# Patient Record
Sex: MALE | Race: WHITE | Employment: OTHER | URBAN - METROPOLITAN AREA
[De-identification: names, ages, dates, MRNs, and addresses within clinical notes are randomized per-mention and may not be internally consistent; named-entity substitution may affect disease eponyms.]

---

## 2017-01-16 ENCOUNTER — ANTICOAGULATION THERAPY VISIT (OUTPATIENT)
Dept: ANTICOAGULATION | Facility: CLINIC | Age: 82
End: 2017-01-16
Payer: COMMERCIAL

## 2017-01-16 ENCOUNTER — OFFICE VISIT (OUTPATIENT)
Dept: INTERNAL MEDICINE | Facility: CLINIC | Age: 82
End: 2017-01-16
Payer: COMMERCIAL

## 2017-01-16 VITALS
HEART RATE: 94 BPM | TEMPERATURE: 97.4 F | OXYGEN SATURATION: 97 % | DIASTOLIC BLOOD PRESSURE: 78 MMHG | WEIGHT: 187 LBS | BODY MASS INDEX: 26.18 KG/M2 | HEIGHT: 71 IN | SYSTOLIC BLOOD PRESSURE: 124 MMHG

## 2017-01-16 DIAGNOSIS — E11.9 TYPE 2 DIABETES MELLITUS WITHOUT COMPLICATION, WITHOUT LONG-TERM CURRENT USE OF INSULIN (H): ICD-10-CM

## 2017-01-16 DIAGNOSIS — E11.22 TYPE 2 DIABETES MELLITUS WITH STAGE 3 CHRONIC KIDNEY DISEASE, WITHOUT LONG-TERM CURRENT USE OF INSULIN (H): ICD-10-CM

## 2017-01-16 DIAGNOSIS — Z01.818 PREOP GENERAL PHYSICAL EXAM: Primary | ICD-10-CM

## 2017-01-16 DIAGNOSIS — N18.30 TYPE 2 DIABETES MELLITUS WITH STAGE 3 CHRONIC KIDNEY DISEASE, WITHOUT LONG-TERM CURRENT USE OF INSULIN (H): ICD-10-CM

## 2017-01-16 DIAGNOSIS — Z79.01 LONG-TERM (CURRENT) USE OF ANTICOAGULANTS: Primary | ICD-10-CM

## 2017-01-16 DIAGNOSIS — I48.20 CHRONIC ATRIAL FIBRILLATION (H): ICD-10-CM

## 2017-01-16 DIAGNOSIS — H26.9 CATARACT: ICD-10-CM

## 2017-01-16 LAB — INR POINT OF CARE: 4.1 (ref 0.86–1.14)

## 2017-01-16 PROCEDURE — 85610 PROTHROMBIN TIME: CPT | Mod: QW

## 2017-01-16 PROCEDURE — 36416 COLLJ CAPILLARY BLOOD SPEC: CPT

## 2017-01-16 PROCEDURE — 99207 ZZC NO CHARGE NURSE ONLY: CPT

## 2017-01-16 PROCEDURE — 99214 OFFICE O/P EST MOD 30 MIN: CPT | Performed by: INTERNAL MEDICINE

## 2017-01-16 RX ORDER — PIOGLITAZONEHYDROCHLORIDE 45 MG/1
45 TABLET ORAL DAILY
Qty: 90 TABLET | Refills: 1 | Status: SHIPPED | OUTPATIENT
Start: 2017-01-16 | End: 2017-06-14

## 2017-01-16 NOTE — PROGRESS NOTES
ANTICOAGULATION FOLLOW-UP CLINIC VISIT    Patient Name:  Louis Keller Jr.  Date:  1/16/2017  Contact Type:  Face to Face    SUBJECTIVE:     Patient Findings     Positives Activity level change (Pt reports decreased activity.), Unexplained INR or factor level change    Comments Pt has cataract surgery 1/17/17           OBJECTIVE    INR PROTIME   Date Value Ref Range Status   01/16/2017 4.1* 0.86 - 1.14 Final       ASSESSMENT / PLAN  INR assessment SUPRA    Recheck INR In: 10 DAYS    INR Location Clinic      Anticoagulation Summary as of 1/16/2017     INR goal 2.0-3.0   Selected INR 4.1! (1/16/2017)   Maintenance plan 5 mg (5 mg x 1) on Sun, Tue, Thu; 2.5 mg (5 mg x 0.5) all other days   Full instructions 1/16: Hold; Otherwise 5 mg on Sun, Tue, Thu; 2.5 mg all other days   Weekly total 25 mg   Plan last modified Monica Souza RN (5/12/2016)   Next INR check 1/26/2017   Priority INR   Target end date     Indications   Long-term (current) use of anticoagulants [Z79.01] [Z79.01]  Atrial fibrillation (H) [I48.91]         Anticoagulation Episode Summary     INR check location     Preferred lab     Send INR reminders to [facility]    Comments       Anticoagulation Care Providers     Provider Role Specialty Phone number    Joselito Armas MD [facility] Internal Medicine [phone]            See the Encounter Report to view Anticoagulation Flowsheet and Dosing Calendar (Go to Encounters tab in chart review, and find the Anticoagulation Therapy Visit)        Monica Souza, RN

## 2017-01-16 NOTE — MR AVS SNAPSHOT
After Visit Summary   1/16/2017    Louis Keller Jr.    MRN: [id number]           Patient Information     Date Of Birth          6/26/1932        Visit Information        Provider Department      1/16/2017 1:00 PM Joselito Armas MD [facility]        Today's Diagnoses     Preop general physical exam    -  1     Type 2 diabetes mellitus without complication, without long-term current use of insulin (H)           Care Instructions      Before Your Surgery      Call your surgeon if there is any change in your health. This includes signs of a cold or flu (such as a sore throat, runny nose, cough, rash or fever).    Do not smoke, drink alcohol or take over the counter medicine (unless your surgeon or primary care doctor tells you to) for the 24 hours before and after surgery.    If you take prescribed drugs: Follow your doctor s orders about which medicines to take and which to stop until after surgery.    Eating and drinking prior to surgery: follow the instructions from your surgeon    Take a shower or bath the night before surgery. Use the soap your surgeon gave you to gently clean your skin. If you do not have soap from your surgeon, use your regular soap. Do not shave or scrub the surgery site.  Wear clean pajamas and have clean sheets on your bed.         Follow-ups after your visit        Your next 10 appointments already scheduled     Jan 16, 2017  1:45 PM   Anticoagulation Visit with [facility]   [facility] ([facility])    [address] [facility] [address]  [facility] [id number] [phone]            Mar 07, 2017  2:15 PM   Return Visit with Herbert Gleason MD   [facility] [place] ([facility])    [address]  [facility] [id number] [phone]              Who to contact     If you have questions or need follow up information about today's clinic visit or your  "schedule please contact [facility] directly at [phone].  Normal or non-critical lab and imaging results will be communicated to you by MyChart, letter or phone within 4 business days after the clinic has received the results. If you do not hear from us within 7 days, please contact the clinic through MyChart or phone. If you have a critical or abnormal lab result, we will notify you by phone as soon as possible.  Submit refill requests through [employer] or call your pharmacy and they will forward the refill request to us. Please allow 3 business days for your refill to be completed.          Additional Information About Your Visit        Care EveryWhere ID     This is your Care EveryWhere ID. This could be used by other organizations to access your [facility] medical records  [id number]        Your Vitals Were     Pulse Temperature Height BMI (Body Mass Index) Pulse Oximetry       94 97.4  F (36.3  C) (Oral) 5' 11\" (1.803 m) 26.09 kg/m2 97%        Blood Pressure from Last 3 Encounters:   01/16/17 124/78   12/14/16 112/70   09/14/16 108/58    Weight from Last 3 Encounters:   01/16/17 187 lb (84.823 kg)   12/14/16 185 lb (83.915 kg)   09/14/16 188 lb (85.276 kg)              Today, you had the following     No orders found for display         Today's Medication Changes          These changes are accurate as of: 1/16/17  1:31 PM.  If you have any questions, ask your nurse or doctor.               These medicines have changed or have updated prescriptions.        Dose/Directions    pioglitazone 45 MG tablet   Commonly known as:  ACTOS   This may have changed:  when to take this   Used for:  Type 2 diabetes mellitus without complication, without long-term current use of insulin (H)   Changed by:  Joselito Armas MD        Dose:  45 mg   Take 1 tablet (45 mg) by mouth daily   Quantity:  90 tablet   Refills:  1         Stop taking these medicines if you haven't already. Please contact your care team " if you have questions.     metFORMIN 1000 MG tablet   Commonly known as:  GLUCOPHAGE   Stopped by:  Joselito Armas MD                Where to get your medicines      These medications were sent to [facility] [address] [facility] - [place], [place] - [address]  [address], [facility] [address]     Phone:  [phone]    - pioglitazone 45 MG tablet             Primary Care Provider Office Phone # Fax #    Joselito Armas [phone]       [facility] [address] [facility] [address]        Thank you!     Thank you for choosing [facility]  for your care. Our goal is always to provide you with excellent care. Hearing back from our patients is one way we can continue to improve our services. Please take a few minutes to complete the written survey that you may receive in the mail after your visit with us. Thank you!             Your Updated Medication List - Protect others around you: Learn how to safely use, store and throw away your medicines at www.disposemymeds.org.          This list is accurate as of: 1/16/17  1:31 PM.  Always use your most recent med list.                   Brand Name Dispense Instructions for use    ACE NOT PRESCRIBED (INTENTIONAL)     0 each    1 each daily ACE Inhibitor not prescribed due to Risk for drug interaction       acetaminophen 500 MG tablet    TYLENOL     Take 500 mg by mouth every 6 hours as needed       ASPIRIN NOT PRESCRIBED    INTENTIONAL     by Other route continuous prn.       blood glucose monitoring lancets     1 Box    Use to test blood sugar 1 times daily or as directed.       blood glucose monitoring test strip    ONE TOUCH ULTRA    100 each    100 strips by In Vitro route daily check daily       cholestyramine 4 G Packet    QUESTRAN    180 each    Take 1 packet (4 g) by mouth 2 times daily (with meals)       Chromium 200 MCG Tabs      Take 500 tablets by mouth daily       diltiazem 120 MG 24 hr capsule      90 capsule    Take 1 capsule (120 mg) by mouth every evening       ferrous gluconate 324 (38 FE) MG tablet    FERGON    100 tablet    Take 1 tablet (324 mg) by mouth daily (with breakfast)       fish oil-omega-3 fatty acids 1000 MG capsule      Take 1 g by mouth 2 times daily       glipiZIDE 10 MG 24 hr tablet    glipiZIDE XL    180 tablet    Take 1 tablet (10 mg) by mouth 2 times daily       loperamide 2 MG tablet    IMODIUM A-D    30 tablet    1 tablet BID       Multi-vitamin Tabs tablet   Generic drug:  multivitamin, therapeutic with minerals     100    one tab po qd       order for DME     1 Device    Equipment being ordered: Onetouch Ultrasoft Lancing Device       pioglitazone 45 MG tablet    ACTOS    90 tablet    Take 1 tablet (45 mg) by mouth daily       STATIN NOT PRESCRIBED (INTENTIONAL)     0 each    1 each At Bedtime Statin not prescribed intentionally due to Risk for drug interaction       warfarin 5 MG tablet    COUMADIN    72 tablet    Take 1 tablet (5 mg) Tuesday, Thursday, and Sunday. Take one-half tablet (2.5 mg) on Monday, Wednesday, Friday, and Saturday or as directed by INR clinic

## 2017-01-16 NOTE — NURSING NOTE
"Chief Complaint   Patient presents with     Pre-Op Exam     01/17/17       Initial /78 mmHg  Pulse 94  Temp(Src) 97.4  F (36.3  C) (Oral)  Ht 5' 11\" (1.803 m)  Wt 187 lb (84.823 kg)  BMI 26.09 kg/m2  SpO2 97% Estimated body mass index is 26.09 kg/(m^2) as calculated from the following:    Height as of this encounter: 5' 11\" (1.803 m).    Weight as of this encounter: 187 lb (84.823 kg).  BP completed using cuff size: margarita Grimm MA      "

## 2017-01-16 NOTE — MR AVS SNAPSHOT
Louis Keller Jr.   1/16/2017 1:45 PM   Anticoagulation Therapy Visit    Description:  84 year old male   Provider:  [facility]   Department:  [facility]           INR as of 1/16/2017     Selected INR 4.1! (1/16/2017)      Anticoagulation Summary as of 1/16/2017     INR goal 2.0-3.0   Selected INR 4.1! (1/16/2017)   Full instructions 1/16: Hold; Otherwise 5 mg on Sun, Tue, Thu; 2.5 mg all other days   Next INR check 1/26/2017    Indications   Long-term (current) use of anticoagulants [Z79.01] [Z79.01]  Atrial fibrillation (H) [I48.91]         Your next Anticoagulation Clinic appointment(s)     Jan 26, 2017  2:45 PM   Anticoagulation Visit with [facility]   [facility] ([facility])    [address] [facility] [address]  [facility] [address] [phone]              Contact Numbers     [facility] Phone Numbers:  Anticoagulation Clinic Appointments : [phone]  Anticoagulation Nurse: [phone]         January 2017 Details    Sun Mon Tue Wed Thu Fri Sat     1               2               3               4               5               6               7                 8               9               10               11               12               13               14                 15               16      Hold   See details      17      5 mg         18      2.5 mg         19      5 mg         20      2.5 mg         21      2.5 mg           22      5 mg         23      2.5 mg         24      5 mg         25      2.5 mg         26            27               28                 29               30               31                    Date Details   01/16 This INR check       Date of next INR:  1/26/2017         How to take your warfarin dose     To take:  2.5 mg Take 0.5 of a 5 mg tablet.    To take:  5 mg Take 1 of the 5 mg tablets.    Hold Do not take your warfarin dose. See the Details table to the right for additional  instructions.

## 2017-01-16 NOTE — PROGRESS NOTES
[facility]  [address]  [facility] [address]  [phone]  Dept: [phone]    PRE-OP EVALUATION:  Today's date: 2017    Louis PACHECO Arianna Willis (: 1932) presents for pre-operative evaluation assessment as requested by Dr. Sagastume.  He requires evaluation and anesthesia risk assessment prior to undergoing surgery/procedure for treatment of eye cataract .  Proposed procedure: Rt Cataract Removal    Date of Surgery/ Procedure: 17  Time of Surgery/ Procedure: 10:30AM  Hospital/Surgical Facility: [facility]  Fax number for surgical facility: [phone]  Primary Physician: Joselito Armas  Type of Anesthesia Anticipated: to be determined    Patient has a Health Care Directive or Living Will:  YES     1. NO - Do you have a history of heart attack, stroke, stent, bypass or surgery on an artery in the head, neck, heart or legs?  2. NO - Do you ever have any pain or discomfort in your chest?  3. NO - Do you have a history of  Heart Failure?  4. YES - ARE YOUR TROUBLED BY SHORTNESS OF BREATH WHEN WALKING ON THE LEVEL, UP A SLIGHT HILL OR AT NIGHT?   5. NO - Do you currently have a cold, bronchitis or other respiratory infection?  6. NO - Do you have a cough, shortness of breath or wheezing?  7. YES - DO YOU SOMETIMES GET PAINS IN THE CALVES OF YOUR LEGS WHEN YOU WALK?   8. NO - Do you or anyone in your family have previous history of blood clots?  9. NO - Do you or does anyone in your family have a serious bleeding problem such as prolonged bleeding following surgeries or cuts?  10. YES - HAVE YOU EVER HAD PROBLEMS WITH ANEMIA OR BEEN TOLD TO TAKE IRON PILLS?   11. YES - HAVE YOU HAD ANY ABNORMAL BLOOD LOSS SUCH AS BLACK, TARRY OR BLOODY STOOLS, OR ABNORMAL VAGINAL BLEEDING?   12. YES - HAVE YOU EVER HAD A BLOOD TRANSFUSION?   13. NO - Have you or any of your relatives ever had problems with anesthesia?  14. NO - Do you have sleep apnea, excessive  snoring or daytime drowsiness?  15. NO - Do you have any prosthetic heart valves?  16. YES - DO YOU HAVE PROSTHETIC JOINTS?   17. NO - Is there any chance that you may be pregnant?      HPI:                                                      Brief HPI related to upcoming procedure: scheduled for eye cataract surgery.   No acute complaints, no medication change or new medical conditions.  Has history of atrial fibrillation. On anticoagulation with Coumadin and rate control medications. Asymptonatic - no chest pains , palpitations,  no side effects from medications.  Has H/O DM. On diet , exercise and PO medications. Blood sugars are controlled. No parestesias. No hypoglycemias. Has had diarrhea related to Metformin  Patient has anemia . Has been treated with iron PO . Currently not symptomatic with dizziness, weakness, no melena or blood in the stools.         See problem list for active medical problems.  Problems all longstanding and stable, except as noted/documented.  See ROS for pertinent symptoms related to these conditions.                                                                                                  .    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  FPA / [facility]             Advanced directives, counseling/discussion 05/07/2012     Priority: Low     Patient states has Advance Directive and will bring in a copy to clinic.       HL (hearing loss) 05/07/2012     Priority: Low      Past Medical History   Diagnosis Date     Type II or unspecified type diabetes mellitus without mention of complication, not stated as uncontrolled      Scarlet fever      Hemorrhage of gastrointestinal tract, unspecified 63,65,and 1971     hospitalized     Unspecified disease of pancreas 1990     pancreatitis     Arrhythmia      atrial fibrillation     Antiplatelet or antithrombotic long-term use      History of blood transfusion      Diarrhea      Atrial fibrillation (H)      QUESADA (dyspnea on exertion)      Diastolic murmur      Past Surgical History   Procedure Laterality Date     C nonspecific procedure  Child     T&A     C nonspecific procedure  ; also 11/03     Colonoscopy     C nonspecific procedure       Basal cell cancer of the nose     C nonspecific procedure  1990     Cholecystectomy     Arthroplasty knee  8/5/2013     Procedure: ARTHROPLASTY KNEE;  Left Total Knee Arthroplasty       Cardioversion  9/6/11     failed     Current Outpatient Prescriptions   Medication Sig Dispense Refill     ferrous gluconate (FERGON) 324 (38 FE) MG tablet Take 1  tablet (324 mg) by mouth daily (with breakfast) 100 tablet 3     warfarin (COUMADIN) 5 MG tablet Take 1 tablet (5 mg) Tuesday, Thursday, and Sunday. Take one-half tablet (2.5 mg) on Monday, Wednesday, Friday, and Saturday or as directed by INR clinic 72 tablet 3     pioglitazone (ACTOS) 45 MG tablet Take 1 tablet (45 mg) by mouth every other day 90 tablet 1     diltiazem 120 MG 24 hr CD capsule Take 1 capsule (120 mg) by mouth every evening 90 capsule 1     cholestyramine (QUESTRAN) 4 G packet Take 1 packet (4 g) by mouth 2 times daily (with meals) 180 each 0     glipiZIDE (GLIPIZIDE XL) 10 MG 24 hr tablet Take 1 tablet (10 mg) by mouth 2 times daily 180 tablet 1     loperamide (IMODIUM A-D) 2 MG tablet Start with 2 tabs (4 mg), then take one tab (2 mg) after each diarrheal stool.  Do not use more than  8 tabs (16 mg) per day. 30 tablet 0     blood glucose monitoring (ONE TOUCH ULTRA) test strip 100 strips by In Vitro route daily check daily 100 each 3     metFORMIN (GLUCOPHAGE) 1000 MG tablet Take 1 tablet (1,000 mg) by mouth 2 times daily (with meals) 180 tablet 3     acetaminophen (TYLENOL) 500 MG tablet Take 500 mg by mouth every 6 hours as needed        STATIN NOT PRESCRIBED, INTENTIONAL, 1 each At Bedtime Statin not prescribed intentionally due to Risk for drug interaction 0 each 0     ACE NOT PRESCRIBED, INTENTIONAL, 1 each daily ACE Inhibitor not prescribed due to Risk for drug interaction 0 each 0     ORDER FOR DME Equipment being ordered: Onetouch Ultrasoft Lancing Device 1 Device 0     blood glucose monitoring (ONE TOUCH ULTRASOFT) lancets Use to test blood sugar 1 times daily or as directed. 1 Box 2     Chromium 200 MCG TABS Take 1 tablet by mouth 2 times daily.       ASPIRIN NOT PRESCRIBED, INTENTIONAL, by Other route continuous prn.       fish oil-omega-3 fatty acids (FISH OIL) 1000 MG capsule Take 400 g by mouth 2 times daily        MULTI-VITAMIN OR TABS one tab po qd 100 0     OTC products: None,  except as noted above    Allergies   Allergen Reactions     No Known Drug Allergies       Latex Allergy: NO    Social History   Substance Use Topics     Smoking status: Never Smoker      Smokeless tobacco: Never Used     Alcohol Use: 0.0 oz/week     0 Standard drinks or equivalent per week      Comment: 1 glass of wine every day     History   Drug Use No       REVIEW OF SYSTEMS:                                                    C: NEGATIVE for fever, chills, change in weight  I: NEGATIVE for worrisome rashes, moles or lesions  E: NEGATIVE for vision changes or irritation  E/M: NEGATIVE for ear, mouth and throat problems  R: NEGATIVE for significant cough or SOB  CV: NEGATIVE for chest pain, palpitations or peripheral edema  GI: NEGATIVE for nausea, abdominal pain, heartburn, or change in bowel habits  : NEGATIVE for frequency, dysuria, or hematuria  M: NEGATIVE for significant arthralgias or myalgia  N: NEGATIVE for weakness, dizziness or paresthesias  E: NEGATIVE for temperature intolerance, skin/hair changes  H: NEGATIVE for bleeding problems  P: NEGATIVE for changes in mood or affect    EXAM:                                                    There were no vitals taken for this visit.    GENERAL APPEARANCE: healthy, alert and no distress     EYES: EOMI, - PERRL     HENT: ear canals and TM's normal and nose and mouth without ulcers or lesions     NECK: no adenopathy, no asymmetry, masses, or scars and thyroid normal to palpation     RESP: lungs clear to auscultation - no rales, rhonchi or wheezes     CV: irregular rates and rhythm, normal S1 S2, no S3 or S4 and no murmur, click or rub -     ABDOMEN:  soft, nontender, no HSM or masses and bowel sounds normal     MS: extremities normal- no gross deformities noted, no evidence of inflammation in joints, FROM in all extremities.     SKIN: no suspicious lesions or rashes     NEURO: Normal strength and tone, sensory exam grossly normal, mentation intact and speech  normal     PSYCH: mentation appears normal. and affect normal/bright     LYMPHATICS: No axillary, cervical, inguinal, or supraclavicular nodes    DIAGNOSTICS:                                                    No labs or EKG required for low risk surgery (cataract, skin procedure, breast biopsy, etc)    Recent Labs   Lab Test 12/29/16 12/14/16   0822 12/01/16 09/14/16   0858   HGB   --   12.5*   --    --   12.5*   PLT   --   189   --    --   176   INR  3.4*   --   3.0*   < >   --    NA   --   140   --    --   139   POTASSIUM   --   4.4   --    --   4.3   CR   --   0.96   --    --   0.89   A1C   --   6.8*   --    --   6.6*    < > = values in this interval not displayed.        IMPRESSION:                                                    Reason for surgery/procedure: eye cataract   Diagnosis/reason for consult: preoperative evaluation/ clearance      The proposed surgical procedure is considered LOW risk.    REVISED CARDIAC RISK INDEX  The patient has the following serious cardiovascular risks for perioperative complications such as (MI, PE, VFib and 3  AV Block):  No serious cardiac risks  INTERPRETATION: 0 risks: Class I (very low risk - 0.4% complication rate)    The patient has the following additional risks for perioperative complications:  No identified additional risks    No diagnosis found.    RECOMMENDATIONS:                                                          --Patient is to take all scheduled medications on the day of surgery EXCEPT for modifications listed below.  Hold morning medications on day of procedure    APPROVAL GIVEN to proceed with proposed procedure, without further diagnostic evaluation       Signed Electronically by: Joselito Armas MD    Copy of this evaluation report is provided to requesting physician.    [facility] Preop Guidelines

## 2017-01-17 DIAGNOSIS — E78.5 HYPERLIPIDEMIA LDL GOAL <100: ICD-10-CM

## 2017-01-17 DIAGNOSIS — K91.89 POSTCHOLECYSTECTOMY DIARRHEA: Primary | ICD-10-CM

## 2017-01-17 DIAGNOSIS — R19.7 POSTCHOLECYSTECTOMY DIARRHEA: Primary | ICD-10-CM

## 2017-01-17 NOTE — TELEPHONE ENCOUNTER
Cholestyramine      Last Written Prescription Date: 10/12/16  Last Fill Quantity: 180, # refills: 0  [place] with [facility] or [facility] prescribing provider: 01/16/17   Next 5 appointments (look out 90 days)     Mar 07, 2017  2:15 PM   Return Visit with Herbert Gleason MD   [facility] ([facility])    [address] [address] [phone]                   CHOL      144   3/14/2016  HDL       51   3/14/2016  LDL       76   3/14/2016  TRIG       87   3/14/2016  CHOLHDLRATIO      2.0   3/20/2015    Labs showing if normal/abnormal  Lab Results   Component Value Date    CHOL 144 03/14/2016    TRIG 87 03/14/2016    HDL 51 03/14/2016    LDL 76 03/14/2016    VLDL 19 03/20/2015    CHOLHDLRATIO 2.0 03/20/2015

## 2017-01-21 RX ORDER — CHOLESTYRAMINE 4 G/9G
1 POWDER, FOR SUSPENSION ORAL 2 TIMES DAILY WITH MEALS
Qty: 180 EACH | Refills: 0 | Status: SHIPPED | OUTPATIENT
Start: 2017-01-21 | End: 2017-03-15

## 2017-01-23 ENCOUNTER — TELEPHONE (OUTPATIENT)
Dept: INTERNAL MEDICINE | Facility: CLINIC | Age: 82
End: 2017-01-23

## 2017-01-23 NOTE — TELEPHONE ENCOUNTER
Reason for Call:  Other     Detailed comments: pt has some ?'s about his medications & which ones he should be taking & for how long.      Phone Number Patient can be reached at: Home number on file [phone] (home)    Best Time: anytime    Can we leave a detailed message on this number? YES    Call taken on 1/23/2017 at 3:52 PM by Monica Kelly

## 2017-01-24 NOTE — TELEPHONE ENCOUNTER
Question:  1.) stopped metformin and started Actos every day, diarrhea has stopped-should he stop Questran ?  BS have been a little elevated up to 150's verses 120's.

## 2017-01-26 ENCOUNTER — ANTICOAGULATION THERAPY VISIT (OUTPATIENT)
Dept: ANTICOAGULATION | Facility: CLINIC | Age: 82
End: 2017-01-26
Payer: COMMERCIAL

## 2017-01-26 DIAGNOSIS — I48.91 ATRIAL FIBRILLATION (H): ICD-10-CM

## 2017-01-26 DIAGNOSIS — Z79.01 LONG-TERM (CURRENT) USE OF ANTICOAGULANTS: Primary | ICD-10-CM

## 2017-01-26 LAB — INR POINT OF CARE: 2.1 (ref 0.86–1.14)

## 2017-01-26 PROCEDURE — 36416 COLLJ CAPILLARY BLOOD SPEC: CPT

## 2017-01-26 PROCEDURE — 85610 PROTHROMBIN TIME: CPT | Mod: QW

## 2017-01-26 PROCEDURE — 99207 ZZC NO CHARGE NURSE ONLY: CPT

## 2017-01-26 NOTE — MR AVS SNAPSHOT
Louis Keller Jr.   1/26/2017 2:45 PM   Anticoagulation Therapy Visit    Description:  84 year old male   Provider:  [facility]   Department:  [facility]           INR as of 1/26/2017     Selected INR 2.1 (1/26/2017)      Anticoagulation Summary as of 1/26/2017     INR goal 2.0-3.0   Selected INR 2.1 (1/26/2017)   Full instructions 5 mg on Sun, Tue, Thu; 2.5 mg all other days   Next INR check 2/16/2017    Indications   Long-term (current) use of anticoagulants [Z79.01] [Z79.01]  Atrial fibrillation (H) [I48.91]         Your next Anticoagulation Clinic appointment(s)     Feb 16, 2017  1:45 PM   Anticoagulation Visit with [facility]   [facility]    [address] [facility] [address]  [facility] [id number] [phone]              Contact Numbers     [facility] Phone Numbers:  Anticoagulation Clinic Appointments : [phone]  Anticoagulation Nurse: [phone]         January 2017 Details    Sun Mon Tue Wed Thu Fri Sat     1               2               3               4               5               6               7                 8               9               10               11               12               13               14                 15               16               17               18               19               20               21                 22               23               24               25               26      5 mg   See details      27      2.5 mg         28      2.5 mg           29      5 mg         30      2.5 mg         31      5 mg              Date Details   01/26 This INR check               How to take your warfarin dose     To take:  2.5 mg Take 0.5 of a 5 mg tablet.    To take:  5 mg Take 1 of the 5 mg tablets.           February 2017 Details    Sun Mon Tue Wed Thu Fri Sat        1      2.5 mg         2      5 mg         3      2.5 mg         4      2.5 mg           5      5 mg          6      2.5 mg         7      5 mg         8      2.5 mg         9      5 mg         10      2.5 mg         11      2.5 mg           12      5 mg         13      2.5 mg         14      5 mg         15      2.5 mg         16            17               18                 19               20               21               22               23               24               25                 26               27               28                    Date Details   No additional details    Date of next INR:  2/16/2017         How to take your warfarin dose     To take:  2.5 mg Take 0.5 of a 5 mg tablet.    To take:  5 mg Take 1 of the 5 mg tablets.

## 2017-01-26 NOTE — PROGRESS NOTES
ANTICOAGULATION FOLLOW-UP CLINIC VISIT    Patient Name:  Louis Keller Jr.  Date:  1/26/2017  Contact Type:  Face to Face    SUBJECTIVE:     Patient Findings     Positives Medication Changes (Metformin stopped and Actos dose increased since pt was having diarrhea.  Diarrhea has improved since this change.)           OBJECTIVE    INR PROTIME   Date Value Ref Range Status   01/26/2017 2.1* 0.86 - 1.14 Final       ASSESSMENT / PLAN  INR assessment THER    Recheck INR In: 3 WEEKS    INR Location Clinic      Anticoagulation Summary as of 1/26/2017     INR goal 2.0-3.0   Selected INR 2.1 (1/26/2017)   Maintenance plan 5 mg (5 mg x 1) on Sun, Tue, Thu; 2.5 mg (5 mg x 0.5) all other days   Full instructions 5 mg on Sun, Tue, Thu; 2.5 mg all other days   Weekly total 25 mg   No change documented Tata Alexis RN   Plan last modified Monica Souza RN (5/12/2016)   Next INR check 2/16/2017   Priority INR   Target end date     Indications   Long-term (current) use of anticoagulants [Z79.01] [Z79.01]  Atrial fibrillation (H) [I48.91]         Anticoagulation Episode Summary     INR check location     Preferred lab     Send INR reminders to [facility]    Comments likes calendar printout.        Anticoagulation Care Providers     Provider Role Specialty Phone number    Joselito Armas MD [facility] Internal Medicine [phone]            See the Encounter Report to view Anticoagulation Flowsheet and Dosing Calendar (Go to Encounters tab in chart review, and find the Anticoagulation Therapy Visit)    Dosage adjustment made based on physician directed care plan.    Tata Alexis RN

## 2017-02-16 ENCOUNTER — ANTICOAGULATION THERAPY VISIT (OUTPATIENT)
Dept: ANTICOAGULATION | Facility: CLINIC | Age: 82
End: 2017-02-16
Payer: COMMERCIAL

## 2017-02-16 DIAGNOSIS — Z79.01 LONG-TERM (CURRENT) USE OF ANTICOAGULANTS: ICD-10-CM

## 2017-02-16 LAB — INR POINT OF CARE: 2 (ref 0.86–1.14)

## 2017-02-16 PROCEDURE — 36416 COLLJ CAPILLARY BLOOD SPEC: CPT

## 2017-02-16 PROCEDURE — 85610 PROTHROMBIN TIME: CPT | Mod: QW

## 2017-02-16 PROCEDURE — 99207 ZZC NO CHARGE NURSE ONLY: CPT

## 2017-02-16 NOTE — PROGRESS NOTES
ANTICOAGULATION FOLLOW-UP CLINIC VISIT    Patient Name:  Louis Keller Jr.  Date:  2/16/2017  Contact Type:  Face to Face    SUBJECTIVE:     Patient Findings     Positives No Problem Findings           OBJECTIVE    INR Protime   Date Value Ref Range Status   02/16/2017 2.0 (A) 0.86 - 1.14 Final       ASSESSMENT / PLAN  INR assessment THER    Recheck INR In: 4 WEEKS    INR Location Clinic      Anticoagulation Summary as of 2/16/2017     INR goal 2.0-3.0   Today's INR 2.0   Maintenance plan 5 mg (5 mg x 1) on Sun, Tue, Thu; 2.5 mg (5 mg x 0.5) all other days   Full instructions 5 mg on Sun, Tue, Thu; 2.5 mg all other days   Weekly total 25 mg   Plan last modified Monica Souza RN (5/12/2016)   Next INR check 3/16/2017   Priority INR   Target end date     Indications   Long-term (current) use of anticoagulants [Z79.01] [Z79.01]  Atrial fibrillation (H) [I48.91]         Anticoagulation Episode Summary     INR check location     Preferred lab     Send INR reminders to [facility]    Comments likes calendar printout.        Anticoagulation Care Providers     Provider Role Specialty Phone number    Joselito Armas MD Responsible Internal Medicine [phone]            See the Encounter Report to view Anticoagulation Flowsheet and Dosing Calendar (Go to Encounters tab in chart review, and find the Anticoagulation Therapy Visit)        Monica Souza, RN

## 2017-03-07 ENCOUNTER — OFFICE VISIT (OUTPATIENT)
Dept: CARDIOLOGY | Facility: CLINIC | Age: 82
End: 2017-03-07
Attending: INTERNAL MEDICINE
Payer: COMMERCIAL

## 2017-03-07 VITALS
HEART RATE: 78 BPM | SYSTOLIC BLOOD PRESSURE: 124 MMHG | DIASTOLIC BLOOD PRESSURE: 68 MMHG | HEIGHT: 71 IN | BODY MASS INDEX: 26.32 KG/M2 | WEIGHT: 188 LBS

## 2017-03-07 DIAGNOSIS — I48.20 CHRONIC ATRIAL FIBRILLATION (H): ICD-10-CM

## 2017-03-07 DIAGNOSIS — R06.09 DOE (DYSPNEA ON EXERTION): ICD-10-CM

## 2017-03-07 PROCEDURE — 99213 OFFICE O/P EST LOW 20 MIN: CPT | Performed by: INTERNAL MEDICINE

## 2017-03-07 NOTE — LETTER
3/7/2017    Joselito Armas MD  [facility]   [address] [facility] [address]    RE: Louis Keller Jr.       Dear Colleague,    I had the pleasure of seeing Louis Keller Jr. in the [facility].    Cardiology Progress Note          Assessment and Plan:     1. Stable, chronic atrial fibrillation on rate control strategy.    Repeat stress testing if worsening functional status in the future.    Continue anticoagulation    Routine follow-up one year      This note was transcribed using electronic voice recognition software and there may be typographical errors present.                Interval History:   The patient is a very pleasant 84 year old whom I have been following for chronic atrial fibrillation on rate control strategy and anticoagulation.  He has had negative stress testing in the past in 2013 and 2014.  He has stable dyspnea on exertion.      I see his wife, Laura in cardiology as well.  She is currently in the hospital for influenza and dehydration while on chronic diuretics that she did not hold despite reduced oral intake during her illness and has had resultant hyponatremia and weakness.                     Review of Systems:   Review of Systems:  Skin:  Negative     Eyes:  Positive for glasses  ENT:  Positive for hearing loss;postnasal drainage;nasal congestion  Respiratory:  Positive for dyspnea on exertion;cough  Cardiovascular:    Positive for;fatigue  Gastroenterology: Positive for diarrhea  Genitourinary:  Positive for urinary frequency;nocturia;prostate problem  Musculoskeletal:  Positive for joint pain;foot pain  Neurologic:  Positive for numbness or tingling of feet;headaches  Psychiatric:  Positive for sleep disturbances  Heme/Lymph/Imm:  Positive for    Endocrine:  Positive for diabetes              Physical Exam:     Vitals: /68 (BP Location: Right arm, Patient Position: Chair, Cuff Size: Adult Regular)  Pulse 78  Ht 1.803 m  "(5' 11\")  Wt 85.3 kg (188 lb)  BMI 26.22 kg/m2  Constitutional:  cooperative, alert and oriented, well developed, well nourished, in no acute distress        Skin:  warm and dry to the touch, no apparent skin lesions or masses noted        Head:  normocephalic, no masses or lesions        Eyes:  pupils equal and round, conjunctivae and lids unremarkable, sclera white, no xanthalasma, EOMS intact, no nystagmus        ENT:  no pallor or cyanosis, dentition good        Neck:  JVP normal        Chest:  normal breath sounds, clear to auscultation, normal A-P diameter, normal symmetry, normal respiratory excursion, no use of accessory muscles        Cardiac:   irregularly irregular rhythm         grade 1;early diastolic murmur good rate control    Abdomen:      benign    Extremities and Back:  no deformities, clubbing, cyanosis, erythema observed        Neurological:  affect appropriate, oriented to time, person and place;no gross motor deficits                 Medications:     Current Outpatient Prescriptions   Medication Sig Dispense Refill     Chromium 1000 MCG TABS Take 500 mcg by mouth daily       cholestyramine (QUESTRAN) 4 G Packet Take 1 packet (4 g) by mouth 2 times daily (with meals) (Patient taking differently: Take 1 packet by mouth daily ) 180 each 0     pioglitazone (ACTOS) 45 MG tablet Take 1 tablet (45 mg) by mouth daily 90 tablet 1     ferrous gluconate (FERGON) 324 (38 FE) MG tablet Take 1 tablet (324 mg) by mouth daily (with breakfast) 100 tablet 3     warfarin (COUMADIN) 5 MG tablet Take 1 tablet (5 mg) Tuesday, Thursday, and Sunday. Take one-half tablet (2.5 mg) on Monday, Wednesday, Friday, and Saturday or as directed by INR clinic 72 tablet 3     diltiazem 120 MG 24 hr CD capsule Take 1 capsule (120 mg) by mouth every evening 90 capsule 1     glipiZIDE (GLIPIZIDE XL) 10 MG 24 hr tablet Take 1 tablet (10 mg) by mouth 2 times daily 180 tablet 1     loperamide (IMODIUM A-D) 2 MG tablet 1 tablet BID 30 " tablet 0     blood glucose monitoring (ONE TOUCH ULTRA) test strip 100 strips by In Vitro route daily check daily 100 each 3     acetaminophen (TYLENOL) 500 MG tablet Take 500 mg by mouth every 6 hours as needed        ORDER FOR DME Equipment being ordered: Onetouch Ultrasoft Lancing Device 1 Device 0     blood glucose monitoring (ONE TOUCH ULTRASOFT) lancets Use to test blood sugar 1 times daily or as directed. 1 Box 2     fish oil-omega-3 fatty acids (FISH OIL) 1000 MG capsule Take 1 g by mouth 2 times daily        MULTI-VITAMIN OR TABS one tab po qd 100 0     STATIN NOT PRESCRIBED, INTENTIONAL, 1 each At Bedtime Statin not prescribed intentionally due to Risk for drug interaction (Patient not taking: Reported on 3/7/2017) 0 each 0     ACE NOT PRESCRIBED, INTENTIONAL, 1 each daily ACE Inhibitor not prescribed due to Risk for drug interaction (Patient not taking: Reported on 3/7/2017) 0 each 0     ASPIRIN NOT PRESCRIBED, INTENTIONAL, by Other route continuous prn Reported on 3/7/2017                  Data:   All laboratory data reviewed  No results found for this or any previous visit (from the past 24 hour(s)).    All laboratory data reviewed  Lab Results   Component Value Date    CHOL 144 03/14/2016     Lab Results   Component Value Date    HDL 51 03/14/2016     Lab Results   Component Value Date    LDL 76 03/14/2016     Lab Results   Component Value Date    TRIG 87 03/14/2016     Lab Results   Component Value Date    CHOLHDLRATIO 2.0 03/20/2015     TSH   Date Value Ref Range Status   06/08/2016 3.94 0.40 - 4.00 mU/L Final     Last Basic Metabolic Panel:  Lab Results   Component Value Date     12/14/2016      Lab Results   Component Value Date    POTASSIUM 4.4 12/14/2016     Lab Results   Component Value Date    CHLORIDE 105 12/14/2016     Lab Results   Component Value Date    MICHAEL 9.2 12/14/2016     Lab Results   Component Value Date    CO2 28 12/14/2016     Lab Results   Component Value Date    BUN 13  12/14/2016     Lab Results   Component Value Date    CR 0.96 12/14/2016     Lab Results   Component Value Date     12/14/2016     Lab Results   Component Value Date    WBC 6.5 12/14/2016     Lab Results   Component Value Date    RBC 4.07 12/14/2016     Lab Results   Component Value Date    HGB 12.5 12/14/2016     Lab Results   Component Value Date    HCT 38.8 12/14/2016     Lab Results   Component Value Date    MCV 95 12/14/2016     Lab Results   Component Value Date    MCH 30.7 12/14/2016     Lab Results   Component Value Date    MCHC 32.2 12/14/2016     Lab Results   Component Value Date    RDW 14.2 12/14/2016     Lab Results   Component Value Date     12/14/2016     Thank you for allowing me to participate in the care of your patient.    Sincerely,     Herbert Gleason MD     [facility]

## 2017-03-07 NOTE — MR AVS SNAPSHOT
After Visit Summary   3/7/2017    Louis Keller Jr.    MRN: [id number]           Patient Information     Date Of Birth          6/26/1932        Visit Information        Provider Department      3/7/2017 2:15 PM Herbert Gleason MD [facility]        Today's Diagnoses     Chronic atrial fibrillation (H)        QUESADA (dyspnea on exertion)           Follow-ups after your visit        Additional Services     Follow-Up with Cardiologist                 Your next 10 appointments already scheduled     Mar 15, 2017  1:20 PM CDT   Office Visit with Joselito Armas MD   [facility]    [address] [place]  [facility] [id number] [phone]           Bring a current list of meds and any records pertaining to this visit.  For Physicals, please bring immunization records and any forms needing to be filled out.  Please arrive 10 minutes early to complete paperwork.            Mar 16, 2017 11:00 AM CDT   [facility]tico[facility] Visit with [facility]   [facility]    [address] [place] [address]  [facility] [id number] [phone]              Future tests that were ordered for you today     Open Future Orders        Priority Expected Expires Ordered    Follow-Up with Cardiologist Routine 3/7/2018 7/20/2018 3/7/2017            Who to contact     If you have questions or need follow up information about today's clinic visit or your schedule please contact [facility] [place] directly at [phone].  Normal or non-critical lab and imaging results will be communicated to you by MyChart, letter or phone within 4 business days after the clinic has received the results. If you do not hear from us within 7 days, please contact the clinic through MyChart or phone. If you have a critical or abnormal lab result, we will notify you by  "phone as soon as possible.  Submit refill requests through [employer] or call your pharmacy and they will forward the refill request to us. Please allow 3 business days for your refill to be completed.          Additional Information About Your Visit        Care EveryWhere ID     This is your Care EveryWhere ID. This could be used by other organizations to access your [place] medical records  [id number]        Your Vitals Were     Pulse Height BMI (Body Mass Index)             78 1.803 m (5' 11\") 26.22 kg/m2          Blood Pressure from Last 3 Encounters:   03/07/17 124/68   01/16/17 124/78   12/14/16 112/70    Weight from Last 3 Encounters:   03/07/17 85.3 kg (188 lb)   01/16/17 84.8 kg (187 lb)   12/14/16 83.9 kg (185 lb)              We Performed the Following     Follow-Up with Cardiologist          Today's Medication Changes          These changes are accurate as of: 3/7/17  2:41 PM.  If you have any questions, ask your nurse or doctor.               These medicines have changed or have updated prescriptions.        Dose/Directions    cholestyramine 4 G Packet   Commonly known as:  QUESTRAN   This may have changed:  when to take this   Used for:  Postcholecystectomy diarrhea, Hyperlipidemia LDL goal <100        Dose:  1 packet   Take 1 packet (4 g) by mouth 2 times daily (with meals)   Quantity:  180 each   Refills:  0       Chromium 1000 MCG Tabs   This may have changed:  Another medication with the same name was removed. Continue taking this medication, and follow the directions you see here.   Changed by:  Herbert Gleason MD        Dose:  500 mcg   Take 500 mcg by mouth daily   Refills:  0                Primary Care Provider Office Phone # Fax #    Joselito Armas [phone]       [facility] [address] [place] [address]        Thank you!     Thank you for choosing [facility] [place]  for your care. Our goal is always to " provide you with excellent care. Hearing back from our patients is one way we can continue to improve our services. Please take a few minutes to complete the written survey that you may receive in the mail after your visit with us. Thank you!             Your Updated Medication List - Protect others around you: Learn how to safely use, store and throw away your medicines at www.disposemymeds.org.          This list is accurate as of: 3/7/17  2:41 PM.  Always use your most recent med list.                   Brand Name Dispense Instructions for use    ACE NOT PRESCRIBED (INTENTIONAL)     0 each    1 each daily ACE Inhibitor not prescribed due to Risk for drug interaction       acetaminophen 500 MG tablet    TYLENOL     Take 500 mg by mouth every 6 hours as needed       ASPIRIN NOT PRESCRIBED    INTENTIONAL     by Other route continuous prn Reported on 3/7/2017       blood glucose monitoring lancets     1 Box    Use to test blood sugar 1 times daily or as directed.       blood glucose monitoring test strip    ONE TOUCH ULTRA    100 each    100 strips by In Vitro route daily check daily       cholestyramine 4 G Packet    QUESTRAN    180 each    Take 1 packet (4 g) by mouth 2 times daily (with meals)       Chromium 1000 MCG Tabs      Take 500 mcg by mouth daily       diltiazem 120 MG 24 hr capsule     90 capsule    Take 1 capsule (120 mg) by mouth every evening       ferrous gluconate 324 (38 FE) MG tablet    FERGON    100 tablet    Take 1 tablet (324 mg) by mouth daily (with breakfast)       fish oil-omega-3 fatty acids 1000 MG capsule      Take 1 g by mouth 2 times daily       glipiZIDE 10 MG 24 hr tablet    glipiZIDE XL    180 tablet    Take 1 tablet (10 mg) by mouth 2 times daily       loperamide 2 MG tablet    IMODIUM A-D    30 tablet    1 tablet BID       Multi-vitamin Tabs tablet   Generic drug:  multivitamin, therapeutic with minerals     100    one tab po qd       order for DME     1 Device    Equipment being  ordered: Onetouch Ultrasoft Lancing Device       pioglitazone 45 MG tablet    ACTOS    90 tablet    Take 1 tablet (45 mg) by mouth daily       STATIN NOT PRESCRIBED (INTENTIONAL)     0 each    1 each At Bedtime Statin not prescribed intentionally due to Risk for drug interaction       warfarin 5 MG tablet    COUMADIN    72 tablet    Take 1 tablet (5 mg) Tuesday, Thursday, and Sunday. Take one-half tablet (2.5 mg) on Monday, Wednesday, Friday, and Saturday or as directed by INR clinic

## 2017-03-07 NOTE — PROGRESS NOTES
"Cardiology Progress Note          Assessment and Plan:     1. Stable, chronic atrial fibrillation on rate control strategy.    Repeat stress testing if worsening functional status in the future.    Continue anticoagulation    Routine follow-up one year      This note was transcribed using electronic voice recognition software and there may be typographical errors present.                Interval History:   The patient is a very pleasant 84 year old whom I have been following for chronic atrial fibrillation on rate control strategy and anticoagulation.  He has had negative stress testing in the past in 2013 and 2014.  He has stable dyspnea on exertion.      I see his wife, Laura in cardiology as well.  She is currently in the hospital for influenza and dehydration while on chronic diuretics that she did not hold despite reduced oral intake during her illness and has had resultant hyponatremia and weakness.                     Review of Systems:   Review of Systems:  Skin:  Negative     Eyes:  Positive for glasses  ENT:  Positive for hearing loss;postnasal drainage;nasal congestion  Respiratory:  Positive for dyspnea on exertion;cough  Cardiovascular:    Positive for;fatigue  Gastroenterology: Positive for diarrhea  Genitourinary:  Positive for urinary frequency;nocturia;prostate problem  Musculoskeletal:  Positive for joint pain;foot pain  Neurologic:  Positive for numbness or tingling of feet;headaches  Psychiatric:  Positive for sleep disturbances  Heme/Lymph/Imm:  Positive for    Endocrine:  Positive for diabetes              Physical Exam:     Vitals: /68 (BP Location: Right arm, Patient Position: Chair, Cuff Size: Adult Regular)  Pulse 78  Ht 1.803 m (5' 11\")  Wt 85.3 kg (188 lb)  BMI 26.22 kg/m2  Constitutional:  cooperative, alert and oriented, well developed, well nourished, in no acute distress        Skin:  warm and dry to the touch, no apparent skin lesions or masses noted        Head:  " normocephalic, no masses or lesions        Eyes:  pupils equal and round, conjunctivae and lids unremarkable, sclera white, no xanthalasma, EOMS intact, no nystagmus        ENT:  no pallor or cyanosis, dentition good        Neck:  JVP normal        Chest:  normal breath sounds, clear to auscultation, normal A-P diameter, normal symmetry, normal respiratory excursion, no use of accessory muscles        Cardiac:   irregularly irregular rhythm         grade 1;early diastolic murmur good rate control    Abdomen:      benign    Extremities and Back:  no deformities, clubbing, cyanosis, erythema observed        Neurological:  affect appropriate, oriented to time, person and place;no gross motor deficits                 Medications:     Current Outpatient Prescriptions   Medication Sig Dispense Refill     Chromium 1000 MCG TABS Take 500 mcg by mouth daily       cholestyramine (QUESTRAN) 4 G Packet Take 1 packet (4 g) by mouth 2 times daily (with meals) (Patient taking differently: Take 1 packet by mouth daily ) 180 each 0     pioglitazone (ACTOS) 45 MG tablet Take 1 tablet (45 mg) by mouth daily 90 tablet 1     ferrous gluconate (FERGON) 324 (38 FE) MG tablet Take 1 tablet (324 mg) by mouth daily (with breakfast) 100 tablet 3     warfarin (COUMADIN) 5 MG tablet Take 1 tablet (5 mg) Tuesday, Thursday, and Sunday. Take one-half tablet (2.5 mg) on Monday, Wednesday, Friday, and Saturday or as directed by INR clinic 72 tablet 3     diltiazem 120 MG 24 hr CD capsule Take 1 capsule (120 mg) by mouth every evening 90 capsule 1     glipiZIDE (GLIPIZIDE XL) 10 MG 24 hr tablet Take 1 tablet (10 mg) by mouth 2 times daily 180 tablet 1     loperamide (IMODIUM A-D) 2 MG tablet 1 tablet BID 30 tablet 0     blood glucose monitoring (ONE TOUCH ULTRA) test strip 100 strips by In Vitro route daily check daily 100 each 3     acetaminophen (TYLENOL) 500 MG tablet Take 500 mg by mouth every 6 hours as needed        ORDER FOR DME Equipment  being ordered: Onetouch Ultrasoft Lancing Device 1 Device 0     blood glucose monitoring (ONE TOUCH ULTRASOFT) lancets Use to test blood sugar 1 times daily or as directed. 1 Box 2     fish oil-omega-3 fatty acids (FISH OIL) 1000 MG capsule Take 1 g by mouth 2 times daily        MULTI-VITAMIN OR TABS one tab po qd 100 0     STATIN NOT PRESCRIBED, INTENTIONAL, 1 each At Bedtime Statin not prescribed intentionally due to Risk for drug interaction (Patient not taking: Reported on 3/7/2017) 0 each 0     ACE NOT PRESCRIBED, INTENTIONAL, 1 each daily ACE Inhibitor not prescribed due to Risk for drug interaction (Patient not taking: Reported on 3/7/2017) 0 each 0     ASPIRIN NOT PRESCRIBED, INTENTIONAL, by Other route continuous prn Reported on 3/7/2017                  Data:   All laboratory data reviewed  No results found for this or any previous visit (from the past 24 hour(s)).    All laboratory data reviewed  Lab Results   Component Value Date    CHOL 144 03/14/2016     Lab Results   Component Value Date    HDL 51 03/14/2016     Lab Results   Component Value Date    LDL 76 03/14/2016     Lab Results   Component Value Date    TRIG 87 03/14/2016     Lab Results   Component Value Date    CHOLHDLRATIO 2.0 03/20/2015     TSH   Date Value Ref Range Status   06/08/2016 3.94 0.40 - 4.00 mU/L Final     Last Basic Metabolic Panel:  Lab Results   Component Value Date     12/14/2016      Lab Results   Component Value Date    POTASSIUM 4.4 12/14/2016     Lab Results   Component Value Date    CHLORIDE 105 12/14/2016     Lab Results   Component Value Date    MICHAEL 9.2 12/14/2016     Lab Results   Component Value Date    CO2 28 12/14/2016     Lab Results   Component Value Date    BUN 13 12/14/2016     Lab Results   Component Value Date    CR 0.96 12/14/2016     Lab Results   Component Value Date     12/14/2016     Lab Results   Component Value Date    WBC 6.5 12/14/2016     Lab Results   Component Value Date    RBC 4.07  12/14/2016     Lab Results   Component Value Date    HGB 12.5 12/14/2016     Lab Results   Component Value Date    HCT 38.8 12/14/2016     Lab Results   Component Value Date    MCV 95 12/14/2016     Lab Results   Component Value Date    MCH 30.7 12/14/2016     Lab Results   Component Value Date    MCHC 32.2 12/14/2016     Lab Results   Component Value Date    RDW 14.2 12/14/2016     Lab Results   Component Value Date     12/14/2016

## 2017-03-15 ENCOUNTER — OFFICE VISIT (OUTPATIENT)
Dept: INTERNAL MEDICINE | Facility: CLINIC | Age: 82
End: 2017-03-15
Payer: COMMERCIAL

## 2017-03-15 VITALS
BODY MASS INDEX: 25.9 KG/M2 | OXYGEN SATURATION: 98 % | DIASTOLIC BLOOD PRESSURE: 60 MMHG | SYSTOLIC BLOOD PRESSURE: 124 MMHG | WEIGHT: 185 LBS | HEART RATE: 86 BPM | HEIGHT: 71 IN | TEMPERATURE: 97.7 F

## 2017-03-15 DIAGNOSIS — K91.89 POSTCHOLECYSTECTOMY DIARRHEA: ICD-10-CM

## 2017-03-15 DIAGNOSIS — D64.9 ANEMIA, UNSPECIFIED TYPE: ICD-10-CM

## 2017-03-15 DIAGNOSIS — E78.5 HYPERLIPIDEMIA LDL GOAL <100: ICD-10-CM

## 2017-03-15 DIAGNOSIS — I48.20 CHRONIC ATRIAL FIBRILLATION (H): ICD-10-CM

## 2017-03-15 DIAGNOSIS — E11.22 TYPE 2 DIABETES MELLITUS WITH STAGE 3 CHRONIC KIDNEY DISEASE, WITHOUT LONG-TERM CURRENT USE OF INSULIN (H): Primary | ICD-10-CM

## 2017-03-15 DIAGNOSIS — R19.7 POSTCHOLECYSTECTOMY DIARRHEA: ICD-10-CM

## 2017-03-15 DIAGNOSIS — N18.30 TYPE 2 DIABETES MELLITUS WITH STAGE 3 CHRONIC KIDNEY DISEASE, WITHOUT LONG-TERM CURRENT USE OF INSULIN (H): Primary | ICD-10-CM

## 2017-03-15 LAB
ERYTHROCYTE [DISTWIDTH] IN BLOOD BY AUTOMATED COUNT: 14.9 % (ref 10–15)
HBA1C MFR BLD: 7.6 % (ref 4.3–6)
HCT VFR BLD AUTO: 41.5 % (ref 40–53)
HGB BLD-MCNC: 13.6 G/DL (ref 13.3–17.7)
MCH RBC QN AUTO: 30.8 PG (ref 26.5–33)
MCHC RBC AUTO-ENTMCNC: 32.8 G/DL (ref 31.5–36.5)
MCV RBC AUTO: 94 FL (ref 78–100)
PLATELET # BLD AUTO: 156 10E9/L (ref 150–450)
RBC # BLD AUTO: 4.41 10E12/L (ref 4.4–5.9)
VIT B12 SERPL-MCNC: 294 PG/ML (ref 193–986)
WBC # BLD AUTO: 5.1 10E9/L (ref 4–11)

## 2017-03-15 PROCEDURE — 83540 ASSAY OF IRON: CPT | Performed by: INTERNAL MEDICINE

## 2017-03-15 PROCEDURE — 83036 HEMOGLOBIN GLYCOSYLATED A1C: CPT | Performed by: INTERNAL MEDICINE

## 2017-03-15 PROCEDURE — 36415 COLL VENOUS BLD VENIPUNCTURE: CPT | Performed by: INTERNAL MEDICINE

## 2017-03-15 PROCEDURE — 83550 IRON BINDING TEST: CPT | Performed by: INTERNAL MEDICINE

## 2017-03-15 PROCEDURE — 99214 OFFICE O/P EST MOD 30 MIN: CPT | Performed by: INTERNAL MEDICINE

## 2017-03-15 PROCEDURE — 82607 VITAMIN B-12: CPT | Performed by: INTERNAL MEDICINE

## 2017-03-15 PROCEDURE — 85027 COMPLETE CBC AUTOMATED: CPT | Performed by: INTERNAL MEDICINE

## 2017-03-15 RX ORDER — CHOLESTYRAMINE 4 G/9G
1 POWDER, FOR SUSPENSION ORAL 2 TIMES DAILY WITH MEALS
Qty: 180 EACH | Refills: 3 | Status: SHIPPED | OUTPATIENT
Start: 2017-03-15 | End: 2018-03-03

## 2017-03-15 NOTE — PROGRESS NOTES
SUBJECTIVE:                                                    Louis Keller Jr. is a 84 year old male who presents to clinic today for the following health issues:    Patient is seen for a follow up visit.  No acute complaints, no medication change or new medical conditions.  Seen by cardiology recently , no changes. Has history of atrial fibrillation. On anticoagulation with Coumadin and rate control medications. Asymptonatic - no chest pains , palpitations,  no side effects from medications.    Diabetes Follow-up    Patient is checking blood sugars: once daily.  Results are as follows:         Before lunchtime - varies from 100-170    Diabetic concerns: no     Symptoms of hypoglycemia (low blood sugar): none     Paresthesias (numbness or burning in feet) or sores: Yes , bilateral foot numbness     Date of last diabetic eye exam: 01/2016   Has H/O DM. On diet , exercise and Actos. Blood sugars are controlled. No parestesias. No hypoglycemias.    Hyperlipidemia Follow-Up      Rate your low fat/cholesterol diet?: good    Taking statin?  No    Other lipid medications/supplements?:  Fish oil/Omega 3     Patient has anemia of chronic disease, normal B12, folate, iron, FIT. Has been treated with MVI. Not significant  symptoms reported.      Amount of exercise or physical activity: daily walk    Problems taking medications regularly: No    Medication side effects: No  Diet: eating regular diet    PROBLEMS TO ADD ON...    Problem list and histories reviewed & adjusted, as indicated.  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     Anemia due to blood loss, acute     Physical deconditioning     Diabetes mellitus, type 2 (H)     Atrial fibrillation (H)     QUESADA (dyspnea on exertion)     Diastolic murmur     Pain of right thigh     Long-term (current) use of anticoagulants [Z79.01]     Past Surgical History   Procedure Laterality Date     C nonspecific procedure  Child     T&A     C nonspecific procedure  ; also      Colonoscopy     C nonspecific procedure       Basal cell cancer of the nose     C nonspecific procedure       Cholecystectomy     Arthroplasty knee  2013     Procedure: ARTHROPLASTY KNEE;  Left Total Knee Arthroplasty       Cardioversion  11     failed       Social History   Substance Use Topics     Smoking status: Never Smoker     Smokeless tobacco: Never Used     Alcohol use 0.0 oz/week     0 Standard drinks or equivalent per week      Comment: 1 glass of wine every day     Family History   Problem Relation Age of Onset     Alzheimer Disease Maternal Grandmother      Arthritis Maternal Grandmother      CANCER Mother      breast/ /colon     Eye Disorder Mother      glaucoma and cataracts     HEART DISEASE Mother      angina/CABG     Hypertension Mother      CANCER Father      colon     DIABETES Maternal Aunt      AoDM         Current Outpatient Prescriptions   Medication Sig Dispense Refill     cholestyramine (QUESTRAN) 4 G Packet Take 1 packet (4 g) by mouth 2 times daily (with meals) 180 each 3     Chromium 1000 MCG TABS Take 500 mcg by mouth daily       pioglitazone (ACTOS) 45 MG tablet Take 1 tablet (45 mg) by mouth daily 90 tablet 1     ferrous gluconate (FERGON) 324 (38 FE) MG tablet Take 1 tablet (324 mg) by mouth daily (with breakfast) 100 tablet 3     warfarin (COUMADIN) 5 MG tablet Take 1 tablet (5 mg) Tuesday, Thursday, and . Take one-half tablet (2.5 mg) on Monday, Wednesday, Friday, and Saturday or as directed by INR  "clinic 72 tablet 3     diltiazem 120 MG 24 hr CD capsule Take 1 capsule (120 mg) by mouth every evening 90 capsule 1     glipiZIDE (GLIPIZIDE XL) 10 MG 24 hr tablet Take 1 tablet (10 mg) by mouth 2 times daily 180 tablet 1     loperamide (IMODIUM A-D) 2 MG tablet 1 tablet BID 30 tablet 0     acetaminophen (TYLENOL) 500 MG tablet Take 500 mg by mouth every 6 hours as needed        fish oil-omega-3 fatty acids (FISH OIL) 1000 MG capsule Take 1 g by mouth 2 times daily        MULTI-VITAMIN OR TABS one tab po qd 100 0     [DISCONTINUED] cholestyramine (QUESTRAN) 4 G Packet Take 1 packet (4 g) by mouth 2 times daily (with meals) (Patient taking differently: Take 1 packet by mouth daily ) 180 each 0     blood glucose monitoring (ONE TOUCH ULTRA) test strip 100 strips by In Vitro route daily check daily 100 each 3     STATIN NOT PRESCRIBED, INTENTIONAL, 1 each At Bedtime Statin not prescribed intentionally due to Risk for drug interaction (Patient not taking: Reported on 3/7/2017) 0 each 0     ACE NOT PRESCRIBED, INTENTIONAL, 1 each daily ACE Inhibitor not prescribed due to Risk for drug interaction (Patient not taking: Reported on 3/7/2017) 0 each 0     ORDER FOR DME Equipment being ordered: Onetouch Ultrasoft Lancing Device 1 Device 0     blood glucose monitoring (ONE TOUCH ULTRASOFT) lancets Use to test blood sugar 1 times daily or as directed. 1 Box 2     ASPIRIN NOT PRESCRIBED, INTENTIONAL, by Other route continuous prn Reported on 3/7/2017         Reviewed and updated as needed this visit by clinical staff       Reviewed and updated as needed this visit by Provider         ROS:  Constitutional, HEENT, cardiovascular, pulmonary, GI, , musculoskeletal, neuro, skin, endocrine and psych systems are negative, except as otherwise noted.    OBJECTIVE:                                                    /60  Pulse 86  Temp 97.7  F (36.5  C) (Oral)  Ht 5' 11\" (1.803 m)  Wt 185 lb (83.9 kg)  SpO2 98%  BMI 25.8 " kg/m2  Body mass index is 25.8 kg/(m^2).  GENERAL: healthy, alert and no distress  NECK: no adenopathy, no asymmetry, masses, or scars and thyroid normal to palpation  RESP: lungs clear to auscultation - no rales, rhonchi or wheezes  CV: irregular rate and rhythm, normal S1 S2, no S3 or S4, no murmur, click or rub, no peripheral edema and peripheral pulses strong  ABDOMEN: soft, nontender, no hepatosplenomegaly, no masses and bowel sounds normal  MS: no gross musculoskeletal defects noted, no edema    Diagnostic Test Results:  none      ASSESSMENT/PLAN:                                                      Problem List Items Addressed This Visit     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

## 2017-03-15 NOTE — MR AVS SNAPSHOT
"              After Visit Summary   3/15/2017    Louis Keller Jr.    MRN: [id number]           Patient Information     Date Of Birth          6/26/1932        Visit Information        Provider Department      3/15/2017 1:20 PM Joselito Armas MD [facility]        Today's Diagnoses     Type 2 diabetes mellitus with stage 3 chronic kidney disease, without long-term current use of insulin (H)    -  1    Anemia, unspecified type        Postcholecystectomy diarrhea        Hyperlipidemia LDL goal <100        Chronic atrial fibrillation (H)           Follow-ups after your visit        Your next 10 appointments already scheduled     Mar 16, 2017 11:00 AM CDT   Anticoagulation Visit with [facility]   [facility]    [address] [facility] [address]  [facility] [address] [phone]              Who to contact     If you have questions or need follow up information about today's clinic visit or your schedule please contact [facility] directly at [phone].  Normal or non-critical lab and imaging results will be communicated to you by [employer], letter or phone within 4 business days after the clinic has received the results. If you do not hear from us within 7 days, please contact the clinic through [employer] or phone. If you have a critical or abnormal lab result, we will notify you by phone as soon as possible.  Submit refill requests through [employer] or call your pharmacy and they will forward the refill request to us. Please allow 3 business days for your refill to be completed.          Additional Information About Your Visit        [employer] Information     [employer] lets you send messages to your doctor, view your test results, renew your prescriptions, schedule appointments and more. To sign up, go to [email or web address] . Click on \"Log in\" on the left side of the screen, which will take you to the Welcome page. Then " "click on \"Sign up Now\" on the right side of the page.     You will be asked to enter the access code listed below, as well as some personal information. Please follow the directions to create your username and password.     Your access code is: [email or web address]  Expires: 2017  1:59 PM     Your access code will  in 90 days. If you need help or a new code, please call your [facility] clinic or [phone].        Care EveryWhere ID     This is your Care EveryWhere ID. This could be used by other organizations to access your [facility] medical records  [id number]        Your Vitals Were     Pulse Temperature Height Pulse Oximetry BMI (Body Mass Index)       86 97.7  F (36.5  C) (Oral) 5' 11\" (1.803 m) 98% 25.8 kg/m2        Blood Pressure from Last 3 Encounters:   03/15/17 124/60   17 124/68   17 124/78    Weight from Last 3 Encounters:   03/15/17 185 lb (83.9 kg)   17 188 lb (85.3 kg)   17 187 lb (84.8 kg)              We Performed the Following     CBC with platelets     Hemoglobin A1c     Iron and iron binding capacity     Vitamin B12          Today's Medication Changes          These changes are accurate as of: 3/15/17  1:59 PM.  If you have any questions, ask your nurse or doctor.               These medicines have changed or have updated prescriptions.        Dose/Directions    cholestyramine 4 G Packet   Commonly known as:  QUESTRAN   This may have changed:  when to take this   Used for:  Postcholecystectomy diarrhea, Hyperlipidemia LDL goal <100        Dose:  1 packet   Take 1 packet (4 g) by mouth 2 times daily (with meals)   Quantity:  180 each   Refills:  3            Where to get your medicines      These medications were sent to [facility] [address] [place] [facility] [place], [place] - [address]  [address] [facility] [address]     Phone:  [phone]     cholestyramine 4 G Packet                Primary Care Provider Office Phone # Fax #    Joselito Armas [phone] " [phone]       [facility] [address]  [facility] [address]        Thank you!     Thank you for choosing [facility]  for your care. Our goal is always to provide you with excellent care. Hearing back from our patients is one way we can continue to improve our services. Please take a few minutes to complete the written survey that you may receive in the mail after your visit with us. Thank you!             Your Updated Medication List - Protect others around you: Learn how to safely use, store and throw away your medicines at www.disposemymeds.org.          This list is accurate as of: 3/15/17  1:59 PM.  Always use your most recent med list.                   Brand Name Dispense Instructions for use    ACE NOT PRESCRIBED (INTENTIONAL)     0 each    1 each daily ACE Inhibitor not prescribed due to Risk for drug interaction       acetaminophen 500 MG tablet    TYLENOL     Take 500 mg by mouth every 6 hours as needed       ASPIRIN NOT PRESCRIBED    INTENTIONAL     by Other route continuous prn Reported on 3/7/2017       blood glucose monitoring lancets     1 Box    Use to test blood sugar 1 times daily or as directed.       blood glucose monitoring test strip    ONE TOUCH ULTRA    100 each    100 strips by In Vitro route daily check daily       cholestyramine 4 G Packet    QUESTRAN    180 each    Take 1 packet (4 g) by mouth 2 times daily (with meals)       Chromium 1000 MCG Tabs      Take 500 mcg by mouth daily       diltiazem 120 MG 24 hr capsule     90 capsule    Take 1 capsule (120 mg) by mouth every evening       ferrous gluconate 324 (38 FE) MG tablet    FERGON    100 tablet    Take 1 tablet (324 mg) by mouth daily (with breakfast)       fish oil-omega-3 fatty acids 1000 MG capsule      Take 1 g by mouth 2 times daily       glipiZIDE 10 MG 24 hr tablet    glipiZIDE XL    180 tablet    Take 1 tablet (10 mg) by mouth 2 times daily       loperamide 2 MG tablet    IMODIUM A-D     30 tablet    1 tablet BID       Multi-vitamin Tabs tablet   Generic drug:  multivitamin, therapeutic with minerals     100    one tab po qd       order for DME     1 Device    Equipment being ordered: Onetouch Ultrasoft Lancing Device       pioglitazone 45 MG tablet    ACTOS    90 tablet    Take 1 tablet (45 mg) by mouth daily       STATIN NOT PRESCRIBED (INTENTIONAL)     0 each    1 each At Bedtime Statin not prescribed intentionally due to Risk for drug interaction       warfarin 5 MG tablet    COUMADIN    72 tablet    Take 1 tablet (5 mg) Tuesday, Thursday, and Sunday. Take one-half tablet (2.5 mg) on Monday, Wednesday, Friday, and Saturday or as directed by INR clinic

## 2017-03-15 NOTE — NURSING NOTE
"Chief Complaint   Patient presents with     Diabetes     F/U       Initial /60  Pulse 86  Temp 97.7  F (36.5  C) (Oral)  Ht 5' 11\" (1.803 m)  Wt 185 lb (83.9 kg)  SpO2 98%  BMI 25.8 kg/m2 Estimated body mass index is 25.8 kg/(m^2) as calculated from the following:    Height as of this encounter: 5' 11\" (1.803 m).    Weight as of this encounter: 185 lb (83.9 kg).  Medication Reconciliation: complete   Elen Grimm MA      "

## 2017-03-16 ENCOUNTER — ANTICOAGULATION THERAPY VISIT (OUTPATIENT)
Dept: ANTICOAGULATION | Facility: CLINIC | Age: 82
End: 2017-03-16
Payer: COMMERCIAL

## 2017-03-16 DIAGNOSIS — Z79.01 LONG-TERM (CURRENT) USE OF ANTICOAGULANTS: ICD-10-CM

## 2017-03-16 LAB — INR POINT OF CARE: 2.6 (ref 0.86–1.14)

## 2017-03-16 PROCEDURE — 36416 COLLJ CAPILLARY BLOOD SPEC: CPT

## 2017-03-16 PROCEDURE — 99207 ZZC NO CHARGE NURSE ONLY: CPT

## 2017-03-16 PROCEDURE — 85610 PROTHROMBIN TIME: CPT | Mod: QW

## 2017-03-16 NOTE — PROGRESS NOTES
ANTICOAGULATION FOLLOW-UP CLINIC VISIT    Patient Name:  Louis Keller Jr.  Date:  3/16/2017  Contact Type:  Face to Face    SUBJECTIVE:     Patient Findings     Positives No Problem Findings           OBJECTIVE    INR Protime   Date Value Ref Range Status   03/16/2017 2.6 (A) 0.86 - 1.14 Final       ASSESSMENT / PLAN  INR assessment THER    Recheck INR In: 4 WEEKS    INR Location Clinic      Anticoagulation Summary as of 3/16/2017     INR goal 2.0-3.0   Today's INR 2.6   Maintenance plan 5 mg (5 mg x 1) on Sun, Tue, Thu; 2.5 mg (5 mg x 0.5) all other days   Full instructions 5 mg on Sun, Tue, Thu; 2.5 mg all other days   Weekly total 25 mg   No change documented Monica Souza RN   Plan last modified Monica Souza RN (5/12/2016)   Next INR check 4/13/2017   Priority INR   Target end date     Indications   Long-term (current) use of anticoagulants [Z79.01] [Z79.01]  Atrial fibrillation (H) [I48.91]         Anticoagulation Episode Summary     INR check location     Preferred lab     Send INR reminders to [facility]    Comments likes calendar printout.        Anticoagulation Care Providers     Provider Role Specialty Phone number    Joselito Armas MD Responsible Internal Medicine [phone]            See the Encounter Report to view Anticoagulation Flowsheet and Dosing Calendar (Go to Encounters tab in chart review, and find the Anticoagulation Therapy Visit)    Dosage adjustment made based on physician directed care plan.    Monica Souza RN

## 2017-03-17 LAB
IRON SATN MFR SERPL: 24 % (ref 15–46)
IRON SERPL-MCNC: 110 UG/DL (ref 35–180)
TIBC SERPL-MCNC: 465 UG/DL (ref 240–430)

## 2017-03-21 DIAGNOSIS — E11.9 TYPE 2 DIABETES, HBA1C GOAL < 8% (H): ICD-10-CM

## 2017-03-21 DIAGNOSIS — I48.20 CHRONIC ATRIAL FIBRILLATION (H): ICD-10-CM

## 2017-03-21 DIAGNOSIS — L57.0 ACTINIC KERATOSIS: ICD-10-CM

## 2017-03-21 RX ORDER — GLIPIZIDE 10 MG/1
10 TABLET, FILM COATED, EXTENDED RELEASE ORAL 2 TIMES DAILY
Qty: 180 TABLET | Refills: 0 | Status: SHIPPED | OUTPATIENT
Start: 2017-03-21 | End: 2017-06-14

## 2017-03-21 NOTE — TELEPHONE ENCOUNTER
Per note attached to 3/15/17 lab results-Notes Recorded by Joselito Armas MD on 3/17/2017 at 3:12 PM  Slightly worsened diabetic control. Suggest to improve diet, exercise and recheck in 3 months

## 2017-03-21 NOTE — TELEPHONE ENCOUNTER
Glipizide         Last Written Prescription Date: 9/26/16  Last Fill Quantity: 180, # refills: 1  Last Office Visit with [facility], [facility] or [facility] prescribing provider:  3/15/17        BP Readings from Last 3 Encounters:   03/15/17 124/60   03/07/17 124/68   01/16/17 124/78     Lab Results   Component Value Date    MICROL 16 09/14/2016     No results found for: MICROALBUMIN  Creatinine   Date Value Ref Range Status   12/14/2016 0.96 0.66 - 1.25 mg/dL Final   ]  GFR Estimate   Date Value Ref Range Status   12/14/2016 75 >60 mL/min/1.7m2 Final     Comment:     Non  GFR Calc   09/14/2016 81 >60 mL/min/1.7m2 Final     Comment:     Non  GFR Calc   06/08/2016 76 >60 mL/min/1.7m2 Final     Comment:     Non  GFR Calc     GFR Estimate If Black   Date Value Ref Range Status   12/14/2016 >90   GFR Calc   >60 mL/min/1.7m2 Final   09/14/2016 >90   GFR Calc   >60 mL/min/1.7m2 Final   06/08/2016 >90   GFR Calc   >60 mL/min/1.7m2 Final     Lab Results   Component Value Date    CHOL 144 03/14/2016     Lab Results   Component Value Date    HDL 51 03/14/2016     Lab Results   Component Value Date    LDL 76 03/14/2016     Lab Results   Component Value Date    TRIG 87 03/14/2016     Lab Results   Component Value Date    CHOLHDLRATIO 2.0 03/20/2015     Lab Results   Component Value Date    AST 29 12/14/2016     Lab Results   Component Value Date    ALT 28 12/14/2016     Lab Results   Component Value Date    A1C 7.6 03/15/2017    A1C 6.8 12/14/2016    A1C 6.6 09/14/2016    A1C 7.0 06/08/2016    A1C 7.7 03/14/2016     Potassium   Date Value Ref Range Status   12/14/2016 4.4 3.4 - 5.3 mmol/L Final       Labs showing if normal/abnormal  Lab Results   Component Value Date    UCRR 83 09/14/2016    MICROL 16 09/14/2016     Lab Results   Component Value Date    CHOL 144 03/14/2016    TRIG 87 03/14/2016    HDL 51 03/14/2016    LDL 76 03/14/2016    VLDL 19  03/20/2015    CHOLHDLRATIO 2.0 03/20/2015     Lab Results   Component Value Date    A1C 7.6 (H) 03/15/2017    A1C 6.8 (H) 12/14/2016    A1C 6.6 (H) 09/14/2016

## 2017-04-03 DIAGNOSIS — E11.22 TYPE 2 DIABETES MELLITUS WITH STAGE 3 CHRONIC KIDNEY DISEASE, WITHOUT LONG-TERM CURRENT USE OF INSULIN (H): Primary | ICD-10-CM

## 2017-04-03 DIAGNOSIS — N18.30 TYPE 2 DIABETES MELLITUS WITH STAGE 3 CHRONIC KIDNEY DISEASE, WITHOUT LONG-TERM CURRENT USE OF INSULIN (H): Primary | ICD-10-CM

## 2017-04-03 NOTE — TELEPHONE ENCOUNTER
Pt calling.  Was taken off Metformin 1000mg BID d/t diarrhea March 2017 and advised to take Actos daily.  Also taking Glipizide 10mg BID.    His sugars were 160-170's.    Started taking Metformin 1000mg 1/2 tab daily x 3 wks ago (had some med left).    Sugars were 135-150 and not having diarrhea from 1/2 tab.    Asking if he can get refill on Metformin 1000mg 1/2 tab daily (rx pended for Metformin 500mg tabs)--asking for a 90 day supply.    Has a future appt 6-14-17.    Please advise, thanks.

## 2017-04-13 ENCOUNTER — ANTICOAGULATION THERAPY VISIT (OUTPATIENT)
Dept: ANTICOAGULATION | Facility: CLINIC | Age: 82
End: 2017-04-13
Payer: COMMERCIAL

## 2017-04-13 DIAGNOSIS — Z79.01 LONG-TERM (CURRENT) USE OF ANTICOAGULANTS: ICD-10-CM

## 2017-04-13 LAB — INR POINT OF CARE: 2.8 (ref 0.86–1.14)

## 2017-04-13 PROCEDURE — 36416 COLLJ CAPILLARY BLOOD SPEC: CPT

## 2017-04-13 PROCEDURE — 85610 PROTHROMBIN TIME: CPT | Mod: QW

## 2017-04-13 PROCEDURE — 99207 ZZC NO CHARGE NURSE ONLY: CPT

## 2017-04-13 NOTE — MR AVS SNAPSHOT
Louis Keller Jr.   4/13/2017 1:45 PM   Anticoagulation Therapy Visit    Description:  84 year old male   Provider[facility]  [facility]   Department:  [facility]           INR as of 4/13/2017     Today's INR 2.8      Anticoagulation Summary as of 4/13/2017     INR goal 2.0-3.0   Today's INR 2.8   Full instructions 5 mg on Sun, Tue, Thu; 2.5 mg all other days   Next INR check 5/11/2017    Indications   Long-term (current) use of anticoagulants [Z79.01] [Z79.01]  Atrial fibrillation (H) [I48.91]         Your next Anticoagulation Clinic appointment(s)     May 11, 2017  1:45 PM CDT   [facility] with [facility]   [facility]    [address] [facility] [address]  [facility] [address] [phone]              Contact Numbers     [facility] Phone Numbers:  Anticoagulation Clinic Appointments : [phone]  Anticoagulation Nurse: [phone]         April 2017 Details    Sun Mon Tue Wed Thu Fri Sat           1                 2               3               4               5               6               7               8                 9               10               11               12               13      5 mg   See details      14      2.5 mg         15      2.5 mg           16      5 mg         17      2.5 mg         18      5 mg         19      2.5 mg         20      5 mg         21      2.5 mg         22      2.5 mg           23      5 mg         24      2.5 mg         25      5 mg         26      2.5 mg         27      5 mg         28      2.5 mg         29      2.5 mg           30      5 mg                Date Details   04/13 This INR check               How to take your warfarin dose     To take:  2.5 mg Take 0.5 of a 5 mg tablet.    To take:  5 mg Take 1 of the 5 mg tablets.           May 2017 Details    Sun Mon Tue Wed Thu Fri Sat      1      2.5 mg         2      5 mg         3      2.5 mg         4      5 mg          5      2.5 mg         6      2.5 mg           7      5 mg         8      2.5 mg         9      5 mg         10      2.5 mg         11            12               13                 14               15               16               17               18               19               20                 21               22               23               24               25               26               27                 28               29               30               31                   Date Details   No additional details    Date of next INR:  5/11/2017         How to take your warfarin dose     To take:  2.5 mg Take 0.5 of a 5 mg tablet.    To take:  5 mg Take 1 of the 5 mg tablets.

## 2017-04-13 NOTE — PROGRESS NOTES
ANTICOAGULATION FOLLOW-UP CLINIC VISIT    Patient Name:  Louis Keller Jr.  Date:  4/13/2017  Contact Type:  Face to Face    SUBJECTIVE:     Patient Findings     Positives No Problem Findings           OBJECTIVE    INR Protime   Date Value Ref Range Status   04/13/2017 2.8 (A) 0.86 - 1.14 Final       ASSESSMENT / PLAN  INR assessment THER    Recheck INR In: 4 WEEKS    INR Location Clinic      Anticoagulation Summary as of 4/13/2017     INR goal 2.0-3.0   Today's INR 2.8   Maintenance plan 5 mg (5 mg x 1) on Sun, Tue, Thu; 2.5 mg (5 mg x 0.5) all other days   Full instructions 5 mg on Sun, Tue, Thu; 2.5 mg all other days   Weekly total 25 mg   No change documented Monica Souza RN   Plan last modified Monica Souza RN (5/12/2016)   Next INR check 5/11/2017   Priority INR   Target end date     Indications   Long-term (current) use of anticoagulants [Z79.01] [Z79.01]  Atrial fibrillation (H) [I48.91]         Anticoagulation Episode Summary     INR check location     Preferred lab     Send INR reminders to [facility]    Comments likes calendar printout.        Anticoagulation Care Providers     Provider Role Specialty Phone number    Joselito Armas MD Responsible Internal Medicine [phone]            See the Encounter Report to view Anticoagulation Flowsheet and Dosing Calendar (Go to Encounters tab in chart review, and find the Anticoagulation Therapy Visit)        Monica Souza, CAMILLE

## 2017-05-10 DIAGNOSIS — I48.20 CHRONIC ATRIAL FIBRILLATION (H): ICD-10-CM

## 2017-05-10 RX ORDER — DILTIAZEM HYDROCHLORIDE 120 MG/1
120 CAPSULE, COATED, EXTENDED RELEASE ORAL EVERY EVENING
Qty: 90 CAPSULE | Refills: 3 | Status: SHIPPED | OUTPATIENT
Start: 2017-05-10 | End: 2018-05-15

## 2017-05-12 ENCOUNTER — ANTICOAGULATION THERAPY VISIT (OUTPATIENT)
Dept: ANTICOAGULATION | Facility: CLINIC | Age: 82
End: 2017-05-12
Payer: COMMERCIAL

## 2017-05-12 DIAGNOSIS — Z79.01 LONG-TERM (CURRENT) USE OF ANTICOAGULANTS: ICD-10-CM

## 2017-05-12 DIAGNOSIS — I48.91 ATRIAL FIBRILLATION (H): ICD-10-CM

## 2017-05-12 LAB — INR POINT OF CARE: 3.2 (ref 0.86–1.14)

## 2017-05-12 PROCEDURE — 36416 COLLJ CAPILLARY BLOOD SPEC: CPT

## 2017-05-12 PROCEDURE — 99207 ZZC NO CHARGE NURSE ONLY: CPT

## 2017-05-12 PROCEDURE — 85610 PROTHROMBIN TIME: CPT | Mod: QW

## 2017-05-12 NOTE — PROGRESS NOTES
ANTICOAGULATION FOLLOW-UP CLINIC VISIT    Patient Name:  Louis Keller Jr.  Date:  5/12/2017  Contact Type:  Face to Face    SUBJECTIVE:     Patient Findings     Positives Antibiotic use or infection (took antibiotic prior to dental visit yesterday.)           OBJECTIVE    INR Protime   Date Value Ref Range Status   05/12/2017 3.2 (A) 0.86 - 1.14 Final       ASSESSMENT / PLAN  INR assessment SUPRA    Recheck INR In: 4 WEEKS    INR Location Clinic      Anticoagulation Summary as of 5/12/2017     INR goal 2.0-3.0   Today's INR 3.2!   Maintenance plan 5 mg (5 mg x 1) on Sun, Tue, Thu; 2.5 mg (5 mg x 0.5) all other days   Full instructions 5 mg on Sun, Tue, Thu; 2.5 mg all other days   Weekly total 25 mg   No change documented Tata Alexis RN   Plan last modified Monica Souza RN (5/12/2016)   Next INR check 6/14/2017   Priority INR   Target end date     Indications   Long-term (current) use of anticoagulants [Z79.01] [Z79.01]  Atrial fibrillation (H) [I48.91]         Anticoagulation Episode Summary     INR check location     Preferred lab     Send INR reminders to [facility]    Comments likes calendar printout.        Anticoagulation Care Providers     Provider Role Specialty Phone number    Joselito Armas MD [facility] Internal Medicine [phone]            See the Encounter Report to view Anticoagulation Flowsheet and Dosing Calendar (Go to Encounters tab in chart review, and find the Anticoagulation Therapy Visit)    Dosage adjustment made based on physician directed care plan.    Tata Alexis RN

## 2017-05-12 NOTE — MR AVS SNAPSHOT
Louis Keller Jr.   5/12/2017 1:30 PM   Anticoagulation Therapy Visit    Description:  84 year old male   Provider[facility]  [facility]   Department:  [facility]           INR as of 5/12/2017     Today's INR 3.2!      Anticoagulation Summary as of 5/12/2017     INR goal 2.0-3.0   Today's INR 3.2!   Full instructions 5 mg on Sun, Tue, Thu; 2.5 mg all other days   Next INR check 6/14/2017    Indications   Long-term (current) use of anticoagulants [Z79.01] [Z79.01]  Atrial fibrillation (H) [I48.91]         Your next Anticoagulation Clinic appointment(s)     Jun 14, 2017  8:00 AM CDT   Anticoagulation Visit with [facility]   [facility]    [address] [facility] [address]  [facility] [address] [phone]              Contact Numbers     [facility] Phone Numbers:  Anticoagulation Clinic Appointments : [phone]  Anticoagulation Nurse: [phone]         May 2017 Details    Sun Mon Tue Wed Thu Fri Sat      1               2               3               4               5               6                 7               8               9               10               11               12      2.5 mg   See details      13      2.5 mg           14      5 mg         15      2.5 mg         16      5 mg         17      2.5 mg         18      5 mg         19      2.5 mg         20      2.5 mg           21      5 mg         22      2.5 mg         23      5 mg         24      2.5 mg         25      5 mg         26      2.5 mg         27      2.5 mg           28      5 mg         29      2.5 mg         30      5 mg         31      2.5 mg             Date Details   05/12 This INR check               How to take your warfarin dose     To take:  2.5 mg Take 0.5 of a 5 mg tablet.    To take:  5 mg Take 1 of the 5 mg tablets.           June 2017 Details    Sun Mon Tue Wed Thu Fri Sat         1      5 mg         2      2.5 mg         3      2.5  mg           4      5 mg         5      2.5 mg         6      5 mg         7      2.5 mg         8      5 mg         9      2.5 mg         10      2.5 mg           11      5 mg         12      2.5 mg         13      5 mg         14            15               16               17                 18               19               20               21               22               23               24                 25               26               27               28               29               30                 Date Details   No additional details    Date of next INR:  6/14/2017         How to take your warfarin dose     To take:  2.5 mg Take 0.5 of a 5 mg tablet.    To take:  5 mg Take 1 of the 5 mg tablets.

## 2017-05-25 DIAGNOSIS — Z79.01 CHRONIC ANTICOAGULATION: ICD-10-CM

## 2017-05-25 DIAGNOSIS — R00.2 HEART PALPITATIONS: ICD-10-CM

## 2017-05-25 DIAGNOSIS — I48.91 NEW ONSET ATRIAL FIBRILLATION (H): ICD-10-CM

## 2017-05-25 RX ORDER — WARFARIN SODIUM 5 MG/1
TABLET ORAL
Qty: 72 TABLET | Refills: 3 | Status: CANCELLED | OUTPATIENT
Start: 2017-05-25

## 2017-05-25 NOTE — TELEPHONE ENCOUNTER
Warfarin    Last Written Prescription Date: 12/14/16  Last Fill Qty: 72, # refills: 3  Last Office Visit with [facility], [facility] or [facility] prescribing provider: INR 05/12/17 and IM 03/15/17  Next 5 appointments (look out 90 days)     Jun 14, 2017  7:40 AM CDT   SHORT with Joselito Armas MD   [facility] ([facility]    [address]  [facility] [id number] [phone]                   Date and Result of Last PT/INR:   Lab Results   Component Value Date    INR 3.2 05/12/2017    INR 2.8 04/13/2017    INR 5.03 01/09/2014    INR 1.48 08/08/2013              Lab Results   Component Value Date    INR 3.2 (A) 05/12/2017    INR 2.8 (A) 04/13/2017    INR 2.6 (A) 03/16/2017    INR 2.0 (A) 02/16/2017    INR 2.1 (A) 01/26/2017

## 2017-06-14 ENCOUNTER — ANTICOAGULATION THERAPY VISIT (OUTPATIENT)
Dept: ANTICOAGULATION | Facility: CLINIC | Age: 82
End: 2017-06-14
Payer: COMMERCIAL

## 2017-06-14 ENCOUNTER — OFFICE VISIT (OUTPATIENT)
Dept: INTERNAL MEDICINE | Facility: CLINIC | Age: 82
End: 2017-06-14
Payer: COMMERCIAL

## 2017-06-14 VITALS
RESPIRATION RATE: 16 BRPM | TEMPERATURE: 98.2 F | HEIGHT: 71 IN | DIASTOLIC BLOOD PRESSURE: 62 MMHG | BODY MASS INDEX: 25.62 KG/M2 | WEIGHT: 183 LBS | OXYGEN SATURATION: 97 % | SYSTOLIC BLOOD PRESSURE: 118 MMHG | HEART RATE: 90 BPM

## 2017-06-14 DIAGNOSIS — L57.0 ACTINIC KERATOSIS: ICD-10-CM

## 2017-06-14 DIAGNOSIS — E11.22 TYPE 2 DIABETES MELLITUS WITH STAGE 3 CHRONIC KIDNEY DISEASE, WITHOUT LONG-TERM CURRENT USE OF INSULIN (H): Primary | ICD-10-CM

## 2017-06-14 DIAGNOSIS — I48.20 CHRONIC ATRIAL FIBRILLATION (H): ICD-10-CM

## 2017-06-14 DIAGNOSIS — Z79.01 LONG-TERM (CURRENT) USE OF ANTICOAGULANTS: ICD-10-CM

## 2017-06-14 DIAGNOSIS — N18.30 TYPE 2 DIABETES MELLITUS WITH STAGE 3 CHRONIC KIDNEY DISEASE, WITHOUT LONG-TERM CURRENT USE OF INSULIN (H): Primary | ICD-10-CM

## 2017-06-14 DIAGNOSIS — E78.5 HYPERLIPIDEMIA LDL GOAL <100: ICD-10-CM

## 2017-06-14 DIAGNOSIS — I48.91 ATRIAL FIBRILLATION (H): ICD-10-CM

## 2017-06-14 LAB
ALBUMIN UR-MCNC: NEGATIVE MG/DL
APPEARANCE UR: CLEAR
BILIRUB UR QL STRIP: NEGATIVE
COLOR UR AUTO: YELLOW
GLUCOSE UR STRIP-MCNC: NEGATIVE MG/DL
HBA1C MFR BLD: 7.2 % (ref 4.3–6)
HGB UR QL STRIP: ABNORMAL
INR POINT OF CARE: 2.3 (ref 0.86–1.14)
KETONES UR STRIP-MCNC: NEGATIVE MG/DL
LEUKOCYTE ESTERASE UR QL STRIP: NEGATIVE
NITRATE UR QL: NEGATIVE
PH UR STRIP: 5.5 PH (ref 5–7)
RBC #/AREA URNS AUTO: ABNORMAL /HPF (ref 0–2)
SP GR UR STRIP: 1.01 (ref 1–1.03)
URN SPEC COLLECT METH UR: ABNORMAL
UROBILINOGEN UR STRIP-ACNC: 0.2 EU/DL (ref 0.2–1)
WBC #/AREA URNS AUTO: ABNORMAL /HPF (ref 0–2)

## 2017-06-14 PROCEDURE — 99207 ZZC NO CHARGE NURSE ONLY: CPT

## 2017-06-14 PROCEDURE — 83036 HEMOGLOBIN GLYCOSYLATED A1C: CPT | Performed by: INTERNAL MEDICINE

## 2017-06-14 PROCEDURE — 99214 OFFICE O/P EST MOD 30 MIN: CPT | Performed by: INTERNAL MEDICINE

## 2017-06-14 PROCEDURE — 85610 PROTHROMBIN TIME: CPT | Mod: QW

## 2017-06-14 PROCEDURE — 81001 URINALYSIS AUTO W/SCOPE: CPT | Performed by: INTERNAL MEDICINE

## 2017-06-14 PROCEDURE — 80053 COMPREHEN METABOLIC PANEL: CPT | Performed by: INTERNAL MEDICINE

## 2017-06-14 PROCEDURE — 36416 COLLJ CAPILLARY BLOOD SPEC: CPT

## 2017-06-14 RX ORDER — GLIPIZIDE 10 MG/1
10 TABLET, FILM COATED, EXTENDED RELEASE ORAL 2 TIMES DAILY
Qty: 180 TABLET | Refills: 0 | Status: SHIPPED | OUTPATIENT
Start: 2017-06-14 | End: 2017-09-18

## 2017-06-14 RX ORDER — PIOGLITAZONEHYDROCHLORIDE 45 MG/1
45 TABLET ORAL DAILY
Qty: 90 TABLET | Refills: 1 | Status: SHIPPED | OUTPATIENT
Start: 2017-06-14 | End: 2018-01-27

## 2017-06-14 NOTE — PROGRESS NOTES
ANTICOAGULATION FOLLOW-UP CLINIC VISIT    Patient Name:  Louis Keller Jr.  Date:  6/14/2017  Contact Type:  Face to Face    SUBJECTIVE:     Patient Findings     Positives No Problem Findings           OBJECTIVE    INR Protime   Date Value Ref Range Status   06/14/2017 2.3 (A) 0.86 - 1.14 Final       ASSESSMENT / PLAN  INR assessment THER    Recheck INR In: 4 WEEKS    INR Location Clinic      Anticoagulation Summary as of 6/14/2017     INR goal 2.0-3.0   Today's INR 2.3   Maintenance plan 5 mg (5 mg x 1) on Sun, Tue, Thu; 2.5 mg (5 mg x 0.5) all other days   Full instructions 5 mg on Sun, Tue, Thu; 2.5 mg all other days   Weekly total 25 mg   No change documented Tata Alexis RN   Plan last modified Monica Souza RN (5/12/2016)   Next INR check 7/13/2017   Priority INR   Target end date     Indications   Long-term (current) use of anticoagulants [Z79.01] [Z79.01]  Atrial fibrillation (H) [I48.91]         Anticoagulation Episode Summary     INR check location     Preferred lab     Send INR reminders to [facility]    Comments likes calendar printout.        Anticoagulation Care Providers     Provider Role Specialty Phone number    Joselito Armas MD Responsible Internal Medicine [phone]            See the Encounter Report to view Anticoagulation Flowsheet and Dosing Calendar (Go to Encounters tab in chart review, and find the Anticoagulation Therapy Visit)    Dosage adjustment made based on physician directed care plan.    Tata Alexis RN

## 2017-06-14 NOTE — NURSING NOTE
"Chief Complaint   Patient presents with     Recheck Medication     Amoxicillin usage questions.     Blood Draw     Per requested by PCP to recheck labs in 3 months        Initial /62  Pulse 90  Temp 98.2  F (36.8  C) (Oral)  Resp 16  Ht 5' 11\" (1.803 m)  Wt 183 lb (83 kg)  SpO2 97%  BMI 25.52 kg/m2 Estimated body mass index is 25.52 kg/(m^2) as calculated from the following:    Height as of this encounter: 5' 11\" (1.803 m).    Weight as of this encounter: 183 lb (83 kg).  Medication Reconciliation: complete      Marina Vargas CMA      "

## 2017-06-14 NOTE — MR AVS SNAPSHOT
"              After Visit Summary   6/14/2017    Louis Keller Jr.    MRN: [id number]           Patient Information     Date Of Birth          6/26/1932        Visit Information        Provider Department      6/14/2017 7:40 AM Joselito Armas MD [facility]        Today's Diagnoses     Type 2 diabetes mellitus with stage 3 chronic kidney disease, without long-term current use of insulin (H)    -  1    Chronic atrial fibrillation (H)        Actinic keratosis        Hyperlipidemia LDL goal <100           Follow-ups after your visit        Who to contact     If you have questions or need follow up information about today's clinic visit or your schedule please contact [facility] directly at [phone].  Normal or non-critical lab and imaging results will be communicated to you by MyChart, letter or phone within 4 business days after the clinic has received the results. If you do not hear from us within 7 days, please contact the clinic through MyChart or phone. If you have a critical or abnormal lab result, we will notify you by phone as soon as possible.  Submit refill requests through [employer] or call your pharmacy and they will forward the refill request to us. Please allow 3 business days for your refill to be completed.          Additional Information About Your Visit        MyChart Information     [employer] lets you send messages to your doctor, view your test results, renew your prescriptions, schedule appointments and more. To sign up, go to [email or web address] . Click on \"Log in\" on the left side of the screen, which will take you to the Welcome page. Then click on \"Sign up Now\" on the right side of the page.     You will be asked to enter the access code listed below, as well as some personal information. Please follow the directions to create your username and password.     Your access code is: [email or web address]  Expires: 9/12/2017  8:13 AM     Your access code will " " in 90 days. If you need help or a new code, please call your [facility] clinic or [phone].        Care EveryWhere ID     This is your Care EveryWhere ID. This could be used by other organizations to access your [facility] medical records  [id number]        Your Vitals Were     Pulse Temperature Respirations Height Pulse Oximetry BMI (Body Mass Index)    90 98.2  F (36.8  C) (Oral) 16 5' 11\" (1.803 m) 97% 25.52 kg/m2       Blood Pressure from Last 3 Encounters:   17 118/62   03/15/17 124/60   17 124/68    Weight from Last 3 Encounters:   17 183 lb (83 kg)   03/15/17 185 lb (83.9 kg)   17 188 lb (85.3 kg)              We Performed the Following     *UA reflex to Microscopic and Culture ([place] and [facility] (except [place] and [place])     Comprehensive metabolic panel     Hemoglobin A1c          Where to get your medicines      These medications were sent to [place] [address] [place] [facility] [address]  [address] [facility] [address]     Phone:  [phone]     glipiZIDE 10 MG 24 hr tablet    pioglitazone 45 MG tablet          Primary Care Provider Office Phone # Fax #    Joselito Armas [phone]       [facility] [address] [facility] [address]        Thank you!     Thank you for choosing [facility]  for your care. Our goal is always to provide you with excellent care. Hearing back from our patients is one way we can continue to improve our services. Please take a few minutes to complete the written survey that you may receive in the mail after your visit with us. Thank you!             Your Updated Medication List - Protect others around you: Learn how to safely use, store and throw away your medicines at www.disposemymeds.org.          This list is accurate as of: 17  8:13 AM.  Always use your most recent med list.                   Brand Name Dispense Instructions for use    ACE " NOT PRESCRIBED (INTENTIONAL)     0 each    1 each daily ACE Inhibitor not prescribed due to Risk for drug interaction       acetaminophen 500 MG tablet    TYLENOL     Take 500 mg by mouth every 6 hours as needed       ASPIRIN NOT PRESCRIBED    INTENTIONAL     by Other route continuous prn Reported on 3/7/2017       blood glucose monitoring lancets     1 Box    Use to test blood sugar 1 times daily or as directed.       blood glucose monitoring test strip    ONE TOUCH ULTRA    100 each    100 strips by In Vitro route daily check daily       cholestyramine 4 G Packet    QUESTRAN    180 each    Take 1 packet (4 g) by mouth 2 times daily (with meals)       Chromium 1000 MCG Tabs      Take 500 mcg by mouth daily       diltiazem 120 MG 24 hr capsule     90 capsule    Take 1 capsule (120 mg) by mouth every evening       ferrous gluconate 324 (38 FE) MG tablet    FERGON    100 tablet    Take 1 tablet (324 mg) by mouth daily (with breakfast)       fish oil-omega-3 fatty acids 1000 MG capsule      Take 1 g by mouth 2 times daily       glipiZIDE 10 MG 24 hr tablet    glipiZIDE XL    180 tablet    Take 1 tablet (10 mg) by mouth 2 times daily       loperamide 2 MG tablet    IMODIUM A-D    30 tablet    1 tablet BID       metFORMIN 500 MG tablet    GLUCOPHAGE    90 tablet    Take 1 tablet (500 mg) by mouth daily (with dinner)       Multi-vitamin Tabs tablet   Generic drug:  multivitamin, therapeutic with minerals     100    one tab po qd       order for DME     1 Device    Equipment being ordered: Onetouch Ultrasoft Lancing Device       pioglitazone 45 MG tablet    ACTOS    90 tablet    Take 1 tablet (45 mg) by mouth daily       STATIN NOT PRESCRIBED (INTENTIONAL)     0 each    1 each At Bedtime Statin not prescribed intentionally due to Risk for drug interaction       warfarin 5 MG tablet    COUMADIN    72 tablet    Take 1 tablet (5 mg) Tuesday, Thursday, and Sunday. Take one-half tablet (2.5 mg) on Monday, Wednesday, Friday, and  Saturday or as directed by INR clinic

## 2017-06-14 NOTE — MR AVS SNAPSHOT
Louis Keller Jr.   6/14/2017 8:00 AM   Anticoagulation Therapy Visit    Description:  84 year old male   Provider[facility]   Department:  [facility]           INR as of 6/14/2017     Today's INR 2.3      Anticoagulation Summary as of 6/14/2017     INR goal 2.0-3.0   Today's INR 2.3   Full instructions 5 mg on Sun, Tue, Thu; 2.5 mg all other days   Next INR check 7/13/2017    Indications   Long-term (current) use of anticoagulants [Z79.01] [Z79.01]  Atrial fibrillation (H) [I48.91]         Your next Anticoagulation Clinic appointment(s)     Jul 13, 2017 11:00 AM CDT   [facility] with [facility]   [facility]    [address] [facility] [address]  [facility] [address] [phone]              Contact Numbers     [facility] Phone Numbers:  Anticoagulation Clinic Appointments : [phone]  Anticoagulation Nurse: [phone]         June 2017 Details    Sun Mon Tue Wed Thu Fri Sat         1               2               3                 4               5               6               7               8               9               10                 11               12               13               14      2.5 mg   See details      15      5 mg         16      2.5 mg         17      2.5 mg           18      5 mg         19      2.5 mg         20      5 mg         21      2.5 mg         22      5 mg         23      2.5 mg         24      2.5 mg           25      5 mg         26      2.5 mg         27      5 mg         28      2.5 mg         29      5 mg         30      2.5 mg           Date Details   06/14 This INR check               How to take your warfarin dose     To take:  2.5 mg Take 0.5 of a 5 mg tablet.    To take:  5 mg Take 1 of the 5 mg tablets.           July 2017 Details    Sun Mon Tue Wed Thu Fri Sat           1      2.5 mg           2      5 mg         3      2.5 mg         4      5 mg          5      2.5 mg         6      5 mg         7      2.5 mg         8      2.5 mg           9      5 mg         10      2.5 mg         11      5 mg         12      2.5 mg         13            14               15                 16               17               18               19               20               21               22                 23               24               25               26               27               28               29                 30               31                     Date Details   No additional details    Date of next INR:  7/13/2017         How to take your warfarin dose     To take:  2.5 mg Take 0.5 of a 5 mg tablet.    To take:  5 mg Take 1 of the 5 mg tablets.

## 2017-06-14 NOTE — PROGRESS NOTES
SUBJECTIVE:                                                    Louis Keller Jr. is a 84 year old male who presents to clinic today for the following health issues:      Patient is seen for a follow up visit.  No acute complaints, no medication change or new medical conditions.  Has H/O DM. On diet , exercise and PO medications. Blood sugars are controlled. Has mild feet parestesias. No hypoglycemias.  Has added Metformin for the past 3 months. Last HgbA1c was slightly worse - 7.4 . Now has average glucose of 127 for the past month.     Has history of atrial fibrillation. On anticoagulation with Coumadin and rate control medications. Asymptonatic - no chest pains , palpitations,  no side effects from medications.  Has H/O hyperlipidemia. On medical treatment and diet. No side effects. No muscle weakness, myalgias or upset stomach.           Problem list and histories reviewed & adjusted, as indicated.  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     Anemia due to blood loss, acute     Physical deconditioning     Diabetes mellitus, type 2 (H)     Atrial fibrillation (H)     QUESADA (dyspnea on exertion)     Diastolic murmur     Pain of right thigh     Long-term (current) use of anticoagulants [Z79.01]     Past Surgical History:   Procedure Laterality Date     ARTHROPLASTY KNEE  8/5/2013    Procedure: ARTHROPLASTY KNEE;  Left Total Knee Arthroplasty       C NONSPECIFIC PROCEDURE  Child    T&A     C NONSPECIFIC PROCEDURE  ; also 11/03    Colonoscopy     C NONSPECIFIC  PROCEDURE      Basal cell cancer of the nose     C NONSPECIFIC PROCEDURE      Cholecystectomy     CARDIOVERSION  11    failed       Social History   Substance Use Topics     Smoking status: Never Smoker     Smokeless tobacco: Never Used     Alcohol use 0.0 oz/week     0 Standard drinks or equivalent per week      Comment: 1 glass of wine every day     Family History   Problem Relation Age of Onset     Alzheimer Disease Maternal Grandmother      Arthritis Maternal Grandmother      CANCER Mother      breast/ 1983/colon     Eye Disorder Mother      glaucoma and cataracts     HEART DISEASE Mother      angina/CABG     Hypertension Mother      CANCER Father      colon     DIABETES Maternal Aunt      AoDM         Current Outpatient Prescriptions   Medication Sig Dispense Refill     glipiZIDE (GLIPIZIDE XL) 10 MG 24 hr tablet Take 1 tablet (10 mg) by mouth 2 times daily 180 tablet 0     pioglitazone (ACTOS) 45 MG tablet Take 1 tablet (45 mg) by mouth daily 90 tablet 1     diltiazem 120 MG 24 hr capsule Take 1 capsule (120 mg) by mouth every evening 90 capsule 3     metFORMIN (GLUCOPHAGE) 500 MG tablet Take 1 tablet (500 mg) by mouth daily (with dinner) 90 tablet 1     cholestyramine (QUESTRAN) 4 G Packet Take 1 packet (4 g) by mouth 2 times daily (with meals) 180 each 3     Chromium 1000 MCG TABS Take 500 mcg by mouth daily       ferrous gluconate (FERGON) 324 (38 FE) MG tablet Take 1 tablet (324 mg) by mouth daily (with breakfast) 100 tablet 3     warfarin (COUMADIN) 5 MG tablet Take 1 tablet (5 mg) Tuesday, Thursday, and . Take one-half tablet (2.5 mg) on Monday, Wednesday, Friday, and Saturday or as directed by INR clinic 72 tablet 3     loperamide (IMODIUM A-D) 2 MG tablet 1 tablet BID 30 tablet 0     blood glucose monitoring (ONE TOUCH ULTRA) test strip 100 strips by In Vitro route daily check daily 100 each 3     acetaminophen (TYLENOL) 500 MG tablet Take 500 mg by mouth every 6 hours as needed    "     STATIN NOT PRESCRIBED, INTENTIONAL, 1 each At Bedtime Statin not prescribed intentionally due to Risk for drug interaction 0 each 0     ACE NOT PRESCRIBED, INTENTIONAL, 1 each daily ACE Inhibitor not prescribed due to Risk for drug interaction 0 each 0     ORDER FOR DME Equipment being ordered: Onetouch Ultrasoft Lancing Device 1 Device 0     blood glucose monitoring (ONE TOUCH ULTRASOFT) lancets Use to test blood sugar 1 times daily or as directed. 1 Box 2     ASPIRIN NOT PRESCRIBED, INTENTIONAL, by Other route continuous prn Reported on 3/7/2017       fish oil-omega-3 fatty acids (FISH OIL) 1000 MG capsule Take 1 g by mouth 2 times daily        MULTI-VITAMIN OR TABS one tab po qd 100 0     [DISCONTINUED] glipiZIDE (GLIPIZIDE XL) 10 MG 24 hr tablet Take 1 tablet (10 mg) by mouth 2 times daily 180 tablet 0     [DISCONTINUED] pioglitazone (ACTOS) 45 MG tablet Take 1 tablet (45 mg) by mouth daily 90 tablet 1       Reviewed and updated as needed this visit by clinical staff  Tobacco  Allergies  Meds       Reviewed and updated as needed this visit by Provider         ROS:  Constitutional, HEENT, cardiovascular, pulmonary, gi and gu systems are negative, except as otherwise noted.    OBJECTIVE:                                                    /62  Pulse 90  Temp 98.2  F (36.8  C) (Oral)  Resp 16  Ht 5' 11\" (1.803 m)  Wt 183 lb (83 kg)  SpO2 97%  BMI 25.52 kg/m2  Body mass index is 25.52 kg/(m^2).  GENERAL: healthy, alert and no distress  NECK: no adenopathy, no asymmetry, masses, or scars and thyroid normal to palpation  RESP: lungs clear to auscultation - no rales, rhonchi or wheezes  CV: regular rate and rhythm, normal S1 S2, no S3 or S4, no murmur, click or rub, no peripheral edema and peripheral pulses strong  ABDOMEN: soft, nontender, no hepatosplenomegaly, no masses and bowel sounds normal  MS: no gross musculoskeletal defects noted, no edema  Diabetic foot exam: normal DP and PT pulses, no " trophic changes or ulcerative lesions and slightly abnormal sensory exam    Diagnostic Test Results:  Results for orders placed or performed in visit on 05/12/17   INR point of care   Result Value Ref Range    INR Protime 3.2 (A) 0.86 - 1.14        ASSESSMENT/PLAN:                                                      Problem List Items Addressed This Visit     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    Relevant Medications    glipiZIDE (GLIPIZIDE XL) 10 MG 24 hr tablet    pioglitazone (ACTOS) 45 MG tablet    Other Relevant Orders    Hemoglobin A1c    Comprehensive metabolic panel    *UA reflex to Microscopic and Culture ([place] [facility] (except [place] and [place])    Hyperlipidemia LDL goal <100    Atrial fibrillation (H)    Relevant Medications    glipiZIDE (GLIPIZIDE XL) 10 MG 24 hr tablet      Other Visit Diagnoses     Actinic keratosis        Relevant Medications    glipiZIDE (GLIPIZIDE XL) 10 MG 24 hr tablet           Assess lab work  Cont treatment   Monitor glucose , keep diet, exercise     Follow-Up:in 3 months     Joselito Armas MD  [facility]

## 2017-06-15 LAB
ALBUMIN SERPL-MCNC: 4 G/DL (ref 3.4–5)
ALP SERPL-CCNC: 64 U/L (ref 40–150)
ALT SERPL W P-5'-P-CCNC: 26 U/L (ref 0–70)
ANION GAP SERPL CALCULATED.3IONS-SCNC: 8 MMOL/L (ref 3–14)
AST SERPL W P-5'-P-CCNC: 31 U/L (ref 0–45)
BILIRUB SERPL-MCNC: 1.8 MG/DL (ref 0.2–1.3)
BUN SERPL-MCNC: 13 MG/DL (ref 7–30)
CALCIUM SERPL-MCNC: 8.9 MG/DL (ref 8.5–10.1)
CHLORIDE SERPL-SCNC: 108 MMOL/L (ref 94–109)
CO2 SERPL-SCNC: 25 MMOL/L (ref 20–32)
CREAT SERPL-MCNC: 0.89 MG/DL (ref 0.66–1.25)
GFR SERPL CREATININE-BSD FRML MDRD: 82 ML/MIN/1.7M2
GLUCOSE SERPL-MCNC: 157 MG/DL (ref 70–99)
POTASSIUM SERPL-SCNC: 4.1 MMOL/L (ref 3.4–5.3)
PROT SERPL-MCNC: 7.2 G/DL (ref 6.8–8.8)
SODIUM SERPL-SCNC: 141 MMOL/L (ref 133–144)

## 2017-07-13 ENCOUNTER — ANTICOAGULATION THERAPY VISIT (OUTPATIENT)
Dept: ANTICOAGULATION | Facility: CLINIC | Age: 82
End: 2017-07-13
Payer: COMMERCIAL

## 2017-07-13 DIAGNOSIS — Z79.01 LONG-TERM (CURRENT) USE OF ANTICOAGULANTS: ICD-10-CM

## 2017-07-13 LAB — INR POINT OF CARE: 2.7 (ref 0.86–1.14)

## 2017-07-13 PROCEDURE — 99207 ZZC NO CHARGE NURSE ONLY: CPT

## 2017-07-13 PROCEDURE — 36416 COLLJ CAPILLARY BLOOD SPEC: CPT

## 2017-07-13 PROCEDURE — 85610 PROTHROMBIN TIME: CPT | Mod: QW

## 2017-07-13 NOTE — MR AVS SNAPSHOT
Louis Keller Jr.   7/13/2017 11:00 AM   Anticoagulation Therapy Visit    Description:  85 year old male   Provider[facility]  [facility]   Department:  [facility]           INR as of 7/13/2017     Today's INR 2.7      Anticoagulation Summary as of 7/13/2017     INR goal 2.0-3.0   Today's INR 2.7   Full instructions 5 mg on Sun, Tue, Thu; 2.5 mg all other days   Next INR check 8/10/2017    Indications   Long-term (current) use of anticoagulants [Z79.01] [Z79.01]  Atrial fibrillation (H) [I48.91]         Your next Anticoagulation Clinic appointment(s)     Aug 10, 2017 11:45 AM CDT   [facility]agulation Visit with [facility]   [facility]    [address] [facility] [address]  [facility] [address] [phone]              Contact Numbers     [facility] Phone Numbers:  Anticoagulation Clinic Appointments : [phone]  Anticoagulation Nurse: [phone]         July 2017 Details    Sun Mon Tue Wed Thu Fri Sat           1                 2               3               4               5               6               7               8                 9               10               11               12               13      5 mg   See details      14      2.5 mg         15      2.5 mg           16      5 mg         17      2.5 mg         18      5 mg         19      2.5 mg         20      5 mg         21      2.5 mg         22      2.5 mg           23      5 mg         24      2.5 mg         25      5 mg         26      2.5 mg         27      5 mg         28      2.5 mg         29      2.5 mg           30      5 mg         31      2.5 mg               Date Details   07/13 This INR check               How to take your warfarin dose     To take:  2.5 mg Take 0.5 of a 5 mg tablet.    To take:  5 mg Take 1 of the 5 mg tablets.           August 2017 Details    Sun Mon Tue Wed Thu Fri Sat       1      5 mg         2      2.5 mg         3       5 mg         4      2.5 mg         5      2.5 mg           6      5 mg         7      2.5 mg         8      5 mg         9      2.5 mg         10            11               12                 13               14               15               16               17               18               19                 20               21               22               23               24               25               26                 27               28               29               30               31                  Date Details   No additional details    Date of next INR:  8/10/2017         How to take your warfarin dose     To take:  2.5 mg Take 0.5 of a 5 mg tablet.    To take:  5 mg Take 1 of the 5 mg tablets.

## 2017-07-13 NOTE — PROGRESS NOTES
ANTICOAGULATION FOLLOW-UP CLINIC VISIT    Patient Name:  Louis Keller Jr.  Date:  7/13/2017  Contact Type:  Face to Face    SUBJECTIVE:     Patient Findings     Positives No Problem Findings           OBJECTIVE    INR Protime   Date Value Ref Range Status   07/13/2017 2.7 (A) 0.86 - 1.14 Final       ASSESSMENT / PLAN  INR assessment THER    Recheck INR In: 4 WEEKS    INR Location Clinic      Anticoagulation Summary as of 7/13/2017     INR goal 2.0-3.0   Today's INR 2.7   Maintenance plan 5 mg (5 mg x 1) on Sun, Tue, Thu; 2.5 mg (5 mg x 0.5) all other days   Full instructions 5 mg on Sun, Tue, Thu; 2.5 mg all other days   Weekly total 25 mg   No change documented Monica Souza RN   Plan last modified Monica Souza RN (5/12/2016)   Next INR check 8/10/2017   Priority INR   Target end date     Indications   Long-term (current) use of anticoagulants [Z79.01] [Z79.01]  Atrial fibrillation (H) [I48.91]         Anticoagulation Episode Summary     INR check location     Preferred lab     Send INR reminders to [facility]    Comments likes calendar printout.        Anticoagulation Care Providers     Provider Role Specialty Phone number    Joselito Armas MD Responsible Internal Medicine [phone]            See the Encounter Report to view Anticoagulation Flowsheet and Dosing Calendar (Go to Encounters tab in chart review, and find the Anticoagulation Therapy Visit)    Dosage adjustment made based on physician directed care plan.    Monica Souza RN

## 2017-08-05 DIAGNOSIS — E11.9 TYPE 2 DIABETES MELLITUS WITHOUT COMPLICATION, WITHOUT LONG-TERM CURRENT USE OF INSULIN (H): ICD-10-CM

## 2017-08-07 RX ORDER — PIOGLITAZONEHYDROCHLORIDE 45 MG/1
TABLET ORAL
Qty: 90 TABLET | Refills: 1 | OUTPATIENT
Start: 2017-08-07

## 2017-08-10 ENCOUNTER — TELEPHONE (OUTPATIENT)
Dept: ANTICOAGULATION | Facility: CLINIC | Age: 82
End: 2017-08-10

## 2017-08-10 ENCOUNTER — ANTICOAGULATION THERAPY VISIT (OUTPATIENT)
Dept: ANTICOAGULATION | Facility: CLINIC | Age: 82
End: 2017-08-10
Payer: COMMERCIAL

## 2017-08-10 DIAGNOSIS — Z79.01 LONG-TERM (CURRENT) USE OF ANTICOAGULANTS: ICD-10-CM

## 2017-08-10 DIAGNOSIS — I48.20 CHRONIC ATRIAL FIBRILLATION (H): Primary | ICD-10-CM

## 2017-08-10 LAB — INR POINT OF CARE: 2.5 (ref 0.86–1.14)

## 2017-08-10 PROCEDURE — 99207 ZZC NO CHARGE NURSE ONLY: CPT

## 2017-08-10 PROCEDURE — 36416 COLLJ CAPILLARY BLOOD SPEC: CPT

## 2017-08-10 PROCEDURE — 85610 PROTHROMBIN TIME: CPT | Mod: QW

## 2017-08-10 NOTE — PROGRESS NOTES
ANTICOAGULATION FOLLOW-UP CLINIC VISIT    Patient Name:  Louis Keller Jr.  Date:  8/10/2017  Contact Type:  Face to Face    SUBJECTIVE:     Patient Findings     Positives No Problem Findings           OBJECTIVE    INR Protime   Date Value Ref Range Status   08/10/2017 2.5 (A) 0.86 - 1.14 Final       ASSESSMENT / PLAN  INR assessment THER    Recheck INR In: 4 WEEKS    INR Location Clinic      Anticoagulation Summary as of 8/10/2017     INR goal 2.0-3.0   Today's INR 2.5   Maintenance plan 5 mg (5 mg x 1) on Sun, Tue, Thu; 2.5 mg (5 mg x 0.5) all other days   Full instructions 5 mg on Sun, Tue, Thu; 2.5 mg all other days   Weekly total 25 mg   No change documented Monica Souza RN   Plan last modified Monica Souza RN (5/12/2016)   Next INR check 9/7/2017   Priority INR   Target end date     Indications   Long-term (current) use of anticoagulants [Z79.01] [Z79.01]  Atrial fibrillation (H) [I48.91]         Anticoagulation Episode Summary     INR check location     Preferred lab     Send INR reminders to [facility]    Comments likes calendar printout.        Anticoagulation Care Providers     Provider Role Specialty Phone number    Joselito Armas MD Responsible Internal Medicine [phone]            See the Encounter Report to view Anticoagulation Flowsheet and Dosing Calendar (Go to Encounters tab in chart review, and find the Anticoagulation Therapy Visit)    Dosage adjustment made based on physician directed care plan.    Monica Souza RN

## 2017-08-10 NOTE — TELEPHONE ENCOUNTER
For insurance purposes, an annual INR referral is required. Please sign the order and route back to the INR Clinic. Monica Souza RN

## 2017-08-10 NOTE — MR AVS SNAPSHOT
Louis Keller Jr.   8/10/2017 11:45 AM   Anticoagulation Therapy Visit    Description:  85 year old male   Provider[facility]  [facility]   Department:  [facility]           INR as of 8/10/2017     Today's INR 2.5      Anticoagulation Summary as of 8/10/2017     INR goal 2.0-3.0   Today's INR 2.5   Full instructions 5 mg on Sun, Tue, Thu; 2.5 mg all other days   Next INR check 9/7/2017    Indications   Long-term (current) use of anticoagulants [Z79.01] [Z79.01]  Atrial fibrillation (H) [I48.91]         Your next Anticoagulation Clinic appointment(s)     Sep 07, 2017 11:15 AM CDT   [facility] Visit with [facility]   [facility]    [address] [facility] [address]  [facility] [address] [phone]              Contact Numbers     [facility] Phone Numbers:  Anticoagulation Clinic Appointments : [phone]  Anticoagulation Nurse: [phone]         August 2017 Details    Sun Mon Tue Wed Thu Fri Sat       1               2               3               4               5                 6               7               8               9               10      5 mg   See details      11      2.5 mg         12      2.5 mg           13      5 mg         14      2.5 mg         15      5 mg         16      2.5 mg         17      5 mg         18      2.5 mg         19      2.5 mg           20      5 mg         21      2.5 mg         22      5 mg         23      2.5 mg         24      5 mg         25      2.5 mg         26      2.5 mg           27      5 mg         28      2.5 mg         29      5 mg         30      2.5 mg         31      5 mg            Date Details   08/10 This INR check               How to take your warfarin dose     To take:  2.5 mg Take 0.5 of a 5 mg tablet.    To take:  5 mg Take 1 of the 5 mg tablets.           September 2017 Details    Sun Mon Tue Wed Thu Fri Sat          1      2.5 mg         2      2.5 mg            3      5 mg         4      2.5 mg         5      5 mg         6      2.5 mg         7            8               9                 10               11               12               13               14               15               16                 17               18               19               20               21               22               23                 24               25               26               27               28               29               30                Date Details   No additional details    Date of next INR:  9/7/2017         How to take your warfarin dose     To take:  2.5 mg Take 0.5 of a 5 mg tablet.    To take:  5 mg Take 1 of the 5 mg tablets.

## 2017-09-07 ENCOUNTER — ANTICOAGULATION THERAPY VISIT (OUTPATIENT)
Dept: ANTICOAGULATION | Facility: CLINIC | Age: 82
End: 2017-09-07
Payer: COMMERCIAL

## 2017-09-07 DIAGNOSIS — Z79.01 LONG-TERM (CURRENT) USE OF ANTICOAGULANTS: ICD-10-CM

## 2017-09-07 LAB — INR POINT OF CARE: 2.6 (ref 0.86–1.14)

## 2017-09-07 PROCEDURE — 85610 PROTHROMBIN TIME: CPT | Mod: QW

## 2017-09-07 PROCEDURE — 36416 COLLJ CAPILLARY BLOOD SPEC: CPT

## 2017-09-07 PROCEDURE — 99207 ZZC NO CHARGE NURSE ONLY: CPT

## 2017-09-07 NOTE — MR AVS SNAPSHOT
Louis Keller Jr.   9/7/2017 11:15 AM   Anticoagulation Therapy Visit    Description:  85 year old male   Provider[facility]   Department:  [facility]           INR as of 9/7/2017     Today's INR 2.6      Anticoagulation Summary as of 9/7/2017     INR goal 2.0-3.0   Today's INR 2.6   Full instructions 5 mg on Sun, Tue, Thu; 2.5 mg all other days   Next INR check 10/5/2017    Indications   Long-term (current) use of anticoagulants [Z79.01] [Z79.01]  Atrial fibrillation (H) [I48.91]         Your next Anticoagulation Clinic appointment(s)     Oct 05, 2017 11:15 AM CDT   [facility] with [facility]   [facility]    [address] [facility] [address]  [facility] [address] [phone]              Contact Numbers     [facility] Phone Numbers:  Anticoagulation Clinic Appointments : [phone]  Anticoagulation Nurse: [phone]         September 2017 Details    Sun Mon Tue Wed Thu Fri Sat          1               2                 3               4               5               6               7      5 mg   See details      8      2.5 mg         9      2.5 mg           10      5 mg         11      2.5 mg         12      5 mg         13      2.5 mg         14      5 mg         15      2.5 mg         16      2.5 mg           17      5 mg         18      2.5 mg         19      5 mg         20      2.5 mg         21      5 mg         22      2.5 mg         23      2.5 mg           24      5 mg         25      2.5 mg         26      5 mg         27      2.5 mg         28      5 mg         29      2.5 mg         30      2.5 mg          Date Details   09/07 This INR check               How to take your warfarin dose     To take:  2.5 mg Take 0.5 of a 5 mg tablet.    To take:  5 mg Take 1 of the 5 mg tablets.           October 2017 Details    Sun Mon Tue Wed Thu Fri Sat     1      5 mg         2      2.5 mg         3       5 mg         4      2.5 mg         5            6               7                 8               9               10               11               12               13               14                 15               16               17               18               19               20               21                 22               23               24               25               26               27               28                 29               30               31                    Date Details   No additional details    Date of next INR:  10/5/2017         How to take your warfarin dose     To take:  2.5 mg Take 0.5 of a 5 mg tablet.    To take:  5 mg Take 1 of the 5 mg tablets.

## 2017-09-07 NOTE — PROGRESS NOTES
ANTICOAGULATION FOLLOW-UP CLINIC VISIT    Patient Name:  Louis Keller Jr.  Date:  9/7/2017  Contact Type:  Face to Face    SUBJECTIVE:     Patient Findings     Positives No Problem Findings           OBJECTIVE    INR Protime   Date Value Ref Range Status   09/07/2017 2.6 (A) 0.86 - 1.14 Final       ASSESSMENT / PLAN  INR assessment THER    Recheck INR In: 4 WEEKS    INR Location Clinic      Anticoagulation Summary as of 9/7/2017     INR goal 2.0-3.0   Today's INR 2.6   Maintenance plan 5 mg (5 mg x 1) on Sun, Tue, Thu; 2.5 mg (5 mg x 0.5) all other days   Full instructions 5 mg on Sun, Tue, Thu; 2.5 mg all other days   Weekly total 25 mg   No change documented Monica Souza RN   Plan last modified Monica Souza RN (5/12/2016)   Next INR check 10/5/2017   Priority INR   Target end date     Indications   Long-term (current) use of anticoagulants [Z79.01] [Z79.01]  Atrial fibrillation (H) [I48.91]         Anticoagulation Episode Summary     INR check location     Preferred lab     Send INR reminders to [facility]    Comments likes calendar printout.        Anticoagulation Care Providers     Provider Role Specialty Phone number    Joselito Armas MD Responsible Internal Medicine [phone]            See the Encounter Report to view Anticoagulation Flowsheet and Dosing Calendar (Go to Encounters tab in chart review, and find the Anticoagulation Therapy Visit)    Dosage adjustment made based on physician directed care plan.    Monica Souza RN

## 2017-09-18 ENCOUNTER — OFFICE VISIT (OUTPATIENT)
Dept: INTERNAL MEDICINE | Facility: CLINIC | Age: 82
End: 2017-09-18
Payer: COMMERCIAL

## 2017-09-18 VITALS
OXYGEN SATURATION: 97 % | WEIGHT: 188 LBS | HEIGHT: 71 IN | HEART RATE: 86 BPM | TEMPERATURE: 98.4 F | SYSTOLIC BLOOD PRESSURE: 118 MMHG | DIASTOLIC BLOOD PRESSURE: 64 MMHG | BODY MASS INDEX: 26.32 KG/M2

## 2017-09-18 DIAGNOSIS — L57.0 ACTINIC KERATOSIS: ICD-10-CM

## 2017-09-18 DIAGNOSIS — I48.20 CHRONIC ATRIAL FIBRILLATION (H): ICD-10-CM

## 2017-09-18 DIAGNOSIS — Z23 NEED FOR PROPHYLACTIC VACCINATION AND INOCULATION AGAINST INFLUENZA: ICD-10-CM

## 2017-09-18 DIAGNOSIS — B35.1 ONYCHOMYCOSIS: ICD-10-CM

## 2017-09-18 DIAGNOSIS — E11.22 TYPE 2 DIABETES MELLITUS WITH STAGE 3 CHRONIC KIDNEY DISEASE, WITHOUT LONG-TERM CURRENT USE OF INSULIN (H): Primary | ICD-10-CM

## 2017-09-18 DIAGNOSIS — N18.30 TYPE 2 DIABETES MELLITUS WITH STAGE 3 CHRONIC KIDNEY DISEASE, WITHOUT LONG-TERM CURRENT USE OF INSULIN (H): Primary | ICD-10-CM

## 2017-09-18 LAB
ALBUMIN SERPL-MCNC: 3.9 G/DL (ref 3.4–5)
ALP SERPL-CCNC: 68 U/L (ref 40–150)
ALT SERPL W P-5'-P-CCNC: 31 U/L (ref 0–70)
ANION GAP SERPL CALCULATED.3IONS-SCNC: 7 MMOL/L (ref 3–14)
AST SERPL W P-5'-P-CCNC: 32 U/L (ref 0–45)
BILIRUB SERPL-MCNC: 1.5 MG/DL (ref 0.2–1.3)
BUN SERPL-MCNC: 16 MG/DL (ref 7–30)
CALCIUM SERPL-MCNC: 9.4 MG/DL (ref 8.5–10.1)
CHLORIDE SERPL-SCNC: 102 MMOL/L (ref 94–109)
CO2 SERPL-SCNC: 27 MMOL/L (ref 20–32)
CREAT SERPL-MCNC: 0.93 MG/DL (ref 0.66–1.25)
ERYTHROCYTE [DISTWIDTH] IN BLOOD BY AUTOMATED COUNT: 14.7 % (ref 10–15)
GFR SERPL CREATININE-BSD FRML MDRD: 77 ML/MIN/1.7M2
GLUCOSE SERPL-MCNC: 201 MG/DL (ref 70–99)
HBA1C MFR BLD: 7.5 % (ref 4.3–6)
HCT VFR BLD AUTO: 40.1 % (ref 40–53)
HGB BLD-MCNC: 13 G/DL (ref 13.3–17.7)
MCH RBC QN AUTO: 31.5 PG (ref 26.5–33)
MCHC RBC AUTO-ENTMCNC: 32.4 G/DL (ref 31.5–36.5)
MCV RBC AUTO: 97 FL (ref 78–100)
PLATELET # BLD AUTO: 164 10E9/L (ref 150–450)
POTASSIUM SERPL-SCNC: 4.2 MMOL/L (ref 3.4–5.3)
PROT SERPL-MCNC: 7.7 G/DL (ref 6.8–8.8)
RBC # BLD AUTO: 4.13 10E12/L (ref 4.4–5.9)
SODIUM SERPL-SCNC: 136 MMOL/L (ref 133–144)
TSH SERPL DL<=0.005 MIU/L-ACNC: 3.07 MU/L (ref 0.4–4)
WBC # BLD AUTO: 6.6 10E9/L (ref 4–11)

## 2017-09-18 PROCEDURE — 85027 COMPLETE CBC AUTOMATED: CPT | Performed by: INTERNAL MEDICINE

## 2017-09-18 PROCEDURE — 99214 OFFICE O/P EST MOD 30 MIN: CPT | Mod: 25 | Performed by: INTERNAL MEDICINE

## 2017-09-18 PROCEDURE — 84443 ASSAY THYROID STIM HORMONE: CPT | Performed by: INTERNAL MEDICINE

## 2017-09-18 PROCEDURE — 99207 C PAF COMPLETED  NO CHARGE: CPT | Performed by: INTERNAL MEDICINE

## 2017-09-18 PROCEDURE — 80053 COMPREHEN METABOLIC PANEL: CPT | Performed by: INTERNAL MEDICINE

## 2017-09-18 PROCEDURE — 90662 IIV NO PRSV INCREASED AG IM: CPT | Performed by: INTERNAL MEDICINE

## 2017-09-18 PROCEDURE — 83036 HEMOGLOBIN GLYCOSYLATED A1C: CPT | Performed by: INTERNAL MEDICINE

## 2017-09-18 PROCEDURE — 36415 COLL VENOUS BLD VENIPUNCTURE: CPT | Performed by: INTERNAL MEDICINE

## 2017-09-18 PROCEDURE — G0008 ADMIN INFLUENZA VIRUS VAC: HCPCS | Performed by: INTERNAL MEDICINE

## 2017-09-18 PROCEDURE — 82043 UR ALBUMIN QUANTITATIVE: CPT | Performed by: INTERNAL MEDICINE

## 2017-09-18 RX ORDER — GLIPIZIDE 10 MG/1
10 TABLET, FILM COATED, EXTENDED RELEASE ORAL 2 TIMES DAILY
Qty: 180 TABLET | Refills: 3 | Status: SHIPPED | OUTPATIENT
Start: 2017-09-18 | End: 2018-09-29

## 2017-09-18 NOTE — MR AVS SNAPSHOT
After Visit Summary   9/18/2017    Louis Keller Jr.    MRN: [id number]           Patient Information     Date Of Birth          6/26/1932        Visit Information        Provider Department      9/18/2017 2:00 PM Joselito Armas MD [facility]        Today's Diagnoses     Onychomycosis    -  1    Chronic atrial fibrillation (H)        Actinic keratosis        Type 2 diabetes mellitus with stage 3 chronic kidney disease, without long-term current use of insulin (H)           Follow-ups after your visit        Additional Services     PODIATRY/FOOT & ANKLE SURGERY REFERRAL       Your provider has referred you to: [facility]: [facility] [phone]   [email or web address]    Please be aware that coverage of these services is subject to the terms and limitations of your health insurance plan.  Call member services at your health plan with any benefit or coverage questions.      Please bring the following to your appointment:  >>   Any x-rays, CTs or MRIs which have been performed.  Contact the facility where they were done to arrange for  prior to your scheduled appointment.    >>   List of current medications   >>   This referral request   >>   Any documents/labs given to you for this referral                  Your next 10 appointments already scheduled     Oct 05, 2017 11:15 AM CDT   Anticoagulation Visit with [facility]   [facility]    [address] [facility] [address]  [facility] [address] [phone]              Who to contact     If you have questions or need follow up information about today's clinic visit or your schedule please contact [facility] directly at [phone].  Normal or non-critical lab and imaging results will be communicated to you by MyChart, letter or phone within 4 business days after the clinic has received the  "results. If you do not hear from us within 7 days, please contact the clinic through [employer] or phone. If you have a critical or abnormal lab result, we will notify you by phone as soon as possible.  Submit refill requests through [employer] or call your pharmacy and they will forward the refill request to us. Please allow 3 business days for your refill to be completed.          Additional Information About Your Visit        [employer] Information     [employer] lets you send messages to your doctor, view your test results, renew your prescriptions, schedule appointments and more. To sign up, go to [email or web address] . Click on \"Log in\" on the left side of the screen, which will take you to the Welcome page. Then click on \"Sign up Now\" on the right side of the page.     You will be asked to enter the access code listed below, as well as some personal information. Please follow the directions to create your username and password.     Your access code is: [email or web address]  Expires: 2017  2:31 PM     Your access code will  in 90 days. If you need help or a new code, please call your [place] clinic or [phone].        Care EveryWhere ID     This is your Care EveryWhere ID. This could be used by other organizations to access your [place] medical records  [id number]        Your Vitals Were     Pulse Temperature Height Pulse Oximetry BMI (Body Mass Index)       86 98.4  F (36.9  C) (Oral) 5' 11\" (1.803 m) 97% 26.22 kg/m2        Blood Pressure from Last 3 Encounters:   17 118/64   17 118/62   03/15/17 124/60    Weight from Last 3 Encounters:   17 188 lb (85.3 kg)   17 183 lb (83 kg)   03/15/17 185 lb (83.9 kg)              We Performed the Following     PAF COMPLETED     PODIATRY/FOOT & ANKLE SURGERY REFERRAL          Where to get your medicines      These medications were sent to [facility] [address] [place] [facility] - [place], [place] - [address]  [address] [facility] [address]     " Phone:  [phone]     glipiZIDE 10 MG 24 hr tablet    metFORMIN 500 MG tablet          Primary Care Provider Office Phone # Fax #    Joselito Armas [phone]       [address] [facility] [address]        Equal Access to Services     [facility] ELVIS : Hadii aad ku hadasho Soomaali, waaxda luqadaha, qaybta kaalmada adeegyada, waxay idiin hayaan adeeg khcarmelinash lajacieln ah. So [facility] [phone].    ATENCIÓN: Si habla español, tiene a cao disposición servicios gratuitos de asistencia lingüística. Llame al [phone].    We comply with applicable federal civil rights laws and [place] laws. We do not discriminate on the basis of race, color, national origin, age, disability sex, sexual orientation or gender identity.            Thank you!     Thank you for choosing [facility]  for your care. Our goal is always to provide you with excellent care. Hearing back from our patients is one way we can continue to improve our services. Please take a few minutes to complete the written survey that you may receive in the mail after your visit with us. Thank you!             Your Updated Medication List - Protect others around you: Learn how to safely use, store and throw away your medicines at www.disposemymeds.org.          This list is accurate as of: 9/18/17  2:31 PM.  Always use your most recent med list.                   Brand Name Dispense Instructions for use Diagnosis    ACE NOT PRESCRIBED (INTENTIONAL)     0 each    1 each daily ACE Inhibitor not prescribed due to Risk for drug interaction    Type 2 diabetes, HbA1C goal < 8% (H)       acetaminophen 500 MG tablet    TYLENOL     Take 500 mg by mouth every 6 hours as needed        ASPIRIN NOT PRESCRIBED    INTENTIONAL     by Other route continuous prn Reported on 3/7/2017        blood glucose monitoring lancets     1 Box    Use to test blood sugar 1 times daily or as directed.    Type 2 diabetes, HbA1C goal < 8% (H)       blood  glucose monitoring test strip    ONE TOUCH ULTRA    100 each    100 strips by In Vitro route daily check daily    Type 2 diabetes mellitus with stage 3 chronic kidney disease (H)       cholestyramine 4 G Packet    QUESTRAN    180 each    Take 1 packet (4 g) by mouth 2 times daily (with meals)    Postcholecystectomy diarrhea, Hyperlipidemia LDL goal <100       Chromium 1000 MCG Tabs      Take 500 mcg by mouth daily        diltiazem 120 MG 24 hr capsule    CARDIZEM CD/CARTIA XT    90 capsule    Take 1 capsule (120 mg) by mouth every evening    Chronic atrial fibrillation (H)       ferrous gluconate 324 (38 FE) MG tablet    FERGON    100 tablet    Take 1 tablet (324 mg) by mouth daily (with breakfast)    Anemia due to blood loss, acute       fish oil-omega-3 fatty acids 1000 MG capsule      Take 1 g by mouth 2 times daily        glipiZIDE 10 MG 24 hr tablet    glipiZIDE XL    180 tablet    Take 1 tablet (10 mg) by mouth 2 times daily    Chronic atrial fibrillation (H), Actinic keratosis       loperamide 2 MG tablet    IMODIUM A-D    30 tablet    1 tablet BID        metFORMIN 500 MG tablet    GLUCOPHAGE    90 tablet    Take 1 tablet (500 mg) by mouth daily (with dinner)    Type 2 diabetes mellitus with stage 3 chronic kidney disease, without long-term current use of insulin (H)       Multi-vitamin Tabs tablet   Generic drug:  multivitamin, therapeutic with minerals     100    one tab po qd        order for DME     1 Device    Equipment being ordered: Onetouch Ultrasoft Lancing Device    Type 2 diabetes, HbA1C goal < 8% (H)       pioglitazone 45 MG tablet    ACTOS    90 tablet    Take 1 tablet (45 mg) by mouth daily    Type 2 diabetes mellitus with stage 3 chronic kidney disease, without long-term current use of insulin (H)       STATIN NOT PRESCRIBED (INTENTIONAL)     0 each    1 each At Bedtime Statin not prescribed intentionally due to Risk for drug interaction    Type 2 diabetes, HbA1C goal < 8% (H)       warfarin 5  MG tablet    COUMADIN    72 tablet    Take 1 tablet (5 mg) Tuesday, Thursday, and Sunday. Take one-half tablet (2.5 mg) on Monday, Wednesday, Friday, and Saturday or as directed by INR clinic    Heart palpitations, New onset atrial fibrillation (H), Chronic anticoagulation

## 2017-09-18 NOTE — PROGRESS NOTES
Injectable Influenza Immunization Documentation    1.  Is the person to be vaccinated sick today? No    2. Does the person to be vaccinated have an allergy to a component   of the vaccine? No    3. Has the person to be vaccinated ever had a serious reaction   to influenza vaccine in the past? No    4. Has the person to be vaccinated ever had Guillain-Barré syndrome? No    Form completed by Vaishali Looney MA

## 2017-09-18 NOTE — PROGRESS NOTES
SUBJECTIVE:   Louis Keller Jr. is a 85 year old male who presents to clinic today for the following health issues:    Patient is seen for a follow up visit.    Concern for right great toe pain, swelling around the toe nail and drainage.   Has h/o onychomycosis.       Diabetes Follow-up    Patient is checking blood sugars: once daily.  Results are as follows:       lunchtime - 120-125        Diabetic concerns: infection of toenail ( Rt big toe/toenail)     Symptoms of hypoglycemia (low blood sugar): none     Paresthesias (numbness or burning in feet) or sores: Yes, Rt toe sore        Date of last diabetic eye exam: due  Has H/O DM. On diet , exercise and PO med. Blood sugars are controlled. No parestesias. No hypoglycemias.    Hyperlipidemia Follow-Up      Rate your low fat/cholesterol diet?: good    Taking statin?  No    Other lipid medications/supplements?: daily fish oil    Has history of atrial fibrillation. On anticoagulation with Coumadin and rate control medications. Asymptonatic - no chest pains , palpitations,  no side effects from medications.      PROBLEMS TO ADD ON...    Discussed preventive measures.     Problem list and histories reviewed & adjusted, as indicated.  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     Anemia due to blood loss, acute     Physical deconditioning     Diabetes mellitus, type 2 (H)     Atrial fibrillation (H)     QUESADA (dyspnea on exertion)     Diastolic murmur     Pain of right thigh     Long-term (current) use of  anticoagulants [Z79.01]     Past Surgical History:   Procedure Laterality Date     ARTHROPLASTY KNEE  2013    Procedure: ARTHROPLASTY KNEE;  Left Total Knee Arthroplasty       C NONSPECIFIC PROCEDURE  Child    T&A     C NONSPECIFIC PROCEDURE  ; also     Colonoscopy     C NONSPECIFIC PROCEDURE      Basal cell cancer of the nose     C NONSPECIFIC PROCEDURE      Cholecystectomy     CARDIOVERSION  11    failed       Social History   Substance Use Topics     Smoking status: Never Smoker     Smokeless tobacco: Never Used     Alcohol use 0.0 oz/week     0 Standard drinks or equivalent per week      Comment: 1 glass of wine every day     Family History   Problem Relation Age of Onset     Alzheimer Disease Maternal Grandmother      Arthritis Maternal Grandmother      CANCER Mother      breast/ /colon     Eye Disorder Mother      glaucoma and cataracts     HEART DISEASE Mother      angina/CABG     Hypertension Mother      CANCER Father      colon     DIABETES Maternal Aunt      AoDM         Current Outpatient Prescriptions   Medication Sig Dispense Refill     glipiZIDE (GLIPIZIDE XL) 10 MG 24 hr tablet Take 1 tablet (10 mg) by mouth 2 times daily 180 tablet 3     metFORMIN (GLUCOPHAGE) 500 MG tablet Take 1 tablet (500 mg) by mouth daily (with dinner) 90 tablet 1     pioglitazone (ACTOS) 45 MG tablet Take 1 tablet (45 mg) by mouth daily 90 tablet 1     diltiazem 120 MG 24 hr capsule Take 1 capsule (120 mg) by mouth every evening 90 capsule 3     cholestyramine (QUESTRAN) 4 G Packet Take 1 packet (4 g) by mouth 2 times daily (with meals) 180 each 3     Chromium 1000 MCG TABS Take 500 mcg by mouth daily       ferrous gluconate (FERGON) 324 (38 FE) MG tablet Take 1 tablet (324 mg) by mouth daily (with breakfast) 100 tablet 3     warfarin (COUMADIN) 5 MG tablet Take 1 tablet (5 mg) Tuesday, Thursday, and . Take one-half tablet (2.5 mg) on Monday, Wednesday, Friday, and Saturday or as directed by  "INR clinic 72 tablet 3     loperamide (IMODIUM A-D) 2 MG tablet 1 tablet BID 30 tablet 0     acetaminophen (TYLENOL) 500 MG tablet Take 500 mg by mouth every 6 hours as needed        fish oil-omega-3 fatty acids (FISH OIL) 1000 MG capsule Take 1 g by mouth 2 times daily        MULTI-VITAMIN OR TABS one tab po qd 100 0     [DISCONTINUED] glipiZIDE (GLIPIZIDE XL) 10 MG 24 hr tablet Take 1 tablet (10 mg) by mouth 2 times daily 180 tablet 0     [DISCONTINUED] metFORMIN (GLUCOPHAGE) 500 MG tablet Take 1 tablet (500 mg) by mouth daily (with dinner) 90 tablet 1     blood glucose monitoring (ONE TOUCH ULTRA) test strip 100 strips by In Vitro route daily check daily 100 each 3     STATIN NOT PRESCRIBED, INTENTIONAL, 1 each At Bedtime Statin not prescribed intentionally due to Risk for drug interaction 0 each 0     ACE NOT PRESCRIBED, INTENTIONAL, 1 each daily ACE Inhibitor not prescribed due to Risk for drug interaction 0 each 0     ORDER FOR DME Equipment being ordered: Onetouch Ultrasoft Lancing Device 1 Device 0     blood glucose monitoring (ONE TOUCH ULTRASOFT) lancets Use to test blood sugar 1 times daily or as directed. 1 Box 2     ASPIRIN NOT PRESCRIBED, INTENTIONAL, by Other route continuous prn Reported on 3/7/2017           Reviewed and updated as needed this visit by clinical staffTobacco       Reviewed and updated as needed this visit by Provider         ROS:  Constitutional, HEENT, cardiovascular, pulmonary, gi and gu systems are negative, except as otherwise noted.      OBJECTIVE:   /64  Pulse 86  Temp 98.4  F (36.9  C) (Oral)  Ht 5' 11\" (1.803 m)  Wt 188 lb (85.3 kg)  SpO2 97%  BMI 26.22 kg/m2  Body mass index is 26.22 kg/(m^2).   GENERAL: healthy, alert and no distress  NECK: no adenopathy, no asymmetry, masses, or scars and thyroid normal to palpation  RESP: lungs clear to auscultation - no rales, rhonchi or wheezes  CV: regular rate and rhythm, normal S1 S2, no S3 or S4, no murmur, click or " rub, no peripheral edema and peripheral pulses strong  ABDOMEN: soft, nontender, no hepatosplenomegaly, no masses and bowel sounds normal  MS: no gross musculoskeletal defects noted, no edema  Diabetic foot exam: normal DP and PT pulses, no trophic changes or ulcerative lesions and normal sensory exam, mild decreased in the left great toe   Right great toe with onychomycosis and mild paronychial irritation     Diagnostic Test Results:  none     ASSESSMENT/PLAN:     Problem List Items Addressed This Visit     Diabetes mellitus, type 2 (H) - Primary    Relevant Medications    glipiZIDE (GLIPIZIDE XL) 10 MG 24 hr tablet    metFORMIN (GLUCOPHAGE) 500 MG tablet    Other Relevant Orders    Hemoglobin A1c    Comprehensive metabolic panel    CBC with platelets    TSH with free T4 reflex    Albumin Random Urine Quantitative with Creat Ratio    Atrial fibrillation (H)    Relevant Medications    glipiZIDE (GLIPIZIDE XL) 10 MG 24 hr tablet    Other Relevant Orders    TSH with free T4 reflex      Other Visit Diagnoses     Onychomycosis        Relevant Orders    PODIATRY/FOOT & ANKLE SURGERY REFERRAL    Actinic keratosis        Relevant Medications    glipiZIDE (GLIPIZIDE XL) 10 MG 24 hr tablet    Other Relevant Orders    Comprehensive metabolic panel    CBC with platelets         Assess lab  Cont treatment   Refer to podiatry   Immunized for influenza       Follow-Up:in 6 months     Joselito Armas MD  [facility]

## 2017-09-18 NOTE — NURSING NOTE
"Chief Complaint   Patient presents with     Diabetes     Toenail     Possible infection of Rt big toe/toenail       Initial /64  Pulse 86  Temp 98.4  F (36.9  C) (Oral)  Ht 5' 11\" (1.803 m)  Wt 188 lb (85.3 kg)  SpO2 97%  BMI 26.22 kg/m2 Estimated body mass index is 26.22 kg/(m^2) as calculated from the following:    Height as of this encounter: 5' 11\" (1.803 m).    Weight as of this encounter: 188 lb (85.3 kg).  Medication Reconciliation: complete   Elen Grimm MA      "

## 2017-09-19 LAB
CREAT UR-MCNC: 74 MG/DL
MICROALBUMIN UR-MCNC: 27 MG/L
MICROALBUMIN/CREAT UR: 35.7 MG/G CR (ref 0–17)

## 2017-09-26 ENCOUNTER — OFFICE VISIT (OUTPATIENT)
Dept: PODIATRY | Facility: CLINIC | Age: 82
End: 2017-09-26
Payer: COMMERCIAL

## 2017-09-26 VITALS
WEIGHT: 188 LBS | BODY MASS INDEX: 26.32 KG/M2 | SYSTOLIC BLOOD PRESSURE: 128 MMHG | DIASTOLIC BLOOD PRESSURE: 74 MMHG | HEIGHT: 71 IN

## 2017-09-26 DIAGNOSIS — M20.42 HAMMER TOE OF LEFT FOOT: ICD-10-CM

## 2017-09-26 DIAGNOSIS — B35.1 ONYCHOMYCOSIS OF TOENAIL: ICD-10-CM

## 2017-09-26 DIAGNOSIS — L60.3 DYSTROPHIC NAIL: ICD-10-CM

## 2017-09-26 DIAGNOSIS — M79.672 LEFT FOOT PAIN: ICD-10-CM

## 2017-09-26 DIAGNOSIS — E11.42 DIABETIC POLYNEUROPATHY ASSOCIATED WITH TYPE 2 DIABETES MELLITUS (H): Primary | ICD-10-CM

## 2017-09-26 PROCEDURE — 99204 OFFICE O/P NEW MOD 45 MIN: CPT | Performed by: PODIATRIST

## 2017-09-26 RX ORDER — KETOCONAZOLE 20 MG/G
CREAM TOPICAL DAILY
Qty: 30 G | Refills: 1 | Status: SHIPPED | OUTPATIENT
Start: 2017-09-26 | End: 2018-03-03

## 2017-09-26 NOTE — LETTER
"    9/26/2017         RE: Louis Keller Jr.  [address]  [facility] [address]        Dear Colleague,    Thank you for referring your patient, Louis Keller Jr., to the [facility]. Please see a copy of my visit note below.    PATIENT HISTORY:  Louis Keller Jr. is a 85 year old male who presents to clinic for thickened, brittle right great toenail. He thinks it is fungal. Denies injury. Is diabetic. No pain to right foot. Does have pain to the left toes. Is wondering if it is because they are \"curled over\" and what can be done for them. Pain to left foot can be 4/10 when walking.     Review of Systems:  Patient denies fever, chills, rash, wound, stiffness, limping, numbness, weakness, heart burn, blood in stool, chest pain with activity, calf pain when walking, shortness of breath with activity, chronic cough, easy bleeding/bruising, swelling of ankles, excessive thirst, fatigue, depression, anxiety.       PAST MEDICAL HISTORY:   Past Medical History:   Diagnosis Date     Antiplatelet or antithrombotic long-term use      Atrial fibrillation (H)      Diarrhea      Diastolic murmur      QUESADA (dyspnea on exertion)      Hemorrhage of gastrointestinal tract, unspecified 63,65,and 1971    hospitalized     History of blood transfusion      Scarlet fever      Type II or unspecified type diabetes mellitus without mention of complication, not stated as uncontrolled      Unspecified disease of pancreas 1990    pancreatitis        PAST SURGICAL HISTORY:   Past Surgical History:   Procedure Laterality Date     ARTHROPLASTY KNEE  8/5/2013    Procedure: ARTHROPLASTY KNEE;  Left Total Knee Arthroplasty       C NONSPECIFIC PROCEDURE  Child    T&A     C NONSPECIFIC PROCEDURE  ; also 11/03    Colonoscopy     C NONSPECIFIC PROCEDURE      Basal cell cancer of the nose     C NONSPECIFIC PROCEDURE  1990    Cholecystectomy     CARDIOVERSION  9/6/11    failed        MEDICATIONS:   Current Outpatient " Prescriptions:      ketoconazole (NIZORAL) 2 % cream, Apply topically daily Apply to affected toenail daily, Disp: 30 g, Rfl: 1     glipiZIDE (GLIPIZIDE XL) 10 MG 24 hr tablet, Take 1 tablet (10 mg) by mouth 2 times daily, Disp: 180 tablet, Rfl: 3     metFORMIN (GLUCOPHAGE) 500 MG tablet, Take 1 tablet (500 mg) by mouth daily (with dinner), Disp: 90 tablet, Rfl: 1     pioglitazone (ACTOS) 45 MG tablet, Take 1 tablet (45 mg) by mouth daily, Disp: 90 tablet, Rfl: 1     diltiazem 120 MG 24 hr capsule, Take 1 capsule (120 mg) by mouth every evening, Disp: 90 capsule, Rfl: 3     cholestyramine (QUESTRAN) 4 G Packet, Take 1 packet (4 g) by mouth 2 times daily (with meals), Disp: 180 each, Rfl: 3     Chromium 1000 MCG TABS, Take 500 mcg by mouth daily, Disp: , Rfl:      ferrous gluconate (FERGON) 324 (38 FE) MG tablet, Take 1 tablet (324 mg) by mouth daily (with breakfast), Disp: 100 tablet, Rfl: 3     warfarin (COUMADIN) 5 MG tablet, Take 1 tablet (5 mg) Tuesday, Thursday, and Sunday. Take one-half tablet (2.5 mg) on Monday, Wednesday, Friday, and Saturday or as directed by INR clinic, Disp: 72 tablet, Rfl: 3     loperamide (IMODIUM A-D) 2 MG tablet, 1 tablet BID, Disp: 30 tablet, Rfl: 0     blood glucose monitoring (ONE TOUCH ULTRA) test strip, 100 strips by In Vitro route daily check daily, Disp: 100 each, Rfl: 3     acetaminophen (TYLENOL) 500 MG tablet, Take 500 mg by mouth every 6 hours as needed , Disp: , Rfl:      STATIN NOT PRESCRIBED, INTENTIONAL,, 1 each At Bedtime Statin not prescribed intentionally due to Risk for drug interaction, Disp: 0 each, Rfl: 0     ACE NOT PRESCRIBED, INTENTIONAL,, 1 each daily ACE Inhibitor not prescribed due to Risk for drug interaction, Disp: 0 each, Rfl: 0     ORDER FOR DME, Equipment being ordered: Onetouch Ultrasoft Lancing Device, Disp: 1 Device, Rfl: 0     blood glucose monitoring (ONE TOUCH ULTRASOFT) lancets, Use to test blood sugar 1 times daily or as directed., Disp: 1 Box,  "Rfl: 2     ASPIRIN NOT PRESCRIBED, INTENTIONAL,, by Other route continuous prn Reported on 3/7/2017, Disp: , Rfl:      fish oil-omega-3 fatty acids (FISH OIL) 1000 MG capsule, Take 1 g by mouth 2 times daily , Disp: , Rfl:      MULTI-VITAMIN OR TABS, one tab po qd, Disp: 100, Rfl: 0  No current facility-administered medications for this visit.     Facility-Administered Medications Ordered in Other Visits:      insulin aspart (NovoLOG) injection, 1-7 Units, Subcutaneous, TID AC, Brenda Peterson MD     insulin aspart (NovoLOG) injection, 1-5 Units, Subcutaneous, At Bedtime, Brenda Peterson MD     ALLERGIES:    Allergies   Allergen Reactions     No Known Drug Allergies         SOCIAL HISTORY:   Social History     Social History     Marital status:      Spouse name: N/A     Number of children: N/A     Years of education: N/A     Occupational History           Semi-retired     Social History Main Topics     Smoking status: Never Smoker     Smokeless tobacco: Never Used     Alcohol use 0.0 oz/week     0 Standard drinks or equivalent per week      Comment: 1 glass of wine every day     Drug use: No     Sexual activity: No     Other Topics Concern     Caffeine Concern No     14 oz per day     Sleep Concern No     sometimes - nocturia 4 times per night     Special Diet No     Exercise Yes     treadmill/walking 15-20 minutes 6 days per week     Social History Narrative        FAMILY HISTORY:   Family History   Problem Relation Age of Onset     Alzheimer Disease Maternal Grandmother      Arthritis Maternal Grandmother      CANCER Mother      breast/ 1983/colon     Eye Disorder Mother      glaucoma and cataracts     HEART DISEASE Mother      angina/CABG     Hypertension Mother      CANCER Father      colon     DIABETES Maternal Aunt      AoDM        EXAM:Vitals: /74  Ht 5' 11\" (1.803 m)  Wt 188 lb (85.3 kg)  BMI 26.22 kg/m2  BMI= Body mass index is 26.22 kg/(m^2).    General appearance: " Patient is alert and fully cooperative with history & exam.  No sign of distress is noted during the visit.     Psychiatric: Affect is pleasant & appropriate.  Patient appears motivated to improve health.     Respiratory: Breathing is regular & unlabored while sitting.     HEENT: Hearing is intact to spoken word.  Speech is clear.  No gross evidence of visual impairment that would impact ambulation.     Dermatologic: right great toenail is thickened, dystrophic and 80% onycholysed. No redness or signs of infection noted.      Vascular: DP & PT pulses are intact & regular bilaterally.  No significant edema or varicosities noted.  CFT and skin temperature is normal to both lower extremities.     Neurologic: Lower extremity sensation is diminished.      Musculoskeletal: Patient is ambulatory without assistive device or brace.  Semi flexible contracture of the left toes 2-4.      ASSESSMENT:    Diabetic polyneuropathy associated with type 2 diabetes mellitus (H)  Dystrophic nail  Onychomycosis of toenail  Hammer toe of left foot  Left foot pain       PLAN:  Reviewed patient's chart in epic. Talked about proper diabetic foot care. Discussed causes and treatments of nail fungus.  Explained that even if a culture comes back negative, a patient could still have nail fungus.  Discussed treatment options with patient and explained that there isn't one treatment that is 100% effective.  Discussed oral lamisil which is the most effective at about 70% but which can have liver effects.  Explained that if she wanted to try this that she would need serial blood draws to test her liver function.  Discussed over the counter antifungal creams.  Explained that these are about 50% effective and need to be applied twice a day for about 3-4 months.  Also talked about prescription penlac which is a nail laquer.  Again this is also only 50% effective.  Also discussed that if there was damage to the nail and the nail is now dystrophic that  non of the above is going to change the nail.  If there was damage, there is note anything that can be done for the nail to correct it.  Discussed that if it becomes painful, we can remove the nail in clinic.        Was given order for anti fungal cream.    Reviewed and discussed causes of hammertoes with patient.  Explained that this can be caused by an overpowering of muscles or by the way we walk.  Discussed conservative treatments such as orthotics, pads, shoe gear.  Explained that sometimes the flexor tendons can be cut to try and straighten the toe and reduce rubbing. This is normally done in office and patient is weight bearing in postop she for 1-2 weeks.  We also discussed surgical intervention to remove the joint and possibly fuse the toe.  Normally patient has a pin sticking out of the toe for about 6 weeks and can not get the foot wet. Patient would have to be minimal weight bearing in cam boot.      He is going to think about tenotomies. Was given toe crest pad.          Winter Masterson DPM, Podiatry/Foot and Ankle Surgery    Weight management plan: Patient was referred to their PCP to discuss a diet and exercise plan.      Again, thank you for allowing me to participate in the care of your patient.        Sincerely,        Winter Masterson DPM, Podiatry/Foot and Ankle Surgery

## 2017-09-26 NOTE — PATIENT INSTRUCTIONS
DR. MEJIAS'S CLINIC LOCATIONS:   MONDAY AM - [place] TUESDAY - [place]   [address] [address] [place] [address]   [phone] / [phone] / [phone]       WEDNESDAY - [place]    [address] [address]    [phone] / [phone]        FRIDAY PM - [place] SCHEDULE SURGERY: [phone]   [address] APPOINTMENTS: [phone]   [place] [address] BILLING QUESTIONS: [phone] / [phone]          Body Mass Index (BMI)  Many things can cause foot and ankle problems. Foot structure, activity level, foot mechanics and injuries are common causes of pain.  One very important issue that often goes unmentioned, is body weight. Extra weight can cause increased stress on muscles, ligaments, bones and tendons.  Sometimes just a few extra pounds is all it takes to put one over her/his threshold. Without reducing that stress, it can be difficult to alleviate pain. Some people are uncomfortable addressing this issue, but we feel it is important for you to think about it. As Foot &  Ankle specialists, our job is addressing the lower extremity problem and possible causes. Regarding extra body weight, we encourage patients to discuss diet and weight management plans with their primary care doctors. It is this team approach that gives you the best opportunity for pain relief and getting you back on your feet.        HAMMERTOES     Hammertoe is a contracture (bending) of one or both joints of the second, third, fourth, or fifth (little) toes. This abnormal bending can put pressure on the toe when wearing shoes, causing problems to develop.  Hammertoes usually start out as mild deformities and get progressively worse over time. In the earlier stages, hammertoes are flexible and the symptoms can often be managed with noninvasive measures. But if left untreated, hammertoes can become more rigid and  will not respond to non-surgical treatment.  Because of the progressive nature of hammertoes, they should receive early attention. Hammertoes never get better without some kind of intervention.  Causes  The most common cause of hammertoe is a muscle/tendon imbalance. This imbalance, which leads to a bending of the toe, results from mechanical (structural) changes in the foot that occur over time in some people.  Hammertoes may be aggravated by shoes that don t fit properly. A hammertoe may result if a toe is too long and is forced into a cramped position when a tight shoe is worn.  Occasionally, hammertoe is the result of an earlier trauma to the toe. In some people, hammertoes are inherited.  Symptoms  Pain or irritation of the affected toe when wearing shoes.   Corns and calluses (a buildup of skin) on the toe, between two toes, or on the ball of the foot. Corns are caused by constant friction against the shoe. They may be soft or hard, depending upon their location.   Inflammation, redness, or a burning sensation   Contracture of the toe   In more severe cases of hammertoe, open sores may form.   Diagnosis  Although hammertoes are readily apparent, to arrive at a diagnosis the foot and ankle surgeon will obtain a thorough history of your symptoms and examine your foot. During the physical examination, the doctor may attempt to reproduce your symptoms by manipulating your foot and will study the contractures of the toes. In addition, the foot and ankle surgeon may take x-rays to determine the degree of the deformities and assess any changes that may have occurred.   Hammertoes are progressive   they don t go away by themselves and usually they will get worse over time. However, not all cases are alike   some hammertoes progress more rapidly than others. Once your foot and ankle surgeon has evaluated your hammertoes, a treatment plan can be developed that is suited to your needs.  Non-surgical Treatment  There is a  variety of treatment options for hammertoe. The treatment your foot and ankle surgeon selects will depend upon the severity of your hammertoe and other factors.  A number of non-surgical measures can be undertaken:  Padding corns and calluses. Your foot and ankle surgeon can provide or prescribe pads designed to shield corns from irritation. If you want to try over-the-counter pads, avoid the medicated types. Medicated pads are generally not recommended because they may contain a small amount of acid that can be harmful. Consult your surgeon about this option.   Changes in shoewear. Avoid shoes with pointed toes, shoes that are too short, or shoes with high heels   conditions that can force your toe against the front of the shoe. Instead, choose comfortable shoes with a deep, roomy toe box and heels no higher than two inches.   Orthotic devices. A custom orthotic device placed in your shoe may help control the muscle/tendon imbalance.   Injection therapy. Corticosteroid injections are sometimes used to ease pain and inflammation caused by hammertoe.   Medications. Oral nonsteroidal anti-inflammatory drugs (NSAIDs), such as ibuprofen, may be recommended to reduce pain and inflammation.   Splinting/strapping. Splints or small straps may be applied by the surgeon to realign the bent toe.   Exercises:   1. The Toe Tap  Stand flat on the ground in your bare feet. Raise all of your toes up off the ground as high as you can. Then starting with the little toes, slowly press them down to the ground. After the big toes are on the ground, start over by raising all of them up off the ground again. This motion is similar to tapping your fingers on a desk. Repeat this ten times.     2. Interlocking your Fingers and Toes  Cross your right foot over your knee and place the fingers of your left hand between your toes. Squeeze your toes together, pinching your fingers between them. Spread the toes apart and squeeze them together  again. Repeat this ten times then do the other foot. Like most exercises, this will get easier the more you do it. If you are having a lot of difficulty with this exercise, start with just your index finger between your first and second toe, then later add your middle finger between your second and third toes, and so on until you can fit all your fingers between your toes. Do this ten times on each foot. Eventually you will be able to spread your toes apart without using your fingers.    3. Gripping the Floor   the floor by pressing the pads of your toes (not the tips) into the floor without curling your toes. Relax and repeat this ten times. If your shoes have the proper amount of depth, you should be able to do this with shoes on.      HAMMERTOE TOE SURGERY   Hammertoe surgery is complex. The surgical procedure is an attempt to help the toe lay in a better position. Nearly every structure in the toe will be cut including the tendons, ligaments, skin and bone. Hammertoes are a complex deformity and final toe position is difficult to predict. The only sure way to position a toe is to fuse all three digital joints. That will not happen as some degree of toe motion is needed for walking. The toe may not be completely reduced as the surrounding skin and other structures may not allow the toe to return to a normal position. The tendons on adjacent toes may need to be cut at the time of the original or subsequent surgeries, as interconnections exist between the toes. The toe may drift after surgery. Stiffness may develop leading to new areas of pressure.   Future shoe choices will be critical in allowing the surgery to provide comfort. The toes will still hurt if shoes rub. The original pain may also persist as other foot problems may be contributing to the current pain. The toe may or may not be pinned in place. External pins would require complete avoidance of water on the foot for six weeks. The pin would be  removed about six weeks after the surgery. Strict attention to protection is critical. The pin could get bumped or loosen resulting in early removal. Removal might be necessary before the bone heals which would negatively affect the final surgical outcome and toe allignment.     NAIL FUNGUS / ONYCHOMYCOSIS   Nail fungus is not a hygiene problem and will not likely lead to significant medical   problems. The nails may get thick causing pain and possibly local skin infection.   Treatments include debridement (trimming), oral antifungals, topical antifungals and complete removal of the nail. Most fungal nails are not treated.   Topicals such as tea tree oil can be helpful for surface fungus and may, at best, limit   progression. Over the counter creams (such as Lamisil) can also be used however, their effectiveness is also quite low.  Topical treatment with Pen lac is expensive and often not covered by insurance. Pen lac has an approximate 8% success rate. Topical therapy recommendations is to apply twice a day for at least 3-4 months as it takes 9 months for new nail to grow out.    Experts suggest soaking your feet for 15 to 20 minutes in a mixture of 1 cup vinegar to 4 cups warm water. Be sure to rinse well and pat your feet dry when you're done. You can soak your feet like this daily. But if your skin becomes irritated, try soaking only two to three times a week. Vicks VapoRub, as with vinegar, there have been no controlled clinical trials to assess the effectiveness of Vicks VapoRub on nail fungus, but there have been numerous anecdotal reports that it works. There's no consensus on how often to apply this product, so check with your doctor before using it on your nails.     Oral therapies include Sporanox and Lamisil. Oral therapies are also expensive and not very effective. Side effects such as liver disease are the main concern. Return of fungus is common even if the treatment worked.     Other Tips:  - Penlac  nail medication apply daily x 4 months; remove old polish first day of each week  - Antifungal cream/powder (Zeasorb)   apply daily to feet and shoes x 2 months  - Clean shoes with Lysol or in washing machine every few weeks  - Rotate shoe gear; give them 24 hours to dry out between days wearing them  - Clean pair of socks in morning, clean pair in afternoon if your feet sweat  - Shower shoes used in public showers/pools  FOOT CARE NURSES  If you are interested in having a foot care nurse come out to your   home, please call one of these contacts for more information:  [facility]  [phone] [facility]  [phone]   [facility]  [phone]  [place] [phone]  [place]   [facility]  [phone]  At [facility] [phone]

## 2017-09-26 NOTE — PROGRESS NOTES
"PATIENT HISTORY:  Louis Keller Jr. is a 85 year old male who presents to clinic for thickened, brittle right great toenail. He thinks it is fungal. Denies injury. Is diabetic. No pain to right foot. Does have pain to the left toes. Is wondering if it is because they are \"curled over\" and what can be done for them. Pain to left foot can be 4/10 when walking.     Review of Systems:  Patient denies fever, chills, rash, wound, stiffness, limping, numbness, weakness, heart burn, blood in stool, chest pain with activity, calf pain when walking, shortness of breath with activity, chronic cough, easy bleeding/bruising, swelling of ankles, excessive thirst, fatigue, depression, anxiety.       PAST MEDICAL HISTORY:   Past Medical History:   Diagnosis Date     Antiplatelet or antithrombotic long-term use      Atrial fibrillation (H)      Diarrhea      Diastolic murmur      QUESADA (dyspnea on exertion)      Hemorrhage of gastrointestinal tract, unspecified 63,65,and 1971    hospitalized     History of blood transfusion      Scarlet fever      Type II or unspecified type diabetes mellitus without mention of complication, not stated as uncontrolled      Unspecified disease of pancreas 1990    pancreatitis        PAST SURGICAL HISTORY:   Past Surgical History:   Procedure Laterality Date     ARTHROPLASTY KNEE  8/5/2013    Procedure: ARTHROPLASTY KNEE;  Left Total Knee Arthroplasty       C NONSPECIFIC PROCEDURE  Child    T&A     C NONSPECIFIC PROCEDURE  ; also 11/03    Colonoscopy     C NONSPECIFIC PROCEDURE      Basal cell cancer of the nose     C NONSPECIFIC PROCEDURE  1990    Cholecystectomy     CARDIOVERSION  9/6/11    failed        MEDICATIONS:   Current Outpatient Prescriptions:      ketoconazole (NIZORAL) 2 % cream, Apply topically daily Apply to affected toenail daily, Disp: 30 g, Rfl: 1     glipiZIDE (GLIPIZIDE XL) 10 MG 24 hr tablet, Take 1 tablet (10 mg) by mouth 2 times daily, Disp: 180 tablet, Rfl: 3     metFORMIN " (GLUCOPHAGE) 500 MG tablet, Take 1 tablet (500 mg) by mouth daily (with dinner), Disp: 90 tablet, Rfl: 1     pioglitazone (ACTOS) 45 MG tablet, Take 1 tablet (45 mg) by mouth daily, Disp: 90 tablet, Rfl: 1     diltiazem 120 MG 24 hr capsule, Take 1 capsule (120 mg) by mouth every evening, Disp: 90 capsule, Rfl: 3     cholestyramine (QUESTRAN) 4 G Packet, Take 1 packet (4 g) by mouth 2 times daily (with meals), Disp: 180 each, Rfl: 3     Chromium 1000 MCG TABS, Take 500 mcg by mouth daily, Disp: , Rfl:      ferrous gluconate (FERGON) 324 (38 FE) MG tablet, Take 1 tablet (324 mg) by mouth daily (with breakfast), Disp: 100 tablet, Rfl: 3     warfarin (COUMADIN) 5 MG tablet, Take 1 tablet (5 mg) Tuesday, Thursday, and Sunday. Take one-half tablet (2.5 mg) on Monday, Wednesday, Friday, and Saturday or as directed by INR clinic, Disp: 72 tablet, Rfl: 3     loperamide (IMODIUM A-D) 2 MG tablet, 1 tablet BID, Disp: 30 tablet, Rfl: 0     blood glucose monitoring (ONE TOUCH ULTRA) test strip, 100 strips by In Vitro route daily check daily, Disp: 100 each, Rfl: 3     acetaminophen (TYLENOL) 500 MG tablet, Take 500 mg by mouth every 6 hours as needed , Disp: , Rfl:      STATIN NOT PRESCRIBED, INTENTIONAL,, 1 each At Bedtime Statin not prescribed intentionally due to Risk for drug interaction, Disp: 0 each, Rfl: 0     ACE NOT PRESCRIBED, INTENTIONAL,, 1 each daily ACE Inhibitor not prescribed due to Risk for drug interaction, Disp: 0 each, Rfl: 0     ORDER FOR DME, Equipment being ordered: Onetouch Ultrasoft Lancing Device, Disp: 1 Device, Rfl: 0     blood glucose monitoring (ONE TOUCH ULTRASOFT) lancets, Use to test blood sugar 1 times daily or as directed., Disp: 1 Box, Rfl: 2     ASPIRIN NOT PRESCRIBED, INTENTIONAL,, by Other route continuous prn Reported on 3/7/2017, Disp: , Rfl:      fish oil-omega-3 fatty acids (FISH OIL) 1000 MG capsule, Take 1 g by mouth 2 times daily , Disp: , Rfl:      MULTI-VITAMIN OR TABS, one tab po  "qd, Disp: 100, Rfl: 0  No current facility-administered medications for this visit.     Facility-Administered Medications Ordered in Other Visits:      insulin aspart (NovoLOG) injection, 1-7 Units, Subcutaneous, TID AC, Brenda Peterson MD     insulin aspart (NovoLOG) injection, 1-5 Units, Subcutaneous, At Bedtime, Brenda Peterson MD     ALLERGIES:    Allergies   Allergen Reactions     No Known Drug Allergies         SOCIAL HISTORY:   Social History     Social History     Marital status:      Spouse name: N/A     Number of children: N/A     Years of education: N/A     Occupational History           Semi-retired     Social History Main Topics     Smoking status: Never Smoker     Smokeless tobacco: Never Used     Alcohol use 0.0 oz/week     0 Standard drinks or equivalent per week      Comment: 1 glass of wine every day     Drug use: No     Sexual activity: No     Other Topics Concern     Caffeine Concern No     14 oz per day     Sleep Concern No     sometimes - nocturia 4 times per night     Special Diet No     Exercise Yes     treadmill/walking 15-20 minutes 6 days per week     Social History Narrative        FAMILY HISTORY:   Family History   Problem Relation Age of Onset     Alzheimer Disease Maternal Grandmother      Arthritis Maternal Grandmother      CANCER Mother      breast/ 1983/colon     Eye Disorder Mother      glaucoma and cataracts     HEART DISEASE Mother      angina/CABG     Hypertension Mother      CANCER Father      colon     DIABETES Maternal Aunt      AoDM        EXAM:Vitals: /74  Ht 5' 11\" (1.803 m)  Wt 188 lb (85.3 kg)  BMI 26.22 kg/m2  BMI= Body mass index is 26.22 kg/(m^2).    General appearance: Patient is alert and fully cooperative with history & exam.  No sign of distress is noted during the visit.     Psychiatric: Affect is pleasant & appropriate.  Patient appears motivated to improve health.     Respiratory: Breathing is regular & unlabored while " sitting.     HEENT: Hearing is intact to spoken word.  Speech is clear.  No gross evidence of visual impairment that would impact ambulation.     Dermatologic: right great toenail is thickened, dystrophic and 80% onycholysed. No redness or signs of infection noted.      Vascular: DP & PT pulses are intact & regular bilaterally.  No significant edema or varicosities noted.  CFT and skin temperature is normal to both lower extremities.     Neurologic: Lower extremity sensation is diminished.      Musculoskeletal: Patient is ambulatory without assistive device or brace.  Semi flexible contracture of the left toes 2-4.      ASSESSMENT:    Diabetic polyneuropathy associated with type 2 diabetes mellitus (H)  Dystrophic nail  Onychomycosis of toenail  Hammer toe of left foot  Left foot pain       PLAN:  Reviewed patient's chart in epic. Talked about proper diabetic foot care. Discussed causes and treatments of nail fungus.  Explained that even if a culture comes back negative, a patient could still have nail fungus.  Discussed treatment options with patient and explained that there isn't one treatment that is 100% effective.  Discussed oral lamisil which is the most effective at about 70% but which can have liver effects.  Explained that if she wanted to try this that she would need serial blood draws to test her liver function.  Discussed over the counter antifungal creams.  Explained that these are about 50% effective and need to be applied twice a day for about 3-4 months.  Also talked about prescription penlac which is a nail laquer.  Again this is also only 50% effective.  Also discussed that if there was damage to the nail and the nail is now dystrophic that non of the above is going to change the nail.  If there was damage, there is note anything that can be done for the nail to correct it.  Discussed that if it becomes painful, we can remove the nail in clinic.        Was given order for anti fungal  cream.    Reviewed and discussed causes of hammertoes with patient.  Explained that this can be caused by an overpowering of muscles or by the way we walk.  Discussed conservative treatments such as orthotics, pads, shoe gear.  Explained that sometimes the flexor tendons can be cut to try and straighten the toe and reduce rubbing. This is normally done in office and patient is weight bearing in postop she for 1-2 weeks.  We also discussed surgical intervention to remove the joint and possibly fuse the toe.  Normally patient has a pin sticking out of the toe for about 6 weeks and can not get the foot wet. Patient would have to be minimal weight bearing in cam boot.      He is going to think about tenotomies. Was given toe crest pad.          Winter Masterson DPM, Podiatry/Foot and Ankle Surgery    Weight management plan: Patient was referred to their PCP to discuss a diet and exercise plan.

## 2017-09-26 NOTE — NURSING NOTE
"Chief Complaint   Patient presents with     Toenail     right great toe looks like fungus        Initial /74  Ht 5' 11\" (1.803 m)  Wt 188 lb (85.3 kg)  BMI 26.22 kg/m2 Estimated body mass index is 26.22 kg/(m^2) as calculated from the following:    Height as of this encounter: 5' 11\" (1.803 m).    Weight as of this encounter: 188 lb (85.3 kg).  Medication Reconciliation: complete   Willian Mijares MA      "

## 2017-09-26 NOTE — MR AVS SNAPSHOT
After Visit Summary   9/26/2017    Louis Keller Jr.    MRN: [id number]           Patient Information     Date Of Birth          6/26/1932        Visit Information        Provider Department      9/26/2017 11:15 AM Winter Masterson DPM, Podiatry/Foot and Ankle Surgery [facility]        Today's Diagnoses     Diabetic polyneuropathy associated with type 2 diabetes mellitus (H)    -  1    Dystrophic nail          Care Instructions      DR. MASTERSON'S CLINIC LOCATIONS:   MONDAY AM - [place] TUESDAY - [place]   [address] [place] [address] [place] [address]   [phone] / [phone] / [phone]       WEDNESDAY [facility]    [address] [place] [address]    [phone] / [phone]        FRIDAY PM - [place] SCHEDULE SURGERY: [phone] [address] APPOINTMENTS: [phone]   [place] [address] BILLING QUESTIONS: [phone] / [phone]          Body Mass Index (BMI)  Many things can cause foot and ankle problems. Foot structure, activity level, foot mechanics and injuries are common causes of pain.  One very important issue that often goes unmentioned, is body weight. Extra weight can cause increased stress on muscles, ligaments, bones and tendons.  Sometimes just a few extra pounds is all it takes to put one over her/his threshold. Without reducing that stress, it can be difficult to alleviate pain. Some people are uncomfortable addressing this issue, but we feel it is important for you to think about it. As Foot &  Ankle specialists, our job is addressing the lower extremity problem and possible causes. Regarding extra body weight, we encourage patients to discuss diet and weight management plans with their primary care doctors. It is this team approach that gives you the best opportunity for pain relief and getting you back on your feet.        JOSE      Hammertoe is a contracture (bending) of one or both joints of the second, third, fourth, or fifth (little) toes. This abnormal bending can put pressure on the toe when wearing shoes, causing problems to develop.  Hammertoes usually start out as mild deformities and get progressively worse over time. In the earlier stages, hammertoes are flexible and the symptoms can often be managed with noninvasive measures. But if left untreated, hammertoes can become more rigid and will not respond to non-surgical treatment.  Because of the progressive nature of hammertoes, they should receive early attention. Hammertoes never get better without some kind of intervention.  Causes  The most common cause of hammertoe is a muscle/tendon imbalance. This imbalance, which leads to a bending of the toe, results from mechanical (structural) changes in the foot that occur over time in some people.  Hammertoes may be aggravated by shoes that don t fit properly. A hammertoe may result if a toe is too long and is forced into a cramped position when a tight shoe is worn.  Occasionally, hammertoe is the result of an earlier trauma to the toe. In some people, hammertoes are inherited.  Symptoms  Pain or irritation of the affected toe when wearing shoes.   Corns and calluses (a buildup of skin) on the toe, between two toes, or on the ball of the foot. Corns are caused by constant friction against the shoe. They may be soft or hard, depending upon their location.   Inflammation, redness, or a burning sensation   Contracture of the toe   In more severe cases of hammertoe, open sores may form.   Diagnosis  Although hammertoes are readily apparent, to arrive at a diagnosis the foot and ankle surgeon will obtain a thorough history of your symptoms and examine your foot. During the physical examination, the doctor may attempt to reproduce your symptoms by manipulating your foot and will study the contractures of the toes. In addition, the foot and  ankle surgeon may take x-rays to determine the degree of the deformities and assess any changes that may have occurred.   Hammertoes are progressive - they don t go away by themselves and usually they will get worse over time. However, not all cases are alike - some hammertoes progress more rapidly than others. Once your foot and ankle surgeon has evaluated your hammertoes, a treatment plan can be developed that is suited to your needs.  Non-surgical Treatment  There is a variety of treatment options for hammertoe. The treatment your foot and ankle surgeon selects will depend upon the severity of your hammertoe and other factors.  A number of non-surgical measures can be undertaken:  Padding corns and calluses. Your foot and ankle surgeon can provide or prescribe pads designed to shield corns from irritation. If you want to try over-the-counter pads, avoid the medicated types. Medicated pads are generally not recommended because they may contain a small amount of acid that can be harmful. Consult your surgeon about this option.   Changes in shoewear. Avoid shoes with pointed toes, shoes that are too short, or shoes with high heels - conditions that can force your toe against the front of the shoe. Instead, choose comfortable shoes with a deep, roomy toe box and heels no higher than two inches.   Orthotic devices. A custom orthotic device placed in your shoe may help control the muscle/tendon imbalance.   Injection therapy. Corticosteroid injections are sometimes used to ease pain and inflammation caused by hammertoe.   Medications. Oral nonsteroidal anti-inflammatory drugs (NSAIDs), such as ibuprofen, may be recommended to reduce pain and inflammation.   Splinting/strapping. Splints or small straps may be applied by the surgeon to realign the bent toe.   Exercises:   1. The Toe Tap  Stand flat on the ground in your bare feet. Raise all of your toes up off the ground as high as you can. Then starting with the little  toes, slowly press them down to the ground. After the big toes are on the ground, start over by raising all of them up off the ground again. This motion is similar to tapping your fingers on a desk. Repeat this ten times.     2. Interlocking your Fingers and Toes  Cross your right foot over your knee and place the fingers of your left hand between your toes. Squeeze your toes together, pinching your fingers between them. Spread the toes apart and squeeze them together again. Repeat this ten times then do the other foot. Like most exercises, this will get easier the more you do it. If you are having a lot of difficulty with this exercise, start with just your index finger between your first and second toe, then later add your middle finger between your second and third toes, and so on until you can fit all your fingers between your toes. Do this ten times on each foot. Eventually you will be able to spread your toes apart without using your fingers.    3. Gripping the Floor   the floor by pressing the pads of your toes (not the tips) into the floor without curling your toes. Relax and repeat this ten times. If your shoes have the proper amount of depth, you should be able to do this with shoes on.      HAMMERTOE TOE SURGERY   Hammertoe surgery is complex. The surgical procedure is an attempt to help the toe lay in a better position. Nearly every structure in the toe will be cut including the tendons, ligaments, skin and bone. Hammertoes are a complex deformity and final toe position is difficult to predict. The only sure way to position a toe is to fuse all three digital joints. That will not happen as some degree of toe motion is needed for walking. The toe may not be completely reduced as the surrounding skin and other structures may not allow the toe to return to a normal position. The tendons on adjacent toes may need to be cut at the time of the original or subsequent surgeries, as interconnections exist  between the toes. The toe may drift after surgery. Stiffness may develop leading to new areas of pressure.   Future shoe choices will be critical in allowing the surgery to provide comfort. The toes will still hurt if shoes rub. The original pain may also persist as other foot problems may be contributing to the current pain. The toe may or may not be pinned in place. External pins would require complete avoidance of water on the foot for six weeks. The pin would be removed about six weeks after the surgery. Strict attention to protection is critical. The pin could get bumped or loosen resulting in early removal. Removal might be necessary before the bone heals which would negatively affect the final surgical outcome and toe allignment.     NAIL FUNGUS / ONYCHOMYCOSIS   Nail fungus is not a hygiene problem and will not likely lead to significant medical   problems. The nails may get thick causing pain and possibly local skin infection.   Treatments include debridement (trimming), oral antifungals, topical antifungals and complete removal of the nail. Most fungal nails are not treated.   Topicals such as tea tree oil can be helpful for surface fungus and may, at best, limit   progression. Over the counter creams (such as Lamisil) can also be used however, their effectiveness is also quite low.  Topical treatment with Pen lac is expensive and often not covered by insurance. Pen lac has an approximate 8% success rate. Topical therapy recommendations is to apply twice a day for at least 3-4 months as it takes 9 months for new nail to grow out.    Experts suggest soaking your feet for 15 to 20 minutes in a mixture of 1 cup vinegar to 4 cups warm water. Be sure to rinse well and pat your feet dry when you're done. You can soak your feet like this daily. But if your skin becomes irritated, try soaking only two to three times a week. Vicks VapoRub, as with vinegar, there have been no controlled clinical trials to assess the  effectiveness of Vicks VapoRub on nail fungus, but there have been numerous anecdotal reports that it works. There's no consensus on how often to apply this product, so check with your doctor before using it on your nails.     Oral therapies include Sporanox and Lamisil. Oral therapies are also expensive and not very effective. Side effects such as liver disease are the main concern. Return of fungus is common even if the treatment worked.     Other Tips:  - Penlac nail medication apply daily x 4 months; remove old polish first day of each week  - Antifungal cream/powder (Zeasorb) - apply daily to feet and shoes x 2 months  - Clean shoes with Lysol or in washing machine every few weeks  - Rotate shoe gear; give them 24 hours to dry out between days wearing them  - Clean pair of socks in morning, clean pair in afternoon if your feet sweat  - Shower shoes used in public showers/pools  FOOT CARE NURSES  If you are interested in having a foot care nurse come out to your   home, please call one of these contacts for more information:  [facility]  [phone] [facility]  [phone]   [facility]  [phone]  [facility] [phone]  [place]   [place] [facility]  [phone]  At [facility] [phone]                 Follow-ups after your visit        Your next 10 appointments already scheduled     Oct 05, 2017 11:15 AM CDT   [facility]    [address] [facility] [address]  [facility] [address] [phone]              Who to contact     If you have questions or need follow up information about today's clinic visit or your schedule please contact [facility] directly at [phone].  Normal or non-critical lab and imaging results will be communicated to you by MyChart, letter or phone within 4 business days after  "the clinic has received the results. If you do not hear from us within 7 days, please contact the clinic through [facility] or phone. If you have a critical or abnormal lab result, we will notify you by phone as soon as possible.  Submit refill requests through [employer] or call your pharmacy and they will forward the refill request to us. Please allow 3 business days for your refill to be completed.          Additional Information About Your Visit        [employer] Information     [facility] lets you send messages to your doctor, view your test results, renew your prescriptions, schedule appointments and more. To sign up, go to [email or web address] . Click on \"Log in\" on the left side of the screen, which will take you to the Welcome page. Then click on \"Sign up Now\" on the right side of the page.     You will be asked to enter the access code listed below, as well as some personal information. Please follow the directions to create your username and password.     Your access code is: [email or web address]  Expires: 2017  2:31 PM     Your access code will  in 90 days. If you need help or a new code, please call your [place] clinic or [phone].        Care EveryWhere ID     This is your Care EveryWhere ID. This could be used by other organizations to access your [place] medical records  [id number]        Your Vitals Were     Height BMI (Body Mass Index)                5' 11\" (1.803 m) 26.22 kg/m2           Blood Pressure from Last 3 Encounters:   17 128/74   17 118/64   17 118/62    Weight from Last 3 Encounters:   17 188 lb (85.3 kg)   17 188 lb (85.3 kg)   17 183 lb (83 kg)              Today, you had the following     No orders found for display       Primary Care Provider Office Phone # Fax #    Joselito Armas [phone]       [address]  [facility] [address]        Equal Access to Services     SABRA LEAL AH: Danae Foster, " warodrigoda ceebernardoha, qaybta kaalmichael medrano, emanuel aburtodennise ah. So [facility] [phone].    ATENCIÓN: Si brittney farley, tiene a cao disposición servicios gratuitos de asistencia lingüística. Jose L al [phone].    We comply with applicable federal civil rights laws and [place] laws. We do not discriminate on the basis of race, color, national origin, age, disability sex, sexual orientation or gender identity.            Thank you!     Thank you for choosing [facility]  for your care. Our goal is always to provide you with excellent care. Hearing back from our patients is one way we can continue to improve our services. Please take a few minutes to complete the written survey that you may receive in the mail after your visit with us. Thank you!             Your Updated Medication List - Protect others around you: Learn how to safely use, store and throw away your medicines at www.disposemymeds.org.          This list is accurate as of: 9/26/17 11:40 AM.  Always use your most recent med list.                   Brand Name Dispense Instructions for use Diagnosis    ACE NOT PRESCRIBED (INTENTIONAL)     0 each    1 each daily ACE Inhibitor not prescribed due to Risk for drug interaction    Type 2 diabetes, HbA1C goal < 8% (H)       acetaminophen 500 MG tablet    TYLENOL     Take 500 mg by mouth every 6 hours as needed        ASPIRIN NOT PRESCRIBED    INTENTIONAL     by Other route continuous prn Reported on 3/7/2017        blood glucose monitoring lancets     1 Box    Use to test blood sugar 1 times daily or as directed.    Type 2 diabetes, HbA1C goal < 8% (H)       blood glucose monitoring test strip    ONE TOUCH ULTRA    100 each    100 strips by In Vitro route daily check daily    Type 2 diabetes mellitus with stage 3 chronic kidney disease (H)       cholestyramine 4 G Packet    QUESTRAN    180 each    Take 1 packet (4 g) by mouth 2 times daily (with meals)     Postcholecystectomy diarrhea, Hyperlipidemia LDL goal <100       Chromium 1000 MCG Tabs      Take 500 mcg by mouth daily        diltiazem 120 MG 24 hr capsule    CARDIZEM CD/CARTIA XT    90 capsule    Take 1 capsule (120 mg) by mouth every evening    Chronic atrial fibrillation (H)       ferrous gluconate 324 (38 FE) MG tablet    FERGON    100 tablet    Take 1 tablet (324 mg) by mouth daily (with breakfast)    Anemia due to blood loss, acute       fish oil-omega-3 fatty acids 1000 MG capsule      Take 1 g by mouth 2 times daily        glipiZIDE 10 MG 24 hr tablet    glipiZIDE XL    180 tablet    Take 1 tablet (10 mg) by mouth 2 times daily    Chronic atrial fibrillation (H), Actinic keratosis       loperamide 2 MG tablet    IMODIUM A-D    30 tablet    1 tablet BID        metFORMIN 500 MG tablet    GLUCOPHAGE    90 tablet    Take 1 tablet (500 mg) by mouth daily (with dinner)    Type 2 diabetes mellitus with stage 3 chronic kidney disease, without long-term current use of insulin (H)       Multi-vitamin Tabs tablet   Generic drug:  multivitamin, therapeutic with minerals     100    one tab po qd        order for DME     1 Device    Equipment being ordered: Onetouch Ultrasoft Lancing Device    Type 2 diabetes, HbA1C goal < 8% (H)       pioglitazone 45 MG tablet    ACTOS    90 tablet    Take 1 tablet (45 mg) by mouth daily    Type 2 diabetes mellitus with stage 3 chronic kidney disease, without long-term current use of insulin (H)       STATIN NOT PRESCRIBED (INTENTIONAL)     0 each    1 each At Bedtime Statin not prescribed intentionally due to Risk for drug interaction    Type 2 diabetes, HbA1C goal < 8% (H)       warfarin 5 MG tablet    COUMADIN    72 tablet    Take 1 tablet (5 mg) Tuesday, Thursday, and Sunday. Take one-half tablet (2.5 mg) on Monday, Wednesday, Friday, and Saturday or as directed by INR clinic    Heart palpitations, New onset atrial fibrillation (H), Chronic anticoagulation

## 2017-10-05 ENCOUNTER — ANTICOAGULATION THERAPY VISIT (OUTPATIENT)
Dept: ANTICOAGULATION | Facility: CLINIC | Age: 82
End: 2017-10-05
Payer: COMMERCIAL

## 2017-10-05 DIAGNOSIS — Z79.01 LONG-TERM (CURRENT) USE OF ANTICOAGULANTS: ICD-10-CM

## 2017-10-05 LAB — INR POINT OF CARE: 2.4 (ref 0.86–1.14)

## 2017-10-05 PROCEDURE — 36416 COLLJ CAPILLARY BLOOD SPEC: CPT

## 2017-10-05 PROCEDURE — 85610 PROTHROMBIN TIME: CPT | Mod: QW

## 2017-10-05 PROCEDURE — 99207 ZZC NO CHARGE NURSE ONLY: CPT

## 2017-10-05 NOTE — PROGRESS NOTES
ANTICOAGULATION FOLLOW-UP CLINIC VISIT    Patient Name:  Louis Keller Jr.  Date:  10/5/2017  Contact Type:  Face to Face    SUBJECTIVE:     Patient Findings     Positives No Problem Findings           OBJECTIVE    INR Protime   Date Value Ref Range Status   10/05/2017 2.4 (A) 0.86 - 1.14 Final       ASSESSMENT / PLAN  INR assessment THER    Recheck INR In: 4 WEEKS    INR Location Clinic      Anticoagulation Summary as of 10/5/2017     INR goal 2.0-3.0   Today's INR 2.4   Maintenance plan 5 mg (5 mg x 1) on Sun, Tue, Thu; 2.5 mg (5 mg x 0.5) all other days   Full instructions 5 mg on Sun, Tue, Thu; 2.5 mg all other days   Weekly total 25 mg   No change documented Monica Souza RN   Plan last modified Monica Souza RN (5/12/2016)   Next INR check 11/2/2017   Priority INR   Target end date     Indications   Long-term (current) use of anticoagulants [Z79.01] [Z79.01]  Atrial fibrillation (H) [I48.91]         Anticoagulation Episode Summary     INR check location     Preferred lab     Send INR reminders to [facility]    Comments likes calendar printout.        Anticoagulation Care Providers     Provider Role Specialty Phone number    Joselito Armas MD Responsible Internal Medicine [phone]            See the Encounter Report to view Anticoagulation Flowsheet and Dosing Calendar (Go to Encounters tab in chart review, and find the Anticoagulation Therapy Visit)    Dosage adjustment made based on physician directed care plan.    Monica Souza RN

## 2017-10-05 NOTE — MR AVS SNAPSHOT
Louis Keller Jr.   10/5/2017 11:15 AM   Anticoagulation Therapy Visit    Description:  85 year old male   Provider:  [facility]   Department:  [facility]           INR as of 10/5/2017     Today's INR 2.4      Anticoagulation Summary as of 10/5/2017     INR goal 2.0-3.0   Today's INR 2.4   Full instructions 5 mg on Sun, Tue, Thu; 2.5 mg all other days   Next INR check 11/2/2017    Indications   Long-term (current) use of anticoagulants [Z79.01] [Z79.01]  Atrial fibrillation (H) [I48.91]         Your next Anticoagulation Clinic appointment(s)     Nov 02, 2017 11:00 AM CDT   [facility]ticoagulation Visit with [facility]   [facility]    [address] [facility] [address]  [facility] [address] [phone]              Contact Numbers     [facility] Phone Numbers:  Anticoagulation Clinic Appointments : [phone]  Anticoagulation Nurse: [phone]         October 2017 Details    Sun Mon Tue Wed Thu Fri Sat     1               2               3               4               5      5 mg   See details      6      2.5 mg         7      2.5 mg           8      5 mg         9      2.5 mg         10      5 mg         11      2.5 mg         12      5 mg         13      2.5 mg         14      2.5 mg           15      5 mg         16      2.5 mg         17      5 mg         18      2.5 mg         19      5 mg         20      2.5 mg         21      2.5 mg           22      5 mg         23      2.5 mg         24      5 mg         25      2.5 mg         26      5 mg         27      2.5 mg         28      2.5 mg           29      5 mg         30      2.5 mg         31      5 mg              Date Details   10/05 This INR check               How to take your warfarin dose     To take:  2.5 mg Take 0.5 of a 5 mg tablet.    To take:  5 mg Take 1 of the 5 mg tablets.           November 2017 Details    Sun Mon Tue Wed Thu Fri Sat        1      2.5  mg         2            3               4                 5               6               7               8               9               10               11                 12               13               14               15               16               17               18                 19               20               21               22               23               24               25                 26               27               28               29               30                  Date Details   No additional details    Date of next INR:  11/2/2017         How to take your warfarin dose     To take:  2.5 mg Take 0.5 of a 5 mg tablet.    To take:  5 mg Take 1 of the 5 mg tablets.

## 2017-10-21 DIAGNOSIS — N18.30 TYPE 2 DIABETES MELLITUS WITH STAGE 3 CHRONIC KIDNEY DISEASE (H): ICD-10-CM

## 2017-10-21 DIAGNOSIS — E11.22 TYPE 2 DIABETES MELLITUS WITH STAGE 3 CHRONIC KIDNEY DISEASE (H): ICD-10-CM

## 2017-10-24 NOTE — TELEPHONE ENCOUNTER
Tests strips       Last Written Prescription Date: 9/14/16  Last Fill Quantity: 100,  # refills: 3   Last Office Visit with G, [facility] or [facility] prescribing provider: 9/18/17

## 2017-11-02 ENCOUNTER — ANTICOAGULATION THERAPY VISIT (OUTPATIENT)
Dept: ANTICOAGULATION | Facility: CLINIC | Age: 82
End: 2017-11-02
Payer: COMMERCIAL

## 2017-11-02 DIAGNOSIS — Z79.01 LONG-TERM (CURRENT) USE OF ANTICOAGULANTS: ICD-10-CM

## 2017-11-02 LAB — INR POINT OF CARE: 2.4 (ref 0.86–1.14)

## 2017-11-02 PROCEDURE — 36416 COLLJ CAPILLARY BLOOD SPEC: CPT

## 2017-11-02 PROCEDURE — 99207 ZZC NO CHARGE NURSE ONLY: CPT

## 2017-11-02 PROCEDURE — 85610 PROTHROMBIN TIME: CPT | Mod: QW

## 2017-11-02 NOTE — MR AVS SNAPSHOT
Louis Keller Jr.   11/2/2017 11:00 AM   Anticoagulation Therapy Visit    Description:  85 year old male   Provider[facility]  [facility]   Department:  [facility]           INR as of 11/2/2017     Today's INR 2.4      Anticoagulation Summary as of 11/2/2017     INR goal 2.0-3.0   Today's INR 2.4   Full instructions 5 mg on Sun, Tue, Thu; 2.5 mg all other days   Next INR check 11/29/2017    Indications   Long-term (current) use of anticoagulants [Z79.01] [Z79.01]  Atrial fibrillation (H) [I48.91]         Your next Anticoagulation Clinic appointment(s)     Nov 29, 2017 11:00 AM CST   [facility] with [facility]   [facility]    [address] [facility] [address]  [facility] [id number] [phone]            Dec 27, 2017 11:00 AM CST   [facility] Visit with [facility]   [facility]    [address] [facility] [address]  [facility] [id number] [phone]              Contact Numbers     [facility] Phone Numbers:  Anticoagulation Clinic Appointments : [phone]  Anticoagulation Nurse: [phone]         November 2017 Details    Sun Mon Tue Wed Thu Fri Sat        1               2      5 mg   See details      3      2.5 mg         4      2.5 mg           5      5 mg         6      2.5 mg         7      5 mg         8      2.5 mg         9      5 mg         10      2.5 mg         11      2.5 mg           12      5 mg         13      2.5 mg         14      5 mg         15      2.5 mg         16      5 mg         17      2.5 mg         18      2.5 mg           19      5 mg         20      2.5 mg         21      5 mg         22      2.5 mg         23      5 mg         24      2.5 mg         25      2.5 mg           26      5 mg         27      2.5 mg         28      5 mg         29            30                  Date Details   11/02 This INR check       Date of  next INR:  11/29/2017         How to take your warfarin dose     To take:  2.5 mg Take 0.5 of a 5 mg tablet.    To take:  5 mg Take 1 of the 5 mg tablets.

## 2017-11-02 NOTE — PROGRESS NOTES
ANTICOAGULATION FOLLOW-UP CLINIC VISIT    Patient Name:  Louis Keller Jr.  Date:  11/2/2017  Contact Type:  Face to Face    SUBJECTIVE:     Patient Findings     Positives No Problem Findings           OBJECTIVE    INR Protime   Date Value Ref Range Status   11/02/2017 2.4 (A) 0.86 - 1.14 Final       ASSESSMENT / PLAN  INR assessment THER    Recheck INR In: 4 WEEKS    INR Location Clinic      Anticoagulation Summary as of 11/2/2017     INR goal 2.0-3.0   Today's INR 2.4   Maintenance plan 5 mg (5 mg x 1) on Sun, Tue, Thu; 2.5 mg (5 mg x 0.5) all other days   Full instructions 5 mg on Sun, Tue, Thu; 2.5 mg all other days   Weekly total 25 mg   No change documented Monica Souza RN   Plan last modified Monica Souza RN (5/12/2016)   Next INR check 11/29/2017   Priority INR   Target end date     Indications   Long-term (current) use of anticoagulants [Z79.01] [Z79.01]  Atrial fibrillation (H) [I48.91]         Anticoagulation Episode Summary     INR check location     Preferred lab     Send INR reminders to [facility]    Comments likes calendar printout.        Anticoagulation Care Providers     Provider Role Specialty Phone number    Joselito Armas MD Responsible Internal Medicine [phone]            See the Encounter Report to view Anticoagulation Flowsheet and Dosing Calendar (Go to Encounters tab in chart review, and find the Anticoagulation Therapy Visit)    Dosage adjustment made based on physician directed care plan.    Monica Souza RN

## 2017-11-29 ENCOUNTER — ANTICOAGULATION THERAPY VISIT (OUTPATIENT)
Dept: ANTICOAGULATION | Facility: CLINIC | Age: 82
End: 2017-11-29
Payer: COMMERCIAL

## 2017-11-29 DIAGNOSIS — Z79.01 LONG-TERM (CURRENT) USE OF ANTICOAGULANTS: ICD-10-CM

## 2017-11-29 LAB — INR POINT OF CARE: 2.5 (ref 0.86–1.14)

## 2017-11-29 PROCEDURE — 99207 ZZC NO CHARGE NURSE ONLY: CPT

## 2017-11-29 PROCEDURE — 36416 COLLJ CAPILLARY BLOOD SPEC: CPT

## 2017-11-29 PROCEDURE — 85610 PROTHROMBIN TIME: CPT | Mod: QW

## 2017-11-29 NOTE — PROGRESS NOTES
ANTICOAGULATION FOLLOW-UP CLINIC VISIT    Patient Name:  Louis Keller Jr.  Date:  11/29/2017  Contact Type:  Face to Face    SUBJECTIVE:     Patient Findings     Positives No Problem Findings           OBJECTIVE    INR Protime   Date Value Ref Range Status   11/29/2017 2.5 (A) 0.86 - 1.14 Final       ASSESSMENT / PLAN  INR assessment THER    Recheck INR In: 4 WEEKS    INR Location Clinic      Anticoagulation Summary as of 11/29/2017     INR goal 2.0-3.0   Today's INR 2.5   Maintenance plan 5 mg (5 mg x 1) on Sun, Tue, Thu; 2.5 mg (5 mg x 0.5) all other days   Full instructions 5 mg on Sun, Tue, Thu; 2.5 mg all other days   Weekly total 25 mg   No change documented Monica Souza RN   Plan last modified Monica Souza RN (5/12/2016)   Next INR check 12/27/2017   Priority INR   Target end date     Indications   Long-term (current) use of anticoagulants [Z79.01] [Z79.01]  Atrial fibrillation (H) [I48.91]         Anticoagulation Episode Summary     INR check location     Preferred lab     Send INR reminders to [facility]    Comments likes calendar printout.        Anticoagulation Care Providers     Provider Role Specialty Phone number    Joselito Armas MD Responsible Internal Medicine [phone]            See the Encounter Report to view Anticoagulation Flowsheet and Dosing Calendar (Go to Encounters tab in chart review, and find the Anticoagulation Therapy Visit)    Dosage adjustment made based on physician directed care plan.    Monica Souza RN

## 2017-11-29 NOTE — MR AVS SNAPSHOT
Louis Keller Jr.   11/29/2017 11:00 AM   Anticoagulation Therapy Visit    Description:  85 year old male   Provider[facility]  [facility]   Department:  [facility]           INR as of 11/29/2017     Today's INR 2.5      Anticoagulation Summary as of 11/29/2017     INR goal 2.0-3.0   Today's INR 2.5   Full instructions 5 mg on Sun, Tue, Thu; 2.5 mg all other days   Next INR check 12/27/2017    Indications   Long-term (current) use of anticoagulants [Z79.01] [Z79.01]  Atrial fibrillation (H) [I48.91]         Your next Anticoagulation Clinic appointment(s)     Dec 27, 2017 11:00 AM CST   [facility] Visit with [facility]   [facility]    [address] [facility] [address]  [facility] [address] [phone]              Contact Numbers     [facility] Phone Numbers:  Anticoagulation Clinic Appointments : [phone]  Anticoagulation Nurse: [phone]         November 2017 Details    Sun Mon Tue Wed Thu Fri Sat        1               2               3               4                 5               6               7               8               9               10               11                 12               13               14               15               16               17               18                 19               20               21               22               23               24               25                 26               27               28               29      2.5 mg   See details      30      5 mg            Date Details   11/29 This INR check               How to take your warfarin dose     To take:  2.5 mg Take 0.5 of a 5 mg tablet.    To take:  5 mg Take 1 of the 5 mg tablets.           December 2017 Details    Sun Mon Tue Wed Thu Fri Sat          1      2.5 mg         2      2.5 mg           3      5 mg         4      2.5 mg         5      5 mg         6      2.5 mg         7      5 mg          8      2.5 mg         9      2.5 mg           10      5 mg         11      2.5 mg         12      5 mg         13      2.5 mg         14      5 mg         15      2.5 mg         16      2.5 mg           17      5 mg         18      2.5 mg         19      5 mg         20      2.5 mg         21      5 mg         22      2.5 mg         23      2.5 mg           24      5 mg         25      2.5 mg         26      5 mg         27            28               29               30                 31                      Date Details   No additional details    Date of next INR:  12/27/2017         How to take your warfarin dose     To take:  2.5 mg Take 0.5 of a 5 mg tablet.    To take:  5 mg Take 1 of the 5 mg tablets.

## 2017-12-05 ENCOUNTER — TRANSFERRED RECORDS (OUTPATIENT)
Dept: HEALTH INFORMATION MANAGEMENT | Facility: CLINIC | Age: 82
End: 2017-12-05

## 2017-12-12 ENCOUNTER — TRANSFERRED RECORDS (OUTPATIENT)
Dept: HEALTH INFORMATION MANAGEMENT | Facility: CLINIC | Age: 82
End: 2017-12-12

## 2017-12-16 DIAGNOSIS — I48.91 NEW ONSET ATRIAL FIBRILLATION (H): ICD-10-CM

## 2017-12-16 DIAGNOSIS — R00.2 HEART PALPITATIONS: ICD-10-CM

## 2017-12-16 DIAGNOSIS — Z79.01 CHRONIC ANTICOAGULATION: ICD-10-CM

## 2017-12-19 RX ORDER — WARFARIN SODIUM 5 MG/1
TABLET ORAL
Qty: 65 TABLET | Refills: 1 | Status: SHIPPED | OUTPATIENT
Start: 2017-12-19 | End: 2018-06-10

## 2017-12-19 NOTE — TELEPHONE ENCOUNTER
Warfarin 5 mg    Last Written Prescription Date: 12/14/16  Last Fill Qty: 72, # refills: 3  Last Office Visit with [facility] or [facility] prescribing provider: 9/18/17  Next 5 appointments (look out 90 days)     Dec 27, 2017 11:20 AM CST   SHORT with Joselito Armas MD   [facility] ([facility])    [address]  [facility] [id number] [phone]                   Date and Result of Last PT/INR:   Lab Results   Component Value Date    INR 2.5 11/29/2017    INR 2.4 11/02/2017    INR 5.03 01/09/2014    INR 1.48 08/08/2013      Requested Prescriptions   Pending Prescriptions Disp Refills     warfarin (COUMADIN) 5 MG tablet [Pharmacy Med Name: WARFARIN SODIUM 5 MG TABLET] 72 tablet 1     Sig: TAKE 1 TABLET BY MOUTH TUESDAY,THURS AND SUN. TAKE 1/2 TABLET ON MON,WED,FRI AND SAT. OR AS DIRECTED    Vitamin K Antagonists Failed    12/16/2017  9:00 PM       Failed - INR is within goal in the past 6 weeks    Confirm INR is within goal in the past 6 weeks.     Recent Labs   Lab Test 11/29/17   INR  2.5*                      Passed - Recent or future visit with authorizing provider's specialty    Patient had office visit in the last year or has a visit in the next 30 days with authorizing provider.  See chart review.              Passed - Patient is 18 years of age or older      Prescription approved per [facility] Refill Protocol.  Monica Souza RN

## 2017-12-27 ENCOUNTER — OFFICE VISIT (OUTPATIENT)
Dept: INTERNAL MEDICINE | Facility: CLINIC | Age: 82
End: 2017-12-27
Payer: COMMERCIAL

## 2017-12-27 ENCOUNTER — ANTICOAGULATION THERAPY VISIT (OUTPATIENT)
Dept: ANTICOAGULATION | Facility: CLINIC | Age: 82
End: 2017-12-27
Payer: COMMERCIAL

## 2017-12-27 VITALS
TEMPERATURE: 98.2 F | BODY MASS INDEX: 26.18 KG/M2 | HEART RATE: 77 BPM | DIASTOLIC BLOOD PRESSURE: 80 MMHG | WEIGHT: 187 LBS | OXYGEN SATURATION: 98 % | HEIGHT: 71 IN | SYSTOLIC BLOOD PRESSURE: 130 MMHG

## 2017-12-27 DIAGNOSIS — R97.20 ELEVATED PROSTATE SPECIFIC ANTIGEN (PSA): ICD-10-CM

## 2017-12-27 DIAGNOSIS — Z12.5 SCREENING FOR PROSTATE CANCER: ICD-10-CM

## 2017-12-27 DIAGNOSIS — N18.30 TYPE 2 DIABETES MELLITUS WITH STAGE 3 CHRONIC KIDNEY DISEASE, WITHOUT LONG-TERM CURRENT USE OF INSULIN (H): Primary | ICD-10-CM

## 2017-12-27 DIAGNOSIS — I48.20 CHRONIC ATRIAL FIBRILLATION (H): ICD-10-CM

## 2017-12-27 DIAGNOSIS — Z79.01 LONG-TERM (CURRENT) USE OF ANTICOAGULANTS: ICD-10-CM

## 2017-12-27 DIAGNOSIS — E11.22 TYPE 2 DIABETES MELLITUS WITH STAGE 3 CHRONIC KIDNEY DISEASE, WITHOUT LONG-TERM CURRENT USE OF INSULIN (H): Primary | ICD-10-CM

## 2017-12-27 DIAGNOSIS — I48.91 ATRIAL FIBRILLATION (H): ICD-10-CM

## 2017-12-27 DIAGNOSIS — M17.0 PRIMARY OSTEOARTHRITIS OF BOTH KNEES: ICD-10-CM

## 2017-12-27 DIAGNOSIS — E78.5 HYPERLIPIDEMIA LDL GOAL <100: ICD-10-CM

## 2017-12-27 LAB
HBA1C MFR BLD: 7.5 % (ref 4.3–6)
INR POINT OF CARE: 2.1 (ref 0.86–1.14)

## 2017-12-27 PROCEDURE — 36416 COLLJ CAPILLARY BLOOD SPEC: CPT

## 2017-12-27 PROCEDURE — 80061 LIPID PANEL: CPT | Performed by: INTERNAL MEDICINE

## 2017-12-27 PROCEDURE — 82043 UR ALBUMIN QUANTITATIVE: CPT | Performed by: INTERNAL MEDICINE

## 2017-12-27 PROCEDURE — 99214 OFFICE O/P EST MOD 30 MIN: CPT | Performed by: INTERNAL MEDICINE

## 2017-12-27 PROCEDURE — 83036 HEMOGLOBIN GLYCOSYLATED A1C: CPT | Performed by: INTERNAL MEDICINE

## 2017-12-27 PROCEDURE — 80053 COMPREHEN METABOLIC PANEL: CPT | Performed by: INTERNAL MEDICINE

## 2017-12-27 PROCEDURE — 85610 PROTHROMBIN TIME: CPT | Mod: QW

## 2017-12-27 PROCEDURE — G0103 PSA SCREENING: HCPCS | Performed by: INTERNAL MEDICINE

## 2017-12-27 PROCEDURE — 99207 ZZC NO CHARGE NURSE ONLY: CPT

## 2017-12-27 NOTE — NURSING NOTE
"Chief Complaint   Patient presents with     Recheck Medication     3 month F/U on DM       Initial /80  Pulse 77  Temp 98.2  F (36.8  C) (Oral)  Ht 5' 11\" (1.803 m)  Wt 187 lb (84.8 kg)  SpO2 98%  BMI 26.08 kg/m2 Estimated body mass index is 26.08 kg/(m^2) as calculated from the following:    Height as of this encounter: 5' 11\" (1.803 m).    Weight as of this encounter: 187 lb (84.8 kg).  Medication Reconciliation: complete   Elen Grimm MA      "

## 2017-12-27 NOTE — PROGRESS NOTES
ANTICOAGULATION FOLLOW-UP CLINIC VISIT    Patient Name:  Louis Keller Jr.  Date:  12/27/2017  Contact Type:  Face to Face    SUBJECTIVE:     Patient Findings     Positives No Problem Findings           OBJECTIVE    INR Protime   Date Value Ref Range Status   12/27/2017 2.1 (A) 0.86 - 1.14 Final       ASSESSMENT / PLAN  INR assessment THER    Recheck INR In: 4 WEEKS    INR Location Clinic      Anticoagulation Summary as of 12/27/2017     INR goal 2.0-3.0   Today's INR 2.1   Maintenance plan 5 mg (5 mg x 1) on Sun, Tue, Thu; 2.5 mg (5 mg x 0.5) all other days   Full instructions 5 mg on Sun, Tue, Thu; 2.5 mg all other days   Weekly total 25 mg   No change documented Tata Alexis RN   Plan last modified Monica Souza RN (5/12/2016)   Next INR check 1/25/2018   Priority INR   Target end date     Indications   Long-term (current) use of anticoagulants [Z79.01] [Z79.01]  Atrial fibrillation (H) [I48.91]         Anticoagulation Episode Summary     INR check location     Preferred lab     Send INR reminders to [facility]    Comments likes calendar printout.        Anticoagulation Care Providers     Provider Role Specialty Phone number    Joselito Armas MD Responsible Internal Medicine [phone]            See the Encounter Report to view Anticoagulation Flowsheet and Dosing Calendar (Go to Encounters tab in chart review, and find the Anticoagulation Therapy Visit)    Dosage adjustment made based on physician directed care plan.    Tata Alexis RN

## 2017-12-27 NOTE — MR AVS SNAPSHOT
"              After Visit Summary   12/27/2017    Louis Keller Jr.    MRN: [id number]           Patient Information     Date Of Birth          6/26/1932        Visit Information        Provider Department      12/27/2017 11:20 AM Joselito Armas MD [facility]        Today's Diagnoses     Type 2 diabetes mellitus with stage 3 chronic kidney disease, without long-term current use of insulin (H)    -  1    Hyperlipidemia LDL goal <100        Chronic atrial fibrillation (H)        Screening for prostate cancer        Primary osteoarthritis of both knees        Elevated prostate specific antigen (PSA)           Follow-ups after your visit        Your next 10 appointments already scheduled     Jan 25, 2018 11:00 AM CST   Anticoagulation Visit with [facility]   [facility] ([facility])    [address] [facility] [address]  [facility] [address] [phone]              Who to contact     If you have questions or need follow up information about today's clinic visit or your schedule please contact [facility] directly at [phone].  Normal or non-critical lab and imaging results will be communicated to you by PrestoBoxhart, letter or phone within 4 business days after the clinic has received the results. If you do not hear from us within 7 days, please contact the clinic through [employer] or phone. If you have a critical or abnormal lab result, we will notify you by phone as soon as possible.  Submit refill requests through Indelsul or call your pharmacy and they will forward the refill request to us. Please allow 3 business days for your refill to be completed.          Additional Information About Your Visit        PrestoBoxharANTERIOS Information     Indelsul lets you send messages to your doctor, view your test results, renew your prescriptions, schedule appointments and more. To sign up, go to [email or web address] . Click on \"Log in\" on the left " "side of the screen, which will take you to the Welcome page. Then click on \"Sign up Now\" on the right side of the page.     You will be asked to enter the access code listed below, as well as some personal information. Please follow the directions to create your username and password.     Your access code is: [email or web address]  Expires: 3/29/2018  5:39 PM     Your access code will  in 90 days. If you need help or a new code, please call your [place] clinic or [phone].        Care EveryWhere ID     This is your Care EveryWhere ID. This could be used by other organizations to access your [place] medical records  [id number]        Your Vitals Were     Pulse Temperature Height Pulse Oximetry BMI (Body Mass Index)       77 98.2  F (36.8  C) (Oral) 5' 11\" (1.803 m) 98% 26.08 kg/m2        Blood Pressure from Last 3 Encounters:   17 130/80   17 128/74   17 118/64    Weight from Last 3 Encounters:   17 187 lb (84.8 kg)   17 188 lb (85.3 kg)   17 188 lb (85.3 kg)              We Performed the Following     Albumin Random Urine Quantitative with Creat Ratio     Comprehensive metabolic panel     Hemoglobin A1c     Lipid panel reflex to direct LDL Non-fasting     Prostate spec antigen screen          Today's Medication Changes          These changes are accurate as of: 17 11:59 PM.  If you have any questions, ask your nurse or doctor.               These medicines have changed or have updated prescriptions.        Dose/Directions    metFORMIN 500 MG tablet   Commonly known as:  GLUCOPHAGE   This may have changed:  when to take this   Used for:  Type 2 diabetes mellitus with stage 3 chronic kidney disease, without long-term current use of insulin (H)   Changed by:  Joselito Armas MD        Dose:  500 mg   Take 1 tablet (500 mg) by mouth 2 times daily (with meals)   Quantity:  180 tablet   Refills:  1            Where to get your medicines      These medications were sent to " [id number] - [place], [place] - [address]  [address], [facility] [address]     Phone:  [phone]     metFORMIN 500 MG tablet                Primary Care Provider Office Phone # Fax #    Joselito Armas [phone]       [address]  [facility] [address]        Equal Access to Services     [facility] Center[facility] : Hadii aad ku hadasho Soomaali, waaxda luqadaha, qaybta kaalmada adeegyada, waxay idiin hayaan adeeg kharash la'aan ah. So [facility] [phone].    ATENCIÓN: Si habla español, tiene a cao disposición servicios gratuitos de asistencia lingüística. Llame al [phone].    We comply with applicable federal civil rights laws and [place] laws. We do not discriminate on the basis of race, color, national origin, age, disability, sex, sexual orientation, or gender identity.            Thank you!     Thank you for choosing [facility]  for your care. Our goal is always to provide you with excellent care. Hearing back from our patients is one way we can continue to improve our services. Please take a few minutes to complete the written survey that you may receive in the mail after your visit with us. Thank you!             Your Updated Medication List - Protect others around you: Learn how to safely use, store and throw away your medicines at www.disposemymeds.org.          This list is accurate as of: 12/27/17 11:59 PM.  Always use your most recent med list.                   Brand Name Dispense Instructions for use Diagnosis    ACE NOT PRESCRIBED (INTENTIONAL)     0 each    1 each daily ACE Inhibitor not prescribed due to Risk for drug interaction    Type 2 diabetes, HbA1C goal < 8% (H)       acetaminophen 500 MG tablet    TYLENOL     Take 500 mg by mouth every 6 hours as needed        ASPIRIN NOT PRESCRIBED    INTENTIONAL     by Other route continuous prn Reported on 3/7/2017        blood glucose monitoring lancets     1 Box    Use to test blood sugar 1  times daily or as directed.    Type 2 diabetes, HbA1C goal < 8% (H)       cholestyramine 4 G Packet    QUESTRAN    180 each    Take 1 packet (4 g) by mouth 2 times daily (with meals)    Postcholecystectomy diarrhea, Hyperlipidemia LDL goal <100       Chromium 1000 MCG Tabs      Take 500 mcg by mouth daily        diltiazem 120 MG 24 hr capsule    CARDIZEM CD/CARTIA XT    90 capsule    Take 1 capsule (120 mg) by mouth every evening    Chronic atrial fibrillation (H)       ferrous gluconate 324 (38 FE) MG tablet    FERGON    100 tablet    Take 1 tablet (324 mg) by mouth daily (with breakfast)    Anemia due to blood loss, acute       fish oil-omega-3 fatty acids 1000 MG capsule      Take 1 g by mouth 2 times daily        glipiZIDE 10 MG 24 hr tablet    glipiZIDE XL    180 tablet    Take 1 tablet (10 mg) by mouth 2 times daily    Chronic atrial fibrillation (H), Actinic keratosis       ketoconazole 2 % cream    NIZORAL    30 g    Apply topically daily Apply to affected toenail daily    Diabetic polyneuropathy associated with type 2 diabetes mellitus (H), Dystrophic nail, Onychomycosis of toenail       loperamide 2 MG tablet    IMODIUM A-D    30 tablet    1 tablet BID        metFORMIN 500 MG tablet    GLUCOPHAGE    180 tablet    Take 1 tablet (500 mg) by mouth 2 times daily (with meals)    Type 2 diabetes mellitus with stage 3 chronic kidney disease, without long-term current use of insulin (H)       Multi-vitamin Tabs tablet   Generic drug:  multivitamin, therapeutic with minerals     100    one tab po qd        ONETOUCH ULTRA test strip   Generic drug:  blood glucose monitoring     100 strip    USE TO TEST DAILY    Type 2 diabetes mellitus with stage 3 chronic kidney disease (H)       pioglitazone 45 MG tablet    ACTOS    90 tablet    Take 1 tablet (45 mg) by mouth daily    Type 2 diabetes mellitus with stage 3 chronic kidney disease, without long-term current use of insulin (H)       STATIN NOT PRESCRIBED (INTENTIONAL)      0 each    1 each At Bedtime Statin not prescribed intentionally due to Risk for drug interaction    Type 2 diabetes, HbA1C goal < 8% (H)       warfarin 5 MG tablet    COUMADIN    65 tablet    TAKE 1 TABLET BY MOUTH TUESDAY,THURS AND SUN. TAKE 1/2 TABLET ON MON,WED,FRI AND SAT. OR AS DIRECTED    Heart palpitations, New onset atrial fibrillation (H), Chronic anticoagulation

## 2017-12-27 NOTE — MR AVS SNAPSHOT
Louis Keller Jr.   12/27/2017 11:00 AM   Anticoagulation Therapy Visit    Description:  85 year old male   Provider[facility]   Department:  [facility]           INR as of 12/27/2017     Today's INR 2.1      Anticoagulation Summary as of 12/27/2017     INR goal 2.0-3.0   Today's INR 2.1   Full instructions 5 mg on Sun, Tue, Thu; 2.5 mg all other days   Next INR check 1/25/2018    Indications   Long-term (current) use of anticoagulants [Z79.01] [Z79.01]  Atrial fibrillation (H) [I48.91]         Your next Anticoagulation Clinic appointment(s)     Jan 25, 2018 11:00 AM CST   [facility] with [facility]   [facility]    [address] [facility] [address]  [facility] [address] [phone]              Contact Numbers     [facility] Phone Numbers:  Anticoagulation Clinic Appointments : [phone]  Anticoagulation Nurse: [phone]         December 2017 Details    Sun Mon Tue Wed Thu Fri Sat          1               2                 3               4               5               6               7               8               9                 10               11               12               13               14               15               16                 17               18               19               20               21               22               23                 24               25               26               27      2.5 mg   See details      28      5 mg         29      2.5 mg         30      2.5 mg           31      5 mg                Date Details   12/27 This INR check               How to take your warfarin dose     To take:  2.5 mg Take 0.5 of a 5 mg tablet.    To take:  5 mg Take 1 of the 5 mg tablets.           January 2018 Details    Sun Mon Tue Wed Thu Fri Sat      1      2.5 mg         2      5 mg         3      2.5 mg         4      5 mg         5      2.5 mg         6       2.5 mg           7      5 mg         8      2.5 mg         9      5 mg         10      2.5 mg         11      5 mg         12      2.5 mg         13      2.5 mg           14      5 mg         15      2.5 mg         16      5 mg         17      2.5 mg         18      5 mg         19      2.5 mg         20      2.5 mg           21      5 mg         22      2.5 mg         23      5 mg         24      2.5 mg         25            26               27                 28               29               30               31                   Date Details   No additional details    Date of next INR:  1/25/2018         How to take your warfarin dose     To take:  2.5 mg Take 0.5 of a 5 mg tablet.    To take:  5 mg Take 1 of the 5 mg tablets.

## 2017-12-27 NOTE — PROGRESS NOTES
SUBJECTIVE:   Louis Keller Jr. is a 85 year old male who presents to clinic today for the following health issues:      Follow Up:    No acute complaints, no medication change or new medical conditions.  Has H/O DM. On diet , exercise and PO medications. Blood sugars are controlled. No parestesias. No hypoglycemias.  Has H/O hyperlipidemia. On medical treatment and diet. No side effects. No muscle weakness, myalgias or upset stomach.   Has history of atrial fibrillation. On anticoagulation with Coumadin and rate control medications. Asymptonatic - no chest pains , palpitations,  no side effects from medications.  Has concern for pain with ambulation - knees and ankles. Uses a cane. Has OA. Chronic, recurrent.     PROBLEMS TO ADD ON...    Problem list and histories reviewed & adjusted, as indicated.  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     Anemia due to blood loss, acute     Physical deconditioning     Diabetes mellitus, type 2 (H)     Atrial fibrillation (H)     QUESADA (dyspnea on exertion)     Diastolic murmur     Pain of right thigh     Long-term (current) use of anticoagulants [Z79.01]     Past Surgical History:   Procedure Laterality Date     ARTHROPLASTY KNEE  8/5/2013    Procedure: ARTHROPLASTY KNEE;  Left Total Knee Arthroplasty       C NONSPECIFIC PROCEDURE  Child    T&A     C NONSPECIFIC PROCEDURE  ; also 11/03    Colonoscopy     C NONSPECIFIC PROCEDURE      Basal cell cancer of the nose     C NONSPECIFIC PROCEDURE  1990    Cholecystectomy      CARDIOVERSION  11    failed       Social History   Substance Use Topics     Smoking status: Never Smoker     Smokeless tobacco: Never Used     Alcohol use 0.0 oz/week     0 Standard drinks or equivalent per week      Comment: 1 glass of wine every day     Family History   Problem Relation Age of Onset     Alzheimer Disease Maternal Grandmother      Arthritis Maternal Grandmother      CANCER Mother      breast/ 1983/colon     Eye Disorder Mother      glaucoma and cataracts     HEART DISEASE Mother      angina/CABG     Hypertension Mother      CANCER Father      colon     DIABETES Maternal Aunt      AoDM         Current Outpatient Prescriptions   Medication Sig Dispense Refill     metFORMIN (GLUCOPHAGE) 500 MG tablet Take 1 tablet (500 mg) by mouth 2 times daily (with meals) 180 tablet 1     warfarin (COUMADIN) 5 MG tablet TAKE 1 TABLET BY MOUTH TUESDAY,THURS AND SUN. TAKE 1/2 TABLET ON MON,WED,FRI AND SAT. OR AS DIRECTED 65 tablet 1     ketoconazole (NIZORAL) 2 % cream Apply topically daily Apply to affected toenail daily 30 g 1     glipiZIDE (GLIPIZIDE XL) 10 MG 24 hr tablet Take 1 tablet (10 mg) by mouth 2 times daily 180 tablet 3     pioglitazone (ACTOS) 45 MG tablet Take 1 tablet (45 mg) by mouth daily 90 tablet 1     diltiazem 120 MG 24 hr capsule Take 1 capsule (120 mg) by mouth every evening 90 capsule 3     cholestyramine (QUESTRAN) 4 G Packet Take 1 packet (4 g) by mouth 2 times daily (with meals) 180 each 3     Chromium 1000 MCG TABS Take 500 mcg by mouth daily       ferrous gluconate (FERGON) 324 (38 FE) MG tablet Take 1 tablet (324 mg) by mouth daily (with breakfast) 100 tablet 3     loperamide (IMODIUM A-D) 2 MG tablet 1 tablet BID 30 tablet 0     acetaminophen (TYLENOL) 500 MG tablet Take 500 mg by mouth every 6 hours as needed        fish oil-omega-3 fatty acids (FISH OIL) 1000 MG capsule Take 1 g by mouth 2 times daily        MULTI-VITAMIN OR TABS one tab po qd 100 0     metFORMIN  "(GLUCOPHAGE) 500 MG tablet Take 1 tablet (500 mg) by mouth 2 times daily (with meals) 180 tablet 1     ONE TOUCH ULTRA test strip USE TO TEST DAILY 100 strip 1     [DISCONTINUED] metFORMIN (GLUCOPHAGE) 500 MG tablet Take 1 tablet (500 mg) by mouth daily (with dinner) 90 tablet 1     STATIN NOT PRESCRIBED, INTENTIONAL, 1 each At Bedtime Statin not prescribed intentionally due to Risk for drug interaction 0 each 0     ACE NOT PRESCRIBED, INTENTIONAL, 1 each daily ACE Inhibitor not prescribed due to Risk for drug interaction 0 each 0     blood glucose monitoring (ONE TOUCH ULTRASOFT) lancets Use to test blood sugar 1 times daily or as directed. 1 Box 2     ASPIRIN NOT PRESCRIBED, INTENTIONAL, by Other route continuous prn Reported on 3/7/2017           Reviewed and updated as needed this visit by clinical staff       Reviewed and updated as needed this visit by Provider         ROS:  Constitutional, HEENT, cardiovascular, pulmonary, gi and gu systems are negative, except as otherwise noted.      OBJECTIVE:   /80  Pulse 77  Temp 98.2  F (36.8  C) (Oral)  Ht 5' 11\" (1.803 m)  Wt 187 lb (84.8 kg)  SpO2 98%  BMI 26.08 kg/m2  Body mass index is 26.08 kg/(m^2).   GENERAL: healthy, alert and no distress  EYES: Eyes grossly normal to inspection, PERRL and conjunctivae and sclerae normal  HENT: ear canals and TM's normal, nose and mouth without ulcers or lesions  NECK: no adenopathy, no asymmetry, masses, or scars and thyroid normal to palpation  RESP: lungs clear to auscultation - no rales, rhonchi or wheezes  CV: regular rate and rhythm, normal S1 S2, no S3 or S4, no murmur, click or rub, no peripheral edema and peripheral pulses strong  ABDOMEN: soft, nontender, no hepatosplenomegaly, no masses and bowel sounds normal  MS: no gross musculoskeletal defects noted, no edema,OA of the knees, palpatory tender   SKIN: no suspicious lesions or rashes  NEURO: Normal strength and tone, mentation intact and speech " normal    Diagnostic Test Results:  Results for orders placed or performed in visit on 12/27/17   Hemoglobin A1c   Result Value Ref Range    Hemoglobin A1C 7.5 (H) 4.3 - 6.0 %   Prostate spec antigen screen   Result Value Ref Range    PSA 4.35 (H) 0 - 4 ug/L   Lipid panel reflex to direct LDL Non-fasting   Result Value Ref Range    Cholesterol 140 <200 mg/dL    Triglycerides 100 <150 mg/dL    HDL Cholesterol 62 >39 mg/dL    LDL Cholesterol Calculated 58 <100 mg/dL    Non HDL Cholesterol 78 <130 mg/dL   Albumin Random Urine Quantitative with Creat Ratio   Result Value Ref Range    Creatinine Urine 57 mg/dL    Albumin Urine mg/L 27 mg/L    Albumin Urine mg/g Cr 47.56 (H) 0 - 17 mg/g Cr   Comprehensive metabolic panel   Result Value Ref Range    Sodium 137 133 - 144 mmol/L    Potassium 4.2 3.4 - 5.3 mmol/L    Chloride 104 94 - 109 mmol/L    Carbon Dioxide 24 20 - 32 mmol/L    Anion Gap 9 3 - 14 mmol/L    Glucose 173 (H) 70 - 99 mg/dL    Urea Nitrogen 14 7 - 30 mg/dL    Creatinine 0.82 0.66 - 1.25 mg/dL    GFR Estimate 89 >60 mL/min/1.7m2    GFR Estimate If Black >90 >60 mL/min/1.7m2    Calcium 9.3 8.5 - 10.1 mg/dL    Bilirubin Total 1.3 0.2 - 1.3 mg/dL    Albumin 3.9 3.4 - 5.0 g/dL    Protein Total 7.3 6.8 - 8.8 g/dL    Alkaline Phosphatase 79 40 - 150 U/L    ALT 26 0 - 70 U/L    AST 24 0 - 45 U/L       ASSESSMENT/PLAN:     Problem List Items Addressed This Visit     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    Relevant Medications    metFORMIN (GLUCOPHAGE) 500 MG tablet    Other Relevant Orders    Hemoglobin A1c (Completed)    Albumin Random Urine Quantitative with Creat Ratio (Completed)    Comprehensive metabolic panel (Completed)    Hemoglobin A1c    Hyperlipidemia LDL goal <100    Relevant Orders    Lipid panel reflex to direct LDL Non-fasting (Completed)    Atrial fibrillation (H)      Other Visit Diagnoses     Screening for prostate cancer        Relevant Orders    Prostate spec antigen  screen (Completed)    Primary osteoarthritis of both knees        Elevated prostate specific antigen (PSA)        Relevant Orders    Prostate spec antigen screen           Assess DM control, consider increased Metformin dose   Recheck PSA- history of elevated   Controlled AF, cont treatment   Assess lipids   PRN Tylenol for OA of the knees     Follow-Up:in 6 months or as needed     Joselito Armas MD  [facility]

## 2017-12-28 LAB
ALBUMIN SERPL-MCNC: 3.9 G/DL (ref 3.4–5)
ALP SERPL-CCNC: 79 U/L (ref 40–150)
ALT SERPL W P-5'-P-CCNC: 26 U/L (ref 0–70)
ANION GAP SERPL CALCULATED.3IONS-SCNC: 9 MMOL/L (ref 3–14)
AST SERPL W P-5'-P-CCNC: 24 U/L (ref 0–45)
BILIRUB SERPL-MCNC: 1.3 MG/DL (ref 0.2–1.3)
BUN SERPL-MCNC: 14 MG/DL (ref 7–30)
CALCIUM SERPL-MCNC: 9.3 MG/DL (ref 8.5–10.1)
CHLORIDE SERPL-SCNC: 104 MMOL/L (ref 94–109)
CHOLEST SERPL-MCNC: 140 MG/DL
CO2 SERPL-SCNC: 24 MMOL/L (ref 20–32)
CREAT SERPL-MCNC: 0.82 MG/DL (ref 0.66–1.25)
CREAT UR-MCNC: 57 MG/DL
GFR SERPL CREATININE-BSD FRML MDRD: 89 ML/MIN/1.7M2
GLUCOSE SERPL-MCNC: 173 MG/DL (ref 70–99)
HDLC SERPL-MCNC: 62 MG/DL
LDLC SERPL CALC-MCNC: 58 MG/DL
MICROALBUMIN UR-MCNC: 27 MG/L
MICROALBUMIN/CREAT UR: 47.56 MG/G CR (ref 0–17)
NONHDLC SERPL-MCNC: 78 MG/DL
POTASSIUM SERPL-SCNC: 4.2 MMOL/L (ref 3.4–5.3)
PROT SERPL-MCNC: 7.3 G/DL (ref 6.8–8.8)
PSA SERPL-ACNC: 4.35 UG/L (ref 0–4)
SODIUM SERPL-SCNC: 137 MMOL/L (ref 133–144)
TRIGL SERPL-MCNC: 100 MG/DL

## 2017-12-29 DIAGNOSIS — E11.9 DM TYPE 2 (DIABETES MELLITUS, TYPE 2) (H): Primary | ICD-10-CM

## 2018-01-07 DIAGNOSIS — D62 ANEMIA DUE TO BLOOD LOSS, ACUTE: ICD-10-CM

## 2018-01-10 RX ORDER — FERROUS GLUCONATE 324(38)MG
TABLET ORAL
Qty: 100 TABLET | Refills: 3 | Status: SHIPPED | OUTPATIENT
Start: 2018-01-10 | End: 2019-03-09

## 2018-01-10 NOTE — TELEPHONE ENCOUNTER
Requested Prescriptions   Pending Prescriptions Disp Refills     ferrous gluconate (FERGON) 324 (38 FE) MG tablet [Pharmacy Med Name: FERROUS GLUCONATE 324 MG TAB] 100 tablet 3     Sig: TAKE 1 TABLET (324 MG) BY MOUTH DAILY (WITH BREAKFAST)    There is no refill protocol information for this order        Routing refill request to provider for review/approval because:  Drug not on the G refill protocol

## 2018-01-25 ENCOUNTER — ANTICOAGULATION THERAPY VISIT (OUTPATIENT)
Dept: ANTICOAGULATION | Facility: CLINIC | Age: 83
End: 2018-01-25
Payer: COMMERCIAL

## 2018-01-25 DIAGNOSIS — Z79.01 LONG-TERM (CURRENT) USE OF ANTICOAGULANTS: ICD-10-CM

## 2018-01-25 LAB — INR POINT OF CARE: 2.2 (ref 0.86–1.14)

## 2018-01-25 PROCEDURE — 99207 ZZC NO CHARGE NURSE ONLY: CPT

## 2018-01-25 PROCEDURE — 85610 PROTHROMBIN TIME: CPT | Mod: QW

## 2018-01-25 PROCEDURE — 36416 COLLJ CAPILLARY BLOOD SPEC: CPT

## 2018-01-25 NOTE — MR AVS SNAPSHOT
Louis Keller Jr.   1/25/2018 11:00 AM   Anticoagulation Therapy Visit    Description:  85 year old male   Provider:  [facility]   Department:  [facility]           INR as of 1/25/2018     Today's INR 2.2      Anticoagulation Summary as of 1/25/2018     INR goal 2.0-3.0   Today's INR 2.2   Full instructions 5 mg on Sun, Tue, Thu; 2.5 mg all other days   Next INR check 2/22/2018    Indications   Long-term (current) use of anticoagulants [Z79.01] [Z79.01]  Atrial fibrillation (H) [I48.91]         Your next Anticoagulation Clinic appointment(s)     Feb 22, 2018 11:15 AM CST   [facility]    [address] [facility] [address]  [facility] [address] [phone]              Contact Numbers     [facility] Phone Numbers:  Anticoagulation Clinic Appointments : [phone]  Anticoagulation Nurse: [phone]         January 2018 Details    Sun Mon Tue Wed Thu Fri Sat      1               2               3               4               5               6                 7               8               9               10               11               12               13                 14               15               16               17               18               19               20                 21               22               23               24               25      5 mg   See details      26      2.5 mg         27      2.5 mg           28      5 mg         29      2.5 mg         30      5 mg         31      2.5 mg             Date Details   01/25 This INR check               How to take your warfarin dose     To take:  2.5 mg Take 0.5 of a 5 mg tablet.    To take:  5 mg Take 1 of the 5 mg tablets.           February 2018 Details    Sun Mon Tue Wed Thu Fri Sat         1      5 mg         2      2.5 mg         3      2.5 mg           4      5 mg         5      2.5 mg         6       5 mg         7      2.5 mg         8      5 mg         9      2.5 mg         10      2.5 mg           11      5 mg         12      2.5 mg         13      5 mg         14      2.5 mg         15      5 mg         16      2.5 mg         17      2.5 mg           18      5 mg         19      2.5 mg         20      5 mg         21      2.5 mg         22            23               24                 25               26               27               28                   Date Details   No additional details    Date of next INR:  2/22/2018         How to take your warfarin dose     To take:  2.5 mg Take 0.5 of a 5 mg tablet.    To take:  5 mg Take 1 of the 5 mg tablets.

## 2018-01-25 NOTE — PROGRESS NOTES
ANTICOAGULATION FOLLOW-UP CLINIC VISIT    Patient Name:  Louis Keller Jr.  Date:  1/25/2018  Contact Type:  Face to Face    SUBJECTIVE:     Patient Findings     Positives No Problem Findings           OBJECTIVE    INR Protime   Date Value Ref Range Status   01/25/2018 2.2 (A) 0.86 - 1.14 Final       ASSESSMENT / PLAN  INR assessment THER    Recheck INR In: 4 WEEKS    INR Location Clinic      Anticoagulation Summary as of 1/25/2018     INR goal 2.0-3.0   Today's INR 2.2   Maintenance plan 5 mg (5 mg x 1) on Sun, Tue, Thu; 2.5 mg (5 mg x 0.5) all other days   Full instructions 5 mg on Sun, Tue, Thu; 2.5 mg all other days   Weekly total 25 mg   No change documented Monica Souza RN   Plan last modified Monica Souza RN (5/12/2016)   Next INR check 2/22/2018   Priority INR   Target end date     Indications   Long-term (current) use of anticoagulants [Z79.01] [Z79.01]  Atrial fibrillation (H) [I48.91]         Anticoagulation Episode Summary     INR check location     Preferred lab     Send INR reminders to [facility]    Comments likes calendar printout.        Anticoagulation Care Providers     Provider Role Specialty Phone number    Joselito Armas MD Responsible Internal Medicine [phone]            See the Encounter Report to view Anticoagulation Flowsheet and Dosing Calendar (Go to Encounters tab in chart review, and find the Anticoagulation Therapy Visit)    Dosage adjustment made based on physician directed care plan.    Monica Souza RN

## 2018-01-27 DIAGNOSIS — E11.22 TYPE 2 DIABETES MELLITUS WITH STAGE 3 CHRONIC KIDNEY DISEASE, WITHOUT LONG-TERM CURRENT USE OF INSULIN (H): ICD-10-CM

## 2018-01-27 DIAGNOSIS — N18.30 TYPE 2 DIABETES MELLITUS WITH STAGE 3 CHRONIC KIDNEY DISEASE, WITHOUT LONG-TERM CURRENT USE OF INSULIN (H): ICD-10-CM

## 2018-02-01 RX ORDER — PIOGLITAZONEHYDROCHLORIDE 45 MG/1
TABLET ORAL
Qty: 90 TABLET | Refills: 1 | Status: SHIPPED | OUTPATIENT
Start: 2018-02-01 | End: 2018-08-11

## 2018-02-01 NOTE — TELEPHONE ENCOUNTER
"Requested Prescriptions   Pending Prescriptions Disp Refills     pioglitazone (ACTOS) 45 MG tablet [Pharmacy Med Name: PIOGLITAZONE HCL 45 MG TABLET] 90 tablet 1     Sig: TAKE 1 TABLET (45 MG) BY MOUTH DAILY    Thiazolidinedione Agents (TZD's)  Passed    1/27/2018  9:51 PM       Passed - Patient's BP is less than 140/90    BP Readings from Last 3 Encounters:   12/27/17 130/80   09/26/17 128/74   09/18/17 118/64                Passed - Patient has documented LDL within the past 12 mos.    Recent Labs   Lab Test  12/27/17   1156   LDL  58            Passed - Patient has a normal ALT within the past 12 mos.    Recent Labs   Lab Test  12/27/17   1156   ALT  26            Passed - Patient has a normal AST within the past 12 mos.     Recent Labs   Lab Test  12/27/17   1156   AST  24            Passed - Patient has had a Microalbumin in the past 12 mos.    Recent Labs   Lab Test  12/27/17   1156   MICROL  27   UMALCR  47.56*            Passed - Patient has documented A1c within the specified period of time.    Recent Labs   Lab Test  12/27/17   1156   A1C  7.5*            Passed - Diagnosis not CHF       Passed - Patient is age 18 or older       Passed - Patient has a normal serum Creatinine in the past 12 months    Recent Labs   Lab Test  12/27/17   1156   CR  0.82            Passed - Patient has had an appointment with authorizing provider within the past 6 mos.or within the next 30 days.    Patient had office visit in the last 6 months or has a visit in the next 30 days with authorizing provider.  See \"Patient Info\" tab in inbasket, or \"Choose Columns\" in Meds & Orders section of the refill encounter.            Prescription approved per [facility] Refill Protocol.    "

## 2018-02-22 ENCOUNTER — ANTICOAGULATION THERAPY VISIT (OUTPATIENT)
Dept: ANTICOAGULATION | Facility: CLINIC | Age: 83
End: 2018-02-22
Payer: COMMERCIAL

## 2018-02-22 DIAGNOSIS — Z79.01 LONG-TERM (CURRENT) USE OF ANTICOAGULANTS: ICD-10-CM

## 2018-02-22 LAB — INR POINT OF CARE: 2 (ref 0.86–1.14)

## 2018-02-22 PROCEDURE — 99207 ZZC NO CHARGE NURSE ONLY: CPT

## 2018-02-22 PROCEDURE — 36416 COLLJ CAPILLARY BLOOD SPEC: CPT

## 2018-02-22 PROCEDURE — 85610 PROTHROMBIN TIME: CPT | Mod: QW

## 2018-02-22 NOTE — MR AVS SNAPSHOT
Louis Keller Jr.   2/22/2018 11:15 AM   Anticoagulation Therapy Visit    Description:  85 year old male   Provider[facility]  [facility]   Department:  [facility]           INR as of 2/22/2018     Today's INR 2.0      Anticoagulation Summary as of 2/22/2018     INR goal 2.0-3.0   Today's INR 2.0   Full instructions 5 mg on Sun, Tue, Thu; 2.5 mg all other days   Next INR check 3/22/2018    Indications   Long-term (current) use of anticoagulants [Z79.01] [Z79.01]  Atrial fibrillation (H) [I48.91]         Your next Anticoagulation Clinic appointment(s)     Mar 22, 2018 11:00 AM CDT   [facility] Visit with [facility]   [facility]    [address] [facility] [address]  [facility] [address] [phone]              Contact Numbers     [facility] Phone Numbers:  Anticoagulation Clinic Appointments : [phone]  Anticoagulation Nurse: [phone]         February 2018 Details    Sun Mon Tue Wed Thu Fri Sat         1               2               3                 4               5               6               7               8               9               10                 11               12               13               14               15               16               17                 18               19               20               21               22      5 mg   See details      23      2.5 mg         24      2.5 mg           25      5 mg         26      2.5 mg         27      5 mg         28      2.5 mg             Date Details   02/22 This INR check               How to take your warfarin dose     To take:  2.5 mg Take 0.5 of a 5 mg tablet.    To take:  5 mg Take 1 of the 5 mg tablets.           March 2018 Details    Sun Mon Tue Wed Thu Fri Sat         1      5 mg         2      2.5 mg         3      2.5 mg           4      5 mg         5      2.5 mg         6      5 mg         7      2.5 mg         8      5 mg          9      2.5 mg         10      2.5 mg           11      5 mg         12      2.5 mg         13      5 mg         14      2.5 mg         15      5 mg         16      2.5 mg         17      2.5 mg           18      5 mg         19      2.5 mg         20      5 mg         21      2.5 mg         22            23               24                 25               26               27               28               29               30               31                Date Details   No additional details    Date of next INR:  3/22/2018         How to take your warfarin dose     To take:  2.5 mg Take 0.5 of a 5 mg tablet.    To take:  5 mg Take 1 of the 5 mg tablets.

## 2018-02-22 NOTE — PROGRESS NOTES
ANTICOAGULATION FOLLOW-UP CLINIC VISIT    Patient Name:  Louis Keller Jr.  Date:  2/22/2018  Contact Type:  Face to Face    SUBJECTIVE:     Patient Findings     Positives No Problem Findings           OBJECTIVE    INR Protime   Date Value Ref Range Status   02/22/2018 2.0 (A) 0.86 - 1.14 Final       ASSESSMENT / PLAN  INR assessment THER    Recheck INR In: 4 WEEKS    INR Location Clinic      Anticoagulation Summary as of 2/22/2018     INR goal 2.0-3.0   Today's INR 2.0   Maintenance plan 5 mg (5 mg x 1) on Sun, Tue, Thu; 2.5 mg (5 mg x 0.5) all other days   Full instructions 5 mg on Sun, Tue, Thu; 2.5 mg all other days   Weekly total 25 mg   No change documented Monica Souza RN   Plan last modified Monica Souza RN (5/12/2016)   Next INR check 3/22/2018   Priority INR   Target end date     Indications   Long-term (current) use of anticoagulants [Z79.01] [Z79.01]  Atrial fibrillation (H) [I48.91]         Anticoagulation Episode Summary     INR check location     Preferred lab     Send INR reminders to [facility]    Comments likes calendar printout.        Anticoagulation Care Providers     Provider Role Specialty Phone number    Joselito Armas MD Responsible Internal Medicine [phone]            See the Encounter Report to view Anticoagulation Flowsheet and Dosing Calendar (Go to Encounters tab in chart review, and find the Anticoagulation Therapy Visit)    Dosage adjustment made based on physician directed care plan.    Monica Souza RN

## 2018-03-03 ENCOUNTER — APPOINTMENT (OUTPATIENT)
Dept: CT IMAGING | Facility: CLINIC | Age: 83
DRG: 554 | End: 2018-03-03
Attending: EMERGENCY MEDICINE
Payer: COMMERCIAL

## 2018-03-03 ENCOUNTER — APPOINTMENT (OUTPATIENT)
Dept: GENERAL RADIOLOGY | Facility: CLINIC | Age: 83
DRG: 554 | End: 2018-03-03
Attending: EMERGENCY MEDICINE
Payer: COMMERCIAL

## 2018-03-03 ENCOUNTER — HOSPITAL ENCOUNTER (INPATIENT)
Facility: CLINIC | Age: 83
LOS: 1 days | Discharge: NURSING, REHAB OR LONG-TERM CARE | DRG: 554 | End: 2018-03-06
Attending: EMERGENCY MEDICINE | Admitting: HOSPITALIST
Payer: COMMERCIAL

## 2018-03-03 DIAGNOSIS — M11.20 PSEUDOGOUT: Primary | ICD-10-CM

## 2018-03-03 DIAGNOSIS — K59.00 CONSTIPATION, UNSPECIFIED CONSTIPATION TYPE: ICD-10-CM

## 2018-03-03 DIAGNOSIS — M25.461 KNEE EFFUSION, RIGHT: ICD-10-CM

## 2018-03-03 LAB
ANION GAP SERPL CALCULATED.3IONS-SCNC: 8 MMOL/L (ref 3–14)
BASOPHILS # BLD AUTO: 0 10E9/L (ref 0–0.2)
BASOPHILS NFR BLD AUTO: 0.4 %
BUN SERPL-MCNC: 14 MG/DL (ref 7–30)
CALCIUM SERPL-MCNC: 9.2 MG/DL (ref 8.5–10.1)
CHLORIDE SERPL-SCNC: 101 MMOL/L (ref 94–109)
CO2 SERPL-SCNC: 24 MMOL/L (ref 20–32)
CREAT SERPL-MCNC: 0.81 MG/DL (ref 0.66–1.25)
DIFFERENTIAL METHOD BLD: ABNORMAL
EOSINOPHIL # BLD AUTO: 0 10E9/L (ref 0–0.7)
EOSINOPHIL NFR BLD AUTO: 0.2 %
ERYTHROCYTE [DISTWIDTH] IN BLOOD BY AUTOMATED COUNT: 14.2 % (ref 10–15)
GFR SERPL CREATININE-BSD FRML MDRD: >90 ML/MIN/1.7M2
GLUCOSE SERPL-MCNC: 183 MG/DL (ref 70–99)
HCT VFR BLD AUTO: 38.9 % (ref 40–53)
HGB BLD-MCNC: 12.8 G/DL (ref 13.3–17.7)
IMM GRANULOCYTES # BLD: 0 10E9/L (ref 0–0.4)
IMM GRANULOCYTES NFR BLD: 0.4 %
INR PPP: 2.12 (ref 0.86–1.14)
LACTATE BLD-SCNC: 1.3 MMOL/L (ref 0.7–2)
LYMPHOCYTES # BLD AUTO: 1 10E9/L (ref 0.8–5.3)
LYMPHOCYTES NFR BLD AUTO: 9.4 %
MCH RBC QN AUTO: 30.4 PG (ref 26.5–33)
MCHC RBC AUTO-ENTMCNC: 32.9 G/DL (ref 31.5–36.5)
MCV RBC AUTO: 92 FL (ref 78–100)
MONOCYTES # BLD AUTO: 1.6 10E9/L (ref 0–1.3)
MONOCYTES NFR BLD AUTO: 14.7 %
NEUTROPHILS # BLD AUTO: 8.3 10E9/L (ref 1.6–8.3)
NEUTROPHILS NFR BLD AUTO: 74.9 %
NRBC # BLD AUTO: 0 10*3/UL
NRBC BLD AUTO-RTO: 0 /100
PLATELET # BLD AUTO: 165 10E9/L (ref 150–450)
POTASSIUM SERPL-SCNC: 4.1 MMOL/L (ref 3.4–5.3)
RBC # BLD AUTO: 4.21 10E12/L (ref 4.4–5.9)
SODIUM SERPL-SCNC: 133 MMOL/L (ref 133–144)
WBC # BLD AUTO: 11 10E9/L (ref 4–11)

## 2018-03-03 PROCEDURE — 73562 X-RAY EXAM OF KNEE 3: CPT | Mod: RT

## 2018-03-03 PROCEDURE — G0378 HOSPITAL OBSERVATION PER HR: HCPCS

## 2018-03-03 PROCEDURE — 76882 US LMTD JT/FCL EVL NVASC XTR: CPT

## 2018-03-03 PROCEDURE — 87040 BLOOD CULTURE FOR BACTERIA: CPT | Performed by: PHYSICIAN ASSISTANT

## 2018-03-03 PROCEDURE — 25000132 ZZH RX MED GY IP 250 OP 250 PS 637: Performed by: HOSPITALIST

## 2018-03-03 PROCEDURE — 36415 COLL VENOUS BLD VENIPUNCTURE: CPT | Performed by: PHYSICIAN ASSISTANT

## 2018-03-03 PROCEDURE — 85610 PROTHROMBIN TIME: CPT | Performed by: EMERGENCY MEDICINE

## 2018-03-03 PROCEDURE — 20611 DRAIN/INJ JOINT/BURSA W/US: CPT | Mod: RT

## 2018-03-03 PROCEDURE — 73030 X-RAY EXAM OF SHOULDER: CPT | Mod: LT

## 2018-03-03 PROCEDURE — 73700 CT LOWER EXTREMITY W/O DYE: CPT | Mod: RT

## 2018-03-03 PROCEDURE — 99220 ZZC INITIAL OBSERVATION CARE,LEVL III: CPT | Performed by: PHYSICIAN ASSISTANT

## 2018-03-03 PROCEDURE — 80048 BASIC METABOLIC PNL TOTAL CA: CPT | Performed by: EMERGENCY MEDICINE

## 2018-03-03 PROCEDURE — 83605 ASSAY OF LACTIC ACID: CPT | Performed by: PHYSICIAN ASSISTANT

## 2018-03-03 PROCEDURE — 25000132 ZZH RX MED GY IP 250 OP 250 PS 637: Performed by: EMERGENCY MEDICINE

## 2018-03-03 PROCEDURE — 73610 X-RAY EXAM OF ANKLE: CPT | Mod: RT

## 2018-03-03 PROCEDURE — 25000132 ZZH RX MED GY IP 250 OP 250 PS 637: Performed by: PHYSICIAN ASSISTANT

## 2018-03-03 PROCEDURE — 99285 EMERGENCY DEPT VISIT HI MDM: CPT | Mod: 25

## 2018-03-03 PROCEDURE — 85025 COMPLETE CBC W/AUTO DIFF WBC: CPT | Performed by: EMERGENCY MEDICINE

## 2018-03-03 PROCEDURE — 36415 COLL VENOUS BLD VENIPUNCTURE: CPT | Performed by: EMERGENCY MEDICINE

## 2018-03-03 RX ORDER — NICOTINE POLACRILEX 4 MG
15-30 LOZENGE BUCCAL
Status: DISCONTINUED | OUTPATIENT
Start: 2018-03-03 | End: 2018-03-05

## 2018-03-03 RX ORDER — HYDROXYZINE HYDROCHLORIDE 25 MG/1
25 TABLET, FILM COATED ORAL EVERY 6 HOURS PRN
Status: DISCONTINUED | OUTPATIENT
Start: 2018-03-03 | End: 2018-03-06 | Stop reason: HOSPADM

## 2018-03-03 RX ORDER — ONDANSETRON 4 MG/1
4 TABLET, ORALLY DISINTEGRATING ORAL EVERY 6 HOURS PRN
Status: DISCONTINUED | OUTPATIENT
Start: 2018-03-03 | End: 2018-03-06 | Stop reason: HOSPADM

## 2018-03-03 RX ORDER — WARFARIN SODIUM 5 MG/1
5 TABLET ORAL ONCE
Status: COMPLETED | OUTPATIENT
Start: 2018-03-03 | End: 2018-03-03

## 2018-03-03 RX ORDER — ACETAMINOPHEN 325 MG/1
650 TABLET ORAL EVERY 6 HOURS
Status: DISCONTINUED | OUTPATIENT
Start: 2018-03-03 | End: 2018-03-06 | Stop reason: HOSPADM

## 2018-03-03 RX ORDER — PIOGLITAZONEHYDROCHLORIDE 15 MG/1
45 TABLET ORAL DAILY
Status: DISCONTINUED | OUTPATIENT
Start: 2018-03-04 | End: 2018-03-06 | Stop reason: HOSPADM

## 2018-03-03 RX ORDER — HYDROCODONE BITARTRATE AND ACETAMINOPHEN 5; 325 MG/1; MG/1
1 TABLET ORAL ONCE
Status: COMPLETED | OUTPATIENT
Start: 2018-03-03 | End: 2018-03-03

## 2018-03-03 RX ORDER — DILTIAZEM HYDROCHLORIDE 120 MG/1
120 CAPSULE, COATED, EXTENDED RELEASE ORAL EVERY EVENING
Status: DISCONTINUED | OUTPATIENT
Start: 2018-03-03 | End: 2018-03-06 | Stop reason: HOSPADM

## 2018-03-03 RX ORDER — AMOXICILLIN 250 MG
1 CAPSULE ORAL 2 TIMES DAILY PRN
Status: DISCONTINUED | OUTPATIENT
Start: 2018-03-03 | End: 2018-03-06 | Stop reason: HOSPADM

## 2018-03-03 RX ORDER — MULTIVITAMIN,THERAPEUTIC
1 TABLET ORAL DAILY
COMMUNITY

## 2018-03-03 RX ORDER — CHOLESTYRAMINE 4 G/9G
1 POWDER, FOR SUSPENSION ORAL EVERY EVENING
Status: DISCONTINUED | OUTPATIENT
Start: 2018-03-03 | End: 2018-03-06 | Stop reason: HOSPADM

## 2018-03-03 RX ORDER — IBUPROFEN 200 MG
200 TABLET ORAL EVERY 6 HOURS PRN
Status: DISCONTINUED | OUTPATIENT
Start: 2018-03-03 | End: 2018-03-05

## 2018-03-03 RX ORDER — GLIPIZIDE 10 MG/1
10 TABLET, FILM COATED, EXTENDED RELEASE ORAL 2 TIMES DAILY
Status: DISCONTINUED | OUTPATIENT
Start: 2018-03-03 | End: 2018-03-06 | Stop reason: HOSPADM

## 2018-03-03 RX ORDER — LIDOCAINE HYDROCHLORIDE AND EPINEPHRINE 10; 10 MG/ML; UG/ML
INJECTION, SOLUTION INFILTRATION; PERINEURAL
Status: DISCONTINUED
Start: 2018-03-03 | End: 2018-03-03 | Stop reason: HOSPADM

## 2018-03-03 RX ORDER — DEXTROSE MONOHYDRATE 25 G/50ML
25-50 INJECTION, SOLUTION INTRAVENOUS
Status: DISCONTINUED | OUTPATIENT
Start: 2018-03-03 | End: 2018-03-05

## 2018-03-03 RX ORDER — LIDOCAINE 40 MG/G
CREAM TOPICAL
Status: DISCONTINUED | OUTPATIENT
Start: 2018-03-03 | End: 2018-03-06 | Stop reason: HOSPADM

## 2018-03-03 RX ORDER — CHOLESTYRAMINE 4 G/9G
1 POWDER, FOR SUSPENSION ORAL EVERY EVENING
COMMUNITY
End: 2018-12-19

## 2018-03-03 RX ORDER — POLYETHYLENE GLYCOL 3350 17 G/17G
17 POWDER, FOR SOLUTION ORAL DAILY PRN
Status: DISCONTINUED | OUTPATIENT
Start: 2018-03-03 | End: 2018-03-06 | Stop reason: HOSPADM

## 2018-03-03 RX ORDER — AMOXICILLIN 250 MG
2 CAPSULE ORAL 2 TIMES DAILY PRN
Status: DISCONTINUED | OUTPATIENT
Start: 2018-03-03 | End: 2018-03-06 | Stop reason: HOSPADM

## 2018-03-03 RX ORDER — ONDANSETRON 2 MG/ML
4 INJECTION INTRAMUSCULAR; INTRAVENOUS EVERY 6 HOURS PRN
Status: DISCONTINUED | OUTPATIENT
Start: 2018-03-03 | End: 2018-03-06 | Stop reason: HOSPADM

## 2018-03-03 RX ORDER — HYDROCODONE BITARTRATE AND ACETAMINOPHEN 5; 325 MG/1; MG/1
1-2 TABLET ORAL EVERY 4 HOURS PRN
Qty: 15 TABLET | Refills: 0 | Status: SHIPPED | OUTPATIENT
Start: 2018-03-03 | End: 2018-03-06

## 2018-03-03 RX ORDER — NALOXONE HYDROCHLORIDE 0.4 MG/ML
.1-.4 INJECTION, SOLUTION INTRAMUSCULAR; INTRAVENOUS; SUBCUTANEOUS
Status: DISCONTINUED | OUTPATIENT
Start: 2018-03-03 | End: 2018-03-06 | Stop reason: HOSPADM

## 2018-03-03 RX ADMIN — METFORMIN HYDROCHLORIDE 500 MG: 500 TABLET ORAL at 22:53

## 2018-03-03 RX ADMIN — WARFARIN SODIUM 5 MG: 5 TABLET ORAL at 22:53

## 2018-03-03 RX ADMIN — HYDROCODONE BITARTRATE AND ACETAMINOPHEN 1 TABLET: 5; 325 TABLET ORAL at 14:24

## 2018-03-03 RX ADMIN — ACETAMINOPHEN 650 MG: 325 TABLET, FILM COATED ORAL at 22:53

## 2018-03-03 RX ADMIN — DILTIAZEM HYDROCHLORIDE 120 MG: 120 CAPSULE, COATED, EXTENDED RELEASE ORAL at 22:53

## 2018-03-03 RX ADMIN — GLIPIZIDE 10 MG: 10 TABLET, FILM COATED, EXTENDED RELEASE ORAL at 22:53

## 2018-03-03 RX ADMIN — HYDROCODONE BITARTRATE AND ACETAMINOPHEN 1 TABLET: 5; 325 TABLET ORAL at 19:44

## 2018-03-03 ASSESSMENT — PAIN DESCRIPTION - DESCRIPTORS: DESCRIPTORS: ACHING

## 2018-03-03 ASSESSMENT — ENCOUNTER SYMPTOMS: JOINT SWELLING: 1

## 2018-03-03 NOTE — IP AVS SNAPSHOT
Louis Keller Jr. #[id number] (CSN: [id number])  (85 year old M)  (Adm: 18)     [id number]               [facility] OBSERVATION DEPARTMENT: [phone]            Patient Demographics     Patient Name Sex          Age SSN Address Phone    Louis Keller Jr. Male 1932 (85 year old) [id number] [facility] [address] [phone] (Home) *Preferred*  none (Work)  [phone] (Mobile)      Emergency Contact(s)     Name Relation Home Work Mobile    Kathy Hoyos Daughter [phone] none [phone]    Maame Stout Daughter [phone] none [phone]      Admission Information     Attending Provider Admitting Provider Admission Type Admission Date/Time    Candie Vivas, Candie Odonnell, DO Emergency 18  1327    Discharge Date Hospital Service Auth/Cert Status Service Area     Observation Incomplete [facility]    Unit Room/Bed Admission Status        OBSERVATION DEPT  Admission (Confirmed)       Admission     Complaint    Knee effusion, right, Joint effusion of knee      Hospital Account     Name Acct ID Class Status Primary Coverage    Louis Keller Jr. [id number] Inpatient Open UCARE - [employer]            Guarantor Account (for Hospital Account #[id number])     Name Relation to Pt Service Area Active? Acct Type    Louis Keller Jr.  FCS Yes Personal/Family    Address Phone          [address] [place] [address] [phone](H)  none(O)              Coverage Information (for Hospital Account #[id number])     F/O Payor/Plan Precert #    UCARE/UCARE FOR SENIORS     Subscriber Subscriber #    Louis Keller Jr. [id number]    Address Phone    [address]  [place] [address] [phone]                                                INTERAGENCY TRANSFER FORM - PHYSICIAN ORDERS   3/3/2018                       [facility] OBSERVATION  "DEPARTMENT: [phone]            Attending Provider: Candie Vivas DO     Allergies:  No Known Drug Allergies    Infection:  None   Service:  Observation    Ht:  1.803 m (5' 11\")   Wt:  86.2 kg (190 lb)   Admission Wt:  86.2 kg (190 lb)    BMI:  26.5 kg/m 2   BSA:  2.08 m 2            ED Clinical Impression     Diagnosis Description Comment Added By Time Added    Knee effusion, right [M25.461] Knee effusion, right [M25.461]  Jaime Decker DO 3/3/2018  6:17 PM      Hospital Problems as of 3/6/2018              Priority Class Noted POA    Knee effusion, right Medium  3/3/2018 Yes    Joint effusion of knee Medium  3/5/2018 Yes      Non-Hospital Problems as of 3/6/2018              Priority Class Noted    Family history of malignant neoplasm of gastrointestinal tract Medium  5/18/2004    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) High  4/26/2011    Chronic anticoagulation High  7/25/2011    Hyperlipidemia LDL goal <100 Medium  4/18/2012    Fatigue Medium  5/7/2012    Seasonal allergic rhinitis Medium  5/7/2012    Knee pain Medium  5/7/2012    Irritable bowel syndrome with diarrhea Medium  5/7/2012    Advanced directives, counseling/discussion Low  5/7/2012    HL (hearing loss) Low  5/7/2012    Health Care Home Low  6/20/2012    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 Medium  8/5/2013    Anemia due to blood loss, acute Medium  8/9/2013    Physical deconditioning Medium  8/9/2013    Diabetes mellitus, type 2 (H) Medium  Unknown    Atrial fibrillation (H) Medium  Unknown    QUESADA (dyspnea on exertion) Medium  Unknown    Diastolic murmur Medium  Unknown    Pain of right thigh Medium  9/28/2015    Long-term (current) use of anticoagulants [Z79.01] Medium  3/31/2016      Code Status History     Date Active Date Inactive Code Status Order ID Comments User Context    3/6/2018 10:26 AM  Full Code [id number]  Mike Boykin MD Outpatient    3/4/2018  1:05 PM 3/6/2018 10:26 AM Full Code " [id number]  Maame Pack PA-C Inpatient    8/5/2013  2:02 PM 8/8/2013  3:03 PM Full Code [id number]  Sherlyn Souza RN Inpatient      Current Code Status     Date Active Code Status Order ID Comments User Context       Prior      Summary of Visit     Reason for your hospital stay       Knee effusion, pseudogout                Medication Review      START taking        Dose / Directions Comments    colchicine 0.6 MG tablet   Used for:  Pseudogout        Dose:  0.6 mg   Take 1 tablet (0.6 mg) by mouth 2 times daily for 7 days Then, re-assess with physician   Quantity:  3 tablet   Refills:  0        naproxen 250 MG tablet   Commonly known as:  NAPROSYN        Dose:  250 mg   Take 1 tablet (250 mg) by mouth 2 times daily (with meals) for 7 days After 7 days, re-assess with a physician before continuing   Refills:  0        oxyCODONE IR 5 MG tablet   Commonly known as:  ROXICODONE        Dose:  2.5-5 mg   Take 0.5-1 tablets (2.5-5 mg) by mouth every 6 hours as needed for moderate to severe pain   Quantity:  18 tablet   Refills:  0        polyethylene glycol Packet   Commonly known as:  MIRALAX/GLYCOLAX   Used for:  Constipation, unspecified constipation type        Dose:  17 g   Take 17 g by mouth daily as needed for constipation   Quantity:  7 packet   Refills:  0        senna-docusate 8.6-50 MG per tablet   Commonly known as:  SENOKOT-S;PERICOLACE   Used for:  Constipation, unspecified constipation type        Dose:  1 tablet   Take 1 tablet by mouth 2 times daily as needed for constipation   Quantity:  100 tablet   Refills:  0          CONTINUE these medications which have NOT CHANGED        Dose / Directions Comments    ACE NOT PRESCRIBED (INTENTIONAL)   Used for:  Type 2 diabetes, HbA1C goal < 8% (H)        Dose:  1 each   1 each daily ACE Inhibitor not prescribed due to Risk for drug interaction   Quantity:  0 each   Refills:  0        acetaminophen 500 MG tablet   Commonly known as:  TYLENOL         Dose:  500 mg   Take 500 mg by mouth every 6 hours as needed   Refills:  0        ASPIRIN NOT PRESCRIBED   Commonly known as:  INTENTIONAL        by Other route continuous prn Reported on 3/7/2017   Refills:  0        cholestyramine 4 G Packet   Commonly known as:  QUESTRAN        Dose:  1 packet   Take 1 packet by mouth every evening   Refills:  0        Chromium 1000 MCG Tabs        Dose:  500 mcg   Take 500 mcg by mouth daily   Refills:  0        diltiazem 120 MG 24 hr capsule   Commonly known as:  CARDIZEM CD/CARTIA XT   Used for:  Chronic atrial fibrillation (H)        Dose:  120 mg   Take 1 capsule (120 mg) by mouth every evening   Quantity:  90 capsule   Refills:  3        ferrous gluconate 324 (38 FE) MG tablet   Commonly known as:  FERGON   Used for:  Anemia due to blood loss, acute        TAKE 1 TABLET (324 MG) BY MOUTH DAILY (WITH BREAKFAST)   Quantity:  100 tablet   Refills:  3        fish oil-omega-3 fatty acids 1000 MG capsule        Dose:  1 g   Take 1 g by mouth 2 times daily   Refills:  0        glipiZIDE 10 MG 24 hr tablet   Commonly known as:  glipiZIDE XL   Used for:  Chronic atrial fibrillation (H), Actinic keratosis        Dose:  10 mg   Take 1 tablet (10 mg) by mouth 2 times daily   Quantity:  180 tablet   Refills:  3        loperamide 2 MG tablet   Commonly known as:  IMODIUM A-D        1 tablet BID   Quantity:  30 tablet   Refills:  0    bid       metFORMIN 500 MG tablet   Commonly known as:  GLUCOPHAGE   Used for:  Type 2 diabetes mellitus with stage 3 chronic kidney disease, without long-term current use of insulin (H)        Dose:  500 mg   Take 1 tablet (500 mg) by mouth 2 times daily (with meals)   Quantity:  180 tablet   Refills:  1        multivitamin, therapeutic Tabs tablet        Dose:  0.5 tablet   Take 0.5 tablets by mouth 2 times daily   Refills:  0        pioglitazone 45 MG tablet   Commonly known as:  ACTOS   Used for:  Type 2 diabetes mellitus with stage 3 chronic kidney  disease, without long-term current use of insulin (H)        TAKE 1 TABLET (45 MG) BY MOUTH DAILY   Quantity:  90 tablet   Refills:  1        STATIN NOT PRESCRIBED (INTENTIONAL)   Used for:  Type 2 diabetes, HbA1C goal < 8% (H)        Dose:  1 each   1 each At Bedtime Statin not prescribed intentionally due to Risk for drug interaction   Quantity:  0 each   Refills:  0        warfarin 5 MG tablet   Commonly known as:  COUMADIN   Used for:  Heart palpitations, New onset atrial fibrillation (H), Chronic anticoagulation        TAKE 1 TABLET BY MOUTH TUESDAY,THURS AND SUN. TAKE 1/2 TABLET ON MON,WED,FRI AND SAT. OR AS DIRECTED   Quantity:  65 tablet   Refills:  1          STOP taking     blood glucose monitoring lancets           ONETOUCH ULTRA test strip   Generic drug:  blood glucose monitoring                   After Care     Activity - Up with nursing assistance           Advance Diet as Tolerated       Follow this diet upon discharge: Moderate consistent carbohydrate (4039-1583 shanta / 4-6 CHO units per meal)       Fall precautions           General info for SNF       Length of Stay Estimate: Short Term Care: Estimated # of Days <30  Condition at Discharge: Stable  Level of care:skilled   Rehabilitation Potential: Good  Admission H&P remains valid and up-to-date: Yes  Recent Chemotherapy: N/A  Use Nursing Home Standing Orders: Yes       Glucose monitor nursing POCT       Before meals and at bedtime       Mantoux instructions       Give two-step Mantoux (PPD) Per Facility Policy Yes               Further instructions from your care team         Water on the Knee    Water on the knee is also known as knee effusion. The knee joint normally has less than 1 ounce of fluid. Injury or inflammation of the knee joint causes extra fluid to collect there. When this happens, the knee joint looks swollen and is usually painful. It may be hard to fully bend the knee.  The most common cause of water on the knee is osteoarthritis  due to wear and tear on the joint cartilage. Other causes include injury to the cartilage, inflammatory arthritis such as gout or rheumatoid arthritis, and infection of the joint.  You may need a needle aspiration, if the cause of your water on the knee is not certain. This procedure removes a sample of joint fluid from the knee for testing. This is done with a local anesthetic. Removing excess fluid may also relieve swelling and pain.  Home care    Limit your activities. Stay off the injured leg as much as possible until pain improves.    Keep your leg elevated to reduce pain and swelling. When sleeping, place a pillow under the injured leg. When sitting, support the injured leg so it is level with your waist. This is very important during the first 48 hours.    Apply an ice pack over the injured area for 15 to 20 minutes every 3 to 6 hours. You should do this for the first 24 to 48 hours. You can make an ice pack by filling a plastic bag that seals at the top with ice cubes and then wrapping it with a thin towel. Continue to use ice packs for relief of pain and swelling as needed. As the ice melts, be careful to avoid getting your wrap, splint, or cast wet. After 48 hours, apply heat(warm shower or warm bath) for 15 to 20 minutes several times a day, or alternate ice and heat. If you have to wear a hook-and-loop knee brace, you can open it to apply the ice pack, or heat, directly to the knee. Never put ice directly on the skin. Always wrap the ice in a towel or other type of cloth.    You may use over-the-counter pain medicine to control pain, unless another pain medicine was prescribed. If you have chronic liver or kidney disease or have ever had a stomach ulcer or GI bleeding, talk with your healthcare provider before using these medicines.    If crutches or a walker have been recommended, do not put weight on the injured leg until you can do so without pain. Check with your healthcare provider before returning  to sports or full work duties.    If you have a hook-and-loop knee brace, you can remove it to bathe and sleep, unless told otherwise.  Follow-up care  Follow up with your healthcare provider as advised.  If you are overweight, talk to your healthcare provider about a weight loss program. The excess weight puts extra strain on your knees.  When to seek medical advice  Call your healthcare provider right away if any of these occur:    Increasing pain, redness, or swelling of the knee    Fever of 100.4 F (38 C) or above lasting for 24 to 48 hours  Date Last Reviewed: 11/23/2015 2000-2017 [employer]. [address] [place] [address]. All rights reserved. This information is not intended as a substitute for professional medical care. Always follow your healthcare professional's instructions.          Referrals     Occupational Therapy Adult Consult       Evaluate and treat as clinically indicated.    Reason:  R knee psseudogout       Physical Therapy Adult Consult       Evaluate and treat as clinically indicated.    Reason:  Right knee pseudogout             Follow-Up Appointment Instructions     Follow-up and recommended labs and tests        Follow up with primary care provider, Joselito Armas, within 7 days for hospital follow- up.  The following labs/tests are recommended: INR in 1 week.             Your next 10 appointments already scheduled     Mar 29, 2018 11:15 AM CDT   Anticoagulation Visit with [facility]   [facility]    [address]  [facility] [address] [phone]            Mar 29, 2018 11:45 AM CDT   LAB with [facility]   [facility])    [address]  [facility] [id number] [phone]           Please do not eat 10-12 hours before your appointment if you are coming in fasting for labs on lipids, cholesterol, or glucose (sugar). This  "does not apply to pregnant women. Water, hot tea and black coffee (with nothing added) are okay. Do not drink other fluids, diet soda or chew gum.              Statement of Approval     Ordered          03/06/18 1027  I have reviewed and agree with all the recommendations and orders detailed in this document.  EFFECTIVE NOW     Approved and electronically signed by:  Mike Boykin MD                                                 INTERAGENCY TRANSFER FORM - NURSING   3/3/2018                       [facility] OBSERVATION DEPARTMENT: [phone]            Attending Provider: Candie Vivas DO     Allergies:  No Known Drug Allergies    Infection:  None   Service:  Observation    Ht:  1.803 m (5' 11\")   Wt:  86.2 kg (190 lb)   Admission Wt:  86.2 kg (190 lb)    BMI:  26.5 kg/m 2   BSA:  2.08 m 2            Advance Directives        Scanned docmt in ACP Activity?           Yes, scanned ACP docmt        Immunizations     Name Date      Influenza (H1N1) 01/21/10     Influenza (High Dose) 3 valent vaccine 09/18/17     Influenza (High Dose) 3 valent vaccine 09/14/16     Influenza (High Dose) 3 valent vaccine 09/21/15     Influenza (High Dose) 3 valent vaccine 09/22/14     Influenza (High Dose) 3 valent vaccine 09/22/14     Influenza (IIV3) PF 09/22/14     Influenza (IIV3) PF 09/06/13     Influenza (IIV3) PF 09/04/12     Influenza (IIV3) PF 09/14/11     Influenza (IIV3) PF 10/04/10     Influenza (IIV3) PF 09/21/09     Influenza (IIV3) PF 11/16/08     Influenza (IIV3) PF 11/04/05     Influenza (IIV3) PF 10/27/04     Influenza (IIV3) PF 11/21/02     Influenza (IIV3) PF 10/20/99     Pneumo Conj 13-V (2010&after) 03/20/15     Pneumococcal 23 valent 11/21/08     Pneumococcal 23 valent 10/29/97     TD (ADULT, 7+) 07/29/10     TD (ADULT, 7+) 06/07/00     TDAP Vaccine (Adacel) 07/11/13       ASSESSMENT     Discharge Profile Flowsheet     EXPECTED DISCHARGE     FINAL RESOURCES      Expected Discharge " Date  03/06/18 (home w/sp) 03/06/18 0751   Resources List  Skilled Nursing Facility 03/06/18 1019    DISCHARGE NEEDS ASSESSMENT     Skilled Nursing Facility  [facility] [phone], Fax: [phone] 03/06/18 1019    Patient/family verbalizes understanding of discharge plan recommendations?  Yes 03/05/18 1427   PAS Number  [id number] 03/06/18 1044    Medical Team notified of plan?  yes 03/05/18 1427   SKIN      Readmission Within The Last 30 Days  no previous admission in last 30 days 03/05/18 1427   Inspection of bony prominences  Full 03/06/18 1000    Transportation Available  van, wheelchair accessible ([facility], 3/6 @ 1300) 03/06/18 1019   Full except areas not inspected   Knee, right 03/06/18 1000    # of Referrals Placed by CTS  Transportation 03/06/18 1019   Inspection under devices  Full 03/04/18 0326    Equipment Used at Home  cane, quad;cane, straight;raised toilet;walker, rolling;grab bar;dressing device;other (see comments) (shower bench) 08/06/13 1523   Skin WDL  ex;color;characteristics 03/06/18 1000    ASSESSMENT OF FUNCTIONAL STATUS     Skin Color/Characteristics  pale 03/06/18 1000    Prior to admission patient needed assistance with:  -- (none identified by pt) 03/05/18 1427   Skin Temperature  warm 03/06/18 1000    Assesssment of Functional Status  Not at baseline with ADL Functioning;Not at baseline with mobility;Needs placement in a SNF/TCF for rehabilitation 03/05/18 1427   Skin Moisture  dry 03/06/18 1000    GASTROINTESTINAL (ADULT,PEDIATRIC,OB)     Skin Elasticity  quick return to original state 03/06/18 1000    GI WDL  WDL 03/06/18 1000   Skin Integrity  intact 03/06/18 1000    Last Bowel Movement  03/05/18 03/05/18 2020   SAFETY      Passing flatus  yes 03/06/18 1000   Safety WDL  WDL 03/06/18 1033    COMMUNICATION ASSESSMENT     All Alarms  alarm(s) activated and audible 03/06/18 1033    Patient's communication style  spoken language (English or Bilingual) 03/03/18 1725       "                Assessment WDL (Within Defined Limits) Definitions           Safety WDL     Effective: 09/28/15    Row Information: <b>WDL Definition:</b> Bed in low position, wheels locked; call light in reach; upper side rails up x 2; ID band on<br> <font color=\"gray\"><i>Item=AS safety wdl>>List=AS safety wdl>>Version=F14</i></font>      Skin WDL     Effective: 09/28/15    Row Information: <b>WDL Definition:</b> Warm; dry; intact; elastic; without discoloration; pressure points without redness<br> <font color=\"gray\"><i>Item=AS skin wdl>>List=AS skin wdl>>Version=F14</i></font>      Vitals     Vital Signs Flowsheet     VITAL SIGNS     Pain Orientation  Left 03/06/18 1008    Temp  98.3  F (36.8  C) 03/06/18 1239   Pain Descriptors  Aching 03/06/18 1008    Temp src  Oral 03/06/18 1239   Pain Intervention(s)  Medication (See eMAR) 03/06/18 1008    Resp  20 03/06/18 1239   Response to Interventions  Relief 03/06/18 1008    Pulse  105 03/04/18 1308   ANALGESIA SIDE EFFECTS MONITORING      Heart Rate  89 03/06/18 1239   Side Effects Monitoring: Respiratory Quality  R 03/06/18 1008    Pulse/Heart Rate Source  Monitor 03/06/18 1239   Side Effects Monitoring: Respiratory Depth  N 03/06/18 1008    BP  124/73 03/06/18 1239   Side Effects Monitoring: Sedation Level  1 03/06/18 1008    BP Location  Right arm 03/06/18 1239   HEIGHT AND WEIGHT      OXYGEN THERAPY     Weight  86.2 kg (190 lb) 03/05/18 0844    SpO2  94 % 03/06/18 1239   POSITIONING      O2 Device  None (Room air) 03/06/18 1239   Body Position  independently positioning 03/06/18 0138    PAIN/COMFORT     Head of Bed (HOB)  HOB at 30-45 degrees 03/06/18 0138    Patient Currently in Pain  yes 03/06/18 1008   Positioning/Transfer Devices  pillows 03/04/18 1403    Preferred Pain Scale  number (Numeric Rating Pain Scale) 03/06/18 1008   DAILY CARE      Patient's Stated Pain Goal  1 03/06/18 1008   Activity Management  activity adjusted per tolerance;ambulated in " nielsen;ambulated to bathroom 03/06/18 1033    0-10 Pain Scale  2 03/06/18 1008   Activity Assistance Provided  assistance, 1 person 03/06/18 1033    Pain Location  Back 03/06/18 1008   Assistive Device Utilized  walker;gait belt 03/06/18 1033            Patient Lines/Drains/Airways Status    Active LINES/DRAINS/AIRWAYS     Name: Placement date: Placement time: Site: Days: Last dressing change:    Incision/Surgical Site 08/05/13 Left Knee 08/05/13   0941    1674             Patient Lines/Drains/Airways Status    Active PICC/CVC     None            Intake/Output Detail Report     Date Intake     Output Net    Shift P.O. I.V. IV Piggyback Total Urine Total       Noc 03/04/18 2300 - 03/05/18 0659 100 -- -- 100 700 700 -600    Day 03/05/18 0700 - 03/05/18 1459 -- -- -- -- 500 500 -500    Lori 03/05/18 1500 - 03/05/18 2259 -- -- -- -- 400 400 -400    Noc 03/05/18 2300 - 03/06/18 0659 240 -- -- 240 500 500 -260    Day 03/06/18 0700 - 03/06/18 1459 -- -- -- -- 200 200 -200      Last Void/BM       Most Recent Value    Urine Occurrence 1 at 03/06/2018 1033    Stool Occurrence 1 at 03/06/2018 1033      Case Management/Discharge Planning     Case Management/Discharge Planning Flowsheet     REFERRAL INFORMATION     Patient Personal Strengths  expressive of needs;strong support system;self-reliant 03/05/18 1427    Did the Initial Social Work Assessment result in a Social Work Case?  Yes 03/05/18 1427   Sources Of Support  adult child(randall);spouse 03/05/18 1427    Admission Type  inpatient 03/05/18 1427   Reaction To Health Status  adjusting;accepting 03/05/18 1427    Arrived From  home or self-care 03/05/18 1427   EXPECTED DISCHARGE      Referral Source  physician 03/05/18 1427   Expected Discharge Date  03/06/18 (home w/sp) 03/06/18 0751    New Steerage to  Clinics?  No 03/05/18 1427   DISCHARGE PLANNING      # of Referrals Placed by CTS  Transportation 03/06/18 1019   Patient/family verbalizes understanding of discharge plan  recommendations?  Yes 03/05/18 1427    Reason For Consult  discharge planning 03/05/18 1427   Medical Team notified of plan?  yes 03/05/18 1427    Record Reviewed  clinical discipline documentation;plan of care;patient profile 03/05/18 1427   Readmission Within The Last 30 Days  no previous admission in last 30 days 03/05/18 1427    CTS Assigned to Case  Nessa Souza 03/05/18 1427   [facility] 03/06/18 1019    LIVING ENVIRONMENT     Equipment Used at Home  cane, quad;cane, straight;raised toilet;walker, rolling;grab bar;dressing device;other (see comments) (shower bench) 08/06/13 1523    Lives With  spouse 03/05/18 1427   PATIENT PLACEMENT INFORMATION      Living Arrangements  house 03/05/18 1427   Did the patient choose [facility]?  Yes 03/06/18 1019    Provides Primary Care For  no one 03/05/18 1427   Placement Choice Reason  location 03/06/18 1019    Quality Of Family Relationships  supportive;involved 03/05/18 1427   Did [facility] accept the patient?  Yes ([facility]) 03/06/18 1019    Able to Return to Prior Living Arrangements  other (see comments) ([facility] rehab stay before return home. ) 03/05/18 1427   [facility] 1 [facility] 03/06/18 1019    ASSESSMENT OF FAMILY/SOCIAL SUPPORT     Facility 1 Reason for Decline  Bed not available 03/06/18 1019    Marital Status   03/05/18 1427   FINAL RESOURCES      Who is your support system?  Wife;Children 03/05/18 1427   Resources List  Skilled Nursing Facility 03/06/18 1019    Spouse's Name  Laura 03/05/18 1427   Skilled Nursing Facility  [facility] [phone], Fax: [phone] 03/06/18 1019    Description of Support System  Supportive;Involved 03/05/18 1427   PAS Number  [id number] 03/06/18 1044    Support Assessment  Adequate family and caregiver support 03/05/18 1427   ABUSE RISK SCREEN      Quality of Family Relationships  supportive;involved 03/05/18 1427   QUESTION TO  PATIENT:  Has a member of your family or a partner(now or in the past) intimidated, hurt, manipulated, or controlled you in any way?  no 03/03/18 2103    ASSESSMENT OF FUNCTIONAL STATUS     QUESTION TO PATIENT: Do you feel safe going back to the place where you are living?  yes 03/03/18 2103    Prior to admission patient needed assistance with:  -- (none identified by pt) 03/05/18 1427   OBSERVATION: Is there reason to believe there has been maltreatment of a vulnerable adult (ie. Physical/Sexual/Emotional abuse, self neglect, lack of adequate food, shelter, medical care, or financial exploitation)?  no 03/03/18 2103    Assesssment of Functional Status  Not at baseline with ADL Functioning;Not at baseline with mobility;Needs placement in a SNF/TCF for rehabilitation 03/05/18 1427   OTHER      EMPLOYMENT     Are you depressed or being treated for depression?  No 03/03/18 2103    Do you work full or part-time?  no 03/05/18 1427   HOMICIDE RISK      COPING/STRESS     Feels Like Hurting Others  no 03/03/18 2103    Major Change/Loss/Stressor  none 08/05/13 1825                       [facility] OBSERVATION DEPARTMENT: [phone]            Medication Administration Report for Louis Keller Jr. as of 03/06/18 1311   Legend:    Given Hold Not Given Due Canceled Entry Other Actions    Time Time (Time) Time  Time-Action       Inactive    Active    Linked        Medications 02/28/18 03/01/18 03/02/18 03/03/18 03/04/18 03/05/18 03/06/18    acetaminophen (TYLENOL) tablet 650 mg  Dose: 650 mg  Freq: EVERY 6 HOURS Route: PO  Start: 03/03/18 2152   Admin Instructions: Alternate ibuprofen (if ordered) with acetaminophen.  Maximum acetaminophen dose from all sources = 75 mg/kg/day not to exceed 4 grams/day.        2253 (650 mg)-Given        0521 (650 mg)-Given       1226 (650 mg)-Given       1749 (650 mg)-Given        0013 (650 mg)-Given [C]       0939 (650 mg)-Given              1619 (650 mg)-Given       2233  (650 mg)-Given        0437 (650 mg)-Given       1008 (650 mg)-Given       [ ] 1600       [ ] 2200           cholestyramine (QUESTRAN) Packet 4 g  Dose: 1 packet  Freq: EVERY EVENING Route: PO  Start: 03/03/18 2206   Admin Instructions: Powder must be mixed with fluid before administration.  Schedule cholestyramine to be given one hour after other oral meds or four hours before (or as close to this as possible).          (2254)-Not Given        2018 (4 g)-Given        2006 (4 g)-Given        [ ] 2000           diltiazem (CARDIZEM CD/CARTIA XT) 24 hr capsule 120 mg  Dose: 120 mg  Freq: EVERY EVENING Route: PO  Start: 03/03/18 2206   Admin Instructions: DO NOT CRUSH.        2253 (120 mg)-Given        2019 (120 mg)-Given        2006 (120 mg)-Given        [ ] 2000           ferrous gluconate (FERGON) tablet 324 mg  Dose: 324 mg  Freq: DAILY WITH BREAKFAST Route: PO  Start: 03/04/18 0936   Admin Instructions: DO NOT CRUSH. Absorbed best on an empty stomach. If stomach upset occurs, can take with meals.         1226 (324 mg)-Given        0940 (324 mg)-Given        0843 (324 mg)-Given           glipiZIDE (GLUCOTROL XL) 24 hr tablet 10 mg  Dose: 10 mg  Freq: 2 TIMES DAILY Route: PO  Start: 03/03/18 2207   Admin Instructions: DO NOT CRUSH. Take before or with meals.        2253 (10 mg)-Given        0851 (10 mg)-Given       2019 (10 mg)-Given        0940 (10 mg)-Given       2006 (10 mg)-Given        0843 (10 mg)-Given       [ ] 2000           glucose 40 % gel 15-30 g  Dose: 15-30 g  Freq: EVERY 15 MIN PRN Route: PO  PRN Reason: low blood sugar  Start: 03/04/18 1302   Admin Instructions: Give 15 g for BG 51 to 69 mg/dL IF patient is conscious and able to swallow. Give 30 g for BG less than or equal to 50 mg/dL IF patient is conscious and able to swallow. Do NOT give glucose gel via enteral tube.  IF patient has enteral tube: give apple juice 120 mL (4 oz or 15 g of CHO) via enteral tube for BG 51 to 69 mg/dL.  Give apple juice  240 mL (8 oz or 30 g of CHO) via enteral tube for BG less than or equal to 50 mg/dL.    ~Oral gel is preferable for conscious and able to swallow patient.   ~IF gel unavailable or patient refuses may provide apple juice 120 mL (4 oz or 15 g of CHO). Document juice on I and O flowsheet.              Or  dextrose 50 % injection 25-50 mL  Dose: 25-50 mL  Freq: EVERY 15 MIN PRN Route: IV  PRN Reason: low blood sugar  Start: 03/04/18 1302   Admin Instructions: Use if have IV access, BG less than 70 mg/dL and meet dose criteria below:  Dose if conscious and alert (or disorientated) and NPO = 25 mL  Dose if unconscious / not alert = 50 mL  Vesicant. For ordered doses up to 25 mg, give IV Push undiluted. Give each 5g over 1 minute.              Or  glucagon injection 1 mg  Dose: 1 mg  Freq: EVERY 15 MIN PRN Route: SC  PRN Reason: low blood sugar  PRN Comment: May repeat x 1 only  Start: 03/04/18 1302   Admin Instructions: May give SQ or IM. ONLY use glucagon IF patient has NO IV access AND is UNABLE to swallow AND blood glucose is LESS than or EQUAL to 50 mg/dL.  Give IV Push over 1 minute. Reconstitute with 1mL sterile water.               HYDROmorphone (PF) (DILAUDID) injection 0.2-0.4 mg  Dose: 0.2-0.4 mg  Freq: EVERY 3 HOURS PRN Route: IV  PRN Reason: severe pain  Start: 03/04/18 1249   Admin Instructions: For ordered doses up to 4 mg give IV Push undiluted. Administer each 2mg over 2-5 minutes.               hydrOXYzine (ATARAX) tablet 25 mg  Dose: 25 mg  Freq: EVERY 6 HOURS PRN Route: PO  PRN Reason: other  PRN Comment: adjuvant pain  Start: 03/03/18 2150              insulin aspart (NovoLOG) inj (RAPID ACTING)  Dose: 1-5 Units  Freq: AT BEDTIME Route: SC  Start: 03/04/18 2100   Admin Instructions: MEDIUM INSULIN RESISTANCE DOSING    Do Not give Bedtime Correction Insulin if BG less than  200.   For  - 249 give 1 units.   For  - 299 give 2 units.   For  - 349 give 3 units.   For  -399 give 4  "units.   For BG greater than or equal to 400 give 5 units.  Notify provider if glucose greater than or equal to 350 mg/dL after administration of correction dose.  If given at mealtime, must be administered 5 min before meal or immediately after.         (2215)-Not Given        2233 (1 Units)-Given        [ ] 2100           insulin aspart (NovoLOG) inj (RAPID ACTING)  Dose: 1-7 Units  Freq: 3 TIMES DAILY BEFORE MEALS Route: SC  Start: 03/04/18 1303   Admin Instructions: Correction Scale - MEDIUM INSULIN RESISTANCE DOSING     Do Not give Correction Insulin if Pre-Meal BG less than 140.   For Pre-Meal  - 189 give 1 unit.   For Pre-Meal  - 239 give 2 units.   For Pre-Meal  - 289 give 3 units.   For Pre-Meal  - 339 give 4 units.   For Pre-Meal - 399 give 5 units.   For Pre-Meal -449 give 6 units  For Pre-Meal BG greater than or equal to 450 give 7 units.   To be given with prandial insulin, and based on pre-meal blood glucose.    Notify provider if glucose greater than or equal to 350 mg/dL after administration of correction dose.  If given at mealtime, must be administered 5 min before meal or immediately after.                (1751)-Not Given        (0931)-Not Given [C]       (1322)-Not Given       1809 (1 Units)-Given        0857 (1 Units)-Given       (1149)-Not Given [C]       [ ] 1700           lidocaine (LMX4) kit  Freq: EVERY 1 HOUR PRN Route: Top  PRN Reason: pain  PRN Comment: with VAD insertion or accessing implanted port.  Start: 03/03/18 2146   Admin Instructions: Do NOT give if patient has a history of allergy to any local anesthetic or any \"camille\" product.  Apply 30 minutes prior to VAD insertion or port access. MAX Dose: 2.5 g (  of 5 g tube).               lidocaine 1 % 1 mL  Dose: 1 mL  Freq: EVERY 1 HOUR PRN Route: OTHER  PRN Comment: mild pain with VAD insertion or accessing implanted port  Start: 03/03/18 2146   Admin Instructions: Do NOT give if patient has a " "history of allergy to any local anesthetic or any \"camille\" product. MAX dose 1 mL subcutaneous OR intradermal in divided doses.               melatonin tablet 1 mg  Dose: 1 mg  Freq: AT BEDTIME PRN Route: PO  PRN Reason: sleep  Start: 03/03/18 2145   Admin Instructions: Do not give unless at least 6 hours of uninterrupted sleep is expected.               metFORMIN (GLUCOPHAGE) tablet 500 mg  Dose: 500 mg  Freq: 2 TIMES DAILY WITH MEALS Route: PO  Start: 03/03/18 2207   Admin Instructions: If the patient receives intravenous, iodinated contrast and patient GFR is greater than 60 mL/min/1.7m2, continue metformin.  Contact provider for 'hold' or 'no hold' instructions if no GFR or if GFR is less than 60 mL/min/1.7m2.        2253 (500 mg)-Given        0851 (500 mg)-Given       1749 (500 mg)-Given        0939 (500 mg)-Given       1811 (500 mg)-Given        0843 (500 mg)-Given       [ ] 1800           naloxone (NARCAN) injection 0.1-0.4 mg  Dose: 0.1-0.4 mg  Freq: EVERY 2 MIN PRN Route: IV  PRN Reason: opioid reversal  Start: 03/03/18 2145   Admin Instructions: For respiratory rate LESS than or EQUAL to 8.  Partial reversal dose:  0.1 mg titrated q 2 minutes for Analgesia Side Effects Monitoring Sedation Level of 3 (frequently drowsy, arousable, drifts to sleep during conversation).Full reversal dose:  0.4 mg bolus for Analgesia Side Effects Monitoring Sedation Level of 4 (somnolent, minimal or no response to stimulation).  For ordered doses up to 2mg give IVP. Give each 0.4mg over 15 seconds in emergency situations. For non-emergent situations further dilute in 9mL of NS to facilitate titration of response.               naproxen (NAPROSYN) tablet 250 mg  Dose: 250 mg  Freq: 2 TIMES DAILY WITH MEALS Route: PO  Start: 03/06/18 1010          1113 (250 mg)-Given       [ ] 1800           ondansetron (ZOFRAN-ODT) ODT tab 4 mg  Dose: 4 mg  Freq: EVERY 6 HOURS PRN Route: PO  PRN Reasons: nausea,vomiting  Start: 03/03/18 2145 "   Admin Instructions: This is Step 1 of nausea and vomiting management.  If nausea not resolved in 15 minutes, go to Step 2 prochlorperazine (COMPAZINE). Do not push through foil backing. Peel back foil and gently remove. Place on tongue immediately. Administration with liquid unnecessary  With dry hands, peel back foil backing and gently remove tablet; do not push oral disintegrating tablet through foil backing; administer immediately on tongue and oral disintegrating tablet dissolves in seconds; then swallow with saliva; liquid not required.              Or  ondansetron (ZOFRAN) injection 4 mg  Dose: 4 mg  Freq: EVERY 6 HOURS PRN Route: IV  PRN Reasons: nausea,vomiting  Start: 03/03/18 2145   Admin Instructions: This is Step 1 of nausea and vomiting management.  If nausea not resolved in 15 minutes, go to Step 2 prochlorperazine (COMPAZINE).  Irritant. For ordered doses up to 4 mg, give IV Push undiluted over 2-5 minutes.               oxyCODONE IR (ROXICODONE) half-tab 2.5-5 mg  Dose: 2.5-5 mg  Freq: EVERY 3 HOURS PRN Route: PO  PRN Reason: moderate to severe pain  Start: 03/03/18 2150        0521 (5 mg)-Given       1424 (5 mg)-Given        0939 (5 mg)-Given        0843 (5 mg)-Given           pioglitazone (ACTOS) tablet 45 mg  Dose: 45 mg  Freq: DAILY Route: PO  Start: 03/04/18 0800        0851 (45 mg)-Given        0939 (45 mg)-Given        0843 (45 mg)-Given           polyethylene glycol (MIRALAX/GLYCOLAX) Packet 17 g  Dose: 17 g  Freq: DAILY PRN Route: PO  PRN Reason: constipation  Start: 03/03/18 2148   Admin Instructions: Give in 8oz of  water, juice, or soda. Hold for loose stools.  This is the second step of a three step constipation treatment.  1 Packet = 17 grams. Mixed prescribed dose in 8 ounces of water. Follow with 8 oz. of water.               senna-docusate (SENOKOT-S;PERICOLACE) 8.6-50 MG per tablet 1 tablet  Dose: 1 tablet  Freq: 2 TIMES DAILY PRN Route: PO  PRN Reason: constipation  Start:  03/03/18 2148   Admin Instructions: If no bowel movement in 24 hours, increase to 2 tablets PO.  Hold for loose stools.              Or  senna-docusate (SENOKOT-S;PERICOLACE) 8.6-50 MG per tablet 2 tablet  Dose: 2 tablet  Freq: 2 TIMES DAILY PRN Route: PO  PRN Reason: constipation  Start: 03/03/18 2148   Admin Instructions: Hold for loose stools.               sodium chloride (PF) 0.9% PF flush 3 mL  Dose: 3 mL  Freq: EVERY 8 HOURS Route: IK  Start: 03/03/18 2152   Admin Instructions: And Q1H PRN, to lock peripheral IV dormant line.        (2301)-Not Given        (0524)-Not Given              2216 (3 mL)-Given        (0530)-Not Given       (1617)-Not Given       2237 (3 mL)-Given        0437 (3 mL)-Given       [ ] 1352       [ ] 2152           sodium chloride (PF) 0.9% PF flush 3 mL  Dose: 3 mL  Freq: EVERY 1 HOUR PRN Route: IK  PRN Reason: line flush  Start: 03/03/18 2146   Admin Instructions: for peripheral IV flush post IV meds               Warfarin Therapy Reminder (Check START DATE - warfarin may be starting in the FUTURE)  Dose: 1 each  Freq: CONTINUOUS PRN Route: XX  Start: 03/03/18 2148   Admin Instructions: *Note to reorder warfarin daily*  Pharmacy Warfarin Dosing Service  Patient is on Warfarin Therapy - check for daily order              Future Medications  Medications 02/28/18 03/01/18 03/02/18 03/03/18 03/04/18 03/05/18 03/06/18       colchicine tablet 0.6 mg  Dose: 0.6 mg  Freq: 2 TIMES DAILY Route: PO  Start: 03/06/18 2000          [ ] 2000           warfarin (COUMADIN) tablet 2.5 mg  Dose: 2.5 mg  Freq: ONCE AT 6PM Route: PO  Start: 03/06/18 1800          [ ] 1800          Completed Medications  Medications 02/28/18 03/01/18 03/02/18 03/03/18 03/04/18 03/05/18 03/06/18         Dose: 600 mg  Freq: ONCE Route: IV  Indications of Use: BONE AND/OR JOINT INFECTION  Last Dose: Stopped (03/04/18 1341)  Start: 03/04/18 0830   End: 03/04/18 1341        1226 (600 mg)-New Bag [C]       1341-Stopped                Dose: 1.2 mg  Freq: ONCE Route: PO  Start: 03/06/18 1007   End: 03/06/18 1113          1113 (1.2 mg)-Given             Dose: 1 tablet  Freq: ONCE Route: PO  Start: 03/03/18 1844   End: 03/03/18 1944   Admin Instructions: Maximum acetaminophen dose from all sources= 75 mg/kg/day not to exceed 4 grams        1944 (1 tablet)-Given                Dose: 1 tablet  Freq: ONCE Route: PO  Start: 03/03/18 1420   End: 03/03/18 1424   Admin Instructions: Maximum acetaminophen dose from all sources= 75 mg/kg/day not to exceed 4 grams        1424 (1 tablet)-Given                Dose: 10 mL  Freq: ONCE Route: ID  Start: 03/05/18 1220   End: 03/05/18 1336   Admin Instructions: For knee          1336 (50 mg)-Given [C]              Dose: 1 mg  Freq: ONCE AT 6PM Route: PO  Start: 03/05/18 1800   End: 03/05/18 1812         1812 (1 mg)-Given              Dose: 2.5 mg  Freq: ONCE AT 6PM Route: PO  Start: 03/04/18 1800   End: 03/04/18 1749        1749 (2.5 mg)-Given               Dose: 5 mg  Freq: ONCE Route: PO  Start: 03/03/18 2218   End: 03/03/18 2253       2253 (5 mg)-Given             Discontinued Medications  Medications 02/28/18 03/01/18 03/02/18 03/03/18 03/04/18 03/05/18 03/06/18         Dose: 1 g  Freq: EVERY 8 HOURS Route: IV  Indications of Use: SKIN AND SOFT TISSUE INFECTION  Start: 03/05/18 1203   End: 03/05/18 1334         1334-Med Discontinued          Dose: 2 g  Freq: EVERY 8 HOURS Route: IV  Indications of Use: SKIN AND SOFT TISSUE INFECTION  Indications Comment: septic joint  Start: 03/05/18 1335   End: 03/06/18 1005         1523 (2 g)-Given        0055 (2 g)-Given       0851 (2 g)-Given       1005-Med Discontinued         Dose: 500 mg  Freq: EVERY 6 HOURS SCHEDULED Route: PO  Indications of Use: SKIN AND SOFT TISSUE INFECTION  Start: 03/05/18 1200   End: 03/05/18 1202                1202-Med Discontinued          Dose: 500 mcg  Freq: DAILY Route: PO  Start: 03/04/18 0800   End: 03/03/18 2208 2208-Med  Discontinued            Dose: 15-30 g  Freq: EVERY 15 MIN PRN Route: PO  PRN Reason: low blood sugar  Start: 03/03/18 2206   End: 03/05/18 1322   Admin Instructions: Give 15 g for BG 51 to 69 mg/dL IF patient is conscious and able to swallow. Give 30 g for BG less than or equal to 50 mg/dL IF patient is conscious and able to swallow. Do NOT give glucose gel via enteral tube.  IF patient has enteral tube: give apple juice 120 mL (4 oz or 15 g of CHO) via enteral tube for BG 51 to 69 mg/dL.  Give apple juice 240 mL (8 oz or 30 g of CHO) via enteral tube for BG less than or equal to 50 mg/dL.    ~Oral gel is preferable for conscious and able to swallow patient.   ~IF gel unavailable or patient refuses may provide apple juice 120 mL (4 oz or 15 g of CHO). Document juice on I and O flowsheet.          1322-Med Discontinued       Or    Dose: 25-50 mL  Freq: EVERY 15 MIN PRN Route: IV  PRN Reason: low blood sugar  Start: 03/03/18 2206   End: 03/05/18 1322   Admin Instructions: Use if have IV access, BG less than 70 mg/dL and meet dose criteria below:  Dose if conscious and alert (or disorientated) and NPO = 25 mL  Dose if unconscious / not alert = 50 mL  Vesicant. For ordered doses up to 25 mg, give IV Push undiluted. Give each 5g over 1 minute.          1322-Med Discontinued       Or    Dose: 1 mg  Freq: EVERY 15 MIN PRN Route: SC  PRN Reason: low blood sugar  PRN Comment: May repeat x 1 only  Start: 03/03/18 2206   End: 03/05/18 1322   Admin Instructions: May give SQ or IM. ONLY use glucagon IF patient has NO IV access AND is UNABLE to swallow AND blood glucose is LESS than or EQUAL to 50 mg/dL.  Give IV Push over 1 minute. Reconstitute with 1mL sterile water.          1322-Med Discontinued          Dose: 200 mg  Freq: EVERY 6 HOURS PRN Route: PO  PRN Reason: moderate pain  Start: 03/03/18 2253   End: 03/05/18 1328         1328-Med Discontinued          Start: 03/03/18 1738   End: 03/03/18 2025   Admin Instructions:  Jaime Decker : cabinet override               2025-Med Discontinued       Medications 02/28/18 03/01/18 03/02/18 03/03/18 03/04/18 03/05/18 03/06/18               INTERAGENCY TRANSFER FORM - NOTES (H&P, Discharge Summary, Consults, Procedures, Therapies)   3/3/2018                       [facility] OBSERVATION DEPARTMENT: [phone]               History & Physicals      H&P by Tata Souza PA-C at 3/3/2018  9:36 PM     Author:  Tata Souza PA-C Service:  Hospitalist Author Type:  Physician Assistant - CARLI    Filed:  3/3/2018 11:06 PM Date of Service:  3/3/2018  9:36 PM Creation Time:  3/3/2018  9:36 PM    Status:  Cosign Needed :  Tata Souza PA-C (Physician Assistant - C)    Cosign Required:  Yes             History and Physical     Louis Keller Jr. MRN# [id number]   YOB: 1932 Age: 85 year old      Date of Admission:  3/3/2018    Primary care provider: Joselito Armas          Assessment and Plan:   Louis Keller Jr. is a 85 year old male with a PMH significant for[NJ1.1] atrial fibrillation on warfarin[NJ1.2],[NJ1.1] DM II, and HLP[NJ1.2] who presents with[NJ1.1] right knee effusion after a mechanical fall.[NJ1.2]    Patient was discussed with[NJ1.1] Dr. Decker[NJ1.2], who was provider in ED. Chart review of ED work up was performed and is as follows: Vitals show temp of[NJ1.1] 99[NJ1.2], pulse[NJ1.1] 105[NJ1.2], and pressure[NJ1.1] 148/90[NJ1.2] with spontaneous respirations and[NJ1.1] no[NJ1.2] hypoxia. Labs remarkable for Creatinine[NJ1.1] 0.81[NJ1.2],[NJ1.1] normal[NJ1.2] electrolytes,[NJ1.1] glucose 183,[NJ1.2]  WBC[NJ1.1] 11[NJ1.2], Hgb[NJ1.1] 12.8[NJ1.2].[NJ1.1] INR 2.12. X ray knee shows no fracture, x ray ankle shows no fracture, x ray shoulder shows no fracture. CT of knee showed no evidence of fracture but unusual calcifications in the suprapatellar region that could represent synovial calcification.[NJ1.2] Chart review  of Care Everywhere, previous visits and admissions were also reviewed.[NJ1.1]    Alerted by nursing that he triggered sepsis protocol. On review of vital signs after he arrived here he had a fever to 101.4 and tachycardia 112. Lactic acid and blood cultures were ordered. Unfortunately, knee fluid was not sent for analysis from ED and was thrown away since there was no concern for infection. I went to reevaluate patient and he has no cough, shortness of breath, urinary symptoms, diarrhea or vomiting. His only complaint is his knee pain and it is not cellulitic appearing. I have low suspicion for septic joint. Fever resolved on its own. Will continue to monitor.        1. Right knee pain/effusion after mechanical fall  Patient had mechanical fall on 3/2 that resulted in effusion and pain making it difficult to walk. ED spoke with orthopedics who recommended therapeutic drainage. Per ED fluid was a bloody mixture (not flank blood or infected appearing). Fluid was not sent for analysis as there was low suspicion for infection. He was still in a fair amount of pain after procedure so we were asked to admit for pain control and safe disposition planning.  -Schedule tylenol  -Low dose Ibuprofen PRN (he reports no hx of bleeding ulcer)  -Ice, elevate and compress leg with ace bandage  -Atarax PRN  -Low dose oxycodone PRN  -No hx of ulcer so low dose ibuprofen ordered  -Repeat CBC in AM    2. Hx of Atrial fibrillation  Rate controlled on Diltiazem.   -Continue Warfarin    3. DM II  Continue Metformin, Glipizide and Actos[NJ1.2]        Social:[NJ1.1] Lives with wife and normally ambulates independently[NJ1.2]  Code: Discussed with patient nd they have chosen[NJ1.1] Full code[NJ1.2]  VTE prophylaxis:[NJ1.1] On warfarin[NJ1.2]  Disposition:[NJ1.1] Observation[NJ1.2]                    Chief Complaint:[NJ1.1]   Right knee pain and effusion[NJ1.2]         History of Present Illness:   Louis Keller Jr. is a 85 year old male  who presents with[NJ1.1] right knee pain and swelling. He states he was walking in the driveway on 3/2 and slipped falling onto his right knee. He was able to get up and complete his tasks but over the course of the day the knee became more swollen and painful. Today he was unable to ambulate without significant pain. He noticed significant swelling but no redness.  He has not had any fever, nausea, vomiting, diarrhea, stomach pairn, or urinary symptoms.[NJ1.2]              Past Medical History:     Past Medical History:   Diagnosis Date     Antiplatelet or antithrombotic long-term use      Atrial fibrillation (H)      Diarrhea      Diastolic murmur      QUESADA (dyspnea on exertion)      Hemorrhage of gastrointestinal tract, unspecified 63,65,and 1971    hospitalized     History of blood transfusion      Scarlet fever      Type II or unspecified type diabetes mellitus without mention of complication, not stated as uncontrolled      Unspecified disease of pancreas 1990    pancreatitis               Past Surgical History:     Past Surgical History:   Procedure Laterality Date     ARTHROPLASTY KNEE  8/5/2013    Procedure: ARTHROPLASTY KNEE;  Left Total Knee Arthroplasty       C NONSPECIFIC PROCEDURE  Child    T&A     C NONSPECIFIC PROCEDURE  ; also 11/03    Colonoscopy     C NONSPECIFIC PROCEDURE      Basal cell cancer of the nose     C NONSPECIFIC PROCEDURE  1990    Cholecystectomy     CARDIOVERSION  9/6/11    failed               Social History:     Social History     Social History     Marital status:      Spouse name: N/A     Number of children: N/A     Years of education: N/A     Occupational History           Semi-retired     Social History Main Topics     Smoking status: Never Smoker     Smokeless tobacco: Never Used     Alcohol use 0.0 oz/week     0 Standard drinks or equivalent per week      Comment: 1 glass of wine every day     Drug use: No     Sexual activity: No     Other Topics Concern      Caffeine Concern No     14 oz per day     Sleep Concern No     sometimes - nocturia 4 times per night     Special Diet No     Exercise Yes     treadmill/walking 15-20 minutes 6 days per week     Social History Narrative               Family History:     Family History   Problem Relation Age of Onset     Alzheimer Disease Maternal Grandmother      Arthritis Maternal Grandmother      CANCER Mother      breast/ 1983/colon     Eye Disorder Mother      glaucoma and cataracts     HEART DISEASE Mother      angina/CABG     Hypertension Mother      CANCER Father      colon     DIABETES Maternal Aunt      AoDM              Allergies:      Allergies   Allergen Reactions     No Known Drug Allergies                Medications:     Prior to Admission medications    Medication Sig Last Dose Taking? Auth Provider   HYDROcodone-acetaminophen (NORCO) 5-325 MG per tablet Take 1-2 tablets by mouth every 4 hours as needed for moderate to severe pain  Yes Jaime Decker DO   cholestyramine (QUESTRAN) 4 G Packet Take 1 packet by mouth every evening 3/2/2018 at PM Yes Unknown, Entered By History   multivitamin, therapeutic (THERA-VIT) TABS tablet Take 0.5 tablets by mouth 2 times daily 3/3/2018 at AM Yes Unknown, Entered By History   pioglitazone (ACTOS) 45 MG tablet TAKE 1 TABLET (45 MG) BY MOUTH DAILY 3/3/2018 at AM Yes Joselito Armas MD   ferrous gluconate (FERGON) 324 (38 FE) MG tablet TAKE 1 TABLET (324 MG) BY MOUTH DAILY (WITH BREAKFAST) 3/3/2018 at AM Yes Joselito Armas MD   metFORMIN (GLUCOPHAGE) 500 MG tablet Take 1 tablet (500 mg) by mouth 2 times daily (with meals) 3/3/2018 at AM Yes Joselito Armas MD   warfarin (COUMADIN) 5 MG tablet TAKE 1 TABLET BY MOUTH TUESDAY,THURS AND SUN. TAKE 1/2 TABLET ON MON,WED,FRI AND SAT. OR AS DIRECTED 3/2/2018 at PM Yes Joselito Armas MD   glipiZIDE (GLIPIZIDE XL) 10 MG 24 hr tablet Take 1 tablet (10 mg) by mouth 2 times daily 3/3/2018 at AM Yes Chanda  Joselito PATRICIA MD   diltiazem 120 MG 24 hr capsule Take 1 capsule (120 mg) by mouth every evening 3/2/2018 at PM Yes Herbert Gleason MD   Chromium 1000 MCG TABS Take 500 mcg by mouth daily 3/3/2018 at AM Yes Reported, Patient   loperamide (IMODIUM A-D) 2 MG tablet 1 tablet BID 3/3/2018 at AM Yes Joselito Armas MD   acetaminophen (TYLENOL) 500 MG tablet Take 500 mg by mouth every 6 hours as needed  3/3/2018 at Unknown time Yes Reported, Patient   fish oil-omega-3 fatty acids (FISH OIL) 1000 MG capsule Take 1 g by mouth 2 times daily  3/3/2018 at AM Yes Reported, Patient   STATIN NOT PRESCRIBED, INTENTIONAL, 1 each At Bedtime Statin not prescribed intentionally due to Risk for drug interaction   Joselito Armas MD   ACE NOT PRESCRIBED, INTENTIONAL, 1 each daily ACE Inhibitor not prescribed due to Risk for drug interaction   Joselito Armas MD   ASPIRIN NOT PRESCRIBED, INTENTIONAL, by Other route continuous prn Reported on 3/7/2017   Reported, Patient              Review of Systems:   A Comprehensive greater than 10 system review of systems was carried out.  Pertinent positives and negatives are noted above.  Otherwise negative for contributory information.            Physical Exam:   Blood pressure 140/65, pulse 105, temperature 101.4  F (38.6  C), temperature source Oral, resp. rate 15, SpO2 94 %.  Exam:  GENERAL:  Comfortable.  PSYCH: pleasant, oriented, No acute distress.  HEENT:  PERRLA. Normal conjunctiva, normal hearing, nasal mucosa and Oropharynx are normal.  NECK:  Supple, no neck vein distention, adenopathy or bruits, normal thyroid.  HEART:[NJ1.1]  Irregularly irregular[NJ1.2]  LUNGS:  Clear to auscultation, normal Respiratory effort. No wheezing, rales or ronchi.  ABDOMEN:  Soft, no hepatosplenomegaly, normal bowel sounds. Non-tender, non distended.   EXTREMITIES:[NJ1.1]  Knee was visualized and it is swollen but no redness is noted and I was able to move knee without significant  pain.[NJ1.2]  SKIN:  Dry to touch, No rash, wound or ulcerations.  NEUROLOGIC:  CN 2-12[NJ1.1] grossly[NJ1.2] intact, sensation is intact with no focal deficits.               Data:       Recent Labs  Lab 03/03/18 1925   WBC 11.0   HGB 12.8*   HCT 38.9*   MCV 92          Recent Labs  Lab 03/03/18 1925      POTASSIUM 4.1   CHLORIDE 101   CO2 24   ANIONGAP 8   *   BUN 14   CR 0.81   GFRESTIMATED >90   GFRESTBLACK >90   MICHAEL 9.2         Recent Results (from the past 24 hour(s))   Knee XR, 3 views, right    Narrative    KNEE THREE VIEWS RIGHT  3/3/2018 2:52 PM     HISTORY: fall, right knee pain/swelling;     COMPARISON: None.    FINDINGS: Degenerative changes seen in the patellofemoral compartment.  There is a degenerative spur off the lateral joint compartment. A knee  joint effusion appears to be present. There are calcific densities in  the suprapatellar region which may represent joint loose bodies..      Impression    IMPRESSION:   1. No fractures are identified.  2. Degenerative change with Chondrocalcinosis.  3. Knee joint effusion. Question joint loose bodies.    SHANELL SOLOMON MD   Ankle XR, G/E 3 views, right    Narrative    ANKLE THREE VIEWS RIGHT  3/3/2018 2:52 PM     HISTORY: fall, ankle pain;     COMPARISON: None.    FINDINGS: There is normal osseous alignment.  No acute fractures are  identified.  There is deformity of the distal fibula. This appears to  be due to a healed fracture.      Impression    IMPRESSION: No definite acute fractures are identified. The deformity  of the distal fibula appears to be due to a healed fracture.    SHANELL SOLOMON MD   XR Shoulder Left G/E 3 Views    Narrative    LEFT SHOULDER THREE OR MORE VIEWS  3/3/2018 2:53 PM     HISTORY: Fall, left shoulder pain.      Impression    IMPRESSION: Three views of the left shoulder are performed. No  evidence of fracture or dislocation. Acromioclavicular joint appears  intact radiographically.         ALIZA TRAN MD   CT  Knee Right w/o Contrast    Narrative    CT KNEE RIGHT WITHOUT CONTRAST 3/3/2018 4:52 PM     HISTORY: Right knee pain, possible lateral femoral condyle fracture.    TECHNIQUE: Axial scans were performed through the right knee with  sagittal and coronal reconstruction. Radiation dose for this scan was  reduced using automated exposure control, adjustment of the mA and/or  kV according to patient size, or iterative reconstruction technique.    COMPARISON: X-ray from 3/3/2018.    FINDINGS: No evidence of acute fracture or subluxation. Moderate  marginal osteophyte formation lateral femoral condyle. Mild marginal  osteophyte formation medial femoral condyle some of which project into  the femoral notch. Moderate patellofemoral degenerative changes.    There is a moderate to large joint effusion with some patchy somewhat  linear calcification within the joint effusion that may be synovial  calcification. It does not have the typical appearance of loose bodies  but  difficult to exclude some of this appearance being related to  loose bodies.      Impression    IMPRESSION:  1. No evidence of acute fracture.  2. Unusual calcifications in the suprapatellar region within a joint  effusion that could represent synovial calcification. Loose bodies  unlikely but could potentially contribute to this appearance. Some  unusual type of synovial chondromatosis or chondrocalcinosis could  result in this appearance as well.  3. Tricompartmental degenerative changes.     POC US SOFT TISSUE    Impression    [facility] Procedure Note     Limited Bedside ED Ultrasound of Soft Tissue:    PROCEDURE: PERFORMED BY: Dr. Jaime Decker  INDICATIONS/SYMPTOM: Right knee swelling  PROBE: High frequency linear probe  BODY LOCATION: Soft tissue located on right knee     FINDINGS: Cobblestoning of soft tissue: absent   Hypoechoic fluid (ie abscess) identified: present    US utilized to access fluid pocket with needle  INTERPRETATION:  The soft  tissue and muscle layers were evaluated.      Findings indicate hemarthrosis    IMAGE DOCUMENTATION: Images were archived to PACs system.             Tata Souza PA-C[NJ1.1]           Revision History        User Key Date/Time User Provider Type Action    > NJ1.2 3/3/2018 11:06 PM Tata Souza PA-C Physician Assistant Almaz BOYCE Sign     NJ1.1 3/3/2018  9:36 PM Tata Souza PA-C Physician Assistant - CARLI                      Discharge Summaries      Discharge Summaries by Mike Boykin MD at 3/6/2018 10:30 AM     Author:  Mike Boykin MD Service:  Hospitalist Author Type:  Physician    Filed:  3/6/2018 10:39 AM Date of Service:  3/6/2018 10:30 AM Creation Time:  3/6/2018 10:29 AM    Status:  Signed :  Mike Boykin MD (Physician)         [facility]  Discharge Summary  Name: Louis Keller Jr.    MRN: [id number]  YOB: 1932    Age: 85 year old  Date of Discharge:[JY1.1]  3/6/2018[JY1.2]  Date of Admission: 3/3/2018  Primary Care Provider: Joselito Armas  Discharge Physician:  Mike Boykin MD  Discharging Service:  Hospitalist  Date of Service (when I saw the patient):[JY1.1] 03/06/18[JY1.2]    Discharge Diagnosis:  Knee effusion, pseudogout     Other Diagnosis:  atrial fibrillation on warfarin, DM II, and HLP     Discharge Disposition:  Discharged to TCU     Allergies:[JY1.1]  Allergies   Allergen Reactions     No Known Drug Allergies[JY1.2]         Discharge Medications:   Current Discharge Medication List      START taking these medications    Details   oxyCODONE IR (ROXICODONE) 5 MG tablet Take 0.5-1 tablets (2.5-5 mg) by mouth every 6 hours as needed for moderate to severe pain  Qty: 18 tablet, Refills: 0    Associated Diagnoses: Knee effusion, right      naproxen (NAPROSYN) 250 MG tablet Take 1 tablet (250 mg) by mouth 2 times daily (with meals) for 7 days After 7 days, re-assess with a physician before continuing    Associated Diagnoses: Knee  effusion, right      colchicine 0.6 MG tablet Take 1 tablet (0.6 mg) by mouth 2 times daily for 7 days Then, re-assess with physician  Qty: 3 tablet    Associated Diagnoses: Pseudogout         CONTINUE these medications which have NOT CHANGED    Details   cholestyramine (QUESTRAN) 4 G Packet Take 1 packet by mouth every evening      multivitamin, therapeutic (THERA-VIT) TABS tablet Take 0.5 tablets by mouth 2 times daily      pioglitazone (ACTOS) 45 MG tablet TAKE 1 TABLET (45 MG) BY MOUTH DAILY  Qty: 90 tablet, Refills: 1    Associated Diagnoses: Type 2 diabetes mellitus with stage 3 chronic kidney disease, without long-term current use of insulin (H)      ferrous gluconate (FERGON) 324 (38 FE) MG tablet TAKE 1 TABLET (324 MG) BY MOUTH DAILY (WITH BREAKFAST)  Qty: 100 tablet, Refills: 3    Associated Diagnoses: Anemia due to blood loss, acute      metFORMIN (GLUCOPHAGE) 500 MG tablet Take 1 tablet (500 mg) by mouth 2 times daily (with meals)  Qty: 180 tablet, Refills: 1    Associated Diagnoses: Type 2 diabetes mellitus with stage 3 chronic kidney disease, without long-term current use of insulin (H)      warfarin (COUMADIN) 5 MG tablet TAKE 1 TABLET BY MOUTH TUESDAY,THURS AND SUN. TAKE 1/2 TABLET ON MON,WED,FRI AND SAT. OR AS DIRECTED  Qty: 65 tablet, Refills: 1    Associated Diagnoses: Heart palpitations; New onset atrial fibrillation (H); Chronic anticoagulation      glipiZIDE (GLIPIZIDE XL) 10 MG 24 hr tablet Take 1 tablet (10 mg) by mouth 2 times daily  Qty: 180 tablet, Refills: 3    Associated Diagnoses: Chronic atrial fibrillation (H); Actinic keratosis      diltiazem 120 MG 24 hr capsule Take 1 capsule (120 mg) by mouth every evening  Qty: 90 capsule, Refills: 3    Associated Diagnoses: Chronic atrial fibrillation (H)      Chromium 1000 MCG TABS Take 500 mcg by mouth daily      loperamide (IMODIUM A-D) 2 MG tablet 1 tablet BID  Qty: 30 tablet, Refills: 0    Comments: bid      acetaminophen (TYLENOL) 500 MG  tablet Take 500 mg by mouth every 6 hours as needed       fish oil-omega-3 fatty acids (FISH OIL) 1000 MG capsule Take 1 g by mouth 2 times daily       STATIN NOT PRESCRIBED, INTENTIONAL, 1 each At Bedtime Statin not prescribed intentionally due to Risk for drug interaction  Qty: 0 each, Refills: 0    Associated Diagnoses: Type 2 diabetes, HbA1C goal < 8% (H)      ACE NOT PRESCRIBED, INTENTIONAL, 1 each daily ACE Inhibitor not prescribed due to Risk for drug interaction  Qty: 0 each, Refills: 0    Associated Diagnoses: Type 2 diabetes, HbA1C goal < 8% (H)      ASPIRIN NOT PRESCRIBED, INTENTIONAL, by Other route continuous prn Reported on 3/7/2017         STOP taking these medications       ONE TOUCH ULTRA test strip Comments:   Reason for Stopping:         blood glucose monitoring (ONE TOUCH ULTRASOFT) lancets Comments:   Reason for Stopping:                Condition on Discharge:  Discharge condition: Stable   Discharge vitals:[JY1.1] Blood pressure 128/74, pulse 105, temperature 98.2  F (36.8  C), temperature source Oral, resp. rate 20, weight 86.2 kg (190 lb), SpO2 95 %.[JY1.2]   Code status on discharge: Full Code     History of Illness:  See detailed admission note for full details.     85 year old male who presents with right knee pain and swelling. He states he was walking in the driveway on 3/2 and slipped falling onto his right knee. He was able to get up and complete his tasks but over the course of the day the knee became more swollen and painful. Today he was unable to ambulate without significant pain. He noticed significant swelling but no redness.  He has not had any fever, nausea, vomiting, diarrhea, stomach pairn, or urinary symptoms.     Significant Physical Exam Findings:  Patient doing better. Still has knee pain. No chest pain, sob, abdo pain, n/v/d    Procedures:  Knee aspiration     Imaging:[JY1.1]  Results for orders placed or performed during the hospital encounter of 03/03/18   Knee XR, 3  views, right    Narrative    KNEE THREE VIEWS RIGHT  3/3/2018 2:52 PM     HISTORY: fall, right knee pain/swelling;     COMPARISON: None.    FINDINGS: Degenerative changes seen in the patellofemoral compartment.  There is a degenerative spur off the lateral joint compartment. A knee  joint effusion appears to be present. There are calcific densities in  the suprapatellar region which may represent joint loose bodies..      Impression    IMPRESSION:   1. No fractures are identified.  2. Degenerative change with Chondrocalcinosis.  3. Knee joint effusion. Question joint loose bodies.    SHANELL SOLOMON MD   Ankle XR, G/E 3 views, right    Narrative    ANKLE THREE VIEWS RIGHT  3/3/2018 2:52 PM     HISTORY: fall, ankle pain;     COMPARISON: None.    FINDINGS: There is normal osseous alignment.  No acute fractures are  identified.  There is deformity of the distal fibula. This appears to  be due to a healed fracture.      Impression    IMPRESSION: No definite acute fractures are identified. The deformity  of the distal fibula appears to be due to a healed fracture.    SHANELL SOLOMON MD   XR Shoulder Left G/E 3 Views    Narrative    LEFT SHOULDER THREE OR MORE VIEWS  3/3/2018 2:53 PM     HISTORY: Fall, left shoulder pain.      Impression    IMPRESSION: Three views of the left shoulder are performed. No  evidence of fracture or dislocation. Acromioclavicular joint appears  intact radiographically.         ALIZA TRAN MD   CT Knee Right w/o Contrast    Narrative    CT KNEE RIGHT WITHOUT CONTRAST 3/3/2018 4:52 PM     HISTORY: Right knee pain, possible lateral femoral condyle fracture.    TECHNIQUE: Axial scans were performed through the right knee with  sagittal and coronal reconstruction. Radiation dose for this scan was  reduced using automated exposure control, adjustment of the mA and/or  kV according to patient size, or iterative reconstruction technique.    COMPARISON: X-ray from 3/3/2018.    FINDINGS: No evidence of acute  fracture or subluxation. Moderate  marginal osteophyte formation lateral femoral condyle. Mild marginal  osteophyte formation medial femoral condyle some of which project into  the femoral notch. Moderate patellofemoral degenerative changes.    There is a moderate to large joint effusion with some patchy somewhat  linear calcification within the joint effusion that may be synovial  calcification. It does not have the typical appearance of loose bodies  but  difficult to exclude some of this appearance being related to  loose bodies.      Impression    IMPRESSION:  1. No evidence of acute fracture.  2. Unusual calcifications in the suprapatellar region within a joint  effusion that could represent synovial calcification. Loose bodies  unlikely but could potentially contribute to this appearance. Some  unusual type of synovial chondromatosis or chondrocalcinosis could  result in this appearance as well.  3. Tricompartmental degenerative changes.      KAYLA VALLEJO MD   POC US SOFT TISSUE    Impression    [facility] Procedure Note     Limited Bedside ED Ultrasound of Soft Tissue:    PROCEDURE: PERFORMED BY: Dr. Jaime Decker  INDICATIONS/SYMPTOM: Right knee swelling  PROBE: High frequency linear probe  BODY LOCATION: Soft tissue located on right knee     FINDINGS: Cobblestoning of soft tissue: absent   Hypoechoic fluid (ie abscess) identified: present    US utilized to access fluid pocket with needle  INTERPRETATION:  The soft tissue and muscle layers were evaluated.      Findings indicate hemarthrosis    IMAGE DOCUMENTATION: Images were archived to PACs system.     MR Knee Right w/o Contrast    Narrative    MR KNEE RIGHT WITHOUT CONTRAST March 5, 2018 9:26 AM    HISTORY: Left knee pain since 3/2/2018 following an injury. The  concern is for a quadriceps tendon rupture or other ligamentous  injury.    COMPARISON: Radiographs and a CT on 3/3/2018.    TECHNIQUE: Multiplanar MR imaging was performed without  contrast.    FINDINGS:     Medial Meniscus: No tear, displaced fragment, or extrusion.      Lateral Meniscus: There is mild diffuse increased signal throughout  the periphery of the meniscus consistent with myxoid degeneration. No  signal contact the articular surface with no evidence of a tear.     Anterior Cruciate Ligament: Unremarkable.     Posterior Cruciate Ligament: Unremarkable.     Medial Collateral Ligament: Unremarkable.    Lateral Collateral Ligament Complex, Popliteus Tendon: The iliotibial  band, fibular collateral ligament, biceps femoris tendon, and  popliteus tendon are unremarkable.    Osseous and Cartilaginous Structures: No fracture or osseous lesion is  demonstrated. No abnormal marrow signal intensity is identified. There  appears to be only mild grade 2 chondromalacia throughout both the  medial and lateral compartments with no focal chondral defects seen in  these areas.     Extensor Mechanism: The quadriceps and patellar tendons are  unremarkable. The medial and lateral patellar retinacula appear  unremarkable.    Joint Space: Large joint effusion. No definite loose bodies  appreciated. There appears to be a suprapatellar plica.    Additional Findings: There is a few seem diameter Baker's cyst. No  semimembranosus-tibial collateral ligament or pes anserine bursitis.  No adjacent soft tissue pathology is seen.      Impression    IMPRESSION:   1. No quadriceps tendon or ligamentous injury is seen.  2. Advanced degenerative changes of the patellofemoral joint with mild  chondromalacia elsewhere in the knee.  3. Suprapatellar plica. Calcifications seen in this area on the CT are  not definitely identified on the MRI. Those calcifications could be  along the plica or synovial lining.  4. Large joint effusion and a several cm diameter Lovelace's cyst.    KALPESH CLARK MD[JY1.3]        Consultations:  Consultation during this admission received from orthopedics.     Significant Lab  Results:[JY1.1]    Recent Labs  Lab 03/06/18  0624 03/05/18  1006 03/04/18  0607   WBC 8.1 10.8 10.2   HGB 11.1* 12.5* 11.4*   HCT 33.7* 38.1* 34.3*   MCV 92 93 93    171 157[JY1.3]          Lab Results   Component Value Date     03/06/2018     03/05/2018     03/03/2018    Lab Results   Component Value Date    CHLORIDE 101 03/06/2018    CHLORIDE 100 03/05/2018    CHLORIDE 101 03/03/2018    Lab Results   Component Value Date    BUN 16 03/06/2018    BUN 15 03/05/2018    BUN 14 03/03/2018      Lab Results   Component Value Date    POTASSIUM 4.0 03/06/2018    POTASSIUM 4.1 03/05/2018    POTASSIUM 4.1 03/03/2018    Lab Results   Component Value Date    CO2 25 03/06/2018    CO2 27 03/05/2018    CO2 24 03/03/2018    Lab Results   Component Value Date    CR 0.77 03/06/2018    CR 0.91 03/05/2018    CR 0.81 03/03/2018[JY1.1]          Recent Labs  Lab 03/06/18  0624 03/05/18  0630 03/04/18  0607   INR 2.54* 3.06* 2.34*[JY1.3]        Pending Results:[JY1.1]    Unresulted Labs Ordered in the Past 30 Days of this Admission     Date and Time Order Name Status Description    3/5/2018 1349 Anaerobic bacterial culture Preliminary     3/5/2018 1345 Fluid Culture Aerobic Bacterial Preliminary     3/3/2018 2239 Blood culture Preliminary     3/3/2018 2239 Blood culture Preliminary[JY1.2]            Discharge Instructions and Follow-Up:   Discharge diet:[JY1.1]   Active Diet Order      Combination Diet 2787-8095 Calories: Moderate Consistent CHO (4-6 CHO units/meal)      Advance Diet as Tolerated[JY1.2]   Discharge activity: Activity as tolerated   Discharge follow-up: Follow up with primary care provider in 7 days   Outpatient therapy: None    Home Care agency: None    Other instructions: Re-check chem 7 with PCP      Hospital Course:  Patient was initially admitted under Obs status. He came with knee pain/effusion. He actually had his knee aspirated in the ED, but unfortunately they discarded the fluid without  sending it for testing. Therefore, it was unclear what his effusion was caused by initially. He was started on antibiotics. He was made inpatient. He continued having pain and inability to ambulate. A decision was made to have ortho re-aspirate his knee. This revealed pseudogout. He has been started on naprosyn and colchicine. Hopefully these can be stopped in a week once his pain improves, as he is on coumadin for a fib. He will DC to TCU.     Total time spent in face to face contact with the patient and coordinating discharge was:  40 Minutes.    Mike Boykin MD  Pager: [phone][JY1.1]     Revision History        User Key Date/Time User Provider Type Action    > JY1.3 3/6/2018 10:39 AM Mike Boykin MD Physician Sign     JY1.2 3/6/2018 10:30 AM Mike Boykin MD Physician      JY1.1 3/6/2018 10:29 AM Mike Boykin MD Physician                      Consult Notes      Consults by Nacho Souza APRN CNP at 3/5/2018  8:02 AM     Author:  Nacho Souza APRN CNP Service:  Orthopedics Author Type:  Nurse Practitioner    Filed:  3/5/2018  3:18 PM Date of Service:  3/5/2018  8:02 AM Creation Time:  3/5/2018  8:01 AM    Status:  Attested :  Nacho Souza APRN CNP (Nurse Practitioner)    Cosigner:  Tristen Zhao MD at 3/5/2018  3:31 PM         Consult Orders:    1. Orthopedic Surgery IP Consult: Patient to be seen: Routine - within 24 hours; knee pain; Consultant may enter orders: Yes [[id number]] ordered by Maame Pack PA-C at 03/04/18 1248           Attestation signed by Tristen Zhao MD at 3/5/2018  3:31 PM        Physician Attestation   I, Tristen Zhao, have reviewed and discussed with the advanced practice provider their history, physical and plan for Louis Keller Jr.. I did not participate in a shared visit by interviewing or examining the patient and this should be billed as an advanced practice provider only visit.    Tristen Zhao  Date of  "Service (when I saw the patient): I did not personally see this patient today.                               [facility] Orthopedic Consultation    Louis Keller Jr. MRN# [id number]   Age: 85 year old YOB: 1932     Date of Admission:  3/3/2018    Reason for consult: Right knee effusion        Requesting physician: Maame Pack PA-C        Level of consult: Consult, follow and place orders           Assessment and Plan:   Assessment:   1)[EJ1.1] Traumatic hemarthrosis right knee-   ? Possible quadriceps tendon rupture given unable to perform straight leg raise[EJ1.2] and[EJ1.3] patient describes that his right knee \"gave out\" as he stepped down.[EJ1.2] S[EJ1.3]eptic knee joint less likely.[EJ1.2]            Plan:[EJ1.1]   1) I discussed with Ortho Trauma Surgeon, Dr. Zhao- will order STAT MRI of right knee this morning to evaluat[EJ1.2]e[EJ1.3] for possible quadriceps tendon injury.  2) PT/OT  3) WBAT RLE with[EJ1.2] right[EJ1.4] knee in immobilizer- ok to remove knee immobilizer when in bed.  4) Continue ACE wrap and ice to decrease swelling.[EJ1.2]   5) Further plan to follow based on MRI results[EJ1.4]: MRI results back: negative for quadriceps tendon or ligamentous injury, does show advanced patellofemoral DJD and large joint effusion.   6) After talking with Hospitalist and Dr. Zhao- we aspirated fluid from right knee effusion this afternoon and sent to lab for cell count, gram stain and culture. Further Ortho plan to follow based on results of joint aspiration.[EJ1.5]              Chief Complaint:[EJ1.1]   Right knee pain[EJ1.4]          History of Present Illness:   This patient is a 85 year old male who presents with the following condition requiring a hospital admission:[EJ1.1]    Three days ago patient reports he stepped off the street over a gutter and felt his right knee give way. He fell to the ground landing on his left shoulder but denies actually striking his right " "knee. He denies hitting his head or injuring any other area of his body. After the fall he started having right knee pain and increased swelling, prompting him to come into the ED. X-rays and CT scan of his right knee did not show any acute fracture. X-rays of his right ankle and left shoulder were also negative. US of his right knee showed a fluid pocket indicating possible hemarthrosis and the knee was drained of approx 35cc of bloody colored fluid. Unfortunately, this fluid was not sent for further analysis as suspicion for septic arthritis was low. Patient was admitted to hospital and continues to have significant right knee effusion and pain. He did have temp up to 101.4[EJ1.4] with tachycardia on evening of 3/3/18 so there was concern about possible septic joint. Antibiotics were started by Hospitalist. Since then, temps have remained in 98-99 range but patient continues to report right knee pain with bending and weight bearing. He is seen this morning in his room. He reports no pain in right knee when lying in bed. The pain increases to 7/10 when he bends the right knee to about 50 degrees. The right knee has a moderate effusion but no erythema or cellulitis. There is some warmth about the knee but it is not hot to touch. He denies numbness or tingling in RLE. He is unable to perform a straight leg raise with his RLE. He is able to plantar and dorsiflex the right foot and wiggle toes. He denies any recent illness or feeling \"sick\" today. Denies current fever, chills, nausea/vomiting or diarrhea. He had a left total knee arthroplasty performed by Dr. Perea in 2013 and a past fracture of his right ankle that did not require surgery. He denies any past problems with his left knee replacement.[EJ1.3]           Past Medical History:[EJ1.1]     Past Medical History:   Diagnosis Date     Antiplatelet or antithrombotic long-term use      Atrial fibrillation (H)      Diarrhea      Diastolic murmur      QUESADA " (dyspnea on exertion)      Hemorrhage of gastrointestinal tract, unspecified 63,65,and 1971    hospitalized     History of blood transfusion      Scarlet fever      Type II or unspecified type diabetes mellitus without mention of complication, not stated as uncontrolled      Unspecified disease of pancreas     pancreatitis[EJ1.6]             Past Surgical History:[EJ1.1]     Past Surgical History:   Procedure Laterality Date     ARTHROPLASTY KNEE  2013    Procedure: ARTHROPLASTY KNEE;  Left Total Knee Arthroplasty       C NONSPECIFIC PROCEDURE  Child    T&A     C NONSPECIFIC PROCEDURE  ; also     Colonoscopy     C NONSPECIFIC PROCEDURE      Basal cell cancer of the nose     C NONSPECIFIC PROCEDURE      Cholecystectomy     CARDIOVERSION  11    failed[EJ1.6]             Social History:[EJ1.1]     Social History   Substance Use Topics     Smoking status: Never Smoker     Smokeless tobacco: Never Used     Alcohol use 0.0 oz/week     0 Standard drinks or equivalent per week      Comment: 1 glass of wine every day[EJ1.6]             Family History:[EJ1.1]     Family History   Problem Relation Age of Onset     Alzheimer Disease Maternal Grandmother      Arthritis Maternal Grandmother      CANCER Mother      breast/ /colon     Eye Disorder Mother      glaucoma and cataracts     HEART DISEASE Mother      angina/CABG     Hypertension Mother      CANCER Father      colon     DIABETES Maternal Aunt      AoDM[EJ1.6]             Immunizations:     VACCINE/DOSE   Diptheria   DPT   DTAP   HBIG   Hepatitis A   Hepatitis B   HIB   Influenza   Measles   Meningococcal   MMR   Mumps   Pneumococcal   Polio   Rubella   Small Pox   TDAP   Varicella   Zoster             Allergies:[EJ1.1]     Allergies   Allergen Reactions     No Known Drug Allergies[EJ1.6]              Medications:[EJ1.1]     Current Facility-Administered Medications   Medication     ferrous gluconate (FERGON) tablet 324 mg     HYDROmorphone  (PF) (DILAUDID) injection 0.2-0.4 mg     glucose 40 % gel 15-30 g    Or     dextrose 50 % injection 25-50 mL    Or     glucagon injection 1 mg     insulin aspart (NovoLOG) inj (RAPID ACTING)     insulin aspart (NovoLOG) inj (RAPID ACTING)     cholestyramine (QUESTRAN) Packet 4 g     diltiazem (CARDIZEM CD/CARTIA XT) 24 hr capsule 120 mg     glipiZIDE (GLUCOTROL XL) 24 hr tablet 10 mg     metFORMIN (GLUCOPHAGE) tablet 500 mg     pioglitazone (ACTOS) tablet 45 mg     acetaminophen (TYLENOL) tablet 650 mg     naloxone (NARCAN) injection 0.1-0.4 mg     melatonin tablet 1 mg     ondansetron (ZOFRAN-ODT) ODT tab 4 mg    Or     ondansetron (ZOFRAN) injection 4 mg     lidocaine 1 % 1 mL     lidocaine (LMX4) kit     sodium chloride (PF) 0.9% PF flush 3 mL     sodium chloride (PF) 0.9% PF flush 3 mL     senna-docusate (SENOKOT-S;PERICOLACE) 8.6-50 MG per tablet 1 tablet    Or     senna-docusate (SENOKOT-S;PERICOLACE) 8.6-50 MG per tablet 2 tablet     polyethylene glycol (MIRALAX/GLYCOLAX) Packet 17 g     Warfarin Therapy Reminder (Check START DATE - warfarin may be starting in the FUTURE)     hydrOXYzine (ATARAX) tablet 25 mg     oxyCODONE IR (ROXICODONE) half-tab 2.5-5 mg     glucose 40 % gel 15-30 g    Or     dextrose 50 % injection 25-50 mL    Or     glucagon injection 1 mg     ibuprofen (ADVIL/MOTRIN) tablet 200 mg     Facility-Administered Medications Ordered in Other Encounters   Medication     insulin aspart (NovoLOG) injection     insulin aspart (NovoLOG) injection[EJ1.6]             Review of Systems:[EJ1.1]   CV: NEGATIVE for chest pain, palpitations or peripheral edema  C: NEGATIVE for fever, chills, change in weight  E/M: NEGATIVE for ear, mouth and throat problems  R: NEGATIVE for significant cough or SOB[EJ1.3]          Physical Exam:   All vitals have been reviewed[EJ1.1]  Patient Vitals for the past 24 hrs:   BP Temp Temp src Pulse Heart Rate Resp SpO2   03/05/18 0754 155/82 98.5  F (36.9  C) Oral - 111 20 85  %   03/05/18 0420 102/57 98.5  F (36.9  C) Oral - 86 16 95 %   03/05/18 0001 114/63 99.7  F (37.6  C) Oral - 106 18 93 %   03/04/18 2035 124/68 99.4  F (37.4  C) Oral - 96 24 96 %   03/04/18 1605 124/68 98.8  F (37.1  C) Oral - 92 16 95 %   03/04/18 1303 123/78 97.9  F (36.6  C) Oral 105 - 16 95 %   03/04/18 0816 113/70 98.3  F (36.8  C) Oral - 88 16 95 %[EJ1.6]       Intake/Output Summary (Last 24 hours) at 03/05/18 0802  Last data filed at 03/05/18 0522   Gross per 24 hour   Intake              100 ml   Output             1350 ml   Net            -1250 ml[EJ1.1]     Musculoskeletal:   RLE: Moderate effusion mainly suprapatellar of right knee. Skin intact. No erythema, streaking or ecchymosis.   -Warm to touch but not hot, slight tenderness to palpation of suprapatellar area, minimal tenderness around joint line.  -Unable to perform straight leg raise or actively extend RLE   -Able to flex right knee to about 45 degrees actively, passive flexion to about 95 degrees  - +pain when joint flexed >50 degrees   - Distal CMS intact  - +DF/PF/EHL  - 2+ DP pulses  - Wiggles toes, cap refill < 3 sec[EJ1.3]              Data:   All laboratory data reviewed[EJ1.1]  Results for orders placed or performed during the hospital encounter of 03/03/18   Knee XR, 3 views, right    Narrative    KNEE THREE VIEWS RIGHT  3/3/2018 2:52 PM     HISTORY: fall, right knee pain/swelling;     COMPARISON: None.    FINDINGS: Degenerative changes seen in the patellofemoral compartment.  There is a degenerative spur off the lateral joint compartment. A knee  joint effusion appears to be present. There are calcific densities in  the suprapatellar region which may represent joint loose bodies..      Impression    IMPRESSION:   1. No fractures are identified.  2. Degenerative change with Chondrocalcinosis.  3. Knee joint effusion. Question joint loose bodies.    SHANELL SOLOMON MD   Ankle XR, G/E 3 views, right    Narrative    ANKLE THREE VIEWS RIGHT   3/3/2018 2:52 PM     HISTORY: fall, ankle pain;     COMPARISON: None.    FINDINGS: There is normal osseous alignment.  No acute fractures are  identified.  There is deformity of the distal fibula. This appears to  be due to a healed fracture.      Impression    IMPRESSION: No definite acute fractures are identified. The deformity  of the distal fibula appears to be due to a healed fracture.    SHANELL SOLOMON MD   XR Shoulder Left G/E 3 Views    Narrative    LEFT SHOULDER THREE OR MORE VIEWS  3/3/2018 2:53 PM     HISTORY: Fall, left shoulder pain.      Impression    IMPRESSION: Three views of the left shoulder are performed. No  evidence of fracture or dislocation. Acromioclavicular joint appears  intact radiographically.         ALIZA TRAN MD   CT Knee Right w/o Contrast    Narrative    CT KNEE RIGHT WITHOUT CONTRAST 3/3/2018 4:52 PM     HISTORY: Right knee pain, possible lateral femoral condyle fracture.    TECHNIQUE: Axial scans were performed through the right knee with  sagittal and coronal reconstruction. Radiation dose for this scan was  reduced using automated exposure control, adjustment of the mA and/or  kV according to patient size, or iterative reconstruction technique.    COMPARISON: X-ray from 3/3/2018.    FINDINGS: No evidence of acute fracture or subluxation. Moderate  marginal osteophyte formation lateral femoral condyle. Mild marginal  osteophyte formation medial femoral condyle some of which project into  the femoral notch. Moderate patellofemoral degenerative changes.    There is a moderate to large joint effusion with some patchy somewhat  linear calcification within the joint effusion that may be synovial  calcification. It does not have the typical appearance of loose bodies  but  difficult to exclude some of this appearance being related to  loose bodies.      Impression    IMPRESSION:  1. No evidence of acute fracture.  2. Unusual calcifications in the suprapatellar region within a  joint  effusion that could represent synovial calcification. Loose bodies  unlikely but could potentially contribute to this appearance. Some  unusual type of synovial chondromatosis or chondrocalcinosis could  result in this appearance as well.  3. Tricompartmental degenerative changes.      KAYLA VALLEJO MD   POC US SOFT TISSUE    Impression    [facility] Procedure Note     Limited Bedside ED Ultrasound of Soft Tissue:    PROCEDURE: PERFORMED BY: Dr. Jaime Decker  INDICATIONS/SYMPTOM: Right knee swelling  PROBE: High frequency linear probe  BODY LOCATION: Soft tissue located on right knee     FINDINGS: Cobblestoning of soft tissue: absent   Hypoechoic fluid (ie abscess) identified: present    US utilized to access fluid pocket with needle  INTERPRETATION:  The soft tissue and muscle layers were evaluated.      Findings indicate hemarthrosis    IMAGE DOCUMENTATION: Images were archived to PACs system.     INR   Result Value Ref Range    INR 2.12 (H) 0.86 - 1.14   CBC with platelets differential   Result Value Ref Range    WBC 11.0 4.0 - 11.0 10e9/L    RBC Count 4.21 (L) 4.4 - 5.9 10e12/L    Hemoglobin 12.8 (L) 13.3 - 17.7 g/dL    Hematocrit 38.9 (L) 40.0 - 53.0 %    MCV 92 78 - 100 fl    MCH 30.4 26.5 - 33.0 pg    MCHC 32.9 31.5 - 36.5 g/dL    RDW 14.2 10.0 - 15.0 %    Platelet Count 165 150 - 450 10e9/L    Diff Method Automated Method     % Neutrophils 74.9 %    % Lymphocytes 9.4 %    % Monocytes 14.7 %    % Eosinophils 0.2 %    % Basophils 0.4 %    % Immature Granulocytes 0.4 %    Nucleated RBCs 0 0 /100    Absolute Neutrophil 8.3 1.6 - 8.3 10e9/L    Absolute Lymphocytes 1.0 0.8 - 5.3 10e9/L    Absolute Monocytes 1.6 (H) 0.0 - 1.3 10e9/L    Absolute Eosinophils 0.0 0.0 - 0.7 10e9/L    Absolute Basophils 0.0 0.0 - 0.2 10e9/L    Abs Immature Granulocytes 0.0 0 - 0.4 10e9/L    Absolute Nucleated RBC 0.0    Basic metabolic panel   Result Value Ref Range    Sodium 133 133 - 144 mmol/L    Potassium 4.1 3.4 -  5.3 mmol/L    Chloride 101 94 - 109 mmol/L    Carbon Dioxide 24 20 - 32 mmol/L    Anion Gap 8 3 - 14 mmol/L    Glucose 183 (H) 70 - 99 mg/dL    Urea Nitrogen 14 7 - 30 mg/dL    Creatinine 0.81 0.66 - 1.25 mg/dL    GFR Estimate >90 >60 mL/min/1.7m2    GFR Estimate If Black >90 >60 mL/min/1.7m2    Calcium 9.2 8.5 - 10.1 mg/dL   Lactic acid whole blood   Result Value Ref Range    Lactic Acid 1.3 0.7 - 2.0 mmol/L   INR   Result Value Ref Range    INR 2.34 (H) 0.86 - 1.14   CBC with platelets differential   Result Value Ref Range    WBC 10.2 4.0 - 11.0 10e9/L    RBC Count 3.71 (L) 4.4 - 5.9 10e12/L    Hemoglobin 11.4 (L) 13.3 - 17.7 g/dL    Hematocrit 34.3 (L) 40.0 - 53.0 %    MCV 93 78 - 100 fl    MCH 30.7 26.5 - 33.0 pg    MCHC 33.2 31.5 - 36.5 g/dL    RDW 14.3 10.0 - 15.0 %    Platelet Count 157 150 - 450 10e9/L    Diff Method Automated Method     % Neutrophils 72.5 %    % Lymphocytes 10.8 %    % Monocytes 15.4 %    % Eosinophils 0.3 %    % Basophils 0.5 %    % Immature Granulocytes 0.5 %    Nucleated RBCs 0 0 /100    Absolute Neutrophil 7.4 1.6 - 8.3 10e9/L    Absolute Lymphocytes 1.1 0.8 - 5.3 10e9/L    Absolute Monocytes 1.6 (H) 0.0 - 1.3 10e9/L    Absolute Eosinophils 0.0 0.0 - 0.7 10e9/L    Absolute Basophils 0.1 0.0 - 0.2 10e9/L    Abs Immature Granulocytes 0.1 0 - 0.4 10e9/L    Absolute Nucleated RBC 0.0    UA with Microscopic reflex to Culture   Result Value Ref Range    Color Urine Yellow     Appearance Urine Clear     Glucose Urine 50 (A) NEG^Negative mg/dL    Bilirubin Urine Negative NEG^Negative    Ketones Urine Negative NEG^Negative mg/dL    Specific Gravity Urine 1.012 1.003 - 1.035    Blood Urine Small (A) NEG^Negative    pH Urine 6.0 5.0 - 7.0 pH    Protein Albumin Urine Negative NEG^Negative mg/dL    Urobilinogen mg/dL 0.0 0.0 - 2.0 mg/dL    Nitrite Urine Negative NEG^Negative    Leukocyte Esterase Urine Negative NEG^Negative    Source Midstream Urine     WBC Urine <1 0 - 5 /HPF    RBC Urine 4 (H) 0 -  2 /HPF    Mucous Urine Present (A) NEG^Negative /LPF   Glucose by meter   Result Value Ref Range    Glucose 221 (H) 70 - 99 mg/dL   Glucose by meter   Result Value Ref Range    Glucose 179 (H) 70 - 99 mg/dL   INR   Result Value Ref Range    INR 3.06 (H) 0.86 - 1.14   Glucose by meter   Result Value Ref Range    Glucose 141 (H) 70 - 99 mg/dL   Blood culture   Result Value Ref Range    Specimen Description Blood Left Hand     Special Requests Aerobic and anaerobic bottles received     Culture Micro No growth after 1 day    Blood culture   Result Value Ref Range    Specimen Description Blood Left Arm     Special Requests Aerobic and anaerobic bottles received     Culture Micro No growth after 1 day[EJ1.6]           Attestation:  I have reviewed today's vital signs, notes, medications, labs and imaging with [EJ1.1] Pavel[EJ1.3].  Amount of time performed on this consult:[EJ1.1] 45[EJ1.3] minutes.[EJ1.1]    MARTHA Rutherford CNP[EJ1.6]        Revision History        User Key Date/Time User Provider Type Action    > EJ1.5 3/5/2018  3:18 PM Nacho Souza APRN CNP Nurse Practitioner Sign     EJ1.3 3/5/2018  9:23 AM Nacho Souza APRN CNP Nurse Practitioner Sign     EJ1.4 3/5/2018  8:43 AM Nacho Souza APRN CNP Nurse Practitioner      EJ1.2 3/5/2018  8:35 AM Nacho Souza APRN CNP Nurse Practitioner      EJ1.6 3/5/2018  8:02 AM Nacho Souza APRN CNP Nurse Practitioner      EJ1.1 3/5/2018  8:01 AM Nacho Souza APRN CNP Nurse Practitioner             Consults by Nessa oSuza LSW at 3/5/2018  2:48 PM     Author:  Nessa Souza LSW Service:  (none) Author Type:      Filed:  3/5/2018  2:48 PM Date of Service:  3/5/2018  2:48 PM Creation Time:  3/5/2018  2:35 PM    Status:  Signed :  Nessa Souza LSW ()         .Care Transition Initial Assessment -   Reason For Consult: discharge planning. Noted PT assessment with  recommendation for pt's transfer to rehab facility on discharge.  Met with: Patient    Active Problems:    Knee effusion, right    Joint effusion of knee       DATA  Lives With: spouse  Living Arrangements: house  Description of Support System: Supportive, Involved  Who is your support system?: Wife, Children  Support Assessment: Adequate family and caregiver support.        Resources List: Skilled Nursing Facility     Quality Of Family Relationships: supportive, involved  Transportation Available:  (to be determined)    ASSESSMENT  Cognitive Status:  alert and oriented    INTERVENTION   Met with pt and discussed therapy recommendation for his transfer to rehab facility on discharge. While hopeful of returning home on discharge, pt noted that he would be in agreement with going to rehab facility if that was determined in his best interest. Pt lives with his wife, reports that his wife has had cardia issues in the past so there would be limitations that she would have caring for him at home. He further noted that he and his wife have both been to [facility] in the past so he would like this facility considered, he also has awareness of [facility]. His daughters live in the [facility] area so he would also consider [facility]. Based on discussion, referrals have been made to these three facilities, pt has requested private room. Discussed with him the private room costs at the facilities ($38.40, $45, $40) which he acknowedges and accepts. Sw has noted in clinical documentation of planning for pt's discharge tomorrow.    PLAN  Financial costs for the patient includes: private room fees at rehab fcaility  Patient given options and choices for discharge: Yes   Patient/family is agreeable to the plan?  Yes:   Patient Goals and Preferences: independence with ambulation and ADLs  Patient anticipates discharging to: rehab facility[GJ1.1]           Revision History        User Key Date/Time User Provider Type Action     > GJ1.1 3/5/2018  2:48 PM Nessa Souza LSW  Sign                     Progress Notes - Physician (Notes for yesterday and today)      Progress Notes by Pramod Marin at 3/6/2018 10:20 AM     Author:  Pramod Marin Service:  (none) Author Type:  (none)    Filed:  3/6/2018 10:40 AM Date of Service:  3/6/2018 10:20 AM Creation Time:  3/6/2018 10:33 AM    Status:  Signed :  Pramod Marin         Pt ambulated in the hallway from his room to nursing station with walker gait belt and assistance of 1 person. Pt complained of right knee pain 4/10. RN notified[EK1.1]     Revision History        User Key Date/Time User Provider Type Action    > EK1.1 3/6/2018 10:40 AM Pramod Marin (none) Sign            Progress Notes by Tristen Zhao MD at 3/5/2018 10:49 PM     Author:  Tristen Zhao MD Service:  Orthopedics Author Type:  Physician    Filed:  3/5/2018 10:53 PM Date of Service:  3/5/2018 10:49 PM Creation Time:  3/5/2018 10:49 PM    Status:  Signed :  Tristen Zhao MD (Physician)         Ortho    Labs reviewed.  Aspiration of patient's right knee shows WBC count of 21,000 and crystals consistent with pseudogout.  No infection.  Would treat patient for pseudogout and discharge home vs rehab when appropriate.  No further workup needed.  No abx needed from ortho standpoint.  Would treat with anti-inflammatory such as indomethacin or prednisone - will leave up to primary team to decide.  Ok for full ROM and WBAT.  Knee immobilizer may help in short term with ambulation.      Please call with any questions.    Tristen Zhao MD[BB1.1]     Revision History        User Key Date/Time User Provider Type Action    > BB1.1 3/5/2018 10:53 PM Tristen Zhao MD Physician Sign            Progress Notes by Tracy Cardoso PA-C at 3/5/2018  4:05 PM     Author:  Tracy Cardoso PA-C Service:  Orthopedics Author Type:  Physician Assistant - C    Filed:  3/5/2018  4:13 PM Date of Service:   3/5/2018  4:05 PM Creation Time:  3/5/2018  4:05 PM    Status:  Signed :  Tracy Cardoso PA-C (Physician Assistant - C)         Louis Keller Jr.  Right knee effusion    S: Patient presents with a right knee effusion. MRI shows no tendon or ligament ruptures, no mensicus tearing. Minimal concern for infection, therefore orthopedics will aspirate the knee at this time to send fluid for culture. Patient resting comfortably in bed without complaints.     O: @[KN1.1]Temp: 98.1  F (36.7  C) Temp src: Oral BP: 128/75   Heart Rate: 93 Resp: 16 SpO2: 97 % O2 Device: None (Room air)[KN1.2]      No significant erythema about the right knee  Moderate knee effusion  Able to perform SLR  Passive knee ROM 0-45 with minimal discomfort  Active knee ROM 0-30  NVID    Labs:[KN1.1]   Hemoglobin   Date Value Ref Range Status   03/05/2018 12.5 (L) 13.3 - 17.7 g/dL Final[KN1.2]   ]    A:  Right knee effusion    P:  - aspiration of right knee fluid   -consent obtained from patient for right knee aspiration   -under sterile condition 45mL of bloody fluid was drained from the patient's right knee   -fluid mostly bloody with tinge of cloudiness   - will send synovial fluid for cell count, crystal, gram stain and culture   - if cell count and gram stain come back without concern for infection, likely okay for patient to discharge home and follow up as an outpatient to discuss arthritis treatment  - Dr. Zhao will follow results this evening, and will call to discuss treatment plan pending cell count and gram stain  - leave ace bandage at patient's knee for compression, okay to remove for showering  - ROM as tolerated by patient, knee immobilizer on while ambulating       Vaishali Cardoso PA-C  3/5/2018[KN1.1]       Revision History        User Key Date/Time User Provider Type Action    > KN1.2 3/5/2018  4:13 PM Tracy Cardoso PA-C Physician Assistant - C Sign     KN1.1 3/5/2018  4:05 PM Tracy Cardoso  JILLIAN De La Fuente Physician Assistant - CARLI                      Procedure Notes      Procedures by Tracy Cardoso PA-C at 3/5/2018  1:00 PM     Author:  Tracy Carodso PA-C Service:  Orthopedics Author Type:  Physician Assistant - C    Filed:  3/5/2018  4:26 PM Date of Service:  3/5/2018  1:00 PM Creation Time:  3/5/2018  4:13 PM    Status:  Signed :  Tracy Cardoso PA-C (Physician Assistant Almaz BOYCE)     Pre-procedure Diagnoses:    1. Knee effusion, right [M25.461]           Post-procedure Diagnoses:    1. Knee effusion, right [M25.461]           Procedures:    1. ASPIRATION NEEDLE KNEE [IPK3838 (Custom)]               Right knee aspiration.  This procedure has been fully reviewed with the patient and written informed consent has been obtained. We discussed the risks of infection, stiffness, bleeding and nerve injury. We discussed we believe the benefits of the procedure outweigh the risks. He expressed understanding and agreed.    Under sterile conditions the right knee was positioned and cleaned with betadine swabs. The right lateral knee was marked for the aspiration. 2cc of 1% lidocaine was injected into the right knee joint using the lateral parapatellar approach. We allowed the anesthetic a few minutes to set in, and then cleaned the knee again with betadine swabs. The same approach was then used and using an 18 gauge needle 45mL of bloody fluid was aspirated from the knee. The synovial fluid did have a slight cloudy appearance. Gauze was used to place pressure at the aspiration site. Minimal bleeding occurred. A bandaid was placed over the site, and a 6 inch ACE bandage was placed around the knee for compression.    The fluid was placed into a sterile container and appropriately labeled. It was sent as a STAT order to the lab for cell count, gram stain, crystal analysis and culture.     Vaishali Cardoso PA-C  March 5, 2018[KN1.1]         Revision History        User Key  Date/Time User Provider Type Action    > KN1.1 3/5/2018  4:26 PM Tracy Cardoso PA-C Physician Assistant - CARLI Sign                     Progress Notes - Therapies (Notes from 03/03/18 through 03/06/18)      Progress Notes by Pramod Marin at 3/6/2018 10:20 AM     Author:  Pramod Marin Service:  (none) Author Type:  (none)    Filed:  3/6/2018 10:40 AM Date of Service:  3/6/2018 10:20 AM Creation Time:  3/6/2018 10:33 AM    Status:  Signed :  Pramod Marin         Pt ambulated in the hallway from his room to nursing station with walker gait belt and assistance of 1 person. Pt complained of right knee pain 4/10. RN notified[EK1.1]     Revision History        User Key Date/Time User Provider Type Action    > EK1.1 3/6/2018 10:40 AM rPamod Marin (none) Sign            Progress Notes by Ryan Barfield PT at 3/4/2018  1:07 PM     Author:  Ryan Barfield PT Service:  (none) Author Type:  Physical Therapist    Filed:  3/4/2018  1:07 PM Date of Service:  3/4/2018  1:07 PM Creation Time:  3/4/2018  1:07 PM    Status:  Signed :  Ryan Barfield PT (Physical Therapist)          03/04/18 1030   Quick Adds   Type of Visit Initial PT Evaluation   Living Environment   Lives With spouse   Home Accessibility stairs to enter home;grab bars present (bathtub);grab bars present (toilet)   Number of Stairs to Enter Home 3  (garage, 2 steps to enter front door)   Number of Stairs Within Home 0   Stair Railings at Home outside, present at both sides  (railings present in garage, no railings present at front doo)   Living Environment Comment Pt's wife able to complete cooking/cleaning/etc as pt healing.   Self-Care   Usual Activity Tolerance moderate   Current Activity Tolerance fair   Regular Exercise yes   Activity/Exercise Type walking   Exercise Amount/Frequency 5-7 times/wk   Functional Level Prior   Ambulation 0-->independent   Transferring 0-->independent   Toileting 1-->assistive equipment   Bathing 1-->assistive  equipment   Dressing 0-->independent   Eating 0-->independent   Communication 0-->understands/communicates without difficulty   Swallowing 0-->swallows foods/liquids without difficulty   Cognition 0 - no cognition issues reported   Fall history within last six months yes   Number of times patient has fallen within last six months 1   Which of the above functional risks had a recent onset or change? ambulation   Prior Functional Level Comment Pt reports being modified independent with ADL/IADLs as baseline.   General Information   Onset of Illness/Injury or Date of Surgery - Date 03/03/18   Referring Physician Mamae Pack PA-C   Patient/Family Goals Statement Return home   Pertinent History of Current Problem (include personal factors and/or comorbidities that impact the POC) Pt presented to ED with Rt knee pain after attempting to step over a gap, stepped down on RLE and experienced onset of Rt knee pain and buckling leading to fall.  Bloody fluid was drained from knee; he continues to experience increased pain and difficulty with mobility.   Cognitive Status Examination   Orientation orientation to person, place and time   Level of Consciousness alert   Follows Commands and Answers Questions 100% of the time   Personal Safety and Judgment intact   Memory intact   Pain Assessment   Patient Currently in Pain Yes, see Vital Sign flowsheet   Integumentary/Edema   Integumentary/Edema Comments Rt knee wrap applied   Posture    Posture Forward head position;Protracted shoulders   Range of Motion (ROM)   ROM Comment LLE ROM is WNL, Rt knee flexion limited d/t pain   Strength   Strength Comments LLE strength is WNL, RLE unable to access d/t pain   Bed Mobility   Bed Mobility Comments Supine to sit with HOB elevated with Raquel for moving RLE to EOB   Transfer Skills   Transfer Comments Sit to stand to WW with close CGA , heavy UE use   Gait   Gait Comments Ambulation with WW x 5ft/2 reps, only placing minimal weight  "through RLE - vocalizing pain with attempts at placing partial weight through RLE.   Balance   Balance no deficits were identified   Clinical Impression   Criteria for Skilled Therapeutic Intervention yes, treatment indicated   PT Diagnosis Difficulty Walking   Influenced by the following impairments pain   Functional limitations due to impairments Decreased independence with functional mobility   Clinical Presentation Evolving/Changing   Clinical Presentation Rationale Patient's Rt knee pain requires ongoing care planning/decision making related to unclear discharge recommendations   Clinical Decision Making (Complexity) Low complexity   Therapy Frequency` daily   Predicted Duration of Therapy Intervention (days/wks) 2 days   Anticipated Discharge Disposition Home with Assist;Transitional Care Facility   Risk & Benefits of therapy have been explained Yes   Patient, Family & other staff in agreement with plan of care Yes   [facility] [employer]-PAC TM \"6 Clicks\"   2016, Trustees of [facility], under license to [employer].  All rights reserved.   6 Clicks Short Forms Basic Mobility Inpatient Short Form   [facility] AM-PAC  \"6 Clicks\" V.2 Basic Mobility Inpatient Short Form   1. Turning from your back to your side while in a flat bed without using bedrails? 3 - A Little   2. Moving from lying on your back to sitting on the side of a flat bed without using bedrails? 3 - A Little   3. Moving to and from a bed to a chair (including a wheelchair)? 3 - A Little   4. Standing up from a chair using your arms (e.g., wheelchair, or bedside chair)? 3 - A Little   5. To walk in hospital room? 2 - A Lot   6. Climbing 3-5 steps with a railing? 1 - Total   Basic Mobility Raw Score (Score out of 24.Lower scores equate to lower levels of function) 15   Total Evaluation Time   Total Evaluation Time (Minutes) 18[KN1.1]        Revision History        User Key Date/Time User Provider Type Action    > KN1.1 3/4/2018  1:07 " PM Ryan Barfield, PT Physical Therapist Sign                                                      INTERAGENCY TRANSFER FORM - LAB / IMAGING / EKG / EMG RESULTS   3/3/2018                       [facility] OBSERVATION DEPARTMENT: [phone]            Unresulted Labs (24h ago through future)    Start       Ordered    03/04/18 0600  INR  (warfarin (COUMADIN) Pharmacy Consult-INITIAL ORDER)  DAILY,   Routine      03/03/18 2151         Lab Results - 3 Days      Glucose by meter [[id number]] (Abnormal)  Resulted: 03/06/18 1152, Result status: Final result    Ordering provider: Candie Vivas, DO  03/06/18 1140 Resulting lab: POINT OF CARE TEST, GLUCOSE    Specimen Information    Type Source Collected On     03/06/18 1140          Components       Value Reference Range Flag Lab   Glucose 202 70 - 99 mg/dL H 170            Glucose by meter [[id number]] (Abnormal)  Resulted: 03/06/18 0825, Result status: Final result    Ordering provider: Candie Vivas,   03/06/18 0813 Resulting lab: POINT OF CARE TEST, GLUCOSE    Specimen Information    Type Source Collected On     03/06/18 0813          Components       Value Reference Range Flag Lab   Glucose 150 70 - 99 mg/dL H 170            Anaerobic bacterial culture [[id number]]  Resulted: 03/06/18 0823, Result status: Preliminary result    Ordering provider: Nacho Souza APRN CNP  03/05/18 1349 Resulting lab: [facility]    Specimen Information    Type Source Collected On   Right Knee Knee, Right 03/05/18 1325   Comment:  Joint fluid          Components       Value Reference Range Flag Lab   Specimen Description Right Knee Joint fluid      Special Requests Received in anaerobic tubes.   75   Culture Micro Culture negative monitoring continues   225            Basic metabolic panel [[id number]] (Abnormal)  Resulted: 03/06/18 0648, Result status: Final result    Ordering provider: Sunitha Hoffman PA-C   03/06/18 0000 Resulting lab: [facility]    Specimen Information    Type Source Collected On   Blood  03/06/18 0624          Components       Value Reference Range Flag Lab   Sodium 132 133 - 144 mmol/L L FrRdHs   Potassium 4.0 3.4 - 5.3 mmol/L  FrRdHs   Chloride 101 94 - 109 mmol/L  FrRdHs   Carbon Dioxide 25 20 - 32 mmol/L  FrRdHs   Anion Gap 6 3 - 14 mmol/L  FrRdHs   Glucose 156 70 - 99 mg/dL H FrRdHs   Urea Nitrogen 16 7 - 30 mg/dL  FrRdHs   Creatinine 0.77 0.66 - 1.25 mg/dL  FrRdHs   GFR Estimate >90 >60 mL/min/1.7m2  FrRdHs   Comment:  Non  GFR Calc   GFR Estimate If Black >90 >60 mL/min/1.7m2  FrRdHs   Comment:  African American GFR Calc   Calcium 8.5 8.5 - 10.1 mg/dL  FrRdHs            INR [[id number]] (Abnormal)  Resulted: 03/06/18 0640, Result status: Final result    Ordering provider: Tata Souza PA-C  03/06/18 0000 Resulting lab: [facility]    Specimen Information    Type Source Collected On   Blood  03/06/18 0624          Components       Value Reference Range Flag Lab   INR 2.54 0.86 - 1.14 H FrRdHs            CBC with platelets [[id number]] (Abnormal)  Resulted: 03/06/18 0633, Result status: Final result    Ordering provider: Sunitha Hoffman PA-C  03/06/18 0000 Resulting lab: [facility]    Specimen Information    Type Source Collected On   Blood  03/06/18 0624          Components       Value Reference Range Flag Lab   WBC 8.1 4.0 - 11.0 10e9/L  FrRdHs   RBC Count 3.68 4.4 - 5.9 10e12/L L FrRdHs   Hemoglobin 11.1 13.3 - 17.7 g/dL L FrRdHs   Hematocrit 33.7 40.0 - 53.0 % L FrRdHs   MCV 92 78 - 100 fl  FrRdHs   MCH 30.2 26.5 - 33.0 pg  FrRdHs   MCHC 32.9 31.5 - 36.5 g/dL  FrRdHs   RDW 14.1 10.0 - 15.0 %  FrRdHs   Platelet Count 170 150 - 450 10e9/L  [facility]            Glucose by meter [[id number]] (Abnormal)  Resulted: 03/06/18 0627, Result status: Final result    Ordering provider: Candie Vivas DO  03/06/18 0615 Resulting  lab: POINT OF CARE TEST, GLUCOSE    Specimen Information    Type Source Collected On     03/06/18 0615          Components       Value Reference Range Flag Lab   Glucose 157 70 - 99 mg/dL H 170            Blood culture [[id number]]  Resulted: 03/06/18 0122, Result status: Preliminary result    Ordering provider: Tata Souza PA-C  03/03/18 2239 Resulting lab: [facility]    Specimen Information    Type Source Collected On   Blood  03/03/18 2246   Comment:  Left Hand          Components       Value Reference Range Flag Lab   Specimen Description Blood Left Hand      Special Requests Aerobic and anaerobic bottles received   75   Culture Micro No growth after 2 days   75            Blood culture [[id number]]  Resulted: 03/06/18 0122, Result status: Preliminary result    Ordering provider: Tata Souza PA-C  03/03/18 2239 Resulting lab: [facility]    Specimen Information    Type Source Collected On   Blood  03/03/18 2243   Comment:  Left Arm          Components       Value Reference Range Flag Lab   Specimen Description Blood Left Arm      Special Requests Aerobic and anaerobic bottles received   [facility]   Culture Micro No growth after 2 days   75            Glucose by meter [[id number]] (Abnormal)  Resulted: 03/05/18 2107, Result status: Final result    Ordering provider: Candie Vivas DO  03/05/18 2053 Resulting lab: POINT OF CARE TEST, GLUCOSE    Specimen Information    Type Source Collected On     03/05/18 2053          Components       Value Reference Range Flag Lab   Glucose 208 70 - 99 mg/dL H 170            Crystal ID synovial fluid [[id number]] (Abnormal)  Resulted: 03/05/18 2031, Result status: Final result    Ordering provider: Nacho Souza APRN CNP  03/05/18 1345 Resulting lab: [facility]    Specimen Information    Type Source Collected On   Synovial fluid Knee, Right 03/05/18 1325           Components       Value Reference Range Flag Lab   Crystal Analysis -- NOCRYS^No clincally significant crystals seen. A FrStHsLb   Result:         Positive for intracellular crystals, consistent with calcium pyrophosphate dihydrate   crystals.              Gram stain [[id number]]  Resulted: 03/05/18 1854, Result status: Final result    Ordering provider: Nacho Souza APRN CNP  03/05/18 1345 Resulting lab: [facility]    Specimen Information    Type Source Collected On   Right Knee  03/05/18 1325   Comment:  Joint fluid          Components       Value Reference Range Flag Lab   Specimen Description Right Knee Joint fluid      Gram Stain No organisms seen   226   Gram Stain --   226   Result:         Moderate  WBC'S seen  PMNs seen     Gram Stain too bloody for a cytospin   226   Result:     Gram Stain --   226   Result:         Gram stain review consistent with reported results.  Gram stain slide reviewed at the [facility]              Cell count with differential fluid [[id number]]  Resulted: 03/05/18 1813, Result status: Final result    Ordering provider: Nacho Souza APRN CNP  03/05/18 1345 Resulting lab: [facility]    Specimen Information    Type Source Collected On   Synovial fluid Knee, Right 03/05/18 1325          Components       Value Reference Range Flag Lab   Body Fluid Analysis Source Synovial fluid   FrRdHs   % Neutrophils Fluid 91 %  FrRdHs   % Lymphocytes Fluid 1 %  FrRdHs   % Mono/Macro Fluid 8 %  FrRdHs   Color Fluid Red   FrRdHs   Appearance Fluid Cloudy   FrRdHs   WBC Fluid 40442 /uL  FrRdHs   Comment:         Specimen not received in EDTA. Results may be affected.  No reference ranges have been established.  This result should be interpreted   in the context of the patient's clinical condition and compared to   simultaneous measurement in the patient's blood.  Refer to Lab Guide for   specific interpretive  guidelines.              Glucose by meter [[id number]] (Abnormal)  Resulted: 03/05/18 1743, Result status: Final result    Ordering provider: Candie Vivas DO  03/05/18 1729 Resulting lab: POINT OF CARE TEST, GLUCOSE    Specimen Information    Type Source Collected On     03/05/18 1729          Components       Value Reference Range Flag Lab   Glucose 184 70 - 99 mg/dL H 170            Fluid Culture Aerobic Bacterial [[id number]]  Resulted: 03/05/18 1719, Result status: Preliminary result    Ordering provider: Nacho Souza APRN CNP  03/05/18 1345 Resulting lab: [facility]    Specimen Information    Type Source Collected On   Right Knee Knee, Right 03/05/18 1325   Comment:  Joint fluid          Components       Value Reference Range Flag Lab   Specimen Description Right Knee Joint fluid      Culture Micro PENDING               WBC Differential [[id number]] (Abnormal)  Resulted: 03/05/18 1406, Result status: Final result    Ordering provider: Sunitha Hoffman PA-C  03/05/18 1006 Resulting lab: [facility]    Specimen Information    Type Source Collected On     03/05/18 1006          Components       Value Reference Range Flag Lab   Diff Method Automated Method   FrRdHs   % Neutrophils 82.0 %  FrRdHs   % Lymphocytes 7.3 %  FrRdHs   % Monocytes 9.4 %  FrRdHs   % Eosinophils 0.4 %  FrRdHs   % Basophils 0.4 %  FrRdHs   % Immature Granulocytes 0.5 %  FrRdHs   Nucleated RBCs 0 0 /100  FrRdHs   Absolute Neutrophil 8.6 1.6 - 8.3 10e9/L H FrRdHs   Absolute Lymphocytes 0.8 0.8 - 5.3 10e9/L  FrRdHs   Absolute Monocytes 1.0 0.0 - 1.3 10e9/L  FrRdHs   Absolute Eosinophils 0.0 0.0 - 0.7 10e9/L  FrRdHs   Absolute Basophils 0.0 0.0 - 0.2 10e9/L  FrRdHs   Abs Immature Granulocytes 0.1 0 - 0.4 10e9/L  FrRdHs   Absolute Nucleated RBC 0.0   FrRdHs            Glucose by meter [[id number]] (Abnormal)  Resulted: 03/05/18 1228, Result status: Final result    Ordering provider: Mike Boykin MD  03/05/18 1213  Resulting lab: POINT OF CARE TEST, GLUCOSE    Specimen Information    Type Source Collected On     03/05/18 1213          Components       Value Reference Range Flag Lab   Glucose 183 70 - 99 mg/dL H 170            Basic metabolic panel [[id number]] (Abnormal)  Resulted: 03/05/18 1032, Result status: Final result    Ordering provider: Sunitha Hoffman PA-C  03/05/18 1006 Resulting lab: [facility]    Specimen Information    Type Source Collected On     03/05/18 1006          Components       Value Reference Range Flag Lab   Sodium 133 133 - 144 mmol/L  FrRdHs   Potassium 4.1 3.4 - 5.3 mmol/L  FrRdHs   Chloride 100 94 - 109 mmol/L  FrRdHs   Carbon Dioxide 27 20 - 32 mmol/L  FrRdHs   Anion Gap 6 3 - 14 mmol/L  FrRdHs   Glucose 190 70 - 99 mg/dL H FrRdHs   Urea Nitrogen 15 7 - 30 mg/dL  FrRdHs   Creatinine 0.91 0.66 - 1.25 mg/dL  FrRdHs   GFR Estimate 79 >60 mL/min/1.7m2  FrRdHs   Comment:  Non  GFR Calc   GFR Estimate If Black >90 >60 mL/min/1.7m2  FrRdHs   Comment:  African American GFR Calc   Calcium 9.0 8.5 - 10.1 mg/dL  FrRdHs            CBC with platelets [[id number]] (Abnormal)  Resulted: 03/05/18 1015, Result status: Final result    Ordering provider: Sunitha Hoffman PA-C  03/05/18 0721 Resulting lab: [facility]    Specimen Information    Type Source Collected On   Blood  03/05/18 1006          Components       Value Reference Range Flag Lab   WBC 10.8 4.0 - 11.0 10e9/L  FrRdHs   RBC Count 4.09 4.4 - 5.9 10e12/L L FrRdHs   Hemoglobin 12.5 13.3 - 17.7 g/dL L FrRdHs   Hematocrit 38.1 40.0 - 53.0 % L FrRdHs   MCV 93 78 - 100 fl  FrRdHs   MCH 30.6 26.5 - 33.0 pg  FrRdHs   MCHC 32.8 31.5 - 36.5 g/dL  FrRdHs   RDW 14.3 10.0 - 15.0 %  FrRdHs   Platelet Count 171 150 - 450 10e9/L  FrRdHs            Glucose by meter [[id number]] (Abnormal)  Resulted: 03/05/18 0835, Result status: Final result    Ordering provider: Mike Boykin MD  03/05/18 0756 Resulting lab: POINT OF  CARE TEST, GLUCOSE    Specimen Information    Type Source Collected On     03/05/18 0756          Components       Value Reference Range Flag Lab   Glucose 112 70 - 99 mg/dL H 170            INR [[id number]] (Abnormal)  Resulted: 03/05/18 0700, Result status: Final result    Ordering provider: Tata Souza PA-C  03/05/18 0000 Resulting lab: [facility]    Specimen Information    Type Source Collected On   Blood  03/05/18 0630          Components       Value Reference Range Flag Lab   INR 3.06 0.86 - 1.14 H FrRdHs            Glucose by meter [[id number]] (Abnormal)  Resulted: 03/05/18 0235, Result status: Final result    Ordering provider: Mike Boykin MD  03/05/18 0221 Resulting lab: POINT OF CARE TEST, GLUCOSE    Specimen Information    Type Source Collected On     03/05/18 0221          Components       Value Reference Range Flag Lab   Glucose 141 70 - 99 mg/dL H 170            Glucose by meter [[id number]] (Abnormal)  Resulted: 03/04/18 2212, Result status: Final result    Ordering provider: Mike Boykin MD  03/04/18 2158 Resulting lab: POINT OF CARE TEST, GLUCOSE    Specimen Information    Type Source Collected On     03/04/18 2158          Components       Value Reference Range Flag Lab   Glucose 179 70 - 99 mg/dL H 170            Glucose by meter [[id number]] (Abnormal)  Resulted: 03/04/18 1635, Result status: Final result    Ordering provider: Mike Boykin MD  03/04/18 1609 Resulting lab: POINT OF CARE TEST, GLUCOSE    Specimen Information    Type Source Collected On     03/04/18 1609          Components       Value Reference Range Flag Lab   Glucose 221 70 - 99 mg/dL H 170            UA with Microscopic reflex to Culture [[id number]] (Abnormal)  Resulted: 03/04/18 1008, Result status: Final result    Ordering provider: Maame Pack PA-C  03/04/18 0734 Resulting lab: [facility]    Specimen Information    Type Source Collected On   Midstream Urine  Urine 03/04/18 0945          Components       Value Reference Range Flag Lab   Color Urine Yellow   FrRdHs   Appearance Urine Clear   FrRdHs   Glucose Urine 50 NEG^Negative mg/dL A FrRdHs   Bilirubin Urine Negative NEG^Negative  FrRdHs   Ketones Urine Negative NEG^Negative mg/dL  FrRdHs   Specific Gravity Urine 1.012 1.003 - 1.035  FrRdHs   Blood Urine Small NEG^Negative A FrRdHs   pH Urine 6.0 5.0 - 7.0 pH  FrRdHs   Protein Albumin Urine Negative NEG^Negative mg/dL  FrRdHs   Urobilinogen mg/dL 0.0 0.0 - 2.0 mg/dL  FrRdHs   Nitrite Urine Negative NEG^Negative  FrRdHs   Leukocyte Esterase Urine Negative NEG^Negative  FrRdHs   Source Midstream Urine   FrRdHs   WBC Urine <1 0 - 5 /HPF  FrRdHs   RBC Urine 4 0 - 2 /HPF H FrRdHs   Mucous Urine Present NEG^Negative /LPF A FrRdHs            INR [[id number]] (Abnormal)  Resulted: 03/04/18 0631, Result status: Final result    Ordering provider: Tata Souza PA-C  03/04/18 0000 Resulting lab: [facility]    Specimen Information    Type Source Collected On   Blood  03/04/18 0607          Components       Value Reference Range Flag Lab   INR 2.34 0.86 - 1.14 H FrRdHs            CBC with platelets differential [[id number]] (Abnormal)  Resulted: 03/04/18 0622, Result status: Final result    Ordering provider: Tata Souza PA-C  03/04/18 0000 Resulting lab: [facility]    Specimen Information    Type Source Collected On   Blood  03/04/18 0607          Components       Value Reference Range Flag Lab   WBC 10.2 4.0 - 11.0 10e9/L  FrRdHs   RBC Count 3.71 4.4 - 5.9 10e12/L L FrRdHs   Hemoglobin 11.4 13.3 - 17.7 g/dL L FrRdHs   Hematocrit 34.3 40.0 - 53.0 % L FrRdHs   MCV 93 78 - 100 fl  FrRdHs   MCH 30.7 26.5 - 33.0 pg  FrRdHs   MCHC 33.2 31.5 - 36.5 g/dL  FrRdHs   RDW 14.3 10.0 - 15.0 %  FrRdHs   Platelet Count 157 150 - 450 10e9/L  FrRdHs   Diff Method Automated Method   FrRdHs   % Neutrophils 72.5 %  FrRdHs   % Lymphocytes 10.8 %   FrRdHs   % Monocytes 15.4 %  FrRdHs   % Eosinophils 0.3 %  FrRdHs   % Basophils 0.5 %  FrRdHs   % Immature Granulocytes 0.5 %  FrRdHs   Nucleated RBCs 0 0 /100  FrRdHs   Absolute Neutrophil 7.4 1.6 - 8.3 10e9/L  FrRdHs   Absolute Lymphocytes 1.1 0.8 - 5.3 10e9/L  FrRdHs   Absolute Monocytes 1.6 0.0 - 1.3 10e9/L H FrRdHs   Absolute Eosinophils 0.0 0.0 - 0.7 10e9/L  FrRdHs   Absolute Basophils 0.1 0.0 - 0.2 10e9/L  FrRdHs   Abs Immature Granulocytes 0.1 0 - 0.4 10e9/L  FrRdHs   Absolute Nucleated RBC 0.0   FrRdHs            Lactic acid whole blood [[id number]]  Resulted: 03/03/18 2259, Result status: Final result    Ordering provider: Tata Souza PA-C  03/03/18 2226 Resulting lab: [facility]    Specimen Information    Type Source Collected On   Blood  03/03/18 2243          Components       Value Reference Range Flag Lab   Lactic Acid 1.3 0.7 - 2.0 mmol/L  FrRdHs            Basic metabolic panel [[id number]] (Abnormal)  Resulted: 03/03/18 2006, Result status: Final result    Ordering provider: Jaime Decker DO  03/03/18 1908 Resulting lab: [facility]    Specimen Information    Type Source Collected On   Blood  03/03/18 1925          Components       Value Reference Range Flag Lab   Sodium 133 133 - 144 mmol/L  FrRdHs   Potassium 4.1 3.4 - 5.3 mmol/L  FrRdHs   Chloride 101 94 - 109 mmol/L  FrRdHs   Carbon Dioxide 24 20 - 32 mmol/L  FrRdHs   Anion Gap 8 3 - 14 mmol/L  FrRdHs   Glucose 183 70 - 99 mg/dL H FrRdHs   Urea Nitrogen 14 7 - 30 mg/dL  FrRdHs   Creatinine 0.81 0.66 - 1.25 mg/dL  FrRdHs   GFR Estimate >90 >60 mL/min/1.7m2  FrRdHs   Comment:  Non  GFR Calc   GFR Estimate If Black >90 >60 mL/min/1.7m2  FrRdHs   Comment:  African American GFR Calc   Calcium 9.2 8.5 - 10.1 mg/dL  FrRdHs            CBC with platelets differential [[id number]] (Abnormal)  Resulted: 03/03/18 1948, Result status: Final result    Ordering provider: Jaime Decker DO   03/03/18 1908 Resulting lab: [facility]    Specimen Information    Type Source Collected On   Blood  03/03/18 1925          Components       Value Reference Range Flag Lab   WBC 11.0 4.0 - 11.0 10e9/L  FrRdHs   RBC Count 4.21 4.4 - 5.9 10e12/L L FrRdHs   Hemoglobin 12.8 13.3 - 17.7 g/dL L FrRdHs   Hematocrit 38.9 40.0 - 53.0 % L FrRdHs   MCV 92 78 - 100 fl  FrRdHs   MCH 30.4 26.5 - 33.0 pg  FrRdHs   MCHC 32.9 31.5 - 36.5 g/dL  FrRdHs   RDW 14.2 10.0 - 15.0 %  FrRdHs   Platelet Count 165 150 - 450 10e9/L  FrRdHs   Diff Method Automated Method   FrRdHs   % Neutrophils 74.9 %  FrRdHs   % Lymphocytes 9.4 %  FrRdHs   % Monocytes 14.7 %  FrRdHs   % Eosinophils 0.2 %  FrRdHs   % Basophils 0.4 %  FrRdHs   % Immature Granulocytes 0.4 %  FrRdHs   Nucleated RBCs 0 0 /100  FrRdHs   Absolute Neutrophil 8.3 1.6 - 8.3 10e9/L  FrRdHs   Absolute Lymphocytes 1.0 0.8 - 5.3 10e9/L  FrRdHs   Absolute Monocytes 1.6 0.0 - 1.3 10e9/L H FrRdHs   Absolute Eosinophils 0.0 0.0 - 0.7 10e9/L  FrRdHs   Absolute Basophils 0.0 0.0 - 0.2 10e9/L  FrRdHs   Abs Immature Granulocytes 0.0 0 - 0.4 10e9/L  FrRdHs   Absolute Nucleated RBC 0.0   FrRdHs            INR [[id number]] (Abnormal)  Resulted: 03/03/18 1550, Result status: Final result    Ordering provider: Jaime Decker DO  03/03/18 1419 Resulting lab: [facility]    Specimen Information    Type Source Collected On   Blood  03/03/18 1510          Components       Value Reference Range Flag Lab   INR 2.12 0.86 - 1.14 H FrRdHs   Comment:  Reviewed, acceptable            Testing Performed By     Lab - Abbreviation Name Director Address Valid Date Range    12 - FrRdHs [facility] Unknown [address] [facility] [id number] 05/08/15 1057 - Present    14 - FrStHsLb [facility] Unknown [address] [place] [address] 05/08/15 1057 - Present    45 - [address] Unknown Unknown 01/28/02 0000 - Present    75 - Unknown [facility] Unknown [address] [facility] [address] 01/15/15 1019 - Present    170 - Unknown POINT OF CARE TEST, GLUCOSE Unknown Unknown 10/31/11 1114 - Present    225 - Unknown INFECTIOUS DISEASE DIAGNOSTIC LABORATORY Unknown [address] [facility] [address] 12/19/14 0954 - Present    226 - Unknown MICRO RAPID TESTING LAB Unknown [address] [facility] [address] 12/19/14 0955 - Present               Imaging Results - 3 Days      MR Knee Right w/o Contrast [[id number]]  Resulted: 03/05/18 0937, Result status: Final result    Ordering provider: Nacho Souza APRN CNP  03/05/18 0824 Resulted by: Jaime Driscoll MD    Performed: 03/05/18 0859 - 03/05/18 0926 Resulting lab: RADIOLOGY RESULTS    Narrative:       MR KNEE RIGHT WITHOUT CONTRAST March 5, 2018 9:26 AM    HISTORY: Left knee pain since 3/2/2018 following an injury. The  concern is for a quadriceps tendon rupture or other ligamentous  injury.    COMPARISON: Radiographs and a CT on 3/3/2018.    TECHNIQUE: Multiplanar MR imaging was performed without contrast.    FINDINGS:     Medial Meniscus: No tear, displaced fragment, or extrusion.      Lateral Meniscus: There is mild diffuse increased signal throughout  the periphery of the meniscus consistent with myxoid degeneration. No  signal contact the articular surface with no evidence of a tear.     Anterior Cruciate Ligament: Unremarkable.     Posterior Cruciate Ligament: Unremarkable.     Medial Collateral Ligament: Unremarkable.    Lateral Collateral Ligament Complex, Popliteus Tendon: The iliotibial  band, fibular collateral ligament, biceps femoris tendon, and  popliteus tendon are unremarkable.    Osseous and Cartilaginous Structures: No fracture or osseous lesion is  demonstrated. No abnormal marrow signal intensity is identified. There  appears to be only mild grade 2 chondromalacia throughout both the  medial and lateral compartments with no focal chondral  defects seen in  these areas.     Extensor Mechanism: The quadriceps and patellar tendons are  unremarkable. The medial and lateral patellar retinacula appear  unremarkable.    Joint Space: Large joint effusion. No definite loose bodies  appreciated. There appears to be a suprapatellar plica.    Additional Findings: There is a few seem diameter Baker's cyst. No  semimembranosus-tibial collateral ligament or pes anserine bursitis.  No adjacent soft tissue pathology is seen.      Impression:       IMPRESSION:   1. No quadriceps tendon or ligamentous injury is seen.  2. Advanced degenerative changes of the patellofemoral joint with mild  chondromalacia elsewhere in the knee.  3. Suprapatellar plica. Calcifications seen in this area on the CT are  not definitely identified on the MRI. Those calcifications could be  along the plica or synovial lining.  4. Large joint effusion and a several cm diameter Lovelace's cyst.    KALPESH CLARK MD      CT Knee Right w/o Contrast [[id number]]  Resulted: 03/03/18 2203, Result status: Final result    Ordering provider: Kalpesh Decker DO  03/03/18 1614 Resulted by: Marco Jaime MD    Performed: 03/03/18 1646 - 03/03/18 1652 Resulting lab: RADIOLOGY RESULTS    Narrative:       CT KNEE RIGHT WITHOUT CONTRAST 3/3/2018 4:52 PM     HISTORY: Right knee pain, possible lateral femoral condyle fracture.    TECHNIQUE: Axial scans were performed through the right knee with  sagittal and coronal reconstruction. Radiation dose for this scan was  reduced using automated exposure control, adjustment of the mA and/or  kV according to patient size, or iterative reconstruction technique.    COMPARISON: X-ray from 3/3/2018.    FINDINGS: No evidence of acute fracture or subluxation. Moderate  marginal osteophyte formation lateral femoral condyle. Mild marginal  osteophyte formation medial femoral condyle some of which project into  the femoral notch. Moderate patellofemoral degenerative  changes.    There is a moderate to large joint effusion with some patchy somewhat  linear calcification within the joint effusion that may be synovial  calcification. It does not have the typical appearance of loose bodies  but  difficult to exclude some of this appearance being related to  loose bodies.      Impression:       IMPRESSION:  1. No evidence of acute fracture.  2. Unusual calcifications in the suprapatellar region within a joint  effusion that could represent synovial calcification. Loose bodies  unlikely but could potentially contribute to this appearance. Some  unusual type of synovial chondromatosis or chondrocalcinosis could  result in this appearance as well.  3. Tricompartmental degenerative changes.      KAYLA VALLEJO MD      POC US SOFT TISSUE [[id number]]  Resulted: 03/03/18 1749, Result status: Final result    Ordering provider: Jaime Decker DO  03/03/18 1749 Performed: 03/03/18 1749 - 03/03/18 1749    Resulting lab: RADIANT POCUS      Impression:       [facility] Procedure Note     Limited Bedside ED Ultrasound of Soft Tissue:    PROCEDURE: PERFORMED BY: Dr. Jaime Decker  INDICATIONS/SYMPTOM: Right knee swelling  PROBE: High frequency linear probe  BODY LOCATION: Soft tissue located on right knee     FINDINGS: Cobblestoning of soft tissue: absent   Hypoechoic fluid (ie abscess) identified: present    US utilized to access fluid pocket with needle  INTERPRETATION:  The soft tissue and muscle layers were evaluated.      Findings indicate hemarthrosis    IMAGE DOCUMENTATION: Images were archived to PACs system.        XR Shoulder Left G/E 3 Views [[id number]]  Resulted: 03/03/18 1523, Result status: Final result    Ordering provider: Jaime Decker DO  03/03/18 1419 Resulted by: Alexander Quijano MD    Performed: 03/03/18 1452 - 03/03/18 1453 Resulting lab: RADIOLOGY RESULTS    Narrative:       LEFT SHOULDER THREE OR MORE VIEWS  3/3/2018 2:53 PM     HISTORY: Fall,  left shoulder pain.      Impression:       IMPRESSION: Three views of the left shoulder are performed. No  evidence of fracture or dislocation. Acromioclavicular joint appears  intact radiographically.         ALIZA TRAN MD      Ankle XR, G/E 3 views, right [[id number]]  Resulted: 03/03/18 1504, Result status: Final result    Ordering provider: Jaime Decker,   03/03/18 1419 Resulted by: Jovon Solomon MD    Performed: 03/03/18 1452 - 03/03/18 1452 Resulting lab: RADIOLOGY RESULTS    Narrative:       ANKLE THREE VIEWS RIGHT  3/3/2018 2:52 PM     HISTORY: fall, ankle pain;     COMPARISON: None.    FINDINGS: There is normal osseous alignment.  No acute fractures are  identified.  There is deformity of the distal fibula. This appears to  be due to a healed fracture.      Impression:       IMPRESSION: No definite acute fractures are identified. The deformity  of the distal fibula appears to be due to a healed fracture.    SHANELL SOLOMON MD      Knee XR, 3 views, right [[id number]]  Resulted: 03/03/18 1504, Result status: Final result    Ordering provider: Jaime Decker,   03/03/18 1419 Resulted by: Jovon Solomon MD    Performed: 03/03/18 1451 - 03/03/18 1452 Resulting lab: RADIOLOGY RESULTS    Narrative:       KNEE THREE VIEWS RIGHT  3/3/2018 2:52 PM     HISTORY: fall, right knee pain/swelling;     COMPARISON: None.    FINDINGS: Degenerative changes seen in the patellofemoral compartment.  There is a degenerative spur off the lateral joint compartment. A knee  joint effusion appears to be present. There are calcific densities in  the suprapatellar region which may represent joint loose bodies..      Impression:       IMPRESSION:   1. No fractures are identified.  2. Degenerative change with Chondrocalcinosis.  3. Knee joint effusion. Question joint loose bodies.    SHANELL SOLOMON MD      Testing Performed By     Lab - Abbreviation Name Director Address Valid Date Range    104 - Rad Rslts  RADIOLOGY RESULTS Unknown Unknown 02/16/05 1553 - Present    1735 - RADIANT POCUS RADIANT POCUS Unknown Unknown 05/12/15 0832 - Present            Encounter-Level Documents:     There are no encounter-level documents.      Order-Level Documents:     There are no order-level documents.

## 2018-03-03 NOTE — ED NOTES
Patient arrived at 13:30 complaining of right knee pain after a mechanical fall yesterday. Patient denies hitting head or loss of consciousness after fall. Unable to bear weight on right knee. Has been using a walker to get around but pain has been too intense so he wishes to be seen. Localized swelling of knee noted. CMS intact. ABCs intact. Alert and oriented X4. Oriented to room and call light.

## 2018-03-03 NOTE — IP AVS SNAPSHOT
"    [facility] OBSERVATION DEPARTMENT: [phone]                                              INTERAGENCY TRANSFER FORM - PHYSICIAN ORDERS   3/3/2018                    Hospital Admission Date: 3/3/2018  KATHY LOPEZ JR.   : 1932  Sex: Male        Attending Provider: Candie Vivas DO     Allergies:  No Known Drug Allergies    Infection:  None   Service:  Observation    Ht:  1.803 m (5' 11\")   Wt:  86.2 kg (190 lb)   Admission Wt:  86.2 kg (190 lb)    BMI:  26.5 kg/m 2   BSA:  2.08 m 2            Patient PCP Information     Provider PCP Type    Joselito Armas MD General      ED Clinical Impression     Diagnosis Description Comment Added By Time Added    Knee effusion, right [M25.461] Knee effusion, right [M25.461]  Jaime Decker DO 3/3/2018  6:17 PM      Hospital Problems as of 3/6/2018              Priority Class Noted POA    Knee effusion, right Medium  3/3/2018 Yes    Joint effusion of knee Medium  3/5/2018 Yes      Non-Hospital Problems as of 3/6/2018              Priority Class Noted    Family history of malignant neoplasm of gastrointestinal tract Medium  2004    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) High  2011    Chronic anticoagulation High  2011    Hyperlipidemia LDL goal <100 Medium  2012    Fatigue Medium  2012    Seasonal allergic rhinitis Medium  2012    Knee pain Medium  2012    Irritable bowel syndrome with diarrhea Medium  2012    Advanced directives, counseling/discussion Low  2012    HL (hearing loss) Low  2012    Health Care Home Low  2012    Total knee replacement status: Left 13 Medium  2013    Anemia due to blood loss, acute Medium  2013    Physical deconditioning Medium  2013    Diabetes mellitus, type 2 (H) Medium  Unknown    Atrial fibrillation (H) Medium  Unknown    QUESADA (dyspnea on exertion) Medium  Unknown    Diastolic murmur Medium  Unknown    Pain of " right thigh Medium  9/28/2015    Long-term (current) use of anticoagulants [Z79.01] Medium  3/31/2016      Code Status History     Date Active Date Inactive Code Status Order ID Comments User Context    3/6/2018 10:26 AM  Full Code [id number]  Mike Boykin MD Outpatient    3/4/2018  1:05 PM 3/6/2018 10:26 AM Full Code [id number]  Maame Pack PA-C Inpatient    8/5/2013  2:02 PM 8/8/2013  3:03 PM Full Code [id number]  Sherlyn Souza RN Inpatient         Medication Review      START taking        Dose / Directions Comments    colchicine 0.6 MG tablet   Used for:  Pseudogout        Dose:  0.6 mg   Take 1 tablet (0.6 mg) by mouth 2 times daily for 7 days Then, re-assess with physician   Quantity:  3 tablet   Refills:  0        naproxen 250 MG tablet   Commonly known as:  NAPROSYN        Dose:  250 mg   Take 1 tablet (250 mg) by mouth 2 times daily (with meals) for 7 days After 7 days, re-assess with a physician before continuing   Refills:  0        oxyCODONE IR 5 MG tablet   Commonly known as:  ROXICODONE        Dose:  2.5-5 mg   Take 0.5-1 tablets (2.5-5 mg) by mouth every 6 hours as needed for moderate to severe pain   Quantity:  18 tablet   Refills:  0        polyethylene glycol Packet   Commonly known as:  MIRALAX/GLYCOLAX   Used for:  Constipation, unspecified constipation type        Dose:  17 g   Take 17 g by mouth daily as needed for constipation   Quantity:  7 packet   Refills:  0        senna-docusate 8.6-50 MG per tablet   Commonly known as:  SENOKOT-S;PERICOLACE   Used for:  Constipation, unspecified constipation type        Dose:  1 tablet   Take 1 tablet by mouth 2 times daily as needed for constipation   Quantity:  100 tablet   Refills:  0          CONTINUE these medications which have NOT CHANGED        Dose / Directions Comments    ACE NOT PRESCRIBED (INTENTIONAL)   Used for:  Type 2 diabetes, HbA1C goal < 8% (H)        Dose:  1 each   1 each daily ACE Inhibitor not prescribed due  to Risk for drug interaction   Quantity:  0 each   Refills:  0        acetaminophen 500 MG tablet   Commonly known as:  TYLENOL        Dose:  500 mg   Take 500 mg by mouth every 6 hours as needed   Refills:  0        ASPIRIN NOT PRESCRIBED   Commonly known as:  INTENTIONAL        by Other route continuous prn Reported on 3/7/2017   Refills:  0        cholestyramine 4 G Packet   Commonly known as:  QUESTRAN        Dose:  1 packet   Take 1 packet by mouth every evening   Refills:  0        Chromium 1000 MCG Tabs        Dose:  500 mcg   Take 500 mcg by mouth daily   Refills:  0        diltiazem 120 MG 24 hr capsule   Commonly known as:  CARDIZEM CD/CARTIA XT   Used for:  Chronic atrial fibrillation (H)        Dose:  120 mg   Take 1 capsule (120 mg) by mouth every evening   Quantity:  90 capsule   Refills:  3        ferrous gluconate 324 (38 FE) MG tablet   Commonly known as:  FERGON   Used for:  Anemia due to blood loss, acute        TAKE 1 TABLET (324 MG) BY MOUTH DAILY (WITH BREAKFAST)   Quantity:  100 tablet   Refills:  3        fish oil-omega-3 fatty acids 1000 MG capsule        Dose:  1 g   Take 1 g by mouth 2 times daily   Refills:  0        glipiZIDE 10 MG 24 hr tablet   Commonly known as:  glipiZIDE XL   Used for:  Chronic atrial fibrillation (H), Actinic keratosis        Dose:  10 mg   Take 1 tablet (10 mg) by mouth 2 times daily   Quantity:  180 tablet   Refills:  3        loperamide 2 MG tablet   Commonly known as:  IMODIUM A-D        1 tablet BID   Quantity:  30 tablet   Refills:  0    bid       metFORMIN 500 MG tablet   Commonly known as:  GLUCOPHAGE   Used for:  Type 2 diabetes mellitus with stage 3 chronic kidney disease, without long-term current use of insulin (H)        Dose:  500 mg   Take 1 tablet (500 mg) by mouth 2 times daily (with meals)   Quantity:  180 tablet   Refills:  1        multivitamin, therapeutic Tabs tablet        Dose:  0.5 tablet   Take 0.5 tablets by mouth 2 times daily   Refills:   0        pioglitazone 45 MG tablet   Commonly known as:  ACTOS   Used for:  Type 2 diabetes mellitus with stage 3 chronic kidney disease, without long-term current use of insulin (H)        TAKE 1 TABLET (45 MG) BY MOUTH DAILY   Quantity:  90 tablet   Refills:  1        STATIN NOT PRESCRIBED (INTENTIONAL)   Used for:  Type 2 diabetes, HbA1C goal < 8% (H)        Dose:  1 each   1 each At Bedtime Statin not prescribed intentionally due to Risk for drug interaction   Quantity:  0 each   Refills:  0        warfarin 5 MG tablet   Commonly known as:  COUMADIN   Used for:  Heart palpitations, New onset atrial fibrillation (H), Chronic anticoagulation        TAKE 1 TABLET BY MOUTH TUESDAY,THURS AND SUN. TAKE 1/2 TABLET ON MON,WED,FRI AND SAT. OR AS DIRECTED   Quantity:  65 tablet   Refills:  1          STOP taking     blood glucose monitoring lancets           ONETOUCH ULTRA test strip   Generic drug:  blood glucose monitoring                     Further instructions from your care team         Water on the Knee    Water on the knee is also known as knee effusion. The knee joint normally has less than 1 ounce of fluid. Injury or inflammation of the knee joint causes extra fluid to collect there. When this happens, the knee joint looks swollen and is usually painful. It may be hard to fully bend the knee.  The most common cause of water on the knee is osteoarthritis due to wear and tear on the joint cartilage. Other causes include injury to the cartilage, inflammatory arthritis such as gout or rheumatoid arthritis, and infection of the joint.  You may need a needle aspiration, if the cause of your water on the knee is not certain. This procedure removes a sample of joint fluid from the knee for testing. This is done with a local anesthetic. Removing excess fluid may also relieve swelling and pain.  Home care    Limit your activities. Stay off the injured leg as much as possible until pain improves.    Keep your leg elevated  to reduce pain and swelling. When sleeping, place a pillow under the injured leg. When sitting, support the injured leg so it is level with your waist. This is very important during the first 48 hours.    Apply an ice pack over the injured area for 15 to 20 minutes every 3 to 6 hours. You should do this for the first 24 to 48 hours. You can make an ice pack by filling a plastic bag that seals at the top with ice cubes and then wrapping it with a thin towel. Continue to use ice packs for relief of pain and swelling as needed. As the ice melts, be careful to avoid getting your wrap, splint, or cast wet. After 48 hours, apply heat(warm shower or warm bath) for 15 to 20 minutes several times a day, or alternate ice and heat. If you have to wear a hook-and-loop knee brace, you can open it to apply the ice pack, or heat, directly to the knee. Never put ice directly on the skin. Always wrap the ice in a towel or other type of cloth.    You may use over-the-counter pain medicine to control pain, unless another pain medicine was prescribed. If you have chronic liver or kidney disease or have ever had a stomach ulcer or GI bleeding, talk with your healthcare provider before using these medicines.    If crutches or a walker have been recommended, do not put weight on the injured leg until you can do so without pain. Check with your healthcare provider before returning to sports or full work duties.    If you have a hook-and-loop knee brace, you can remove it to bathe and sleep, unless told otherwise.  Follow-up care  Follow up with your healthcare provider as advised.  If you are overweight, talk to your healthcare provider about a weight loss program. The excess weight puts extra strain on your knees.  When to seek medical advice  Call your healthcare provider right away if any of these occur:    Increasing pain, redness, or swelling of the knee    Fever of 100.4 F (38 C) or above lasting for 24 to 48 hours  Date Last  Reviewed: 11/23/2015 2000-2017 [employer]. [address] [place] [address], [place] [address]. All rights reserved. This information is not intended as a substitute for professional medical care. Always follow your healthcare professional's instructions.          Summary of Visit     Reason for your hospital stay       Knee effusion, pseudogout             After Care     Activity - Up with nursing assistance           Advance Diet as Tolerated       Follow this diet upon discharge: Moderate consistent carbohydrate (5784-8307 shanta / 4-6 CHO units per meal)       Fall precautions           General info for SNF       Length of Stay Estimate: Short Term Care: Estimated # of Days <30  Condition at Discharge: Stable  Level of care:skilled   Rehabilitation Potential: Good  Admission H&P remains valid and up-to-date: Yes  Recent Chemotherapy: N/A  Use Nursing Home Standing Orders: Yes       Glucose monitor nursing POCT       Before meals and at bedtime       Mantoux instructions       Give two-step Mantoux (PPD) Per Facility Policy Yes             Referrals     Occupational Therapy Adult Consult       Evaluate and treat as clinically indicated.    Reason:  R knee psseudogout       Physical Therapy Adult Consult       Evaluate and treat as clinically indicated.    Reason:  Right knee pseudogout             Your next 10 appointments already scheduled     Mar 29, 2018 11:15 AM CDT   Anticoagulation Visit with [facility]   [facility]    [address]  [facility] [address] [phone]            Mar 29, 2018 11:45 AM CDT   LAB with [facility]   [facility] ([facility])    [address]  [facility] [id number] [phone]           Please do not eat 10-12 hours before your appointment if you are coming in fasting for labs on lipids, cholesterol, or glucose (sugar). This does not apply to pregnant  women. Water, hot tea and black coffee (with nothing added) are okay. Do not drink other fluids, diet soda or chew gum.              Follow-Up Appointment Instructions     Future Labs/Procedures    Follow-up and recommended labs and tests      Comments:    Follow up with primary care provider, Joselito Armas, within 7 days for hospital follow- up.  The following labs/tests are recommended: INR in 1 week.      Follow-Up Appointment Instructions     Follow-up and recommended labs and tests        Follow up with primary care provider, Joselito Armas, within 7 days for hospital follow- up.  The following labs/tests are recommended: INR in 1 week.             Statement of Approval     Ordered          03/06/18 1027  I have reviewed and agree with all the recommendations and orders detailed in this document.  EFFECTIVE NOW     Approved and electronically signed by:  Mike Boykin MD

## 2018-03-03 NOTE — IP AVS SNAPSHOT
[facility] Observation Department    [address]    [facility] [address]    Phone:  [phone]                                       After Visit Summary   3/3/2018    Louis Keller Jr.    MRN: [id number]           After Visit Summary Signature Page     I have received my discharge instructions, and my questions have been answered. I have discussed any challenges I see with this plan with the nurse or doctor.    ..........................................................................................................................................  Patient/Patient Representative Signature      ..........................................................................................................................................  Patient Representative Print Name and Relationship to Patient    ..................................................               ................................................  Date                                            Time    ..........................................................................................................................................  Reviewed by Signature/Title    ...................................................              ..............................................  Date                                                            Time

## 2018-03-03 NOTE — ED PROVIDER NOTES
History     Chief Complaint:  Knee Pain    The history is provided by the patient.      Louis Keller Jr. is a 85 year old male on Warfarin for atrial fibrillation who presents to the emergency department for evaluation of knee pain. The patient reports that he stepped over a gap in the street yesterday at about 1500 and when he stepped down onto his right leg, he experienced pain to his right knee and his right leg gave out. The patient fell and landed on his left shoulder, but denies any impact to his head or elsewhere on his body. He sustained an abrasion to his left arm. Today the patient presents with increased pain and swelling to his right knee. The patient has no history of previous injury to this knee, but he has broken his right ankle in 2010 and had a left knee replacement in 2013.     Allergies:  Allergies reviewed. No pertinent allergies.     Medications:    Actos  Fergon  Glucophage  Coumadin  Nizoral  Glipizide XL  Diltiazem  Questran  Imodium  Blood glucose monitoring kit    Past Medical History:    Antiplatelet or antithrombotic long-term use   Atrial fibrillation    Diarrhea   Diastolic murmur   QUESADA (dyspnea on exertion)   Hemorrhage of gastrointestinal tract, unspecified   History of blood transfusion   Scarlet fever   Type II or unspecified type diabetes mellitus without mention of complication, not stated as uncontrolled  Unspecified disease of pancreas    Past Surgical History:    Left knee arthroplasty  Tonsillectomy and adenoidectomy  Colonoscopy  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  Smoking Status: Never Smoker  Smokeless Tobacco: Never  Used  Alcohol Use: Yes (1 glass of wine/day)  Marital Status:      Review of Systems   Musculoskeletal: Positive for joint swelling.        Right knee pain   All other systems reviewed and are negative.    Physical Exam     Patient Vitals for the past 24 hrs:   BP Temp Temp src Pulse Heart Rate SpO2   03/03/18 1331 148/90 99  F (37.2  C) Oral 105 105 97 %       Physical Exam  Constitutional: Patient appears well-developed and well-nourished. There is mild distress.   Head: No external signs of trauma noted.  Neck: No JVD noted  Eyes: Pupils are equal, round, and reactive to light.   Cardiovascular: Normal rate (80's on monitor in room), irregular rhythm and normal heart sounds.  Exam reveals no gallop and no friction rub.  No murmur heard. Equal B/L peripheral pulses.  Pulmonary/Chest: Effort normal and breath sounds normal. No respiratory distress. Patient has no wheezes. Patient has no rales.   Abdominal: Soft. There is no tenderness.   Ext:     Mild TTP over right medial malleolus  Mild TTP over right lateral malleolus  There is TTP over the right fibular head  There is soft tissue swelling present over the right knee  This is a closed injury  He is able to fire foot/toe flexors and extensors  Sensation and perfusion intact throughout foot  There is mild discomfort on the left shoulder     Remainder of the skeletal survey is unremarkable  Neurological: Patient is alert and oriented to person, place, and time.   Skin: Skin is warm and dry. There is no diaphoresis noted. There is an abrasion on the right knee as well as a skin tear on the left arm.    Emergency Department Course     Imaging:  Radiology findings were communicated with the patient and family who voiced understanding of the findings.    XR Shoulder Left G/E 3 Views  Three views of the left shoulder are performed. No  evidence of fracture or dislocation. Acromioclavicular joint appears  intact radiographically.  Reading per radiology.     Ankle  XR, G/E 3 views, right  No definite acute fractures are identified. The deformity  of the distal fibula appears to be due to a healed fracture.  Reading per radiology.     Knee XR, 3 views, right  1. No fractures are identified.  2. Degenerative change with Chondrocalcinosis.  3. Knee joint effusion. Question joint loose bodies.  Reading per radiology.     CT Knee right w/o Contrast  1. No evidence of acute fracture.  2. Unusual calcifications in the suprapatellar region within a joint  effusion that could represent synovial calcification. Loose bodies  unlikely but could potentially contribute to this appearance. Some  unusual type of synovial chondromatosis or chondrocalcinosis could  result in this appearance as well.  3. Tricompartmental degenerative changes.    Reading per radiology.     POC US Soft Tissue (right knee)  PROCEDURE: PERFORMED BY: Dr. Jaime Decker  INDICATIONS/SYMPTOM: Right knee swelling  PROBE: High frequency linear probe  BODY LOCATION: Soft tissue located on right knee     FINDINGS: Cobblestoning of soft tissue: absent  Hypoechoic fluid (ie abscess) identified: present   US utilized to access fluid pocket with needle  INTERPRETATION:  The soft tissue and muscle layers were evaluated.     Findings indicate hemarthrosis    Laboratory:  Laboratory findings were communicated with the patient and family who voiced understanding of the findings.    CBC: pending  BMP: pending    INR: 2.12 (H)    Procedures:    After consent was obtained, using sterile technique the right knee was prepped and Lidocaine 1% with epinephrine was used as local anesthetic. The joint was entered and 35 cc's of bloody colored fluid was withdrawn. The procedure was well tolerated.  The needle insertion sites were dressed and and ACE wrap was applied. The patient was admitted for pain control.    Interventions:  1424 Norco 1 tablet PO     Emergency Department Course:     Nursing notes and vitals reviewed.     I performed  an exam of the patient as documented above.     IV was inserted and blood was drawn for laboratory testing, results above.     The patient was sent for multiple x-rays while in the emergency department, results above.    1558 I updated the patient.    1612 I spoke with orthopedics regarding the patient's presentation, findings, and plan of care.     1745 I drained the patient's knee of fluid.    I discussed the treatment plan with the patient. They expressed understanding of this plan and consented to admission. I discussed the patient with Supriya Souza PA-C, working with Dr. Boykin, who will place the patient in an observation bed for further evaluation and treatment.    I personally reviewed the laboratory and imaging results with the patient and his family and answered all related questions prior to discharge.    Impression & Plan      Medical Decision Making:  Louis Keller Jr. is a 85 year old male who presents to the emergency department today after a fall. Please see the HPI and exam for specifics. The patient's x-rays did raise the question of a fracture. I discussed the case with orthopedics and a CT was ordered. The CT does not show a fracture. There is a notable effusion. We drained the effusion and the patient had some symptomatic relief. He patient was able to stand, but not able to walk. We will place him in observation for pain control and further consultation by orthopedics. The hospitalist requested a CBC and BMP. Those results are pending.    Impressions:    ICD-10-CM    1. Knee effusion, right M25.461      Disposition:   The patient was placed in observation under the hospitalist service.    Scribe Disclosure:  I, Jing Jasmine, am serving as a scribe at 2:26 PM on 3/3/2018 to document services personally performed by Jaime Decker DO based on my observations and the provider's statements to me.    [facility] EMERGENCY DEPARTMENT       Jaime Decker DO  03/03/18  1912

## 2018-03-04 ENCOUNTER — APPOINTMENT (OUTPATIENT)
Dept: PHYSICAL THERAPY | Facility: CLINIC | Age: 83
DRG: 554 | End: 2018-03-04
Attending: PHYSICIAN ASSISTANT
Payer: COMMERCIAL

## 2018-03-04 LAB
ALBUMIN UR-MCNC: NEGATIVE MG/DL
APPEARANCE UR: CLEAR
BASOPHILS # BLD AUTO: 0.1 10E9/L (ref 0–0.2)
BASOPHILS NFR BLD AUTO: 0.5 %
BILIRUB UR QL STRIP: NEGATIVE
COLOR UR AUTO: YELLOW
DIFFERENTIAL METHOD BLD: ABNORMAL
EOSINOPHIL # BLD AUTO: 0 10E9/L (ref 0–0.7)
EOSINOPHIL NFR BLD AUTO: 0.3 %
ERYTHROCYTE [DISTWIDTH] IN BLOOD BY AUTOMATED COUNT: 14.3 % (ref 10–15)
GLUCOSE BLDC GLUCOMTR-MCNC: 179 MG/DL (ref 70–99)
GLUCOSE BLDC GLUCOMTR-MCNC: 221 MG/DL (ref 70–99)
GLUCOSE UR STRIP-MCNC: 50 MG/DL
HCT VFR BLD AUTO: 34.3 % (ref 40–53)
HGB BLD-MCNC: 11.4 G/DL (ref 13.3–17.7)
HGB UR QL STRIP: ABNORMAL
IMM GRANULOCYTES # BLD: 0.1 10E9/L (ref 0–0.4)
IMM GRANULOCYTES NFR BLD: 0.5 %
INR PPP: 2.34 (ref 0.86–1.14)
KETONES UR STRIP-MCNC: NEGATIVE MG/DL
LEUKOCYTE ESTERASE UR QL STRIP: NEGATIVE
LYMPHOCYTES # BLD AUTO: 1.1 10E9/L (ref 0.8–5.3)
LYMPHOCYTES NFR BLD AUTO: 10.8 %
MCH RBC QN AUTO: 30.7 PG (ref 26.5–33)
MCHC RBC AUTO-ENTMCNC: 33.2 G/DL (ref 31.5–36.5)
MCV RBC AUTO: 93 FL (ref 78–100)
MONOCYTES # BLD AUTO: 1.6 10E9/L (ref 0–1.3)
MONOCYTES NFR BLD AUTO: 15.4 %
MUCOUS THREADS #/AREA URNS LPF: PRESENT /LPF
NEUTROPHILS # BLD AUTO: 7.4 10E9/L (ref 1.6–8.3)
NEUTROPHILS NFR BLD AUTO: 72.5 %
NITRATE UR QL: NEGATIVE
NRBC # BLD AUTO: 0 10*3/UL
NRBC BLD AUTO-RTO: 0 /100
PH UR STRIP: 6 PH (ref 5–7)
PLATELET # BLD AUTO: 157 10E9/L (ref 150–450)
RBC # BLD AUTO: 3.71 10E12/L (ref 4.4–5.9)
RBC #/AREA URNS AUTO: 4 /HPF (ref 0–2)
SOURCE: ABNORMAL
SP GR UR STRIP: 1.01 (ref 1–1.03)
UROBILINOGEN UR STRIP-MCNC: 0 MG/DL (ref 0–2)
WBC # BLD AUTO: 10.2 10E9/L (ref 4–11)
WBC #/AREA URNS AUTO: <1 /HPF (ref 0–5)

## 2018-03-04 PROCEDURE — 81001 URINALYSIS AUTO W/SCOPE: CPT | Performed by: PHYSICIAN ASSISTANT

## 2018-03-04 PROCEDURE — 85025 COMPLETE CBC W/AUTO DIFF WBC: CPT | Performed by: PHYSICIAN ASSISTANT

## 2018-03-04 PROCEDURE — 25000132 ZZH RX MED GY IP 250 OP 250 PS 637: Performed by: PHYSICIAN ASSISTANT

## 2018-03-04 PROCEDURE — 97161 PT EVAL LOW COMPLEX 20 MIN: CPT | Mod: GP

## 2018-03-04 PROCEDURE — 96365 THER/PROPH/DIAG IV INF INIT: CPT

## 2018-03-04 PROCEDURE — 25000125 ZZHC RX 250: Performed by: PHYSICIAN ASSISTANT

## 2018-03-04 PROCEDURE — 85610 PROTHROMBIN TIME: CPT | Performed by: PHYSICIAN ASSISTANT

## 2018-03-04 PROCEDURE — 40000193 ZZH STATISTIC PT WARD VISIT

## 2018-03-04 PROCEDURE — 97116 GAIT TRAINING THERAPY: CPT | Mod: GP

## 2018-03-04 PROCEDURE — G0378 HOSPITAL OBSERVATION PER HR: HCPCS

## 2018-03-04 PROCEDURE — 36415 COLL VENOUS BLD VENIPUNCTURE: CPT | Performed by: PHYSICIAN ASSISTANT

## 2018-03-04 PROCEDURE — 25000132 ZZH RX MED GY IP 250 OP 250 PS 637: Performed by: HOSPITALIST

## 2018-03-04 PROCEDURE — 00000146 ZZHCL STATISTIC GLUCOSE BY METER IP

## 2018-03-04 RX ORDER — HYDROMORPHONE HYDROCHLORIDE 1 MG/ML
.2-.4 INJECTION, SOLUTION INTRAMUSCULAR; INTRAVENOUS; SUBCUTANEOUS
Status: DISCONTINUED | OUTPATIENT
Start: 2018-03-04 | End: 2018-03-06 | Stop reason: HOSPADM

## 2018-03-04 RX ORDER — FERROUS GLUCONATE 324(38)MG
324 TABLET ORAL
Status: DISCONTINUED | OUTPATIENT
Start: 2018-03-04 | End: 2018-03-06 | Stop reason: HOSPADM

## 2018-03-04 RX ORDER — WARFARIN SODIUM 2.5 MG/1
2.5 TABLET ORAL
Status: COMPLETED | OUTPATIENT
Start: 2018-03-04 | End: 2018-03-04

## 2018-03-04 RX ORDER — NICOTINE POLACRILEX 4 MG
15-30 LOZENGE BUCCAL
Status: DISCONTINUED | OUTPATIENT
Start: 2018-03-04 | End: 2018-03-06 | Stop reason: HOSPADM

## 2018-03-04 RX ORDER — DEXTROSE MONOHYDRATE 25 G/50ML
25-50 INJECTION, SOLUTION INTRAVENOUS
Status: DISCONTINUED | OUTPATIENT
Start: 2018-03-04 | End: 2018-03-06 | Stop reason: HOSPADM

## 2018-03-04 RX ORDER — CLINDAMYCIN PHOSPHATE 600 MG/50ML
600 INJECTION, SOLUTION INTRAVENOUS ONCE
Status: COMPLETED | OUTPATIENT
Start: 2018-03-04 | End: 2018-03-04

## 2018-03-04 RX ADMIN — DILTIAZEM HYDROCHLORIDE 120 MG: 120 CAPSULE, COATED, EXTENDED RELEASE ORAL at 20:19

## 2018-03-04 RX ADMIN — OXYCODONE HYDROCHLORIDE 5 MG: 5 TABLET ORAL at 05:21

## 2018-03-04 RX ADMIN — CHOLESTYRAMINE 4 G: 4 POWDER, FOR SUSPENSION ORAL at 20:18

## 2018-03-04 RX ADMIN — OXYCODONE HYDROCHLORIDE 5 MG: 5 TABLET ORAL at 14:24

## 2018-03-04 RX ADMIN — CLINDAMYCIN PHOSPHATE 600 MG: 12 INJECTION, SOLUTION INTRAVENOUS at 12:26

## 2018-03-04 RX ADMIN — METFORMIN HYDROCHLORIDE 500 MG: 500 TABLET ORAL at 08:51

## 2018-03-04 RX ADMIN — ACETAMINOPHEN 650 MG: 325 TABLET, FILM COATED ORAL at 05:21

## 2018-03-04 RX ADMIN — METFORMIN HYDROCHLORIDE 500 MG: 500 TABLET ORAL at 17:49

## 2018-03-04 RX ADMIN — FERROUS GLUCONATE 324 MG: 324 TABLET ORAL at 12:26

## 2018-03-04 RX ADMIN — PIOGLITAZONE 45 MG: 15 TABLET ORAL at 08:51

## 2018-03-04 RX ADMIN — WARFARIN SODIUM 2.5 MG: 2.5 TABLET ORAL at 17:49

## 2018-03-04 RX ADMIN — GLIPIZIDE 10 MG: 10 TABLET, FILM COATED, EXTENDED RELEASE ORAL at 08:51

## 2018-03-04 RX ADMIN — ACETAMINOPHEN 650 MG: 325 TABLET, FILM COATED ORAL at 17:49

## 2018-03-04 RX ADMIN — ACETAMINOPHEN 650 MG: 325 TABLET, FILM COATED ORAL at 12:26

## 2018-03-04 RX ADMIN — GLIPIZIDE 10 MG: 10 TABLET, FILM COATED, EXTENDED RELEASE ORAL at 20:19

## 2018-03-04 NOTE — PHARMACY-ADMISSION MEDICATION HISTORY
Admission medication history interview status for this patient is complete. See [facility] admission navigator for allergy information, prior to admission medications and immunization status.     Medication history interview source(s):Patient  Medication history resources (including written lists, pill bottles, clinic record):None  Primary pharmacy: Not ID'd    Changes made to PTA medication list:  Added: None  Deleted: Ketoconazole, Duplicate Metformin  Changed: Cholestyramine (BID to QPM), Multivitamin (1 tab qday changed to 0.5 tab BID)    Actions taken by pharmacist (provider contacted, etc):None     Additional medication history information:None    Medication reconciliation/reorder completed by provider prior to medication history? No    Prior to Admission medications    Medication Sig Last Dose Taking? Auth Provider   HYDROcodone-acetaminophen (NORCO) 5-325 MG per tablet Take 1-2 tablets by mouth every 4 hours as needed for moderate to severe pain  Yes Jaime Decker,    cholestyramine (QUESTRAN) 4 G Packet Take 1 packet by mouth every evening 3/2/2018 at PM Yes Unknown, Entered By History   multivitamin, therapeutic (THERA-VIT) TABS tablet Take 0.5 tablets by mouth 2 times daily 3/3/2018 at AM Yes Unknown, Entered By History   pioglitazone (ACTOS) 45 MG tablet TAKE 1 TABLET (45 MG) BY MOUTH DAILY 3/3/2018 at AM Yes Joselito Armas MD   ferrous gluconate (FERGON) 324 (38 FE) MG tablet TAKE 1 TABLET (324 MG) BY MOUTH DAILY (WITH BREAKFAST) 3/3/2018 at AM Yes Joselito Armas MD   metFORMIN (GLUCOPHAGE) 500 MG tablet Take 1 tablet (500 mg) by mouth 2 times daily (with meals) 3/3/2018 at AM Yes Joselito Armas MD   warfarin (COUMADIN) 5 MG tablet TAKE 1 TABLET BY MOUTH TUESDAY,THURS AND SUN. TAKE 1/2 TABLET ON MON,WED,FRI AND SAT. OR AS DIRECTED 3/2/2018 at PM Yes Joselito Armas MD   glipiZIDE (GLIPIZIDE XL) 10 MG 24 hr tablet Take 1 tablet (10 mg) by mouth 2 times daily 3/3/2018 at AM Yes  Joselito Armas MD   diltiazem 120 MG 24 hr capsule Take 1 capsule (120 mg) by mouth every evening 3/2/2018 at PM Yes Herbert Gleason MD   Chromium 1000 MCG TABS Take 500 mcg by mouth daily 3/3/2018 at AM Yes Reported, Patient   loperamide (IMODIUM A-D) 2 MG tablet 1 tablet BID 3/3/2018 at AM Yes Joselito Armas MD   acetaminophen (TYLENOL) 500 MG tablet Take 500 mg by mouth every 6 hours as needed  3/3/2018 at Unknown time Yes Reported, Patient   fish oil-omega-3 fatty acids (FISH OIL) 1000 MG capsule Take 1 g by mouth 2 times daily  3/3/2018 at AM Yes Reported, Patient   STATIN NOT PRESCRIBED, INTENTIONAL, 1 each At Bedtime Statin not prescribed intentionally due to Risk for drug interaction   Joselito Armas MD   ACE NOT PRESCRIBED, INTENTIONAL, 1 each daily ACE Inhibitor not prescribed due to Risk for drug interaction   Joselito Armas MD   ASPIRIN NOT PRESCRIBED, INTENTIONAL, by Other route continuous prn Reported on 3/7/2017   Reported, Patient

## 2018-03-04 NOTE — PLAN OF CARE
Problem: Patient Care Overview  Goal: Plan of Care/Patient Progress Review  Discharge Planner PT   Patient plan for discharge: Home with assist  Current status: Pt demonstrates difficulty with ambulation attempts with WW d/t limited tolerance for putting weight through RLE, pt attempted stairs however was unable to manage d/t pain  Barriers to return to prior living situation: Stairs to enter home  Recommendations for discharge: Home vs TCU pending pain control and improving mobility  Rationale for recommendations: Pt currently is unable to complete stairs to enter home, could consider rental w/c for in home mobility if pt/family able to get pt up the 2 stairs into home, if pt/family unable to manage stairs may have to consider short TCU stay.       Entered by: Ryan Barfield 03/04/2018 1:08 PM

## 2018-03-04 NOTE — PROGRESS NOTES
[facility]  Hospitalist Progress Note  Maame Pack PA-C, JILLIAN 03/04/2018    Reason for Stay (Diagnosis): knee pain         Assessment and Plan:      Summary of Stay: Louis Keller Jr. is a 85 year old male with a past medical history significant for a fib - on coumadin, DM2, and HLD admitted on 3/3/2018 with right knee pain.  The patient had a mechanical fall on 3/2, striking his right knee on the pavement. The right knee became progressively more swollen and painful to the point that he could not ambulate or touch the knee without severe pain. He presented to the ER on 3/3 for evaluation. He had an XRay and CT of the right knee that did not show any acute fracture. Ortho was consulted via phone by the ER who recommended drainage. About 35cc of mixed bloody fluid was removed, this was not sent for analysis. The patient continued to have significant pain, however, so was admitted for further evaluation. The patient today has severe pain in the knee, particularly with palpation and weight bearing. He has decreased ROM and the knee is warm to the touch. His temp has been fluctuating with a Tmax of 101.4, but he has been taking Tylenol now for pain. He is occasionally tachy with a pulse up to 112. Ua negative.   Problem List:   1. Knee pain - with effusion. I am concerned that there may be some component for septic arthritis here based on his continued pain, decreased ROM and warmth, fever and lack of clinical improvement. Will give a dose of IV Clindamycin and then switch to Oral. Physical Therapy was consulted as well. [place] They are recommending either a wheelchair with family assist to get him into the house, vs TCU. For now we will work on pain control and start the ABX. He has significant pain with any movement and even light palpation of the knee. Imaging was unremarkable. Will have Ortho see him and see as well for any other suggestions on  treatment.   2. DM2 - resume PTA actos, metformin and glipizide. SSI started,  3. A Fib - pharmacy to continue dosing coumadin. Continue PTA diltiazem.   DVT Prophylaxis: Low Risk/Ambulatory with no VTE prophylaxis indicated  Code Status: Full Code  Discharge Dispo: home vs TCU  Estimated Disch Date / # of Days until Disch: 1-2 days        Interval History (Subjective):      Still having a lot of pain in the right knee. Hurts significantly more if he bears any weight on it. Overall not feeling a lot better from yesterday.                   Physical Exam:      Last Vital Signs:  /70 (BP Location: Right arm)  Pulse 105  Temp 98.3  F (36.8  C) (Oral)  Resp 16  SpO2 95%    Wt Readings from Last 1 Encounters:   12/27/17 84.8 kg (187 lb)       Constitutional: Awake, alert, cooperative, no apparent distress   Respiratory: Clear to auscultation bilaterally, no crackles or wheezing   Cardiovascular: Regular rate and rhythm, normal S1 and S2, and no murmur noted   Abdomen: Normal bowel sounds, soft, non-distended, non-tender   Skin: No rashes, no cyanosis, dry to touch   Neuro: Alert and oriented x3, no weakness, numbness, memory loss   Extremities: Right knee is swollen, very tender to the touch (even light touching), limited active and passive ROM due to pain. Warm to the touch, no obvious erythema noted.    Other(s):        All other systems: Negative          Medications:      All current medications were reviewed with changes reflected in problem list.         Data:      All new lab and imaging data was reviewed.   Labs:    Recent Labs  Lab 03/03/18  2246 03/03/18  2243   CULT No growth after 3 hours No growth after 3 hours          Lab Results   Component Value Date     03/03/2018     12/27/2017     09/18/2017    Lab Results   Component Value Date    CHLORIDE 101 03/03/2018    CHLORIDE 104 12/27/2017    CHLORIDE 102 09/18/2017    Lab Results   Component Value Date    BUN 14 03/03/2018    BUN  14 12/27/2017    BUN 16 09/18/2017      Lab Results   Component Value Date    POTASSIUM 4.1 03/03/2018    POTASSIUM 4.2 12/27/2017    POTASSIUM 4.2 09/18/2017    Lab Results   Component Value Date    CO2 24 03/03/2018    CO2 24 12/27/2017    CO2 27 09/18/2017    Lab Results   Component Value Date    CR 0.81 03/03/2018    CR 0.82 12/27/2017    CR 0.93 09/18/2017          Recent Labs  Lab 03/04/18  0607 03/03/18  1925   WBC 10.2 11.0   HGB 11.4* 12.8*   HCT 34.3* 38.9*   MCV 93 92    165       Recent Labs  Lab 03/03/18  1925      POTASSIUM 4.1   CHLORIDE 101   CO2 24   ANIONGAP 8   *   BUN 14   CR 0.81   GFRESTIMATED >90   GFRESTBLACK >90   MICHAEL 9.2     No results for input(s): DD in the last 168 hours.    Recent Labs  Lab 03/04/18  0607 03/03/18  1510   INR 2.34* 2.12*     No results for input(s): LIPASE in the last 168 hours.  No results for input(s): TROPONIN, TROPI, TROPR in the last 168 hours.    Invalid input(s): TROP, TROPONINIES  No results for input(s): TSH in the last 168 hours.    Recent Labs  Lab 03/04/18  0945   COLOR Yellow   APPEARANCE Clear   URINEGLC 50*   URINEBILI Negative   URINEKETONE Negative   SG 1.012   UBLD Small*   URINEPH 6.0   PROTEIN Negative   NITRITE Negative   LEUKEST Negative   RBCU 4*   WBCU <1      Imaging:   Recent Results (from the past 48 hour(s))   Knee XR, 3 views, right    Narrative    KNEE THREE VIEWS RIGHT  3/3/2018 2:52 PM     HISTORY: fall, right knee pain/swelling;     COMPARISON: None.    FINDINGS: Degenerative changes seen in the patellofemoral compartment.  There is a degenerative spur off the lateral joint compartment. A knee  joint effusion appears to be present. There are calcific densities in  the suprapatellar region which may represent joint loose bodies..      Impression    IMPRESSION:   1. No fractures are identified.  2. Degenerative change with Chondrocalcinosis.  3. Knee joint effusion. Question joint loose bodies.    SHANELL SOLOMON MD   Ankle  XR, G/E 3 views, right    Narrative    ANKLE THREE VIEWS RIGHT  3/3/2018 2:52 PM     HISTORY: fall, ankle pain;     COMPARISON: None.    FINDINGS: There is normal osseous alignment.  No acute fractures are  identified.  There is deformity of the distal fibula. This appears to  be due to a healed fracture.      Impression    IMPRESSION: No definite acute fractures are identified. The deformity  of the distal fibula appears to be due to a healed fracture.    SHANELL SOLOMON MD   XR Shoulder Left G/E 3 Views    Narrative    LEFT SHOULDER THREE OR MORE VIEWS  3/3/2018 2:53 PM     HISTORY: Fall, left shoulder pain.      Impression    IMPRESSION: Three views of the left shoulder are performed. No  evidence of fracture or dislocation. Acromioclavicular joint appears  intact radiographically.         ALIZA TRAN MD   CT Knee Right w/o Contrast    Narrative    CT KNEE RIGHT WITHOUT CONTRAST 3/3/2018 4:52 PM     HISTORY: Right knee pain, possible lateral femoral condyle fracture.    TECHNIQUE: Axial scans were performed through the right knee with  sagittal and coronal reconstruction. Radiation dose for this scan was  reduced using automated exposure control, adjustment of the mA and/or  kV according to patient size, or iterative reconstruction technique.    COMPARISON: X-ray from 3/3/2018.    FINDINGS: No evidence of acute fracture or subluxation. Moderate  marginal osteophyte formation lateral femoral condyle. Mild marginal  osteophyte formation medial femoral condyle some of which project into  the femoral notch. Moderate patellofemoral degenerative changes.    There is a moderate to large joint effusion with some patchy somewhat  linear calcification within the joint effusion that may be synovial  calcification. It does not have the typical appearance of loose bodies  but  difficult to exclude some of this appearance being related to  loose bodies.      Impression    IMPRESSION:  1. No evidence of acute fracture.  2. Unusual  calcifications in the suprapatellar region within a joint  effusion that could represent synovial calcification. Loose bodies  unlikely but could potentially contribute to this appearance. Some  unusual type of synovial chondromatosis or chondrocalcinosis could  result in this appearance as well.  3. Tricompartmental degenerative changes.      KAYLA VALLEJO MD   POC US SOFT TISSUE    Impression    [facility] Procedure Note     Limited Bedside ED Ultrasound of Soft Tissue:    PROCEDURE: PERFORMED BY: Dr. Jaime Decker  INDICATIONS/SYMPTOM: Right knee swelling  PROBE: High frequency linear probe  BODY LOCATION: Soft tissue located on right knee     FINDINGS: Cobblestoning of soft tissue: absent   Hypoechoic fluid (ie abscess) identified: present    US utilized to access fluid pocket with needle  INTERPRETATION:  The soft tissue and muscle layers were evaluated.      Findings indicate hemarthrosis    IMAGE DOCUMENTATION: Images were archived to PACs system.

## 2018-03-04 NOTE — PROGRESS NOTES
Ortho    Consult received.  Chart and x-rays reviewed.  Discussed patient with Maame Pack PA-C.  Patient presenting what appears to be a traumatic bloody effusion after a fall.  Pain from effusion and underlying OA.  These will commonly be walm to touch and sometimes have low grade fevers.  Would recommend having him use a knee immobilizer to help with ambulation.  OK to WBAT.  Ice and ace wrap to knee.  Ortho to see patient tomorrow to assess.  Please call with questions.    Tristen Zhao MD

## 2018-03-04 NOTE — DISCHARGE INSTRUCTIONS
Water on the Knee    Water on the knee is also known as knee effusion. The knee joint normally has less than 1 ounce of fluid. Injury or inflammation of the knee joint causes extra fluid to collect there. When this happens, the knee joint looks swollen and is usually painful. It may be hard to fully bend the knee.  The most common cause of water on the knee is osteoarthritis due to wear and tear on the joint cartilage. Other causes include injury to the cartilage, inflammatory arthritis such as gout or rheumatoid arthritis, and infection of the joint.  You may need a needle aspiration, if the cause of your water on the knee is not certain. This procedure removes a sample of joint fluid from the knee for testing. This is done with a local anesthetic. Removing excess fluid may also relieve swelling and pain.  Home care    Limit your activities. Stay off the injured leg as much as possible until pain improves.    Keep your leg elevated to reduce pain and swelling. When sleeping, place a pillow under the injured leg. When sitting, support the injured leg so it is level with your waist. This is very important during the first 48 hours.    Apply an ice pack over the injured area for 15 to 20 minutes every 3 to 6 hours. You should do this for the first 24 to 48 hours. You can make an ice pack by filling a plastic bag that seals at the top with ice cubes and then wrapping it with a thin towel. Continue to use ice packs for relief of pain and swelling as needed. As the ice melts, be careful to avoid getting your wrap, splint, or cast wet. After 48 hours, apply heat(warm shower or warm bath) for 15 to 20 minutes several times a day, or alternate ice and heat. If you have to wear a hook-and-loop knee brace, you can open it to apply the ice pack, or heat, directly to the knee. Never put ice directly on the skin. Always wrap the ice in a towel or other type of cloth.    You may use over-the-counter pain medicine to control  pain, unless another pain medicine was prescribed. If you have chronic liver or kidney disease or have ever had a stomach ulcer or GI bleeding, talk with your healthcare provider before using these medicines.    If crutches or a walker have been recommended, do not put weight on the injured leg until you can do so without pain. Check with your healthcare provider before returning to sports or full work duties.    If you have a hook-and-loop knee brace, you can remove it to bathe and sleep, unless told otherwise.  Follow-up care  Follow up with your healthcare provider as advised.  If you are overweight, talk to your healthcare provider about a weight loss program. The excess weight puts extra strain on your knees.  When to seek medical advice  Call your healthcare provider right away if any of these occur:    Increasing pain, redness, or swelling of the knee    Fever of 100.4 F (38 C) or above lasting for 24 to 48 hours  Date Last Reviewed: 11/23/2015 2000-2017 [employer]. [address] [place] [address], [place] [address]. All rights reserved. This information is not intended as a substitute for professional medical care. Always follow your healthcare professional's instructions.

## 2018-03-04 NOTE — PROGRESS NOTES
03/04/18 1030   Quick Adds   Type of Visit Initial PT Evaluation   Living Environment   Lives With spouse   Home Accessibility stairs to enter home;grab bars present (bathtub);grab bars present (toilet)   Number of Stairs to Enter Home 3  (garage, 2 steps to enter front door)   Number of Stairs Within Home 0   Stair Railings at Home outside, present at both sides  (railings present in garage, no railings present at front doo)   Living Environment Comment Pt's wife able to complete cooking/cleaning/etc as pt healing.   Self-Care   Usual Activity Tolerance moderate   Current Activity Tolerance fair   Regular Exercise yes   Activity/Exercise Type walking   Exercise Amount/Frequency 5-7 times/wk   Functional Level Prior   Ambulation 0-->independent   Transferring 0-->independent   Toileting 1-->assistive equipment   Bathing 1-->assistive equipment   Dressing 0-->independent   Eating 0-->independent   Communication 0-->understands/communicates without difficulty   Swallowing 0-->swallows foods/liquids without difficulty   Cognition 0 - no cognition issues reported   Fall history within last six months yes   Number of times patient has fallen within last six months 1   Which of the above functional risks had a recent onset or change? ambulation   Prior Functional Level Comment Pt reports being modified independent with ADL/IADLs as baseline.   General Information   Onset of Illness/Injury or Date of Surgery - Date 03/03/18   Referring Physician Maame Pack PA-C   Patient/Family Goals Statement Return home   Pertinent History of Current Problem (include personal factors and/or comorbidities that impact the POC) Pt presented to ED with Rt knee pain after attempting to step over a gap, stepped down on RLE and experienced onset of Rt knee pain and buckling leading to fall.  Bloody fluid was drained from knee; he continues to experience increased pain and difficulty with mobility.   Cognitive Status Examination  "  Orientation orientation to person, place and time   Level of Consciousness alert   Follows Commands and Answers Questions 100% of the time   Personal Safety and Judgment intact   Memory intact   Pain Assessment   Patient Currently in Pain Yes, see Vital Sign flowsheet   Integumentary/Edema   Integumentary/Edema Comments Rt knee wrap applied   Posture    Posture Forward head position;Protracted shoulders   Range of Motion (ROM)   ROM Comment LLE ROM is WNL, Rt knee flexion limited d/t pain   Strength   Strength Comments LLE strength is WNL, RLE unable to access d/t pain   Bed Mobility   Bed Mobility Comments Supine to sit with HOB elevated with Raquel for moving RLE to EOB   Transfer Skills   Transfer Comments Sit to stand to WW with close CGA , heavy UE use   Gait   Gait Comments Ambulation with WW x 5ft/2 reps, only placing minimal weight through RLE - vocalizing pain with attempts at placing partial weight through RLE.   Balance   Balance no deficits were identified   Clinical Impression   Criteria for Skilled Therapeutic Intervention yes, treatment indicated   PT Diagnosis Difficulty Walking   Influenced by the following impairments pain   Functional limitations due to impairments Decreased independence with functional mobility   Clinical Presentation Evolving/Changing   Clinical Presentation Rationale Patient's Rt knee pain requires ongoing care planning/decision making related to unclear discharge recommendations   Clinical Decision Making (Complexity) Low complexity   Therapy Frequency` daily   Predicted Duration of Therapy Intervention (days/wks) 2 days   Anticipated Discharge Disposition Home with Assist;Transitional Care Facility   Risk & Benefits of therapy have been explained Yes   Patient, Family & other staff in agreement with plan of care Yes   [facility] AM-PAC TM \"6 Clicks\"   2016, Trustees of [facility], under license to [employer].  All rights reserved.   6 Clicks Short Forms Basic " "Mobility Inpatient Short Form   [facility] AM-PAC  \"6 Clicks\" V.2 Basic Mobility Inpatient Short Form   1. Turning from your back to your side while in a flat bed without using bedrails? 3 - A Little   2. Moving from lying on your back to sitting on the side of a flat bed without using bedrails? 3 - A Little   3. Moving to and from a bed to a chair (including a wheelchair)? 3 - A Little   4. Standing up from a chair using your arms (e.g., wheelchair, or bedside chair)? 3 - A Little   5. To walk in hospital room? 2 - A Lot   6. Climbing 3-5 steps with a railing? 1 - Total   Basic Mobility Raw Score (Score out of 24.Lower scores equate to lower levels of function) 15   Total Evaluation Time   Total Evaluation Time (Minutes) 18     "

## 2018-03-04 NOTE — PHARMACY-ANTICOAGULATION SERVICE
Clinical Pharmacy - Warfarin Dosing Consult     Pharmacy has been consulted to manage this patient s warfarin therapy.  Indication: Atrial Fibrillation  Therapy Goal: INR 2-3  Warfarin Prior to Admission: Yes  Warfarin PTA Regimen: 2.5mg=Fvv-Pcy-Eie-Sat, 5mg=ROW  Significant drug interactions: Questran    INR   Date Value Ref Range Status   03/03/2018 2.12 (H) 0.86 - 1.14 Final     Comment:     Reviewed, acceptable     INR Protime   Date Value Ref Range Status   02/22/2018 2.0 (A) 0.86 - 1.14 Final       Recommend warfarin 5 mg today.  Pharmacy will monitor Louis Keller Jr. daily and order warfarin doses to achieve specified goal.      Please contact pharmacy as soon as possible if the warfarin needs to be held for a procedure or if the warfarin goals change.

## 2018-03-04 NOTE — PLAN OF CARE
Problem: Patient Care Overview  Goal: Plan of Care/Patient Progress Review  Outcome: No Change  PRIMARY DIAGNOSIS: Mechanical fall/ Right knee pain     OUTPATIENT/OBSERVATION GOALS TO BE MET BEFORE DISCHARGE  1. Orthostatic performed: No    2. Tolerating PO medications: Yes    3. Return to near baseline physical activity: Patient using 1 assist with walker, can't do stairs yet due to pain. Patient baseline is IND    4. Cleared for discharge by consultants (if involved): N/A    Discharge Planner Nurse   Safe discharge environment identified: Yes  Barriers to discharge: possibly, Patient can't do stairs yet due to pain. May have difficulty at home if stairs.        Entered by: Leila Arora 03/04/2018 4:44 PM     Please review provider order for any additional goals.   Nurse to notify provider when observation goals have been met and patient is ready for discharge.

## 2018-03-04 NOTE — PLAN OF CARE
Problem: Patient Care Overview  Goal: Plan of Care/Patient Progress Review  Outcome: No Change  PRIMARY DIAGNOSIS: ACUTE PAIN  OUTPATIENT/OBSERVATION GOALS TO BE MET BEFORE DISCHARGE:  1. Pain Status: Improved-controlled with oral pain medications.    2. Return to near baseline physical activity: No, did not get overnight      3. Cleared for discharge by consultants (if involved): IZZY     Discharge Planner Nurse   Safe discharge environment identified: Yes  Barriers to discharge: Yes, patient needs to show successful ambulation        Entered by: Tata Crawley 03/04/2018 6:18 AM     Patient is rating pain 3/10 at rest, patient given tylenol and PRN Oxy 5 mg. Patient has not been up but is premedicated for morning ambulation. Patient did find relief in pain with oral meds. Applied ice to knee as well and patient stated it helped. No consults, plan for pain control. Pt was afebrile overnight. No IV access.      Please review provider order for any additional goals.   Nurse to notify provider when observation goals have been met and patient is ready for discharge.

## 2018-03-04 NOTE — PLAN OF CARE
Problem: Patient Care Overview  Goal: Plan of Care/Patient Progress Review  Problem: Patient Care Overview  Goal: Plan of Care/Patient Progress Review  PRIMARY DIAGNOSIS: ACUTE PAIN - R KNEE  OUTPATIENT/OBSERVATION GOALS TO BE MET BEFORE DISCHARGE:  1. Pain Status: Pain free at rest.     2. Return to near baseline physical activity: No     3. Cleared for discharge by consultants (if involved): No     Pt A&Ox4, VSS. Pt denies pain in R knee at rest. Ace wrap in place. Ice pack used prn. Scheduled tylenol and prn oxycodone for pain. R knee warm to touch, w/ erythema. Pt up with Ax1 with walker. PT recommends home vs TCU pending pain control and mobility improvements. Ortho recommends immobilizer to R knee when oob, WBAT, will see pt tomorrow for official consult. Will continue to monitor.     Discharge Planner Nurse   Safe discharge environment identified: TBD  Barriers to discharge: Yes       Entered by: Sandra Aquino 03/04/2018 8:45 AM    Please review provider order for any additional goals.   Nurse to notify provider when observation goals have been met and patient is ready for discharge.

## 2018-03-04 NOTE — PLAN OF CARE
Problem: Patient Care Overview  Goal: Plan of Care/Patient Progress Review  PRIMARY DIAGNOSIS: ACUTE PAIN - R KNEE  OUTPATIENT/OBSERVATION GOALS TO BE MET BEFORE DISCHARGE:  1. Pain Status: Pain free at rest.    2. Return to near baseline physical activity: No    3. Cleared for discharge by consultants (if involved): No    Pt A&Ox4, VSS. Pt denies pain in R knee at rest. Pt reportedly up with Ax2, though will attempt to ambulate after pt finished with breakfast. Will continue to monitor.    Discharge Planner Nurse   Safe discharge environment identified: TBD  Barriers to discharge: Yes       Entered by: Sandra Aquino 03/04/2018 8:45 AM     Please review provider order for any additional goals.   Nurse to notify provider when observation goals have been met and patient is ready for discharge.

## 2018-03-04 NOTE — H&P
History and Physical     Louis Keller Jr. MRN# [id number]   YOB: 1932 Age: 85 year old      Date of Admission:  3/3/2018    Primary care provider: Joselito Armas          Assessment and Plan:   Louis Keller Jr. is a 85 year old male with a PMH significant for atrial fibrillation on warfarin, DM II, and HLP who presents with right knee effusion after a mechanical fall.    Patient was discussed with Dr. Decker, who was provider in ED. Chart review of ED work up was performed and is as follows: Vitals show temp of 99, pulse 105, and pressure 148/90 with spontaneous respirations and no hypoxia. Labs remarkable for Creatinine 0.81, normal electrolytes, glucose 183,  WBC 11, Hgb 12.8. INR 2.12. X ray knee shows no fracture, x ray ankle shows no fracture, x ray shoulder shows no fracture. CT of knee showed no evidence of fracture but unusual calcifications in the suprapatellar region that could represent synovial calcification. Chart review of Care Everywhere, previous visits and admissions were also reviewed.    Alerted by nursing that he triggered sepsis protocol. On review of vital signs after he arrived here he had a fever to 101.4 and tachycardia 112. Lactic acid and blood cultures were ordered. Unfortunately, knee fluid was not sent for analysis from ED and was thrown away since there was no concern for infection. I went to reevaluate patient and he has no cough, shortness of breath, urinary symptoms, diarrhea or vomiting. His only complaint is his knee pain and it is not cellulitic appearing. I have low suspicion for septic joint. Fever resolved on its own. Will continue to monitor.        1. Right knee pain/effusion after mechanical fall  Patient had mechanical fall on 3/2 that resulted in effusion and pain making it difficult to walk. ED spoke with orthopedics who recommended therapeutic drainage. Per ED fluid was a bloody mixture (not flank blood or infected appearing). Fluid was not  sent for analysis as there was low suspicion for infection. He was still in a fair amount of pain after procedure so we were asked to admit for pain control and safe disposition planning.  -Schedule tylenol  -Low dose Ibuprofen PRN (he reports no hx of bleeding ulcer)  -Ice, elevate and compress leg with ace bandage  -Atarax PRN  -Low dose oxycodone PRN  -No hx of ulcer so low dose ibuprofen ordered  -Repeat CBC in AM    2. Hx of Atrial fibrillation  Rate controlled on Diltiazem.   -Continue Warfarin    3. DM II  Continue Metformin, Glipizide and Actos        Social: Lives with wife and normally ambulates independently  Code: Discussed with patient nd they have chosen Full code  VTE prophylaxis: On warfarin  Disposition: Observation                    Chief Complaint:   Right knee pain and effusion         History of Present Illness:   Louis PACHECO Arianna Willis is a 85 year old male who presents with right knee pain and swelling. He states he was walking in the driveway on 3/2 and slipped falling onto his right knee. He was able to get up and complete his tasks but over the course of the day the knee became more swollen and painful. Today he was unable to ambulate without significant pain. He noticed significant swelling but no redness.  He has not had any fever, nausea, vomiting, diarrhea, stomach pairn, or urinary symptoms.              Past Medical History:     Past Medical History:   Diagnosis Date     Antiplatelet or antithrombotic long-term use      Atrial fibrillation (H)      Diarrhea      Diastolic murmur      QUESADA (dyspnea on exertion)      Hemorrhage of gastrointestinal tract, unspecified 63,65,and 1971    hospitalized     History of blood transfusion      Scarlet fever      Type II or unspecified type diabetes mellitus without mention of complication, not stated as uncontrolled      Unspecified disease of pancreas 1990    pancreatitis               Past Surgical History:     Past Surgical History:   Procedure  Laterality Date     ARTHROPLASTY KNEE  2013    Procedure: ARTHROPLASTY KNEE;  Left Total Knee Arthroplasty       C NONSPECIFIC PROCEDURE  Child    T&A     C NONSPECIFIC PROCEDURE  ; also     Colonoscopy     C NONSPECIFIC PROCEDURE      Basal cell cancer of the nose     C NONSPECIFIC PROCEDURE      Cholecystectomy     CARDIOVERSION  11    failed               Social History:     Social History     Social History     Marital status:      Spouse name: N/A     Number of children: N/A     Years of education: N/A     Occupational History           Semi-retired     Social History Main Topics     Smoking status: Never Smoker     Smokeless tobacco: Never Used     Alcohol use 0.0 oz/week     0 Standard drinks or equivalent per week      Comment: 1 glass of wine every day     Drug use: No     Sexual activity: No     Other Topics Concern     Caffeine Concern No     14 oz per day     Sleep Concern No     sometimes - nocturia 4 times per night     Special Diet No     Exercise Yes     treadmill/walking 15-20 minutes 6 days per week     Social History Narrative               Family History:     Family History   Problem Relation Age of Onset     Alzheimer Disease Maternal Grandmother      Arthritis Maternal Grandmother      CANCER Mother      breast/ 1983/colon     Eye Disorder Mother      glaucoma and cataracts     HEART DISEASE Mother      angina/CABG     Hypertension Mother      CANCER Father      colon     DIABETES Maternal Aunt      AoDM              Allergies:      Allergies   Allergen Reactions     No Known Drug Allergies                Medications:     Prior to Admission medications    Medication Sig Last Dose Taking? Auth Provider   HYDROcodone-acetaminophen (NORCO) 5-325 MG per tablet Take 1-2 tablets by mouth every 4 hours as needed for moderate to severe pain  Yes Jaime Decker DO   cholestyramine (QUESTRAN) 4 G Packet Take 1 packet by mouth every evening 3/2/2018 at PM  Yes Unknown, Entered By History   multivitamin, therapeutic (THERA-VIT) TABS tablet Take 0.5 tablets by mouth 2 times daily 3/3/2018 at AM Yes Unknown, Entered By History   pioglitazone (ACTOS) 45 MG tablet TAKE 1 TABLET (45 MG) BY MOUTH DAILY 3/3/2018 at AM Yes Joselito Armas MD   ferrous gluconate (FERGON) 324 (38 FE) MG tablet TAKE 1 TABLET (324 MG) BY MOUTH DAILY (WITH BREAKFAST) 3/3/2018 at AM Yes Joselito Armas MD   metFORMIN (GLUCOPHAGE) 500 MG tablet Take 1 tablet (500 mg) by mouth 2 times daily (with meals) 3/3/2018 at AM Yes Joselito Armas MD   warfarin (COUMADIN) 5 MG tablet TAKE 1 TABLET BY MOUTH TUESDAY,THURS AND SUN. TAKE 1/2 TABLET ON MON,WED,FRI AND SAT. OR AS DIRECTED 3/2/2018 at PM Yes Joselito Armas MD   glipiZIDE (GLIPIZIDE XL) 10 MG 24 hr tablet Take 1 tablet (10 mg) by mouth 2 times daily 3/3/2018 at AM Yes Joselito Armas MD   diltiazem 120 MG 24 hr capsule Take 1 capsule (120 mg) by mouth every evening 3/2/2018 at PM Yes Herbert Gleason MD   Chromium 1000 MCG TABS Take 500 mcg by mouth daily 3/3/2018 at AM Yes Reported, Patient   loperamide (IMODIUM A-D) 2 MG tablet 1 tablet BID 3/3/2018 at AM Yes Joselito Armas MD   acetaminophen (TYLENOL) 500 MG tablet Take 500 mg by mouth every 6 hours as needed  3/3/2018 at Unknown time Yes Reported, Patient   fish oil-omega-3 fatty acids (FISH OIL) 1000 MG capsule Take 1 g by mouth 2 times daily  3/3/2018 at AM Yes Reported, Patient   STATIN NOT PRESCRIBED, INTENTIONAL, 1 each At Bedtime Statin not prescribed intentionally due to Risk for drug interaction   Joselito Armas MD   ACE NOT PRESCRIBED, INTENTIONAL, 1 each daily ACE Inhibitor not prescribed due to Risk for drug interaction   Joselito Armas MD   ASPIRIN NOT PRESCRIBED, INTENTIONAL, by Other route continuous prn Reported on 3/7/2017   Reported, Patient              Review of Systems:   A Comprehensive greater than 10 system review of systems was carried  out.  Pertinent positives and negatives are noted above.  Otherwise negative for contributory information.            Physical Exam:   Blood pressure 140/65, pulse 105, temperature 101.4  F (38.6  C), temperature source Oral, resp. rate 15, SpO2 94 %.  Exam:  GENERAL:  Comfortable.  PSYCH: pleasant, oriented, No acute distress.  HEENT:  PERRLA. Normal conjunctiva, normal hearing, nasal mucosa and Oropharynx are normal.  NECK:  Supple, no neck vein distention, adenopathy or bruits, normal thyroid.  HEART:  Irregularly irregular  LUNGS:  Clear to auscultation, normal Respiratory effort. No wheezing, rales or ronchi.  ABDOMEN:  Soft, no hepatosplenomegaly, normal bowel sounds. Non-tender, non distended.   EXTREMITIES:  Knee was visualized and it is swollen but no redness is noted and I was able to move knee without significant pain.  SKIN:  Dry to touch, No rash, wound or ulcerations.  NEUROLOGIC:  CN 2-12 grossly intact, sensation is intact with no focal deficits.               Data:       Recent Labs  Lab 03/03/18 1925   WBC 11.0   HGB 12.8*   HCT 38.9*   MCV 92          Recent Labs  Lab 03/03/18 1925      POTASSIUM 4.1   CHLORIDE 101   CO2 24   ANIONGAP 8   *   BUN 14   CR 0.81   GFRESTIMATED >90   GFRESTBLACK >90   MICHAEL 9.2         Recent Results (from the past 24 hour(s))   Knee XR, 3 views, right    Narrative    KNEE THREE VIEWS RIGHT  3/3/2018 2:52 PM     HISTORY: fall, right knee pain/swelling;     COMPARISON: None.    FINDINGS: Degenerative changes seen in the patellofemoral compartment.  There is a degenerative spur off the lateral joint compartment. A knee  joint effusion appears to be present. There are calcific densities in  the suprapatellar region which may represent joint loose bodies..      Impression    IMPRESSION:   1. No fractures are identified.  2. Degenerative change with Chondrocalcinosis.  3. Knee joint effusion. Question joint loose bodies.    SHANELL SOLOMON MD   Ankle XR, G/E  3 views, right    Narrative    ANKLE THREE VIEWS RIGHT  3/3/2018 2:52 PM     HISTORY: fall, ankle pain;     COMPARISON: None.    FINDINGS: There is normal osseous alignment.  No acute fractures are  identified.  There is deformity of the distal fibula. This appears to  be due to a healed fracture.      Impression    IMPRESSION: No definite acute fractures are identified. The deformity  of the distal fibula appears to be due to a healed fracture.    SHANELL SOLOMON MD   XR Shoulder Left G/E 3 Views    Narrative    LEFT SHOULDER THREE OR MORE VIEWS  3/3/2018 2:53 PM     HISTORY: Fall, left shoulder pain.      Impression    IMPRESSION: Three views of the left shoulder are performed. No  evidence of fracture or dislocation. Acromioclavicular joint appears  intact radiographically.         ALIZA TRAN MD   CT Knee Right w/o Contrast    Narrative    CT KNEE RIGHT WITHOUT CONTRAST 3/3/2018 4:52 PM     HISTORY: Right knee pain, possible lateral femoral condyle fracture.    TECHNIQUE: Axial scans were performed through the right knee with  sagittal and coronal reconstruction. Radiation dose for this scan was  reduced using automated exposure control, adjustment of the mA and/or  kV according to patient size, or iterative reconstruction technique.    COMPARISON: X-ray from 3/3/2018.    FINDINGS: No evidence of acute fracture or subluxation. Moderate  marginal osteophyte formation lateral femoral condyle. Mild marginal  osteophyte formation medial femoral condyle some of which project into  the femoral notch. Moderate patellofemoral degenerative changes.    There is a moderate to large joint effusion with some patchy somewhat  linear calcification within the joint effusion that may be synovial  calcification. It does not have the typical appearance of loose bodies  but  difficult to exclude some of this appearance being related to  loose bodies.      Impression    IMPRESSION:  1. No evidence of acute fracture.  2. Unusual  calcifications in the suprapatellar region within a joint  effusion that could represent synovial calcification. Loose bodies  unlikely but could potentially contribute to this appearance. Some  unusual type of synovial chondromatosis or chondrocalcinosis could  result in this appearance as well.  3. Tricompartmental degenerative changes.     POC US SOFT TISSUE    Impression    [facility] Procedure Note     Limited Bedside ED Ultrasound of Soft Tissue:    PROCEDURE: PERFORMED BY: Dr. Jaime Decker  INDICATIONS/SYMPTOM: Right knee swelling  PROBE: High frequency linear probe  BODY LOCATION: Soft tissue located on right knee     FINDINGS: Cobblestoning of soft tissue: absent   Hypoechoic fluid (ie abscess) identified: present    US utilized to access fluid pocket with needle  INTERPRETATION:  The soft tissue and muscle layers were evaluated.      Findings indicate hemarthrosis    IMAGE DOCUMENTATION: Images were archived to PACs system.             Tata Souza PA-C

## 2018-03-04 NOTE — PLAN OF CARE
Problem: Patient Care Overview  Goal: Plan of Care/Patient Progress Review  Outcome: No Change  PRIMARY DIAGNOSIS: ACUTE PAIN  OUTPATIENT/OBSERVATION GOALS TO BE MET BEFORE DISCHARGE:  1. Pain Status: Improved-controlled with oral pain medications.    2. Return to near baseline physical activity: No    3. Cleared for discharge by consultants (if involved): N/A, no consults ordered    Discharge Planner Nurse   Safe discharge environment identified: Yes  Barriers to discharge: No, needs to have controlled pain and successful ambulation       Entered by: Tata Crawley 03/04/2018 2:59 AM      Patient is reporting pain in right knee at 2/10. Ice applied to right knee. Vitals stable on room air. Patient is resting comfortably will continue to monitor.     Please review provider order for any additional goals.   Nurse to notify provider when observation goals have been met and patient is ready for discharge.

## 2018-03-04 NOTE — PLAN OF CARE
Problem: Patient Care Overview  Goal: Plan of Care/Patient Progress Review  ROOM # 223    Living Situation (if not independent, order SW consult): Ind with wife   Facility name:  : [phone]    Activity level at baseline: Ind with walker  Activity level on admit: x2      Patient registered to observation; given Patient Bill of Rights; given the opportunity to ask questions about observation status and their plan of care.  Patient has been oriented to the observation room, bathroom and call light is in place.    Discussed discharge goals and expectations with patient/family.

## 2018-03-05 ENCOUNTER — APPOINTMENT (OUTPATIENT)
Dept: MRI IMAGING | Facility: CLINIC | Age: 83
DRG: 554 | End: 2018-03-05
Attending: NURSE PRACTITIONER
Payer: COMMERCIAL

## 2018-03-05 ENCOUNTER — APPOINTMENT (OUTPATIENT)
Dept: PHYSICAL THERAPY | Facility: CLINIC | Age: 83
DRG: 554 | End: 2018-03-05
Payer: COMMERCIAL

## 2018-03-05 ENCOUNTER — TELEPHONE (OUTPATIENT)
Dept: CARDIOLOGY | Facility: CLINIC | Age: 83
End: 2018-03-05

## 2018-03-05 PROBLEM — M25.469 JOINT EFFUSION OF KNEE: Status: ACTIVE | Noted: 2018-03-05

## 2018-03-05 LAB
ANION GAP SERPL CALCULATED.3IONS-SCNC: 6 MMOL/L (ref 3–14)
APPEARANCE FLD: NORMAL
BASOPHILS # BLD AUTO: 0 10E9/L (ref 0–0.2)
BASOPHILS NFR BLD AUTO: 0.4 %
BUN SERPL-MCNC: 15 MG/DL (ref 7–30)
CALCIUM SERPL-MCNC: 9 MG/DL (ref 8.5–10.1)
CHLORIDE SERPL-SCNC: 100 MMOL/L (ref 94–109)
CO2 SERPL-SCNC: 27 MMOL/L (ref 20–32)
COLOR FLD: NORMAL
CREAT SERPL-MCNC: 0.91 MG/DL (ref 0.66–1.25)
CRYSTALS SNV MICRO: ABNORMAL
DIFFERENTIAL METHOD BLD: ABNORMAL
EOSINOPHIL # BLD AUTO: 0 10E9/L (ref 0–0.7)
EOSINOPHIL NFR BLD AUTO: 0.4 %
ERYTHROCYTE [DISTWIDTH] IN BLOOD BY AUTOMATED COUNT: 14.3 % (ref 10–15)
GFR SERPL CREATININE-BSD FRML MDRD: 79 ML/MIN/1.7M2
GLUCOSE BLDC GLUCOMTR-MCNC: 112 MG/DL (ref 70–99)
GLUCOSE BLDC GLUCOMTR-MCNC: 141 MG/DL (ref 70–99)
GLUCOSE BLDC GLUCOMTR-MCNC: 183 MG/DL (ref 70–99)
GLUCOSE BLDC GLUCOMTR-MCNC: 184 MG/DL (ref 70–99)
GLUCOSE BLDC GLUCOMTR-MCNC: 208 MG/DL (ref 70–99)
GLUCOSE SERPL-MCNC: 190 MG/DL (ref 70–99)
GRAM STN SPEC: NORMAL
HCT VFR BLD AUTO: 38.1 % (ref 40–53)
HGB BLD-MCNC: 12.5 G/DL (ref 13.3–17.7)
IMM GRANULOCYTES # BLD: 0.1 10E9/L (ref 0–0.4)
IMM GRANULOCYTES NFR BLD: 0.5 %
INR PPP: 3.06 (ref 0.86–1.14)
LYMPHOCYTES # BLD AUTO: 0.8 10E9/L (ref 0.8–5.3)
LYMPHOCYTES NFR BLD AUTO: 7.3 %
LYMPHOCYTES NFR FLD MANUAL: 1 %
MCH RBC QN AUTO: 30.6 PG (ref 26.5–33)
MCHC RBC AUTO-ENTMCNC: 32.8 G/DL (ref 31.5–36.5)
MCV RBC AUTO: 93 FL (ref 78–100)
MONOCYTES # BLD AUTO: 1 10E9/L (ref 0–1.3)
MONOCYTES NFR BLD AUTO: 9.4 %
MONOS+MACROS NFR FLD MANUAL: 8 %
NEUTROPHILS # BLD AUTO: 8.6 10E9/L (ref 1.6–8.3)
NEUTROPHILS NFR BLD AUTO: 82 %
NEUTS BAND NFR FLD MANUAL: 91 %
NRBC # BLD AUTO: 0 10*3/UL
NRBC BLD AUTO-RTO: 0 /100
PLATELET # BLD AUTO: 171 10E9/L (ref 150–450)
POTASSIUM SERPL-SCNC: 4.1 MMOL/L (ref 3.4–5.3)
RBC # BLD AUTO: 4.09 10E12/L (ref 4.4–5.9)
SODIUM SERPL-SCNC: 133 MMOL/L (ref 133–144)
SPECIMEN SOURCE FLD: NORMAL
SPECIMEN SOURCE: NORMAL
WBC # BLD AUTO: 10.8 10E9/L (ref 4–11)
WBC # FLD AUTO: NORMAL /UL

## 2018-03-05 PROCEDURE — 87075 CULTR BACTERIA EXCEPT BLOOD: CPT | Performed by: NURSE PRACTITIONER

## 2018-03-05 PROCEDURE — 12000000 ZZH R&B MED SURG/OB

## 2018-03-05 PROCEDURE — 25000132 ZZH RX MED GY IP 250 OP 250 PS 637: Performed by: PHYSICIAN ASSISTANT

## 2018-03-05 PROCEDURE — G0378 HOSPITAL OBSERVATION PER HR: HCPCS

## 2018-03-05 PROCEDURE — 99232 SBSQ HOSP IP/OBS MODERATE 35: CPT | Performed by: PHYSICIAN ASSISTANT

## 2018-03-05 PROCEDURE — 85004 AUTOMATED DIFF WBC COUNT: CPT | Performed by: PHYSICIAN ASSISTANT

## 2018-03-05 PROCEDURE — 36415 COLL VENOUS BLD VENIPUNCTURE: CPT | Performed by: PHYSICIAN ASSISTANT

## 2018-03-05 PROCEDURE — 89051 BODY FLUID CELL COUNT: CPT | Performed by: NURSE PRACTITIONER

## 2018-03-05 PROCEDURE — 87070 CULTURE OTHR SPECIMN AEROBIC: CPT | Performed by: NURSE PRACTITIONER

## 2018-03-05 PROCEDURE — 85027 COMPLETE CBC AUTOMATED: CPT | Performed by: PHYSICIAN ASSISTANT

## 2018-03-05 PROCEDURE — 00000146 ZZHCL STATISTIC GLUCOSE BY METER IP

## 2018-03-05 PROCEDURE — 85610 PROTHROMBIN TIME: CPT | Performed by: PHYSICIAN ASSISTANT

## 2018-03-05 PROCEDURE — 0S9C3ZZ DRAINAGE OF RIGHT KNEE JOINT, PERCUTANEOUS APPROACH: ICD-10-PCS | Performed by: PHYSICIAN ASSISTANT

## 2018-03-05 PROCEDURE — 87205 SMEAR GRAM STAIN: CPT | Performed by: NURSE PRACTITIONER

## 2018-03-05 PROCEDURE — 80048 BASIC METABOLIC PNL TOTAL CA: CPT | Performed by: PHYSICIAN ASSISTANT

## 2018-03-05 PROCEDURE — 97530 THERAPEUTIC ACTIVITIES: CPT | Mod: GP | Performed by: PHYSICAL THERAPIST

## 2018-03-05 PROCEDURE — 73721 MRI JNT OF LWR EXTRE W/O DYE: CPT | Mod: RT

## 2018-03-05 PROCEDURE — 25000132 ZZH RX MED GY IP 250 OP 250 PS 637

## 2018-03-05 PROCEDURE — 97116 GAIT TRAINING THERAPY: CPT | Mod: GP | Performed by: PHYSICAL THERAPIST

## 2018-03-05 PROCEDURE — 25000128 H RX IP 250 OP 636: Performed by: PHYSICIAN ASSISTANT

## 2018-03-05 PROCEDURE — 40000193 ZZH STATISTIC PT WARD VISIT: Performed by: PHYSICAL THERAPIST

## 2018-03-05 PROCEDURE — 25000125 ZZHC RX 250: Performed by: NURSE PRACTITIONER

## 2018-03-05 PROCEDURE — 89060 EXAM SYNOVIAL FLUID CRYSTALS: CPT | Performed by: NURSE PRACTITIONER

## 2018-03-05 PROCEDURE — 25000131 ZZH RX MED GY IP 250 OP 636 PS 637: Performed by: PHYSICIAN ASSISTANT

## 2018-03-05 RX ORDER — CEFAZOLIN SODIUM 2 G/100ML
2 INJECTION, SOLUTION INTRAVENOUS EVERY 8 HOURS
Status: DISCONTINUED | OUTPATIENT
Start: 2018-03-05 | End: 2018-03-06

## 2018-03-05 RX ORDER — CEPHALEXIN 500 MG/1
500 CAPSULE ORAL EVERY 6 HOURS SCHEDULED
Status: DISCONTINUED | OUTPATIENT
Start: 2018-03-05 | End: 2018-03-05

## 2018-03-05 RX ORDER — LIDOCAINE HYDROCHLORIDE 10 MG/ML
10 INJECTION, SOLUTION EPIDURAL; INFILTRATION; INTRACAUDAL; PERINEURAL ONCE
Status: COMPLETED | OUTPATIENT
Start: 2018-03-05 | End: 2018-03-05

## 2018-03-05 RX ORDER — WARFARIN SODIUM 1 MG/1
1 TABLET ORAL
Status: COMPLETED | OUTPATIENT
Start: 2018-03-05 | End: 2018-03-05

## 2018-03-05 RX ADMIN — DILTIAZEM HYDROCHLORIDE 120 MG: 120 CAPSULE, COATED, EXTENDED RELEASE ORAL at 20:06

## 2018-03-05 RX ADMIN — GLIPIZIDE 10 MG: 10 TABLET, FILM COATED, EXTENDED RELEASE ORAL at 09:40

## 2018-03-05 RX ADMIN — FERROUS GLUCONATE 324 MG: 324 TABLET ORAL at 09:40

## 2018-03-05 RX ADMIN — PIOGLITAZONE 45 MG: 15 TABLET ORAL at 09:39

## 2018-03-05 RX ADMIN — ACETAMINOPHEN 650 MG: 325 TABLET, FILM COATED ORAL at 16:19

## 2018-03-05 RX ADMIN — CHOLESTYRAMINE 4 G: 4 POWDER, FOR SUSPENSION ORAL at 20:06

## 2018-03-05 RX ADMIN — METFORMIN HYDROCHLORIDE 500 MG: 500 TABLET ORAL at 18:11

## 2018-03-05 RX ADMIN — ACETAMINOPHEN 650 MG: 325 TABLET, FILM COATED ORAL at 00:13

## 2018-03-05 RX ADMIN — METFORMIN HYDROCHLORIDE 500 MG: 500 TABLET ORAL at 09:39

## 2018-03-05 RX ADMIN — LIDOCAINE HYDROCHLORIDE 50 MG: 10 INJECTION, SOLUTION EPIDURAL; INFILTRATION; INTRACAUDAL at 13:36

## 2018-03-05 RX ADMIN — ACETAMINOPHEN 650 MG: 325 TABLET, FILM COATED ORAL at 22:33

## 2018-03-05 RX ADMIN — CEFAZOLIN SODIUM 2 G: 2 INJECTION, SOLUTION INTRAVENOUS at 15:23

## 2018-03-05 RX ADMIN — INSULIN ASPART 1 UNITS: 100 INJECTION, SOLUTION INTRAVENOUS; SUBCUTANEOUS at 22:33

## 2018-03-05 RX ADMIN — GLIPIZIDE 10 MG: 10 TABLET, FILM COATED, EXTENDED RELEASE ORAL at 20:06

## 2018-03-05 RX ADMIN — ACETAMINOPHEN 650 MG: 325 TABLET, FILM COATED ORAL at 09:39

## 2018-03-05 RX ADMIN — WARFARIN SODIUM 1 MG: 1 TABLET ORAL at 18:12

## 2018-03-05 RX ADMIN — OXYCODONE HYDROCHLORIDE 5 MG: 5 TABLET ORAL at 09:39

## 2018-03-05 NOTE — PLAN OF CARE
Problem: Patient Care Overview  Goal: Plan of Care/Patient Progress Review  Problem: Patient Care Overview  Goal: Plan of Care/Patient Progress Review  PRIMARY DIAGNOSIS: ACUTE PAIN - R KNEE  OUTPATIENT/OBSERVATION GOALS TO BE MET BEFORE DISCHARGE:  1. Pain Status: Pain free at rest.     2. Return to near baseline physical activity: No     3. Cleared for discharge by consultants (if involved): No    Pt A&Ox4, VSS, HR tachy at times. Pt denies R knee pain at rest, reports pain is about 7/10 when bending knee or bearing weight. Ortho preformed R knee joint aspiration, fluid sent to lab. PT recommended TCU. Pt up with Ax1 with walker, and immobilizer on R knee. Ace wrap in place on R knee. Ice pack PRN. Tolerating diabetic diet, appetite good. BM today. SW following for placement. Will continue to monitor.    Discharge Planner Nurse   Safe discharge environment identified: No  Barriers to discharge: Yes       Entered by: Sandra Aquino 03/05/2018 8:00 AM  Please review provider order for any additional goals.   Nurse to notify provider when observation goals have been met and patient is ready for discharge.

## 2018-03-05 NOTE — CONSULTS
"[facility] Orthopedic Consultation    Louis Keller Jr. MRN# [id number]   Age: 85 year old YOB: 1932     Date of Admission:  3/3/2018    Reason for consult: Right knee effusion        Requesting physician: Maame Pack PA-C        Level of consult: Consult, follow and place orders           Assessment and Plan:   Assessment:   1) Traumatic hemarthrosis right knee-   ? Possible quadriceps tendon rupture given unable to perform straight leg raise and patient describes that his right knee \"gave out\" as he stepped down. Septic knee joint less likely.            Plan:   1) I discussed with Ortho Trauma Surgeon, Dr. Zhao- will order STAT MRI of right knee this morning to evaluate for possible quadriceps tendon injury.  2) PT/OT  3) WBAT RLE with right knee in immobilizer- ok to remove knee immobilizer when in bed.  4) Continue ACE wrap and ice to decrease swelling.   5) Further plan to follow based on MRI results: MRI results back: negative for quadriceps tendon or ligamentous injury, does show advanced patellofemoral DJD and large joint effusion.   6) After talking with Hospitalist and Dr. Zhao- we aspirated fluid from right knee effusion this afternoon and sent to lab for cell count, gram stain and culture. Further Ortho plan to follow based on results of joint aspiration.              Chief Complaint:   Right knee pain          History of Present Illness:   This patient is a 85 year old male who presents with the following condition requiring a hospital admission:    Three days ago patient reports he stepped off the street over a gutter and felt his right knee give way. He fell to the ground landing on his left shoulder but denies actually striking his right knee. He denies hitting his head or injuring any other area of his body. After the fall he started having right knee pain and increased swelling, prompting him to come into the ED. X-rays and CT scan of his right knee did not show " "any acute fracture. X-rays of his right ankle and left shoulder were also negative. US of his right knee showed a fluid pocket indicating possible hemarthrosis and the knee was drained of approx 35cc of bloody colored fluid. Unfortunately, this fluid was not sent for further analysis as suspicion for septic arthritis was low. Patient was admitted to hospital and continues to have significant right knee effusion and pain. He did have temp up to 101.4 with tachycardia on evening of 3/3/18 so there was concern about possible septic joint. Antibiotics were started by Hospitalist. Since then, temps have remained in 98-99 range but patient continues to report right knee pain with bending and weight bearing. He is seen this morning in his room. He reports no pain in right knee when lying in bed. The pain increases to 7/10 when he bends the right knee to about 50 degrees. The right knee has a moderate effusion but no erythema or cellulitis. There is some warmth about the knee but it is not hot to touch. He denies numbness or tingling in RLE. He is unable to perform a straight leg raise with his RLE. He is able to plantar and dorsiflex the right foot and wiggle toes. He denies any recent illness or feeling \"sick\" today. Denies current fever, chills, nausea/vomiting or diarrhea. He had a left total knee arthroplasty performed by Dr. Perea in 2013 and a past fracture of his right ankle that did not require surgery. He denies any past problems with his left knee replacement.           Past Medical History:     Past Medical History:   Diagnosis Date     Antiplatelet or antithrombotic long-term use      Atrial fibrillation (H)      Diarrhea      Diastolic murmur      QUESADA (dyspnea on exertion)      Hemorrhage of gastrointestinal tract, unspecified 63,65,and 1971    hospitalized     History of blood transfusion      Scarlet fever      Type II or unspecified type diabetes mellitus without mention of complication, not stated " as uncontrolled      Unspecified disease of pancreas     pancreatitis             Past Surgical History:     Past Surgical History:   Procedure Laterality Date     ARTHROPLASTY KNEE  2013    Procedure: ARTHROPLASTY KNEE;  Left Total Knee Arthroplasty       C NONSPECIFIC PROCEDURE  Child    T&A     C NONSPECIFIC PROCEDURE  ; also     Colonoscopy     C NONSPECIFIC PROCEDURE      Basal cell cancer of the nose     C NONSPECIFIC PROCEDURE      Cholecystectomy     CARDIOVERSION  11    failed             Social History:     Social History   Substance Use Topics     Smoking status: Never Smoker     Smokeless tobacco: Never Used     Alcohol use 0.0 oz/week     0 Standard drinks or equivalent per week      Comment: 1 glass of wine every day             Family History:     Family History   Problem Relation Age of Onset     Alzheimer Disease Maternal Grandmother      Arthritis Maternal Grandmother      CANCER Mother      breast/ /colon     Eye Disorder Mother      glaucoma and cataracts     HEART DISEASE Mother      angina/CABG     Hypertension Mother      CANCER Father      colon     DIABETES Maternal Aunt      AoDM             Immunizations:     VACCINE/DOSE   Diptheria   DPT   DTAP   HBIG   Hepatitis A   Hepatitis B   HIB   Influenza   Measles   Meningococcal   MMR   Mumps   Pneumococcal   Polio   Rubella   Small Pox   TDAP   Varicella   Zoster             Allergies:     Allergies   Allergen Reactions     No Known Drug Allergies              Medications:     Current Facility-Administered Medications   Medication     ferrous gluconate (FERGON) tablet 324 mg     HYDROmorphone (PF) (DILAUDID) injection 0.2-0.4 mg     glucose 40 % gel 15-30 g    Or     dextrose 50 % injection 25-50 mL    Or     glucagon injection 1 mg     insulin aspart (NovoLOG) inj (RAPID ACTING)     insulin aspart (NovoLOG) inj (RAPID ACTING)     cholestyramine (QUESTRAN) Packet 4 g     diltiazem (CARDIZEM CD/CARTIA XT) 24 hr  capsule 120 mg     glipiZIDE (GLUCOTROL XL) 24 hr tablet 10 mg     metFORMIN (GLUCOPHAGE) tablet 500 mg     pioglitazone (ACTOS) tablet 45 mg     acetaminophen (TYLENOL) tablet 650 mg     naloxone (NARCAN) injection 0.1-0.4 mg     melatonin tablet 1 mg     ondansetron (ZOFRAN-ODT) ODT tab 4 mg    Or     ondansetron (ZOFRAN) injection 4 mg     lidocaine 1 % 1 mL     lidocaine (LMX4) kit     sodium chloride (PF) 0.9% PF flush 3 mL     sodium chloride (PF) 0.9% PF flush 3 mL     senna-docusate (SENOKOT-S;PERICOLACE) 8.6-50 MG per tablet 1 tablet    Or     senna-docusate (SENOKOT-S;PERICOLACE) 8.6-50 MG per tablet 2 tablet     polyethylene glycol (MIRALAX/GLYCOLAX) Packet 17 g     Warfarin Therapy Reminder (Check START DATE - warfarin may be starting in the FUTURE)     hydrOXYzine (ATARAX) tablet 25 mg     oxyCODONE IR (ROXICODONE) half-tab 2.5-5 mg     glucose 40 % gel 15-30 g    Or     dextrose 50 % injection 25-50 mL    Or     glucagon injection 1 mg     ibuprofen (ADVIL/MOTRIN) tablet 200 mg     Facility-Administered Medications Ordered in Other Encounters   Medication     insulin aspart (NovoLOG) injection     insulin aspart (NovoLOG) injection             Review of Systems:   CV: NEGATIVE for chest pain, palpitations or peripheral edema  C: NEGATIVE for fever, chills, change in weight  E/M: NEGATIVE for ear, mouth and throat problems  R: NEGATIVE for significant cough or SOB          Physical Exam:   All vitals have been reviewed  Patient Vitals for the past 24 hrs:   BP Temp Temp src Pulse Heart Rate Resp SpO2   03/05/18 0754 155/82 98.5  F (36.9  C) Oral - 111 20 95 %   03/05/18 0420 102/57 98.5  F (36.9  C) Oral - 86 16 95 %   03/05/18 0001 114/63 99.7  F (37.6  C) Oral - 106 18 93 %   03/04/18 2035 124/68 99.4  F (37.4  C) Oral - 96 24 96 %   03/04/18 1605 124/68 98.8  F (37.1  C) Oral - 92 16 95 %   03/04/18 1303 123/78 97.9  F (36.6  C) Oral 105 - 16 95 %   03/04/18 0816 113/70 98.3  F (36.8  C) Oral - 88 16  95 %       Intake/Output Summary (Last 24 hours) at 03/05/18 0802  Last data filed at 03/05/18 0522   Gross per 24 hour   Intake              100 ml   Output             1350 ml   Net            -1250 ml     Musculoskeletal:   RLE: Moderate effusion mainly suprapatellar of right knee. Skin intact. No erythema, streaking or ecchymosis.   -Warm to touch but not hot, slight tenderness to palpation of suprapatellar area, minimal tenderness around joint line.  -Unable to perform straight leg raise or actively extend RLE   -Able to flex right knee to about 45 degrees actively, passive flexion to about 95 degrees  - +pain when joint flexed >50 degrees   - Distal CMS intact  - +DF/PF/EHL  - 2+ DP pulses  - Wiggles toes, cap refill < 3 sec              Data:   All laboratory data reviewed  Results for orders placed or performed during the hospital encounter of 03/03/18   Knee XR, 3 views, right    Narrative    KNEE THREE VIEWS RIGHT  3/3/2018 2:52 PM     HISTORY: fall, right knee pain/swelling;     COMPARISON: None.    FINDINGS: Degenerative changes seen in the patellofemoral compartment.  There is a degenerative spur off the lateral joint compartment. A knee  joint effusion appears to be present. There are calcific densities in  the suprapatellar region which may represent joint loose bodies..      Impression    IMPRESSION:   1. No fractures are identified.  2. Degenerative change with Chondrocalcinosis.  3. Knee joint effusion. Question joint loose bodies.    SHANELL SOLOMON MD   Ankle XR, G/E 3 views, right    Narrative    ANKLE THREE VIEWS RIGHT  3/3/2018 2:52 PM     HISTORY: fall, ankle pain;     COMPARISON: None.    FINDINGS: There is normal osseous alignment.  No acute fractures are  identified.  There is deformity of the distal fibula. This appears to  be due to a healed fracture.      Impression    IMPRESSION: No definite acute fractures are identified. The deformity  of the distal fibula appears to be due to a healed  fracture.    SHANELL SOLOMON MD   XR Shoulder Left G/E 3 Views    Narrative    LEFT SHOULDER THREE OR MORE VIEWS  3/3/2018 2:53 PM     HISTORY: Fall, left shoulder pain.      Impression    IMPRESSION: Three views of the left shoulder are performed. No  evidence of fracture or dislocation. Acromioclavicular joint appears  intact radiographically.         ALIZA TRAN MD   CT Knee Right w/o Contrast    Narrative    CT KNEE RIGHT WITHOUT CONTRAST 3/3/2018 4:52 PM     HISTORY: Right knee pain, possible lateral femoral condyle fracture.    TECHNIQUE: Axial scans were performed through the right knee with  sagittal and coronal reconstruction. Radiation dose for this scan was  reduced using automated exposure control, adjustment of the mA and/or  kV according to patient size, or iterative reconstruction technique.    COMPARISON: X-ray from 3/3/2018.    FINDINGS: No evidence of acute fracture or subluxation. Moderate  marginal osteophyte formation lateral femoral condyle. Mild marginal  osteophyte formation medial femoral condyle some of which project into  the femoral notch. Moderate patellofemoral degenerative changes.    There is a moderate to large joint effusion with some patchy somewhat  linear calcification within the joint effusion that may be synovial  calcification. It does not have the typical appearance of loose bodies  but  difficult to exclude some of this appearance being related to  loose bodies.      Impression    IMPRESSION:  1. No evidence of acute fracture.  2. Unusual calcifications in the suprapatellar region within a joint  effusion that could represent synovial calcification. Loose bodies  unlikely but could potentially contribute to this appearance. Some  unusual type of synovial chondromatosis or chondrocalcinosis could  result in this appearance as well.  3. Tricompartmental degenerative changes.      KAYLA VALLEJO MD   POC US SOFT TISSUE    Impression    [facility] Procedure Note     Limited  Bedside ED Ultrasound of Soft Tissue:    PROCEDURE: PERFORMED BY: Dr. Jaime Decker  INDICATIONS/SYMPTOM: Right knee swelling  PROBE: High frequency linear probe  BODY LOCATION: Soft tissue located on right knee     FINDINGS: Cobblestoning of soft tissue: absent   Hypoechoic fluid (ie abscess) identified: present    US utilized to access fluid pocket with needle  INTERPRETATION:  The soft tissue and muscle layers were evaluated.      Findings indicate hemarthrosis    IMAGE DOCUMENTATION: Images were archived to PACs system.     INR   Result Value Ref Range    INR 2.12 (H) 0.86 - 1.14   CBC with platelets differential   Result Value Ref Range    WBC 11.0 4.0 - 11.0 10e9/L    RBC Count 4.21 (L) 4.4 - 5.9 10e12/L    Hemoglobin 12.8 (L) 13.3 - 17.7 g/dL    Hematocrit 38.9 (L) 40.0 - 53.0 %    MCV 92 78 - 100 fl    MCH 30.4 26.5 - 33.0 pg    MCHC 32.9 31.5 - 36.5 g/dL    RDW 14.2 10.0 - 15.0 %    Platelet Count 165 150 - 450 10e9/L    Diff Method Automated Method     % Neutrophils 74.9 %    % Lymphocytes 9.4 %    % Monocytes 14.7 %    % Eosinophils 0.2 %    % Basophils 0.4 %    % Immature Granulocytes 0.4 %    Nucleated RBCs 0 0 /100    Absolute Neutrophil 8.3 1.6 - 8.3 10e9/L    Absolute Lymphocytes 1.0 0.8 - 5.3 10e9/L    Absolute Monocytes 1.6 (H) 0.0 - 1.3 10e9/L    Absolute Eosinophils 0.0 0.0 - 0.7 10e9/L    Absolute Basophils 0.0 0.0 - 0.2 10e9/L    Abs Immature Granulocytes 0.0 0 - 0.4 10e9/L    Absolute Nucleated RBC 0.0    Basic metabolic panel   Result Value Ref Range    Sodium 133 133 - 144 mmol/L    Potassium 4.1 3.4 - 5.3 mmol/L    Chloride 101 94 - 109 mmol/L    Carbon Dioxide 24 20 - 32 mmol/L    Anion Gap 8 3 - 14 mmol/L    Glucose 183 (H) 70 - 99 mg/dL    Urea Nitrogen 14 7 - 30 mg/dL    Creatinine 0.81 0.66 - 1.25 mg/dL    GFR Estimate >90 >60 mL/min/1.7m2    GFR Estimate If Black >90 >60 mL/min/1.7m2    Calcium 9.2 8.5 - 10.1 mg/dL   Lactic acid whole blood   Result Value Ref Range    Lactic Acid  1.3 0.7 - 2.0 mmol/L   INR   Result Value Ref Range    INR 2.34 (H) 0.86 - 1.14   CBC with platelets differential   Result Value Ref Range    WBC 10.2 4.0 - 11.0 10e9/L    RBC Count 3.71 (L) 4.4 - 5.9 10e12/L    Hemoglobin 11.4 (L) 13.3 - 17.7 g/dL    Hematocrit 34.3 (L) 40.0 - 53.0 %    MCV 93 78 - 100 fl    MCH 30.7 26.5 - 33.0 pg    MCHC 33.2 31.5 - 36.5 g/dL    RDW 14.3 10.0 - 15.0 %    Platelet Count 157 150 - 450 10e9/L    Diff Method Automated Method     % Neutrophils 72.5 %    % Lymphocytes 10.8 %    % Monocytes 15.4 %    % Eosinophils 0.3 %    % Basophils 0.5 %    % Immature Granulocytes 0.5 %    Nucleated RBCs 0 0 /100    Absolute Neutrophil 7.4 1.6 - 8.3 10e9/L    Absolute Lymphocytes 1.1 0.8 - 5.3 10e9/L    Absolute Monocytes 1.6 (H) 0.0 - 1.3 10e9/L    Absolute Eosinophils 0.0 0.0 - 0.7 10e9/L    Absolute Basophils 0.1 0.0 - 0.2 10e9/L    Abs Immature Granulocytes 0.1 0 - 0.4 10e9/L    Absolute Nucleated RBC 0.0    UA with Microscopic reflex to Culture   Result Value Ref Range    Color Urine Yellow     Appearance Urine Clear     Glucose Urine 50 (A) NEG^Negative mg/dL    Bilirubin Urine Negative NEG^Negative    Ketones Urine Negative NEG^Negative mg/dL    Specific Gravity Urine 1.012 1.003 - 1.035    Blood Urine Small (A) NEG^Negative    pH Urine 6.0 5.0 - 7.0 pH    Protein Albumin Urine Negative NEG^Negative mg/dL    Urobilinogen mg/dL 0.0 0.0 - 2.0 mg/dL    Nitrite Urine Negative NEG^Negative    Leukocyte Esterase Urine Negative NEG^Negative    Source Midstream Urine     WBC Urine <1 0 - 5 /HPF    RBC Urine 4 (H) 0 - 2 /HPF    Mucous Urine Present (A) NEG^Negative /LPF   Glucose by meter   Result Value Ref Range    Glucose 221 (H) 70 - 99 mg/dL   Glucose by meter   Result Value Ref Range    Glucose 179 (H) 70 - 99 mg/dL   INR   Result Value Ref Range    INR 3.06 (H) 0.86 - 1.14   Glucose by meter   Result Value Ref Range    Glucose 141 (H) 70 - 99 mg/dL   Blood culture   Result Value Ref Range     Specimen Description Blood Left Hand     Special Requests Aerobic and anaerobic bottles received     Culture Micro No growth after 1 day    Blood culture   Result Value Ref Range    Specimen Description Blood Left Arm     Special Requests Aerobic and anaerobic bottles received     Culture Micro No growth after 1 day           Attestation:  I have reviewed today's vital signs, notes, medications, labs and imaging with Dr. Zhao.  Amount of time performed on this consult: 45 minutes.    MARTHA Rutherford CNP

## 2018-03-05 NOTE — CONSULTS
.Care Transition Initial Assessment - ALISIA  Reason For Consult: discharge planning. Noted PT assessment with recommendation for pt's transfer to rehab facility on discharge.  Met with: Patient    Active Problems:    Knee effusion, right    Joint effusion of knee       DATA  Lives With: spouse  Living Arrangements: house  Description of Support System: Supportive, Involved  Who is your support system?: Wife, Children  Support Assessment: Adequate family and caregiver support.        Resources List: Skilled Nursing Facility     Quality Of Family Relationships: supportive, involved  Transportation Available:  (to be determined)    ASSESSMENT  Cognitive Status:  alert and oriented    INTERVENTION   Met with pt and discussed therapy recommendation for his transfer to rehab facility on discharge. While hopeful of returning home on discharge, pt noted that he would be in agreement with going to rehab facility if that was determined in his best interest. Pt lives with his wife, reports that his wife has had cardia issues in the past so there would be limitations that she would have caring for him at home. He further noted that he and his wife have both been to [facility] in the past so he would like this facility considered, he also has awareness of [facility]. His daughters live in the [facility] area so he would also consider [facility]. Based on discussion, referrals have been made to these three facilities, pt has requested private room. Discussed with him the private room costs at the facilities ($38.40, $45, $40) which he acknowedges and accepts. Sw has noted in clinical documentation of planning for pt's discharge tomorrow.    PLAN  Financial costs for the patient includes: private room fees at rehab fcaility  Patient given options and choices for discharge: Yes   Patient/family is agreeable to the plan?  Yes:   Patient Goals and Preferences: independence with ambulation and ADLs  Patient anticipates discharging  to: rehab facility

## 2018-03-05 NOTE — PROGRESS NOTES
CM notified that PT has recommended TCU at discharge. Plan for discharge tomorrow, Tuesday, 3/6/18. CM attempted to visit with pt for TCU choice. Ortho MD in room preparing to start a procedure. Requested CM come back later. CM asked bedside RN to notify writer when procedure done. Will return later.     CM will continue to follow patient until discharge for any additional needs.     Sarah Walker RN, BSN, CTS  [facility]  [phone]

## 2018-03-05 NOTE — TELEPHONE ENCOUNTER
Pts wife called stating patient is currently admitted with right knee pain. They are talking about moving patient a nursing home tomorrow to do rehab. Pt was scheduled to have OV with Dr. Gleason for 3/9/18. Pts wife requested that we cancel OV and she will call to reschedule when patient gets better. Writer cancelled OV.

## 2018-03-05 NOTE — PLAN OF CARE
Problem: Patient Care Overview  Goal: Plan of Care/Patient Progress Review  Discharge Planner PT   Patient plan for discharge: Home with assist  Current status: Pt tolerates short distance ambulation with use of FWW, CGA, and use of KI. Pt noted to minimally weightbear with ambulation, and when cued to put more weight through R LE pt reports increased pain and is noted to have some buckling through brace. Pt with poor awareness of buckling. Unsafe to trial stairs at this time. Pt also noted to have some slight redness to R shin and the R leg is warm to touch even after having ice pack applied. Nursing and PA updated.   Barriers to return to prior living situation: Stairs to enter home  Recommendations for discharge: TCU  Rationale for recommendations: Pt is not currently at baseline for mobility, and is unsafe to discharge home. With continued PT, both IP and after discharge, pt is likely to obtain mobility goals.        Entered by: Bruna Novoa 03/05/2018 12:10 PM

## 2018-03-05 NOTE — PLAN OF CARE
Problem: Patient Care Overview  Goal: Plan of Care/Patient Progress Review  Outcome: No Change  PRIMARY DIAGNOSIS: Mechanical fall/ Right knee pain       OUTPATIENT/OBSERVATION GOALS TO BE MET BEFORE DISCHARGE  1. Orthostatic performed: No      2. Tolerating PO medications: Yes      3. Return to near baseline physical activity: Patient using 1 assist with walker, reported that he can't do stairs yet due to pain. Patient baseline is IND      4. Cleared for discharge by consultants (if involved): N/A     Pt denies any pain at rest- states pain to right knee w/ movement. SW following for possible TCU vs Home w/ therapies. Temp 99.7 at midnight, Scheduled Tylenol given, Temp now 98.5.  PT saw yesterday - if pt unable to manage stairs - possible TCU stay. Will continue to monitor and provide supportive cares.

## 2018-03-05 NOTE — PLAN OF CARE
Problem: Patient Care Overview  Goal: Plan of Care/Patient Progress Review  Outcome: No Change  PRIMARY DIAGNOSIS: Mechanical fall/ Right knee pain      OUTPATIENT/OBSERVATION GOALS TO BE MET BEFORE DISCHARGE  1. Orthostatic performed: No     2. Tolerating PO medications: Yes     3. Return to near baseline physical activity: Patient using 1 assist with walker, reported to writer that he can't do stairs yet due to pain. Patient baseline is IND     4. Cleared for discharge by consultants (if involved): N/A    Pt denies any pain at rest- states pain to right knee w/ movement. SW following for possible TCU vs Home w/ therapies. Temp 99.7, o/w vitals stable. Scheduled Tylenol given. Will continue to monitor and provide supportive cares.     Discharge Planner Nurse   Safe discharge environment identified: Yes  Barriers to discharge: possibly, Patient can't do stairs yet due to pain. May have difficulty at home if stairs.          Please review provider order for any additional goals.   Nurse to notify provider when observation goals have been met and patient is ready for discharge.

## 2018-03-05 NOTE — PROGRESS NOTES
Louis Keller Jr.  Right knee effusion    S: Patient presents with a right knee effusion. MRI shows no tendon or ligament ruptures, no mensicus tearing. Minimal concern for infection, therefore orthopedics will aspirate the knee at this time to send fluid for culture. Patient resting comfortably in bed without complaints.     O: @Temp: 98.1  F (36.7  C) Temp src: Oral BP: 128/75   Heart Rate: 93 Resp: 16 SpO2: 97 % O2 Device: None (Room air)      No significant erythema about the right knee  Moderate knee effusion  Able to perform SLR  Passive knee ROM 0-45 with minimal discomfort  Active knee ROM 0-30  NVID    Labs:   Hemoglobin   Date Value Ref Range Status   03/05/2018 12.5 (L) 13.3 - 17.7 g/dL Final   ]    A:  Right knee effusion    P:  - aspiration of right knee fluid   -consent obtained from patient for right knee aspiration   -under sterile condition 45mL of bloody fluid was drained from the patient's right knee   -fluid mostly bloody with tinge of cloudiness   - will send synovial fluid for cell count, crystal, gram stain and culture   - if cell count and gram stain come back without concern for infection, likely okay for patient to discharge home and follow up as an outpatient to discuss arthritis treatment  - Dr. Zhao will follow results this evening, and will call to discuss treatment plan pending cell count and gram stain  - leave ace bandage at patient's knee for compression, okay to remove for showering  - ROM as tolerated by patient, knee immobilizer on while ambulating       Vaishali Cardoso PA-C  3/5/2018

## 2018-03-05 NOTE — PROCEDURES
Right knee aspiration.  This procedure has been fully reviewed with the patient and written informed consent has been obtained. We discussed the risks of infection, stiffness, bleeding and nerve injury. We discussed we believe the benefits of the procedure outweigh the risks. He expressed understanding and agreed.    Under sterile conditions the right knee was positioned and cleaned with betadine swabs. The right lateral knee was marked for the aspiration. 2cc of 1% lidocaine was injected into the right knee joint using the lateral parapatellar approach. We allowed the anesthetic a few minutes to set in, and then cleaned the knee again with betadine swabs. The same approach was then used and using an 18 gauge needle 45mL of bloody fluid was aspirated from the knee. The synovial fluid did have a slight cloudy appearance. Gauze was used to place pressure at the aspiration site. Minimal bleeding occurred. A bandaid was placed over the site, and a 6 inch ACE bandage was placed around the knee for compression.    The fluid was placed into a sterile container and appropriately labeled. It was sent as a STAT order to the lab for cell count, gram stain, crystal analysis and culture.     Vaishali Cardoso PA-C  March 5, 2018

## 2018-03-05 NOTE — PROGRESS NOTES
[facility]  Internal Medicine  Progress Note    Date of Service: 3/5/2018    Patient: Louis Keller Jr.  MRN: [id number]  Admission Date: 3/3/2018  Hospital Day # 3    Assessment & Plan: Louis Keller Jr. is a 85 year old male with a past medical history significant for a fib - on coumadin, DM2, and HLD admitted on 3/3/2018 with right knee pain.  The patient had a mechanical fall on 3/2, striking his right knee on the pavement. The right knee became progressively more swollen and painful to the point that he could not ambulate or touch the knee without severe pain. He presented to the ER on 3/3 for evaluation. He had an XRay and CT of the right knee that did not show any acute fracture. Ortho was consulted via phone by the ER who recommended drainage. About 35cc of mixed bloody fluid was removed, this was not sent for analysis. The patient continued to have significant pain, however, so was admitted for further evaluation.     Traumatic Right Knee Effusion - significant swelling, warmth with mild erythema of the right knee joint concerning for infection.  Tmax 101.4 on 3/3 with temp down to 99.7 overnight.  WBC stable 10.8 and Hgb stable 12.5.  Blood cultures NGTD.  Received IV Clindamycin x1 on 3/4.  Orthopedics consulted today.  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  - joint aspiration today and follow up fluid analysis  - IV Cefazolin   - patient today has severe pain in the knee, particularly with palpation and weight bearing. He has decreased ROM and the knee is warm to the touch. - PT recommended TCU, social work consulted.  - scheduled Tylenol with prn Oxycodone and prn Dilaudid.    DM2 - resume PTA actos, metformin and glipizide. ISS protocol.    A Fib - pharmacy to continue dosing coumadin. Continue PTA diltiazem.     CODE: Full  DVT: on warfarin  Diet/fluids: diabetic diet    Sunitha Hoffman MS  JILLIAN  Hospitalist Physician Assistant  [facility]  Pager: [phone]      Subjective & Interval Hx:    Patient still with 7/10 right knee pain.  Has increased pain with any movement or weight bearing.  No chest pain or shortness of breath.  Lives in a home with his wife.     Last 24 hr care team notes reviewed.   ROS:  4 point ROS including Respiratory, CV, GI and , other than that noted in the HPI, is negative.    Physical Exam:    Blood pressure 123/69, pulse 105, temperature 98.2  F (36.8  C), temperature source Oral, resp. rate 16, weight 86.2 kg (190 lb), SpO2 95 %.  General: Alert, interactive, NAD, sitting up in bed  HEENT: AT/NC, sclera anicteric, PERRL, EOMI, OP clear with MMM  Neck: Supple, no JVD or cervical LAD  Resp: clear to auscultation bilaterally, no crackles or wheezes  Cardiac: regular rate and rhythm, no murmur  Abdomen: Soft, nontender, nondistended. +BS.  No HSM or masses, no rebound or guarding.  Extremities: large right knee effusion, tender to palpation, warm to the touch with mild erythema.  Pain with minimal movement.  Neuro: Alert & oriented x 3, FROM testing not performed.     Labs & Images:  Reviewed in Epic   Medications:    Current Facility-Administered Medications   Medication     ceFAZolin (ANCEF) intermittent infusion 1 g     lidocaine (PF) (XYLOCAINE) 1 % injection 100 mg     ferrous gluconate (FERGON) tablet 324 mg     HYDROmorphone (PF) (DILAUDID) injection 0.2-0.4 mg     glucose 40 % gel 15-30 g    Or     dextrose 50 % injection 25-50 mL    Or     glucagon injection 1 mg     insulin aspart (NovoLOG) inj (RAPID ACTING)     insulin aspart (NovoLOG) inj (RAPID ACTING)     cholestyramine (QUESTRAN) Packet 4 g     diltiazem (CARDIZEM CD/CARTIA XT) 24 hr capsule 120 mg     glipiZIDE (GLUCOTROL XL) 24 hr tablet 10 mg     metFORMIN (GLUCOPHAGE) tablet 500 mg     pioglitazone (ACTOS) tablet 45 mg     acetaminophen (TYLENOL) tablet 650 mg     naloxone (NARCAN) injection  0.1-0.4 mg     melatonin tablet 1 mg     ondansetron (ZOFRAN-ODT) ODT tab 4 mg    Or     ondansetron (ZOFRAN) injection 4 mg     lidocaine 1 % 1 mL     lidocaine (LMX4) kit     sodium chloride (PF) 0.9% PF flush 3 mL     sodium chloride (PF) 0.9% PF flush 3 mL     senna-docusate (SENOKOT-S;PERICOLACE) 8.6-50 MG per tablet 1 tablet    Or     senna-docusate (SENOKOT-S;PERICOLACE) 8.6-50 MG per tablet 2 tablet     polyethylene glycol (MIRALAX/GLYCOLAX) Packet 17 g     Warfarin Therapy Reminder (Check START DATE - warfarin may be starting in the FUTURE)     hydrOXYzine (ATARAX) tablet 25 mg     oxyCODONE IR (ROXICODONE) half-tab 2.5-5 mg     glucose 40 % gel 15-30 g    Or     dextrose 50 % injection 25-50 mL    Or     glucagon injection 1 mg     ibuprofen (ADVIL/MOTRIN) tablet 200 mg     Facility-Administered Medications Ordered in Other Encounters   Medication     insulin aspart (NovoLOG) injection     insulin aspart (NovoLOG) injection

## 2018-03-05 NOTE — PLAN OF CARE
Problem: Patient Care Overview  Goal: Plan of Care/Patient Progress Review  PRIMARY DIAGNOSIS: ACUTE PAIN  OUTPATIENT/OBSERVATION GOALS TO BE MET BEFORE DISCHARGE:  1. Pain Status: Pain free at rest.    2. Return to near baseline physical activity: No    3. Cleared for discharge by consultants (if involved): No    Pt A&Ox4, VSS, HR tachy at times. Pt denies R knee pain at rest, reports pain is about 7/10 when bending knee or bearing weight. Ortho consulted this AM, MRI ordered to r/o tendon or ligament injuries. PT recommended TCU vs home pending improved pain control and mobility. Pt reports his R knee feels a little bit better than yesterday, though does report mild numbness in R foot, though he cannot recall if this is new or not. Pt up with Ax1 with walker, and immobilizer on R knee. Ace wrap in place on R knee. Ice pack PRN. Scheduled tylenol and prn oxycodone given. Tolerating diabetic diet, appetite good. BM today. Will continue to monitor.    Discharge Planner Nurse   Safe discharge environment identified: TBD  Barriers to discharge: Yes       Entered by: Sandra Aquino 03/05/2018 8:00 AM     Please review provider order for any additional goals.   Nurse to notify provider when observation goals have been met and patient is ready for discharge.

## 2018-03-06 VITALS
HEART RATE: 83 BPM | RESPIRATION RATE: 18 BRPM | WEIGHT: 191.7 LBS | TEMPERATURE: 97.6 F | DIASTOLIC BLOOD PRESSURE: 80 MMHG | OXYGEN SATURATION: 96 % | SYSTOLIC BLOOD PRESSURE: 124 MMHG | BODY MASS INDEX: 26.74 KG/M2

## 2018-03-06 VITALS
HEART RATE: 105 BPM | RESPIRATION RATE: 20 BRPM | DIASTOLIC BLOOD PRESSURE: 73 MMHG | WEIGHT: 190 LBS | OXYGEN SATURATION: 94 % | BODY MASS INDEX: 26.5 KG/M2 | TEMPERATURE: 98.3 F | SYSTOLIC BLOOD PRESSURE: 124 MMHG

## 2018-03-06 LAB
ANION GAP SERPL CALCULATED.3IONS-SCNC: 6 MMOL/L (ref 3–14)
BUN SERPL-MCNC: 16 MG/DL (ref 7–30)
CALCIUM SERPL-MCNC: 8.5 MG/DL (ref 8.5–10.1)
CHLORIDE SERPL-SCNC: 101 MMOL/L (ref 94–109)
CO2 SERPL-SCNC: 25 MMOL/L (ref 20–32)
CREAT SERPL-MCNC: 0.77 MG/DL (ref 0.66–1.25)
ERYTHROCYTE [DISTWIDTH] IN BLOOD BY AUTOMATED COUNT: 14.1 % (ref 10–15)
GFR SERPL CREATININE-BSD FRML MDRD: >90 ML/MIN/1.7M2
GLUCOSE BLDC GLUCOMTR-MCNC: 150 MG/DL (ref 70–99)
GLUCOSE BLDC GLUCOMTR-MCNC: 157 MG/DL (ref 70–99)
GLUCOSE BLDC GLUCOMTR-MCNC: 202 MG/DL (ref 70–99)
GLUCOSE SERPL-MCNC: 156 MG/DL (ref 70–99)
HCT VFR BLD AUTO: 33.7 % (ref 40–53)
HGB BLD-MCNC: 11.1 G/DL (ref 13.3–17.7)
INR PPP: 2.54 (ref 0.86–1.14)
MCH RBC QN AUTO: 30.2 PG (ref 26.5–33)
MCHC RBC AUTO-ENTMCNC: 32.9 G/DL (ref 31.5–36.5)
MCV RBC AUTO: 92 FL (ref 78–100)
PLATELET # BLD AUTO: 170 10E9/L (ref 150–450)
POTASSIUM SERPL-SCNC: 4 MMOL/L (ref 3.4–5.3)
RBC # BLD AUTO: 3.68 10E12/L (ref 4.4–5.9)
SODIUM SERPL-SCNC: 132 MMOL/L (ref 133–144)
WBC # BLD AUTO: 8.1 10E9/L (ref 4–11)

## 2018-03-06 PROCEDURE — 85027 COMPLETE CBC AUTOMATED: CPT | Performed by: PHYSICIAN ASSISTANT

## 2018-03-06 PROCEDURE — 25000132 ZZH RX MED GY IP 250 OP 250 PS 637: Performed by: PHYSICIAN ASSISTANT

## 2018-03-06 PROCEDURE — 25000132 ZZH RX MED GY IP 250 OP 250 PS 637: Performed by: HOSPITALIST

## 2018-03-06 PROCEDURE — 00000146 ZZHCL STATISTIC GLUCOSE BY METER IP

## 2018-03-06 PROCEDURE — 99239 HOSP IP/OBS DSCHRG MGMT >30: CPT | Performed by: HOSPITALIST

## 2018-03-06 PROCEDURE — 85610 PROTHROMBIN TIME: CPT | Performed by: PHYSICIAN ASSISTANT

## 2018-03-06 PROCEDURE — 36415 COLL VENOUS BLD VENIPUNCTURE: CPT | Performed by: PHYSICIAN ASSISTANT

## 2018-03-06 PROCEDURE — 80048 BASIC METABOLIC PNL TOTAL CA: CPT | Performed by: PHYSICIAN ASSISTANT

## 2018-03-06 PROCEDURE — 25000128 H RX IP 250 OP 636: Performed by: PHYSICIAN ASSISTANT

## 2018-03-06 RX ORDER — NAPROXEN 250 MG/1
250 TABLET ORAL 2 TIMES DAILY WITH MEALS
DISCHARGE
Start: 2018-03-06 | End: 2018-03-13

## 2018-03-06 RX ORDER — WARFARIN SODIUM 2.5 MG/1
2.5 TABLET ORAL
Status: DISCONTINUED | OUTPATIENT
Start: 2018-03-06 | End: 2018-03-06 | Stop reason: HOSPADM

## 2018-03-06 RX ORDER — AMOXICILLIN 250 MG
1 CAPSULE ORAL 2 TIMES DAILY PRN
Qty: 100 TABLET | DISCHARGE
Start: 2018-03-06 | End: 2019-11-11

## 2018-03-06 RX ORDER — COLCHICINE 0.6 MG/1
1.2 TABLET ORAL ONCE
Status: COMPLETED | OUTPATIENT
Start: 2018-03-06 | End: 2018-03-06

## 2018-03-06 RX ORDER — COLCHICINE 0.6 MG/1
0.6 TABLET ORAL 2 TIMES DAILY
Status: DISCONTINUED | OUTPATIENT
Start: 2018-03-06 | End: 2018-03-06 | Stop reason: HOSPADM

## 2018-03-06 RX ORDER — NAPROXEN 250 MG/1
250 TABLET ORAL 2 TIMES DAILY WITH MEALS
Status: DISCONTINUED | OUTPATIENT
Start: 2018-03-06 | End: 2018-03-06 | Stop reason: HOSPADM

## 2018-03-06 RX ORDER — OXYCODONE HYDROCHLORIDE 5 MG/1
2.5-5 TABLET ORAL EVERY 6 HOURS PRN
Qty: 18 TABLET | Refills: 0 | Status: SHIPPED | OUTPATIENT
Start: 2018-03-06 | End: 2018-03-13

## 2018-03-06 RX ORDER — COLCHICINE 0.6 MG/1
0.6 TABLET ORAL 2 TIMES DAILY
Qty: 3 TABLET | DISCHARGE
Start: 2018-03-06 | End: 2018-03-08

## 2018-03-06 RX ORDER — POLYETHYLENE GLYCOL 3350 17 G/17G
17 POWDER, FOR SOLUTION ORAL DAILY PRN
Qty: 7 PACKET | DISCHARGE
Start: 2018-03-06 | End: 2018-10-03

## 2018-03-06 RX ADMIN — GLIPIZIDE 10 MG: 10 TABLET, FILM COATED, EXTENDED RELEASE ORAL at 08:43

## 2018-03-06 RX ADMIN — METFORMIN HYDROCHLORIDE 500 MG: 500 TABLET ORAL at 08:43

## 2018-03-06 RX ADMIN — COLCHICINE 1.2 MG: 0.6 TABLET, FILM COATED ORAL at 11:13

## 2018-03-06 RX ADMIN — ACETAMINOPHEN 650 MG: 325 TABLET, FILM COATED ORAL at 10:08

## 2018-03-06 RX ADMIN — NAPROXEN 250 MG: 250 TABLET ORAL at 11:13

## 2018-03-06 RX ADMIN — ACETAMINOPHEN 650 MG: 325 TABLET, FILM COATED ORAL at 04:37

## 2018-03-06 RX ADMIN — FERROUS GLUCONATE 324 MG: 324 TABLET ORAL at 08:43

## 2018-03-06 RX ADMIN — PIOGLITAZONE 45 MG: 15 TABLET ORAL at 08:43

## 2018-03-06 RX ADMIN — OXYCODONE HYDROCHLORIDE 5 MG: 5 TABLET ORAL at 08:43

## 2018-03-06 RX ADMIN — CEFAZOLIN SODIUM 2 G: 2 INJECTION, SOLUTION INTRAVENOUS at 00:55

## 2018-03-06 RX ADMIN — CEFAZOLIN SODIUM 2 G: 2 INJECTION, SOLUTION INTRAVENOUS at 08:51

## 2018-03-06 NOTE — PLAN OF CARE
Physical Therapy Discharge Summary    Reason for therapy discharge:    Discharged to transitional care facility.    Progress towards therapy goal(s). See goals on Care Plan in [facility] electronic health record for goal details.  Goals not met.  Barriers to achieving goals:   limited tolerance for therapy and discharge from facility.    Therapy recommendation(s):    Continued therapy is recommended.  Rationale/Recommendations:  Eval and tx TCU.

## 2018-03-06 NOTE — PHARMACY-ANTICOAGULATION SERVICE
Clinical Pharmacy- Warfarin Discharge Note      Anticoagulation Dose History     Recent Dosing and Labs Latest Ref Rng & Units 12/27/2017 1/25/2018 2/22/2018 3/3/2018 3/4/2018 3/5/2018 3/6/2018    Warfarin 1 mg - - - - - - 1 mg -    Warfarin 2.5 mg - - - - - 2.5 mg - -    Warfarin 5 mg - - - - 5 mg - - -    INR 0.86 - 1.14 - - - 2.12(H) 2.34(H) 3.06(H) 2.54(H)    INR 0.86 - 1.14 2.1(A) 2.2(A) 2.0(A) - - - -          Patient admitted with warfarin as PTA med.  INR was therapeutic on admission but did range from 2.12 to 3.06 during admission.  Agree with plan to discharge on prior to admit dose and have close follow up of INR at the end of this week at the facility patient is discharging to.      The patient should have an INR checked end of the week.

## 2018-03-06 NOTE — PROGRESS NOTES
Your information has been submitted on March 06th, 2018 at 10:44:07 AM [facility]. The confirmation number is [id number]

## 2018-03-06 NOTE — PLAN OF CARE
Problem: Patient Care Overview  Goal: Plan of Care/Patient Progress Review  Patient's After Visit Summary was reviewed with patient, spouse, and daughter  Patient verbalized understanding of After Visit Summary, recommended follow up and was given an opportunity to ask questions.   Discharge medications sent home with patient/family: Not applicable, going to TCU. Scripts sent with [facility] transporter.    Discharged with [facility] transport to [facility] TCU.    Pt was stable and discharged via wheelchair with [facility] transport.    OBSERVATION patient END time: 1300

## 2018-03-06 NOTE — PLAN OF CARE
Problem: Patient Care Overview  Goal: Plan of Care/Patient Progress Review  Outcome: No Change  OUTPATIENT/OBSERVATION GOALS TO BE MET BEFORE DISCHARGE:  1. Pain Status: Pain free at rest.      2. Return to near baseline physical activity: No      3. Cleared for discharge by consultants (if involved): No     Pt A&Ox4, VSS, HR tachy at times. Pt denies R knee pain at rest, reports pain is about 7/10 when bending knee or bearing weight; none at rest Ortho preformed R knee joint aspiration, fluid sent to lab, positive for crystals. HS  - 1 unit insulin given.  PT recommended TCU. Pt up with Ax1 with walker, and immobilizer on R knee. Ace wrap in place on R knee. Ice pack PRN. SW following for placement. Will continue to monitor and provide supportive cares.     Discharge Planner Nurse   Safe discharge environment identified: No  Barriers to discharge: Yes         Please review provider order for any additional goals.   Nurse to notify provider when observation goals have been met and patient is ready for discharge.

## 2018-03-06 NOTE — PLAN OF CARE
Problem: Patient Care Overview  Goal: Plan of Care/Patient Progress Review  Outcome: No Change    Right Knee pain  1. Pain Status: Pain free at rest, pain 4/10 with movement      2. Return to near baseline physical activity: No      3. Cleared for discharge by consultants (if involved): No     Pt A&Ox4, VSS,  Pt denies R knee pain at rest, reports pain is about 4/10 when bending knee or bearing weight.  Analysis of fluid from right knee indicate positive for crystals   Pt  immobilizer on R knee. Ace wrap in place on R knee. Ice pack PRN. SW following for placement. Will continue to monitor and provide supportive cares.     Discharge Planner Nurse   Safe discharge environment identified: No  Barriers to discharge: Yes         Please review provider order for any additional goals.   Nurse to notify provider when observation goals have been met and patient is ready for discharge.

## 2018-03-06 NOTE — PROGRESS NOTES
SWS     D: Discharge planning continuing.. per discussion with MD planning continuing for pt's transfer to rehab facility today. [facility] has assessed and would be able to accept pt today, private room as requested, [facility] has semi-private room available, no openings at [facility].      I: Met with pt and spoke by phone with pt's wife, Laura.. discussed above. Pt has asked that planning continue for his transfer to [facility] accepting the private room cost of $45/day. Pt has requested medical transport to [facility], discussed with him that [facility] does not cover the w/c transport, so the private pay cost would be $70/base and $4.50/mi which he acknowledged and accepted. [facility] transport arranged for 3/6 @ 1300.. additional questions of pt and wife were addressed.      A/P: Anticipate no problem with arrangements for transfer as noted, SW available until discharge should adjustments be needed in plan.

## 2018-03-06 NOTE — DISCHARGE SUMMARY
[facility]  Discharge Summary  Name: Louis Keller Jr.    MRN: [id number]  YOB: 1932    Age: 85 year old  Date of Discharge:  3/6/2018  Date of Admission: 3/3/2018  Primary Care Provider: Joselito Armas  Discharge Physician:  Mike Boykin MD  Discharging Service:  Hospitalist  Date of Service (when I saw the patient): 03/06/18    Discharge Diagnosis:  Knee effusion, pseudogout     Other Diagnosis:  atrial fibrillation on warfarin, DM II, and HLP     Discharge Disposition:  Discharged to TCU     Allergies:  Allergies   Allergen Reactions     No Known Drug Allergies         Discharge Medications:   Current Discharge Medication List      START taking these medications    Details   oxyCODONE IR (ROXICODONE) 5 MG tablet Take 0.5-1 tablets (2.5-5 mg) by mouth every 6 hours as needed for moderate to severe pain  Qty: 18 tablet, Refills: 0    Associated Diagnoses: Knee effusion, right      naproxen (NAPROSYN) 250 MG tablet Take 1 tablet (250 mg) by mouth 2 times daily (with meals) for 7 days After 7 days, re-assess with a physician before continuing    Associated Diagnoses: Knee effusion, right      colchicine 0.6 MG tablet Take 1 tablet (0.6 mg) by mouth 2 times daily for 7 days Then, re-assess with physician  Qty: 3 tablet    Associated Diagnoses: Pseudogout         CONTINUE these medications which have NOT CHANGED    Details   cholestyramine (QUESTRAN) 4 G Packet Take 1 packet by mouth every evening      multivitamin, therapeutic (THERA-VIT) TABS tablet Take 0.5 tablets by mouth 2 times daily      pioglitazone (ACTOS) 45 MG tablet TAKE 1 TABLET (45 MG) BY MOUTH DAILY  Qty: 90 tablet, Refills: 1    Associated Diagnoses: Type 2 diabetes mellitus with stage 3 chronic kidney disease, without long-term current use of insulin (H)      ferrous gluconate (FERGON) 324 (38 FE) MG tablet TAKE 1 TABLET (324 MG) BY MOUTH DAILY (WITH BREAKFAST)  Qty: 100 tablet, Refills: 3    Associated Diagnoses:  Anemia due to blood loss, acute      metFORMIN (GLUCOPHAGE) 500 MG tablet Take 1 tablet (500 mg) by mouth 2 times daily (with meals)  Qty: 180 tablet, Refills: 1    Associated Diagnoses: Type 2 diabetes mellitus with stage 3 chronic kidney disease, without long-term current use of insulin (H)      warfarin (COUMADIN) 5 MG tablet TAKE 1 TABLET BY MOUTH TUESDAY,THURS AND SUN. TAKE 1/2 TABLET ON MON,WED,FRI AND SAT. OR AS DIRECTED  Qty: 65 tablet, Refills: 1    Associated Diagnoses: Heart palpitations; New onset atrial fibrillation (H); Chronic anticoagulation      glipiZIDE (GLIPIZIDE XL) 10 MG 24 hr tablet Take 1 tablet (10 mg) by mouth 2 times daily  Qty: 180 tablet, Refills: 3    Associated Diagnoses: Chronic atrial fibrillation (H); Actinic keratosis      diltiazem 120 MG 24 hr capsule Take 1 capsule (120 mg) by mouth every evening  Qty: 90 capsule, Refills: 3    Associated Diagnoses: Chronic atrial fibrillation (H)      Chromium 1000 MCG TABS Take 500 mcg by mouth daily      loperamide (IMODIUM A-D) 2 MG tablet 1 tablet BID  Qty: 30 tablet, Refills: 0    Comments: bid      acetaminophen (TYLENOL) 500 MG tablet Take 500 mg by mouth every 6 hours as needed       fish oil-omega-3 fatty acids (FISH OIL) 1000 MG capsule Take 1 g by mouth 2 times daily       STATIN NOT PRESCRIBED, INTENTIONAL, 1 each At Bedtime Statin not prescribed intentionally due to Risk for drug interaction  Qty: 0 each, Refills: 0    Associated Diagnoses: Type 2 diabetes, HbA1C goal < 8% (H)      ACE NOT PRESCRIBED, INTENTIONAL, 1 each daily ACE Inhibitor not prescribed due to Risk for drug interaction  Qty: 0 each, Refills: 0    Associated Diagnoses: Type 2 diabetes, HbA1C goal < 8% (H)      ASPIRIN NOT PRESCRIBED, INTENTIONAL, by Other route continuous prn Reported on 3/7/2017         STOP taking these medications       ONE TOUCH ULTRA test strip Comments:   Reason for Stopping:         blood glucose monitoring (ONE TOUCH ULTRASOFT) lancets  Comments:   Reason for Stopping:                Condition on Discharge:  Discharge condition: Stable   Discharge vitals: Blood pressure 128/74, pulse 105, temperature 98.2  F (36.8  C), temperature source Oral, resp. rate 20, weight 86.2 kg (190 lb), SpO2 95 %.   Code status on discharge: Full Code     History of Illness:  See detailed admission note for full details.     85 year old male who presents with right knee pain and swelling. He states he was walking in the driveway on 3/2 and slipped falling onto his right knee. He was able to get up and complete his tasks but over the course of the day the knee became more swollen and painful. Today he was unable to ambulate without significant pain. He noticed significant swelling but no redness.  He has not had any fever, nausea, vomiting, diarrhea, stomach pairn, or urinary symptoms.     Significant Physical Exam Findings:  Patient doing better. Still has knee pain. No chest pain, sob, abdo pain, n/v/d    Procedures:  Knee aspiration     Imaging:  Results for orders placed or performed during the hospital encounter of 03/03/18   Knee XR, 3 views, right    Narrative    KNEE THREE VIEWS RIGHT  3/3/2018 2:52 PM     HISTORY: fall, right knee pain/swelling;     COMPARISON: None.    FINDINGS: Degenerative changes seen in the patellofemoral compartment.  There is a degenerative spur off the lateral joint compartment. A knee  joint effusion appears to be present. There are calcific densities in  the suprapatellar region which may represent joint loose bodies..      Impression    IMPRESSION:   1. No fractures are identified.  2. Degenerative change with Chondrocalcinosis.  3. Knee joint effusion. Question joint loose bodies.    SHANELL SOLOMON MD   Ankle XR, G/E 3 views, right    Narrative    ANKLE THREE VIEWS RIGHT  3/3/2018 2:52 PM     HISTORY: fall, ankle pain;     COMPARISON: None.    FINDINGS: There is normal osseous alignment.  No acute fractures are  identified.  There is  deformity of the distal fibula. This appears to  be due to a healed fracture.      Impression    IMPRESSION: No definite acute fractures are identified. The deformity  of the distal fibula appears to be due to a healed fracture.    SHANELL SOLOMON MD   XR Shoulder Left G/E 3 Views    Narrative    LEFT SHOULDER THREE OR MORE VIEWS  3/3/2018 2:53 PM     HISTORY: Fall, left shoulder pain.      Impression    IMPRESSION: Three views of the left shoulder are performed. No  evidence of fracture or dislocation. Acromioclavicular joint appears  intact radiographically.         ALIZA TRAN MD   CT Knee Right w/o Contrast    Narrative    CT KNEE RIGHT WITHOUT CONTRAST 3/3/2018 4:52 PM     HISTORY: Right knee pain, possible lateral femoral condyle fracture.    TECHNIQUE: Axial scans were performed through the right knee with  sagittal and coronal reconstruction. Radiation dose for this scan was  reduced using automated exposure control, adjustment of the mA and/or  kV according to patient size, or iterative reconstruction technique.    COMPARISON: X-ray from 3/3/2018.    FINDINGS: No evidence of acute fracture or subluxation. Moderate  marginal osteophyte formation lateral femoral condyle. Mild marginal  osteophyte formation medial femoral condyle some of which project into  the femoral notch. Moderate patellofemoral degenerative changes.    There is a moderate to large joint effusion with some patchy somewhat  linear calcification within the joint effusion that may be synovial  calcification. It does not have the typical appearance of loose bodies  but  difficult to exclude some of this appearance being related to  loose bodies.      Impression    IMPRESSION:  1. No evidence of acute fracture.  2. Unusual calcifications in the suprapatellar region within a joint  effusion that could represent synovial calcification. Loose bodies  unlikely but could potentially contribute to this appearance. Some  unusual type of synovial  chondromatosis or chondrocalcinosis could  result in this appearance as well.  3. Tricompartmental degenerative changes.      KAYLA VALLEJO MD   POC US SOFT TISSUE    Impression    [facility] Procedure Note     Limited Bedside ED Ultrasound of Soft Tissue:    PROCEDURE: PERFORMED BY: Dr. Jaime Decker  INDICATIONS/SYMPTOM: Right knee swelling  PROBE: High frequency linear probe  BODY LOCATION: Soft tissue located on right knee     FINDINGS: Cobblestoning of soft tissue: absent   Hypoechoic fluid (ie abscess) identified: present    US utilized to access fluid pocket with needle  INTERPRETATION:  The soft tissue and muscle layers were evaluated.      Findings indicate hemarthrosis    IMAGE DOCUMENTATION: Images were archived to PACs system.     MR Knee Right w/o Contrast    Narrative    MR KNEE RIGHT WITHOUT CONTRAST March 5, 2018 9:26 AM    HISTORY: Left knee pain since 3/2/2018 following an injury. The  concern is for a quadriceps tendon rupture or other ligamentous  injury.    COMPARISON: Radiographs and a CT on 3/3/2018.    TECHNIQUE: Multiplanar MR imaging was performed without contrast.    FINDINGS:     Medial Meniscus: No tear, displaced fragment, or extrusion.      Lateral Meniscus: There is mild diffuse increased signal throughout  the periphery of the meniscus consistent with myxoid degeneration. No  signal contact the articular surface with no evidence of a tear.     Anterior Cruciate Ligament: Unremarkable.     Posterior Cruciate Ligament: Unremarkable.     Medial Collateral Ligament: Unremarkable.    Lateral Collateral Ligament Complex, Popliteus Tendon: The iliotibial  band, fibular collateral ligament, biceps femoris tendon, and  popliteus tendon are unremarkable.    Osseous and Cartilaginous Structures: No fracture or osseous lesion is  demonstrated. No abnormal marrow signal intensity is identified. There  appears to be only mild grade 2 chondromalacia throughout both the  medial and lateral  compartments with no focal chondral defects seen in  these areas.     Extensor Mechanism: The quadriceps and patellar tendons are  unremarkable. The medial and lateral patellar retinacula appear  unremarkable.    Joint Space: Large joint effusion. No definite loose bodies  appreciated. There appears to be a suprapatellar plica.    Additional Findings: There is a few seem diameter Baker's cyst. No  semimembranosus-tibial collateral ligament or pes anserine bursitis.  No adjacent soft tissue pathology is seen.      Impression    IMPRESSION:   1. No quadriceps tendon or ligamentous injury is seen.  2. Advanced degenerative changes of the patellofemoral joint with mild  chondromalacia elsewhere in the knee.  3. Suprapatellar plica. Calcifications seen in this area on the CT are  not definitely identified on the MRI. Those calcifications could be  along the plica or synovial lining.  4. Large joint effusion and a several cm diameter Lovelace's cyst.    KALPESH CLARK MD        Consultations:  Consultation during this admission received from orthopedics.     Significant Lab Results:    Recent Labs  Lab 03/06/18  0624 03/05/18  1006 03/04/18  0607   WBC 8.1 10.8 10.2   HGB 11.1* 12.5* 11.4*   HCT 33.7* 38.1* 34.3*   MCV 92 93 93    171 157          Lab Results   Component Value Date     03/06/2018     03/05/2018     03/03/2018    Lab Results   Component Value Date    CHLORIDE 101 03/06/2018    CHLORIDE 100 03/05/2018    CHLORIDE 101 03/03/2018    Lab Results   Component Value Date    BUN 16 03/06/2018    BUN 15 03/05/2018    BUN 14 03/03/2018      Lab Results   Component Value Date    POTASSIUM 4.0 03/06/2018    POTASSIUM 4.1 03/05/2018    POTASSIUM 4.1 03/03/2018    Lab Results   Component Value Date    CO2 25 03/06/2018    CO2 27 03/05/2018    CO2 24 03/03/2018    Lab Results   Component Value Date    CR 0.77 03/06/2018    CR 0.91 03/05/2018    CR 0.81 03/03/2018          Recent Labs  Lab  03/06/18  0624 03/05/18  0630 03/04/18  0607   INR 2.54* 3.06* 2.34*        Pending Results:    Unresulted Labs Ordered in the Past 30 Days of this Admission     Date and Time Order Name Status Description    3/5/2018 1349 Anaerobic bacterial culture Preliminary     3/5/2018 1345 Fluid Culture Aerobic Bacterial Preliminary     3/3/2018 2239 Blood culture Preliminary     3/3/2018 2239 Blood culture Preliminary            Discharge Instructions and Follow-Up:   Discharge diet:   Active Diet Order      Combination Diet 2697-7091 Calories: Moderate Consistent CHO (4-6 CHO units/meal)      Advance Diet as Tolerated   Discharge activity: Activity as tolerated   Discharge follow-up: Follow up with primary care provider in 7 days   Outpatient therapy: None    Home Care agency: None    Other instructions: Re-check chem 7 with PCP      Hospital Course:  Patient was initially admitted under Obs status. He came with knee pain/effusion. He actually had his knee aspirated in the ED, but unfortunately they discarded the fluid without sending it for testing. Therefore, it was unclear what his effusion was caused by initially. He was started on antibiotics. He was made inpatient. He continued having pain and inability to ambulate. A decision was made to have ortho re-aspirate his knee. This revealed pseudogout. He has been started on naprosyn and colchicine. Hopefully these can be stopped in a week once his pain improves, as he is on coumadin for a fib. He will DC to TCU.     Total time spent in face to face contact with the patient and coordinating discharge was:  40 Minutes.    Mike Boykin MD  Pager: [phone]

## 2018-03-06 NOTE — PROGRESS NOTES
Orthopedic Surgery Progress Note  3/6/2018  Orthopedics consulted and following for right knee effusion    S: Patient reports some continued right knee pain and stiffness. Denies chest pain, shortness of breath, fever/chills. Denies numbness or tingling in BLE.     O: Blood pressure 128/74, pulse 105, temperature 98.2  F (36.8  C), temperature source Oral, resp. rate 20, weight 86.2 kg (190 lb), SpO2 95 %.  Lab Results   Component Value Date    HGB 11.1 03/06/2018     Lab Results   Component Value Date    INR 2.54 03/06/2018     I/O last 3 completed shifts:  In: 240 [P.O.:240]  Out: 1400 [Urine:1400]     No significant right knee erythema   Moderate effusion right knee   Distal extremity CMSI bilaterally.  Calves are negative bilaterally, both soft and nontender.  +DF/PF/EHL  2+ DP pulses    A:  See Progress Note from Dr. Zhao from 3/5/18: Joint fluid analysis shows crystals consistent with pseudogout and WBC of 21,000.     P:   - Primary team to treat for pseudogout with anti-inflammatory such as Indomethacin or prednisone- will leave up to primary team to decide.  - No infection- ok to d/c antibiotics from Ortho standpoint  - No further Ortho workup needed  - Ok for full right knee ROM and WBAT on RLE- ok to use knee immobilizer as needed for extra knee support or comfort with ambulation but not required.   - Ok from Ortho standpoint to d/c to home vs rehab when medically cleared by Hospitalist.   - No Ortho follow up needed unless right knee does not improve. If right knee does not improve within 7-10 days or worsens call [facility] at [phone] to schedule an appointment.        Please page me with any Ortho related questions/concerns on this patient between 7a-5pm today.     Nacho Souza, MSN, APRN, NP-C   Nurse Practitioner, [facility]  [facility]   Office Phone: [phone]  Pager: [phone]

## 2018-03-06 NOTE — PROGRESS NOTES
Pt ambulated in the hallway from his room to nursing station with walker gait belt and assistance of 1 person. Pt complained of right knee pain 4/10. RN notified

## 2018-03-06 NOTE — PROGRESS NOTES
Ortho    Labs reviewed.  Aspiration of patient's right knee shows WBC count of 21,000 and crystals consistent with pseudogout.  No infection.  Would treat patient for pseudogout and discharge home vs rehab when appropriate.  No further workup needed.  No abx needed from ortho standpoint.  Would treat with anti-inflammatory such as indomethacin or prednisone - will leave up to primary team to decide.  Ok for full ROM and WBAT.  Knee immobilizer may help in short term with ambulation.      Please call with any questions.    Tristen Zhao MD

## 2018-03-06 NOTE — PLAN OF CARE
Problem: Patient Care Overview  Goal: Plan of Care/Patient Progress Review  PRIMARY DIAGNOSIS: ACUTE PAIN-knee pain  OUTPATIENT/OBSERVATION GOALS TO BE MET BEFORE DISCHARGE:  1. Pain Status: Improved-controlled with oral pain medications.    2. Return to near baseline physical activity: No, to go to TCU    3. Cleared for discharge by consultants (if involved): Yes    Discharge Planner Nurse   Safe discharge environment identified: Yes  Barriers to discharge: No       Entered by: Linda Vera 03/06/2018 1:47 PM     Pt A&Ox4, VSS, pain controlled with oral meds. Ambulating with assist. To discharge this afternoon to [facility]CU. Will continue to monitor and assess.      Please review provider order for any additional goals.   Nurse to notify provider when observation goals have been met and patient is ready for discharge.

## 2018-03-07 ENCOUNTER — CARE COORDINATION (OUTPATIENT)
Dept: CARE COORDINATION | Facility: CLINIC | Age: 83
End: 2018-03-07

## 2018-03-07 ENCOUNTER — NURSING HOME VISIT (OUTPATIENT)
Dept: GERIATRICS | Facility: CLINIC | Age: 83
End: 2018-03-07
Payer: COMMERCIAL

## 2018-03-07 DIAGNOSIS — I48.91 ATRIAL FIBRILLATION, UNSPECIFIED TYPE (H): ICD-10-CM

## 2018-03-07 DIAGNOSIS — M25.461 KNEE EFFUSION, RIGHT: Primary | ICD-10-CM

## 2018-03-07 DIAGNOSIS — Z79.01 LONG-TERM (CURRENT) USE OF ANTICOAGULANTS: ICD-10-CM

## 2018-03-07 DIAGNOSIS — D62 ANEMIA DUE TO BLOOD LOSS, ACUTE: ICD-10-CM

## 2018-03-07 DIAGNOSIS — M11.261 PSEUDOGOUT OF KNEE, RIGHT: ICD-10-CM

## 2018-03-07 DIAGNOSIS — R53.81 PHYSICAL DECONDITIONING: ICD-10-CM

## 2018-03-07 PROCEDURE — 99309 SBSQ NF CARE MODERATE MDM 30: CPT | Performed by: NURSE PRACTITIONER

## 2018-03-07 NOTE — PROGRESS NOTES
Clinic Care Coordination Contact  Care Coordination Transition Communication    Referral Source: IP/TCU Report    Clinical Data: Patient was hospitalized at [facility] from 3/3/18 to 3/6/18 with diagnosis of a Fall with pain.     Transition to Facility:              Facility Name:  [facility]              Contact name and phone number/fax: [phone]    Plan: RN/SW Care Coordinator will await notification from facility staff informing RN/SW Care Coordinator of patient's discharge plans/needs. RN/SW Care Coordinator will review chart and outreach to facility staff every 4 weeks and as needed.     MASOOD Mckeon  Care Navigator  [facility]  [phone]

## 2018-03-07 NOTE — PROGRESS NOTES
[place] [facility]  PRIMARY CARE PROVIDER AND CLINIC:  Joselito Armas JEANETTECHETNA [facility] / [facility] [address]  Chief Complaint   Patient presents with     Hospital F/U       HPI:    Louis Keller Jr. is a 85 year old  (6/26/1932), PMH of DMII, IBS, atrial fib on chronic AC presented after a fall with right knee pain admitted to the [facility] from [facility].  Hospital stay 3/3/18 through 3/6/18.    Right knee effusion: aspiration of knee by ortho revealed pseudogoutAdmitted to this facility for  rehab, medical management and nursing care.  HPI information obtained from: facility chart records, facility staff, patient report and [facility] chart review.  Current issues are: ON exam today patient  Is alert, sitting up in WC, states pain in right knee is improving, rates right knee pain as 2/10 at rest and 5/10 with walking or weight bearing, denies fever, chills, cough, congestion, SOB, N/V/D or constipation, no other concerns noted at this time.     Last 3 BPs: 100/56, 136/76, 124/80 mmHg  HR Ranges: 83-89 bpm  BG  PM: 120-227 mg/dL    CODE STATUS/ADVANCE DIRECTIVES DISCUSSION:   CPR/Full code   Patient's living condition: lives with spouse    ALLERGIES:No known drug allergies  PAST MEDICAL HISTORY:  has a past medical history of Antiplatelet or antithrombotic long-term use; Atrial fibrillation (H); Diarrhea; Diastolic murmur; QUESADA (dyspnea on exertion); Hemorrhage of gastrointestinal tract, unspecified (63,65,and 1971); History of blood transfusion; Scarlet fever; Type II or unspecified type diabetes mellitus without mention of complication, not stated as uncontrolled; and Unspecified disease of pancreas (1990). He also has no past medical history of Chronic infection; Malignant hyperthermia; or Sleep apnea.  PAST SURGICAL HISTORY:  has a past surgical history that includes NONSPECIFIC PROCEDURE (Child); NONSPECIFIC PROCEDURE (; also 11/03); NONSPECIFIC PROCEDURE;  NONSPECIFIC PROCEDURE (1990); Arthroplasty knee (8/5/2013); and Cardioversion (9/6/11).  FAMILY HISTORY: family history includes Alzheimer Disease in his maternal grandmother; Arthritis in his maternal grandmother; CANCER in his father and mother; DIABETES in his maternal aunt; Eye Disorder in his mother; HEART DISEASE in his mother; Hypertension in his mother.  SOCIAL HISTORY:  reports that he has never smoked. He has never used smokeless tobacco. He reports that he drinks alcohol. He reports that he does not use illicit drugs.    Post Discharge Medication Reconciliation Status: discharge medications reconciled, continue medications without change.  Current Outpatient Prescriptions   Medication Sig Dispense Refill     oxyCODONE IR (ROXICODONE) 5 MG tablet Take 0.5-1 tablets (2.5-5 mg) by mouth every 6 hours as needed for moderate to severe pain 18 tablet 0     naproxen (NAPROSYN) 250 MG tablet Take 1 tablet (250 mg) by mouth 2 times daily (with meals) for 7 days After 7 days, re-assess with a physician before continuing       colchicine 0.6 MG tablet Take 1 tablet (0.6 mg) by mouth 2 times daily for 7 days Then, re-assess with physician 3 tablet      polyethylene glycol (MIRALAX/GLYCOLAX) Packet Take 17 g by mouth daily as needed for constipation 7 packet      senna-docusate (SENOKOT-S;PERICOLACE) 8.6-50 MG per tablet Take 1 tablet by mouth 2 times daily as needed for constipation 100 tablet      cholestyramine (QUESTRAN) 4 G Packet Take 1 packet by mouth every evening       multivitamin, therapeutic (THERA-VIT) TABS tablet Take 0.5 tablets by mouth 2 times daily       pioglitazone (ACTOS) 45 MG tablet TAKE 1 TABLET (45 MG) BY MOUTH DAILY 90 tablet 1     ferrous gluconate (FERGON) 324 (38 FE) MG tablet TAKE 1 TABLET (324 MG) BY MOUTH DAILY (WITH BREAKFAST) 100 tablet 3     metFORMIN (GLUCOPHAGE) 500 MG tablet Take 1 tablet (500 mg) by mouth 2 times daily (with meals) 180 tablet 1     warfarin (COUMADIN) 5 MG tablet  TAKE 1 TABLET BY MOUTH TUESDAY,THURS AND SUN. TAKE 1/2 TABLET ON MON,WED,FRI AND SAT. OR AS DIRECTED 65 tablet 1     glipiZIDE (GLIPIZIDE XL) 10 MG 24 hr tablet Take 1 tablet (10 mg) by mouth 2 times daily 180 tablet 3     diltiazem 120 MG 24 hr capsule Take 1 capsule (120 mg) by mouth every evening 90 capsule 3     Chromium 1000 MCG TABS Take 500 mcg by mouth daily       loperamide (IMODIUM A-D) 2 MG tablet 1 tablet BID 30 tablet 0     acetaminophen (TYLENOL) 500 MG tablet Take 500 mg by mouth every 6 hours as needed        STATIN NOT PRESCRIBED, INTENTIONAL, 1 each At Bedtime Statin not prescribed intentionally due to Risk for drug interaction 0 each 0     ACE NOT PRESCRIBED, INTENTIONAL, 1 each daily ACE Inhibitor not prescribed due to Risk for drug interaction 0 each 0     ASPIRIN NOT PRESCRIBED, INTENTIONAL, by Other route continuous prn Reported on 3/7/2017       fish oil-omega-3 fatty acids (FISH OIL) 1000 MG capsule Take 1 g by mouth 2 times daily          ROS:  10 point ROS of systems including Constitutional, Eyes, Respiratory, Cardiovascular, Gastroenterology, Genitourinary, Integumentary, Muscularskeletal, Psychiatric were all negative except for pertinent positives noted in my HPI.    Exam:  /80  Pulse 83  Temp 97.6  F (36.4  C)  Resp 18  Wt 191 lb 11.2 oz (87 kg)  SpO2 96%  BMI 26.74 kg/m2  GENERAL APPEARANCE:  Alert, in no distress  ENT:  Mouth and posterior oropharynx normal, moist mucous membranes, [place]  EYES:  EOM, conjunctivae, lids, pupils and irises normal, PERRL  RESP:  respiratory effort and palpation of chest normal, lungs clear to auscultation , no respiratory distress  CV:  Palpation and auscultation of heart done , regular rate and rhythm, no murmur, rub, or gallop, peripheral edema none+ in LE bilaterally  ABDOMEN:  normal bowel sounds, soft, nontender, no hepatosplenomegaly or other masses  M/S:   Examination of:   right upper extremity, left upper extremity and left lower  extremity  Inspection, ROM, stability and muscle strength normal and decreased ROM and strength RLE, slight swelling of right knee joint 1+edema noted  SKIN:  Inspection of skin and subcutaneous tissue baseline  NEURO:   Cranial nerves 2-12 are normal tested and grossly at patient's baseline, speech WNL  PSYCH:  affect and mood normal    Lab/Diagnostic data:  CBC RESULTS:   Recent Labs   Lab Test  03/06/18   0624  03/05/18   1006   WBC  8.1  10.8   RBC  3.68*  4.09*   HGB  11.1*  12.5*   HCT  33.7*  38.1*   MCV  92  93   MCH  30.2  30.6   MCHC  32.9  32.8   RDW  14.1  14.3   PLT  170  171       Last Basic Metabolic Panel:  Recent Labs   Lab Test  03/06/18   0624  03/05/18   1006   NA  132*  133   POTASSIUM  4.0  4.1   CHLORIDE  101  100   MICHAEL  8.5  9.0   CO2  25  27   BUN  16  15   CR  0.77  0.91   GLC  156*  190*       Liver Function Studies -   Recent Labs   Lab Test  12/27/17   1156  09/18/17   1455   PROTTOTAL  7.3  7.7   ALBUMIN  3.9  3.9   BILITOTAL  1.3  1.5*   ALKPHOS  79  68   AST  24  32   ALT  26  31       TSH   Date Value Ref Range Status   09/18/2017 3.07 0.40 - 4.00 mU/L Final   06/08/2016 3.94 0.40 - 4.00 mU/L Final       Lab Results   Component Value Date    A1C 7.5 12/27/2017    A1C 7.5 09/18/2017       ASSESSMENT/PLAN:  Knee effusion, right  Pseudogout of knee, right  Physical deconditioning  Acute/ongoing: PT and OT for strengthening, schedule tylenol 1000mg TID, coninue naproxen 250mg BID for 7 days, oxycodone 2.5-5mg q 6 hours prn  DC colchisine due to drug interaction with diltiazem    Atrial fibrillation, unspecified type (H)  Long-term (current) use of anticoagulants [Z79.01]  Ongoing: vitals daily and prn, BMP follow, continue diltiazem DC 120mg QD, coumadin as directed goal INR 2-3    Anemia due to blood loss, acute  Acute/ongoing: Hgb weekly, continue ferrous gluconate 324mg QD    DMII:   Ongoing: Blood sugar monitoring BID, continue glipizide 10mg BID, metformin 500mg BID, actos 45mg QD        Orders:  BMP and Hgb weekly  DC colchisine  Blood sugar monitoring BID call if BS < 60 or > 350  Tylenol 1000mg TID scheduled, DC all other tylenol         Electronically signed by:  Tonya Lynn Haase, APRN CNP

## 2018-03-08 ENCOUNTER — TRANSFERRED RECORDS (OUTPATIENT)
Dept: HEALTH INFORMATION MANAGEMENT | Facility: CLINIC | Age: 83
End: 2018-03-08

## 2018-03-08 VITALS
RESPIRATION RATE: 18 BRPM | TEMPERATURE: 97.1 F | DIASTOLIC BLOOD PRESSURE: 72 MMHG | WEIGHT: 188.4 LBS | OXYGEN SATURATION: 96 % | HEART RATE: 89 BPM | SYSTOLIC BLOOD PRESSURE: 121 MMHG | BODY MASS INDEX: 26.28 KG/M2

## 2018-03-08 LAB
BUN SERPL-MCNC: 15 MG/DL (ref 9–26)
CALCIUM SERPL-MCNC: 9.3 MG/DL (ref 8.4–10.2)
CHLORIDE SERPLBLD-SCNC: 101 MMOL/L (ref 98–109)
CO2 SERPL-SCNC: 24 MMOL/L (ref 22–31)
CREAT SERPL-MCNC: 0.87 MG/DL (ref 0.73–1.18)
GFR SERPL CREATININE-BSD FRML MDRD: >60 ML/MIN/1.73M2
GLUCOSE SERPL-MCNC: 158 MG/DL (ref 70–100)
HEMOGLOBIN: 11.6 G/DL (ref 13.4–17.5)
POTASSIUM SERPL-SCNC: 4.6 MMOL/L (ref 3.5–5.2)
SODIUM SERPL-SCNC: 134 MMOL/L (ref 136–145)

## 2018-03-09 ENCOUNTER — DISCHARGE SUMMARY NURSING HOME (OUTPATIENT)
Dept: GERIATRICS | Facility: CLINIC | Age: 83
End: 2018-03-09
Payer: COMMERCIAL

## 2018-03-09 DIAGNOSIS — R53.81 PHYSICAL DECONDITIONING: ICD-10-CM

## 2018-03-09 DIAGNOSIS — M25.461 KNEE EFFUSION, RIGHT: Primary | ICD-10-CM

## 2018-03-09 DIAGNOSIS — Z79.01 LONG-TERM (CURRENT) USE OF ANTICOAGULANTS: ICD-10-CM

## 2018-03-09 DIAGNOSIS — I48.91 ATRIAL FIBRILLATION, UNSPECIFIED TYPE (H): ICD-10-CM

## 2018-03-09 DIAGNOSIS — M11.261 PSEUDOGOUT OF KNEE, RIGHT: ICD-10-CM

## 2018-03-09 DIAGNOSIS — D62 ANEMIA DUE TO BLOOD LOSS, ACUTE: ICD-10-CM

## 2018-03-09 PROCEDURE — 99316 NF DSCHRG MGMT 30 MIN+: CPT | Performed by: NURSE PRACTITIONER

## 2018-03-09 NOTE — PROGRESS NOTES
[place] [facility] DISCHARGE SUMMARY    PATIENT'S NAME: Louis Keller Jr.  YOB: 1932  MEDICAL RECORD NUMBER:  [id number]    PRIMARY CARE PROVIDER AND CLINIC RESPONSIBLE AFTER TRANSFER: Nikolov, Nikolay G [address] [facility] / [place] [address]     CODE STATUS/ADVANCE DIRECTIVES DISCUSSION:   CPR/Full code        Allergies   Allergen Reactions     No Known Drug Allergies        TRANSFERRING PROVIDERS: Tonya Lynn Haase, APRN CNP, Dr. Alexandrea MD  DATE OF SNF ADMISSION:  March / 06 / 2018  DATE OF SNF (anticipated) DISCHARGE: March / 11 / 2018  DISCHARGE DISPOSITION: [facility] Provider   Nursing Facility: [facility] stay 3/3/18 to 3/6/18.     Condition on Discharge:  Stable.  Function:  Walking independently using a 4WW > 150 feet  Cognitive Scores: BIMS: 15/15, SBT: 0/28    Equipment: FWW    DISCHARGE DIAGNOSIS:   1. Knee effusion, right    2. Pseudogout of knee, right    3. Physical deconditioning    4. Atrial fibrillation, unspecified type (H)    5. Long-term (current) use of anticoagulants [Z79.01]    6. Anemia due to blood loss, acute            PAST MEDICAL HISTORY:  has a past medical history of Antiplatelet or antithrombotic long-term use; Atrial fibrillation (H); Diarrhea; Diastolic murmur; QUESADA (dyspnea on exertion); Hemorrhage of gastrointestinal tract, unspecified (63,65,and 1971); History of blood transfusion; Scarlet fever; Type II or unspecified type diabetes mellitus without mention of complication, not stated as uncontrolled; and Unspecified disease of pancreas (1990). He also has no past medical history of Chronic infection; Malignant hyperthermia; or Sleep apnea.    DISCHARGE MEDICATIONS:  Current Outpatient Prescriptions   Medication Sig Dispense Refill     Acetaminophen (TYLENOL PO) Take 1,000 mg by mouth 3 times daily       oxyCODONE IR (ROXICODONE) 5 MG tablet Take 0.5-1 tablets (2.5-5 mg) by mouth every 6 hours as needed for  moderate to severe pain 18 tablet 0     naproxen (NAPROSYN) 250 MG tablet Take 1 tablet (250 mg) by mouth 2 times daily (with meals) for 7 days After 7 days, re-assess with a physician before continuing       polyethylene glycol (MIRALAX/GLYCOLAX) Packet Take 17 g by mouth daily as needed for constipation 7 packet      senna-docusate (SENOKOT-S;PERICOLACE) 8.6-50 MG per tablet Take 1 tablet by mouth 2 times daily as needed for constipation 100 tablet      cholestyramine (QUESTRAN) 4 G Packet Take 1 packet by mouth every evening       multivitamin, therapeutic (THERA-VIT) TABS tablet Take 0.5 tablets by mouth 2 times daily       pioglitazone (ACTOS) 45 MG tablet TAKE 1 TABLET (45 MG) BY MOUTH DAILY 90 tablet 1     ferrous gluconate (FERGON) 324 (38 FE) MG tablet TAKE 1 TABLET (324 MG) BY MOUTH DAILY (WITH BREAKFAST) 100 tablet 3     metFORMIN (GLUCOPHAGE) 500 MG tablet Take 1 tablet (500 mg) by mouth 2 times daily (with meals) 180 tablet 1     warfarin (COUMADIN) 5 MG tablet TAKE 1 TABLET BY MOUTH TUESDAY,THURS AND SUN. TAKE 1/2 TABLET ON MON,WED,FRI AND SAT. OR AS DIRECTED 65 tablet 1     glipiZIDE (GLIPIZIDE XL) 10 MG 24 hr tablet Take 1 tablet (10 mg) by mouth 2 times daily 180 tablet 3     diltiazem 120 MG 24 hr capsule Take 1 capsule (120 mg) by mouth every evening 90 capsule 3     Chromium 1000 MCG TABS Take 500 mcg by mouth daily       loperamide (IMODIUM A-D) 2 MG tablet 1 tablet BID 30 tablet 0     STATIN NOT PRESCRIBED, INTENTIONAL, 1 each At Bedtime Statin not prescribed intentionally due to Risk for drug interaction 0 each 0     ACE NOT PRESCRIBED, INTENTIONAL, 1 each daily ACE Inhibitor not prescribed due to Risk for drug interaction 0 each 0     ASPIRIN NOT PRESCRIBED, INTENTIONAL, by Other route continuous prn Reported on 3/7/2017       fish oil-omega-3 fatty acids (FISH OIL) 1000 MG capsule Take 1 g by mouth 2 times daily          MEDICATION CHANGES/RATIONALE:   DC colchisine due to interaction with  diltiazem  Patient right knee pain much improved  Last 3 BPs: 121/72, 100/56, 136/76 mmHg  HR Ranges: 84-89 bpm  BG   AM: 182-220 mg/dL  PM: 141-227 mg/dL  Admission Weight: 191.7 lbs  Current Weight: 188.4 lbs  Controlled medications sent with patient:   not applicable/none     ROS:    10 point ROS of systems including Constitutional, Eyes, Respiratory, Cardiovascular, Gastroenterology, Genitourinary, Integumentary, Muscularskeletal, Psychiatric were all negative except for pertinent positives noted in my HPI.    Physical Exam:   Vitals: /72  Pulse 89  Temp 97.1  F (36.2  C)  Resp 18  Wt 188 lb 6.4 oz (85.5 kg)  SpO2 96%  BMI 26.28 kg/m2  BMI= Body mass index is 26.28 kg/(m^2).  GENERAL APPEARANCE:  Alert, in no distress  ENT:  Mouth and posterior oropharynx normal, moist mucous membranes, [place]  EYES:  EOM, conjunctivae, lids, pupils and irises normal, PERRL  RESP:  respiratory effort and palpation of chest normal, lungs clear to auscultation , no respiratory distress  CV:  Palpation and auscultation of heart done , regular rate and rhythm, no murmur, rub, or gallop, peripheral edema none+ in LE bilaterally  ABDOMEN:  normal bowel sounds, soft, nontender, no hepatosplenomegaly or other masses  M/S:   Examination of:   right upper extremity, left upper extremity and left lower extremity  Inspection, ROM, stability and muscle strength normal and decreased ROM and strength RLE, slight swelling of right knee joint 1+edema noted  SKIN:  Inspection of skin and subcutaneous tissue baseline  NEURO:   Cranial nerves 2-12 are normal tested and grossly at patient's baseline, speech WNL  PSYCH:  affect and mood normal       HPI Nursing Facility Course: Patient right knee pain is much improved, in therapy he progressed to walking > 150 feet using a 4WW indep, indep with ADL's and toileting will dC home to live with wife with OP PT>   HPI information obtained from: facility chart records, facility staff, patient  report and [facility] chart review.    Knee effusion, right  Pseudogout of knee, right  Physical deconditioning  Acute/ongoing: DC home with OP PT,  Continue tylenol 1000mg TID prn, coninue naproxen 250mg BID for 7 days last day is 3/13/18, DC oxycodone at disharge  DC colchisine due to drug interaction with diltiazem on admission     Atrial fibrillation, unspecified type (H)  Long-term (current) use of anticoagulants [Z79.01]  Ongoing:  continue diltiazem 120mg QD, coumadin as directed goal INR 2-3  Home dose of coumadin has been 5mg M-W-F and 2.5mg every other day. F/u with PCP within one week for INR check and f/u     Anemia due to blood loss, acute  Acute/ongoing: Hgb stable, continue ferrous gluconate 324mg QD     DMII:   Ongoing: DC with PTA monitoring of Blood sugars, continue glipizide 10mg BID, metformin 500mg BID, actos 45mg QD         DISCHARGE PLAN:  Physical Therapy  Patient instructed to follow-up with:  PCP in 5 days       Current [place] scheduled appointments:  Future Appointments  Date Time Provider Department Center   3/29/2018 11:15 AM [facility] ANTICOAGULATION CLINIC [facility]   3/29/2018 11:45 AM [facility] LAB [facility]       [facility] referral needed and placed by this provider: No    Pending labs: none  SNF labs   CBC RESULTS:   Recent Labs   Lab Test 03/08/18 03/06/18   0624  03/05/18   1006   WBC   --   8.1  10.8   RBC   --   3.68*  4.09*   HGB  11.6*  11.1*  12.5*   HCT   --   33.7*  38.1*   MCV   --   92  93   MCH   --   30.2  30.6   MCHC   --   32.9  32.8   RDW   --   14.1  14.3   PLT   --   170  171       Last Basic Metabolic Panel:  Recent Labs   Lab Test 03/08/18 03/06/18   0624   NA  134*  132*   POTASSIUM  4.6  4.0   CHLORIDE  101  101   MICHAEL  9.3  8.5   CO2  24  25   BUN  15  16   CR  0.87  0.77   GLC  158*  156*       Liver Function Studies -   Recent Labs   Lab Test  12/27/17   1156  09/18/17   1455   PROTTOTAL  7.3  7.7   ALBUMIN  3.9  3.9   BILITOTAL  1.3  1.5*   ALKPHOS  79  68   AST  24  32    ALT  26  31       TSH   Date Value Ref Range Status   09/18/2017 3.07 0.40 - 4.00 mU/L Final   06/08/2016 3.94 0.40 - 4.00 mU/L Final       Lab Results   Component Value Date    A1C 7.5 12/27/2017    A1C 7.5 09/18/2017           Discharge Treatments:none    TOTAL DISCHARGE TIME:   Greater than 30 minutes  Electronically signed by:  Tonya Lynn Haase, APRN CNP

## 2018-03-10 LAB
BACTERIA SPEC CULT: NO GROWTH
Lab: NORMAL
Lab: NORMAL
SPECIMEN SOURCE: NORMAL

## 2018-03-12 ENCOUNTER — TELEPHONE (OUTPATIENT)
Dept: INTERNAL MEDICINE | Facility: CLINIC | Age: 83
End: 2018-03-12

## 2018-03-12 NOTE — TELEPHONE ENCOUNTER
IP F/U    Date: 03/11/18  Diagnosis: Knee Effusion, Right  Is patient active in care coordination? Yes - Route to Care Coordination (P 18021)  Was patient in TCU? Yes - Route to Care Coordination (P 77973)    Next 5 appointments (look out 90 days)     Mar 19, 2018  2:20 PM CDT   Office Visit with Joselito Armas MD   [facility] ([facility])    [address] [facility] [id number] [phone]

## 2018-03-13 ENCOUNTER — ANTICOAGULATION THERAPY VISIT (OUTPATIENT)
Dept: NURSING | Facility: CLINIC | Age: 83
End: 2018-03-13
Payer: COMMERCIAL

## 2018-03-13 ENCOUNTER — THERAPY VISIT (OUTPATIENT)
Dept: PHYSICAL THERAPY | Facility: CLINIC | Age: 83
End: 2018-03-13
Payer: COMMERCIAL

## 2018-03-13 ENCOUNTER — CARE COORDINATION (OUTPATIENT)
Dept: CARE COORDINATION | Facility: CLINIC | Age: 83
End: 2018-03-13

## 2018-03-13 DIAGNOSIS — I48.91 ATRIAL FIBRILLATION, UNSPECIFIED TYPE (H): ICD-10-CM

## 2018-03-13 DIAGNOSIS — Z79.01 LONG-TERM (CURRENT) USE OF ANTICOAGULANTS: ICD-10-CM

## 2018-03-13 DIAGNOSIS — M25.561 ACUTE PAIN OF RIGHT KNEE: Primary | ICD-10-CM

## 2018-03-13 DIAGNOSIS — Z71.89 COMPLEX CARE COORDINATION: Primary | ICD-10-CM

## 2018-03-13 LAB — INR POINT OF CARE: 2.4 (ref 0.86–1.14)

## 2018-03-13 PROCEDURE — 99207 ZZC NO CHARGE NURSE ONLY: CPT

## 2018-03-13 PROCEDURE — G8978 MOBILITY CURRENT STATUS: HCPCS | Mod: GP | Performed by: PHYSICAL THERAPIST

## 2018-03-13 PROCEDURE — 85610 PROTHROMBIN TIME: CPT | Mod: QW

## 2018-03-13 PROCEDURE — 97161 PT EVAL LOW COMPLEX 20 MIN: CPT | Mod: GP | Performed by: PHYSICAL THERAPIST

## 2018-03-13 PROCEDURE — 36416 COLLJ CAPILLARY BLOOD SPEC: CPT

## 2018-03-13 PROCEDURE — G8979 MOBILITY GOAL STATUS: HCPCS | Mod: GP | Performed by: PHYSICAL THERAPIST

## 2018-03-13 PROCEDURE — 97110 THERAPEUTIC EXERCISES: CPT | Mod: GP | Performed by: PHYSICAL THERAPIST

## 2018-03-13 ASSESSMENT — ACTIVITIES OF DAILY LIVING (ADL)
STAND: ACTIVITY IS MINIMALLY DIFFICULT
STIFFNESS: THE SYMPTOM AFFECTS MY ACTIVITY SLIGHTLY
RISE FROM A CHAIR: ACTIVITY IS MINIMALLY DIFFICULT
AS_A_RESULT_OF_YOUR_KNEE_INJURY,_HOW_WOULD_YOU_RATE_YOUR_CURRENT_LEVEL_OF_DAILY_ACTIVITY?: ABNORMAL
SWELLING: THE SYMPTOM AFFECTS MY ACTIVITY SLIGHTLY
WEAKNESS: THE SYMPTOM AFFECTS MY ACTIVITY SLIGHTLY
KNEEL ON THE FRONT OF YOUR KNEE: ACTIVITY IS FAIRLY DIFFICULT
RAW_SCORE: 41
HOW_WOULD_YOU_RATE_THE_OVERALL_FUNCTION_OF_YOUR_KNEE_DURING_YOUR_USUAL_DAILY_ACTIVITIES?: ABNORMAL
HOW_WOULD_YOU_RATE_THE_CURRENT_FUNCTION_OF_YOUR_KNEE_DURING_YOUR_USUAL_DAILY_ACTIVITIES_ON_A_SCALE_FROM_0_TO_100_WITH_100_BEING_YOUR_LEVEL_OF_KNEE_FUNCTION_PRIOR_TO_YOUR_INJURY_AND_0_BEING_THE_INABILITY_TO_PERFORM_ANY_OF_YOUR_USUAL_DAILY_ACTIVITIES?: 30
PAIN: THE SYMPTOM AFFECTS MY ACTIVITY MODERATELY
KNEE_ACTIVITY_OF_DAILY_LIVING_SUM: 41
WALK: ACTIVITY IS FAIRLY DIFFICULT
LIMPING: I DO NOT HAVE THE SYMPTOM
GO DOWN STAIRS: ACTIVITY IS SOMEWHAT DIFFICULT
GO UP STAIRS: ACTIVITY IS SOMEWHAT DIFFICULT
GIVING WAY, BUCKLING OR SHIFTING OF KNEE: THE SYMPTOM AFFECTS MY ACTIVITY MODERATELY
KNEE_ACTIVITY_OF_DAILY_LIVING_SCORE: 58.57
SIT WITH YOUR KNEE BENT: ACTIVITY IS MINIMALLY DIFFICULT
SQUAT: ACTIVITY IS VERY DIFFICULT

## 2018-03-13 NOTE — PROGRESS NOTES
ANTICOAGULATION FOLLOW-UP CLINIC VISIT    Patient Name:  Louis Keller Jr.  Date:  3/13/2018  Contact Type:  Face to Face    SUBJECTIVE:     Patient Findings     Positives Change in medications (Finished Naproxen today.  Pt did NOT get colchicine as pharmacist had warned of increased risk of bleeding when taken with Warfarin.), Bruising (2 small bruises on top of R hand, pt states this is normal for him)    Comments Hospital stay 03/03-03/06/18 for R knee pseudogout followed by TCU stay.  Patient lives in  so he will plan to come here for his INR checks.           OBJECTIVE    INR Protime   Date Value Ref Range Status   03/13/2018 2.4 (A) 0.86 - 1.14 Final       ASSESSMENT / PLAN  INR assessment THER    Recheck INR In: 2 WEEKS    INR Location Clinic      Anticoagulation Summary as of 3/13/2018     INR goal 2.0-3.0   Today's INR 2.4   Maintenance plan 5 mg (5 mg x 1) on Sun, Tue, Thu; 2.5 mg (5 mg x 0.5) all other days   Full instructions 5 mg on Sun, Tue, Thu; 2.5 mg all other days   Weekly total 25 mg   No change documented Hope Baumann RN   Plan last modified Monica Souza RN (5/12/2016)   Next INR check 3/29/2018   Priority INR   Target end date     Indications   Long-term (current) use of anticoagulants [Z79.01] [Z79.01]  Atrial fibrillation (H) [I48.91]         Anticoagulation Episode Summary     INR check location     Preferred lab     Send INR reminders to [facility]    Comments likes calendar printout.        Anticoagulation Care Providers     Provider Role Specialty Phone number    Joselito Armas MD [facility] [phone]            See the Encounter Report to view Anticoagulation Flowsheet and Dosing Calendar (Go to Encounters tab in chart review, and find the Anticoagulation Therapy Visit)        Hope Baumann RN

## 2018-03-13 NOTE — PROGRESS NOTES
[facility] Initial Evaluation  Subjective:  Patient is a 85 year old male presenting with rehab left knee hpi. The history is provided by the patient and the spouse. No  was used.   Louis Keller Jr. is a 85 year old male with a right knee condition.  Condition occurred with:  A fall/slip.  Condition occurred: at home.  This is a new condition  Pt reports falling on 3/2/18 and having right knee pain.  He reports spending 3 days in hospital then one week in TCU before returning home.  Dx'ed with pseudogout while in hospital.  .    Patient reports pain:  Anterior.  Radiates to:  Knee.  Pain is described as aching and is intermittent and reported as 3/10.  Associated symptoms:  Loss of motion/stiffness. Pain is worse during the day.  Symptoms are exacerbated by ascending stairs, descending stairs, bending/squatting and kneeling and relieved by nothing and rest.  Since onset symptoms are gradually improving.  Special tests:  MRI, CT scan and x-ray.  Previous treatment includes physical therapy (at TCU).    General health as reported by patient is fair.  Pertinent medical history includes:  Osteoarthritis and diabetes.  Medical allergies: no.  Other surgeries include:  Orthopedic surgery and cancer surgery (L TKA, skin cancer).    Current occupation is Retired  .        Barriers include:  None as reported by the patient.    Red flags:  None as reported by the patient.                        Objective:    Gait:    Gait Type:  Antalgic   Weight Bearing Status:  WBAT   Assistive Devices:  Walker                                                        Knee Evaluation:  ROM:  AROM: normal  PROM: normal            Strength:     Extension:  Left: 3/5   Pain:      Right: 3/5   Pain:  Flexion:  Left: 3/5   Pain:      Right: 3/5   Pain:    Quad Set Left: Fair    Pain:   Quad Set Right: Fair    Pain:      Palpation:  Normal                General     ROS    Assessment/Plan:    Patient is a  85 year old male with right side knee complaints.    Patient has the following significant findings with corresponding treatment plan.                Diagnosis 1:  R knee pain   Pain -  self management, education, directional preference exercise and home program  Decreased strength - therapeutic exercise and therapeutic activities  Impaired balance - neuro re-education and therapeutic activities  Decreased proprioception - neuro re-education and therapeutic activities  Impaired gait - gait training  Impaired muscle performance - neuro re-education  Decreased function - therapeutic activities    Therapy Evaluation Codes:   1) History comprised of:   Personal factors that impact the plan of care:      None.    Comorbidity factors that impact the plan of care are:      Diabetes, Heart problems, Implanted device and Osteoarthritis.     Medications impacting care: Pain.  2) Examination of Body Systems comprised of:   Body structures and functions that impact the plan of care:      Knee.   Activity limitations that impact the plan of care are:      Bending, Squatting/kneeling, Stairs, Standing and Walking.  3) Clinical presentation characteristics are:   Stable/Uncomplicated.  4) Decision-Making    Low complexity using standardized patient assessment instrument and/or measureable assessment of functional outcome.  Cumulative Therapy Evaluation is: Low complexity.    Previous and current functional limitations:  (See Goal Flow Sheet for this information)    Short term and Long term goals: (See Goal Flow Sheet for this information)     Communication ability:  Patient appears to be able to clearly communicate and understand verbal and written communication and follow directions correctly.  Treatment Explanation - The following has been discussed with the patient:   RX ordered/plan of care  Anticipated outcomes  Possible risks and side effects  This patient would benefit from PT intervention to resume normal activities.    Rehab potential is fair.    Frequency:  2 X week, once daily  Duration:  for 4 weeks  Discharge Plan:  Achieve all LTG.  Independent in home treatment program.  Reach maximal therapeutic benefit.    Please refer to the daily flowsheet for treatment today, total treatment time and time spent performing 1:1 timed codes.

## 2018-03-13 NOTE — MR AVS SNAPSHOT
Louis Keller Jr.   3/13/2018 3:00 PM   Anticoagulation Therapy Visit    Description:  85 year old male   Provider:  [facility] ANT[facility]ATION CLINIC   Department:  Cr Nurse           INR as of 3/13/2018     Today's INR 2.4      Anticoagulation Summary as of 3/13/2018     INR goal 2.0-3.0   Today's INR 2.4   Full instructions 5 mg on Sun, Tue, Thu; 2.5 mg all other days   Next INR check 3/29/2018    Indications   Long-term (current) use of anticoagulants [Z79.01] [Z79.01]  Atrial fibrillation (H) [I48.91]         Your next Anticoagulation Clinic appointment(s)     Mar 13, 2018  3:00 PM CDT   Anticoagulation Visit with [facility]   [facility]    [address] [facility] [address]   [phone]            Mar 29, 2018 11:15 AM CDT   Anticoagulation Visit with  ANTICOAGULATION CLINIC   [facility]    [address] [facility] [address]   [phone]              Contact Numbers     Clinic Number:         March 2018 Details    Sun Mon Tue Wed Thu Fri Sat         1               2               3                 4               5               6               7               8               9               10                 11               12               13      5 mg   See details      14      2.5 mg         15      5 mg         16      2.5 mg         17      2.5 mg           18      5 mg         19      2.5 mg         20      5 mg         21      2.5 mg         22      5 mg         23      2.5 mg         24      2.5 mg           25      5 mg         26      2.5 mg         27      5 mg         28      2.5 mg         29            30               31                Date Details   03/13 This INR check       Date of next INR:  3/29/2018         How to take your warfarin dose     To take:  2.5 mg Take 0.5 of a 5 mg tablet.    To take:  5 mg Take 1 of the 5 mg tablets.

## 2018-03-13 NOTE — MR AVS SNAPSHOT
After Visit Summary   3/13/2018    Louis Keller Jr.    MRN: [id number]           Patient Information     Date Of Birth          6/26/1932        Visit Information        Provider Department      3/13/2018 1:00 PM Willem Ellis, JERONIMO [facility]        Today's Diagnoses     Acute pain of right knee    -  1       Follow-ups after your visit        Your next 10 appointments already scheduled     Mar 15, 2018  1:10 PM CDT   YADI Extremity with Willem Ellis PT   [facility] ([facility])    [id number] [place] [address] [address]  [facility] [address]   [phone]            Mar 19, 2018 12:50 PM CDT   YADI Extremity with Willem Ellis PT   [facility] ([facility])    [address] [place] [address] [address]  [facility] [address]   [phone]            Mar 19, 2018  2:20 PM CDT   Office Visit with Joselito Armas MD   [facility] ([facility]    [address] [facility] [address] [phone]           Bring a current list of meds and any records pertaining to this visit. For Physicals, please bring immunization records and any forms needing to be filled out. Please arrive 10 minutes early to complete paperwork.            Mar 23, 2018  1:00 PM CDT   YADI Extremity with Willem Ellis PT   [facility] ([facility])    [address]  [facility] [address]   [phone]            Mar 29, 2018 11:15 AM CDT   Anticoagulation Visit with [facility]   [facility]    [address] [place] [address]   [phone]            Mar 29, 2018 11:30 AM CDT   LAB with CR LAB   [facility]    [address] [place] [address]  "[facility] [id number] [phone]           Please do not eat 10-12 hours before your appointment if you are coming in fasting for labs on lipids, cholesterol, or glucose (sugar). This does not apply to pregnant women. Water, hot tea and black coffee (with nothing added) are okay. Do not drink other fluids, diet soda or chew gum.              Who to contact     If you have questions or need follow up information about today's clinic visit or your schedule please contact [facility] directly at [phone].  Normal or non-critical lab and imaging results will be communicated to you by VocalIQhart, letter or phone within 4 business days after the clinic has received the results. If you do not hear from us within 7 days, please contact the clinic through [employer] or phone. If you have a critical or abnormal lab result, we will notify you by phone as soon as possible.  Submit refill requests through Zaelab or call your pharmacy and they will forward the refill request to us. Please allow 3 business days for your refill to be completed.          Additional Information About Your Visit        VocalIQhart Information     Zaelab lets you send messages to your doctor, view your test results, renew your prescriptions, schedule appointments and more. To sign up, go to [email or web address] . Click on \"Log in\" on the left side of the screen, which will take you to the Welcome page. Then click on \"Sign up Now\" on the right side of the page.     You will be asked to enter the access code listed below, as well as some personal information. Please follow the directions to create your username and password.     Your access code is: [email or web address]  Expires: 3/29/2018  6:39 PM     Your access code will  in 90 days. If you need help or a new code, please call your [place] clinic or [phone].        Care EveryWhere ID     This is your Care EveryWhere ID. This " could be used by other organizations to access your [facility] medical records  [id number]         Blood Pressure from Last 3 Encounters:   03/08/18 121/72   03/06/18 124/80   03/06/18 124/73    Weight from Last 3 Encounters:   03/08/18 85.5 kg (188 lb 6.4 oz)   03/06/18 87 kg (191 lb 11.2 oz)   03/05/18 86.2 kg (190 lb)              We Performed the Following     HC PT EVAL, LOW COMPLEXITY     YADI CERT REPORT     YADI INITIAL EVAL REPORT     THERAPEUTIC EXERCISES        Primary Care Provider Office Phone # Fax #    Joselito Armas [phone]       [address] [facility] [address]        Equal Access to Services     SABRA LEAL : Hadii aad ku hadasho Soomaali, waaxda luqadaha, qaybta kaalmada adeegyada, waxay idiin hayjulio cesar chin. So [facility] [phone].    ATENCIÓN: Si habla español, tiene a cao disposición servicios gratuitos de asistencia lingüística. [facility] [phone].    We comply with applicable federal civil rights laws and [place] laws. We do not discriminate on the basis of race, color, national origin, age, disability, sex, sexual orientation, or gender identity.            Thank you!     Thank you for choosing [facility]  for your care. Our goal is always to provide you with excellent care. Hearing back from our patients is one way we can continue to improve our services. Please take a few minutes to complete the written survey that you may receive in the mail after your visit with us. Thank you!             Your Updated Medication List - Protect others around you: Learn how to safely use, store and throw away your medicines at www.disposemymeds.org.          This list is accurate as of 3/13/18  3:40 PM.  Always use your most recent med list.                   Brand Name Dispense Instructions for use Diagnosis    ACE NOT PRESCRIBED (INTENTIONAL)     0 each    1 each daily ACE Inhibitor not prescribed due to  Risk for drug interaction    Type 2 diabetes, HbA1C goal < 8% (H)       ASPIRIN NOT PRESCRIBED    INTENTIONAL     by Other route continuous prn Reported on 3/7/2017        cholestyramine 4 G Packet    QUESTRAN     Take 1 packet by mouth every evening        Chromium 1000 MCG Tabs      Take 500 mcg by mouth daily        diltiazem 120 MG 24 hr capsule    CARDIZEM CD/CARTIA XT    90 capsule    Take 1 capsule (120 mg) by mouth every evening    Chronic atrial fibrillation (H)       ferrous gluconate 324 (38 FE) MG tablet    FERGON    100 tablet    TAKE 1 TABLET (324 MG) BY MOUTH DAILY (WITH BREAKFAST)    Anemia due to blood loss, acute       fish oil-omega-3 fatty acids 1000 MG capsule      Take 1 g by mouth 2 times daily        glipiZIDE 10 MG 24 hr tablet    glipiZIDE XL    180 tablet    Take 1 tablet (10 mg) by mouth 2 times daily    Chronic atrial fibrillation (H), Actinic keratosis       loperamide 2 MG tablet    IMODIUM A-D    30 tablet    1 tablet BID        metFORMIN 500 MG tablet    GLUCOPHAGE    180 tablet    Take 1 tablet (500 mg) by mouth 2 times daily (with meals)    Type 2 diabetes mellitus with stage 3 chronic kidney disease, without long-term current use of insulin (H)       multivitamin, therapeutic Tabs tablet      Take 0.5 tablets by mouth 2 times daily        pioglitazone 45 MG tablet    ACTOS    90 tablet    TAKE 1 TABLET (45 MG) BY MOUTH DAILY    Type 2 diabetes mellitus with stage 3 chronic kidney disease, without long-term current use of insulin (H)       polyethylene glycol Packet    MIRALAX/GLYCOLAX    7 packet    Take 17 g by mouth daily as needed for constipation    Constipation, unspecified constipation type       senna-docusate 8.6-50 MG per tablet    SENOKOT-S;PERICOLACE    100 tablet    Take 1 tablet by mouth 2 times daily as needed for constipation    Constipation, unspecified constipation type       STATIN NOT PRESCRIBED (INTENTIONAL)     0 each    1 each At Bedtime Statin not prescribed  intentionally due to Risk for drug interaction    Type 2 diabetes, HbA1C goal < 8% (H)       TYLENOL PO      Take 1,000 mg by mouth 3 times daily        warfarin 5 MG tablet    COUMADIN    65 tablet    TAKE 1 TABLET BY MOUTH TUESDAY,THURS AND SUN. TAKE 1/2 TABLET ON MON,WED,FRI AND SAT. OR AS DIRECTED    Heart palpitations, New onset atrial fibrillation (H), Chronic anticoagulation

## 2018-03-13 NOTE — LETTER
[facility] CARE COORDINATION  [address]  [facility] [address]      March 27, 2018    Louis PACHECO Arianna .  [address]  [facility] [address]      Dear Louis,    I am a clinic care coordinator who works with Joselito Armas MD. I have been trying to reach you recently to introduce Clinic Care Coordination and to see if there was anything I could assist you with.  I wanted to introduce myself and provide you with my contact information so that you can call me with questions or concerns about your health care. Below is a description of clinic care coordination and how I can further assist you.     The clinic care coordinator is a registered nurse and/or  who understand the health care system. The goal of clinic care coordination is to help you manage your health and improve access to the [facility] system in the most efficient manner. The registered nurse can assist you in meeting your health care goals by providing education, coordinating services, and strengthening the communication among your providers. The  can assist you with financial, behavioral, psychosocial, chemical dependency, counseling, and/or psychiatric resources.    Please feel free to contact me at [phone], with any questions or concerns. We at [facility] are focused on providing you with the highest-quality healthcare experience possible and that all starts with you.     Sincerely,     MASOOD Mckeon  Care Navigator  [facility]  [phone]

## 2018-03-14 ENCOUNTER — CARE COORDINATION (OUTPATIENT)
Dept: CARE COORDINATION | Facility: CLINIC | Age: 83
End: 2018-03-14

## 2018-03-14 ENCOUNTER — TRANSFERRED RECORDS (OUTPATIENT)
Dept: HEALTH INFORMATION MANAGEMENT | Facility: CLINIC | Age: 83
End: 2018-03-14

## 2018-03-15 ENCOUNTER — THERAPY VISIT (OUTPATIENT)
Dept: PHYSICAL THERAPY | Facility: CLINIC | Age: 83
End: 2018-03-15
Payer: COMMERCIAL

## 2018-03-15 DIAGNOSIS — M25.569 KNEE PAIN: Primary | ICD-10-CM

## 2018-03-15 PROCEDURE — 97112 NEUROMUSCULAR REEDUCATION: CPT | Mod: GP | Performed by: PHYSICAL THERAPIST

## 2018-03-15 PROCEDURE — 97110 THERAPEUTIC EXERCISES: CPT | Mod: GP | Performed by: PHYSICAL THERAPIST

## 2018-03-15 NOTE — PROGRESS NOTES
Clinic Care Coordination Contact    Situation: Patient chart reviewed by care coordinator.    Background: Patient was discharged from TCU    Assessment: Patient is at therapy appointment    Plan/Recommendations: Writer will reach out to patient tomorrow Friday 3/16/18    MASOOD Mckeon  Care Navigrene  [facility]  [phone]      Clinic Care Coordination Contact    Situation: Patient chart reviewed by care coordinator.    Background: Patient was discharged from TCU    Assessment: Patient had an appointment with PCP.    Plan/Recommendations: This writer left a message for patient to call back. CC intro letter was sent to the Patient with writers contact information. No further outreaches will be made at this time.    MASOOD Mckeon  Care Navigrene  [facility]  [phone]

## 2018-03-19 ENCOUNTER — THERAPY VISIT (OUTPATIENT)
Dept: PHYSICAL THERAPY | Facility: CLINIC | Age: 83
End: 2018-03-19
Payer: COMMERCIAL

## 2018-03-19 ENCOUNTER — OFFICE VISIT (OUTPATIENT)
Dept: INTERNAL MEDICINE | Facility: CLINIC | Age: 83
End: 2018-03-19
Payer: COMMERCIAL

## 2018-03-19 VITALS
OXYGEN SATURATION: 96 % | HEART RATE: 103 BPM | RESPIRATION RATE: 16 BRPM | BODY MASS INDEX: 25.72 KG/M2 | TEMPERATURE: 98.9 F | SYSTOLIC BLOOD PRESSURE: 138 MMHG | WEIGHT: 184.4 LBS | DIASTOLIC BLOOD PRESSURE: 78 MMHG

## 2018-03-19 DIAGNOSIS — Z09 HOSPITAL DISCHARGE FOLLOW-UP: Primary | ICD-10-CM

## 2018-03-19 DIAGNOSIS — Z79.01 LONG-TERM (CURRENT) USE OF ANTICOAGULANTS: ICD-10-CM

## 2018-03-19 DIAGNOSIS — N18.30 TYPE 2 DIABETES MELLITUS WITH STAGE 3 CHRONIC KIDNEY DISEASE, WITHOUT LONG-TERM CURRENT USE OF INSULIN (H): ICD-10-CM

## 2018-03-19 DIAGNOSIS — I48.91 ATRIAL FIBRILLATION, UNSPECIFIED TYPE (H): ICD-10-CM

## 2018-03-19 DIAGNOSIS — M11.261 PSEUDOGOUT OF RIGHT KNEE: ICD-10-CM

## 2018-03-19 DIAGNOSIS — M25.561 ACUTE PAIN OF RIGHT KNEE: ICD-10-CM

## 2018-03-19 DIAGNOSIS — E11.22 TYPE 2 DIABETES MELLITUS WITH STAGE 3 CHRONIC KIDNEY DISEASE, WITHOUT LONG-TERM CURRENT USE OF INSULIN (H): ICD-10-CM

## 2018-03-19 LAB
BACTERIA SPEC CULT: NORMAL
Lab: NORMAL
SPECIMEN SOURCE: NORMAL

## 2018-03-19 PROCEDURE — 97110 THERAPEUTIC EXERCISES: CPT | Mod: GP | Performed by: PHYSICAL THERAPIST

## 2018-03-19 PROCEDURE — 97112 NEUROMUSCULAR REEDUCATION: CPT | Mod: GP | Performed by: PHYSICAL THERAPIST

## 2018-03-19 PROCEDURE — 99495 TRANSJ CARE MGMT MOD F2F 14D: CPT | Performed by: INTERNAL MEDICINE

## 2018-03-19 NOTE — NURSING NOTE
"Chief Complaint   Patient presents with     Hospital F/U       Initial /78  Pulse 103  Temp 98.9  F (37.2  C) (Oral)  Resp 16  Wt 184 lb 6.4 oz (83.6 kg)  SpO2 96%  BMI 25.72 kg/m2 Estimated body mass index is 25.72 kg/(m^2) as calculated from the following:    Height as of 12/27/17: 5' 11\" (1.803 m).    Weight as of this encounter: 184 lb 6.4 oz (83.6 kg).  Medication Reconciliation: complete      Marina Vargas CMA      "

## 2018-03-19 NOTE — PROCEDURES
"  SUBJECTIVE:   Louis Keller Jr. is a 85 year old male who presents to clinic today for the following health issues:          Hospital Follow-up Visit:    Hospital/Nursing Home/ Rehab Facility: [facility]  Date of Admission: 03/06/2018  Date of Discharge: 03/11/2018  Reason(s) for Admission: Knee Effusion, right            Problems taking medications regularly:  None       Medication changes since discharge: None       Problems adhering to non-medication therapy:  None    Summary of hospitalization:  {HOSPITAL DISCHARGE SUMMARY INFO:255719::\"[facility] hospital discharge summary reviewed\"}  Diagnostic Tests/Treatments reviewed.  Follow up needed: {NONE DEFAULTED:748738::\"none\"}  Other Healthcare Providers Involved in Patient s Care:         {those currently involved after discharge:284911::\"None\"}  Update since discharge: {IMPROVED DEFAULT:749264::\"improved.\"} {include information from family, SNF, care coordination}    Post Discharge Medication Reconciliation: {ACO Med Rec (Provider):149414}.  Plan of care communicated with {Communicate Plan to:274149::\"patient\"}     Coding guidelines for this visit:  Type of Medical   Decision Making Face-to-Face Visit       within 7 Days of discharge Face-to-Face Visit        within 14 days of discharge   Moderate Complexity [id number]   High Complexity 76046 [id number]              {additional problems for provider to add:246711}    Problem list and histories reviewed & adjusted, as indicated.  Additional history: {NONE - AS DOCUMENTED:070536::\"as documented\"}    {HIST REVIEW/ LINKS 2:505303}    Reviewed and updated as needed this visit by clinical staff       Reviewed and updated as needed this visit by Provider         {PROVIDER CHARTING PREFERENCE:798653}    "

## 2018-03-19 NOTE — MR AVS SNAPSHOT
After Visit Summary   3/19/2018    Louis Keller Jr.    MRN: [id number]           Patient Information     Date Of Birth          6/26/1932        Visit Information        Provider Department      3/19/2018 2:20 PM Joselito Armas MD [facility]        Today's Diagnoses     Hospital discharge follow-up    -  1    Pseudogout of right knee        Type 2 diabetes mellitus with stage 3 chronic kidney disease, without long-term current use of insulin (H)        Long-term (current) use of anticoagulants [Z79.01]        Atrial fibrillation, unspecified type (H)          Care Instructions      At your visit today, we discussed your risk for falls and preventive options.    Fall Prevention  Falls often occur due to slipping, tripping or losing your balance. Millions of people fall every year and injure themselves. Here are ways to reduce your risk of falling again.    Think about your fall, was there anything that caused your fall that can be fixed, removed, or replaced?    Make your home safe by keeping walkways clear of objects you may trip over.    Use non-slip pads under rugs. Do not use area rugs or small throw rugs.    Use non-slip mats in bathtubs and showers.    Install handrails and lights on staircases.    Do not walk in poorly lit areas.    Do not stand on chairs or wobbly ladders.    Use caution when reaching overhead or looking upward. This position can cause a loss of balance.    Be sure your shoes fit properly, have non-slip bottoms and are in good condition.     Wear shoes both inside and out. Avoid going barefoot or wearing slippers.    Be cautious when going up and down stairs, curbs, and when walking on uneven sidewalks.    If your balance is poor, consider using a cane or walker.    If your fall was related to alcohol use, stop or limit alcohol intake.     If your fall was related to use of sleeping medicines, talk to your doctor about this. You may need to reduce your  dosage at bedtime if you awaken during the night to go to the bathroom.      To reduce the need for nighttime bathroom trips:    Avoid drinking fluids for several hours before going to bed    Empty your bladder before going to bed    Men can keep a urinal at the bedside    Stay as active as you can. Balance, flexibility, strength, and endurance all come from exercise. They all play a role in preventing falls. Ask your healthcare provider which types of activity are right for you.    Get your vision checked on a regular basis.    If you have pets, know where they are before you stand up or walk so you don't trip over them.    Use night lights.  Date Last Reviewed: 11/5/2015 2000-2017 [employer]. [address] [place] [address], [place] [address]. All rights reserved. This information is not intended as a substitute for professional medical care. Always follow your healthcare professional's instructions.                Follow-ups after your visit        Your next 10 appointments already scheduled     Apr 26, 2018 11:15 AM CDT   Antico[facility] with [facility]   [facility]    [address] [place] [address] [phone]            May 03, 2018 10:45 AM CDT   Return Visit with Herbert Gleason MD   [facility] ([facility])    [address]  [facility] [address] [phone]            Jun 22, 2018 11:00 AM CDT   [facility] with Joselito [facility]as MD   [facility]    [facility] [address]  [facility] [address] [phone]              Who to contact     If you have questions or need follow up information about today's clinic visit or your schedule please contact [facility] directly at [phone].  Normal or non-critical lab and imaging results will be communicated to you by  "MyChart, letter or phone within 4 business days after the clinic has received the results. If you do not hear from us within 7 days, please contact the clinic through [employer] or phone. If you have a critical or abnormal lab result, we will notify you by phone as soon as possible.  Submit refill requests through [employer] or call your pharmacy and they will forward the refill request to us. Please allow 3 business days for your refill to be completed.          Additional Information About Your Visit        [employer] Information     [employer] lets you send messages to your doctor, view your test results, renew your prescriptions, schedule appointments and more. To sign up, go to [email or web address] . Click on \"Log in\" on the left side of the screen, which will take you to the Welcome page. Then click on \"Sign up Now\" on the right side of the page.     You will be asked to enter the access code listed below, as well as some personal information. Please follow the directions to create your username and password.     Your access code is: [email or web address]  Expires: 2018 11:44 AM     Your access code will  in 90 days. If you need help or a new code, please call your [place] clinic or [phone].        Care EveryWhere ID     This is your Care EveryWhere ID. This could be used by other organizations to access your [facility] medical records  [id number]        Your Vitals Were     Pulse Temperature Respirations Pulse Oximetry BMI (Body Mass Index)       103 98.9  F (37.2  C) (Oral) 16 96% 25.72 kg/m2        Blood Pressure from Last 3 Encounters:   18 138/78   18 121/72   18 124/80    Weight from Last 3 Encounters:   18 184 lb 6.4 oz (83.6 kg)   18 188 lb 6.4 oz (85.5 kg)   18 191 lb 11.2 oz (87 kg)              Today, you had the following     No orders found for display       Primary Care Provider Office Phone # Fax #    Joselito Armas [phone]       [address] [facility] [address]        Equal Access to Services     SABRA LEAL : Hadii aad ku hadjossiemagdi Jeananoemi, waaxda luqbernardoha, qaybta kaaniajerrica bajaidajerrica, emanuel michaelcarmelinayue chin. So [facility] [phone].    ATENCIÓN: Si habla español, tiene a cao disposición servicios gratuitos de asistencia lingüística. Llame al [phone].    We comply with applicable federal civil rights laws and [place] laws. We do not discriminate on the basis of race, color, national origin, age, disability, sex, sexual orientation, or gender identity.            Thank you!     Thank you for choosing [facility]  for your care. Our goal is always to provide you with excellent care. Hearing back from our patients is one way we can continue to improve our services. Please take a few minutes to complete the written survey that you may receive in the mail after your visit with us. Thank you!             Your Updated Medication List - Protect others around you: Learn how to safely use, store and throw away your medicines at www.disposemymeds.org.          This list is accurate as of 3/19/18 11:59 PM.  Always use your most recent med list.                   Brand Name Dispense Instructions for use Diagnosis    ACE NOT PRESCRIBED (INTENTIONAL)     0 each    1 each daily ACE Inhibitor not prescribed due to Risk for drug interaction    Type 2 diabetes, HbA1C goal < 8% (H)       ASPIRIN NOT PRESCRIBED    INTENTIONAL     by Other route continuous prn Reported on 3/7/2017        cholestyramine 4 G Packet    QUESTRAN     Take 1 packet by mouth every evening        Chromium 1000 MCG Tabs      Take 500 mcg by mouth daily        diltiazem 120 MG 24 hr capsule    CARDIZEM CD/CARTIA XT    90 capsule    Take 1 capsule (120 mg) by mouth every evening    Chronic atrial fibrillation (H)       ferrous gluconate 324 (38 FE) MG tablet    FERGON    100 tablet    TAKE 1 TABLET (324 MG) BY MOUTH DAILY (WITH BREAKFAST)    Anemia  due to blood loss, acute       fish oil-omega-3 fatty acids 1000 MG capsule      Take 1 g by mouth 2 times daily        glipiZIDE 10 MG 24 hr tablet    glipiZIDE XL    180 tablet    Take 1 tablet (10 mg) by mouth 2 times daily    Chronic atrial fibrillation (H), Actinic keratosis       loperamide 2 MG tablet    IMODIUM A-D    30 tablet    1 tablet BID        metFORMIN 500 MG tablet    GLUCOPHAGE    180 tablet    Take 1 tablet (500 mg) by mouth 2 times daily (with meals)    Type 2 diabetes mellitus with stage 3 chronic kidney disease, without long-term current use of insulin (H)       multivitamin, therapeutic Tabs tablet      Take 0.5 tablets by mouth 2 times daily        pioglitazone 45 MG tablet    ACTOS    90 tablet    TAKE 1 TABLET (45 MG) BY MOUTH DAILY    Type 2 diabetes mellitus with stage 3 chronic kidney disease, without long-term current use of insulin (H)       polyethylene glycol Packet    MIRALAX/GLYCOLAX    7 packet    Take 17 g by mouth daily as needed for constipation    Constipation, unspecified constipation type       senna-docusate 8.6-50 MG per tablet    SENOKOT-S;PERICOLACE    100 tablet    Take 1 tablet by mouth 2 times daily as needed for constipation    Constipation, unspecified constipation type       STATIN NOT PRESCRIBED (INTENTIONAL)     0 each    1 each At Bedtime Statin not prescribed intentionally due to Risk for drug interaction    Type 2 diabetes, HbA1C goal < 8% (H)       TYLENOL PO      Take 1,000 mg by mouth 3 times daily        warfarin 5 MG tablet    COUMADIN    65 tablet    TAKE 1 TABLET BY MOUTH TUESDAY,THURS AND SUN. TAKE 1/2 TABLET ON MON,WED,FRI AND SAT. OR AS DIRECTED    Heart palpitations, New onset atrial fibrillation (H), Chronic anticoagulation

## 2018-03-19 NOTE — PATIENT INSTRUCTIONS
At your visit today, we discussed your risk for falls and preventive options.    Fall Prevention  Falls often occur due to slipping, tripping or losing your balance. Millions of people fall every year and injure themselves. Here are ways to reduce your risk of falling again.    Think about your fall, was there anything that caused your fall that can be fixed, removed, or replaced?    Make your home safe by keeping walkways clear of objects you may trip over.    Use non-slip pads under rugs. Do not use area rugs or small throw rugs.    Use non-slip mats in bathtubs and showers.    Install handrails and lights on staircases.    Do not walk in poorly lit areas.    Do not stand on chairs or wobbly ladders.    Use caution when reaching overhead or looking upward. This position can cause a loss of balance.    Be sure your shoes fit properly, have non-slip bottoms and are in good condition.     Wear shoes both inside and out. Avoid going barefoot or wearing slippers.    Be cautious when going up and down stairs, curbs, and when walking on uneven sidewalks.    If your balance is poor, consider using a cane or walker.    If your fall was related to alcohol use, stop or limit alcohol intake.     If your fall was related to use of sleeping medicines, talk to your doctor about this. You may need to reduce your dosage at bedtime if you awaken during the night to go to the bathroom.      To reduce the need for nighttime bathroom trips:    Avoid drinking fluids for several hours before going to bed    Empty your bladder before going to bed    Men can keep a urinal at the bedside    Stay as active as you can. Balance, flexibility, strength, and endurance all come from exercise. They all play a role in preventing falls. Ask your healthcare provider which types of activity are right for you.    Get your vision checked on a regular basis.    If you have pets, know where they are before you stand up or walk so you don't trip over  them.    Use night lights.  Date Last Reviewed: 11/5/2015 2000-2017 [employer]. [address] [place] [address], [place] [address]. All rights reserved. This information is not intended as a substitute for professional medical care. Always follow your healthcare professional's instructions.

## 2018-03-21 NOTE — PROGRESS NOTES
SUBJECTIVE:   Louis Keller Jr. is a 85 year old male who presents to clinic today for the following health issues:          Hospital Follow-up Visit:    Hospital/Nursing Home/IP Rehab Facility: [facility]  Date of Admission: 3/3/18  Date of Discharge: 3/6/18 from [facility], 3/11/18 from TCU  Reason(s) for Admission: fall, pseudogout             Problems taking medications regularly:  None       Medication changes since discharge: per chart        Problems adhering to non-medication therapy:  None    Summary of hospitalization:  [facility] discharge summary reviewed  Diagnostic Tests/Treatments reviewed.  Follow up needed: none  Other Healthcare Providers Involved in Patient s Care:         None  Update since discharge: improved.     Post Discharge Medication Reconciliation: discharge medications reconciled, continue medications without change.  Plan of care communicated with patient     Coding guidelines for this visit:  Type of Medical   Decision Making Face-to-Face Visit       within 7 Days of discharge Face-to-Face Visit        within 14 days of discharge   Moderate Complexity [id number]   High Complexity 45328 [id number]            Patient is seen for a follow up visit.  Recently hospitalized for right knee pain, fall.   Had fallen after developing of right knee pain.   In hospital had concern for possible septic joint. Joint aspiration showed pseudogout. No infection.   Treated with NSAIDs, prednisone. Improved. Has had knee steroid injection.   Feels better after rehab. Now discharged to home. Able to ambulate with a cane. No recurrent knee swelling, falls.   Has H/O DM. On diet , exercise and PO Medications . Blood sugars are controlled. No parestesias. No hypoglycemias.  Has history of atrial fibrillation. On anticoagulation with Coumadin and rate control medications. Asymptonatic - no chest pains , palpitations,  no side effects from medications.        Problem list and histories reviewed &  adjusted, as indicated.  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     Anemia due to blood loss, acute     Physical deconditioning     Diabetes mellitus, type 2 (H)     Atrial fibrillation (H)     QUESADA (dyspnea on exertion)     Diastolic murmur     Pain of right thigh     Long-term (current) use of anticoagulants [Z79.01]     Knee effusion, right     Joint effusion of knee     Past Surgical History:   Procedure Laterality Date     ARTHROPLASTY KNEE  2013    Procedure: ARTHROPLASTY KNEE;  Left Total Knee Arthroplasty       C NONSPECIFIC PROCEDURE  Child    T&A     C NONSPECIFIC PROCEDURE  ; also     Colonoscopy     C NONSPECIFIC PROCEDURE      Basal cell cancer of the nose     C NONSPECIFIC PROCEDURE      Cholecystectomy     CARDIOVERSION  11    failed       Social History   Substance Use Topics     Smoking status: Never Smoker     Smokeless tobacco: Never Used     Alcohol use 0.0 oz/week     0 Standard drinks or equivalent per week      Comment: 1 glass of wine every day     Family History   Problem Relation Age of Onset     Alzheimer Disease Maternal Grandmother      Arthritis Maternal Grandmother      CANCER Mother      breast/ 1983/colon     Eye Disorder Mother      glaucoma and cataracts     HEART DISEASE Mother      angina/CABG     Hypertension Mother      CANCER Father      colon     DIABETES Maternal Aunt      AoDM         Current Outpatient Prescriptions   Medication Sig Dispense Refill     Acetaminophen (TYLENOL PO) Take 1,000 mg by  mouth 3 times daily       polyethylene glycol (MIRALAX/GLYCOLAX) Packet Take 17 g by mouth daily as needed for constipation 7 packet      senna-docusate (SENOKOT-S;PERICOLACE) 8.6-50 MG per tablet Take 1 tablet by mouth 2 times daily as needed for constipation 100 tablet      cholestyramine (QUESTRAN) 4 G Packet Take 1 packet by mouth every evening       multivitamin, therapeutic (THERA-VIT) TABS tablet Take 0.5 tablets by mouth 2 times daily       pioglitazone (ACTOS) 45 MG tablet TAKE 1 TABLET (45 MG) BY MOUTH DAILY 90 tablet 1     ferrous gluconate (FERGON) 324 (38 FE) MG tablet TAKE 1 TABLET (324 MG) BY MOUTH DAILY (WITH BREAKFAST) 100 tablet 3     metFORMIN (GLUCOPHAGE) 500 MG tablet Take 1 tablet (500 mg) by mouth 2 times daily (with meals) 180 tablet 1     warfarin (COUMADIN) 5 MG tablet TAKE 1 TABLET BY MOUTH TUESDAY,THURS AND SUN. TAKE 1/2 TABLET ON MON,WED,FRI AND SAT. OR AS DIRECTED 65 tablet 1     glipiZIDE (GLIPIZIDE XL) 10 MG 24 hr tablet Take 1 tablet (10 mg) by mouth 2 times daily 180 tablet 3     diltiazem 120 MG 24 hr capsule Take 1 capsule (120 mg) by mouth every evening 90 capsule 3     Chromium 1000 MCG TABS Take 500 mcg by mouth daily       loperamide (IMODIUM A-D) 2 MG tablet 1 tablet BID 30 tablet 0     STATIN NOT PRESCRIBED, INTENTIONAL, 1 each At Bedtime Statin not prescribed intentionally due to Risk for drug interaction 0 each 0     ACE NOT PRESCRIBED, INTENTIONAL, 1 each daily ACE Inhibitor not prescribed due to Risk for drug interaction 0 each 0     ASPIRIN NOT PRESCRIBED, INTENTIONAL, by Other route continuous prn Reported on 3/7/2017       fish oil-omega-3 fatty acids (FISH OIL) 1000 MG capsule Take 1 g by mouth 2 times daily          Reviewed and updated as needed this visit by clinical staff  Tobacco  Allergies  Meds  Med Hx  Surg Hx  Fam Hx  Soc Hx      Reviewed and updated as needed this visit by Provider         ROS:  Constitutional, HEENT, cardiovascular, pulmonary, gi and  gu systems are negative, except as otherwise noted.    OBJECTIVE:     /78  Pulse 103  Temp 98.9  F (37.2  C) (Oral)  Resp 16  Wt 184 lb 6.4 oz (83.6 kg)  SpO2 96%  BMI 25.72 kg/m2  Body mass index is 25.72 kg/(m^2).   GENERAL: healthy, alert and no distress  EYES: Eyes grossly normal to inspection, PERRL and conjunctivae and sclerae normal  NECK: no adenopathy, no asymmetry, masses, or scars and thyroid normal to palpation  RESP: lungs clear to auscultation - no rales, rhonchi or wheezes  CV:irregular rate and rhythm, normal S1 S2, no S3 or S4, no murmur, click or rub, no peripheral edema and peripheral pulses strong  ABDOMEN: soft, nontender, no hepatosplenomegaly, no masses and bowel sounds normal  MS: no gross musculoskeletal defects noted, no edema, knees with OA changes, mild pain with ambulation, no locking   SKIN: no suspicious lesions or rashes    Diagnostic Test Results:  none     ASSESSMENT/PLAN:     Problem List Items Addressed This Visit     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

## 2018-03-23 ENCOUNTER — THERAPY VISIT (OUTPATIENT)
Dept: PHYSICAL THERAPY | Facility: CLINIC | Age: 83
End: 2018-03-23
Payer: COMMERCIAL

## 2018-03-23 DIAGNOSIS — M25.561 ACUTE PAIN OF RIGHT KNEE: ICD-10-CM

## 2018-03-23 PROCEDURE — 97112 NEUROMUSCULAR REEDUCATION: CPT | Mod: GP | Performed by: PHYSICAL THERAPIST

## 2018-03-23 PROCEDURE — 97110 THERAPEUTIC EXERCISES: CPT | Mod: GP | Performed by: PHYSICAL THERAPIST

## 2018-03-23 NOTE — PROGRESS NOTES
Subjective:  HPI                    Objective:  System    Physical Exam    General     ROS    Assessment/Plan:    DISCHARGE REPORT    Progress reporting period is from eval to 3/23/18.       SUBJECTIVE  Subjective changes noted by patient:  .  Subjective: Pt reports using cane less at home and having more confidence with walking    Current pain level is  Current Pain level: 1/10.     Previous pain level was   Initial Pain level: 3/10.   Changes in function:  Yes (See Goal flowsheet attached for changes in current functional level)  Adverse reaction to treatment or activity: None    OBJECTIVE  Changes noted in objective findings:  Yes,   Objective: Able to perform single leg stnace on right LE  with UE support for 25 seconds     ASSESSMENT/PLAN  Updated problem list and treatment plan: Diagnosis 1:  Balance/knee pain  Pain -  self management, education, directional preference exercise and home program  Decreased ROM/flexibility - manual therapy and therapeutic exercise  Decreased strength - therapeutic exercise and therapeutic activities  Impaired muscle performance - neuro re-education  STG/LTGs have been met or progress has been made towards goals:  Yes (See Goal flow sheet completed today.)  Assessment of Progress: The patient's condition is improving.  The patient's condition has potential to improve.  Self Management Plans:  Patient has been instructed in a home treatment program.  I have re-evaluated this patient and find that the nature, scope, duration and intensity of the therapy is appropriate for the medical condition of the patient.      Recommendations:  This patient is ready to be discharged from therapy and continue their home treatment program.    Please refer to the daily flowsheet for treatment today, total treatment time and time spent performing 1:1 timed codes.

## 2018-03-23 NOTE — MR AVS SNAPSHOT
After Visit Summary   3/23/2018    Louis Keller Jr.    MRN: [id number]           Patient Information     Date Of Birth          6/26/1932        Visit Information        Provider Department      3/23/2018 1:00 PM Willem Ellis, PT [facility]        Today's Diagnoses     Acute pain of right knee           Follow-ups after your visit        Your next 10 appointments already scheduled     Mar 29, 2018 11:15 AM CDT   Anticoagulation Visit with [facility]   [facility]    [address] [place] [address] [phone]            Mar 29, 2018 11:30 AM CDT   LAB with [facility] LAB   [facility]    [address] [address] [phone]           Please do not eat 10-12 hours before your appointment if you are coming in fasting for labs on lipids, cholesterol, or glucose (sugar). This does not apply to pregnant women. Water, hot tea and black coffee (with nothing added) are okay. Do not drink other fluids, diet soda or chew gum.            May 03, 2018 10:45 AM CDT   Return Visit with Herbert Gleason MD   [facility] ([facility]    [address]  [facility] [address] [phone]            Jun 22, 2018 11:00 AM CDT   SHORT with Joselito Armas MD   [facility]    [address] [facility] [id number] [phone]              Who to contact     If you have questions or need follow up information about today's clinic visit or your schedule please contact [place] [facility] directly at [phone].  Normal or non-critical lab and imaging results will be communicated to you by MyChart, letter or phone within 4  "business days after the clinic has received the results. If you do not hear from us within 7 days, please contact the clinic through [employer] or phone. If you have a critical or abnormal lab result, we will notify you by phone as soon as possible.  Submit refill requests through [employer] or call your pharmacy and they will forward the refill request to us. Please allow 3 business days for your refill to be completed.          Additional Information About Your Visit        [employer] Information     [employer] lets you send messages to your doctor, view your test results, renew your prescriptions, schedule appointments and more. To sign up, go to [email or web address] . Click on \"Log in\" on the left side of the screen, which will take you to the Welcome page. Then click on \"Sign up Now\" on the right side of the page.     You will be asked to enter the access code listed below, as well as some personal information. Please follow the directions to create your username and password.     Your access code is: [email or web address]  Expires: 3/29/2018  6:39 PM     Your access code will  in 90 days. If you need help or a new code, please call your [facility] clinic or [phone].        Care EveryWhere ID     This is your Care EveryWhere ID. This could be used by other organizations to access your [facility] medical records  [id number]         Blood Pressure from Last 3 Encounters:   18 138/78   18 121/72   18 124/80    Weight from Last 3 Encounters:   18 83.6 kg (184 lb 6.4 oz)   18 85.5 kg (188 lb 6.4 oz)   18 87 kg (191 lb 11.2 oz)              We Performed the Following     NEUROMUSCULAR RE-EDUCATION     THERAPEUTIC EXERCISES        Primary Care Provider Office Phone # Fax #    Joselito Armas [phone]       [address]  [facility] [address]        Equal Access to Services     SABRA LEAL AH: Hadii luis frosto Sonoemi, waaxda blanquitaqadaroxanne, qaybta siddharthalmajerrica " emanuel medranoallyson alecjennifer altamiranoaadennise ah. Jeana [facility] [phone].    ATENCIÓN: Si brittney farley, tiene a cao disposición servicios gratuitos de asistencia lingüística. Jose L al [phone].    We comply with applicable federal civil rights laws and [place] laws. We do not discriminate on the basis of race, color, national origin, age, disability, sex, sexual orientation, or gender identity.            Thank you!     Thank you for choosing [facility]  for your care. Our goal is always to provide you with excellent care. Hearing back from our patients is one way we can continue to improve our services. Please take a few minutes to complete the written survey that you may receive in the mail after your visit with us. Thank you!             Your Updated Medication List - Protect others around you: Learn how to safely use, store and throw away your medicines at www.disposemymeds.org.          This list is accurate as of 3/23/18  2:21 PM.  Always use your most recent med list.                   Brand Name Dispense Instructions for use Diagnosis    ACE NOT PRESCRIBED (INTENTIONAL)     0 each    1 each daily ACE Inhibitor not prescribed due to Risk for drug interaction    Type 2 diabetes, HbA1C goal < 8% (H)       ASPIRIN NOT PRESCRIBED    INTENTIONAL     by Other route continuous prn Reported on 3/7/2017        cholestyramine 4 G Packet    QUESTRAN     Take 1 packet by mouth every evening        Chromium 1000 MCG Tabs      Take 500 mcg by mouth daily        diltiazem 120 MG 24 hr capsule    CARDIZEM CD/CARTIA XT    90 capsule    Take 1 capsule (120 mg) by mouth every evening    Chronic atrial fibrillation (H)       ferrous gluconate 324 (38 FE) MG tablet    FERGON    100 tablet    TAKE 1 TABLET (324 MG) BY MOUTH DAILY (WITH BREAKFAST)    Anemia due to blood loss, acute       fish oil-omega-3 fatty acids 1000 MG capsule      Take 1 g by mouth 2 times daily         glipiZIDE 10 MG 24 hr tablet    glipiZIDE XL    180 tablet    Take 1 tablet (10 mg) by mouth 2 times daily    Chronic atrial fibrillation (H), Actinic keratosis       loperamide 2 MG tablet    IMODIUM A-D    30 tablet    1 tablet BID        metFORMIN 500 MG tablet    GLUCOPHAGE    180 tablet    Take 1 tablet (500 mg) by mouth 2 times daily (with meals)    Type 2 diabetes mellitus with stage 3 chronic kidney disease, without long-term current use of insulin (H)       multivitamin, therapeutic Tabs tablet      Take 0.5 tablets by mouth 2 times daily        pioglitazone 45 MG tablet    ACTOS    90 tablet    TAKE 1 TABLET (45 MG) BY MOUTH DAILY    Type 2 diabetes mellitus with stage 3 chronic kidney disease, without long-term current use of insulin (H)       polyethylene glycol Packet    MIRALAX/GLYCOLAX    7 packet    Take 17 g by mouth daily as needed for constipation    Constipation, unspecified constipation type       senna-docusate 8.6-50 MG per tablet    SENOKOT-S;PERICOLACE    100 tablet    Take 1 tablet by mouth 2 times daily as needed for constipation    Constipation, unspecified constipation type       STATIN NOT PRESCRIBED (INTENTIONAL)     0 each    1 each At Bedtime Statin not prescribed intentionally due to Risk for drug interaction    Type 2 diabetes, HbA1C goal < 8% (H)       TYLENOL PO      Take 1,000 mg by mouth 3 times daily        warfarin 5 MG tablet    COUMADIN    65 tablet    TAKE 1 TABLET BY MOUTH TUESDAY,THURS AND SUN. TAKE 1/2 TABLET ON MON,WED,FRI AND SAT. OR AS DIRECTED    Heart palpitations, New onset atrial fibrillation (H), Chronic anticoagulation

## 2018-03-29 ENCOUNTER — ANTICOAGULATION THERAPY VISIT (OUTPATIENT)
Dept: NURSING | Facility: CLINIC | Age: 83
End: 2018-03-29
Payer: COMMERCIAL

## 2018-03-29 DIAGNOSIS — E11.22 TYPE 2 DIABETES MELLITUS WITH STAGE 3 CHRONIC KIDNEY DISEASE, WITHOUT LONG-TERM CURRENT USE OF INSULIN (H): ICD-10-CM

## 2018-03-29 DIAGNOSIS — N18.30 TYPE 2 DIABETES MELLITUS WITH STAGE 3 CHRONIC KIDNEY DISEASE, WITHOUT LONG-TERM CURRENT USE OF INSULIN (H): ICD-10-CM

## 2018-03-29 DIAGNOSIS — R97.20 ELEVATED PROSTATE SPECIFIC ANTIGEN (PSA): ICD-10-CM

## 2018-03-29 DIAGNOSIS — I48.91 ATRIAL FIBRILLATION, UNSPECIFIED TYPE (H): ICD-10-CM

## 2018-03-29 DIAGNOSIS — Z79.01 LONG-TERM (CURRENT) USE OF ANTICOAGULANTS: ICD-10-CM

## 2018-03-29 LAB
HBA1C MFR BLD: 7.6 % (ref 0–6.4)
INR POINT OF CARE: 2.2 (ref 0.86–1.14)
PSA SERPL-ACNC: 4.93 UG/L (ref 0–4)

## 2018-03-29 PROCEDURE — 85610 PROTHROMBIN TIME: CPT | Mod: QW

## 2018-03-29 PROCEDURE — 36416 COLLJ CAPILLARY BLOOD SPEC: CPT

## 2018-03-29 PROCEDURE — 83036 HEMOGLOBIN GLYCOSYLATED A1C: CPT | Performed by: FAMILY MEDICINE

## 2018-03-29 PROCEDURE — 99207 ZZC NO CHARGE NURSE ONLY: CPT

## 2018-03-29 PROCEDURE — 84153 ASSAY OF PSA TOTAL: CPT | Performed by: FAMILY MEDICINE

## 2018-03-29 NOTE — PROGRESS NOTES
ANTICOAGULATION FOLLOW-UP CLINIC VISIT    Patient Name:  Louis Keller Jr.  Date:  3/29/2018  Contact Type:  Face to Face    SUBJECTIVE:     Patient Findings     Positives No Problem Findings    Comments R knee pain has improved from psuedogout           OBJECTIVE    INR Protime   Date Value Ref Range Status   03/29/2018 2.2 (A) 0.86 - 1.14 Final       ASSESSMENT / PLAN  INR assessment THER    Recheck INR In: 4 WEEKS    INR Location Clinic      Anticoagulation Summary as of 3/29/2018     INR goal 2.0-3.0   Today's INR 2.2   Maintenance plan 5 mg (5 mg x 1) on Sun, Tue, Thu; 2.5 mg (5 mg x 0.5) all other days   Full instructions 5 mg on Sun, Tue, Thu; 2.5 mg all other days   Weekly total 25 mg   No change documented Hope Baumann RN   Plan last modified Monica Souza RN (5/12/2016)   Next INR check 4/26/2018   Priority INR   Target end date     Indications   Long-term (current) use of anticoagulants [Z79.01] [Z79.01]  Atrial fibrillation (H) [I48.91]         Anticoagulation Episode Summary     INR check location     Preferred lab     Send INR reminders to [facility]    Comments likes calendar printout.        Anticoagulation Care Providers     Provider Role Specialty Phone number    Joselito Armas MD [facility] [phone]            See the Encounter Report to view Anticoagulation Flowsheet and Dosing Calendar (Go to Encounters tab in chart review, and find the Anticoagulation Therapy Visit)        Hope Baumann RN

## 2018-03-29 NOTE — MR AVS SNAPSHOT
Louis Keller Jr.   3/29/2018 11:15 AM   Anticoagulation Therapy Visit    Description:  85 year old male   Provider:  [facility]INIC   Department:  Cr Nurse           INR as of 3/29/2018     Today's INR 2.2      Anticoagulation Summary as of 3/29/2018     INR goal 2.0-3.0   Today's INR 2.2   Full instructions 5 mg on Sun, Tue, Thu; 2.5 mg all other days   Next INR check 4/26/2018    Indications   Long-term (current) use of anticoagulants [Z79.01] [Z79.01]  Atrial fibrillation (H) [I48.91]         Your next Anticoagulation Clinic appointment(s)     Apr 26, 2018 11:15 AM CDT   Anticoagulation Visit with [facility]   [facility]    [address] [place] [address]   [phone]              Contact Numbers     Clinic Number:         March 2018 Details    Sun Mon Tue Wed Thu Fri Sat         1               2               3                 4               5               6               7               8               9               10                 11               12               13               14               15               16               17                 18               19               20               21               22               23               24                 25               26               27               28               29      5 mg   See details      30      2.5 mg         31      2.5 mg          Date Details   03/29 This INR check               How to take your warfarin dose     To take:  2.5 mg Take 0.5 of a 5 mg tablet.    To take:  5 mg Take 1 of the 5 mg tablets.           April 2018 Details    Sun Mon Tue Wed Thu Fri Sat     1      5 mg         2      2.5 mg         3      5 mg         4      2.5 mg         5      5 mg         6      2.5 mg         7      2.5 mg           8      5 mg         9      2.5 mg         10      5 mg         11      2.5 mg         12      5 mg          13      2.5 mg         14      2.5 mg           15      5 mg         16      2.5 mg         17      5 mg         18      2.5 mg         19      5 mg         20      2.5 mg         21      2.5 mg           22      5 mg         23      2.5 mg         24      5 mg         25      2.5 mg         26            27               28                 29               30                     Date Details   No additional details    Date of next INR:  4/26/2018         How to take your warfarin dose     To take:  2.5 mg Take 0.5 of a 5 mg tablet.    To take:  5 mg Take 1 of the 5 mg tablets.

## 2018-04-02 ENCOUNTER — TELEPHONE (OUTPATIENT)
Dept: INTERNAL MEDICINE | Facility: CLINIC | Age: 83
End: 2018-04-02

## 2018-04-02 DIAGNOSIS — R97.20 ELEVATED PROSTATE SPECIFIC ANTIGEN (PSA): Primary | ICD-10-CM

## 2018-04-26 ENCOUNTER — ANTICOAGULATION THERAPY VISIT (OUTPATIENT)
Dept: NURSING | Facility: CLINIC | Age: 83
End: 2018-04-26
Payer: COMMERCIAL

## 2018-04-26 DIAGNOSIS — I48.91 ATRIAL FIBRILLATION, UNSPECIFIED TYPE (H): ICD-10-CM

## 2018-04-26 DIAGNOSIS — Z79.01 LONG-TERM (CURRENT) USE OF ANTICOAGULANTS: ICD-10-CM

## 2018-04-26 LAB — INR POINT OF CARE: 1.7 (ref 0.86–1.14)

## 2018-04-26 PROCEDURE — 85610 PROTHROMBIN TIME: CPT | Mod: QW

## 2018-04-26 PROCEDURE — 36416 COLLJ CAPILLARY BLOOD SPEC: CPT

## 2018-04-26 PROCEDURE — 99207 ZZC NO CHARGE NURSE ONLY: CPT

## 2018-04-26 NOTE — PROGRESS NOTES
"  ANTICOAGULATION FOLLOW-UP CLINIC VISIT    Patient Name:  Louis Keller Jr.  Date:  4/26/2018  Contact Type:  Face to Face    SUBJECTIVE:     Patient Findings     Positives Change in diet/appetite (Ate more salads this past week, I encouraged consistency and small portion sizes), Bruising (small bruise on top of R hand, pt states he has had it \"a long time\"), OTC meds (Pt was taking 4-6 Ibuprofen per day for a \"few days\" for arthritic pain in L hand.  I encouraged pt to discontinue Ibuprofen and take Tylenol in place of.)           OBJECTIVE    INR Protime   Date Value Ref Range Status   04/26/2018 1.7 (A) 0.86 - 1.14 Final       ASSESSMENT / PLAN  INR assessment SUB    Recheck INR In: 2 WEEKS    INR Location Clinic      Anticoagulation Summary as of 4/26/2018     INR goal 2.0-3.0   Today's INR 1.7!   Maintenance plan 5 mg (5 mg x 1) on Sun, Tue, Thu; 2.5 mg (5 mg x 0.5) all other days   Full instructions 4/26: 7.5 mg; 4/27: 5 mg; Otherwise 5 mg on Sun, Tue, Thu; 2.5 mg all other days   Weekly total 25 mg   Plan last modified Monica Souza RN (5/12/2016)   Next INR check 5/10/2018   Priority INR   Target end date     Indications   Long-term (current) use of anticoagulants [Z79.01] [Z79.01]  Atrial fibrillation (H) [I48.91]         Anticoagulation Episode Summary     INR check location     Preferred lab     Send INR reminders to [facility]    Comments likes calendar printout.        Anticoagulation Care Providers     Provider Role Specialty Phone number    Joselito Armas MD Responsible Internal Medicine [phone]            See the Encounter Report to view Anticoagulation Flowsheet and Dosing Calendar (Go to Encounters tab in chart review, and find the Anticoagulation Therapy Visit)        Hope Baumann RN               "

## 2018-04-26 NOTE — MR AVS SNAPSHOT
Louis Keller Jr.   4/26/2018 11:15 AM   Anticoagulation Therapy Visit    Description:  85 year old male   Provider:  [facility]   Department:  Cr Nurse           INR as of 4/26/2018     Today's INR 1.7!      Anticoagulation Summary as of 4/26/2018     INR goal 2.0-3.0   Today's INR 1.7!   Full instructions 4/26: 7.5 mg; 4/27: 5 mg; Otherwise 5 mg on Sun, Tue, Thu; 2.5 mg all other days   Next INR check 5/10/2018    Indications   Long-term (current) use of anticoagulants [Z79.01] [Z79.01]  Atrial fibrillation (H) [I48.91]         Contact Numbers     Clinic Number:         April 2018 Details    Sun Mon Tue Wed Thu Fri Sat     1               2               3               4               5               6               7                 8               9               10               11               12               13               14                 15               16               17               18               19               20               21                 22               23               24               25               26      7.5 mg   See details      27      5 mg         28      2.5 mg           29      5 mg         30      2.5 mg               Date Details   04/26 This INR check               How to take your warfarin dose     To take:  2.5 mg Take 0.5 of a 5 mg tablet.    To take:  5 mg Take 1 of the 5 mg tablets.    To take:  7.5 mg Take 1.5 of the 5 mg tablets.           May 2018 Details    Sun Mon Tue Wed Thu Fri Sat       1      5 mg         2      2.5 mg         3      5 mg         4      2.5 mg         5      2.5 mg           6      5 mg         7      2.5 mg         8      5 mg         9      2.5 mg         10            11               12                 13               14               15               16               17               18               19                 20               21               22               23               24               25                26                 27               28               29               30               31                  Date Details   No additional details    Date of next INR:  5/10/2018         How to take your warfarin dose     To take:  2.5 mg Take 0.5 of a 5 mg tablet.    To take:  5 mg Take 1 of the 5 mg tablets.

## 2018-04-30 ENCOUNTER — OFFICE VISIT (OUTPATIENT)
Dept: UROLOGY | Facility: CLINIC | Age: 83
End: 2018-04-30
Payer: COMMERCIAL

## 2018-04-30 VITALS — BODY MASS INDEX: 25.76 KG/M2 | HEIGHT: 71 IN | HEART RATE: 72 BPM | WEIGHT: 184 LBS | OXYGEN SATURATION: 97 %

## 2018-04-30 DIAGNOSIS — R31.29 MICROSCOPIC HEMATURIA: ICD-10-CM

## 2018-04-30 DIAGNOSIS — R35.1 NOCTURIA: ICD-10-CM

## 2018-04-30 DIAGNOSIS — R35.0 URINARY FREQUENCY: Primary | ICD-10-CM

## 2018-04-30 LAB
ALBUMIN UR-MCNC: NEGATIVE MG/DL
APPEARANCE UR: CLEAR
BILIRUB UR QL STRIP: NEGATIVE
COLOR UR AUTO: YELLOW
GLUCOSE UR STRIP-MCNC: NEGATIVE MG/DL
HGB UR QL STRIP: ABNORMAL
KETONES UR STRIP-MCNC: NEGATIVE MG/DL
LEUKOCYTE ESTERASE UR QL STRIP: NEGATIVE
NITRATE UR QL: NEGATIVE
PH UR STRIP: 5.5 PH (ref 5–7)
RESIDUAL VOLUME (RV) (EXTERNAL): 14
SOURCE: ABNORMAL
SP GR UR STRIP: 1.01 (ref 1–1.03)
UROBILINOGEN UR STRIP-ACNC: 0.2 EU/DL (ref 0.2–1)

## 2018-04-30 PROCEDURE — 52000 CYSTOURETHROSCOPY: CPT | Performed by: UROLOGY

## 2018-04-30 PROCEDURE — 88112 CYTOPATH CELL ENHANCE TECH: CPT | Performed by: UROLOGY

## 2018-04-30 PROCEDURE — 81003 URINALYSIS AUTO W/O SCOPE: CPT | Mod: QW | Performed by: UROLOGY

## 2018-04-30 PROCEDURE — 99203 OFFICE O/P NEW LOW 30 MIN: CPT | Mod: 25 | Performed by: UROLOGY

## 2018-04-30 RX ORDER — CIPROFLOXACIN 500 MG/1
500 TABLET, FILM COATED ORAL ONCE
Qty: 1 TABLET | Refills: 0 | Status: SHIPPED | OUTPATIENT
Start: 2018-04-30 | End: 2019-02-14

## 2018-04-30 RX ORDER — TAMSULOSIN HYDROCHLORIDE 0.4 MG/1
0.4 CAPSULE ORAL DAILY
Qty: 30 CAPSULE | Refills: 11 | Status: SHIPPED | OUTPATIENT
Start: 2018-04-30 | End: 2019-11-11

## 2018-04-30 ASSESSMENT — PAIN SCALES - GENERAL: PAINLEVEL: NO PAIN (0)

## 2018-04-30 NOTE — PATIENT INSTRUCTIONS
"     AFTER YOUR CYSTOSCOPY         You have just completed a cystoscopy, or \"cysto\", which allowed your physician to learn more about your bladder (or to remove a stent placed after surgery). We suggest that you continue to avoid caffeine, fruit juice, and alcohol for the next 24 hours, however, you are encouraged to return to your normal activities.       A few things that are considered normal after your cystoscopy:    * small amount of bleeding (or spotting) that clears within the next 24 hours    * slight burning sensation with urination    * sensation to of needing to avoid more frequently    * the feeling of \"air\" in your urine    * mild discomfort that is relieved with Tylonol        Please contact our office promptly if you:    * develop a fever above 101 degrees    * are unable to urinate    * develop bright red blood that does not stop    * severe pain or swelling        And of course, please contact our office with any concerns or questions [phone]        "

## 2018-04-30 NOTE — NURSING NOTE
Prior to the start of the procedure and with procedural staff participation, I verbally confirmed the patient s identity using two indicators, relevant allergies, that the procedure was appropriate and matched the consent or emergent situation, and that the correct equipment/implants were available. Immediately prior to starting the procedure I conducted the Time Out with the procedural staff and re-confirmed the patient s name, procedure, and site/side. (The Joint Commission universal protocol was followed.)  Yes    Sedation (Moderate or Deep): None  Pt has signed the consent form stating that we will be doing a CYSTOSCOPY (with or without stent removal) today, and that it is the correct procedure. I verbally confirmed the patient s identity using two indicators, relevant allergies, and that the correct equipment was available. Post-op information given to the pt as needed at check-out. I have sent an appropriate antibiotic to the pharmacy in our building as recommended by the MD. JESSI Magaña CMA

## 2018-04-30 NOTE — PROGRESS NOTES
[facility]  Main Office: [address]  [address]  [place] [address]       CHIEF COMPLAINT:  Elevated PSA and nocturia, microscopic hematuria    HISTORY:   This is an 85 -year-old gentleman who complains of nocturia.  He normally gets up about 3 times to urinate at night. He does not complain of frequency during the day. He has not had any gross hematuria but has had urine samples showing microscopic hematuria. His last 2 urine samples have shown microscopic hematuria. His PSA was 3.93 in . It was checked again in December and was 4.35, currently at 4.93.    In light of his history and his microscopic hematuria, I recommended a cystoscopy today.      PAST MEDICAL HISTORY:   Past Medical History:   Diagnosis Date     Antiplatelet or antithrombotic long-term use      Atrial fibrillation (H)      Diarrhea      Diastolic murmur      QUESADA (dyspnea on exertion)      Hemorrhage of gastrointestinal tract, unspecified 63,65,and 1971    hospitalized     History of blood transfusion      Scarlet fever      Type II or unspecified type diabetes mellitus without mention of complication, not stated as uncontrolled      Unspecified disease of pancreas     pancreatitis       PAST SURGICAL HISTORY:   Past Surgical History:   Procedure Laterality Date     ARTHROPLASTY KNEE  2013    Procedure: ARTHROPLASTY KNEE;  Left Total Knee Arthroplasty       C NONSPECIFIC PROCEDURE  Child    T&A     C NONSPECIFIC PROCEDURE  ; also     Colonoscopy     C NONSPECIFIC PROCEDURE      Basal cell cancer of the nose     C NONSPECIFIC PROCEDURE      Cholecystectomy     CARDIOVERSION  11    failed       FAMILY HISTORY:   Family History   Problem Relation Age of Onset     Alzheimer Disease Maternal Grandmother      Arthritis Maternal Grandmother      CANCER Mother      breast/ 1983/colon     Eye Disorder Mother      glaucoma and cataracts     HEART DISEASE Mother      angina/CABG     Hypertension Mother       CANCER Father      colon     DIABETES Maternal Aunt      AoDM       SOCIAL HISTORY:   Social History   Substance Use Topics     Smoking status: Never Smoker     Smokeless tobacco: Never Used     Alcohol use 0.0 oz/week     0 Standard drinks or equivalent per week      Comment: 1 glass of wine every day          Allergies   Allergen Reactions     No Known Drug Allergies          Current Outpatient Prescriptions:      ACE NOT PRESCRIBED, INTENTIONAL,, 1 each daily ACE Inhibitor not prescribed due to Risk for drug interaction, Disp: 0 each, Rfl: 0     Acetaminophen (TYLENOL PO), Take 1,000 mg by mouth 3 times daily, Disp: , Rfl:      ASPIRIN NOT PRESCRIBED, INTENTIONAL,, by Other route continuous prn Reported on 3/7/2017, Disp: , Rfl:      cholestyramine (QUESTRAN) 4 G Packet, Take 1 packet by mouth every evening, Disp: , Rfl:      Chromium 1000 MCG TABS, Take 500 mcg by mouth daily, Disp: , Rfl:      diltiazem 120 MG 24 hr capsule, Take 1 capsule (120 mg) by mouth every evening, Disp: 90 capsule, Rfl: 3     ferrous gluconate (FERGON) 324 (38 FE) MG tablet, TAKE 1 TABLET (324 MG) BY MOUTH DAILY (WITH BREAKFAST), Disp: 100 tablet, Rfl: 3     fish oil-omega-3 fatty acids (FISH OIL) 1000 MG capsule, Take 1 g by mouth 2 times daily , Disp: , Rfl:      glipiZIDE (GLIPIZIDE XL) 10 MG 24 hr tablet, Take 1 tablet (10 mg) by mouth 2 times daily, Disp: 180 tablet, Rfl: 3     loperamide (IMODIUM A-D) 2 MG tablet, 1 tablet BID, Disp: 30 tablet, Rfl: 0     metFORMIN (GLUCOPHAGE) 500 MG tablet, Take 1 tablet (500 mg) by mouth 2 times daily (with meals), Disp: 180 tablet, Rfl: 1     multivitamin, therapeutic (THERA-VIT) TABS tablet, Take 0.5 tablets by mouth 2 times daily, Disp: , Rfl:      pioglitazone (ACTOS) 45 MG tablet, TAKE 1 TABLET (45 MG) BY MOUTH DAILY, Disp: 90 tablet, Rfl: 1     polyethylene glycol (MIRALAX/GLYCOLAX) Packet, Take 17 g by mouth daily as needed for constipation, Disp: 7 packet, Rfl:      senna-docusate  (SENOKOT-S;PERICOLACE) 8.6-50 MG per tablet, Take 1 tablet by mouth 2 times daily as needed for constipation, Disp: 100 tablet, Rfl:      STATIN NOT PRESCRIBED, INTENTIONAL,, 1 each At Bedtime Statin not prescribed intentionally due to Risk for drug interaction, Disp: 0 each, Rfl: 0     warfarin (COUMADIN) 5 MG tablet, TAKE 1 TABLET BY MOUTH TUESDAY,THURS AND SUN. TAKE 1/2 TABLET ON MON,WED,FRI AND SAT. OR AS DIRECTED, Disp: 65 tablet, Rfl: 1    Review Of Systems:  Skin: negative  Eyes: negative  Ears/Nose/Throat: negative  Respiratory: No shortness of breath, dyspnea on exertion, cough, or hemoptysis  Cardiovascular: negative  Gastrointestinal: negative  Genitourinary: negative  Musculoskeletal: negative  Neurologic: negative  Psychiatric: negative  Hematologic/Lymphatic/Immunologic: negative  Endocrine: negative      PHYSICAL EXAM:    There were no vitals taken for this visit.  General appearance: In NAD, conversant  HEENT: Normocephalic and atraumatic, anicteric sclera  Cardiovascular: Not examined  Respiratory: normal, non-labored breathing  Gastrointestinal: negative, Abdomen soft, non-tender, and non-distended.   Musculoskeletal: Not Examined  Peripheral Vascular/extremity: No peripheral edema  Skin: Normal temperature, turgor, and texture. No rash  Psychiatric: Appropriate affect, alert and oriented to person, place, and time    Penis: Normal  Scrotal skin: Normal, no lesions  Testicles: Normal to palpation bilaterally  Epididymis: Normal to palpation bilaterally  Lymphatic: Normal inguinal lymph nodes  Digital Rectal Exam: His prostate is moderately enlarged but benign and symmetric to palpation    Cystoscopy: I performed a cystoscopy today. The bladder was trabeculated but otherwise normal throughout. The prostate was moderately enlarged. No tumors identified throughout the bladder.      PSA: 4.93    UA RESULTS:  Recent Labs   Lab Test  03/04/18   0945  06/14/17   0852   COLOR  Yellow  Yellow   APPEARANCE   Clear  Clear   URINEGLC  50*  Negative   URINEBILI  Negative  Negative   URINEKETONE  Negative  Negative   SG  1.012  1.010   UBLD  Small*  Trace*   URINEPH  6.0  5.5   PROTEIN  Negative  Negative   UROBILINOGEN   --   0.2   NITRITE  Negative  Negative   LEUKEST  Negative  Negative   RBCU  4*  2-5*   WBCU  <1  O - 2       Bladder Scan: 14mL    Other Labs:      Imaging Studies: None      CLINICAL IMPRESSION:   Enlarged prostate, microscopic hematuria, nocturia    PLAN:   He has an enlarged prostate and nocturia. His cystoscopy today shows no concerning causes for hematuria. I recommended a renal ultrasound be performed in order to complete his hematuria workup. Urine sample from today was sent for cytology as well. We discussed options for his nocturia. He would like to undergo a trial of Flomax. I prescribed medication for him and gave him instructions.    I will contact him with his renal ultrasound results. We will talk about the success of the Flomax at that time as well. I recommended that he stop checking the PSA as he is well beyond the normally recommended screening age for PSA screening.      Lee Dominguez MD

## 2018-04-30 NOTE — MR AVS SNAPSHOT
"              After Visit Summary   4/30/2018    Louis Keller Jr.    MRN: [id number]           Patient Information     Date Of Birth          6/26/1932        Visit Information        Provider Department      4/30/2018 1:30 PM Lee Dominguez MD [facility] [place]        Today's Diagnoses     Urinary frequency    -  1    Microscopic hematuria        Nocturia          Care Instructions         AFTER YOUR CYSTOSCOPY         You have just completed a cystoscopy, or \"cysto\", which allowed your physician to learn more about your bladder (or to remove a stent placed after surgery). We suggest that you continue to avoid caffeine, fruit juice, and alcohol for the next 24 hours, however, you are encouraged to return to your normal activities.       A few things that are considered normal after your cystoscopy:    * small amount of bleeding (or spotting) that clears within the next 24 hours    * slight burning sensation with urination    * sensation to of needing to avoid more frequently    * the feeling of \"air\" in your urine    * mild discomfort that is relieved with Tylonol        Please contact our office promptly if you:    * develop a fever above 101 degrees    * are unable to urinate    * develop bright red blood that does not stop    * severe pain or swelling        And of course, please contact our office with any concerns or questions [phone]                Follow-ups after your visit        Follow-up notes from your care team     Return for renal U/S, I will call with results.      Your next 10 appointments already scheduled     May 01, 2018  1:00 PM CDT   US RENAL COMPLETE with [facility] ([facility])    [address] [address] [id number] [phone]           Please bring a list of your medicines (including vitamins, minerals and over-the-counter drugs). Also, tell your doctor " about any allergies you may have. Wear comfortable clothes and leave your valuables at home.  You do not need to do anything special to prepare for your exam.  Please call the Imaging Department at your exam site with any questions.            May 03, 2018 10:45 AM CDT   Return Visit with Herbert Gleason MD   [facility] ([facility])    [address]  [facility] [id number] [phone]            May 10, 2018 11:15 AM CDT   Anticoagulation Visit with [facility]   [facility]    [address] [address] [id number] [phone]            Jun 22, 2018 11:00 AM CDT   SHORT with Joselito Armas MD   [facility]    [address]  [facility] [id number] [phone]              Future tests that were ordered for you today     Open Future Orders        Priority Expected Expires Ordered    US Renal Complete [[id number]] Routine  4/30/2019 4/30/2018            Who to contact     If you have questions or need follow up information about today's clinic visit or your schedule please contact [facility] [place] directly at [phone].  Normal or non-critical lab and imaging results will be communicated to you by MyChart, letter or phone within 4 business days after the clinic has received the results. If you do not hear from us within 7 days, please contact the clinic through MyChart or phone. If you have a critical or abnormal lab result, we will notify you by phone as soon as possible.  Submit refill requests through [employer] or call your pharmacy and they will forward the refill request to us. Please allow 3 business days for your refill to be completed.          Additional Information About Your Visit        [employer] Information     [employer] lets you send messages to your  "doctor, view your test results, renew your prescriptions, schedule appointments and more. To sign up, go to [email or web address] . Click on \"Log in\" on the left side of the screen, which will take you to the Welcome page. Then click on \"Sign up Now\" on the right side of the page.     You will be asked to enter the access code listed below, as well as some personal information. Please follow the directions to create your username and password.     Your access code is: [email or web address]  Expires: 2018 11:44 AM     Your access code will  in 90 days. If you need help or a new code, please call your [place] clinic or [phone].        Care EveryWhere ID     This is your Care EveryWhere ID. This could be used by other organizations to access your [place] medical records  [id number]        Your Vitals Were     Pulse Height Pulse Oximetry BMI (Body Mass Index)          72 1.803 m (5' 11\") 97% 25.66 kg/m2         Blood Pressure from Last 3 Encounters:   18 138/78   18 121/72   18 124/80    Weight from Last 3 Encounters:   18 83.5 kg (184 lb)   18 83.6 kg (184 lb 6.4 oz)   18 85.5 kg (188 lb 6.4 oz)              We Performed the Following     Bladder scan     Cytology non gyn [[id number]]     UA without Microscopic          Today's Medication Changes          These changes are accurate as of 18  2:49 PM.  If you have any questions, ask your nurse or doctor.               Start taking these medicines.        Dose/Directions    ciprofloxacin 500 MG tablet   Commonly known as:  CIPRO   Used for:  Urinary frequency   Started by:  Lee Dominguez MD        Dose:  500 mg   Take 1 tablet (500 mg) by mouth once for 1 dose   Quantity:  1 tablet   Refills:  0       tamsulosin 0.4 MG capsule   Commonly known as:  FLOMAX   Used for:  Urinary frequency   Started by:  Lee Dominguez MD        Dose:  0.4 mg   Take 1 capsule (0.4 mg) by mouth daily   Quantity:  30 capsule "   Refills:  11            Where to get your medicines      These medications were sent to [facility] - [place], [place] - [address]  [address], [facility] [address]     Phone:  [phone]     tamsulosin 0.4 MG capsule         These medications were sent to [facility] [place], [place] - [address]  [address] [facility] [address]     Phone:  [phone]     ciprofloxacin 500 MG tablet                Primary Care Provider Office Phone # Fax #    Joselito Armas [phone]       [address]  [facility] [address]        Equal Access to Services     SABRA LEAL : Hadii aad aman hadasho Soomaali, waaxda luqadaha, qaybta kaalmada adeegyada, emanuel chin. So [facility] [phone].    ATENCIÓN: Si habla español, tiene a cao disposición servicios gratuitos de asistencia lingüística. [facility] [phone].    We comply with applicable federal civil rights laws and [place] laws. We do not discriminate on the basis of race, color, national origin, age, disability, sex, sexual orientation, or gender identity.            Thank you!     Thank you for choosing [facility] [place]  for your care. Our goal is always to provide you with excellent care. Hearing back from our patients is one way we can continue to improve our services. Please take a few minutes to complete the written survey that you may receive in the mail after your visit with us. Thank you!             Your Updated Medication List - Protect others around you: Learn how to safely use, store and throw away your medicines at www.disposemymeds.org.          This list is accurate as of 4/30/18  2:49 PM.  Always use your most recent med list.                   Brand Name Dispense Instructions for use Diagnosis    ACE NOT PRESCRIBED (INTENTIONAL)     0 each    1 each daily ACE Inhibitor not prescribed due to Risk for  drug interaction    Type 2 diabetes, HbA1C goal < 8% (H)       ASPIRIN NOT PRESCRIBED    INTENTIONAL     by Other route continuous prn Reported on 3/7/2017        cholestyramine 4 g Packet    QUESTRAN     Take 1 packet by mouth every evening        Chromium 1000 MCG Tabs      Take 500 mcg by mouth daily        ciprofloxacin 500 MG tablet    CIPRO    1 tablet    Take 1 tablet (500 mg) by mouth once for 1 dose    Urinary frequency       diltiazem 120 MG 24 hr capsule    CARDIZEM CD/CARTIA XT    90 capsule    Take 1 capsule (120 mg) by mouth every evening    Chronic atrial fibrillation (H)       ferrous gluconate 324 (38 Fe) MG tablet    FERGON    100 tablet    TAKE 1 TABLET (324 MG) BY MOUTH DAILY (WITH BREAKFAST)    Anemia due to blood loss, acute       fish oil-omega-3 fatty acids 1000 MG capsule      Take 1 g by mouth 2 times daily        glipiZIDE 10 MG 24 hr tablet    glipiZIDE XL    180 tablet    Take 1 tablet (10 mg) by mouth 2 times daily    Chronic atrial fibrillation (H), Actinic keratosis       loperamide 2 MG tablet    IMODIUM A-D    30 tablet    1 tablet BID        metFORMIN 500 MG tablet    GLUCOPHAGE    180 tablet    Take 1 tablet (500 mg) by mouth 2 times daily (with meals)    Type 2 diabetes mellitus with stage 3 chronic kidney disease, without long-term current use of insulin (H)       multivitamin, therapeutic Tabs tablet      Take 0.5 tablets by mouth 2 times daily        pioglitazone 45 MG tablet    ACTOS    90 tablet    TAKE 1 TABLET (45 MG) BY MOUTH DAILY    Type 2 diabetes mellitus with stage 3 chronic kidney disease, without long-term current use of insulin (H)       polyethylene glycol Packet    MIRALAX/GLYCOLAX    7 packet    Take 17 g by mouth daily as needed for constipation    Constipation, unspecified constipation type       senna-docusate 8.6-50 MG per tablet    SENOKOT-S;PERICOLACE    100 tablet    Take 1 tablet by mouth 2 times daily as needed for constipation    Constipation,  unspecified constipation type       STATIN NOT PRESCRIBED (INTENTIONAL)     0 each    1 each At Bedtime Statin not prescribed intentionally due to Risk for drug interaction    Type 2 diabetes, HbA1C goal < 8% (H)       tamsulosin 0.4 MG capsule    FLOMAX    30 capsule    Take 1 capsule (0.4 mg) by mouth daily    Urinary frequency       TYLENOL PO      Take 1,000 mg by mouth 3 times daily        warfarin 5 MG tablet    COUMADIN    65 tablet    TAKE 1 TABLET BY MOUTH TUESDAY,THURS AND SUN. TAKE 1/2 TABLET ON MON,WED,FRI AND SAT. OR AS DIRECTED    Heart palpitations, New onset atrial fibrillation (H), Chronic anticoagulation

## 2018-04-30 NOTE — LETTER
4/30/2018       RE: Louis Keller Jr.  [id number]  [facility] [address]     Dear Colleague,    Thank you for referring your patient, Louis Keller Jr., to the [facility] [place] at [facility]. Please see a copy of my visit note below.    [facility]  Main Office: [address]  [address]  [place] [address]       CHIEF COMPLAINT:  Elevated PSA and nocturia, microscopic hematuria    HISTORY:   This is an 85 -year-old gentleman who complains of nocturia.  He normally gets up about 3 times to urinate at night. He does not complain of frequency during the day. He has not had any gross hematuria but has had urine samples showing microscopic hematuria. His last 2 urine samples have shown microscopic hematuria. His PSA was 3.93 in 2014. It was checked again in December and was 4.35, currently at 4.93.    In light of his history and his microscopic hematuria, I recommended a cystoscopy today.      PAST MEDICAL HISTORY:   Past Medical History:   Diagnosis Date     Antiplatelet or antithrombotic long-term use      Atrial fibrillation (H)      Diarrhea      Diastolic murmur      QUESADA (dyspnea on exertion)      Hemorrhage of gastrointestinal tract, unspecified 63,65,and 1971    hospitalized     History of blood transfusion      Scarlet fever      Type II or unspecified type diabetes mellitus without mention of complication, not stated as uncontrolled      Unspecified disease of pancreas 1990    pancreatitis       PAST SURGICAL HISTORY:   Past Surgical History:   Procedure Laterality Date     ARTHROPLASTY KNEE  8/5/2013    Procedure: ARTHROPLASTY KNEE;  Left Total Knee Arthroplasty       C NONSPECIFIC PROCEDURE  Child    T&A     C NONSPECIFIC PROCEDURE  ; also 11/03    Colonoscopy     C NONSPECIFIC PROCEDURE      Basal cell cancer of the nose     C NONSPECIFIC PROCEDURE  1990    Cholecystectomy     CARDIOVERSION  9/6/11     failed       FAMILY HISTORY:   Family History   Problem Relation Age of Onset     Alzheimer Disease Maternal Grandmother      Arthritis Maternal Grandmother      CANCER Mother      breast/ 1983/colon     Eye Disorder Mother      glaucoma and cataracts     HEART DISEASE Mother      angina/CABG     Hypertension Mother      CANCER Father      colon     DIABETES Maternal Aunt      AoDM       SOCIAL HISTORY:   Social History   Substance Use Topics     Smoking status: Never Smoker     Smokeless tobacco: Never Used     Alcohol use 0.0 oz/week     0 Standard drinks or equivalent per week      Comment: 1 glass of wine every day          Allergies   Allergen Reactions     No Known Drug Allergies          Current Outpatient Prescriptions:      ACE NOT PRESCRIBED, INTENTIONAL,, 1 each daily ACE Inhibitor not prescribed due to Risk for drug interaction, Disp: 0 each, Rfl: 0     Acetaminophen (TYLENOL PO), Take 1,000 mg by mouth 3 times daily, Disp: , Rfl:      ASPIRIN NOT PRESCRIBED, INTENTIONAL,, by Other route continuous prn Reported on 3/7/2017, Disp: , Rfl:      cholestyramine (QUESTRAN) 4 G Packet, Take 1 packet by mouth every evening, Disp: , Rfl:      Chromium 1000 MCG TABS, Take 500 mcg by mouth daily, Disp: , Rfl:      diltiazem 120 MG 24 hr capsule, Take 1 capsule (120 mg) by mouth every evening, Disp: 90 capsule, Rfl: 3     ferrous gluconate (FERGON) 324 (38 FE) MG tablet, TAKE 1 TABLET (324 MG) BY MOUTH DAILY (WITH BREAKFAST), Disp: 100 tablet, Rfl: 3     fish oil-omega-3 fatty acids (FISH OIL) 1000 MG capsule, Take 1 g by mouth 2 times daily , Disp: , Rfl:      glipiZIDE (GLIPIZIDE XL) 10 MG 24 hr tablet, Take 1 tablet (10 mg) by mouth 2 times daily, Disp: 180 tablet, Rfl: 3     loperamide (IMODIUM A-D) 2 MG tablet, 1 tablet BID, Disp: 30 tablet, Rfl: 0     metFORMIN (GLUCOPHAGE) 500 MG tablet, Take 1 tablet (500 mg) by mouth 2 times daily (with meals), Disp: 180 tablet, Rfl: 1     multivitamin, therapeutic  (THERA-VIT) TABS tablet, Take 0.5 tablets by mouth 2 times daily, Disp: , Rfl:      pioglitazone (ACTOS) 45 MG tablet, TAKE 1 TABLET (45 MG) BY MOUTH DAILY, Disp: 90 tablet, Rfl: 1     polyethylene glycol (MIRALAX/GLYCOLAX) Packet, Take 17 g by mouth daily as needed for constipation, Disp: 7 packet, Rfl:      senna-docusate (SENOKOT-S;PERICOLACE) 8.6-50 MG per tablet, Take 1 tablet by mouth 2 times daily as needed for constipation, Disp: 100 tablet, Rfl:      STATIN NOT PRESCRIBED, INTENTIONAL,, 1 each At Bedtime Statin not prescribed intentionally due to Risk for drug interaction, Disp: 0 each, Rfl: 0     warfarin (COUMADIN) 5 MG tablet, TAKE 1 TABLET BY MOUTH TUESDAY,THURS AND SUN. TAKE 1/2 TABLET ON MON,WED,FRI AND SAT. OR AS DIRECTED, Disp: 65 tablet, Rfl: 1    Review Of Systems:  Skin: negative  Eyes: negative  Ears/Nose/Throat: negative  Respiratory: No shortness of breath, dyspnea on exertion, cough, or hemoptysis  Cardiovascular: negative  Gastrointestinal: negative  Genitourinary: negative  Musculoskeletal: negative  Neurologic: negative  Psychiatric: negative  Hematologic/Lymphatic/Immunologic: negative  Endocrine: negative      PHYSICAL EXAM:    There were no vitals taken for this visit.  General appearance: In NAD, conversant  HEENT: Normocephalic and atraumatic, anicteric sclera  Cardiovascular: Not examined  Respiratory: normal, non-labored breathing  Gastrointestinal: negative, Abdomen soft, non-tender, and non-distended.   Musculoskeletal: Not Examined  Peripheral Vascular/extremity: No peripheral edema  Skin: Normal temperature, turgor, and texture. No rash  Psychiatric: Appropriate affect, alert and oriented to person, place, and time    Penis: Normal  Scrotal skin: Normal, no lesions  Testicles: Normal to palpation bilaterally  Epididymis: Normal to palpation bilaterally  Lymphatic: Normal inguinal lymph nodes  Digital Rectal Exam: His prostate is moderately enlarged but benign and symmetric to  palpation    Cystoscopy: I performed a cystoscopy today. The bladder was trabeculated but otherwise normal throughout. The prostate was moderately enlarged. No tumors identified throughout the bladder.      PSA: 4.93    UA RESULTS:  Recent Labs   Lab Test  03/04/18   0945  06/14/17   0852   COLOR  Yellow  Yellow   APPEARANCE  Clear  Clear   URINEGLC  50*  Negative   URINEBILI  Negative  Negative   URINEKETONE  Negative  Negative   SG  1.012  1.010   UBLD  Small*  Trace*   URINEPH  6.0  5.5   PROTEIN  Negative  Negative   UROBILINOGEN   --   0.2   NITRITE  Negative  Negative   LEUKEST  Negative  Negative   RBCU  4*  2-5*   WBCU  <1  O - 2       Bladder Scan: 14mL    Other Labs:      Imaging Studies: None      CLINICAL IMPRESSION:   Enlarged prostate, microscopic hematuria, nocturia    PLAN:   He has an enlarged prostate and nocturia. His cystoscopy today shows no concerning causes for hematuria. I recommended a renal ultrasound be performed in order to complete his hematuria workup. Urine sample from today was sent for cytology as well. We discussed options for his nocturia. He would like to undergo a trial of Flomax. I prescribed medication for him and gave him instructions.    I will contact him with his renal ultrasound results. We will talk about the success of the Flomax at that time as well. I recommended that he stop checking the PSA as he is well beyond the normally recommended screening age for PSA screening.      Again, thank you for allowing me to participate in the care of your patient.      Sincerely,    Lee Dominguez MD

## 2018-05-01 ENCOUNTER — HOSPITAL ENCOUNTER (OUTPATIENT)
Dept: ULTRASOUND IMAGING | Facility: CLINIC | Age: 83
Discharge: HOME | End: 2018-05-01
Attending: UROLOGY | Admitting: UROLOGY
Payer: COMMERCIAL

## 2018-05-01 DIAGNOSIS — R31.29 MICROSCOPIC HEMATURIA: ICD-10-CM

## 2018-05-01 LAB — COPATH REPORT: NORMAL

## 2018-05-01 PROCEDURE — 76770 US EXAM ABDO BACK WALL COMP: CPT

## 2018-05-03 ENCOUNTER — OFFICE VISIT (OUTPATIENT)
Dept: CARDIOLOGY | Facility: CLINIC | Age: 83
End: 2018-05-03
Payer: COMMERCIAL

## 2018-05-03 VITALS
HEIGHT: 71 IN | WEIGHT: 185 LBS | DIASTOLIC BLOOD PRESSURE: 64 MMHG | BODY MASS INDEX: 25.9 KG/M2 | SYSTOLIC BLOOD PRESSURE: 124 MMHG | HEART RATE: 78 BPM

## 2018-05-03 DIAGNOSIS — I48.20 CHRONIC ATRIAL FIBRILLATION (H): ICD-10-CM

## 2018-05-03 DIAGNOSIS — M62.89 EXERCISE-INDUCED LEG FATIGUE: Primary | ICD-10-CM

## 2018-05-03 DIAGNOSIS — R06.09 DOE (DYSPNEA ON EXERTION): ICD-10-CM

## 2018-05-03 PROCEDURE — 99213 OFFICE O/P EST LOW 20 MIN: CPT | Performed by: INTERNAL MEDICINE

## 2018-05-03 NOTE — PROGRESS NOTES
"Cardiology Progress Note          Assessment and Plan:     1. Dyspnea on exertion and right greater than left lower extremity exertional heaviness    Patient would like to try more walking now that the weather has improved.  If worsening dyspnea on exertion or leg heaviness over the next 3 months, would plan on repeating echo, treadmill stress nuclear and exercise LIZ.    He will follow-up with our [facility] in 3 months to revisit symptoms and possibly do the aforementioned testing.      2. Chronic atrial fibrillation, rate control strategy and anticoagulation    Continue current medications    If symptoms improve, patient may follow-up with me annually.    This note was transcribed using electronic voice recognition software and there may be typographical errors present.                Interval History:   The patient is a very pleasant 85 year old whom I have been following for chronic atrial fibrillation with failed cardioversion.  He is maintained on rate control strategy and anticoagulation.  He has had negative stress testing previously in 2012, 2013 and 2014.  He feels like his stamina has decreased a little bit over the winter.  He has some weakness in his legs with exertion right greater than left and more dyspnea on exertion then he would have thought previously.                     Review of Systems:   Review of Systems:  Skin:  Negative for     Eyes:  Positive for glasses;visual blurring  ENT:  Positive for hearing loss  Respiratory:  Positive for dyspnea on exertion  Cardiovascular:    edema;Positive for;fatigue  Gastroenterology: Positive for diarrhea  Genitourinary:  not assessed    Musculoskeletal:  Positive for back pain;arthritis  Neurologic:  Positive for    Psychiatric:  Negative    Heme/Lymph/Imm:  Negative    Endocrine:  Positive for diabetes              Physical Exam:     Vitals: /64  Pulse 78  Ht 1.803 m (5' 11\")  Wt 83.9 kg (185 lb)  BMI 25.8 kg/m2  Constitutional:  cooperative, alert " and oriented, well developed, well nourished, in no acute distress        Skin:  warm and dry to the touch, no apparent skin lesions or masses noted        Head:  normocephalic, no masses or lesions        Eyes:  pupils equal and round, conjunctivae and lids unremarkable, sclera white, no xanthalasma, EOMS intact, no nystagmus        ENT:  no pallor or cyanosis, dentition good        Neck:  JVP normal        Chest:  normal breath sounds, clear to auscultation, normal A-P diameter, normal symmetry, normal respiratory excursion, no use of accessory muscles        Cardiac:   irregularly irregular rhythm         grade 1;early diastolic murmur good rate control, frequent ectopy    Abdomen:      benign    Vascular: pulses full and equal                                      Extremities and Back:  no deformities, clubbing, cyanosis, erythema observed   spider veins ankles and feet    Neurological:  no gross motor deficits;affect appropriate                 Medications:     Current Outpatient Prescriptions   Medication Sig Dispense Refill     ACE NOT PRESCRIBED, INTENTIONAL, 1 each daily ACE Inhibitor not prescribed due to Risk for drug interaction 0 each 0     Acetaminophen (TYLENOL PO) Take 1,000 mg by mouth 3 times daily       ASPIRIN NOT PRESCRIBED, INTENTIONAL, by Other route continuous prn Reported on 3/7/2017       cholestyramine (QUESTRAN) 4 G Packet Take 1 packet by mouth every evening       Chromium 1000 MCG TABS Take 500 mcg by mouth daily       diltiazem 120 MG 24 hr capsule Take 1 capsule (120 mg) by mouth every evening 90 capsule 3     ferrous gluconate (FERGON) 324 (38 FE) MG tablet TAKE 1 TABLET (324 MG) BY MOUTH DAILY (WITH BREAKFAST) 100 tablet 3     fish oil-omega-3 fatty acids (FISH OIL) 1000 MG capsule Take 1 g by mouth 2 times daily        glipiZIDE (GLIPIZIDE XL) 10 MG 24 hr tablet Take 1 tablet (10 mg) by mouth 2 times daily 180 tablet 3     loperamide (IMODIUM A-D) 2 MG tablet 1 tablet BID 30 tablet  0     metFORMIN (GLUCOPHAGE) 500 MG tablet Take 1 tablet (500 mg) by mouth 2 times daily (with meals) 180 tablet 1     multivitamin, therapeutic (THERA-VIT) TABS tablet Take 0.5 tablets by mouth 2 times daily       pioglitazone (ACTOS) 45 MG tablet TAKE 1 TABLET (45 MG) BY MOUTH DAILY 90 tablet 1     polyethylene glycol (MIRALAX/GLYCOLAX) Packet Take 17 g by mouth daily as needed for constipation 7 packet      senna-docusate (SENOKOT-S;PERICOLACE) 8.6-50 MG per tablet Take 1 tablet by mouth 2 times daily as needed for constipation 100 tablet      STATIN NOT PRESCRIBED, INTENTIONAL, 1 each At Bedtime Statin not prescribed intentionally due to Risk for drug interaction 0 each 0     tamsulosin (FLOMAX) 0.4 MG capsule Take 1 capsule (0.4 mg) by mouth daily 30 capsule 11     warfarin (COUMADIN) 5 MG tablet TAKE 1 TABLET BY MOUTH TUESDAY,THURS AND SUN. TAKE 1/2 TABLET ON MON,WED,FRI AND SAT. OR AS DIRECTED 65 tablet 1                Data:   All laboratory data reviewed  No results found for this or any previous visit (from the past 24 hour(s)).    All laboratory data reviewed  Lab Results   Component Value Date    CHOL 140 12/27/2017     Lab Results   Component Value Date    HDL 62 12/27/2017     Lab Results   Component Value Date    LDL 58 12/27/2017     Lab Results   Component Value Date    TRIG 100 12/27/2017     Lab Results   Component Value Date    CHOLHDLRATIO 2.0 03/20/2015     TSH   Date Value Ref Range Status   09/18/2017 3.07 0.40 - 4.00 mU/L Final     Last Basic Metabolic Panel:  Lab Results   Component Value Date     03/08/2018      Lab Results   Component Value Date    POTASSIUM 4.6 03/08/2018     Lab Results   Component Value Date    CHLORIDE 101 03/08/2018     Lab Results   Component Value Date    MICHAEL 9.3 03/08/2018     Lab Results   Component Value Date    CO2 24 03/08/2018     Lab Results   Component Value Date    BUN 15 03/08/2018     Lab Results   Component Value Date    CR 0.87 03/08/2018     Lab  Results   Component Value Date     03/08/2018     Lab Results   Component Value Date    WBC 8.1 03/06/2018     Lab Results   Component Value Date    RBC 3.68 03/06/2018     Lab Results   Component Value Date    HGB 11.6 03/08/2018     Lab Results   Component Value Date    HCT 33.7 03/06/2018     Lab Results   Component Value Date    MCV 92 03/06/2018     Lab Results   Component Value Date    MCH 30.2 03/06/2018     Lab Results   Component Value Date    MCHC 32.9 03/06/2018     Lab Results   Component Value Date    RDW 14.1 03/06/2018     Lab Results   Component Value Date     03/06/2018

## 2018-05-03 NOTE — LETTER
5/3/2018    Joselito Armas MD  [address]  [facility] [address]    RE: Louis Keller Jr.       Dear Colleague,    I had the pleasure of seeing Louis Keller Jr. in the [facility].    Cardiology Progress Note          Assessment and Plan:     1. Dyspnea on exertion and right greater than left lower extremity exertional heaviness    Patient would like to try more walking now that the weather has improved.  If worsening dyspnea on exertion or leg heaviness over the next 3 months, would plan on repeating echo, treadmill stress nuclear and exercise LIZ.    He will follow-up with our [facility] in 3 months to revisit symptoms and possibly do the aforementioned testing.      2. Chronic atrial fibrillation, rate control strategy and anticoagulation    Continue current medications    If symptoms improve, patient may follow-up with me annually.    This note was transcribed using electronic voice recognition software and there may be typographical errors present.                Interval History:   The patient is a very pleasant 85 year old whom I have been following for chronic atrial fibrillation with failed cardioversion.  He is maintained on rate control strategy and anticoagulation.  He has had negative stress testing previously in 2012, 2013 and 2014.  He feels like his stamina has decreased a little bit over the winter.  He has some weakness in his legs with exertion right greater than left and more dyspnea on exertion then he would have thought previously.                     Review of Systems:   Review of Systems:  Skin:  Negative for     Eyes:  Positive for glasses;visual blurring  ENT:  Positive for hearing loss  Respiratory:  Positive for dyspnea on exertion  Cardiovascular:    edema;Positive for;fatigue  Gastroenterology: Positive for diarrhea  Genitourinary:  not assessed    Musculoskeletal:  Positive for back pain;arthritis  Neurologic:  Positive for    Psychiatric:   "Negative    Heme/Lymph/Imm:  Negative    Endocrine:  Positive for diabetes              Physical Exam:     Vitals: /64  Pulse 78  Ht 1.803 m (5' 11\")  Wt 83.9 kg (185 lb)  BMI 25.8 kg/m2  Constitutional:  cooperative, alert and oriented, well developed, well nourished, in no acute distress        Skin:  warm and dry to the touch, no apparent skin lesions or masses noted        Head:  normocephalic, no masses or lesions        Eyes:  pupils equal and round, conjunctivae and lids unremarkable, sclera white, no xanthalasma, EOMS intact, no nystagmus        ENT:  no pallor or cyanosis, dentition good        Neck:  JVP normal        Chest:  normal breath sounds, clear to auscultation, normal A-P diameter, normal symmetry, normal respiratory excursion, no use of accessory muscles        Cardiac:   irregularly irregular rhythm         grade 1;early diastolic murmur good rate control, frequent ectopy    Abdomen:      benign    Vascular: pulses full and equal                                      Extremities and Back:  no deformities, clubbing, cyanosis, erythema observed   spider veins ankles and feet    Neurological:  no gross motor deficits;affect appropriate                 Medications:     Current Outpatient Prescriptions   Medication Sig Dispense Refill     ACE NOT PRESCRIBED, INTENTIONAL, 1 each daily ACE Inhibitor not prescribed due to Risk for drug interaction 0 each 0     Acetaminophen (TYLENOL PO) Take 1,000 mg by mouth 3 times daily       ASPIRIN NOT PRESCRIBED, INTENTIONAL, by Other route continuous prn Reported on 3/7/2017       cholestyramine (QUESTRAN) 4 G Packet Take 1 packet by mouth every evening       Chromium 1000 MCG TABS Take 500 mcg by mouth daily       diltiazem 120 MG 24 hr capsule Take 1 capsule (120 mg) by mouth every evening 90 capsule 3     ferrous gluconate (FERGON) 324 (38 FE) MG tablet TAKE 1 TABLET (324 MG) BY MOUTH DAILY (WITH BREAKFAST) 100 tablet 3     fish oil-omega-3 fatty " acids (FISH OIL) 1000 MG capsule Take 1 g by mouth 2 times daily        glipiZIDE (GLIPIZIDE XL) 10 MG 24 hr tablet Take 1 tablet (10 mg) by mouth 2 times daily 180 tablet 3     loperamide (IMODIUM A-D) 2 MG tablet 1 tablet BID 30 tablet 0     metFORMIN (GLUCOPHAGE) 500 MG tablet Take 1 tablet (500 mg) by mouth 2 times daily (with meals) 180 tablet 1     multivitamin, therapeutic (THERA-VIT) TABS tablet Take 0.5 tablets by mouth 2 times daily       pioglitazone (ACTOS) 45 MG tablet TAKE 1 TABLET (45 MG) BY MOUTH DAILY 90 tablet 1     polyethylene glycol (MIRALAX/GLYCOLAX) Packet Take 17 g by mouth daily as needed for constipation 7 packet      senna-docusate (SENOKOT-S;PERICOLACE) 8.6-50 MG per tablet Take 1 tablet by mouth 2 times daily as needed for constipation 100 tablet      STATIN NOT PRESCRIBED, INTENTIONAL, 1 each At Bedtime Statin not prescribed intentionally due to Risk for drug interaction 0 each 0     tamsulosin (FLOMAX) 0.4 MG capsule Take 1 capsule (0.4 mg) by mouth daily 30 capsule 11     warfarin (COUMADIN) 5 MG tablet TAKE 1 TABLET BY MOUTH TUESDAY,THURS AND SUN. TAKE 1/2 TABLET ON MON,WED,FRI AND SAT. OR AS DIRECTED 65 tablet 1                Data:   All laboratory data reviewed  No results found for this or any previous visit (from the past 24 hour(s)).    All laboratory data reviewed  Lab Results   Component Value Date    CHOL 140 12/27/2017     Lab Results   Component Value Date    HDL 62 12/27/2017     Lab Results   Component Value Date    LDL 58 12/27/2017     Lab Results   Component Value Date    TRIG 100 12/27/2017     Lab Results   Component Value Date    CHOLHDLRATIO 2.0 03/20/2015     TSH   Date Value Ref Range Status   09/18/2017 3.07 0.40 - 4.00 mU/L Final     Last Basic Metabolic Panel:  Lab Results   Component Value Date     03/08/2018      Lab Results   Component Value Date    POTASSIUM 4.6 03/08/2018     Lab Results   Component Value Date    CHLORIDE 101 03/08/2018     Lab  Results   Component Value Date    MICHAEL 9.3 03/08/2018     Lab Results   Component Value Date    CO2 24 03/08/2018     Lab Results   Component Value Date    BUN 15 03/08/2018     Lab Results   Component Value Date    CR 0.87 03/08/2018     Lab Results   Component Value Date     03/08/2018     Lab Results   Component Value Date    WBC 8.1 03/06/2018     Lab Results   Component Value Date    RBC 3.68 03/06/2018     Lab Results   Component Value Date    HGB 11.6 03/08/2018     Lab Results   Component Value Date    HCT 33.7 03/06/2018     Lab Results   Component Value Date    MCV 92 03/06/2018     Lab Results   Component Value Date    MCH 30.2 03/06/2018     Lab Results   Component Value Date    MCHC 32.9 03/06/2018     Lab Results   Component Value Date    RDW 14.1 03/06/2018     Lab Results   Component Value Date     03/06/2018       Thank you for allowing me to participate in the care of your patient.    Sincerely,     Herbert Gleason MD     [facility]

## 2018-05-03 NOTE — MR AVS SNAPSHOT
After Visit Summary   5/3/2018    Louis Keller Jr.    MRN: [id number]           Patient Information     Date Of Birth          6/26/1932        Visit Information        Provider Department      5/3/2018 10:45 AM Herbert Gleason MD [facility]        Today's Diagnoses     Exercise-induced leg fatigue    -  1    Chronic atrial fibrillation (H)        QUEASDA (dyspnea on exertion)           Follow-ups after your visit        Additional Services     Follow-Up with Cardiac Advanced Practice Provider                 Your next 10 appointments already scheduled     May 10, 2018 11:15 AM CDT   Anticoagulation Visit with [facility] CL[facility]   [facility] ([facility])    [address] [address]   [phone]            Jun 22, 2018 11:00 AM CDT   SHORT with Joselito Armas MD   [facility]    [address]  [facility] [id number] [phone]            Aug 03, 2018  1:10 PM CDT   Return Visit with Susanna Haque PA-C   [facility] ([facility])    [address]  [facility] [id number] [phone]              Future tests that were ordered for you today     Open Future Orders        Priority Expected Expires Ordered    Follow-Up with Cardiac Advanced Practice Provider Routine 8/1/2018 5/3/2019 5/3/2018            Who to contact     If you have questions or need follow up information about today's clinic visit or your schedule please contact [facility] directly at [phone].  Normal or non-critical lab and imaging results will be communicated to you by MyChart, letter or phone within 4 business days after the clinic has received the results. If you do not hear from us within 7 days, please contact the clinic through  "[employer] or phone. If you have a critical or abnormal lab result, we will notify you by phone as soon as possible.  Submit refill requests through [employer] or call your pharmacy and they will forward the refill request to us. Please allow 3 business days for your refill to be completed.          Additional Information About Your Visit        [employer] Information     [employer] lets you send messages to your doctor, view your test results, renew your prescriptions, schedule appointments and more. To sign up, go to [email or web address] . Click on \"Log in\" on the left side of the screen, which will take you to the Welcome page. Then click on \"Sign up Now\" on the right side of the page.     You will be asked to enter the access code listed below, as well as some personal information. Please follow the directions to create your username and password.     Your access code is: [email or web address]  Expires: 2018 11:44 AM     Your access code will  in 90 days. If you need help or a new code, please call your [facility] clinic or [phone].        Care EveryWhere ID     This is your Care EveryWhere ID. This could be used by other organizations to access your [facility] medical records  [id number]        Your Vitals Were     Pulse Height BMI (Body Mass Index)             78 1.803 m (5' 11\") 25.8 kg/m2          Blood Pressure from Last 3 Encounters:   18 124/64   18 138/78   18 121/72    Weight from Last 3 Encounters:   18 83.9 kg (185 lb)   18 83.5 kg (184 lb)   18 83.6 kg (184 lb 6.4 oz)              We Performed the Following     Follow-Up with Cardiologist        Primary Care Provider Office Phone # Fax #    Joselito Armas [phone]       [address]  [facility] [address]        Equal Access to Services     SABRA LEAL AH: Danae Foster, cynthia hicks, emanuel denisen ah. So [facility] " [phone].    ATENCIÓN: Si brittney farley, tiene a cao disposición servicios gratuitos de asistencia lingüística. Jose L anaya [phone].    We comply with applicable federal civil rights laws and [place] laws. We do not discriminate on the basis of race, color, national origin, age, disability, sex, sexual orientation, or gender identity.            Thank you!     Thank you for choosing [facility]  for your care. Our goal is always to provide you with excellent care. Hearing back from our patients is one way we can continue to improve our services. Please take a few minutes to complete the written survey that you may receive in the mail after your visit with us. Thank you!             Your Updated Medication List - Protect others around you: Learn how to safely use, store and throw away your medicines at www.disposemymeds.org.          This list is accurate as of 5/3/18 11:06 AM.  Always use your most recent med list.                   Brand Name Dispense Instructions for use Diagnosis    ACE NOT PRESCRIBED (INTENTIONAL)     0 each    1 each daily ACE Inhibitor not prescribed due to Risk for drug interaction    Type 2 diabetes, HbA1C goal < 8% (H)       ASPIRIN NOT PRESCRIBED    INTENTIONAL     by Other route continuous prn Reported on 3/7/2017        cholestyramine 4 g Packet    QUESTRAN     Take 1 packet by mouth every evening        Chromium 1000 MCG Tabs      Take 500 mcg by mouth daily        diltiazem 120 MG 24 hr capsule    CARDIZEM CD/CARTIA XT    90 capsule    Take 1 capsule (120 mg) by mouth every evening    Chronic atrial fibrillation (H)       ferrous gluconate 324 (38 Fe) MG tablet    FERGON    100 tablet    TAKE 1 TABLET (324 MG) BY MOUTH DAILY (WITH BREAKFAST)    Anemia due to blood loss, acute       fish oil-omega-3 fatty acids 1000 MG capsule      Take 1 g by mouth 2 times daily        glipiZIDE 10 MG 24 hr tablet    glipiZIDE XL    180 tablet    Take 1  tablet (10 mg) by mouth 2 times daily    Chronic atrial fibrillation (H), Actinic keratosis       loperamide 2 MG tablet    IMODIUM A-D    30 tablet    1 tablet BID        metFORMIN 500 MG tablet    GLUCOPHAGE    180 tablet    Take 1 tablet (500 mg) by mouth 2 times daily (with meals)    Type 2 diabetes mellitus with stage 3 chronic kidney disease, without long-term current use of insulin (H)       multivitamin, therapeutic Tabs tablet      Take 0.5 tablets by mouth 2 times daily        pioglitazone 45 MG tablet    ACTOS    90 tablet    TAKE 1 TABLET (45 MG) BY MOUTH DAILY    Type 2 diabetes mellitus with stage 3 chronic kidney disease, without long-term current use of insulin (H)       polyethylene glycol Packet    MIRALAX/GLYCOLAX    7 packet    Take 17 g by mouth daily as needed for constipation    Constipation, unspecified constipation type       senna-docusate 8.6-50 MG per tablet    SENOKOT-S;PERICOLACE    100 tablet    Take 1 tablet by mouth 2 times daily as needed for constipation    Constipation, unspecified constipation type       STATIN NOT PRESCRIBED (INTENTIONAL)     0 each    1 each At Bedtime Statin not prescribed intentionally due to Risk for drug interaction    Type 2 diabetes, HbA1C goal < 8% (H)       tamsulosin 0.4 MG capsule    FLOMAX    30 capsule    Take 1 capsule (0.4 mg) by mouth daily    Urinary frequency       TYLENOL PO      Take 1,000 mg by mouth 3 times daily        warfarin 5 MG tablet    COUMADIN    65 tablet    TAKE 1 TABLET BY MOUTH TUESDAY,THURS AND SUN. TAKE 1/2 TABLET ON MON,WED,FRI AND SAT. OR AS DIRECTED    Heart palpitations, New onset atrial fibrillation (H), Chronic anticoagulation

## 2018-05-08 DIAGNOSIS — N18.30 TYPE 2 DIABETES MELLITUS WITH STAGE 3 CHRONIC KIDNEY DISEASE (H): ICD-10-CM

## 2018-05-08 DIAGNOSIS — E11.22 TYPE 2 DIABETES MELLITUS WITH STAGE 3 CHRONIC KIDNEY DISEASE (H): ICD-10-CM

## 2018-05-09 NOTE — TELEPHONE ENCOUNTER
"Requested Prescriptions   Pending Prescriptions Disp Refills     ONETOUCH ULTRA test strip [Pharmacy Med Name: ONE TOUCH ULTRA TEST STRIPS] 100 strip 1     Sig: USE TO TEST DAILY    Diabetic Supplies Protocol Passed    5/8/2018  1:38 AM       Passed - Patient is 18 years of age or older       Passed - Recent (6 mo) or future (30 days) visit within the authorizing provider's specialty    Patient had office visit in the last 6 months or has a visit in the next 30 days with authorizing provider.  See \"Patient Info\" tab in inbasket, or \"Choose Columns\" in Meds & Orders section of the refill encounter.              Next 5 appointments (look out 90 days)     Jun 22, 2018 11:00 AM CDT   SHORT with Joselito Armas MD   [facility]    [address] [place] [id number] [phone]            Aug 03, 2018  1:10 PM CDT   Return Visit with Susanna Haque PA-C   [facility] ([facility])    [address]  [facility] [id number] [phone]                  Prescription approved per [facility] Refill Protocol.    "

## 2018-05-10 ENCOUNTER — ANTICOAGULATION THERAPY VISIT (OUTPATIENT)
Dept: NURSING | Facility: CLINIC | Age: 83
End: 2018-05-10
Payer: COMMERCIAL

## 2018-05-10 DIAGNOSIS — Z79.01 LONG-TERM (CURRENT) USE OF ANTICOAGULANTS: ICD-10-CM

## 2018-05-10 DIAGNOSIS — I48.91 ATRIAL FIBRILLATION, UNSPECIFIED TYPE (H): ICD-10-CM

## 2018-05-10 LAB — INR POINT OF CARE: 2.1 (ref 0.86–1.14)

## 2018-05-10 PROCEDURE — 36416 COLLJ CAPILLARY BLOOD SPEC: CPT

## 2018-05-10 PROCEDURE — 99207 ZZC NO CHARGE NURSE ONLY: CPT

## 2018-05-10 PROCEDURE — 85610 PROTHROMBIN TIME: CPT | Mod: QW

## 2018-05-10 NOTE — MR AVS SNAPSHOT
Louis Keller Jr.   5/10/2018 11:15 AM   Anticoagulation Therapy Visit    Description:  85 year old male   Provider:  CR ANTICOAGULATION CLINIC   Department:  Cr Nurse           INR as of 5/10/2018     Today's INR 2.1      Anticoagulation Summary as of 5/10/2018     INR goal 2.0-3.0   Today's INR 2.1   Full instructions 5 mg on Sun, Tue, Thu; 2.5 mg all other days   Next INR check 6/7/2018    Indications   Long-term (current) use of anticoagulants [Z79.01] [Z79.01]  Atrial fibrillation (H) [I48.91]         Your next Anticoagulation Clinic appointment(s)     Jun 07, 2018 11:15 AM CDT   Anticoagulation Visit with CR ANTICOAGULATION CLINIC   [facility] ([facility]    [address] [place] [id number] [phone]              Contact Numbers     Clinic Number:         May 2018 Details    Sun Mon Tue Wed Thu Fri Sat       1               2               3               4               5                 6               7               8               9               10      5 mg   See details      11      2.5 mg         12      2.5 mg           13      5 mg         14      2.5 mg         15      5 mg         16      2.5 mg         17      5 mg         18      2.5 mg         19      2.5 mg           20      5 mg         21      2.5 mg         22      5 mg         23      2.5 mg         24      5 mg         25      2.5 mg         26      2.5 mg           27      5 mg         28      2.5 mg         29      5 mg         30      2.5 mg         31      5 mg            Date Details   05/10 This INR check               How to take your warfarin dose     To take:  2.5 mg Take 0.5 of a 5 mg tablet.    To take:  5 mg Take 1 of the 5 mg tablets.           June 2018 Details    Sun Mon Tue Wed Thu Fri Sat          1      2.5 mg         2      2.5 mg           3      5 mg         4      2.5 mg         5      5 mg         6      2.5 mg         7            8                9                 10               11               12               13               14               15               16                 17               18               19               20               21               22               23                 24               25               26               27               28               29               30                Date Details   No additional details    Date of next INR:  6/7/2018         How to take your warfarin dose     To take:  2.5 mg Take 0.5 of a 5 mg tablet.    To take:  5 mg Take 1 of the 5 mg tablets.

## 2018-05-10 NOTE — PROGRESS NOTES
ANTICOAGULATION FOLLOW-UP CLINIC VISIT    Patient Name:  Louis Keller Jr.  Date:  5/10/2018  Contact Type:  Face to Face    SUBJECTIVE:     Patient Findings     Positives Change in medications (Started Flomax end of April, 2018), No Problem Findings    Comments Recent cardiology visit--no problems or changes.           OBJECTIVE    INR Protime   Date Value Ref Range Status   05/10/2018 2.1 (A) 0.86 - 1.14 Final       ASSESSMENT / PLAN  INR assessment THER    Recheck INR In: 4 WEEKS    INR Location Clinic      Anticoagulation Summary as of 5/10/2018     INR goal 2.0-3.0   Today's INR 2.1   Maintenance plan 5 mg (5 mg x 1) on Sun, Tue, Thu; 2.5 mg (5 mg x 0.5) all other days   Full instructions 5 mg on Sun, Tue, Thu; 2.5 mg all other days   Weekly total 25 mg   No change documented Hope Baumann RN   Plan last modified Monica Souza RN (5/12/2016)   Next INR check 6/7/2018   Priority INR   Target end date     Indications   Long-term (current) use of anticoagulants [Z79.01] [Z79.01]  Atrial fibrillation (H) [I48.91]         Anticoagulation Episode Summary     INR check location     Preferred lab     Send INR reminders to [facility]    Comments likes calendar printout.        Anticoagulation Care Providers     Provider Role Specialty Phone number    Joselito Armas MD [facility] Internal Medicine [phone]            See the Encounter Report to view Anticoagulation Flowsheet and Dosing Calendar (Go to Encounters tab in chart review, and find the Anticoagulation Therapy Visit)        Hope Baumann, RN

## 2018-05-15 DIAGNOSIS — I48.20 CHRONIC ATRIAL FIBRILLATION (H): ICD-10-CM

## 2018-05-15 RX ORDER — DILTIAZEM HYDROCHLORIDE 120 MG/1
120 CAPSULE, COATED, EXTENDED RELEASE ORAL EVERY EVENING
Qty: 90 CAPSULE | Refills: 3 | Status: SHIPPED | OUTPATIENT
Start: 2018-05-15 | End: 2019-05-10

## 2018-06-07 ENCOUNTER — ANTICOAGULATION THERAPY VISIT (OUTPATIENT)
Dept: NURSING | Facility: CLINIC | Age: 83
End: 2018-06-07
Payer: COMMERCIAL

## 2018-06-07 DIAGNOSIS — Z79.01 LONG-TERM (CURRENT) USE OF ANTICOAGULANTS: ICD-10-CM

## 2018-06-07 DIAGNOSIS — I48.91 ATRIAL FIBRILLATION, UNSPECIFIED TYPE (H): ICD-10-CM

## 2018-06-07 LAB — INR POINT OF CARE: 2.9 (ref 0.86–1.14)

## 2018-06-07 PROCEDURE — 36416 COLLJ CAPILLARY BLOOD SPEC: CPT

## 2018-06-07 PROCEDURE — 99207 ZZC NO CHARGE NURSE ONLY: CPT

## 2018-06-07 PROCEDURE — 85610 PROTHROMBIN TIME: CPT | Mod: QW

## 2018-06-07 NOTE — PROGRESS NOTES
ANTICOAGULATION FOLLOW-UP CLINIC VISIT    Patient Name:  Louis Keller Jr.  Date:  6/7/2018  Contact Type:  Face to Face    SUBJECTIVE:     Patient Findings     Positives Other complaints (URI, pt states he is feeling better), OTC meds (Taking Dayquil and Tylenol for URI symptoms)           OBJECTIVE    INR Protime   Date Value Ref Range Status   06/07/2018 2.9 (A) 0.86 - 1.14 Final       ASSESSMENT / PLAN  INR assessment THER    Recheck INR In: 4 WEEKS    INR Location Clinic      Anticoagulation Summary as of 6/7/2018     INR goal 2.0-3.0   Today's INR 2.9   Warfarin maintenance plan 5 mg (5 mg x 1) on Sun, Tue, Thu; 2.5 mg (5 mg x 0.5) all other days   Full warfarin instructions 5 mg on Sun, Tue, Thu; 2.5 mg all other days   Weekly warfarin total 25 mg   No change documented Hope Baumann RN   Plan last modified Monica Souza RN (5/12/2016)   Next INR check 7/9/2018   Priority INR   Target end date     Indications   Long-term (current) use of anticoagulants [Z79.01] [Z79.01]  Atrial fibrillation (H) [I48.91]         Anticoagulation Episode Summary     INR check location     Preferred lab     Send INR reminders to [facility]    Comments likes calendar printout.        Anticoagulation Care Providers     Provider Role Specialty Phone number    Joselito Armas MD [facility] Internal Medicine [phone]            See the Encounter Report to view Anticoagulation Flowsheet and Dosing Calendar (Go to Encounters tab in chart review, and find the Anticoagulation Therapy Visit)        Hope Baumann RN

## 2018-06-07 NOTE — MR AVS SNAPSHOT
Louis Keller Jr.   6/7/2018 11:15 AM   Anticoagulation Therapy Visit    Description:  85 year old male   Provider:  [facility] ANTICOAGULATION CLINIC   Department:  Cr Nurse           INR as of 6/7/2018     Today's INR 2.9      Anticoagulation Summary as of 6/7/2018     INR goal 2.0-3.0   Today's INR 2.9   Full warfarin instructions 5 mg on Sun, Tue, Thu; 2.5 mg all other days   Next INR check 7/9/2018    Indications   Long-term (current) use of anticoagulants [Z79.01] [Z79.01]  Atrial fibrillation (H) [I48.91]         Your next Anticoagulation Clinic appointment(s)     Jul 09, 2018 11:15 AM CDT   Anticoagulation Visit with [facility] ANTICOAGULATION CLINIC   [facility]    [address] [place] [address]   [phone]              Contact Numbers     Clinic Number:         June 2018 Details    Sun Mon Tue Wed Thu Fri Sat          1               2                 3               4               5               6               7      5 mg   See details      8      2.5 mg         9      2.5 mg           10      5 mg         11      2.5 mg         12      5 mg         13      2.5 mg         14      5 mg         15      2.5 mg         16      2.5 mg           17      5 mg         18      2.5 mg         19      5 mg         20      2.5 mg         21      5 mg         22      2.5 mg         23      2.5 mg           24      5 mg         25      2.5 mg         26      5 mg         27      2.5 mg         28      5 mg         29      2.5 mg         30      2.5 mg          Date Details   06/07 This INR check               How to take your warfarin dose     To take:  2.5 mg Take 0.5 of a 5 mg tablet.    To take:  5 mg Take 1 of the 5 mg tablets.           July 2018 Details    Sun Mon Tue Wed Thu Fri Sat     1      5 mg         2      2.5 mg         3      5 mg         4      2.5 mg         5      5 mg         6      2.5 mg         7      2.5 mg           8      5  mg         9            10               11               12               13               14                 15               16               17               18               19               20               21                 22               23               24               25               26               27               28                 29               30               31                    Date Details   No additional details    Date of next INR:  7/9/2018         How to take your warfarin dose     To take:  2.5 mg Take 0.5 of a 5 mg tablet.    To take:  5 mg Take 1 of the 5 mg tablets.

## 2018-06-10 DIAGNOSIS — R00.2 HEART PALPITATIONS: ICD-10-CM

## 2018-06-10 DIAGNOSIS — I48.91 NEW ONSET ATRIAL FIBRILLATION (H): ICD-10-CM

## 2018-06-10 DIAGNOSIS — Z79.01 CHRONIC ANTICOAGULATION: ICD-10-CM

## 2018-06-12 RX ORDER — WARFARIN SODIUM 5 MG/1
TABLET ORAL
Qty: 60 TABLET | Refills: 3 | Status: SHIPPED | OUTPATIENT
Start: 2018-06-12 | End: 2018-11-30

## 2018-06-12 NOTE — TELEPHONE ENCOUNTER
Last INR: 2.9 on 06/07/18  Next INR: 07/09/18    Prescription approved per [facility] Refill Protocol.    Hope Baumann RN

## 2018-06-21 NOTE — PROGRESS NOTES
Discharge Note    Progress reporting period is from initial eval to Mar 23, 2018.     Louis failed to return for next follow up visit and current status is unknown.  Please see information below for last relevant information on current status.  Patient seen for Rxs Used: 4 visits.  SUBJECTIVE  Subjective changes noted by patient:  Subjective: Pt reports using cane less at home and having more confidence with walking  .  Current pain level is Current Pain level: 1/10.     Previous pain level was  Initial Pain level: 3/10.   Changes in function:  Yes (See Goal flowsheet attached for changes in current functional level)  Adverse reaction to treatment or activity: None    OBJECTIVE  Changes noted in objective findings: Objective: Able to perform single leg stnace on right LE  with UE support for 25 seconds     ASSESSMENT/PLAN  Diagnosis: R knee pain   DIAGP:  The encounter diagnosis was Acute pain of right knee.  Updated problem list and treatment plan:   Decreased function - HEP  Decreased strength - HEP  STG/LTGs have been met or progress has been made towards goals:  Yes, please see goal flowsheet for most current information  Assessment of Progress: current status is unknown.  Last current status:     Self Management Plans:  HEP  I have re-evaluated this patient and find that the nature, scope, duration and intensity of the therapy is appropriate for the medical condition of the patient.  Louis continues to require the following intervention to meet STG and LTG's:  HEP.    Recommendations:  Discharge with current home program.  Patient to follow up with MD as needed.    Please refer to the daily flowsheet for treatment today, total treatment time and time spent performing 1:1 timed codes.

## 2018-06-21 NOTE — ED NOTES
[facility]  ED Nurse Handoff Report    Louis Keller Jr. is a 85 year old male   ED Chief complaint: Knee Pain  . ED Diagnosis:   Final diagnoses:   Knee effusion, right     Allergies:   Allergies   Allergen Reactions     No Known Drug Allergies        Code Status: Full Code  Activity level - Baseline/Home:  Stand with Assist. Activity Level - Current:   failed road test. Lift room needed: No. Bariatric: No   Needed: No   Isolation: No. Infection: Not Applicable.     Vital Signs:   Vitals:    03/03/18 1331   BP: 148/90   Pulse: 105   Temp: 99  F (37.2  C)   TempSrc: Oral   SpO2: 97%       Cardiac Rhythm:  ,      Pain level: 0-10 Pain Scale: 3  Patient confused: No. Patient Falls Risk: Yes.   Elimination Status: .   Patient Report / Focused Assessment:Musculoskeletal - Musculoskeletal WDL:  WDL except RLE Extremity Movement: active ROM moderately impaired RLE Weight-Bearing Status: toe touch weight-bearing CMS Intact: Yes  Skin - Skin WDL:  WDL except Skin Comment: localized knee swelling  Tests Performed / Abnormal Results:    Labs Ordered and Resulted from Time of ED Arrival Up to the Time of Departure from the ED   INR - Abnormal; Notable for the following:        Result Value    INR 2.12 (*)     All other components within normal limits     POC US SOFT TISSUE   Final Result   [facility] Procedure Note       Limited Bedside ED Ultrasound of Soft Tissue:      PROCEDURE: PERFORMED BY: Dr. Jaime Decker   INDICATIONS/SYMPTOM: Right knee swelling   PROBE: High frequency linear probe   BODY LOCATION: Soft tissue located on right knee       FINDINGS: Cobblestoning of soft tissue: absent    Hypoechoic fluid (ie abscess) identified: present     US utilized to access fluid pocket with needle   INTERPRETATION:  The soft tissue and muscle layers were evaluated.       Findings indicate hemarthrosis      IMAGE DOCUMENTATION: Images were archived to PACs system.         CT Knee Right w/o  Contrast   Preliminary Result   IMPRESSION:   1. No evidence of acute fracture.   2. Unusual calcifications in the suprapatellar region within a joint   effusion that could represent synovial calcification. Loose bodies   unlikely but could potentially contribute to this appearance. Some   unusual type of synovial chondromatosis or chondrocalcinosis could   result in this appearance as well.   3. Tricompartmental degenerative changes.        XR Shoulder Left G/E 3 Views   Final Result   IMPRESSION: Three views of the left shoulder are performed. No   evidence of fracture or dislocation. Acromioclavicular joint appears   intact radiographically.             ALIZA TRAN MD      Ankle XR, G/E 3 views, right   Final Result   IMPRESSION: No definite acute fractures are identified. The deformity   of the distal fibula appears to be due to a healed fracture.      SHANELL SOLOMON MD      Knee XR, 3 views, right   Final Result   IMPRESSION:    1. No fractures are identified.   2. Degenerative change with Chondrocalcinosis.   3. Knee joint effusion. Question joint loose bodies.      SHANELL SOLOMON MD          Treatments provided: INR, PAIN MEDS, BEDSIDE US, FLUID DRAINED FROM RIGHT KNEE, VITALS, ACE TO KNEE, FAILED ROAD TEST.  Family Comments: NONE  OBS brochure/video discussed/provided to patient:  yes  ED Medications:   Medications   lidocaine 1% with EPINEPHrine 1:100,000 1 %-1:[id number] injection (not administered)   HYDROcodone-acetaminophen (NORCO) 5-325 MG per tablet 1 tablet (1 tablet Oral Given 3/3/18 1414)     Drips infusing:  No  For the majority of the shift, the patient's behavior Green.   Interventions performed were INR, PAIN MEDS, BEDSIDE US, FLUID DRAINED FROM RIGHT KNEE, VITALS, ACE TO KNEE, FAILED ROAD TEST.       Severe Sepsis OR Septic Shock Diagnosis Present: No      ED Nurse Name/Phone Number: Valentin Chapa,   6:37 PM    RECEIVING UNIT ED HANDOFF REVIEW    Above ED Nurse Handoff Report was reviewed: yes  Reviewed  by: Hazel Lovelace on March 3, 2018 at 8:12 PM      Detail Level: Zone Detail Level: Detailed Detail Level: Generalized

## 2018-06-22 ENCOUNTER — OFFICE VISIT (OUTPATIENT)
Dept: INTERNAL MEDICINE | Facility: CLINIC | Age: 83
End: 2018-06-22
Payer: COMMERCIAL

## 2018-06-22 VITALS
HEART RATE: 83 BPM | HEIGHT: 71 IN | OXYGEN SATURATION: 98 % | TEMPERATURE: 98.3 F | SYSTOLIC BLOOD PRESSURE: 138 MMHG | BODY MASS INDEX: 25.62 KG/M2 | DIASTOLIC BLOOD PRESSURE: 70 MMHG | WEIGHT: 183 LBS

## 2018-06-22 DIAGNOSIS — N18.30 TYPE 2 DIABETES MELLITUS WITH STAGE 3 CHRONIC KIDNEY DISEASE, WITHOUT LONG-TERM CURRENT USE OF INSULIN (H): Primary | ICD-10-CM

## 2018-06-22 DIAGNOSIS — E78.5 HYPERLIPIDEMIA LDL GOAL <100: ICD-10-CM

## 2018-06-22 DIAGNOSIS — E11.22 TYPE 2 DIABETES MELLITUS WITH STAGE 3 CHRONIC KIDNEY DISEASE, WITHOUT LONG-TERM CURRENT USE OF INSULIN (H): Primary | ICD-10-CM

## 2018-06-22 DIAGNOSIS — I48.20 CHRONIC ATRIAL FIBRILLATION (H): ICD-10-CM

## 2018-06-22 LAB
ERYTHROCYTE [DISTWIDTH] IN BLOOD BY AUTOMATED COUNT: 14.9 % (ref 10–15)
HBA1C MFR BLD: 7.1 % (ref 0–5.6)
HCT VFR BLD AUTO: 40.1 % (ref 40–53)
HGB BLD-MCNC: 13.1 G/DL (ref 13.3–17.7)
MCH RBC QN AUTO: 31.6 PG (ref 26.5–33)
MCHC RBC AUTO-ENTMCNC: 32.7 G/DL (ref 31.5–36.5)
MCV RBC AUTO: 97 FL (ref 78–100)
PLATELET # BLD AUTO: 166 10E9/L (ref 150–450)
RBC # BLD AUTO: 4.15 10E12/L (ref 4.4–5.9)
WBC # BLD AUTO: 6.6 10E9/L (ref 4–11)

## 2018-06-22 PROCEDURE — 80053 COMPREHEN METABOLIC PANEL: CPT | Performed by: INTERNAL MEDICINE

## 2018-06-22 PROCEDURE — 83036 HEMOGLOBIN GLYCOSYLATED A1C: CPT | Performed by: INTERNAL MEDICINE

## 2018-06-22 PROCEDURE — 99214 OFFICE O/P EST MOD 30 MIN: CPT | Performed by: INTERNAL MEDICINE

## 2018-06-22 PROCEDURE — 99207 ZZC RSCC CODE FOR CODING REVIEW: CPT | Performed by: INTERNAL MEDICINE

## 2018-06-22 PROCEDURE — 85027 COMPLETE CBC AUTOMATED: CPT | Performed by: INTERNAL MEDICINE

## 2018-06-22 PROCEDURE — 36415 COLL VENOUS BLD VENIPUNCTURE: CPT | Performed by: INTERNAL MEDICINE

## 2018-06-22 PROCEDURE — 99207 C PAF COMPLETED  NO CHARGE: CPT | Performed by: INTERNAL MEDICINE

## 2018-06-22 NOTE — MR AVS SNAPSHOT
After Visit Summary   6/22/2018    Louis Keller Jr.    MRN: [id number]           Patient Information     Date Of Birth          6/26/1932        Visit Information        Provider Department      6/22/2018 11:00 AM Joselito Armas MD [facility]        Today's Diagnoses     Type 2 diabetes mellitus with stage 3 chronic kidney disease, without long-term current use of insulin (H)    -  1    Hyperlipidemia LDL goal <100        Chronic atrial fibrillation (H)           Follow-ups after your visit        Your next 10 appointments already scheduled     Jul 09, 2018 11:15 AM CDT   [facility]    [address] [facility] [id number] [phone]            Aug 03, 2018  1:10 PM CDT   Return Visit with Susanna Haque PA-C   [facility] ([facility])    [address]  [facility] [id number] [phone]              Who to contact     If you have questions or need follow up information about today's clinic visit or your schedule please contact [facility] directly at [phone].  Normal or non-critical lab and imaging results will be communicated to you by MyChart, letter or phone within 4 business days after the clinic has received the results. If you do not hear from us within 7 days, please contact the clinic through MyChart or phone. If you have a critical or abnormal lab result, we will notify you by phone as soon as possible.  Submit refill requests through [employer] or call your pharmacy and they will forward the refill request to us. Please allow 3 business days for your refill to be completed.          Additional Information About Your Visit        Care EveryWhere ID     This is your Care EveryWhere ID. This could be used by other organizations to access your [facility]  "records  [id number]        Your Vitals Were     Pulse Temperature Height Pulse Oximetry BMI (Body Mass Index)       83 98.3  F (36.8  C) (Oral) 5' 11\" (1.803 m) 98% 25.52 kg/m2        Blood Pressure from Last 3 Encounters:   06/22/18 138/70   05/03/18 124/64   03/19/18 138/78    Weight from Last 3 Encounters:   06/22/18 183 lb (83 kg)   05/03/18 185 lb (83.9 kg)   04/30/18 184 lb (83.5 kg)              We Performed the Following     CBC with platelets     Comprehensive metabolic panel     Hemoglobin A1c     PAF COMPLETED        Primary Care Provider Office Phone # Fax #    Joselito Armas [phone]       [address]  [facility] [address]        Equal Access to Services     [facility] : Hadii aad aman hadasho Sonoemi, waaxda luqadaha, qaybta kaalmada adeegyada, emanuel kelsey . So [facility] [phone].    ATENCIÓN: Si habla español, tiene a cao disposición servicios gratuitos de asistencia lingüística. Jose L al [phone].    We comply with applicable federal civil rights laws and [place] laws. We do not discriminate on the basis of race, color, national origin, age, disability, sex, sexual orientation, or gender identity.            Thank you!     Thank you for choosing [facility]  for your care. Our goal is always to provide you with excellent care. Hearing back from our patients is one way we can continue to improve our services. Please take a few minutes to complete the written survey that you may receive in the mail after your visit with us. Thank you!             Your Updated Medication List - Protect others around you: Learn how to safely use, store and throw away your medicines at www.disposemymeds.org.          This list is accurate as of 6/22/18 11:40 AM.  Always use your most recent med list.                   Brand Name Dispense Instructions for use Diagnosis    ACE NOT PRESCRIBED (INTENTIONAL)     0 each    1 each daily ACE " Inhibitor not prescribed due to Risk for drug interaction    Type 2 diabetes, HbA1C goal < 8% (H)       ASPIRIN NOT PRESCRIBED    INTENTIONAL     by Other route continuous prn Reported on 3/7/2017        cholestyramine 4 g Packet    QUESTRAN     Take 1 packet by mouth every evening        Chromium 1000 MCG Tabs      Take 500 mcg by mouth daily        diltiazem 120 MG 24 hr capsule    CARDIZEM CD/CARTIA XT    90 capsule    Take 1 capsule (120 mg) by mouth every evening    Chronic atrial fibrillation (H)       ferrous gluconate 324 (38 Fe) MG tablet    FERGON    100 tablet    TAKE 1 TABLET (324 MG) BY MOUTH DAILY (WITH BREAKFAST)    Anemia due to blood loss, acute       fish oil-omega-3 fatty acids 1000 MG capsule      Take 1 g by mouth 2 times daily        glipiZIDE 10 MG 24 hr tablet    glipiZIDE XL    180 tablet    Take 1 tablet (10 mg) by mouth 2 times daily    Chronic atrial fibrillation (H), Actinic keratosis       loperamide 2 MG tablet    IMODIUM A-D    30 tablet    1 tablet BID        metFORMIN 500 MG tablet    GLUCOPHAGE    180 tablet    Take 1 tablet (500 mg) by mouth 2 times daily (with meals)    Type 2 diabetes mellitus with stage 3 chronic kidney disease, without long-term current use of insulin (H)       multivitamin, therapeutic Tabs tablet      Take 0.5 tablets by mouth 2 times daily        ONETOUCH ULTRA test strip   Generic drug:  blood glucose monitoring     100 strip    USE TO TEST DAILY    Type 2 diabetes mellitus with stage 3 chronic kidney disease (H)       pioglitazone 45 MG tablet    ACTOS    90 tablet    TAKE 1 TABLET (45 MG) BY MOUTH DAILY    Type 2 diabetes mellitus with stage 3 chronic kidney disease, without long-term current use of insulin (H)       polyethylene glycol Packet    MIRALAX/GLYCOLAX    7 packet    Take 17 g by mouth daily as needed for constipation    Constipation, unspecified constipation type       senna-docusate 8.6-50 MG per tablet    SENOKOT-S;PERICOLACE    100 tablet     Take 1 tablet by mouth 2 times daily as needed for constipation    Constipation, unspecified constipation type       STATIN NOT PRESCRIBED (INTENTIONAL)     0 each    1 each At Bedtime Statin not prescribed intentionally due to Risk for drug interaction    Type 2 diabetes, HbA1C goal < 8% (H)       tamsulosin 0.4 MG capsule    FLOMAX    30 capsule    Take 1 capsule (0.4 mg) by mouth daily    Urinary frequency       TYLENOL PO      Take 1,000 mg by mouth 3 times daily        warfarin 5 MG tablet    COUMADIN    60 tablet    TAKE 1 TABLET BY MOUTH TUESDAY,THURS AND SUN. TAKE 1/2 TABLET ON MON,WED,FRI AND SAT. OR AS DIRECTED    Heart palpitations, New onset atrial fibrillation (H), Chronic anticoagulation

## 2018-06-22 NOTE — PROGRESS NOTES
SUBJECTIVE:   Louis Keller Jr. is a 85 year old male who presents to clinic today for the following health issues:    Patient is seen for a follow up visit.  No acute complaints, no medication change or new medical conditions.    Diabetes Follow-up    Has H/O DM. On diet , exercise and PO medications. Blood sugars are controlled. No parestesias. No hypoglycemias.    Patient is checking blood sugars: once daily.  Results are as follows:         lunchtime - 's , occasional 190's    Diabetic concerns: None     Symptoms of hypoglycemia (low blood sugar): none     Paresthesias (numbness or burning in feet) or sores: No, but ankle swelling     Date of last diabetic eye exam: 12/2017    BP Readings from Last 2 Encounters:   05/03/18 124/64   03/19/18 138/78     Hemoglobin A1C (%)   Date Value   03/29/2018 7.6 (H)   12/27/2017 7.5 (H)     LDL Cholesterol Calculated (mg/dL)   Date Value   12/27/2017 58   03/14/2016 76     Hyperlipidemia Follow-Up  Has H/O hyperlipidemia. On medical treatment and diet. No side effects. No muscle weakness, myalgias or upset stomach.       Rate your low fat/cholesterol diet?: fair    Taking statin?  No    Other lipid medications/supplements?:  none          PROBLEMS TO ADD ON...  Has history of atrial fibrillation. On anticoagulation with Coumadin and rate control medications. Asymptonatic - no chest pains , palpitations,  no side effects from medications.      Problem list and histories reviewed & adjusted, as indicated.  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     Anemia due to blood loss, acute     Physical deconditioning     Diabetes mellitus, type 2 (H)     Atrial fibrillation (H)     QUESADA (dyspnea on exertion)     Diastolic murmur     Pain of right thigh     Long-term (current) use of anticoagulants [Z79.01]     Knee effusion, right     Joint effusion of knee     Past Surgical History:   Procedure Laterality Date     ARTHROPLASTY KNEE  2013    Procedure: ARTHROPLASTY KNEE;  Left Total Knee Arthroplasty       C NONSPECIFIC PROCEDURE  Child    T&A     C NONSPECIFIC PROCEDURE  ; also     Colonoscopy     C NONSPECIFIC PROCEDURE      Basal cell cancer of the nose     C NONSPECIFIC PROCEDURE      Cholecystectomy     CARDIOVERSION  11    failed       Social History   Substance Use Topics     Smoking status: Never Smoker     Smokeless tobacco: Never Used     Alcohol use 0.0 oz/week     0 Standard drinks or equivalent per week      Comment: 1 glass of wine every day     Family History   Problem Relation Age of Onset     Alzheimer Disease Maternal Grandmother      Arthritis Maternal Grandmother      Cancer Mother      breast/ 1983/colon     Eye Disorder Mother      glaucoma and cataracts     HEART DISEASE Mother      angina/CABG     Hypertension Mother      Cancer Father      colon     Diabetes Maternal Aunt      AoDM         Current Outpatient Prescriptions   Medication Sig Dispense Refill     Acetaminophen (TYLENOL PO) Take 1,000 mg by mouth 3 times daily       Chromium 1000 MCG TABS Take 500 mcg by mouth daily       diltiazem (CARDIZEM CD/CARTIA XT) 120 MG 24 hr capsule Take 1 capsule (120 mg) by mouth every evening 90 capsule 3     ferrous gluconate (FERGON) 324 (38 FE) MG tablet TAKE 1 TABLET (324 MG) BY MOUTH DAILY (WITH BREAKFAST) 100 tablet 3     fish oil-omega-3 fatty acids (FISH OIL) 1000 MG capsule Take 1 g by mouth 2 times daily        glipiZIDE (GLIPIZIDE XL) 10 MG 24 hr tablet Take 1 tablet (10 mg) by mouth 2 times daily  "180 tablet 3     loperamide (IMODIUM A-D) 2 MG tablet 1 tablet BID 30 tablet 0     metFORMIN (GLUCOPHAGE) 500 MG tablet Take 1 tablet (500 mg) by mouth 2 times daily (with meals) 180 tablet 1     multivitamin, therapeutic (THERA-VIT) TABS tablet Take 0.5 tablets by mouth 2 times daily       pioglitazone (ACTOS) 45 MG tablet TAKE 1 TABLET (45 MG) BY MOUTH DAILY 90 tablet 1     polyethylene glycol (MIRALAX/GLYCOLAX) Packet Take 17 g by mouth daily as needed for constipation 7 packet      senna-docusate (SENOKOT-S;PERICOLACE) 8.6-50 MG per tablet Take 1 tablet by mouth 2 times daily as needed for constipation 100 tablet      warfarin (COUMADIN) 5 MG tablet TAKE 1 TABLET BY MOUTH TUESDAY,THURS AND SUN. TAKE 1/2 TABLET ON MON,WED,FRI AND SAT. OR AS DIRECTED 60 tablet 3     ACE NOT PRESCRIBED, INTENTIONAL, 1 each daily ACE Inhibitor not prescribed due to Risk for drug interaction 0 each 0     ASPIRIN NOT PRESCRIBED, INTENTIONAL, by Other route continuous prn Reported on 3/7/2017       cholestyramine (QUESTRAN) 4 G Packet Take 1 packet by mouth every evening       ONETOUCH ULTRA test strip USE TO TEST DAILY 100 strip 2     STATIN NOT PRESCRIBED, INTENTIONAL, 1 each At Bedtime Statin not prescribed intentionally due to Risk for drug interaction 0 each 0     tamsulosin (FLOMAX) 0.4 MG capsule Take 1 capsule (0.4 mg) by mouth daily (Patient not taking: Reported on 6/22/2018) 30 capsule 11       Reviewed and updated as needed this visit by clinical staff       Reviewed and updated as needed this visit by Provider         ROS:  Constitutional, HEENT, cardiovascular, pulmonary, gi and gu systems are negative, except as otherwise noted.    OBJECTIVE:     /70  Pulse 83  Temp 98.3  F (36.8  C) (Oral)  Ht 5' 11\" (1.803 m)  Wt 183 lb (83 kg)  SpO2 98%  BMI 25.52 kg/m2  Body mass index is 25.52 kg/(m^2).   GENERAL: healthy, alert and no distress  NECK: no adenopathy, no asymmetry, masses, or scars and thyroid normal to " palpation  RESP: lungs clear to auscultation - no rales, rhonchi or wheezes  CV: irregular rate and rhythm, normal S1 S2, no S3 or S4, no murmur, click or rub, no peripheral edema and peripheral pulses strong  ABDOMEN: soft, nontender, no hepatosplenomegaly, no masses and bowel sounds normal  MS: no gross musculoskeletal defects noted, no edema    Diagnostic Test Results:  none     ASSESSMENT/PLAN:     Problem List Items Addressed This Visit     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    Relevant Orders    Hemoglobin A1c (Completed)    Hyperlipidemia LDL goal <100    Relevant Orders    Comprehensive metabolic panel (Completed)    Atrial fibrillation (H)    Relevant Orders    CBC with platelets (Completed)           Assess lab work   Cont treatment       Follow-Up:in 6 months     Joselito Armas MD  [facility]

## 2018-06-22 NOTE — LETTER
June 25, 2018      Louis Keller .  [address]  [facility] [address]        Dear ,    We are writing to inform you of your test results.  Mild anemia, improve.   Better diabetic control.   Continue treatment, recheck in 6 months.      Resulted Orders   CBC with platelets   Result Value Ref Range    WBC 6.6 4.0 - 11.0 10e9/L    RBC Count 4.15 (L) 4.4 - 5.9 10e12/L    Hemoglobin 13.1 (L) 13.3 - 17.7 g/dL    Hematocrit 40.1 40.0 - 53.0 %    MCV 97 78 - 100 fl    MCH 31.6 26.5 - 33.0 pg    MCHC 32.7 31.5 - 36.5 g/dL    RDW 14.9 10.0 - 15.0 %    Platelet Count 166 150 - 450 10e9/L   Comprehensive metabolic panel   Result Value Ref Range    Sodium 140 133 - 144 mmol/L    Potassium 4.4 3.4 - 5.3 mmol/L    Chloride 105 94 - 109 mmol/L    Carbon Dioxide 26 20 - 32 mmol/L    Anion Gap 9 3 - 14 mmol/L    Glucose 169 (H) 70 - 99 mg/dL      Comment:      Non Fasting    Urea Nitrogen 14 7 - 30 mg/dL    Creatinine 0.86 0.66 - 1.25 mg/dL    GFR Estimate 84 >60 mL/min/1.7m2      Comment:      Non  GFR Calc    GFR Estimate If Black >90 >60 mL/min/1.7m2      Comment:       GFR Calc    Calcium 9.3 8.5 - 10.1 mg/dL    Bilirubin Total 1.3 0.2 - 1.3 mg/dL    Albumin 4.0 3.4 - 5.0 g/dL    Protein Total 7.5 6.8 - 8.8 g/dL    Alkaline Phosphatase 60 40 - 150 U/L    ALT 27 0 - 70 U/L    AST 34 0 - 45 U/L   Hemoglobin A1c   Result Value Ref Range    Hemoglobin A1C 7.1 (H) 0 - 5.6 %      Comment:      Normal <5.7% Prediabetes 5.7-6.4%  Diabetes 6.5% or higher - adopted from ADA   consensus guidelines.         If you have any questions or concerns, please call the clinic at the number listed above.       Sincerely,        Joselito Armas MD

## 2018-06-23 LAB
ALBUMIN SERPL-MCNC: 4 G/DL (ref 3.4–5)
ALP SERPL-CCNC: 60 U/L (ref 40–150)
ALT SERPL W P-5'-P-CCNC: 27 U/L (ref 0–70)
ANION GAP SERPL CALCULATED.3IONS-SCNC: 9 MMOL/L (ref 3–14)
AST SERPL W P-5'-P-CCNC: 34 U/L (ref 0–45)
BILIRUB SERPL-MCNC: 1.3 MG/DL (ref 0.2–1.3)
BUN SERPL-MCNC: 14 MG/DL (ref 7–30)
CALCIUM SERPL-MCNC: 9.3 MG/DL (ref 8.5–10.1)
CHLORIDE SERPL-SCNC: 105 MMOL/L (ref 94–109)
CO2 SERPL-SCNC: 26 MMOL/L (ref 20–32)
CREAT SERPL-MCNC: 0.86 MG/DL (ref 0.66–1.25)
GFR SERPL CREATININE-BSD FRML MDRD: 84 ML/MIN/1.7M2
GLUCOSE SERPL-MCNC: 169 MG/DL (ref 70–99)
POTASSIUM SERPL-SCNC: 4.4 MMOL/L (ref 3.4–5.3)
PROT SERPL-MCNC: 7.5 G/DL (ref 6.8–8.8)
SODIUM SERPL-SCNC: 140 MMOL/L (ref 133–144)

## 2018-07-09 ENCOUNTER — ANTICOAGULATION THERAPY VISIT (OUTPATIENT)
Dept: NURSING | Facility: CLINIC | Age: 83
End: 2018-07-09
Payer: COMMERCIAL

## 2018-07-09 DIAGNOSIS — Z79.01 LONG-TERM (CURRENT) USE OF ANTICOAGULANTS: ICD-10-CM

## 2018-07-09 DIAGNOSIS — I48.20 CHRONIC ATRIAL FIBRILLATION (H): ICD-10-CM

## 2018-07-09 LAB — INR POINT OF CARE: 2.1 (ref 0.86–1.14)

## 2018-07-09 PROCEDURE — 36416 COLLJ CAPILLARY BLOOD SPEC: CPT

## 2018-07-09 PROCEDURE — 85610 PROTHROMBIN TIME: CPT | Mod: QW

## 2018-07-09 PROCEDURE — 99207 ZZC NO CHARGE NURSE ONLY: CPT

## 2018-07-09 RX ORDER — ZINC OXIDE 13 %
CREAM (GRAM) TOPICAL
COMMUNITY
Start: 2018-07-09 | End: 2018-09-21

## 2018-07-09 NOTE — MR AVS SNAPSHOT
Louis Keller Jr.   7/9/2018 11:15 AM   Anticoagulation Therapy Visit    Description:  86 year old male   Provider:  [facility]   Department:  Cr Nurse           INR as of 7/9/2018     Today's INR 2.1      Anticoagulation Summary as of 7/9/2018     INR goal 2.0-3.0   Today's INR 2.1   Full warfarin instructions 5 mg on Sun, Tue, Thu; 2.5 mg all other days   Next INR check 8/6/2018    Indications   Long-term (current) use of anticoagulants [Z79.01] [Z79.01]  Atrial fibrillation (H) [I48.91]         Your next Anticoagulation Clinic appointment(s)     Aug 06, 2018 11:15 AM CDT   Anticoagulation Visit with [facility]   [facility]    [address] [place] [id number]   [phone]              Contact Numbers     Clinic Number:         July 2018 Details    Sun Mon Tue Wed Thu Fri Sat     1               2               3               4               5               6               7                 8               9      2.5 mg   See details      10      5 mg         11      2.5 mg         12      5 mg         13      2.5 mg         14      2.5 mg           15      5 mg         16      2.5 mg         17      5 mg         18      2.5 mg         19      5 mg         20      2.5 mg         21      2.5 mg           22      5 mg         23      2.5 mg         24      5 mg         25      2.5 mg         26      5 mg         27      2.5 mg         28      2.5 mg           29      5 mg         30      2.5 mg         31      5 mg              Date Details   07/09 This INR check               How to take your warfarin dose     To take:  2.5 mg Take 0.5 of a 5 mg tablet.    To take:  5 mg Take 1 of the 5 mg tablets.           August 2018 Details    Sun Mon Tue Wed Thu Fri Sat        1      2.5 mg         2      5 mg         3      2.5 mg         4      2.5 mg           5      5 mg         6            7               8                9               10               11                 12               13               14               15               16               17               18                 19               20               21               22               23               24               25                 26               27               28               29               30               31                 Date Details   No additional details    Date of next INR:  8/6/2018         How to take your warfarin dose     To take:  2.5 mg Take 0.5 of a 5 mg tablet.    To take:  5 mg Take 1 of the 5 mg tablets.

## 2018-07-09 NOTE — PROGRESS NOTES
ANTICOAGULATION FOLLOW-UP CLINIC VISIT    Patient Name:  Louis Keller Jr.  Date:  7/9/2018  Contact Type:  Face to Face    SUBJECTIVE:     Patient Findings     Positives Change in diet/appetite (1 small bruise near each thumb)           OBJECTIVE    INR Protime   Date Value Ref Range Status   07/09/2018 2.1 (A) 0.86 - 1.14 Final       ASSESSMENT / PLAN  INR assessment THER    Recheck INR In: 4 WEEKS    INR Location Clinic      Anticoagulation Summary as of 7/9/2018     INR goal 2.0-3.0   Today's INR 2.1   Warfarin maintenance plan 5 mg (5 mg x 1) on Sun, Tue, Thu; 2.5 mg (5 mg x 0.5) all other days   Full warfarin instructions 5 mg on Sun, Tue, Thu; 2.5 mg all other days   Weekly warfarin total 25 mg   No change documented Hope Baumann RN   Plan last modified Monica Souza RN (5/12/2016)   Next INR check 8/6/2018   Priority INR   Target end date     Indications   Long-term (current) use of anticoagulants [Z79.01] [Z79.01]  Atrial fibrillation (H) [I48.91]         Anticoagulation Episode Summary     INR check location     Preferred lab     Send INR reminders to [facility]    Comments likes calendar printout.        Anticoagulation Care Providers     Provider Role Specialty Phone number    Joselito Armas MD [facility] Internal [facility] [phone]            See the Encounter Report to view Anticoagulation Flowsheet and Dosing Calendar (Go to Encounters tab in chart review, and find the Anticoagulation Therapy Visit)        Hope Baumann RN

## 2018-07-31 ENCOUNTER — TELEPHONE (OUTPATIENT)
Dept: NURSING | Facility: CLINIC | Age: 83
End: 2018-07-31

## 2018-07-31 DIAGNOSIS — I48.20 CHRONIC ATRIAL FIBRILLATION (H): Primary | ICD-10-CM

## 2018-07-31 NOTE — TELEPHONE ENCOUNTER
Has the patient previously taken warfarin? yes  If yes, for what indication? Atrial Fibrillation    Does the patient have any of the following indications for a higher range of 2.5-3.5:    Mitral position mechanical valve? no    Marcel-Shiley, Ball and Cage or Monoleaflet valve (regardless of position) no    Other (if yes, please explain) no      New insurance and medicare reimbursement rules require that all of our INR patients have an INR Clinic referral order in Epic, and that it be renewed annually.     We have entered this initial order for you and your signature is required once you review it.       You may close this encounter once signed.     Thank you,     Hope Baumann RN

## 2018-08-03 ENCOUNTER — TRANSFERRED RECORDS (OUTPATIENT)
Dept: HEALTH INFORMATION MANAGEMENT | Facility: CLINIC | Age: 83
End: 2018-08-03

## 2018-08-03 ENCOUNTER — OFFICE VISIT (OUTPATIENT)
Dept: CARDIOLOGY | Facility: CLINIC | Age: 83
End: 2018-08-03
Attending: INTERNAL MEDICINE
Payer: COMMERCIAL

## 2018-08-03 VITALS
OXYGEN SATURATION: 97 % | WEIGHT: 185.4 LBS | HEIGHT: 71 IN | SYSTOLIC BLOOD PRESSURE: 126 MMHG | HEART RATE: 80 BPM | DIASTOLIC BLOOD PRESSURE: 74 MMHG | BODY MASS INDEX: 25.96 KG/M2

## 2018-08-03 DIAGNOSIS — R06.09 DOE (DYSPNEA ON EXERTION): ICD-10-CM

## 2018-08-03 DIAGNOSIS — M62.89 EXERCISE-INDUCED LEG FATIGUE: ICD-10-CM

## 2018-08-03 DIAGNOSIS — I48.20 CHRONIC ATRIAL FIBRILLATION (H): ICD-10-CM

## 2018-08-03 PROCEDURE — 99214 OFFICE O/P EST MOD 30 MIN: CPT | Performed by: PHYSICIAN ASSISTANT

## 2018-08-03 NOTE — LETTER
"8/3/2018    Joselito Armas MD  [address] [facility] [address]    RE: Louis Bassdevin Willis       Dear Colleague,    I had the pleasure of seeing Louis Keller JrJana in the [facility].    CARDIOLOGY CLINIC PROGRESS NOTE    DOS: 2018      Louis Keller Jr.  : 1932, 86 year old  MRN: [id number]      History: I had the pleasure of meeting Louis Keller Jr. Today in the cardiology clinic.  He is a patient of Dr. Gleason.  He was accompanied by his wife, Laura (also see myself and Dr. Gleason).    Louis is a pleasant 86 year old man with history of chronic atrial fibrillation, DM2.  He failed cardioversion in the past.  He is maintained on rate control strategy and anticoagulation.  He has had negative stress testing previously in ,  and .      He saw Dr. Gleason 18.  He had some QUESADA and R>L LE exertional heaviness.   He planned to increase walking, and follow up in 3 months with the plan that if his sxs did not imporve, we could reat an echo, obtain a treadmill nuclear stress test, and ABIs.     Interval History:   He presents today for follow up.   He says his symptoms are about the same.   He had a shooting ankle pain about 10 days ago and did fall. He is still walking 30 minutes a day but using a cane now.   He has some varicose veins and some \"spider veins\" (telangiectasias).     ROS:  Skin:  Positive for lumps or bumps;bruising   Eyes:  Positive for glasses;visual blurring  ENT:  Positive for hearing loss  Respiratory:  Positive for dyspnea on exertion  Cardiovascular:    Positive for;edema;fatigue  Gastroenterology: Positive for diarrhea  Genitourinary:  Positive for urinary frequency;nocturia;prostate problem  Musculoskeletal:  Positive for back pain;arthritis  Neurologic:  Positive for numbness or tingling of feet  Psychiatric:  not assessed    Heme/Lymph/Imm:  Positive for    Endocrine:  Positive for diabetes    PAST MEDICAL HISTORY:  Past " Medical History:   Diagnosis Date     Antiplatelet or antithrombotic long-term use      Atrial fibrillation (H)      Diarrhea      Diastolic murmur      QUESADA (dyspnea on exertion)      Gout      Hemorrhage of gastrointestinal tract, unspecified 63,65,and 1971    hospitalized     History of blood transfusion      Hyperlipidemia LDL goal <100 4/18/2012     Long-term (current) use of anticoagulants [Z79.01] 3/31/2016     Mumps      Palpitations      Physical deconditioning 8/9/2013     Scarlet fever      Spider veins      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     Unspecified disease of pancreas 1990    pancreatitis       PAST SURGICAL HISTORY:  Past Surgical History:   Procedure Laterality Date     ARTHROPLASTY KNEE  8/5/2013    Procedure: ARTHROPLASTY KNEE;  Left Total Knee Arthroplasty       C NONSPECIFIC PROCEDURE  Child    T&A     C NONSPECIFIC PROCEDURE  ; also 11/03    Colonoscopy     C NONSPECIFIC PROCEDURE      Basal cell cancer of the nose     C NONSPECIFIC PROCEDURE  1990    Cholecystectomy     CARDIOVERSION  9/6/11    failed       SOCIAL HISTORY:  Social History     Social History     Marital status:      Spouse name: N/A     Number of children: N/A     Years of education: N/A     Occupational History           Semi-retired     Social History Main Topics     Smoking status: Never Smoker     Smokeless tobacco: Never Used     Alcohol use 0.0 oz/week     0 Standard drinks or equivalent per week      Comment: 1 glass of wine every day     Drug use: No     Sexual activity: No     Other Topics Concern     Caffeine Concern No     14 oz per day     Sleep Concern No     sometimes - nocturia 4 times per night     Special Diet No     Exercise Yes     treadmill/walking 15-20 minutes 6 days per week     Social History Narrative       FAMILY HISTORY:  Family History   Problem Relation Age of Onset     Alzheimer Disease Maternal Grandmother      Arthritis Maternal Grandmother       Cancer Mother      breast/ 1983/colon     Eye Disorder Mother      glaucoma and cataracts     HEART DISEASE Mother      angina/CABG     Hypertension Mother      Cancer Father      colon     Diabetes Maternal Aunt      AoDM       MEDS:   Current Outpatient Prescriptions on File Prior to Visit:  Acetaminophen (TYLENOL PO) Take 1,000 mg by mouth 3 times daily   cholestyramine (QUESTRAN) 4 G Packet Take 1 packet by mouth every evening   Chromium 1000 MCG TABS Take 500 mcg by mouth daily   diltiazem (CARDIZEM CD/CARTIA XT) 120 MG 24 hr capsule Take 1 capsule (120 mg) by mouth every evening   ferrous gluconate (FERGON) 324 (38 FE) MG tablet TAKE 1 TABLET (324 MG) BY MOUTH DAILY (WITH BREAKFAST)   fish oil-omega-3 fatty acids (FISH OIL) 1000 MG capsule Take 1 g by mouth 2 times daily    glipiZIDE (GLIPIZIDE XL) 10 MG 24 hr tablet Take 1 tablet (10 mg) by mouth 2 times daily   loperamide (IMODIUM A-D) 2 MG tablet daily    metFORMIN (GLUCOPHAGE) 500 MG tablet Take 1 tablet (500 mg) by mouth 2 times daily (with meals)   multivitamin, therapeutic (THERA-VIT) TABS tablet Take 0.5 tablets by mouth 2 times daily   ONETOUCH ULTRA test strip USE TO TEST DAILY   pioglitazone (ACTOS) 45 MG tablet TAKE 1 TABLET (45 MG) BY MOUTH DAILY   Probiotic Product (PROBIOTIC DAILY) CAPS    warfarin (COUMADIN) 5 MG tablet TAKE 1 TABLET BY MOUTH TUESDAY,THURS AND SUN. TAKE 1/2 TABLET ON MON,WED,FRI AND SAT. OR AS DIRECTED   ACE NOT PRESCRIBED, INTENTIONAL, 1 each daily ACE Inhibitor not prescribed due to Risk for drug interaction (Patient not taking: Reported on 8/3/2018)   ASPIRIN NOT PRESCRIBED, INTENTIONAL, by Other route continuous prn Reported on 3/7/2017   polyethylene glycol (MIRALAX/GLYCOLAX) Packet Take 17 g by mouth daily as needed for constipation (Patient not taking: Reported on 8/3/2018)   senna-docusate (SENOKOT-S;PERICOLACE) 8.6-50 MG per tablet Take 1 tablet by mouth 2 times daily as needed for constipation (Patient not  "taking: Reported on 8/3/2018)   STATIN NOT PRESCRIBED, INTENTIONAL, 1 each At Bedtime Statin not prescribed intentionally due to Risk for drug interaction (Patient not taking: Reported on 8/3/2018)   tamsulosin (FLOMAX) 0.4 MG capsule Take 1 capsule (0.4 mg) by mouth daily (Patient not taking: Reported on 6/22/2018)     No current facility-administered medications on file prior to visit.     ALLERGIES:   Allergies   Allergen Reactions     No Known Drug Allergies        PHYSICAL EXAM:  Vitals: /74 (BP Location: Right arm, Patient Position: Chair, Cuff Size: Adult Regular)  Pulse 80  Ht 1.803 m (5' 11\")  Wt 84.1 kg (185 lb 6.4 oz)  SpO2 97%  BMI 25.86 kg/m2  Constitutional:  cooperative, alert and oriented, well developed, well nourished, in no acute distress        Skin:  warm and dry to the touch, no apparent skin lesions or masses noted        Head:  normocephalic, no masses or lesions        Eyes:  pupils equal and round;conjunctivae and lids unremarkable;sclera white;no xanthalasma        ENT:  no pallor or cyanosis, dentition good        Neck:  JVP normal        Respiratory:  normal breath sounds, clear to auscultation, normal A-P diameter, normal symmetry, normal respiratory excursion, no use of accessory muscles        Cardiac:   irregularly irregular rhythm         grade 1;early diastolic murmur good rate control, frequent ectopy    GI:  abdomen soft;BS normoactive        Vascular:                                        Extremities and Musculoskeletal:  no deformities, clubbing, cyanosis, erythema observed stasis pigmentation      Neurological:  no gross motor deficits;affect appropriate          LABS/DATA:  I reviewed the following:  None today    ASSESSMENT/PLAN:  Chronic afib  - Rate controlled on dilt cd 120 mg  - On warfarin    QUESADA  - Will get echo, treadmill nuc stress test    Exertional leg heaviness  Varicose veins  Telangiectasias  - Will get exercise ABIs and venous comp study      Follow " up after tests to review results      Thank you for allowing me to participate in the care of your patient.    Sincerely,     Susanna Haque PA-C     [facility]

## 2018-08-03 NOTE — LETTER
"8/3/2018    Joselito Armas MD  [address] [facility] [address]    RE: Louis Bassdevin Willis       Dear Colleague,    I had the pleasure of seeing Louis Keller JrJana in the [facility].    CARDIOLOGY CLINIC PROGRESS NOTE    DOS: 2018      Louis Keller Jr.  : 1932, 86 year old  MRN: [id number]      History: I had the pleasure of meeting Louis Keller Jr. Today in the cardiology clinic.  He is a patient of Dr. Gleason.  He was accompanied by his wife, Laura (also see myself and Dr. Gleason).    Louis is a pleasant 86 year old man with history of chronic atrial fibrillation, DM2.  He failed cardioversion in the past.  He is maintained on rate control strategy and anticoagulation.  He has had negative stress testing previously in ,  and .      He saw Dr. Gleason 18.  He had some QUESADA and R>L LE exertional heaviness.   He planned to increase walking, and follow up in 3 months with the plan that if his sxs did not imporve, we could reat an echo, obtain a treadmill nuclear stress test, and ABIs.     Interval History:   He presents today for follow up.   He says his symptoms are about the same.   He had a shooting ankle pain about 10 days ago and did fall. He is still walking 30 minutes a day but using a cane now.   He has some varicose veins and some \"spider veins\" (telangiectasias).     ROS:  Skin:  Positive for lumps or bumps;bruising   Eyes:  Positive for glasses;visual blurring  ENT:  Positive for hearing loss  Respiratory:  Positive for dyspnea on exertion  Cardiovascular:    Positive for;edema;fatigue  Gastroenterology: Positive for diarrhea  Genitourinary:  Positive for urinary frequency;nocturia;prostate problem  Musculoskeletal:  Positive for back pain;arthritis  Neurologic:  Positive for numbness or tingling of feet  Psychiatric:  not assessed    Heme/Lymph/Imm:  Positive for    Endocrine:  Positive for diabetes    PAST MEDICAL HISTORY:  Past " Medical History:   Diagnosis Date     Antiplatelet or antithrombotic long-term use      Atrial fibrillation (H)      Diarrhea      Diastolic murmur      QUESADA (dyspnea on exertion)      Gout      Hemorrhage of gastrointestinal tract, unspecified 63,65,and 1971    hospitalized     History of blood transfusion      Hyperlipidemia LDL goal <100 4/18/2012     Long-term (current) use of anticoagulants [Z79.01] 3/31/2016     Mumps      Palpitations      Physical deconditioning 8/9/2013     Scarlet fever      Spider veins      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     Unspecified disease of pancreas 1990    pancreatitis       PAST SURGICAL HISTORY:  Past Surgical History:   Procedure Laterality Date     ARTHROPLASTY KNEE  8/5/2013    Procedure: ARTHROPLASTY KNEE;  Left Total Knee Arthroplasty       C NONSPECIFIC PROCEDURE  Child    T&A     C NONSPECIFIC PROCEDURE  ; also 11/03    Colonoscopy     C NONSPECIFIC PROCEDURE      Basal cell cancer of the nose     C NONSPECIFIC PROCEDURE  1990    Cholecystectomy     CARDIOVERSION  9/6/11    failed       SOCIAL HISTORY:  Social History     Social History     Marital status:      Spouse name: N/A     Number of children: N/A     Years of education: N/A     Occupational History           Semi-retired     Social History Main Topics     Smoking status: Never Smoker     Smokeless tobacco: Never Used     Alcohol use 0.0 oz/week     0 Standard drinks or equivalent per week      Comment: 1 glass of wine every day     Drug use: No     Sexual activity: No     Other Topics Concern     Caffeine Concern No     14 oz per day     Sleep Concern No     sometimes - nocturia 4 times per night     Special Diet No     Exercise Yes     treadmill/walking 15-20 minutes 6 days per week     Social History Narrative       FAMILY HISTORY:  Family History   Problem Relation Age of Onset     Alzheimer Disease Maternal Grandmother      Arthritis Maternal Grandmother       Cancer Mother      breast/ 1983/colon     Eye Disorder Mother      glaucoma and cataracts     HEART DISEASE Mother      angina/CABG     Hypertension Mother      Cancer Father      colon     Diabetes Maternal Aunt      AoDM       MEDS:   Current Outpatient Prescriptions on File Prior to Visit:  Acetaminophen (TYLENOL PO) Take 1,000 mg by mouth 3 times daily   cholestyramine (QUESTRAN) 4 G Packet Take 1 packet by mouth every evening   Chromium 1000 MCG TABS Take 500 mcg by mouth daily   diltiazem (CARDIZEM CD/CARTIA XT) 120 MG 24 hr capsule Take 1 capsule (120 mg) by mouth every evening   ferrous gluconate (FERGON) 324 (38 FE) MG tablet TAKE 1 TABLET (324 MG) BY MOUTH DAILY (WITH BREAKFAST)   fish oil-omega-3 fatty acids (FISH OIL) 1000 MG capsule Take 1 g by mouth 2 times daily    glipiZIDE (GLIPIZIDE XL) 10 MG 24 hr tablet Take 1 tablet (10 mg) by mouth 2 times daily   loperamide (IMODIUM A-D) 2 MG tablet daily    metFORMIN (GLUCOPHAGE) 500 MG tablet Take 1 tablet (500 mg) by mouth 2 times daily (with meals)   multivitamin, therapeutic (THERA-VIT) TABS tablet Take 0.5 tablets by mouth 2 times daily   ONETOUCH ULTRA test strip USE TO TEST DAILY   pioglitazone (ACTOS) 45 MG tablet TAKE 1 TABLET (45 MG) BY MOUTH DAILY   Probiotic Product (PROBIOTIC DAILY) CAPS    warfarin (COUMADIN) 5 MG tablet TAKE 1 TABLET BY MOUTH TUESDAY,THURS AND SUN. TAKE 1/2 TABLET ON MON,WED,FRI AND SAT. OR AS DIRECTED   ACE NOT PRESCRIBED, INTENTIONAL, 1 each daily ACE Inhibitor not prescribed due to Risk for drug interaction (Patient not taking: Reported on 8/3/2018)   ASPIRIN NOT PRESCRIBED, INTENTIONAL, by Other route continuous prn Reported on 3/7/2017   polyethylene glycol (MIRALAX/GLYCOLAX) Packet Take 17 g by mouth daily as needed for constipation (Patient not taking: Reported on 8/3/2018)   senna-docusate (SENOKOT-S;PERICOLACE) 8.6-50 MG per tablet Take 1 tablet by mouth 2 times daily as needed for constipation (Patient not  "taking: Reported on 8/3/2018)   STATIN NOT PRESCRIBED, INTENTIONAL, 1 each At Bedtime Statin not prescribed intentionally due to Risk for drug interaction (Patient not taking: Reported on 8/3/2018)   tamsulosin (FLOMAX) 0.4 MG capsule Take 1 capsule (0.4 mg) by mouth daily (Patient not taking: Reported on 6/22/2018)     No current facility-administered medications on file prior to visit.     ALLERGIES:   Allergies   Allergen Reactions     No Known Drug Allergies        PHYSICAL EXAM:  Vitals: /74 (BP Location: Right arm, Patient Position: Chair, Cuff Size: Adult Regular)  Pulse 80  Ht 1.803 m (5' 11\")  Wt 84.1 kg (185 lb 6.4 oz)  SpO2 97%  BMI 25.86 kg/m2  Constitutional:  cooperative, alert and oriented, well developed, well nourished, in no acute distress        Skin:  warm and dry to the touch, no apparent skin lesions or masses noted        Head:  normocephalic, no masses or lesions        Eyes:  pupils equal and round;conjunctivae and lids unremarkable;sclera white;no xanthalasma        ENT:  no pallor or cyanosis, dentition good        Neck:  JVP normal        Respiratory:  normal breath sounds, clear to auscultation, normal A-P diameter, normal symmetry, normal respiratory excursion, no use of accessory muscles        Cardiac:   irregularly irregular rhythm         grade 1;early diastolic murmur good rate control, frequent ectopy    GI:  abdomen soft;BS normoactive        Vascular:                                        Extremities and Musculoskeletal:  no deformities, clubbing, cyanosis, erythema observed stasis pigmentation      Neurological:  no gross motor deficits;affect appropriate          LABS/DATA:  I reviewed the following:  None today    ASSESSMENT/PLAN:  Chronic afib  - Rate controlled on dilt cd 120 mg  - On warfarin    QUESADA  - Will get echo, treadmill nuc stress test    Exertional leg heaviness  Varicose veins  Telangiectasias  - Will get exercise ABIs and venous comp study      Follow " up after tests to review results      Susanna Haque PA-C    Thank you for allowing me to participate in the care of your patient.      Sincerely,     Susanna Haque PA-C     [facility]    cc:   Herbert Gleason MD  [address]  [place] [address]

## 2018-08-03 NOTE — PROGRESS NOTES
"CARDIOLOGY CLINIC PROGRESS NOTE    DOS: 2018      Louis Keller Jr.  : 1932, 86 year old  MRN: [id number]      History: I had the pleasure of meeting Louis Keller Jr. Today in the cardiology clinic.  He is a patient of Dr. Gleason.  He was accompanied by his wife, Laura (also see myself and Dr. Gleason).    Louis is a pleasant 86 year old man with history of chronic atrial fibrillation, DM2.  He failed cardioversion in the past.  He is maintained on rate control strategy and anticoagulation.  He has had negative stress testing previously in ,  and .      He saw Dr. Gleason 18.  He had some QUESADA and R>L LE exertional heaviness.   He planned to increase walking, and follow up in 3 months with the plan that if his sxs did not imporve, we could reat an echo, obtain a treadmill nuclear stress test, and ABIs.     Interval History:   He presents today for follow up.   He says his symptoms are about the same.   He had a shooting ankle pain about 10 days ago and did fall. He is still walking 30 minutes a day but using a cane now.   He has some varicose veins and some \"spider veins\" (telangiectasias).     ROS:  Skin:  Positive for lumps or bumps;bruising   Eyes:  Positive for glasses;visual blurring  ENT:  Positive for hearing loss  Respiratory:  Positive for dyspnea on exertion  Cardiovascular:    Positive for;edema;fatigue  Gastroenterology: Positive for diarrhea  Genitourinary:  Positive for urinary frequency;nocturia;prostate problem  Musculoskeletal:  Positive for back pain;arthritis  Neurologic:  Positive for numbness or tingling of feet  Psychiatric:  not assessed    Heme/Lymph/Imm:  Positive for    Endocrine:  Positive for diabetes    PAST MEDICAL HISTORY:  Past Medical History:   Diagnosis Date     Antiplatelet or antithrombotic long-term use      Atrial fibrillation (H)      Diarrhea      Diastolic murmur      QUESADA (dyspnea on exertion)      Gout      Hemorrhage of gastrointestinal tract, " unspecified 63,65,and 1971    hospitalized     History of blood transfusion      Hyperlipidemia LDL goal <100 2012     Long-term (current) use of anticoagulants [Z79.01] 3/31/2016     Mumps      Palpitations      Physical deconditioning 2013     Scarlet fever      Spider veins      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     Unspecified disease of pancreas     pancreatitis       PAST SURGICAL HISTORY:  Past Surgical History:   Procedure Laterality Date     ARTHROPLASTY KNEE  2013    Procedure: ARTHROPLASTY KNEE;  Left Total Knee Arthroplasty       C NONSPECIFIC PROCEDURE  Child    T&A     C NONSPECIFIC PROCEDURE  ; also     Colonoscopy     C NONSPECIFIC PROCEDURE      Basal cell cancer of the nose     C NONSPECIFIC PROCEDURE      Cholecystectomy     CARDIOVERSION  11    failed       SOCIAL HISTORY:  Social History     Social History     Marital status:      Spouse name: N/A     Number of children: N/A     Years of education: N/A     Occupational History           Semi-retired     Social History Main Topics     Smoking status: Never Smoker     Smokeless tobacco: Never Used     Alcohol use 0.0 oz/week     0 Standard drinks or equivalent per week      Comment: 1 glass of wine every day     Drug use: No     Sexual activity: No     Other Topics Concern     Caffeine Concern No     14 oz per day     Sleep Concern No     sometimes - nocturia 4 times per night     Special Diet No     Exercise Yes     treadmill/walking 15-20 minutes 6 days per week     Social History Narrative       FAMILY HISTORY:  Family History   Problem Relation Age of Onset     Alzheimer Disease Maternal Grandmother      Arthritis Maternal Grandmother      Cancer Mother      breast/ 1983/colon     Eye Disorder Mother      glaucoma and cataracts     HEART DISEASE Mother      angina/CABG     Hypertension Mother      Cancer Father      colon     Diabetes Maternal Aunt      AoDM        MEDS:   Current Outpatient Prescriptions on File Prior to Visit:  Acetaminophen (TYLENOL PO) Take 1,000 mg by mouth 3 times daily   cholestyramine (QUESTRAN) 4 G Packet Take 1 packet by mouth every evening   Chromium 1000 MCG TABS Take 500 mcg by mouth daily   diltiazem (CARDIZEM CD/CARTIA XT) 120 MG 24 hr capsule Take 1 capsule (120 mg) by mouth every evening   ferrous gluconate (FERGON) 324 (38 FE) MG tablet TAKE 1 TABLET (324 MG) BY MOUTH DAILY (WITH BREAKFAST)   fish oil-omega-3 fatty acids (FISH OIL) 1000 MG capsule Take 1 g by mouth 2 times daily    glipiZIDE (GLIPIZIDE XL) 10 MG 24 hr tablet Take 1 tablet (10 mg) by mouth 2 times daily   loperamide (IMODIUM A-D) 2 MG tablet daily    metFORMIN (GLUCOPHAGE) 500 MG tablet Take 1 tablet (500 mg) by mouth 2 times daily (with meals)   multivitamin, therapeutic (THERA-VIT) TABS tablet Take 0.5 tablets by mouth 2 times daily   ONETOUCH ULTRA test strip USE TO TEST DAILY   pioglitazone (ACTOS) 45 MG tablet TAKE 1 TABLET (45 MG) BY MOUTH DAILY   Probiotic Product (PROBIOTIC DAILY) CAPS    warfarin (COUMADIN) 5 MG tablet TAKE 1 TABLET BY MOUTH TUESDAY,THURS AND SUN. TAKE 1/2 TABLET ON MON,WED,FRI AND SAT. OR AS DIRECTED   ACE NOT PRESCRIBED, INTENTIONAL, 1 each daily ACE Inhibitor not prescribed due to Risk for drug interaction (Patient not taking: Reported on 8/3/2018)   ASPIRIN NOT PRESCRIBED, INTENTIONAL, by Other route continuous prn Reported on 3/7/2017   polyethylene glycol (MIRALAX/GLYCOLAX) Packet Take 17 g by mouth daily as needed for constipation (Patient not taking: Reported on 8/3/2018)   senna-docusate (SENOKOT-S;PERICOLACE) 8.6-50 MG per tablet Take 1 tablet by mouth 2 times daily as needed for constipation (Patient not taking: Reported on 8/3/2018)   STATIN NOT PRESCRIBED, INTENTIONAL, 1 each At Bedtime Statin not prescribed intentionally due to Risk for drug interaction (Patient not taking: Reported on 8/3/2018)   tamsulosin (FLOMAX) 0.4 MG capsule  "Take 1 capsule (0.4 mg) by mouth daily (Patient not taking: Reported on 6/22/2018)     No current facility-administered medications on file prior to visit.     ALLERGIES:   Allergies   Allergen Reactions     No Known Drug Allergies        PHYSICAL EXAM:  Vitals: /74 (BP Location: Right arm, Patient Position: Chair, Cuff Size: Adult Regular)  Pulse 80  Ht 1.803 m (5' 11\")  Wt 84.1 kg (185 lb 6.4 oz)  SpO2 97%  BMI 25.86 kg/m2  Constitutional:  cooperative, alert and oriented, well developed, well nourished, in no acute distress        Skin:  warm and dry to the touch, no apparent skin lesions or masses noted        Head:  normocephalic, no masses or lesions        Eyes:  pupils equal and round;conjunctivae and lids unremarkable;sclera white;no xanthalasma        ENT:  no pallor or cyanosis, dentition good        Neck:  JVP normal        Respiratory:  normal breath sounds, clear to auscultation, normal A-P diameter, normal symmetry, normal respiratory excursion, no use of accessory muscles        Cardiac:   irregularly irregular rhythm         grade 1;early diastolic murmur good rate control, frequent ectopy    GI:  abdomen soft;BS normoactive        Vascular:                                        Extremities and Musculoskeletal:  no deformities, clubbing, cyanosis, erythema observed stasis pigmentation      Neurological:  no gross motor deficits;affect appropriate          LABS/DATA:  I reviewed the following:  None today    ASSESSMENT/PLAN:  Chronic afib  - Rate controlled on dilt cd 120 mg  - On warfarin    QUESADA  - Will get echo, treadmill nuc stress test    Exertional leg heaviness  Varicose veins  Telangiectasias  - Will get exercise ABIs and venous comp study      Follow up after tests to review results      Susanna Haque PA-C  "

## 2018-08-03 NOTE — MR AVS SNAPSHOT
After Visit Summary   8/3/2018    Louis Keller Jr.    MRN: [id number]           Patient Information     Date Of Birth          6/26/1932        Visit Information        Provider Department      8/3/2018 1:10 PM Susanna Haque PA-C [facility]        Today's Diagnoses     Chronic atrial fibrillation (H)        QUESADA (dyspnea on exertion)        Exercise-induced leg fatigue           Follow-ups after your visit        Additional Services     Follow-Up with Cardiac Advanced Practice Provider                 Your next 10 appointments already scheduled     Aug 06, 2018 11:15 AM CDT   Anticoagulation Visit with CR ANTICOAGULATION CLINIC   [facility] ([facility])    [address] [address] [phone]            Aug 07, 2018  8:00 AM CDT   NM MPI TREADMILL with RHNM1   [facility] ([facility])    [address] [facility] [id number] [phone]           For a ONE day exam: Allow 3-4 hours for test. For a TWO day exam: Allow  minutes PER day for test.  On the day of your resting scan: Please stop eating 3 hours before the test. You may drink water or juice.  On the day of your stress test: Stop all caffeine 12 hours before the test. This includes coffee, tea, soda pop, chocolate and certain medicines (such as Anacin, Excedrin and NoDoz). Also avoid decaf coffee and tea, as these contain small amounts of caffeine.  Stop eating 3 hours before the test. You may drink water.  You may need to stop some medicines before the test. Follow your doctor s orders. - If you take a beta blocker: Do not take your beta-blocker on the day before your test, unless specifically told to by your doctor. And do not take it on the day of your test. Bring it with you to take after the test. - If you take Aggrenox or dipyridamole (Persantine, Permole), stop taking it  48 hours before your test. - If you take Viagra, Cialis or Levitra, stop taking it 48 hours before your test. - If you take theophylline or aminophylline, stop taking it 12 hours before your test.  Do not take nitrates on the day of your test. Do not wear your Nitro-Patch.  Please wear a loose two-piece outfit. If you will have an exercise test, bring rubber-soled walking shoes.  When you arrive, please tell us if you have a pacemaker or ICD (implantable defibrillator).  Please call your Imaging Department at your exam site with any questions.            Aug 07, 2018  9:30 AM CDT   Ekg Stress Nm Exercise with RESRM3   [facility] ([facility])    [address]  [facility] [id number] [phone]            Aug 10, 2018  1:00 PM CDT   US LIZ DOPPLER WITH EXERCISE BILATERAL with RHECHVUS   [facility] ([facility])    [address]  [facility] [address] [phone]           Please bring a list of your medicines (including vitamins, minerals and over-the-counter drugs). Also, tell your doctor about any allergies you may have. Wear comfortable clothes and leave your valuables at home.  No caffeine or tobacco for 1 hour prior to exam.  Please call the Imaging Department at your exam site with any questions.            Aug 10, 2018  2:45 PM CDT   Ech Complete with [email or web address]   [facility] ([facility])    [address]  [facility] [address] [phone]           1.  Please bring or wear a comfortable two-piece outfit. 2.  You may eat, drink and take your normal medicines. 3.  For any questions that cannot be answered, please contact the ordering physician 4.  Please do not wear perfumes or scented lotions on the day of your exam. ***Please check-in at the [facility] Office located in [address] in the [facility] building.  When you are finished registering, please go to [address] and have a seat. The technician will call your name for the test.            Aug 21, 2018  1:00 PM CDT   US LOWER EXTREMITY VENOUS COMPETENCY BILATERAL with JEANNETTETAMIKO   [facility] ([facility])    [address]  [facility] [address]   [phone]           Please bring a list of your medicines (including vitamins, minerals and over-the-counter drugs). Also, tell your doctor about any allergies you may have. Wear comfortable clothes and leave your valuables at home.  You do not need to do anything special to prepare for your exam.  Please call the Imaging Department at your exam site with any questions.            Aug 30, 2018  2:30 PM CDT   Return Visit with Susanna Haque PA-C   [facility] ([facility])    [address]  [facility] [address]   [phone]            Sep 21, 2018 11:00 AM CDT   PHYSICAL with Joselito Armas MD   [facility] ([facility])    [address]  [facility] [address] [phone]              Future tests that were ordered for you today     Open Future Orders        Priority Expected Expires Ordered    US Venous Competency Bilateral Routine 8/10/2018 8/3/2019 8/3/2018    Follow-Up with Cardiac Advanced Practice Provider Routine 8/30/2018 8/3/2019 8/3/2018    Echocardiogram Complete Routine 8/10/2018 8/3/2019 8/3/2018    US LIZ Doppler with Exercise Bilateral Routine 8/10/2018 8/3/2019 8/3/2018    NM Exercise stress test (nuc card) Routine 8/10/2018 8/3/2019 8/3/2018            Who to contact     If you have questions or need follow up information about today's clinic visit or your schedule please contact [facility] directly at [phone].  Normal or non-critical lab and imaging results will be  "communicated to you by MyChart, letter or phone within 4 business days after the clinic has received the results. If you do not hear from us within 7 days, please contact the clinic through MyChart or phone. If you have a critical or abnormal lab result, we will notify you by phone as soon as possible.  Submit refill requests through [facility] or call your pharmacy and they will forward the refill request to us. Please allow 3 business days for your refill to be completed.          Additional Information About Your Visit        Care EveryWhere ID     This is your Care EveryWhere ID. This could be used by other organizations to access your [facility] medical records  [id number]        Your Vitals Were     Pulse Height Pulse Oximetry BMI (Body Mass Index)          80 1.803 m (5' 11\") 97% 25.86 kg/m2         Blood Pressure from Last 3 Encounters:   08/03/18 126/74   06/22/18 138/70   05/03/18 124/64    Weight from Last 3 Encounters:   08/03/18 84.1 kg (185 lb 6.4 oz)   06/22/18 83 kg (183 lb)   05/03/18 83.9 kg (185 lb)              We Performed the Following     Follow-Up with Cardiac Advanced Practice Provider        Primary Care Provider Office Phone # Fax #    Joselito Armas [phone]       [address] [facility] [address]        Equal Access to Services     SABRA LEAL : Hadii aad ku hadasho Soomaali, waaxda luqadaha, qaybta kaalmada adeegyada, emanuel kelsey . So [facility] [phone].    ATENCIÓN: Si habla español, tiene a cao disposición servicios gratuitos de asistencia lingüística. Jose L al [phone].    We comply with applicable federal civil rights laws and [place] laws. We do not discriminate on the basis of race, color, national origin, age, disability, sex, sexual orientation, or gender identity.            Thank you!     Thank you for choosing [facility]  for your care. Our goal is always to provide you " with excellent care. Hearing back from our patients is one way we can continue to improve our services. Please take a few minutes to complete the written survey that you may receive in the mail after your visit with us. Thank you!             Your Updated Medication List - Protect others around you: Learn how to safely use, store and throw away your medicines at www.disposemymeds.org.          This list is accurate as of 8/3/18  2:10 PM.  Always use your most recent med list.                   Brand Name Dispense Instructions for use Diagnosis    ACE NOT PRESCRIBED (INTENTIONAL)     0 each    1 each daily ACE Inhibitor not prescribed due to Risk for drug interaction    Type 2 diabetes, HbA1C goal < 8% (H)       ASPIRIN NOT PRESCRIBED    INTENTIONAL     by Other route continuous prn Reported on 3/7/2017        cholestyramine 4 g Packet    QUESTRAN     Take 1 packet by mouth every evening        Chromium 1000 MCG Tabs      Take 500 mcg by mouth daily        diltiazem 120 MG 24 hr capsule    CARDIZEM CD/CARTIA XT    90 capsule    Take 1 capsule (120 mg) by mouth every evening    Chronic atrial fibrillation (H)       ferrous gluconate 324 (38 Fe) MG tablet    FERGON    100 tablet    TAKE 1 TABLET (324 MG) BY MOUTH DAILY (WITH BREAKFAST)    Anemia due to blood loss, acute       fish oil-omega-3 fatty acids 1000 MG capsule      Take 1 g by mouth 2 times daily        glipiZIDE 10 MG 24 hr tablet    glipiZIDE XL    180 tablet    Take 1 tablet (10 mg) by mouth 2 times daily    Chronic atrial fibrillation (H), Actinic keratosis       loperamide 2 MG tablet    IMODIUM A-D    30 tablet    daily        metFORMIN 500 MG tablet    GLUCOPHAGE    180 tablet    Take 1 tablet (500 mg) by mouth 2 times daily (with meals)    Type 2 diabetes mellitus with stage 3 chronic kidney disease, without long-term current use of insulin (H)       multivitamin, therapeutic Tabs tablet      Take 0.5 tablets by mouth 2 times daily        ONETOUCH  ULTRA test strip   Generic drug:  blood glucose monitoring     100 strip    USE TO TEST DAILY    Type 2 diabetes mellitus with stage 3 chronic kidney disease (H)       pioglitazone 45 MG tablet    ACTOS    90 tablet    TAKE 1 TABLET (45 MG) BY MOUTH DAILY    Type 2 diabetes mellitus with stage 3 chronic kidney disease, without long-term current use of insulin (H)       polyethylene glycol Packet    MIRALAX/GLYCOLAX    7 packet    Take 17 g by mouth daily as needed for constipation    Constipation, unspecified constipation type       PROBIOTIC DAILY Caps           senna-docusate 8.6-50 MG per tablet    SENOKOT-S;PERICOLACE    100 tablet    Take 1 tablet by mouth 2 times daily as needed for constipation    Constipation, unspecified constipation type       STATIN NOT PRESCRIBED (INTENTIONAL)     0 each    1 each At Bedtime Statin not prescribed intentionally due to Risk for drug interaction    Type 2 diabetes, HbA1C goal < 8% (H)       tamsulosin 0.4 MG capsule    FLOMAX    30 capsule    Take 1 capsule (0.4 mg) by mouth daily    Urinary frequency       TYLENOL PO      Take 1,000 mg by mouth 3 times daily        warfarin 5 MG tablet    COUMADIN    60 tablet    TAKE 1 TABLET BY MOUTH TUESDAY,THURS AND SUN. TAKE 1/2 TABLET ON MON,WED,FRI AND SAT. OR AS DIRECTED    Heart palpitations, New onset atrial fibrillation (H), Chronic anticoagulation

## 2018-08-06 ENCOUNTER — ANTICOAGULATION THERAPY VISIT (OUTPATIENT)
Dept: NURSING | Facility: CLINIC | Age: 83
End: 2018-08-06
Payer: COMMERCIAL

## 2018-08-06 DIAGNOSIS — I48.20 CHRONIC ATRIAL FIBRILLATION (H): ICD-10-CM

## 2018-08-06 DIAGNOSIS — Z79.01 LONG-TERM (CURRENT) USE OF ANTICOAGULANTS: ICD-10-CM

## 2018-08-06 LAB — INR POINT OF CARE: 2.6 (ref 0.86–1.14)

## 2018-08-06 PROCEDURE — 85610 PROTHROMBIN TIME: CPT | Mod: QW

## 2018-08-06 PROCEDURE — 36416 COLLJ CAPILLARY BLOOD SPEC: CPT

## 2018-08-06 PROCEDURE — 99207 ZZC NO CHARGE NURSE ONLY: CPT

## 2018-08-06 NOTE — PROGRESS NOTES
"  ANTICOAGULATION FOLLOW-UP CLINIC VISIT    Patient Name:  Louis Keller Jr.  Date:  8/6/2018  Contact Type:  Face to Face    SUBJECTIVE:     Patient Findings     Positives Dental/Other procedures (Scheduled for nuclear cardiac stress test on 08/07/18), Bruising (few small bruises on L arm), Other complaints (Fell at home about 1 week ago, he fell on soft carpet and denies hitting his head)    Comments Patient also c/o leg \"heaviness\", he is scheduled to see vascular provider on 08/21/18.           OBJECTIVE    INR Protime   Date Value Ref Range Status   08/06/2018 2.6 (A) 0.86 - 1.14 Final       ASSESSMENT / PLAN  INR assessment THER    Recheck INR In: 4 WEEKS    INR Location Clinic      Anticoagulation Summary as of 8/6/2018     INR goal 2.0-3.0   Today's INR 2.6   Warfarin maintenance plan 5 mg (5 mg x 1) on Sun, Tue, Thu; 2.5 mg (5 mg x 0.5) all other days   Full warfarin instructions 5 mg on Sun, Tue, Thu; 2.5 mg all other days   Weekly warfarin total 25 mg   No change documented Hope Baumann RN   Plan last modified Monica Souza RN (5/12/2016)   Next INR check 9/4/2018   Priority INR   Target end date     Indications   Long-term (current) use of anticoagulants [Z79.01] [Z79.01]  Atrial fibrillation (H) [I48.91]         Anticoagulation Episode Summary     INR check location     Preferred lab     Send INR reminders to [facility]    Comments likes calendar printout.        Anticoagulation Care Providers     Provider Role Specialty Phone number    Joselito Armas MD [facility] Internal Medicine [phone]            See the Encounter Report to view Anticoagulation Flowsheet and Dosing Calendar (Go to Encounters tab in chart review, and find the Anticoagulation Therapy Visit)        Hope Baumann RN               "

## 2018-08-06 NOTE — MR AVS SNAPSHOT
Louis Keller Jr.   8/6/2018 11:15 AM   Anticoagulation Therapy Visit    Description:  86 year old male   Provider:  CR ANTICOAGULATION CLINIC   Department:  Cr Nurse           INR as of 8/6/2018     Today's INR 2.6      Anticoagulation Summary as of 8/6/2018     INR goal 2.0-3.0   Today's INR 2.6   Full warfarin instructions 5 mg on Sun, Tue, Thu; 2.5 mg all other days   Next INR check 9/4/2018    Indications   Long-term (current) use of anticoagulants [Z79.01] [Z79.01]  Atrial fibrillation (H) [I48.91]         Your next Anticoagulation Clinic appointment(s)     Sep 04, 2018 11:15 AM CDT   Anticoagulation Visit with CR ANTICOAGULATION CLINIC   [facility] ([facility]    [address] [place] [id number] [phone]              Contact Numbers     Clinic Number:         August 2018 Details    Sun Mon Tue Wed Thu Fri Sat        1               2               3               4                 5               6      2.5 mg   See details      7      5 mg         8      2.5 mg         9      5 mg         10      2.5 mg         11      2.5 mg           12      5 mg         13      2.5 mg         14      5 mg         15      2.5 mg         16      5 mg         17      2.5 mg         18      2.5 mg           19      5 mg         20      2.5 mg         21      5 mg         22      2.5 mg         23      5 mg         24      2.5 mg         25      2.5 mg           26      5 mg         27      2.5 mg         28      5 mg         29      2.5 mg         30      5 mg         31      2.5 mg           Date Details   08/06 This INR check               How to take your warfarin dose     To take:  2.5 mg Take 0.5 of a 5 mg tablet.    To take:  5 mg Take 1 of the 5 mg tablets.           September 2018 Details    Sun Mon Tue Wed Thu Fri Sat           1      2.5 mg           2      5 mg         3      2.5 mg         4            5               6               7                8                 9               10               11               12               13               14               15                 16               17               18               19               20               21               22                 23               24               25               26               27               28               29                 30                      Date Details   No additional details    Date of next INR:  9/4/2018         How to take your warfarin dose     To take:  2.5 mg Take 0.5 of a 5 mg tablet.    To take:  5 mg Take 1 of the 5 mg tablets.

## 2018-08-07 ENCOUNTER — HOSPITAL ENCOUNTER (OUTPATIENT)
Dept: NUCLEAR MEDICINE | Facility: CLINIC | Age: 83
Setting detail: NUCLEAR MEDICINE
End: 2018-08-07
Attending: PHYSICIAN ASSISTANT
Payer: COMMERCIAL

## 2018-08-07 ENCOUNTER — HOSPITAL ENCOUNTER (OUTPATIENT)
Dept: CARDIOLOGY | Facility: CLINIC | Age: 83
Discharge: HOME | End: 2018-08-07
Attending: PHYSICIAN ASSISTANT | Admitting: PHYSICIAN ASSISTANT
Payer: COMMERCIAL

## 2018-08-07 DIAGNOSIS — R06.09 DOE (DYSPNEA ON EXERTION): ICD-10-CM

## 2018-08-07 PROCEDURE — 34300033 ZZH RX 343: Performed by: PHYSICIAN ASSISTANT

## 2018-08-07 PROCEDURE — 93018 CV STRESS TEST I&R ONLY: CPT | Performed by: INTERNAL MEDICINE

## 2018-08-07 PROCEDURE — 78452 HT MUSCLE IMAGE SPECT MULT: CPT

## 2018-08-07 PROCEDURE — A9502 TC99M TETROFOSMIN: HCPCS | Performed by: PHYSICIAN ASSISTANT

## 2018-08-07 PROCEDURE — 93017 CV STRESS TEST TRACING ONLY: CPT

## 2018-08-07 PROCEDURE — 78452 HT MUSCLE IMAGE SPECT MULT: CPT | Mod: 26 | Performed by: INTERNAL MEDICINE

## 2018-08-07 PROCEDURE — 93016 CV STRESS TEST SUPVJ ONLY: CPT | Performed by: INTERNAL MEDICINE

## 2018-08-07 RX ADMIN — TETROFOSMIN 31.5 MCI.: 1.38 INJECTION, POWDER, LYOPHILIZED, FOR SOLUTION INTRAVENOUS at 09:15

## 2018-08-07 RX ADMIN — TETROFOSMIN 10.5 MCI.: 1.38 INJECTION, POWDER, LYOPHILIZED, FOR SOLUTION INTRAVENOUS at 08:11

## 2018-08-10 ENCOUNTER — HOSPITAL ENCOUNTER (OUTPATIENT)
Dept: CARDIOLOGY | Facility: CLINIC | Age: 83
Discharge: HOME | End: 2018-08-10
Attending: PHYSICIAN ASSISTANT | Admitting: PHYSICIAN ASSISTANT
Payer: COMMERCIAL

## 2018-08-10 DIAGNOSIS — R06.09 DOE (DYSPNEA ON EXERTION): ICD-10-CM

## 2018-08-10 DIAGNOSIS — M62.89 EXERCISE-INDUCED LEG FATIGUE: ICD-10-CM

## 2018-08-10 PROCEDURE — 93924 LWR XTR VASC STDY BILAT: CPT

## 2018-08-10 PROCEDURE — 93306 TTE W/DOPPLER COMPLETE: CPT | Mod: 26 | Performed by: INTERNAL MEDICINE

## 2018-08-10 PROCEDURE — 93924 LWR XTR VASC STDY BILAT: CPT | Mod: 26 | Performed by: INTERNAL MEDICINE

## 2018-08-10 PROCEDURE — 93306 TTE W/DOPPLER COMPLETE: CPT

## 2018-08-11 DIAGNOSIS — N18.30 TYPE 2 DIABETES MELLITUS WITH STAGE 3 CHRONIC KIDNEY DISEASE, WITHOUT LONG-TERM CURRENT USE OF INSULIN (H): ICD-10-CM

## 2018-08-11 DIAGNOSIS — E11.22 TYPE 2 DIABETES MELLITUS WITH STAGE 3 CHRONIC KIDNEY DISEASE, WITHOUT LONG-TERM CURRENT USE OF INSULIN (H): ICD-10-CM

## 2018-08-11 DIAGNOSIS — E11.9 DM TYPE 2 (DIABETES MELLITUS, TYPE 2) (H): ICD-10-CM

## 2018-08-13 NOTE — TELEPHONE ENCOUNTER
Requested Prescriptions   Pending Prescriptions Disp Refills     metFORMIN (GLUCOPHAGE) 500 MG tablet [Pharmacy Med Name: METFORMIN  MG TABLET] 180 tablet 1    Last Written Prescription Date:  12/29/201  Last Fill Quantity: 180,  # refills: 1   Last office visit: 6/22/2018 with prescribing provider:     Future Office Visit:   Next 5 appointments (look out 90 days)     Aug 30, 2018  2:30 PM CDT   Return Visit with Susanna Haque PA-C   [facility] ([facility])    [address]  [facility] [address]   [phone]            Sep 21, 2018 11:00 AM CDT   PHYSICAL with Joselito Armas MD   [facility]    [address]  [facility] [address] [phone]                Sig: TAKE 1 TABLET (500 MG) BY MOUTH 2 TIMES DAILY (WITH MEALS)    Biguanide Agents Passed    8/11/2018  4:58 PM       Passed - Blood pressure less than 140/90 in past 6 months    BP Readings from Last 3 Encounters:   08/03/18 126/74   06/22/18 138/70   05/03/18 124/64                Passed - Patient has documented LDL within the past 12 mos.    Recent Labs   Lab Test  12/27/17   1156   LDL  58            Passed - Patient has had a Microalbumin in the past 12 mos.    Recent Labs   Lab Test  12/27/17   1156   MICROL  27   UMALCR  47.56*            Passed - Patient is age 10 or older       Passed - Patient has documented A1c within the specified period of time.    If HgbA1C is 8 or greater, it needs to be on file within the past 3 months.  If less than 8, must be on file within the past 6 months.     Recent Labs   Lab Test  06/22/18   1142   A1C  7.1*            Passed - Patient's CR is NOT>1.4 OR Patient's EGFR is NOT<45 within past 12 mos.    Recent Labs   Lab Test  06/22/18   1142   GFRESTIMATED  84   GFRESTBLACK  >90       Recent Labs   Lab Test  06/22/18   1142   CR  0.86            Passed - Patient does NOT have a  "diagnosis of CHF.       Passed - Recent (6 mo) or future (30 days) visit within the authorizing provider's specialty    Patient had office visit in the last 6 months or has a visit in the next 30 days with authorizing provider or within the authorizing provider's specialty.  See \"Patient Info\" tab in inbasket, or \"Choose Columns\" in Meds & Orders section of the refill encounter.            pioglitazone (ACTOS) 45 MG tablet [Pharmacy Med Name: PIOGLITAZONE HCL 45 MG TABLET] 90 tablet 1    Last Written Prescription Date:  02/01/2018  Last Fill Quantity: 90,  # refills: 1   Last office visit: 6/22/2018 with prescribing provider:     Future Office Visit:   Next 5 appointments (look out 90 days)     Aug 30, 2018  2:30 PM CDT   Return Visit with Susanna Haque PA-C   [facility] ([facility])    [address]  [facility] [address]   [phone]            Sep 21, 2018 11:00 AM CDT   PHYSICAL with Joselito Armas MD   [facility]    [address] [facility] [address]   [phone]                Sig: TAKE 1 TABLET (45 MG) BY MOUTH DAILY    Thiazolidinedione Agents (TZDs)  Passed    8/11/2018  4:58 PM       Passed - Blood pressure less than 140/90 in past 6 months    BP Readings from Last 3 Encounters:   08/03/18 126/74   06/22/18 138/70   05/03/18 124/64                Passed - Patient has documented LDL within the past 12 mos.    Recent Labs   Lab Test  12/27/17   1156   LDL  58            Passed - Patient has a normal ALT within the past 12 mos.    Recent Labs   Lab Test  06/22/18   1142   ALT  27            Passed - Patient has a normal AST within the past 12 mos.     Recent Labs   Lab Test  06/22/18   1142   AST  34            Passed - Patient has had a Microalbumin in the past 12 mos.    Recent Labs   Lab Test  12/27/17   1156   MICROL  27   UMALCR  47.56*            Passed - Patient " "has documented A1c within the specified period of time.    If HgbA1C is 8 or greater, it needs to be on file within the past 3 months.  If less than 8, must be on file within the past 6 months.     Recent Labs   Lab Test  06/22/18   1142   A1C  7.1*            Passed - Diagnosis not CHF       Passed - Patient is age 18 or older       Passed - Patient has a normal serum Creatinine in the past 12 months    Recent Labs   Lab Test  06/22/18   1142   CR  0.86            Passed - Recent (6 mo) or future (30 days) visit within the authorizing provider's specialty    Patient had office visit in the last 6 months or has a visit in the next 30 days with authorizing provider or within the authorizing provider's specialty.  See \"Patient Info\" tab in inbasket, or \"Choose Columns\" in Meds & Orders section of the refill encounter.            "

## 2018-08-14 RX ORDER — PIOGLITAZONEHYDROCHLORIDE 45 MG/1
TABLET ORAL
Qty: 90 TABLET | Refills: 0 | Status: SHIPPED | OUTPATIENT
Start: 2018-08-14 | End: 2018-11-03

## 2018-08-21 ENCOUNTER — HOSPITAL ENCOUNTER (OUTPATIENT)
Dept: CARDIOLOGY | Facility: CLINIC | Age: 83
Discharge: HOME | End: 2018-08-21
Attending: PHYSICIAN ASSISTANT | Admitting: PHYSICIAN ASSISTANT
Payer: COMMERCIAL

## 2018-08-21 DIAGNOSIS — M62.89 EXERCISE-INDUCED LEG FATIGUE: ICD-10-CM

## 2018-08-21 PROCEDURE — 93970 EXTREMITY STUDY: CPT

## 2018-08-21 PROCEDURE — 93970 EXTREMITY STUDY: CPT | Mod: 26 | Performed by: INTERNAL MEDICINE

## 2018-08-30 ENCOUNTER — OFFICE VISIT (OUTPATIENT)
Dept: CARDIOLOGY | Facility: CLINIC | Age: 83
End: 2018-08-30
Attending: PHYSICIAN ASSISTANT
Payer: COMMERCIAL

## 2018-08-30 VITALS
DIASTOLIC BLOOD PRESSURE: 78 MMHG | OXYGEN SATURATION: 99 % | SYSTOLIC BLOOD PRESSURE: 138 MMHG | WEIGHT: 186 LBS | BODY MASS INDEX: 26.04 KG/M2 | HEIGHT: 71 IN | HEART RATE: 80 BPM

## 2018-08-30 DIAGNOSIS — R60.0 BILATERAL LEG EDEMA: ICD-10-CM

## 2018-08-30 DIAGNOSIS — I87.2 VENOUS (PERIPHERAL) INSUFFICIENCY: ICD-10-CM

## 2018-08-30 DIAGNOSIS — R06.09 DOE (DYSPNEA ON EXERTION): ICD-10-CM

## 2018-08-30 DIAGNOSIS — I48.20 CHRONIC ATRIAL FIBRILLATION (H): ICD-10-CM

## 2018-08-30 DIAGNOSIS — I51.89 DIASTOLIC DYSFUNCTION: Primary | ICD-10-CM

## 2018-08-30 DIAGNOSIS — M62.89 EXERCISE-INDUCED LEG FATIGUE: ICD-10-CM

## 2018-08-30 PROCEDURE — 99214 OFFICE O/P EST MOD 30 MIN: CPT | Performed by: PHYSICIAN ASSISTANT

## 2018-08-30 RX ORDER — FUROSEMIDE 20 MG
20 TABLET ORAL DAILY
Qty: 30 TABLET | Refills: 3 | Status: SHIPPED | OUTPATIENT
Start: 2018-08-30 | End: 2018-11-27

## 2018-08-30 NOTE — MR AVS SNAPSHOT
After Visit Summary   8/30/2018    Louis Keller Jr.    MRN: [id number]           Patient Information     Date Of Birth          6/26/1932        Visit Information        Provider Department      8/30/2018 2:30 PM Susanna Haque PA-C [facility]        Today's Diagnoses     Diastolic dysfunction    -  1    Chronic atrial fibrillation (H)        QUESADA (dyspnea on exertion)        Exercise-induced leg fatigue        Bilateral leg edema        Venous (peripheral) insufficiency          Care Instructions    Start Lasix 20 mg daily.  If you get lightheaded or dizzy, please call me.  Lab test in 1 week.     Call or see Dr. Armas about replacing Actos with another medications.     Start wearing the compression stockings.   See Dr. Gleason in about 1 month to discuss how your legs feel with the compression stockings and/or proceeding to some procedures for the legs.             Follow-ups after your visit        Additional Services     Follow-Up with Vascular Cardiologist                 Your next 10 appointments already scheduled     Sep 04, 2018 11:15 AM CDT   Anticoagulation Visit with [facility] AN[facility]AGULATION CLINIC   [facility]    [address] [place] [address] [phone]            Sep 06, 2018  1:15 PM CDT   LAB with RU LAB   [facility] [place] ([facility])    [address]  [facility] [address] [phone]           Please do not eat 10-12 hours before your appointment if you are coming in fasting for labs on lipids, cholesterol, or glucose (sugar). This does not apply to pregnant women. Water, hot tea and black coffee (with nothing added) are okay. Do not drink other fluids, diet soda or chew gum.            Sep 21, 2018 11:00 AM CDT   PHYSICAL with Joselito Armas MD   [facility] ([facility]    [address]  [facility] [address]   [phone]            Oct 03, 2018  1:15 PM CDT   New Vascular Visit with Herbert Gleason MD   [facility] ([facility])    [address]  [facility] [address] [phone]              Future tests that were ordered for you today     Open Future Orders        Priority Expected Expires Ordered    Follow-Up with Vascular Cardiologist Routine 9/27/2018 8/30/2019 8/30/2018    Basic metabolic panel Routine 9/6/2018 8/30/2019 8/30/2018            Who to contact     If you have questions or need follow up information about today's clinic visit or your schedule please contact [facility] directly at [phone].  Normal or non-critical lab and imaging results will be communicated to you by MyChart, letter or phone within 4 business days after the clinic has received the results. If you do not hear from us within 7 days, please contact the clinic through MyChart or phone. If you have a critical or abnormal lab result, we will notify you by phone as soon as possible.  Submit refill requests through [employer] or call your pharmacy and they will forward the refill request to us. Please allow 3 business days for your refill to be completed.          Additional Information About Your Visit        Care EveryWhere ID     This is your Care EveryWhere ID. This could be used by other organizations to access your [place] medical records  [id number]        Your Vitals Were     Pulse Height Pulse Oximetry BMI (Body Mass Index)          80 1.803 m (5' 11\") 99% 25.94 kg/m2         Blood Pressure from Last 3 Encounters:   08/30/18 138/78   08/03/18 126/74   06/22/18 138/70    Weight from Last 3 Encounters:   08/30/18 84.4 kg (186 lb)   08/03/18 84.1 kg (185 lb 6.4 oz)   06/22/18 83 kg (183 lb)              We Performed the Following     Follow-Up with " Cardiac Advanced Practice Provider          Today's Medication Changes          These changes are accurate as of 8/30/18  3:21 PM.  If you have any questions, ask your nurse or doctor.               Start taking these medicines.        Dose/Directions    furosemide 20 MG tablet   Commonly known as:  LASIX   Used for:  QUESADA (dyspnea on exertion), Diastolic dysfunction, Bilateral leg edema   Started by:  Susanna Haque PA-C        Dose:  20 mg   Take 1 tablet (20 mg) by mouth daily   Quantity:  30 tablet   Refills:  3            Where to get your medicines      These medications were sent to [facility] [address] [facility] - [place], [place] - [address]  [address], [facility] [address]     Phone:  [phone]     furosemide 20 MG tablet                Primary Care Provider Office Phone # Fax #    Joselito Armas [phone]       [address]  [facility] [address]        Equal Access to Services     [facility]: Hadii aad ku hadasho Soomaali, waaxda luqadaha, qaybta kaalmada adeegyada, waxay alejoin hayaan mejia kelsey . So [facility] [phone].    ATENCIÓN: Si habla español, tiene a cao disposición servicios gratuitos de asistencia lingüística. [place] [phone].    We comply with applicable federal civil rights laws and [place] laws. We do not discriminate on the basis of race, color, national origin, age, disability, sex, sexual orientation, or gender identity.            Thank you!     Thank you for choosing [facility]  for your care. Our goal is always to provide you with excellent care. Hearing back from our patients is one way we can continue to improve our services. Please take a few minutes to complete the written survey that you may receive in the mail after your visit with us. Thank you!             Your Updated Medication List - Protect others around you: Learn how to safely use, store and throw away your medicines at  www.disposemymeds.org.          This list is accurate as of 8/30/18  3:21 PM.  Always use your most recent med list.                   Brand Name Dispense Instructions for use Diagnosis    ACE NOT PRESCRIBED (INTENTIONAL)     0 each    1 each daily ACE Inhibitor not prescribed due to Risk for drug interaction    Type 2 diabetes, HbA1C goal < 8% (H)       ASPIRIN NOT PRESCRIBED    INTENTIONAL     by Other route continuous prn Reported on 3/7/2017        cholestyramine 4 g Packet    QUESTRAN     Take 1 packet by mouth every evening        Chromium 1000 MCG Tabs      Take 500 mcg by mouth daily        diltiazem 120 MG 24 hr capsule    CARDIZEM CD/CARTIA XT    90 capsule    Take 1 capsule (120 mg) by mouth every evening    Chronic atrial fibrillation (H)       ferrous gluconate 324 (38 Fe) MG tablet    FERGON    100 tablet    TAKE 1 TABLET (324 MG) BY MOUTH DAILY (WITH BREAKFAST)    Anemia due to blood loss, acute       fish oil-omega-3 fatty acids 1000 MG capsule      Take 1 g by mouth 2 times daily        furosemide 20 MG tablet    LASIX    30 tablet    Take 1 tablet (20 mg) by mouth daily    QUESADA (dyspnea on exertion), Diastolic dysfunction, Bilateral leg edema       glipiZIDE 10 MG 24 hr tablet    glipiZIDE XL    180 tablet    Take 1 tablet (10 mg) by mouth 2 times daily    Chronic atrial fibrillation (H), Actinic keratosis       loperamide 2 MG tablet    IMODIUM A-D    30 tablet    daily        * metFORMIN 500 MG tablet    GLUCOPHAGE    180 tablet    Take 1 tablet (500 mg) by mouth 2 times daily (with meals)    Type 2 diabetes mellitus with stage 3 chronic kidney disease, without long-term current use of insulin (H)       * metFORMIN 500 MG tablet    GLUCOPHAGE    180 tablet    TAKE 1 TABLET (500 MG) BY MOUTH 2 TIMES DAILY (WITH MEALS)    DM type 2 (diabetes mellitus, type 2) (H)       multivitamin, therapeutic Tabs tablet      Take 0.5 tablets by mouth 2 times daily        ONETOUCH ULTRA test strip   Generic drug:   blood glucose monitoring     100 strip    USE TO TEST DAILY    Type 2 diabetes mellitus with stage 3 chronic kidney disease (H)       pioglitazone 45 MG tablet    ACTOS    90 tablet    TAKE 1 TABLET (45 MG) BY MOUTH DAILY    Type 2 diabetes mellitus with stage 3 chronic kidney disease, without long-term current use of insulin (H)       polyethylene glycol Packet    MIRALAX/GLYCOLAX    7 packet    Take 17 g by mouth daily as needed for constipation    Constipation, unspecified constipation type       PROBIOTIC DAILY Caps           senna-docusate 8.6-50 MG per tablet    SENOKOT-S;PERICOLACE    100 tablet    Take 1 tablet by mouth 2 times daily as needed for constipation    Constipation, unspecified constipation type       STATIN NOT PRESCRIBED (INTENTIONAL)     0 each    1 each At Bedtime Statin not prescribed intentionally due to Risk for drug interaction    Type 2 diabetes, HbA1C goal < 8% (H)       tamsulosin 0.4 MG capsule    FLOMAX    30 capsule    Take 1 capsule (0.4 mg) by mouth daily    Urinary frequency       TYLENOL PO      Take 1,000 mg by mouth 3 times daily        warfarin 5 MG tablet    COUMADIN    60 tablet    TAKE 1 TABLET BY MOUTH TUESDAY,THURS AND SUN. TAKE 1/2 TABLET ON MON,WED,FRI AND SAT. OR AS DIRECTED    Heart palpitations, New onset atrial fibrillation (H), Chronic anticoagulation       * Notice:  This list has 2 medication(s) that are the same as other medications prescribed for you. Read the directions carefully, and ask your doctor or other care provider to review them with you.

## 2018-08-30 NOTE — PATIENT INSTRUCTIONS
Start Lasix 20 mg daily.  If you get lightheaded or dizzy, please call me.  Lab test in 1 week.     Call or see Dr. Armas about replacing Actos with another medications.     Start wearing the compression stockings.   See Dr. Gleason in about 1 month to discuss how your legs feel with the compression stockings and/or proceeding to some procedures for the legs.

## 2018-08-30 NOTE — LETTER
2018    Joselito Armas MD  [address] [facility] [address]    RE: Louis PACHECO Arianna Willis       Dear Colleague,    I had the pleasure of seeing Louis Keller JrJana in the [facility].    CARDIOLOGY CLINIC PROGRESS NOTE    DOS: 2018      Louis Keller Jr.  : 1932, 86 year old  MRN: [id number]      History: I had the pleasure of following up with Louis Keller JrJana today in the cardiology clinic.  He is a patient of Dr. Gleason.  He was accompanied by his wife, Laura (also sees myself and Dr. Gleason).    Louis is a pleasant 86 year old man with history of chronic atrial fibrillation, DM2.  He failed cardioversion in the past.  He is maintained on rate control strategy and anticoagulation.  He has had negative stress testing previously in ,  and .      He saw Dr. Gleason 18.  He had some QUESADA and R>L LE exertional heaviness.  He planned to increase walking, and follow up in 3 months with the plan that if his sxs did not imporve, we could reat an echo, obtain a treadmill nuclear stress test, and ABIs.     I saw Louis 8/3/18 in follow up.  His sxs were unchanged. Echo, treadmill nuc, ABIs and venous comp study were ordered.     Interval History:   He presents today for follow up.   He is still SOB with exertion - stairs, any increased speed.   Continues to have leg edema - it is a little worse. Left is worse than the right today. The legs feel heavy as well.  He says it feels like he has a weight strapped on.    No chest pain.       ROS:  Skin:  Positive for     Eyes:  Positive for glasses  ENT:  Positive for hearing loss  Respiratory:  Positive for dyspnea on exertion  Cardiovascular:    edema;Positive for;fatigue  Gastroenterology:   diarrhea  Genitourinary:  Positive for urinary frequency  Musculoskeletal:  Positive for arthritis  Neurologic:  Positive for numbness or tingling of feet  Psychiatric:  Positive for sleep  disturbances  Heme/Lymph/Imm:  Positive for    Endocrine:  Positive for diabetes    PAST MEDICAL HISTORY:  Past Medical History:   Diagnosis Date     Antiplatelet or antithrombotic long-term use      Atrial fibrillation (H)      Diarrhea      Diastolic murmur      QUESADA (dyspnea on exertion)      Gout      Hemorrhage of gastrointestinal tract, unspecified 63,65,and 1971    hospitalized     History of blood transfusion      Hyperlipidemia LDL goal <100 4/18/2012     Long-term (current) use of anticoagulants [Z79.01] 3/31/2016     Mumps      Palpitations      Physical deconditioning 8/9/2013     Scarlet fever      Spider veins      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     Unspecified disease of pancreas 1990    pancreatitis       PAST SURGICAL HISTORY:  Past Surgical History:   Procedure Laterality Date     ARTHROPLASTY KNEE  8/5/2013    Procedure: ARTHROPLASTY KNEE;  Left Total Knee Arthroplasty       C NONSPECIFIC PROCEDURE  Child    T&A     C NONSPECIFIC PROCEDURE  ; also 11/03    Colonoscopy     C NONSPECIFIC PROCEDURE      Basal cell cancer of the nose     C NONSPECIFIC PROCEDURE  1990    Cholecystectomy     CARDIOVERSION  9/6/11    failed       SOCIAL HISTORY:  Social History     Social History     Marital status:      Spouse name: N/A     Number of children: N/A     Years of education: N/A     Occupational History           Semi-retired     Social History Main Topics     Smoking status: Never Smoker     Smokeless tobacco: Never Used     Alcohol use 0.0 oz/week     0 Standard drinks or equivalent per week      Comment: 1 glass of wine every day     Drug use: No     Sexual activity: No     Other Topics Concern     Caffeine Concern No     14 oz per day     Sleep Concern No     sometimes - nocturia 4 times per night     Special Diet No     Exercise Yes     treadmill/walking 15-20 minutes 6 days per week     Social History Narrative       FAMILY HISTORY:  Family History    Problem Relation Age of Onset     Alzheimer Disease Maternal Grandmother      Arthritis Maternal Grandmother      Cancer Mother      breast/ 1983/colon     Eye Disorder Mother      glaucoma and cataracts     HEART DISEASE Mother      angina/CABG     Hypertension Mother      Cancer Father      colon     Diabetes Maternal Aunt      AoDM       MEDS:   Current Outpatient Prescriptions on File Prior to Visit:  ACE NOT PRESCRIBED, INTENTIONAL, 1 each daily ACE Inhibitor not prescribed due to Risk for drug interaction   Acetaminophen (TYLENOL PO) Take 1,000 mg by mouth 3 times daily   ASPIRIN NOT PRESCRIBED, INTENTIONAL, by Other route continuous prn Reported on 3/7/2017   cholestyramine (QUESTRAN) 4 G Packet Take 1 packet by mouth every evening   Chromium 1000 MCG TABS Take 500 mcg by mouth daily   diltiazem (CARDIZEM CD/CARTIA XT) 120 MG 24 hr capsule Take 1 capsule (120 mg) by mouth every evening   ferrous gluconate (FERGON) 324 (38 FE) MG tablet TAKE 1 TABLET (324 MG) BY MOUTH DAILY (WITH BREAKFAST)   fish oil-omega-3 fatty acids (FISH OIL) 1000 MG capsule Take 1 g by mouth 2 times daily    glipiZIDE (GLIPIZIDE XL) 10 MG 24 hr tablet Take 1 tablet (10 mg) by mouth 2 times daily   loperamide (IMODIUM A-D) 2 MG tablet daily    metFORMIN (GLUCOPHAGE) 500 MG tablet TAKE 1 TABLET (500 MG) BY MOUTH 2 TIMES DAILY (WITH MEALS)   metFORMIN (GLUCOPHAGE) 500 MG tablet Take 1 tablet (500 mg) by mouth 2 times daily (with meals)   multivitamin, therapeutic (THERA-VIT) TABS tablet Take 0.5 tablets by mouth 2 times daily   ONETOUCH ULTRA test strip USE TO TEST DAILY   pioglitazone (ACTOS) 45 MG tablet TAKE 1 TABLET (45 MG) BY MOUTH DAILY   Probiotic Product (PROBIOTIC DAILY) CAPS    warfarin (COUMADIN) 5 MG tablet TAKE 1 TABLET BY MOUTH TUESDAY,THURS AND SUN. TAKE 1/2 TABLET ON MON,WED,FRI AND SAT. OR AS DIRECTED   polyethylene glycol (MIRALAX/GLYCOLAX) Packet Take 17 g by mouth daily as needed for constipation (Patient not  "taking: Reported on 2018)   senna-docusate (SENOKOT-S;PERICOLACE) 8.6-50 MG per tablet Take 1 tablet by mouth 2 times daily as needed for constipation (Patient not taking: Reported on 2018)   STATIN NOT PRESCRIBED, INTENTIONAL, 1 each At Bedtime Statin not prescribed intentionally due to Risk for drug interaction (Patient not taking: Reported on 2018)   tamsulosin (FLOMAX) 0.4 MG capsule Take 1 capsule (0.4 mg) by mouth daily (Patient not taking: Reported on 2018)     No current facility-administered medications on file prior to visit.     ALLERGIES:   Allergies   Allergen Reactions     No Known Drug Allergies        PHYSICAL EXAM:  Vitals: /78 (BP Location: Right arm)  Pulse 80  Ht 1.803 m (5' 11\")  Wt 84.4 kg (186 lb)  SpO2 99%  BMI 25.94 kg/m2  Constitutional:  cooperative, alert and oriented, well developed, well nourished, in no acute distress        Skin:  warm and dry to the touch, no apparent skin lesions or masses noted        Head:  normocephalic, no masses or lesions        Eyes:  pupils equal and round;conjunctivae and lids unremarkable;sclera white;no xanthalasma        ENT:  no pallor or cyanosis, dentition good        Neck:    JVP 8-10      Respiratory:  normal breath sounds, clear to auscultation, normal A-P diameter, normal symmetry, normal respiratory excursion, no use of accessory muscles        Cardiac:   irregularly irregular rhythm         grade 1;early diastolic murmur good rate control, frequent ectopy    GI:  abdomen soft;BS normoactive        Vascular:                                        Extremities and Musculoskeletal:  no deformities, clubbing, cyanosis, erythema observed stasis pigmentation      Neurological:  no gross motor deficits;affect appropriate          LABS/DATA:  I reviewed the followin18 treadmill nuc stress test:  Impression  1.  Myocardial perfusion imaging using single isotope technique  demonstrated no significant perfusion defect " consistent with  significant ischemia or infarct. There is mild decrease in inferior  activity at the base on rest and stress images most likely consistent  with diaphragm attenuation/bowel uptake artifact..   2. Gated images demonstrated normal size left ventricle with adequate  to good overall contractility and no significant regional wall motion  abnormality.  The left ventricular systolic function is normal with  ejection fraction 57% (accuracy maybe affected by presence of atrial  fibrillation).  3. Compared to the prior study from not applicable .      8/10/18 resting and exercise ABIs:  IMPRESSION:  1. Normal resting and exercise ABIs bilaterally       8/10/18 echo:  Interpretation Summary     Left ventricular systolic function is normal.The visual ejection fraction is  estimated at 55-60%.  Diastolic Doppler findings (E/E' ratio and/or other parameters) suggest left  ventricular filling pressures are increased.  The right ventricular systolic function is normal.  Pulmonary hypertension- RVSP 40 mm hg +RA.  There is trace aortic regurgitation.  Mild aortic root and ascending aorta dilatation.      8/21/18 venous comp study:  Impression:  1. Right leg: No DVT. Severe reflux (5.2s, 4.0mm) of the proximal GSV  in the thigh and superficial tributary (5.4s, 4.7mm) in the calf as  well as severe reflux in the small saphenous vein in the calf (6.4s,  2.2mm) with resultant refluxing venous varicosities (4.5s, 2.9mm).     2. Left leg: No DVT. Severe reflux (5.0s, 2.9mm) limited to distal GSV  at the ankle.        If clinically indicated could treat:  Right leg: RF ablation of GSV, RF ablation of SSV, sclerotherapy of  superficial tributary and varicose veins with phlebectomy of varicose  veins.     Left leg: Sclerotherapy of distal GSV vs. RF ablation of GSV +  sclerotherapy.        ASSESSMENT/PLAN:  Chronic afib  - Rate controlled on dilt cd 120 mg  - On warfarin    QUESADA  - Echo shows normal LVEF, grade 3 diastolic  dysfunction  - Nuc without e/o ischemia  - Some contribution could be from the chronic afib as well  - Given the QUESADA, edema, and the echo showing grade 3 diastolic dysfunction, will trial some low dose diuretic - Lasix 20 mg daily.  BMP in 1 week.   - Will also have him see Dr. Armas to see if there is a substitute medication for Actos given his diastolic dysfunction/QUESADA/edema     LE edema  Exertional leg heaviness  Varicose veins  Telangiectasias  - Resting and exercise ABIs are normal  - Venous comp study is abnormal showing severe reflux in both right and left legs.    -- Right leg:  No DVT.  There is severe reflux (5.2s, 4.0mm) of the proximal GSV in the thigh and superficial tributary (5.4s, 4.7mm) in the calf as well as severe reflux in the small saphenous vein in the calf (6.4s, 2.2mm) with resultant refluxing venous varicosities (4.5s, 2.9mm).     --Left leg: No DVT. Severe reflux (5.0s, 2.9mm) limited to distal GSV at the ankle.  - The LE edema could be in aprt from the diastolic dysfunction, see above for management.    - But also he has severe reflux in bilateral LEs.  Will Rx compression stockings, then have him see Dr. Gleason in about 1 month to consider    -- Right leg: RF ablation of GSV, RF ablation of SSV, sclerotherapy of superficial tributary and varicose veins with phlebectomy of varicose veins.    -- Left leg: Sclerotherapy of distal GSV vs. RF ablation of GSV + sclerotherapy.        Follow up:  BMP in 1 week  Dr. Gleason in about 1 month in vein clinic  See PCP re Actos      Susanna Haque PA-C    Thank you for allowing me to participate in the care of your patient.      Sincerely,     Susanna Haque PA-C     [facility]    cc:   Susanna Haque PA-C  [address] [place]  [place] [address]

## 2018-08-30 NOTE — LETTER
2018    Joselito Armas MD  [address] [facility] [address]    RE: Louis PACHECO Arianna Willis       Dear Colleague,    I had the pleasure of seeing Louis Keller JrJana in the [facility].    CARDIOLOGY CLINIC PROGRESS NOTE    DOS: 2018      Louis Keller Jr.  : 1932, 86 year old  MRN: [id number]      History: I had the pleasure of following up with Louis Keller JrJana today in the cardiology clinic.  He is a patient of Dr. Gleason.  He was accompanied by his wife, Laura (also sees myself and Dr. Gleason).    Louis is a pleasant 86 year old man with history of chronic atrial fibrillation, DM2.  He failed cardioversion in the past.  He is maintained on rate control strategy and anticoagulation.  He has had negative stress testing previously in ,  and .      He saw Dr. Gleason 18.  He had some QUESADA and R>L LE exertional heaviness.  He planned to increase walking, and follow up in 3 months with the plan that if his sxs did not imporve, we could reat an echo, obtain a treadmill nuclear stress test, and ABIs.     I saw Louis 8/3/18 in follow up.  His sxs were unchanged. Echo, treadmill nuc, ABIs and venous comp study were ordered.     Interval History:   He presents today for follow up.   He is still SOB with exertion - stairs, any increased speed.   Continues to have leg edema - it is a little worse. Left is worse than the right today. The legs feel heavy as well.  He says it feels like he has a weight strapped on.    No chest pain.       ROS:  Skin:  Positive for     Eyes:  Positive for glasses  ENT:  Positive for hearing loss  Respiratory:  Positive for dyspnea on exertion  Cardiovascular:    edema;Positive for;fatigue  Gastroenterology:   diarrhea  Genitourinary:  Positive for urinary frequency  Musculoskeletal:  Positive for arthritis  Neurologic:  Positive for numbness or tingling of feet  Psychiatric:  Positive for sleep  disturbances  Heme/Lymph/Imm:  Positive for    Endocrine:  Positive for diabetes    PAST MEDICAL HISTORY:  Past Medical History:   Diagnosis Date     Antiplatelet or antithrombotic long-term use      Atrial fibrillation (H)      Diarrhea      Diastolic murmur      QUESADA (dyspnea on exertion)      Gout      Hemorrhage of gastrointestinal tract, unspecified 63,65,and 1971    hospitalized     History of blood transfusion      Hyperlipidemia LDL goal <100 4/18/2012     Long-term (current) use of anticoagulants [Z79.01] 3/31/2016     Mumps      Palpitations      Physical deconditioning 8/9/2013     Scarlet fever      Spider veins      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     Unspecified disease of pancreas 1990    pancreatitis       PAST SURGICAL HISTORY:  Past Surgical History:   Procedure Laterality Date     ARTHROPLASTY KNEE  8/5/2013    Procedure: ARTHROPLASTY KNEE;  Left Total Knee Arthroplasty       C NONSPECIFIC PROCEDURE  Child    T&A     C NONSPECIFIC PROCEDURE  ; also 11/03    Colonoscopy     C NONSPECIFIC PROCEDURE      Basal cell cancer of the nose     C NONSPECIFIC PROCEDURE  1990    Cholecystectomy     CARDIOVERSION  9/6/11    failed       SOCIAL HISTORY:  Social History     Social History     Marital status:      Spouse name: N/A     Number of children: N/A     Years of education: N/A     Occupational History           Semi-retired     Social History Main Topics     Smoking status: Never Smoker     Smokeless tobacco: Never Used     Alcohol use 0.0 oz/week     0 Standard drinks or equivalent per week      Comment: 1 glass of wine every day     Drug use: No     Sexual activity: No     Other Topics Concern     Caffeine Concern No     14 oz per day     Sleep Concern No     sometimes - nocturia 4 times per night     Special Diet No     Exercise Yes     treadmill/walking 15-20 minutes 6 days per week     Social History Narrative       FAMILY HISTORY:  Family History    Problem Relation Age of Onset     Alzheimer Disease Maternal Grandmother      Arthritis Maternal Grandmother      Cancer Mother      breast/ 1983/colon     Eye Disorder Mother      glaucoma and cataracts     HEART DISEASE Mother      angina/CABG     Hypertension Mother      Cancer Father      colon     Diabetes Maternal Aunt      AoDM       MEDS:   Current Outpatient Prescriptions on File Prior to Visit:  ACE NOT PRESCRIBED, INTENTIONAL, 1 each daily ACE Inhibitor not prescribed due to Risk for drug interaction   Acetaminophen (TYLENOL PO) Take 1,000 mg by mouth 3 times daily   ASPIRIN NOT PRESCRIBED, INTENTIONAL, by Other route continuous prn Reported on 3/7/2017   cholestyramine (QUESTRAN) 4 G Packet Take 1 packet by mouth every evening   Chromium 1000 MCG TABS Take 500 mcg by mouth daily   diltiazem (CARDIZEM CD/CARTIA XT) 120 MG 24 hr capsule Take 1 capsule (120 mg) by mouth every evening   ferrous gluconate (FERGON) 324 (38 FE) MG tablet TAKE 1 TABLET (324 MG) BY MOUTH DAILY (WITH BREAKFAST)   fish oil-omega-3 fatty acids (FISH OIL) 1000 MG capsule Take 1 g by mouth 2 times daily    glipiZIDE (GLIPIZIDE XL) 10 MG 24 hr tablet Take 1 tablet (10 mg) by mouth 2 times daily   loperamide (IMODIUM A-D) 2 MG tablet daily    metFORMIN (GLUCOPHAGE) 500 MG tablet TAKE 1 TABLET (500 MG) BY MOUTH 2 TIMES DAILY (WITH MEALS)   metFORMIN (GLUCOPHAGE) 500 MG tablet Take 1 tablet (500 mg) by mouth 2 times daily (with meals)   multivitamin, therapeutic (THERA-VIT) TABS tablet Take 0.5 tablets by mouth 2 times daily   ONETOUCH ULTRA test strip USE TO TEST DAILY   pioglitazone (ACTOS) 45 MG tablet TAKE 1 TABLET (45 MG) BY MOUTH DAILY   Probiotic Product (PROBIOTIC DAILY) CAPS    warfarin (COUMADIN) 5 MG tablet TAKE 1 TABLET BY MOUTH TUESDAY,THURS AND SUN. TAKE 1/2 TABLET ON MON,WED,FRI AND SAT. OR AS DIRECTED   polyethylene glycol (MIRALAX/GLYCOLAX) Packet Take 17 g by mouth daily as needed for constipation (Patient not  "taking: Reported on 2018)   senna-docusate (SENOKOT-S;PERICOLACE) 8.6-50 MG per tablet Take 1 tablet by mouth 2 times daily as needed for constipation (Patient not taking: Reported on 2018)   STATIN NOT PRESCRIBED, INTENTIONAL, 1 each At Bedtime Statin not prescribed intentionally due to Risk for drug interaction (Patient not taking: Reported on 2018)   tamsulosin (FLOMAX) 0.4 MG capsule Take 1 capsule (0.4 mg) by mouth daily (Patient not taking: Reported on 2018)     No current facility-administered medications on file prior to visit.     ALLERGIES:   Allergies   Allergen Reactions     No Known Drug Allergies        PHYSICAL EXAM:  Vitals: /78 (BP Location: Right arm)  Pulse 80  Ht 1.803 m (5' 11\")  Wt 84.4 kg (186 lb)  SpO2 99%  BMI 25.94 kg/m2  Constitutional:  cooperative, alert and oriented, well developed, well nourished, in no acute distress        Skin:  warm and dry to the touch, no apparent skin lesions or masses noted        Head:  normocephalic, no masses or lesions        Eyes:  pupils equal and round;conjunctivae and lids unremarkable;sclera white;no xanthalasma        ENT:  no pallor or cyanosis, dentition good        Neck:    JVP 8-10      Respiratory:  normal breath sounds, clear to auscultation, normal A-P diameter, normal symmetry, normal respiratory excursion, no use of accessory muscles        Cardiac:   irregularly irregular rhythm         grade 1;early diastolic murmur good rate control, frequent ectopy    GI:  abdomen soft;BS normoactive        Vascular:                                        Extremities and Musculoskeletal:  no deformities, clubbing, cyanosis, erythema observed stasis pigmentation      Neurological:  no gross motor deficits;affect appropriate          LABS/DATA:  I reviewed the followin18 treadmill nuc stress test:  Impression  1.  Myocardial perfusion imaging using single isotope technique  demonstrated no significant perfusion defect " consistent with  significant ischemia or infarct. There is mild decrease in inferior  activity at the base on rest and stress images most likely consistent  with diaphragm attenuation/bowel uptake artifact..   2. Gated images demonstrated normal size left ventricle with adequate  to good overall contractility and no significant regional wall motion  abnormality.  The left ventricular systolic function is normal with  ejection fraction 57% (accuracy maybe affected by presence of atrial  fibrillation).  3. Compared to the prior study from not applicable .      8/10/18 resting and exercise ABIs:  IMPRESSION:  1. Normal resting and exercise ABIs bilaterally       8/10/18 echo:  Interpretation Summary     Left ventricular systolic function is normal.The visual ejection fraction is  estimated at 55-60%.  Diastolic Doppler findings (E/E' ratio and/or other parameters) suggest left  ventricular filling pressures are increased.  The right ventricular systolic function is normal.  Pulmonary hypertension- RVSP 40 mm hg +RA.  There is trace aortic regurgitation.  Mild aortic root and ascending aorta dilatation.      8/21/18 venous comp study:  Impression:  1. Right leg: No DVT. Severe reflux (5.2s, 4.0mm) of the proximal GSV  in the thigh and superficial tributary (5.4s, 4.7mm) in the calf as  well as severe reflux in the small saphenous vein in the calf (6.4s,  2.2mm) with resultant refluxing venous varicosities (4.5s, 2.9mm).     2. Left leg: No DVT. Severe reflux (5.0s, 2.9mm) limited to distal GSV  at the ankle.        If clinically indicated could treat:  Right leg: RF ablation of GSV, RF ablation of SSV, sclerotherapy of  superficial tributary and varicose veins with phlebectomy of varicose  veins.     Left leg: Sclerotherapy of distal GSV vs. RF ablation of GSV +  sclerotherapy.        ASSESSMENT/PLAN:  Chronic afib  - Rate controlled on dilt cd 120 mg  - On warfarin    QUESADA  - Echo shows normal LVEF, grade 3 diastolic  dysfunction  - Nuc without e/o ischemia  - Some contribution could be from the chronic afib as well  - Given the QUESADA, edema, and the echo showing grade 3 diastolic dysfunction, will trial some low dose diuretic - Lasix 20 mg daily.  BMP in 1 week.   - Will also have him see Dr. Armas to see if there is a substitute medication for Actos given his diastolic dysfunction/QUESADA/edema     LE edema  Exertional leg heaviness  Varicose veins  Telangiectasias  - Resting and exercise ABIs are normal  - Venous comp study is abnormal showing severe reflux in both right and left legs.    -- Right leg:  No DVT.  There is severe reflux (5.2s, 4.0mm) of the proximal GSV in the thigh and superficial tributary (5.4s, 4.7mm) in the calf as well as severe reflux in the small saphenous vein in the calf (6.4s, 2.2mm) with resultant refluxing venous varicosities (4.5s, 2.9mm).     --Left leg: No DVT. Severe reflux (5.0s, 2.9mm) limited to distal GSV at the ankle.  - The LE edema could be in aprt from the diastolic dysfunction, see above for management.    - But also he has severe reflux in bilateral LEs.  Will Rx compression stockings, then have him see Dr. Gleason in about 1 month to consider    -- Right leg: RF ablation of GSV, RF ablation of SSV, sclerotherapy of superficial tributary and varicose veins with phlebectomy of varicose veins.    -- Left leg: Sclerotherapy of distal GSV vs. RF ablation of GSV + sclerotherapy.        Follow up:  BMP in 1 week  Dr. Gleason in about 1 month in vein clinic  See PCP re Jerry      Thank you for allowing me to participate in the care of your patient.    Sincerely,     Susanna Haque PA-C     [facility]

## 2018-08-30 NOTE — PROGRESS NOTES
CARDIOLOGY CLINIC PROGRESS NOTE    DOS: 2018      Louis Keller Jr.  : 1932, 86 year old  MRN: [id number]      History: I had the pleasure of following up with Louis Keller Jr. today in the cardiology clinic.  He is a patient of Dr. Gleason.  He was accompanied by his wife, Laura (also sees myself and Dr. Gleason).    Louis is a pleasant 86 year old man with history of chronic atrial fibrillation, DM2.  He failed cardioversion in the past.  He is maintained on rate control strategy and anticoagulation.  He has had negative stress testing previously in ,  and .      He saw Dr. Gleason 18.  He had some QUESADA and R>L LE exertional heaviness.  He planned to increase walking, and follow up in 3 months with the plan that if his sxs did not imporve, we could reat an echo, obtain a treadmill nuclear stress test, and ABIs.     I saw Louis 8/3/18 in follow up.  His sxs were unchanged. Echo, treadmill nuc, ABIs and venous comp study were ordered.     Interval History:   He presents today for follow up.   He is still SOB with exertion - stairs, any increased speed.   Continues to have leg edema - it is a little worse. Left is worse than the right today. The legs feel heavy as well.  He says it feels like he has a weight strapped on.    No chest pain.       ROS:  Skin:  Positive for     Eyes:  Positive for glasses  ENT:  Positive for hearing loss  Respiratory:  Positive for dyspnea on exertion  Cardiovascular:    edema;Positive for;fatigue  Gastroenterology:   diarrhea  Genitourinary:  Positive for urinary frequency  Musculoskeletal:  Positive for arthritis  Neurologic:  Positive for numbness or tingling of feet  Psychiatric:  Positive for sleep disturbances  Heme/Lymph/Imm:  Positive for    Endocrine:  Positive for diabetes    PAST MEDICAL HISTORY:  Past Medical History:   Diagnosis Date     Antiplatelet or antithrombotic long-term use      Atrial fibrillation (H)      Diarrhea      Diastolic murmur       QUESADA (dyspnea on exertion)      Gout      Hemorrhage of gastrointestinal tract, unspecified 63,65,and 1971    hospitalized     History of blood transfusion      Hyperlipidemia LDL goal <100 2012     Long-term (current) use of anticoagulants [Z79.01] 3/31/2016     Mumps      Palpitations      Physical deconditioning 2013     Scarlet fever      Spider veins      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     Unspecified disease of pancreas     pancreatitis       PAST SURGICAL HISTORY:  Past Surgical History:   Procedure Laterality Date     ARTHROPLASTY KNEE  2013    Procedure: ARTHROPLASTY KNEE;  Left Total Knee Arthroplasty       C NONSPECIFIC PROCEDURE  Child    T&A     C NONSPECIFIC PROCEDURE  ; also     Colonoscopy     C NONSPECIFIC PROCEDURE      Basal cell cancer of the nose     C NONSPECIFIC PROCEDURE      Cholecystectomy     CARDIOVERSION  11    failed       SOCIAL HISTORY:  Social History     Social History     Marital status:      Spouse name: N/A     Number of children: N/A     Years of education: N/A     Occupational History           Semi-retired     Social History Main Topics     Smoking status: Never Smoker     Smokeless tobacco: Never Used     Alcohol use 0.0 oz/week     0 Standard drinks or equivalent per week      Comment: 1 glass of wine every day     Drug use: No     Sexual activity: No     Other Topics Concern     Caffeine Concern No     14 oz per day     Sleep Concern No     sometimes - nocturia 4 times per night     Special Diet No     Exercise Yes     treadmill/walking 15-20 minutes 6 days per week     Social History Narrative       FAMILY HISTORY:  Family History   Problem Relation Age of Onset     Alzheimer Disease Maternal Grandmother      Arthritis Maternal Grandmother      Cancer Mother      breast/ 1983/colon     Eye Disorder Mother      glaucoma and cataracts     HEART DISEASE Mother      angina/CABG      Hypertension Mother      Cancer Father      colon     Diabetes Maternal Aunt      AoDM       MEDS:   Current Outpatient Prescriptions on File Prior to Visit:  ACE NOT PRESCRIBED, INTENTIONAL, 1 each daily ACE Inhibitor not prescribed due to Risk for drug interaction   Acetaminophen (TYLENOL PO) Take 1,000 mg by mouth 3 times daily   ASPIRIN NOT PRESCRIBED, INTENTIONAL, by Other route continuous prn Reported on 3/7/2017   cholestyramine (QUESTRAN) 4 G Packet Take 1 packet by mouth every evening   Chromium 1000 MCG TABS Take 500 mcg by mouth daily   diltiazem (CARDIZEM CD/CARTIA XT) 120 MG 24 hr capsule Take 1 capsule (120 mg) by mouth every evening   ferrous gluconate (FERGON) 324 (38 FE) MG tablet TAKE 1 TABLET (324 MG) BY MOUTH DAILY (WITH BREAKFAST)   fish oil-omega-3 fatty acids (FISH OIL) 1000 MG capsule Take 1 g by mouth 2 times daily    glipiZIDE (GLIPIZIDE XL) 10 MG 24 hr tablet Take 1 tablet (10 mg) by mouth 2 times daily   loperamide (IMODIUM A-D) 2 MG tablet daily    metFORMIN (GLUCOPHAGE) 500 MG tablet TAKE 1 TABLET (500 MG) BY MOUTH 2 TIMES DAILY (WITH MEALS)   metFORMIN (GLUCOPHAGE) 500 MG tablet Take 1 tablet (500 mg) by mouth 2 times daily (with meals)   multivitamin, therapeutic (THERA-VIT) TABS tablet Take 0.5 tablets by mouth 2 times daily   ONETOUCH ULTRA test strip USE TO TEST DAILY   pioglitazone (ACTOS) 45 MG tablet TAKE 1 TABLET (45 MG) BY MOUTH DAILY   Probiotic Product (PROBIOTIC DAILY) CAPS    warfarin (COUMADIN) 5 MG tablet TAKE 1 TABLET BY MOUTH TUESDAY,THURS AND SUN. TAKE 1/2 TABLET ON MON,WED,FRI AND SAT. OR AS DIRECTED   polyethylene glycol (MIRALAX/GLYCOLAX) Packet Take 17 g by mouth daily as needed for constipation (Patient not taking: Reported on 8/30/2018)   senna-docusate (SENOKOT-S;PERICOLACE) 8.6-50 MG per tablet Take 1 tablet by mouth 2 times daily as needed for constipation (Patient not taking: Reported on 8/30/2018)   STATIN NOT PRESCRIBED, INTENTIONAL, 1 each At Bedtime  "Statin not prescribed intentionally due to Risk for drug interaction (Patient not taking: Reported on 2018)   tamsulosin (FLOMAX) 0.4 MG capsule Take 1 capsule (0.4 mg) by mouth daily (Patient not taking: Reported on 2018)     No current facility-administered medications on file prior to visit.     ALLERGIES:   Allergies   Allergen Reactions     No Known Drug Allergies        PHYSICAL EXAM:  Vitals: /78 (BP Location: Right arm)  Pulse 80  Ht 1.803 m (5' 11\")  Wt 84.4 kg (186 lb)  SpO2 99%  BMI 25.94 kg/m2  Constitutional:  cooperative, alert and oriented, well developed, well nourished, in no acute distress        Skin:  warm and dry to the touch, no apparent skin lesions or masses noted        Head:  normocephalic, no masses or lesions        Eyes:  pupils equal and round;conjunctivae and lids unremarkable;sclera white;no xanthalasma        ENT:  no pallor or cyanosis, dentition good        Neck:    JVP 8-10      Respiratory:  normal breath sounds, clear to auscultation, normal A-P diameter, normal symmetry, normal respiratory excursion, no use of accessory muscles        Cardiac:   irregularly irregular rhythm         grade 1;early diastolic murmur good rate control, frequent ectopy    GI:  abdomen soft;BS normoactive        Vascular:                                        Extremities and Musculoskeletal:  no deformities, clubbing, cyanosis, erythema observed stasis pigmentation      Neurological:  no gross motor deficits;affect appropriate          LABS/DATA:  I reviewed the followin18 treadmill nuc stress test:  Impression  1.  Myocardial perfusion imaging using single isotope technique  demonstrated no significant perfusion defect consistent with  significant ischemia or infarct. There is mild decrease in inferior  activity at the base on rest and stress images most likely consistent  with diaphragm attenuation/bowel uptake artifact..   2. Gated images demonstrated normal size left " ventricle with adequate  to good overall contractility and no significant regional wall motion  abnormality.  The left ventricular systolic function is normal with  ejection fraction 57% (accuracy maybe affected by presence of atrial  fibrillation).  3. Compared to the prior study from not applicable .      8/10/18 resting and exercise ABIs:  IMPRESSION:  1. Normal resting and exercise ABIs bilaterally       8/10/18 echo:  Interpretation Summary     Left ventricular systolic function is normal.The visual ejection fraction is  estimated at 55-60%.  Diastolic Doppler findings (E/E' ratio and/or other parameters) suggest left  ventricular filling pressures are increased.  The right ventricular systolic function is normal.  Pulmonary hypertension- RVSP 40 mm hg +RA.  There is trace aortic regurgitation.  Mild aortic root and ascending aorta dilatation.      8/21/18 venous comp study:  Impression:  1. Right leg: No DVT. Severe reflux (5.2s, 4.0mm) of the proximal GSV  in the thigh and superficial tributary (5.4s, 4.7mm) in the calf as  well as severe reflux in the small saphenous vein in the calf (6.4s,  2.2mm) with resultant refluxing venous varicosities (4.5s, 2.9mm).     2. Left leg: No DVT. Severe reflux (5.0s, 2.9mm) limited to distal GSV  at the ankle.        If clinically indicated could treat:  Right leg: RF ablation of GSV, RF ablation of SSV, sclerotherapy of  superficial tributary and varicose veins with phlebectomy of varicose  veins.     Left leg: Sclerotherapy of distal GSV vs. RF ablation of GSV +  sclerotherapy.        ASSESSMENT/PLAN:  Chronic afib  - Rate controlled on dilt cd 120 mg  - On warfarin    QUESADA  - Echo shows normal LVEF, grade 3 diastolic dysfunction  - Nuc without e/o ischemia  - Some contribution could be from the chronic afib as well  - Given the QUESADA, edema, and the echo showing grade 3 diastolic dysfunction, will trial some low dose diuretic - Lasix 20 mg daily.  BMP in 1 week.   - Will  also have him see Dr. Armas to see if there is a substitute medication for Actos given his diastolic dysfunction/QUESADA/edema     LE edema  Exertional leg heaviness  Varicose veins  Telangiectasias  - Resting and exercise ABIs are normal  - Venous comp study is abnormal showing severe reflux in both right and left legs.    -- Right leg:  No DVT.  There is severe reflux (5.2s, 4.0mm) of the proximal GSV in the thigh and superficial tributary (5.4s, 4.7mm) in the calf as well as severe reflux in the small saphenous vein in the calf (6.4s, 2.2mm) with resultant refluxing venous varicosities (4.5s, 2.9mm).     --Left leg: No DVT. Severe reflux (5.0s, 2.9mm) limited to distal GSV at the ankle.  - The LE edema could be in aprt from the diastolic dysfunction, see above for management.    - But also he has severe reflux in bilateral LEs.  Will Rx compression stockings, then have him see Dr. Gleason in about 1 month to consider    -- Right leg: RF ablation of GSV, RF ablation of SSV, sclerotherapy of superficial tributary and varicose veins with phlebectomy of varicose veins.    -- Left leg: Sclerotherapy of distal GSV vs. RF ablation of GSV + sclerotherapy.        Follow up:  REUBEN in 1 week  Dr. Gleason in about 1 month in vein clinic  See PCP re Jerry Haque PA-C

## 2018-09-04 ENCOUNTER — ANTICOAGULATION THERAPY VISIT (OUTPATIENT)
Dept: NURSING | Facility: CLINIC | Age: 83
End: 2018-09-04
Payer: COMMERCIAL

## 2018-09-04 DIAGNOSIS — I48.20 CHRONIC ATRIAL FIBRILLATION (H): ICD-10-CM

## 2018-09-04 DIAGNOSIS — Z79.01 LONG-TERM (CURRENT) USE OF ANTICOAGULANTS: ICD-10-CM

## 2018-09-04 LAB — INR POINT OF CARE: 2.5 (ref 0.86–1.14)

## 2018-09-04 PROCEDURE — 36416 COLLJ CAPILLARY BLOOD SPEC: CPT

## 2018-09-04 PROCEDURE — 99207 ZZC NO CHARGE NURSE ONLY: CPT

## 2018-09-04 PROCEDURE — 85610 PROTHROMBIN TIME: CPT | Mod: QW

## 2018-09-04 NOTE — PROGRESS NOTES
ANTICOAGULATION FOLLOW-UP CLINIC VISIT    Patient Name:  Louis Keller Jr.  Date:  9/4/2018  Contact Type:  Face to Face    SUBJECTIVE:     Patient Findings     Positives Change in medications (Started Lasix on 08/30/18), No Problem Findings           OBJECTIVE    INR Protime   Date Value Ref Range Status   09/04/2018 2.5 (A) 0.86 - 1.14 Final       ASSESSMENT / PLAN  INR assessment THER    Recheck INR In: 5 WEEKS    INR Location Clinic      Anticoagulation Summary as of 9/4/2018     INR goal 2.0-3.0   Today's INR 2.5   Warfarin maintenance plan 5 mg (5 mg x 1) on Sun, Tue, Thu; 2.5 mg (5 mg x 0.5) all other days   Full warfarin instructions 5 mg on Sun, Tue, Thu; 2.5 mg all other days   Weekly warfarin total 25 mg   No change documented Hope Baumann RN   Plan last modified Monica Souza RN (5/12/2016)   Next INR check 10/9/2018   Priority INR   Target end date     Indications   Long-term (current) use of anticoagulants [Z79.01] [Z79.01]  Atrial fibrillation (H) [I48.91]         Anticoagulation Episode Summary     INR check location     Preferred lab     Send INR reminders to [facility]    Comments likes calendar printout.        Anticoagulation Care Providers     Provider Role Specialty Phone number    Joselito Armas MD Responsible Internal Medicine [phone]            See the Encounter Report to view Anticoagulation Flowsheet and Dosing Calendar (Go to Encounters tab in chart review, and find the Anticoagulation Therapy Visit)        Hope Baumann RN

## 2018-09-04 NOTE — MR AVS SNAPSHOT
Louis Keller Jr.   9/4/2018 11:15 AM   Anticoagulation Therapy Visit    Description:  86 year old male   Provider:  [facility]   Department:  [facility] Nurse           INR as of 9/4/2018     Today's INR 2.5      Anticoagulation Summary as of 9/4/2018     INR goal 2.0-3.0   Today's INR 2.5   Full warfarin instructions 5 mg on Sun, Tue, Thu; 2.5 mg all other days   Next INR check 10/9/2018    Indications   Long-term (current) use of anticoagulants [Z79.01] [Z79.01]  Atrial fibrillation (H) [I48.91]         Your next Anticoagulation Clinic appointment(s)     Oct 09, 2018 11:15 AM CDT   Anticoagulation Visit with [facility]   [facility]    [address] [place] [id number] [phone]              Contact Numbers     Clinic Number:         September 2018 Details    Sun Mon Tue Wed Thu Fri Sat           1                 2               3               4      5 mg   See details      5      2.5 mg         6      5 mg         7      2.5 mg         8      2.5 mg           9      5 mg         10      2.5 mg         11      5 mg         12      2.5 mg         13      5 mg         14      2.5 mg         15      2.5 mg           16      5 mg         17      2.5 mg         18      5 mg         19      2.5 mg         20      5 mg         21      2.5 mg         22      2.5 mg           23      5 mg         24      2.5 mg         25      5 mg         26      2.5 mg         27      5 mg         28      2.5 mg         29      2.5 mg           30      5 mg                Date Details   09/04 This INR check               How to take your warfarin dose     To take:  2.5 mg Take 0.5 of a 5 mg tablet.    To take:  5 mg Take 1 of the 5 mg tablets.           October 2018 Details    Sun Mon Tue Wed Thu Fri Sat      1      2.5 mg         2      5 mg         3      2.5 mg         4      5 mg         5      2.5 mg         6      2.5 mg            7      5 mg         8      2.5 mg         9            10               11               12               13                 14               15               16               17               18               19               20                 21               22               23               24               25               26               27                 28               29               30               31                   Date Details   No additional details    Date of next INR:  10/9/2018         How to take your warfarin dose     To take:  2.5 mg Take 0.5 of a 5 mg tablet.    To take:  5 mg Take 1 of the 5 mg tablets.

## 2018-09-06 DIAGNOSIS — I51.89 DIASTOLIC DYSFUNCTION: ICD-10-CM

## 2018-09-06 DIAGNOSIS — R60.0 BILATERAL LEG EDEMA: ICD-10-CM

## 2018-09-06 DIAGNOSIS — R06.09 DOE (DYSPNEA ON EXERTION): ICD-10-CM

## 2018-09-06 LAB
ANION GAP SERPL CALCULATED.3IONS-SCNC: 4 MMOL/L (ref 3–14)
BUN SERPL-MCNC: 12 MG/DL (ref 7–30)
CALCIUM SERPL-MCNC: 9.1 MG/DL (ref 8.5–10.1)
CHLORIDE SERPL-SCNC: 103 MMOL/L (ref 94–109)
CO2 SERPL-SCNC: 31 MMOL/L (ref 20–32)
CREAT SERPL-MCNC: 0.93 MG/DL (ref 0.66–1.25)
GFR SERPL CREATININE-BSD FRML MDRD: 77 ML/MIN/1.7M2
GLUCOSE SERPL-MCNC: 152 MG/DL (ref 70–99)
POTASSIUM SERPL-SCNC: 4.1 MMOL/L (ref 3.4–5.3)
SODIUM SERPL-SCNC: 138 MMOL/L (ref 133–144)

## 2018-09-06 PROCEDURE — 36415 COLL VENOUS BLD VENIPUNCTURE: CPT | Performed by: PHYSICIAN ASSISTANT

## 2018-09-06 PROCEDURE — 80048 BASIC METABOLIC PNL TOTAL CA: CPT | Performed by: PHYSICIAN ASSISTANT

## 2018-09-07 ENCOUNTER — CARE COORDINATION (OUTPATIENT)
Dept: CARDIOLOGY | Facility: CLINIC | Age: 83
End: 2018-09-07

## 2018-09-07 DIAGNOSIS — I51.89 DIASTOLIC DYSFUNCTION: ICD-10-CM

## 2018-09-07 DIAGNOSIS — R06.09 DOE (DYSPNEA ON EXERTION): Primary | ICD-10-CM

## 2018-09-21 ENCOUNTER — OFFICE VISIT (OUTPATIENT)
Dept: INTERNAL MEDICINE | Facility: CLINIC | Age: 83
End: 2018-09-21
Payer: COMMERCIAL

## 2018-09-21 VITALS
DIASTOLIC BLOOD PRESSURE: 68 MMHG | TEMPERATURE: 97.9 F | HEART RATE: 90 BPM | SYSTOLIC BLOOD PRESSURE: 118 MMHG | HEIGHT: 71 IN | OXYGEN SATURATION: 98 % | BODY MASS INDEX: 25.79 KG/M2 | RESPIRATION RATE: 24 BRPM | WEIGHT: 184.2 LBS

## 2018-09-21 DIAGNOSIS — E78.5 HYPERLIPIDEMIA LDL GOAL <100: ICD-10-CM

## 2018-09-21 DIAGNOSIS — I48.20 CHRONIC ATRIAL FIBRILLATION (H): ICD-10-CM

## 2018-09-21 DIAGNOSIS — Z00.00 ROUTINE GENERAL MEDICAL EXAMINATION AT A HEALTH CARE FACILITY: Primary | ICD-10-CM

## 2018-09-21 DIAGNOSIS — E11.22 TYPE 2 DIABETES MELLITUS WITH STAGE 3 CHRONIC KIDNEY DISEASE, WITHOUT LONG-TERM CURRENT USE OF INSULIN (H): ICD-10-CM

## 2018-09-21 DIAGNOSIS — N18.30 TYPE 2 DIABETES MELLITUS WITH STAGE 3 CHRONIC KIDNEY DISEASE, WITHOUT LONG-TERM CURRENT USE OF INSULIN (H): ICD-10-CM

## 2018-09-21 PROCEDURE — G0438 PPPS, INITIAL VISIT: HCPCS | Performed by: INTERNAL MEDICINE

## 2018-09-21 NOTE — PROGRESS NOTES
SUBJECTIVE:   Louis Keller Jr. is a 86 year old male who presents for Preventive Visit.    Are you in the first 12 months of your Medicare Part B coverage?  No    Healthy Habits:    Do you get at least three servings of calcium containing foods daily (dairy, green leafy vegetables, etc.)? yes    Amount of exercise or daily activities, outside of work: Walking outside if weather is nice. Walking on treadmill     Problems taking medications regularly No    Medication side effects: No    Have you had an eye exam in the past two years? yes    Do you see a dentist twice per year? yes    Do you have sleep apnea, excessive snoring or daytime drowsiness?no      Ability to successfully perform activities of daily living: No, needs assistance with: housework and lawn care    Home safety:  Steps going down and some rugs     Hearing impairment: Yes, does not wear hearing aids.    Fall risk:  Fallen 2 or more times in the past year?: Yes  Any fall with injury in the past year?: Yes        COGNITIVE SCREEN  1) Repeat 3 items (Leader, Season, Table)    2) Clock draw: ABNORMAL 11:50  3) 3 item recall: Recalls 3 objects  Results: 3 items recalled: COGNITIVE IMPAIRMENT LESS LIKELY    Mini-CogTM Copyright S Nidhi. Licensed by the author for use in [facility]; reprinted with permission ([email or web address]). All rights reserved.            PROBLEMS TO ADD ON...    Feels weak, poor energy.   Has H/O DM. On diet , exercise and PO Medications. Blood sugars are controlled. No parestesias. No hypoglycemias.  Has H/O hyperlipidemia. On medical treatment and diet. No side effects. No muscle weakness, myalgias or upset stomach.   Has history of atrial fibrillation. On anticoagulation with Coumadin and rate control medications. Asymptonatic - no chest pains , palpitations,  no side effects from medications.  Has symptoms of SOB on exertion and mild LE edema. ECHO with finding of diastolic CHF.   Improved edema with Lasix.     Has a  small cystic lesion on the right index finger, no pain , for 2 months.   Has rough spots on the head.   Seeing dermatology.       Reviewed and updated as needed this visit by clinical staff         Reviewed and updated as needed this visit by Provider        Social History   Substance Use Topics     Smoking status: Never Smoker     Smokeless tobacco: Never Used     Alcohol use 0.0 oz/week     0 Standard drinks or equivalent per week      Comment: 1 glass of wine every day       If you drink alcohol do you typically have >3 drinks per day or >7 drinks per week? Yes - AUDIT SCORE:     No flowsheet data found.                        Today's PHQ-2 Score:   PHQ-2 ( 1999 Pf[employer]) 6/22/2018 3/19/2018   Q1: Little interest or pleasure in doing things 0 0   Q2: Feeling down, depressed or hopeless 0 0   PHQ-2 Score 0 0       Do you feel safe in your environment - Yes    Do you have a Health Care Directive?: Yes: Patient states has Advance Directive and will bring in a copy to clinic.    Current providers sharing in care for this patient include:   Patient Care Team:  Joselito Armas MD as PCP - General (Internal Medicine)    The following health maintenance items are reviewed in Epic and correct as of today:  Health Maintenance   Topic Date Due     ADVANCE DIRECTIVE PLANNING Q5 YRS  05/07/2017     INFLUENZA VACCINE (1) 09/01/2018     FOOT EXAM Q1 YEAR  09/18/2018     OP ANNUAL INR REFERRAL  08/16/2018     EYE EXAM Q1 YEAR  12/12/2018     A1C Q6 MO  12/22/2018     LIPID MONITORING Q1 YEAR  12/27/2018     MICROALBUMIN Q1 YEAR  12/27/2018     FALL RISK ASSESSMENT  03/19/2019     PHQ-2 Q1 YR  06/22/2019     CREATININE Q1 YEAR  09/06/2019     TSH W/ FREE T4 REFLEX Q2 YEAR  09/18/2019     TETANUS Q10 YR  07/11/2023     PNEUMOCOCCAL  Completed     Labs reviewed in EPIC  BP Readings from Last 3 Encounters:   09/21/18 118/68   08/30/18 138/78   08/03/18 126/74    Wt Readings from Last 3 Encounters:   09/21/18 184 lb 3.2 oz (83.6  "kg)   08/30/18 186 lb (84.4 kg)   08/03/18 185 lb 6.4 oz (84.1 kg)                        ROS:  Constitutional, HEENT, cardiovascular, pulmonary, GI, , musculoskeletal, neuro, skin, endocrine and psych systems are negative, except as otherwise noted.    OBJECTIVE:   There were no vitals taken for this visit. Estimated body mass index is 25.94 kg/(m^2) as calculated from the following:    Height as of 8/30/18: 5' 11\" (1.803 m).    Weight as of 8/30/18: 186 lb (84.4 kg).  EXAM:   GENERAL: healthy, alert and no distress  EYES: Eyes grossly normal to inspection, PERRL and conjunctivae and sclerae normal  HENT: ear canals and TM's normal, nose and mouth without ulcers or lesions  NECK: no adenopathy, no asymmetry, masses, or scars and thyroid normal to palpation  RESP: lungs clear to auscultation - no rales, rhonchi or wheezes  CV: regular rate and rhythm, normal S1 S2, no S3 or S4, no murmur, click or rub, no peripheral edema and peripheral pulses strong  ABDOMEN: soft, nontender, no hepatosplenomegaly, no masses and bowel sounds normal  MS: no gross musculoskeletal defects noted, no edema  SKIN: no suspicious lesions or rashes  NEURO: Normal strength and tone, mentation intact and speech normal    Diagnostic Test Results:  none     ASSESSMENT / PLAN:       ICD-10-CM    1. Routine general medical examination at a health care facility Z00.00    2. Type 2 diabetes mellitus with stage 3 chronic kidney disease, without long-term current use of insulin (H) E11.22     N18.3    3. Hyperlipidemia LDL goal <100 E78.5    4. Chronic atrial fibrillation (H) I48.2        Cont treatment   Improved symptoms with diuretics.   Continue Actos for now, reassess with HgbA1C in 3 months, consider change because of diastolic CHF.   Right index finger distal IP joint extensor surface small mucinous cyst was opened and drained small amount of thick , clear mucinous fluid     End of Life Planning:  Patient currently has an advanced directive: " "Yes.  Practitioner is supportive of decision.    COUNSELING:  Reviewed preventive health counseling, as reflected in patient instructions       Regular exercise       Healthy diet/nutrition       Vision screening       Hearing screening       Dental care    BP Readings from Last 1 Encounters:   08/30/18 138/78     Estimated body mass index is 25.94 kg/(m^2) as calculated from the following:    Height as of 8/30/18: 5' 11\" (1.803 m).    Weight as of 8/30/18: 186 lb (84.4 kg).           reports that he has never smoked. He has never used smokeless tobacco.      Appropriate preventive services were discussed with this patient, including applicable screening as appropriate for cardiovascular disease, diabetes, osteopenia/osteoporosis, and glaucoma.  As appropriate for age/gender, discussed screening for colorectal cancer, prostate cancer, breast cancer, and cervical cancer. Checklist reviewing preventive services available has been given to the patient.    Reviewed patients plan of care and provided an AVS. The Intermediate Care Plan ( asthma action plan, low back pain action plan, and migraine action plan) for Louis meets the Care Plan requirement. This Care Plan has been established and reviewed with the Patient.    Counseling Resources:  ATP IV Guidelines  Pooled Cohorts Equation Calculator  Breast Cancer Risk Calculator  FRAX Risk Assessment  ICSI Preventive Guidelines  Dietary Guidelines for Americans, 2010  USDA's MyPlate  ASA Prophylaxis  Lung CA Screening    Joselito Armas MD  [facility]  "

## 2018-09-21 NOTE — MR AVS SNAPSHOT
After Visit Summary   9/21/2018    Louis Keller Jr.    MRN: [id number]           Patient Information     Date Of Birth          6/26/1932        Visit Information        Provider Department      9/21/2018 11:00 AM Joselito Armas MD [facility]        Care Instructions      Preventive Health Recommendations:       Male Ages 65 and over    Yearly exam:             See your health care provider every year in order to  o   Review health changes.   o   Discuss preventive care.    o   Review your medicines if your doctor has prescribed any.    Talk with your health care provider about whether you should have a test to screen for prostate cancer (PSA).    Every 3 years, have a diabetes test (fasting glucose). If you are at risk for diabetes, you should have this test more often.    Every 5 years, have a cholesterol test. Have this test more often if you are at risk for high cholesterol or heart disease.     Every 10 years, have a colonoscopy. Or, have a yearly FIT test (stool test). These exams will check for colon cancer.    Talk to with your health care provider about screening for Abdominal Aortic Aneurysm if you have a family history of AAA or have a history of smoking.  Shots:     Get a flu shot each year.     Get a tetanus shot every 10 years.     Talk to your doctor about your pneumonia vaccines. There are now two you should receive - Pneumovax (PPSV 23) and Prevnar (PCV 13).    Talk to your pharmacist about a shingles vaccine.     Talk to your doctor about the hepatitis B vaccine.  Nutrition:     Eat at least 5 servings of fruits and vegetables each day.     Eat whole-grain bread, whole-wheat pasta and brown rice instead of white grains and rice.     Get adequate Calcium and Vitamin D.   Lifestyle    Exercise for at least 150 minutes a week (30 minutes a day, 5 days a week). This will help you control your weight and prevent disease.     Limit alcohol to one drink per day.      No smoking.     Wear sunscreen to prevent skin cancer.     See your dentist every six months for an exam and cleaning.     See your eye doctor every 1 to 2 years to screen for conditions such as glaucoma, macular degeneration and cataracts.    At your visit today, we discussed your risk for falls and preventive options.    Fall Prevention  Falls often occur due to slipping, tripping or losing your balance. Millions of people fall every year and injure themselves. Here are ways to reduce your risk of falling again.    Think about your fall, was there anything that caused your fall that can be fixed, removed, or replaced?    Make your home safe by keeping walkways clear of objects you may trip over.    Use non-slip pads under rugs. Do not use area rugs or small throw rugs.    Use non-slip mats in bathtubs and showers.    Install handrails and lights on staircases.    Do not walk in poorly lit areas.    Do not stand on chairs or wobbly ladders.    Use caution when reaching overhead or looking upward. This position can cause a loss of balance.    Be sure your shoes fit properly, have non-slip bottoms and are in good condition.     Wear shoes both inside and out. Avoid going barefoot or wearing slippers.    Be cautious when going up and down stairs, curbs, and when walking on uneven sidewalks.    If your balance is poor, consider using a cane or walker.    If your fall was related to alcohol use, stop or limit alcohol intake.     If your fall was related to use of sleeping medicines, talk to your doctor about this. You may need to reduce your dosage at bedtime if you awaken during the night to go to the bathroom.      To reduce the need for nighttime bathroom trips:  ? Avoid drinking fluids for several hours before going to bed  ? Empty your bladder before going to bed  ? Men can keep a urinal at the bedside    Stay as active as you can. Balance, flexibility, strength, and endurance all come from exercise. They all  play a role in preventing falls. Ask your healthcare provider which types of activity are right for you.    Get your vision checked on a regular basis.    If you have pets, know where they are before you stand up or walk so you don't trip over them.    Use night lights.  Date Last Reviewed: 11/5/2015 2000-2017 [employer]. [address] [place] [address]. All rights reserved. This information is not intended as a substitute for professional medical care. Always follow your healthcare professional's instructions.                  Follow-ups after your visit        Follow-up notes from your care team     Return in about 3 months (around 12/21/2018) for Routine Visit.      Your next 10 appointments already scheduled     Oct 03, 2018 12:15 PM CDT   LAB with RU LAB   [facility] ([facility])    [address] [place] [id number] [phone]           Please do not eat 10-12 hours before your appointment if you are coming in fasting for labs on lipids, cholesterol, or glucose (sugar). This does not apply to pregnant women. Water, hot tea and black coffee (with nothing added) are okay. Do not drink other fluids, diet soda or chew gum.            Oct 03, 2018  1:15 PM CDT   New Vascular Visit with Herbert Gleason MD   [facility] ([facility])    [address] [place] [id number] [phone]            Oct 09, 2018 11:15 AM CDT   Anticoagulation Visit with [facility]   [facility]    [address] [facility] [id number] [phone]              Who to contact     If you have questions or need follow up information about today's clinic visit or your schedule please contact [facility] directly at [phone].  Normal or non-critical lab and imaging  "results will be communicated to you by MyChart, letter or phone within 4 business days after the clinic has received the results. If you do not hear from us within 7 days, please contact the clinic through MyChart or phone. If you have a critical or abnormal lab result, we will notify you by phone as soon as possible.  Submit refill requests through [employer] or call your pharmacy and they will forward the refill request to us. Please allow 3 business days for your refill to be completed.          Additional Information About Your Visit        Care EveryWhere ID     This is your Care EveryWhere ID. This could be used by other organizations to access your [facility] medical records  [id number]        Your Vitals Were     Pulse Temperature Respirations Height Pulse Oximetry BMI (Body Mass Index)    90 97.9  F (36.6  C) (Oral) 24 5' 11\" (1.803 m) 98% 25.69 kg/m2       Blood Pressure from Last 3 Encounters:   09/21/18 118/68   08/30/18 138/78   08/03/18 126/74    Weight from Last 3 Encounters:   09/21/18 184 lb 3.2 oz (83.6 kg)   08/30/18 186 lb (84.4 kg)   08/03/18 185 lb 6.4 oz (84.1 kg)              Today, you had the following     No orders found for display         Today's Medication Changes          These changes are accurate as of 9/21/18 11:36 AM.  If you have any questions, ask your nurse or doctor.               Stop taking these medicines if you haven't already. Please contact your care team if you have questions.     PROBIOTIC DAILY Caps   Stopped by:  Joselito Armas MD                    Primary Care Provider Office Phone # Fax #    Joselito Armas [phone]       [address] [facility] [address]        Equal Access to Services     [facility]: Danae Foster, cynthia hicks, zoran medrano, emanuel chin. So [facility] [phone].    ATENCIÓN: Si habla español, tiene a cao disposición servicios gratuitos de asistencia " lingüística. Jose L al [phone].    We comply with applicable federal civil rights laws and [place] laws. We do not discriminate on the basis of race, color, national origin, age, disability, sex, sexual orientation, or gender identity.            Thank you!     Thank you for choosing [facility]  for your care. Our goal is always to provide you with excellent care. Hearing back from our patients is one way we can continue to improve our services. Please take a few minutes to complete the written survey that you may receive in the mail after your visit with us. Thank you!             Your Updated Medication List - Protect others around you: Learn how to safely use, store and throw away your medicines at www.disposemymeds.org.          This list is accurate as of 9/21/18 11:36 AM.  Always use your most recent med list.                   Brand Name Dispense Instructions for use Diagnosis    ACE NOT PRESCRIBED (INTENTIONAL)     0 each    1 each daily ACE Inhibitor not prescribed due to Risk for drug interaction    Type 2 diabetes, HbA1C goal < 8% (H)       ASPIRIN NOT PRESCRIBED    INTENTIONAL     by Other route continuous prn Reported on 3/7/2017        cholestyramine 4 g Packet    QUESTRAN     Take 1 packet by mouth every evening        Chromium 1000 MCG Tabs      Take 500 mcg by mouth daily        diltiazem 120 MG 24 hr capsule    CARDIZEM CD/CARTIA XT    90 capsule    Take 1 capsule (120 mg) by mouth every evening    Chronic atrial fibrillation (H)       ferrous gluconate 324 (38 Fe) MG tablet    FERGON    100 tablet    TAKE 1 TABLET (324 MG) BY MOUTH DAILY (WITH BREAKFAST)    Anemia due to blood loss, acute       fish oil-omega-3 fatty acids 1000 MG capsule      Take 1 g by mouth 2 times daily        furosemide 20 MG tablet    LASIX    30 tablet    Take 1 tablet (20 mg) by mouth daily    QUESADA (dyspnea on exertion), Diastolic dysfunction, Bilateral leg edema       glipiZIDE 10 MG 24 hr tablet     glipiZIDE XL    180 tablet    Take 1 tablet (10 mg) by mouth 2 times daily    Chronic atrial fibrillation (H), Actinic keratosis       loperamide 2 MG tablet    IMODIUM A-D    30 tablet    daily        * metFORMIN 500 MG tablet    GLUCOPHAGE    180 tablet    Take 1 tablet (500 mg) by mouth 2 times daily (with meals)    Type 2 diabetes mellitus with stage 3 chronic kidney disease, without long-term current use of insulin (H)       * metFORMIN 500 MG tablet    GLUCOPHAGE    180 tablet    TAKE 1 TABLET (500 MG) BY MOUTH 2 TIMES DAILY (WITH MEALS)    DM type 2 (diabetes mellitus, type 2) (H)       multivitamin, therapeutic Tabs tablet      Take 0.5 tablets by mouth 2 times daily        ONETOUCH ULTRA test strip   Generic drug:  blood glucose monitoring     100 strip    USE TO TEST DAILY    Type 2 diabetes mellitus with stage 3 chronic kidney disease (H)       pioglitazone 45 MG tablet    ACTOS    90 tablet    TAKE 1 TABLET (45 MG) BY MOUTH DAILY    Type 2 diabetes mellitus with stage 3 chronic kidney disease, without long-term current use of insulin (H)       polyethylene glycol Packet    MIRALAX/GLYCOLAX    7 packet    Take 17 g by mouth daily as needed for constipation    Constipation, unspecified constipation type       senna-docusate 8.6-50 MG per tablet    SENOKOT-S;PERICOLACE    100 tablet    Take 1 tablet by mouth 2 times daily as needed for constipation    Constipation, unspecified constipation type       STATIN NOT PRESCRIBED (INTENTIONAL)     0 each    1 each At Bedtime Statin not prescribed intentionally due to Risk for drug interaction    Type 2 diabetes, HbA1C goal < 8% (H)       tamsulosin 0.4 MG capsule    FLOMAX    30 capsule    Take 1 capsule (0.4 mg) by mouth daily    Urinary frequency       TYLENOL PO      Take 1,000 mg by mouth 3 times daily        warfarin 5 MG tablet    COUMADIN    60 tablet    TAKE 1 TABLET BY MOUTH TUESDAY,THURS AND SUN. TAKE 1/2 TABLET ON MON,WED,FRI AND SAT. OR AS DIRECTED     Heart palpitations, New onset atrial fibrillation (H), Chronic anticoagulation       * Notice:  This list has 2 medication(s) that are the same as other medications prescribed for you. Read the directions carefully, and ask your doctor or other care provider to review them with you.

## 2018-09-21 NOTE — NURSING NOTE
"/68  Pulse 90  Temp 97.9  F (36.6  C) (Oral)  Resp 24  Ht 5' 11\" (1.803 m)  Wt 184 lb 3.2 oz (83.6 kg)  SpO2 98%  BMI 25.69 kg/m2    Pt will get flu vaccine elsewhere   "

## 2018-09-21 NOTE — PATIENT INSTRUCTIONS
Preventive Health Recommendations:       Male Ages 65 and over    Yearly exam:             See your health care provider every year in order to  o   Review health changes.   o   Discuss preventive care.    o   Review your medicines if your doctor has prescribed any.    Talk with your health care provider about whether you should have a test to screen for prostate cancer (PSA).    Every 3 years, have a diabetes test (fasting glucose). If you are at risk for diabetes, you should have this test more often.    Every 5 years, have a cholesterol test. Have this test more often if you are at risk for high cholesterol or heart disease.     Every 10 years, have a colonoscopy. Or, have a yearly FIT test (stool test). These exams will check for colon cancer.    Talk to with your health care provider about screening for Abdominal Aortic Aneurysm if you have a family history of AAA or have a history of smoking.  Shots:     Get a flu shot each year.     Get a tetanus shot every 10 years.     Talk to your doctor about your pneumonia vaccines. There are now two you should receive - Pneumovax (PPSV 23) and Prevnar (PCV 13).    Talk to your pharmacist about a shingles vaccine.     Talk to your doctor about the hepatitis B vaccine.  Nutrition:     Eat at least 5 servings of fruits and vegetables each day.     Eat whole-grain bread, whole-wheat pasta and brown rice instead of white grains and rice.     Get adequate Calcium and Vitamin D.   Lifestyle    Exercise for at least 150 minutes a week (30 minutes a day, 5 days a week). This will help you control your weight and prevent disease.     Limit alcohol to one drink per day.     No smoking.     Wear sunscreen to prevent skin cancer.     See your dentist every six months for an exam and cleaning.     See your eye doctor every 1 to 2 years to screen for conditions such as glaucoma, macular degeneration and cataracts.    At your visit today, we discussed your risk for falls and  preventive options.    Fall Prevention  Falls often occur due to slipping, tripping or losing your balance. Millions of people fall every year and injure themselves. Here are ways to reduce your risk of falling again.    Think about your fall, was there anything that caused your fall that can be fixed, removed, or replaced?    Make your home safe by keeping walkways clear of objects you may trip over.    Use non-slip pads under rugs. Do not use area rugs or small throw rugs.    Use non-slip mats in bathtubs and showers.    Install handrails and lights on staircases.    Do not walk in poorly lit areas.    Do not stand on chairs or wobbly ladders.    Use caution when reaching overhead or looking upward. This position can cause a loss of balance.    Be sure your shoes fit properly, have non-slip bottoms and are in good condition.     Wear shoes both inside and out. Avoid going barefoot or wearing slippers.    Be cautious when going up and down stairs, curbs, and when walking on uneven sidewalks.    If your balance is poor, consider using a cane or walker.    If your fall was related to alcohol use, stop or limit alcohol intake.     If your fall was related to use of sleeping medicines, talk to your doctor about this. You may need to reduce your dosage at bedtime if you awaken during the night to go to the bathroom.      To reduce the need for nighttime bathroom trips:  ? Avoid drinking fluids for several hours before going to bed  ? Empty your bladder before going to bed  ? Men can keep a urinal at the bedside    Stay as active as you can. Balance, flexibility, strength, and endurance all come from exercise. They all play a role in preventing falls. Ask your healthcare provider which types of activity are right for you.    Get your vision checked on a regular basis.    If you have pets, know where they are before you stand up or walk so you don't trip over them.    Use night lights.  Date Last Reviewed: 11/5/2015     7221-7792 [employer]. [address] [place] [address], [place] [address]. All rights reserved. This information is not intended as a substitute for professional medical care. Always follow your healthcare professional's instructions.

## 2018-09-29 DIAGNOSIS — L57.0 ACTINIC KERATOSIS: ICD-10-CM

## 2018-09-29 DIAGNOSIS — I48.20 CHRONIC ATRIAL FIBRILLATION (H): ICD-10-CM

## 2018-10-01 RX ORDER — GLIPIZIDE 10 MG/1
TABLET, FILM COATED, EXTENDED RELEASE ORAL
Qty: 180 TABLET | Refills: 0 | Status: SHIPPED | OUTPATIENT
Start: 2018-10-01 | End: 2018-12-28

## 2018-10-01 NOTE — TELEPHONE ENCOUNTER
Prescription approved per [facility] [place] [place] Refill Protocol.  Navya ELLISON RN

## 2018-10-01 NOTE — TELEPHONE ENCOUNTER
Requested Prescriptions   Pending Prescriptions Disp Refills     glipiZIDE (GLUCOTROL XL) 10 MG 24 hr tablet [Pharmacy Med Name: GLIPIZIDE ER 10 MG TABLET] 180 tablet 3    Last Written Prescription Date:  09/18/2017  Last Fill Quantity: 180,  # refills: 3   Last office visit: 9/21/2018 with prescribing provider:     Future Office Visit:   Next 5 appointments (look out 90 days)     Dec 19, 2018  7:40 AM CST   SHORT with Joselito Armas MD   [facility]    [address]  [facility] [address]   [phone]                Sig: TAKE 1 TABLET (10 MG) BY MOUTH 2 TIMES DAILY    Sulfonylurea Agents Passed    9/29/2018  9:01 PM       Passed - Blood pressure less than 140/90 in past 6 months    BP Readings from Last 3 Encounters:   09/21/18 118/68   08/30/18 138/78   08/03/18 126/74                Passed - Patient has documented LDL within the past 12 mos.    Recent Labs   Lab Test  12/27/17   1156   LDL  58            Passed - Patient has had a Microalbumin in the past 12 mos.    Recent Labs   Lab Test  12/27/17   1156   MICROL  27   UMALCR  47.56*            Passed - Patient has documented A1c within the specified period of time.    If HgbA1C is 8 or greater, it needs to be on file within the past 3 months.  If less than 8, must be on file within the past 6 months.     Recent Labs   Lab Test  06/22/18   1142   A1C  7.1*            Passed - Patient is age 18 or older       Passed - Patient has a recent creatinine (normal) within the past 12 mos.    Recent Labs   Lab Test  09/06/18   1316   04/09/12   1526   CR  0.93   < >   --    CREAT   --    --   1.1    < > = values in this interval not displayed.            Passed - Recent (6 mo) or future (30 days) visit within the authorizing provider's specialty    Patient had office visit in the last 6 months or has a visit in the next 30 days with authorizing provider or within the authorizing provider's specialty.  See  "\"Patient Info\" tab in inbasket, or \"Choose Columns\" in Meds & Orders section of the refill encounter.            "

## 2018-10-03 ENCOUNTER — OFFICE VISIT (OUTPATIENT)
Dept: CARDIOLOGY | Facility: CLINIC | Age: 83
End: 2018-10-03
Payer: COMMERCIAL

## 2018-10-03 VITALS
HEIGHT: 71 IN | SYSTOLIC BLOOD PRESSURE: 136 MMHG | DIASTOLIC BLOOD PRESSURE: 74 MMHG | HEART RATE: 68 BPM | BODY MASS INDEX: 26.29 KG/M2 | WEIGHT: 187.8 LBS

## 2018-10-03 DIAGNOSIS — I51.89 DIASTOLIC DYSFUNCTION: ICD-10-CM

## 2018-10-03 DIAGNOSIS — I87.2 VENOUS (PERIPHERAL) INSUFFICIENCY: ICD-10-CM

## 2018-10-03 DIAGNOSIS — R06.09 DOE (DYSPNEA ON EXERTION): ICD-10-CM

## 2018-10-03 LAB
ANION GAP SERPL CALCULATED.3IONS-SCNC: 5 MMOL/L (ref 3–14)
BUN SERPL-MCNC: 15 MG/DL (ref 7–30)
CALCIUM SERPL-MCNC: 8.9 MG/DL (ref 8.5–10.1)
CHLORIDE SERPL-SCNC: 105 MMOL/L (ref 94–109)
CO2 SERPL-SCNC: 28 MMOL/L (ref 20–32)
CREAT SERPL-MCNC: 0.92 MG/DL (ref 0.66–1.25)
GFR SERPL CREATININE-BSD FRML MDRD: 78 ML/MIN/1.7M2
GLUCOSE SERPL-MCNC: 122 MG/DL (ref 70–99)
POTASSIUM SERPL-SCNC: 4.2 MMOL/L (ref 3.4–5.3)
SODIUM SERPL-SCNC: 138 MMOL/L (ref 133–144)

## 2018-10-03 PROCEDURE — 36415 COLL VENOUS BLD VENIPUNCTURE: CPT | Performed by: PHYSICIAN ASSISTANT

## 2018-10-03 PROCEDURE — 99214 OFFICE O/P EST MOD 30 MIN: CPT | Performed by: INTERNAL MEDICINE

## 2018-10-03 PROCEDURE — 80048 BASIC METABOLIC PNL TOTAL CA: CPT | Performed by: PHYSICIAN ASSISTANT

## 2018-10-03 NOTE — LETTER
10/3/2018    Joselito Armas MD  [address] [facility] [address]    RE: Louis PACHECO Arianna Willis       Dear Colleague,    I had the pleasure of seeing Louis Jarrellayush Willis in the [facility].    Vascular Cardiology Consultation      Louis Keller Jr. MRN# [id number]   YOB: 1932 Age: 86 year old   Date of Visit 10/03/2018     Reason for consult:  Right lower extremity heaviness           Assessment and Plan:     1. Right lower extremity heaviness, could be secondary to peripheral venous hypertension    No arterial insufficiency.  Symptoms are unilateral mainly.  If bilateral, could be related to decreased heart rate with his chronic atrial fibrillation.  Could consider backing down on his diltiazem.    We discussed that due to his anatomy in the right lower extremity, vena seal may be more effective for him than radiofrequency ablation.  Could do vena seal of the greater saphenous vein, small saphenous vein and superficial tributary plus or minus sclerotherapy and phlebectomy of the varicose veins.  The varicose veins seem fairly small and may close up without aggressive therapy.    Follow-up in 6 weeks with Susanna Haque, to revisit if his right lower extremity exertional heaviness is getting worse or improving.  If improving then can cancel the vein ablation.  If worsening, would consider keeping the appointment.  Could consider decreasing his calcium channel blocker to allow more heart rate if he has generalized, bilateral symptoms.      This note was transcribed using electronic voice recognition software and may contain typographical errors.             Chief Complaint:   New Patient ( new vascular patient - is a cardiac patient who follows with Dr. Gleason and Ed SCHMIDT. Hx Chronic afib- on dilt and Warfarin)           History of Present Illness:   This patient is a very pleasant 86 year old male with exertional leg heaviness right greater than left.  No  "obstructive arterial disease on LIZ and echo with normal LV function.  He also had venous competency testing which was abnormal.  I reviewed the images of the test with the patient.      Impression:  1. Right leg: No DVT. Severe reflux (5.2s, 4.0mm) of the proximal GSV  in the thigh and superficial tributary (5.4s, 4.7mm) in the calf as  well as severe reflux in the small saphenous vein in the calf (6.4s,  2.2mm) with resultant refluxing venous varicosities (4.5s, 2.9mm).     2. Left leg: No DVT. Severe reflux (5.0s, 2.9mm) limited to distal GSV  at the ankle.        If clinically indicated could treat:  Right leg: RF ablation of GSV, RF ablation of SSV, sclerotherapy of  superficial tributary and varicose veins with phlebectomy of varicose  veins.     Left leg: Sclerotherapy of distal GSV vs. RF ablation of GSV +  sclerotherapy.      He has lower extremity heaviness in the right lower extremity, especially with exertion. It is uncomfortable.  There is some swelling.    History of ulcer: No  History of edema: Yes  History of compression stockings: Yes, previously.  Could not tolerate.  History of leg discomfort requiring analgesics: Yes leg discomfort, no analgesics.  History of itching, skin change or restless legs: Yes skin change.         Physical Exam:       Vitals: /74 (BP Location: Right arm, Patient Position: Sitting, Cuff Size: Adult Regular)  Pulse 68  Ht 1.803 m (5' 11\")  Wt 85.2 kg (187 lb 12.8 oz)  BMI 26.19 kg/m2  Constitutional:  cooperative, alert and oriented, well developed, well nourished, in no acute distress        Skin:  warm and dry to the touch, no apparent skin lesions or masses noted        Head:  normocephalic, no masses or lesions        Eyes:  pupils equal and round;conjunctivae and lids unremarkable;sclera white;no xanthalasma        ENT:  no pallor or cyanosis, dentition good        Neck:  JVP normal        Chest:  normal breath sounds, clear to auscultation, normal A-P " diameter, normal symmetry, normal respiratory excursion, no use of accessory muscles        Cardiac:   irregularly irregular rhythm         grade 1;early diastolic murmur good rate control, frequent ectopy    Abdomen:      benign    Extremities and Back:  no deformities, clubbing, cyanosis, erythema observed stasis pigmentation  , edema and telangiectasias as well as varicose veins right greater than left    Neurological:  no gross motor deficits;affect appropriate                      Past Medical History:   I have reviewed this patient's past medical history  Past Medical History:   Diagnosis Date     Antiplatelet or antithrombotic long-term use      Atrial fibrillation (H)      Diarrhea      Diastolic murmur      QUESADA (dyspnea on exertion)      Gout      Hemorrhage of gastrointestinal tract, unspecified 63,65,and 1971    hospitalized     History of blood transfusion      Hyperlipidemia LDL goal <100 4/18/2012     Long-term (current) use of anticoagulants [Z79.01] 3/31/2016     Mumps      Palpitations      Physical deconditioning 8/9/2013     Scarlet fever      Spider veins      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     Unspecified disease of pancreas 1990    pancreatitis             Past Surgical History:   I have reviewed this patient's past surgical history  Past Surgical History:   Procedure Laterality Date     ARTHROPLASTY KNEE  8/5/2013    Procedure: ARTHROPLASTY KNEE;  Left Total Knee Arthroplasty       C NONSPECIFIC PROCEDURE  Child    T&A     C NONSPECIFIC PROCEDURE  ; also 11/03    Colonoscopy     C NONSPECIFIC PROCEDURE      Basal cell cancer of the nose     C NONSPECIFIC PROCEDURE  1990    Cholecystectomy     CARDIOVERSION  9/6/11    failed               Social History:   I have reviewed this patient's social history  Social History   Substance Use Topics     Smoking status: Never Smoker     Smokeless tobacco: Never Used     Alcohol use 0.0 oz/week     0 Standard drinks or  equivalent per week      Comment: 1 glass of wine every day             Family History:   I have reviewed this patient's family history  Family History   Problem Relation Age of Onset     Alzheimer Disease Maternal Grandmother      Arthritis Maternal Grandmother      Cancer Mother      breast/ 1983/colon     Eye Disorder Mother      glaucoma and cataracts     HEART DISEASE Mother      angina/CABG     Hypertension Mother      Cancer Father      colon     Diabetes Maternal Aunt      AoDM             Allergies:     Allergies   Allergen Reactions     No Known Drug Allergies              Medications:   I have reviewed this patient's current medications  Current Outpatient Prescriptions   Medication Sig Dispense Refill     Acetaminophen (TYLENOL PO) Take 1,000 mg by mouth 3 times daily       cholestyramine (QUESTRAN) 4 G Packet Take 1 packet by mouth every evening       Chromium 1000 MCG TABS Take 500 mcg by mouth daily       diltiazem (CARDIZEM CD/CARTIA XT) 120 MG 24 hr capsule Take 1 capsule (120 mg) by mouth every evening 90 capsule 3     ferrous gluconate (FERGON) 324 (38 FE) MG tablet TAKE 1 TABLET (324 MG) BY MOUTH DAILY (WITH BREAKFAST) 100 tablet 3     fish oil-omega-3 fatty acids (FISH OIL) 1000 MG capsule Take 1 g by mouth 2 times daily        furosemide (LASIX) 20 MG tablet Take 1 tablet (20 mg) by mouth daily 30 tablet 3     glipiZIDE (GLUCOTROL XL) 10 MG 24 hr tablet TAKE 1 TABLET (10 MG) BY MOUTH 2 TIMES DAILY 180 tablet 0     loperamide (IMODIUM A-D) 2 MG tablet daily  30 tablet 0     metFORMIN (GLUCOPHAGE) 500 MG tablet Take 1 tablet (500 mg) by mouth 2 times daily (with meals) 180 tablet 1     multivitamin, therapeutic (THERA-VIT) TABS tablet Take 0.5 tablets by mouth 2 times daily       ONETOUCH ULTRA test strip USE TO TEST DAILY 100 strip 2     pioglitazone (ACTOS) 45 MG tablet TAKE 1 TABLET (45 MG) BY MOUTH DAILY 90 tablet 0     senna-docusate (SENOKOT-S;PERICOLACE) 8.6-50 MG per tablet Take 1  tablet by mouth 2 times daily as needed for constipation 100 tablet      warfarin (COUMADIN) 5 MG tablet TAKE 1 TABLET BY MOUTH TUESDAY,THURS AND SUN. TAKE 1/2 TABLET ON MON,WED,FRI AND SAT. OR AS DIRECTED 60 tablet 3     ACE NOT PRESCRIBED, INTENTIONAL, 1 each daily ACE Inhibitor not prescribed due to Risk for drug interaction 0 each 0     ASPIRIN NOT PRESCRIBED, INTENTIONAL, by Other route continuous prn Reported on 3/7/2017       STATIN NOT PRESCRIBED, INTENTIONAL, 1 each At Bedtime Statin not prescribed intentionally due to Risk for drug interaction (Patient not taking: Reported on 8/30/2018) 0 each 0     tamsulosin (FLOMAX) 0.4 MG capsule Take 1 capsule (0.4 mg) by mouth daily (Patient not taking: Reported on 6/22/2018) 30 capsule 11     [DISCONTINUED] metFORMIN (GLUCOPHAGE) 500 MG tablet TAKE 1 TABLET (500 MG) BY MOUTH 2 TIMES DAILY (WITH MEALS) 180 tablet 0               Review of Systems:   Review of Systems:  11 point review of systems performed and negative except as for HPI and PMH              Data:   All laboratory data reviewed  Lab Results   Component Value Date    CHOL 140 12/27/2017     Lab Results   Component Value Date    HDL 62 12/27/2017     Lab Results   Component Value Date    LDL 58 12/27/2017     Lab Results   Component Value Date    TRIG 100 12/27/2017     Lab Results   Component Value Date    CHOLHDLRATIO 2.0 03/20/2015     TSH   Date Value Ref Range Status   09/18/2017 3.07 0.40 - 4.00 mU/L Final     Last Basic Metabolic Panel:  Lab Results   Component Value Date     10/03/2018      Lab Results   Component Value Date    POTASSIUM 4.2 10/03/2018     Lab Results   Component Value Date    CHLORIDE 105 10/03/2018     Lab Results   Component Value Date    MICHAEL 8.9 10/03/2018     Lab Results   Component Value Date    CO2 28 10/03/2018     Lab Results   Component Value Date    BUN 15 10/03/2018     Lab Results   Component Value Date    CR 0.92 10/03/2018     Lab Results   Component Value  Date     10/03/2018     Lab Results   Component Value Date    WBC 6.6 06/22/2018     Lab Results   Component Value Date    RBC 4.15 06/22/2018     Lab Results   Component Value Date    HGB 13.1 06/22/2018     Lab Results   Component Value Date    HCT 40.1 06/22/2018     Lab Results   Component Value Date    MCV 97 06/22/2018     Lab Results   Component Value Date    MCH 31.6 06/22/2018     Lab Results   Component Value Date    MCHC 32.7 06/22/2018     Lab Results   Component Value Date    RDW 14.9 06/22/2018     Lab Results   Component Value Date     06/22/2018       Thank you for allowing me to participate in the care of your patient.      Sincerely,     Herbert Gleason MD     [facility]    cc:   Susanna Haque PA-C  [address] [place] [address]

## 2018-10-03 NOTE — MR AVS SNAPSHOT
After Visit Summary   10/3/2018    Louis Keller Jr.    MRN: [id number]           Patient Information     Date Of Birth          6/26/1932        Visit Information        Provider Department      10/3/2018 1:15 PM Herbert Gleason MD [facility]        Today's Diagnoses     Venous (peripheral) insufficiency           Follow-ups after your visit        Additional Services     Follow-Up with Cardiac Advanced Practice Provider                 Your next 10 appointments already scheduled     Oct 09, 2018 11:15 AM CDT   Anticoagulation Visit with [facility] ANTICOAGULATION CLINIC   [facility] ([facility])    [address] [address]   [phone]            Nov 15, 2018  1:10 PM CST   Return Visit with Susanna Haque PA-C   [facility] ([facility])    [address]  [place] [address]   [phone]            Dec 10, 2018  2:00 PM CST   US ENDOVENOUS ABLATION THERAPY INCOMPETENT VEIN RT, 2OR>VEINS with SUVUS1   [facility] ([facility])    [address]  [place] [address]   [phone]            Dec 12, 2018 11:30 AM CST   US LOWER EXTREMITY VENOUS DUPLEX RIGHT with SUVUS1   [facility] ([facility])    [address]  [place] [address]   [phone]           How do I prepare for my exam? (Food and drink instructions) No Food and Drink Restrictions.  How do I prepare for my exam? (Other instructions) You do not need to do anything special to prepare for your exam.  What should I wear: Wear comfortable clothes.  How long does the exam take: Most ultrasounds take 30 to 60 minutes.  What should I bring: Bring a list of your medicines, including vitamins, minerals and over-the-counter drugs. It  is safest to leave personal items at home.  Do I need a :  No  is needed.  What do I need to tell my doctor: Tell your doctor about any allergies you may have.  What should I do after the exam: No restrictions, You may resume normal activities.  What is this test: An ultrasound uses sound waves to make pictures of the body. Sound waves do not cause pain. The only discomfort may be the pressure of the wand against your skin or full bladder.  Who should I call with questions: If you have any questions, please call the Imaging Department where you will have your exam. Directions, parking instructions, and other information is available on our website, [email or web address].            Dec 19, 2018  7:40 AM CST   SHORT with Joselito Armas MD   [facility] ([facility])    [address]  [facility] [address]   [phone]              Future tests that were ordered for you today     Open Future Orders        Priority Expected Expires Ordered    US Endoveneous Ablat Thpy Incmpnt Vn Rt, 2Vn Routine 12/10/2018 10/3/2019 10/3/2018    US Sclerotherapy Lower Extremity Multiple Vein Right Routine 12/10/2018 10/3/2019 10/3/2018    US Lower Extremity Venous Duplex Right Routine 12/12/2018 10/3/2019 10/3/2018    Follow-Up with Cardiac Advanced Practice Provider Routine 11/13/2018 10/3/2019 10/3/2018            Who to contact     If you have questions or need follow up information about today's clinic visit or your schedule please contact [facility] directly at [phone].  Normal or non-critical lab and imaging results will be communicated to you by MyChart, letter or phone within 4 business days after the clinic has received the results. If you do not hear from us within 7 days, please contact the clinic through MyChart or phone. If you have a critical or abnormal lab result, we will notify you by phone as soon as  "possible.  Submit refill requests through [facility] or call your pharmacy and they will forward the refill request to us. Please allow 3 business days for your refill to be completed.          Additional Information About Your Visit        Care EveryWhere ID     This is your Care EveryWhere ID. This could be used by other organizations to access your [place] medical records  [id number]        Your Vitals Were     Pulse Height BMI (Body Mass Index)             68 1.803 m (5' 11\") 26.19 kg/m2          Blood Pressure from Last 3 Encounters:   10/03/18 136/74   09/21/18 118/68   08/30/18 138/78    Weight from Last 3 Encounters:   10/03/18 85.2 kg (187 lb 12.8 oz)   09/21/18 83.6 kg (184 lb 3.2 oz)   08/30/18 84.4 kg (186 lb)              We Performed the Following     Follow-Up with Vascular Cardiologist        Primary Care Provider Office Phone # Fax #    Joselito Armas [phone]       [address] [facility] [address]        Equal Access to Services     [facility]: Hadii aad ku hadasho Soomaali, waaxda luqadaha, qaybta kaalmada adeegyajerrica, emanuel kelsey . So [facility] [phone].    ATENCIÓN: Si habla español, tiene a cao disposición servicios gratuitos de asistencia lingüística. [facility] [phone].    We comply with applicable federal civil rights laws and [place] laws. We do not discriminate on the basis of race, color, national origin, age, disability, sex, sexual orientation, or gender identity.            Thank you!     Thank you for choosing [facility]  for your care. Our goal is always to provide you with excellent care. Hearing back from our patients is one way we can continue to improve our services. Please take a few minutes to complete the written survey that you may receive in the mail after your visit with us. Thank you!             Your Updated Medication List - Protect others around you: Learn how to " safely use, store and throw away your medicines at www.disposemymeds.org.          This list is accurate as of 10/3/18  2:24 PM.  Always use your most recent med list.                   Brand Name Dispense Instructions for use Diagnosis    ACE NOT PRESCRIBED (INTENTIONAL)     0 each    1 each daily ACE Inhibitor not prescribed due to Risk for drug interaction    Type 2 diabetes, HbA1C goal < 8% (H)       ASPIRIN NOT PRESCRIBED    INTENTIONAL     by Other route continuous prn Reported on 3/7/2017        cholestyramine 4 g Packet    QUESTRAN     Take 1 packet by mouth every evening        Chromium 1000 MCG Tabs      Take 500 mcg by mouth daily        diltiazem 120 MG 24 hr capsule    CARDIZEM CD/CARTIA XT    90 capsule    Take 1 capsule (120 mg) by mouth every evening    Chronic atrial fibrillation (H)       ferrous gluconate 324 (38 Fe) MG tablet    FERGON    100 tablet    TAKE 1 TABLET (324 MG) BY MOUTH DAILY (WITH BREAKFAST)    Anemia due to blood loss, acute       fish oil-omega-3 fatty acids 1000 MG capsule      Take 1 g by mouth 2 times daily        furosemide 20 MG tablet    LASIX    30 tablet    Take 1 tablet (20 mg) by mouth daily    QUESADA (dyspnea on exertion), Diastolic dysfunction, Bilateral leg edema       glipiZIDE 10 MG 24 hr tablet    GLUCOTROL XL    180 tablet    TAKE 1 TABLET (10 MG) BY MOUTH 2 TIMES DAILY    Chronic atrial fibrillation (H), Actinic keratosis       loperamide 2 MG tablet    IMODIUM A-D    30 tablet    daily        metFORMIN 500 MG tablet    GLUCOPHAGE    180 tablet    Take 1 tablet (500 mg) by mouth 2 times daily (with meals)    Type 2 diabetes mellitus with stage 3 chronic kidney disease, without long-term current use of insulin (H)       multivitamin, therapeutic Tabs tablet      Take 0.5 tablets by mouth 2 times daily        ONETOUCH ULTRA test strip   Generic drug:  blood glucose monitoring     100 strip    USE TO TEST DAILY    Type 2 diabetes mellitus with stage 3 chronic kidney  disease (H)       pioglitazone 45 MG tablet    ACTOS    90 tablet    TAKE 1 TABLET (45 MG) BY MOUTH DAILY    Type 2 diabetes mellitus with stage 3 chronic kidney disease, without long-term current use of insulin (H)       senna-docusate 8.6-50 MG per tablet    SENOKOT-S;PERICOLACE    100 tablet    Take 1 tablet by mouth 2 times daily as needed for constipation    Constipation, unspecified constipation type       STATIN NOT PRESCRIBED (INTENTIONAL)     0 each    1 each At Bedtime Statin not prescribed intentionally due to Risk for drug interaction    Type 2 diabetes, HbA1C goal < 8% (H)       tamsulosin 0.4 MG capsule    FLOMAX    30 capsule    Take 1 capsule (0.4 mg) by mouth daily    Urinary frequency       TYLENOL PO      Take 1,000 mg by mouth 3 times daily        warfarin 5 MG tablet    COUMADIN    60 tablet    TAKE 1 TABLET BY MOUTH TUESDAY,THURS AND SUN. TAKE 1/2 TABLET ON MON,WED,FRI AND SAT. OR AS DIRECTED    Heart palpitations, New onset atrial fibrillation (H), Chronic anticoagulation

## 2018-10-03 NOTE — PROGRESS NOTES
Problem: Non-Pressure Injury Wound  Goal: # No deterioration in wound  Outcome: Outcome Met, Continue evaluating goal progress toward completion  [place] seen by Wound Care RN today for dressing change & leg wrap change.  Wounds measuring smaller. There are 2 new small very superficial areas to the right medial leg. Acticoat then xtrasorb over that applied then visco & profore leg wrap.  Follow up with NP in 1 week.       Vascular Cardiology Consultation      Louis Keller Jr. MRN# [id number]   YOB: 1932 Age: 86 year old   Date of Visit 10/03/2018     Reason for consult:  Right lower extremity heaviness           Assessment and Plan:     1. Right lower extremity heaviness, could be secondary to peripheral venous hypertension    No arterial insufficiency.  Symptoms are unilateral mainly.  If bilateral, could be related to decreased heart rate with his chronic atrial fibrillation.  Could consider backing down on his diltiazem.    We discussed that due to his anatomy in the right lower extremity, vena seal may be more effective for him than radiofrequency ablation.  Could do vena seal of the greater saphenous vein, small saphenous vein and superficial tributary plus or minus sclerotherapy and phlebectomy of the varicose veins.  The varicose veins seem fairly small and may close up without aggressive therapy.    Follow-up in 6 weeks with Susanna Haque, to revisit if his right lower extremity exertional heaviness is getting worse or improving.  If improving then can cancel the vein ablation.  If worsening, would consider keeping the appointment.  Could consider decreasing his calcium channel blocker to allow more heart rate if he has generalized, bilateral symptoms.      This note was transcribed using electronic voice recognition software and may contain typographical errors.             Chief Complaint:   New Patient ( new vascular patient - is a cardiac patient who follows with Dr. Gleason and Ed SCHMIDT. Hx Chronic afib- on dilt and Warfarin)           History of Present Illness:   This patient is a very pleasant 86 year old male with exertional leg heaviness right greater than left.  No obstructive arterial disease on LIZ and echo with normal LV function.  He also had venous competency testing which was abnormal.  I reviewed the images of the test with the patient.      Impression:  1. Right leg: No DVT. Severe reflux  "(5.2s, 4.0mm) of the proximal GSV  in the thigh and superficial tributary (5.4s, 4.7mm) in the calf as  well as severe reflux in the small saphenous vein in the calf (6.4s,  2.2mm) with resultant refluxing venous varicosities (4.5s, 2.9mm).     2. Left leg: No DVT. Severe reflux (5.0s, 2.9mm) limited to distal GSV  at the ankle.        If clinically indicated could treat:  Right leg: RF ablation of GSV, RF ablation of SSV, sclerotherapy of  superficial tributary and varicose veins with phlebectomy of varicose  veins.     Left leg: Sclerotherapy of distal GSV vs. RF ablation of GSV +  sclerotherapy.      He has lower extremity heaviness in the right lower extremity, especially with exertion. It is uncomfortable.  There is some swelling.    History of ulcer: No  History of edema: Yes  History of compression stockings: Yes, previously.  Could not tolerate.  History of leg discomfort requiring analgesics: Yes leg discomfort, no analgesics.  History of itching, skin change or restless legs: Yes skin change.         Physical Exam:       Vitals: /74 (BP Location: Right arm, Patient Position: Sitting, Cuff Size: Adult Regular)  Pulse 68  Ht 1.803 m (5' 11\")  Wt 85.2 kg (187 lb 12.8 oz)  BMI 26.19 kg/m2  Constitutional:  cooperative, alert and oriented, well developed, well nourished, in no acute distress        Skin:  warm and dry to the touch, no apparent skin lesions or masses noted        Head:  normocephalic, no masses or lesions        Eyes:  pupils equal and round;conjunctivae and lids unremarkable;sclera white;no xanthalasma        ENT:  no pallor or cyanosis, dentition good        Neck:  JVP normal        Chest:  normal breath sounds, clear to auscultation, normal A-P diameter, normal symmetry, normal respiratory excursion, no use of accessory muscles        Cardiac:   irregularly irregular rhythm         grade 1;early diastolic murmur good rate control, frequent ectopy    Abdomen:      " benign    Extremities and Back:  no deformities, clubbing, cyanosis, erythema observed stasis pigmentation  , edema and telangiectasias as well as varicose veins right greater than left    Neurological:  no gross motor deficits;affect appropriate                      Past Medical History:   I have reviewed this patient's past medical history  Past Medical History:   Diagnosis Date     Antiplatelet or antithrombotic long-term use      Atrial fibrillation (H)      Diarrhea      Diastolic murmur      QUESADA (dyspnea on exertion)      Gout      Hemorrhage of gastrointestinal tract, unspecified 63,65,and 1971    hospitalized     History of blood transfusion      Hyperlipidemia LDL goal <100 4/18/2012     Long-term (current) use of anticoagulants [Z79.01] 3/31/2016     Mumps      Palpitations      Physical deconditioning 8/9/2013     Scarlet fever      Spider veins      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     Unspecified disease of pancreas 1990    pancreatitis             Past Surgical History:   I have reviewed this patient's past surgical history  Past Surgical History:   Procedure Laterality Date     ARTHROPLASTY KNEE  8/5/2013    Procedure: ARTHROPLASTY KNEE;  Left Total Knee Arthroplasty       C NONSPECIFIC PROCEDURE  Child    T&A     C NONSPECIFIC PROCEDURE  ; also 11/03    Colonoscopy     C NONSPECIFIC PROCEDURE      Basal cell cancer of the nose     C NONSPECIFIC PROCEDURE  1990    Cholecystectomy     CARDIOVERSION  9/6/11    failed               Social History:   I have reviewed this patient's social history  Social History   Substance Use Topics     Smoking status: Never Smoker     Smokeless tobacco: Never Used     Alcohol use 0.0 oz/week     0 Standard drinks or equivalent per week      Comment: 1 glass of wine every day             Family History:   I have reviewed this patient's family history  Family History   Problem Relation Age of Onset     Alzheimer Disease Maternal Grandmother       Arthritis Maternal Grandmother      Cancer Mother      breast/ /colon     Eye Disorder Mother      glaucoma and cataracts     HEART DISEASE Mother      angina/CABG     Hypertension Mother      Cancer Father      colon     Diabetes Maternal Aunt      AoDM             Allergies:     Allergies   Allergen Reactions     No Known Drug Allergies              Medications:   I have reviewed this patient's current medications  Current Outpatient Prescriptions   Medication Sig Dispense Refill     Acetaminophen (TYLENOL PO) Take 1,000 mg by mouth 3 times daily       cholestyramine (QUESTRAN) 4 G Packet Take 1 packet by mouth every evening       Chromium 1000 MCG TABS Take 500 mcg by mouth daily       diltiazem (CARDIZEM CD/CARTIA XT) 120 MG 24 hr capsule Take 1 capsule (120 mg) by mouth every evening 90 capsule 3     ferrous gluconate (FERGON) 324 (38 FE) MG tablet TAKE 1 TABLET (324 MG) BY MOUTH DAILY (WITH BREAKFAST) 100 tablet 3     fish oil-omega-3 fatty acids (FISH OIL) 1000 MG capsule Take 1 g by mouth 2 times daily        furosemide (LASIX) 20 MG tablet Take 1 tablet (20 mg) by mouth daily 30 tablet 3     glipiZIDE (GLUCOTROL XL) 10 MG 24 hr tablet TAKE 1 TABLET (10 MG) BY MOUTH 2 TIMES DAILY 180 tablet 0     loperamide (IMODIUM A-D) 2 MG tablet daily  30 tablet 0     metFORMIN (GLUCOPHAGE) 500 MG tablet Take 1 tablet (500 mg) by mouth 2 times daily (with meals) 180 tablet 1     multivitamin, therapeutic (THERA-VIT) TABS tablet Take 0.5 tablets by mouth 2 times daily       ONETOUCH ULTRA test strip USE TO TEST DAILY 100 strip 2     pioglitazone (ACTOS) 45 MG tablet TAKE 1 TABLET (45 MG) BY MOUTH DAILY 90 tablet 0     senna-docusate (SENOKOT-S;PERICOLACE) 8.6-50 MG per tablet Take 1 tablet by mouth 2 times daily as needed for constipation 100 tablet      warfarin (COUMADIN) 5 MG tablet TAKE 1 TABLET BY MOUTH TUESDAY,THURS AND SUN. TAKE 1/2 TABLET ON MON,WED,FRI AND SAT. OR AS DIRECTED 60 tablet 3     ACE  NOT PRESCRIBED, INTENTIONAL, 1 each daily ACE Inhibitor not prescribed due to Risk for drug interaction 0 each 0     ASPIRIN NOT PRESCRIBED, INTENTIONAL, by Other route continuous prn Reported on 3/7/2017       STATIN NOT PRESCRIBED, INTENTIONAL, 1 each At Bedtime Statin not prescribed intentionally due to Risk for drug interaction (Patient not taking: Reported on 8/30/2018) 0 each 0     tamsulosin (FLOMAX) 0.4 MG capsule Take 1 capsule (0.4 mg) by mouth daily (Patient not taking: Reported on 6/22/2018) 30 capsule 11     [DISCONTINUED] metFORMIN (GLUCOPHAGE) 500 MG tablet TAKE 1 TABLET (500 MG) BY MOUTH 2 TIMES DAILY (WITH MEALS) 180 tablet 0               Review of Systems:   Review of Systems:  11 point review of systems performed and negative except as for HPI and PMH              Data:   All laboratory data reviewed  Lab Results   Component Value Date    CHOL 140 12/27/2017     Lab Results   Component Value Date    HDL 62 12/27/2017     Lab Results   Component Value Date    LDL 58 12/27/2017     Lab Results   Component Value Date    TRIG 100 12/27/2017     Lab Results   Component Value Date    CHOLHDLRATIO 2.0 03/20/2015     TSH   Date Value Ref Range Status   09/18/2017 3.07 0.40 - 4.00 mU/L Final     Last Basic Metabolic Panel:  Lab Results   Component Value Date     10/03/2018      Lab Results   Component Value Date    POTASSIUM 4.2 10/03/2018     Lab Results   Component Value Date    CHLORIDE 105 10/03/2018     Lab Results   Component Value Date    MICHAEL 8.9 10/03/2018     Lab Results   Component Value Date    CO2 28 10/03/2018     Lab Results   Component Value Date    BUN 15 10/03/2018     Lab Results   Component Value Date    CR 0.92 10/03/2018     Lab Results   Component Value Date     10/03/2018     Lab Results   Component Value Date    WBC 6.6 06/22/2018     Lab Results   Component Value Date    RBC 4.15 06/22/2018     Lab Results   Component Value Date    HGB 13.1 06/22/2018     Lab Results    Component Value Date    HCT 40.1 06/22/2018     Lab Results   Component Value Date    MCV 97 06/22/2018     Lab Results   Component Value Date    MCH 31.6 06/22/2018     Lab Results   Component Value Date    MCHC 32.7 06/22/2018     Lab Results   Component Value Date    RDW 14.9 06/22/2018     Lab Results   Component Value Date     06/22/2018

## 2018-10-03 NOTE — LETTER
10/3/2018    Joselito Armas MD  [address] [facility] [address]    RE: Louis PACHECO Arianna Willis       Dear Colleague,    I had the pleasure of seeing Louis Jarrellayush Willis in the [facility].    Vascular Cardiology Consultation      Louis Keller Jr. MRN# [id number]   YOB: 1932 Age: 86 year old   Date of Visit 10/03/2018     Reason for consult:  Right lower extremity heaviness           Assessment and Plan:     1. Right lower extremity heaviness, could be secondary to peripheral venous hypertension    No arterial insufficiency.  Symptoms are unilateral mainly.  If bilateral, could be related to decreased heart rate with his chronic atrial fibrillation.  Could consider backing down on his diltiazem.    We discussed that due to his anatomy in the right lower extremity, vena seal may be more effective for him than radiofrequency ablation.  Could do vena seal of the greater saphenous vein, small saphenous vein and superficial tributary plus or minus sclerotherapy and phlebectomy of the varicose veins.  The varicose veins seem fairly small and may close up without aggressive therapy.    Follow-up in 6 weeks with Susanna Haque, to revisit if his right lower extremity exertional heaviness is getting worse or improving.  If improving then can cancel the vein ablation.  If worsening, would consider keeping the appointment.  Could consider decreasing his calcium channel blocker to allow more heart rate if he has generalized, bilateral symptoms.      This note was transcribed using electronic voice recognition software and may contain typographical errors.             Chief Complaint:   New Patient ( new vascular patient - is a cardiac patient who follows with Dr. Gleason and Ed SCHMIDT. Hx Chronic afib- on dilt and Warfarin)           History of Present Illness:   This patient is a very pleasant 86 year old male with exertional leg heaviness right greater than left.  No  "obstructive arterial disease on LIZ and echo with normal LV function.  He also had venous competency testing which was abnormal.  I reviewed the images of the test with the patient.      Impression:  1. Right leg: No DVT. Severe reflux (5.2s, 4.0mm) of the proximal GSV  in the thigh and superficial tributary (5.4s, 4.7mm) in the calf as  well as severe reflux in the small saphenous vein in the calf (6.4s,  2.2mm) with resultant refluxing venous varicosities (4.5s, 2.9mm).     2. Left leg: No DVT. Severe reflux (5.0s, 2.9mm) limited to distal GSV  at the ankle.        If clinically indicated could treat:  Right leg: RF ablation of GSV, RF ablation of SSV, sclerotherapy of  superficial tributary and varicose veins with phlebectomy of varicose  veins.     Left leg: Sclerotherapy of distal GSV vs. RF ablation of GSV +  sclerotherapy.      He has lower extremity heaviness in the right lower extremity, especially with exertion. It is uncomfortable.  There is some swelling.    History of ulcer: No  History of edema: Yes  History of compression stockings: Yes, previously.  Could not tolerate.  History of leg discomfort requiring analgesics: Yes leg discomfort, no analgesics.  History of itching, skin change or restless legs: Yes skin change.         Physical Exam:       Vitals: /74 (BP Location: Right arm, Patient Position: Sitting, Cuff Size: Adult Regular)  Pulse 68  Ht 1.803 m (5' 11\")  Wt 85.2 kg (187 lb 12.8 oz)  BMI 26.19 kg/m2  Constitutional:  cooperative, alert and oriented, well developed, well nourished, in no acute distress        Skin:  warm and dry to the touch, no apparent skin lesions or masses noted        Head:  normocephalic, no masses or lesions        Eyes:  pupils equal and round;conjunctivae and lids unremarkable;sclera white;no xanthalasma        ENT:  no pallor or cyanosis, dentition good        Neck:  JVP normal        Chest:  normal breath sounds, clear to auscultation, normal A-P " diameter, normal symmetry, normal respiratory excursion, no use of accessory muscles        Cardiac:   irregularly irregular rhythm         grade 1;early diastolic murmur good rate control, frequent ectopy    Abdomen:      benign    Extremities and Back:  no deformities, clubbing, cyanosis, erythema observed stasis pigmentation  , edema and telangiectasias as well as varicose veins right greater than left    Neurological:  no gross motor deficits;affect appropriate                      Past Medical History:   I have reviewed this patient's past medical history  Past Medical History:   Diagnosis Date     Antiplatelet or antithrombotic long-term use      Atrial fibrillation (H)      Diarrhea      Diastolic murmur      QUESADA (dyspnea on exertion)      Gout      Hemorrhage of gastrointestinal tract, unspecified 63,65,and 1971    hospitalized     History of blood transfusion      Hyperlipidemia LDL goal <100 4/18/2012     Long-term (current) use of anticoagulants [Z79.01] 3/31/2016     Mumps      Palpitations      Physical deconditioning 8/9/2013     Scarlet fever      Spider veins      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     Unspecified disease of pancreas 1990    pancreatitis             Past Surgical History:   I have reviewed this patient's past surgical history  Past Surgical History:   Procedure Laterality Date     ARTHROPLASTY KNEE  8/5/2013    Procedure: ARTHROPLASTY KNEE;  Left Total Knee Arthroplasty       C NONSPECIFIC PROCEDURE  Child    T&A     C NONSPECIFIC PROCEDURE  ; also 11/03    Colonoscopy     C NONSPECIFIC PROCEDURE      Basal cell cancer of the nose     C NONSPECIFIC PROCEDURE  1990    Cholecystectomy     CARDIOVERSION  9/6/11    failed               Social History:   I have reviewed this patient's social history  Social History   Substance Use Topics     Smoking status: Never Smoker     Smokeless tobacco: Never Used     Alcohol use 0.0 oz/week     0 Standard drinks or  equivalent per week      Comment: 1 glass of wine every day             Family History:   I have reviewed this patient's family history  Family History   Problem Relation Age of Onset     Alzheimer Disease Maternal Grandmother      Arthritis Maternal Grandmother      Cancer Mother      breast/ 1983/colon     Eye Disorder Mother      glaucoma and cataracts     HEART DISEASE Mother      angina/CABG     Hypertension Mother      Cancer Father      colon     Diabetes Maternal Aunt      AoDM             Allergies:     Allergies   Allergen Reactions     No Known Drug Allergies              Medications:   I have reviewed this patient's current medications  Current Outpatient Prescriptions   Medication Sig Dispense Refill     Acetaminophen (TYLENOL PO) Take 1,000 mg by mouth 3 times daily       cholestyramine (QUESTRAN) 4 G Packet Take 1 packet by mouth every evening       Chromium 1000 MCG TABS Take 500 mcg by mouth daily       diltiazem (CARDIZEM CD/CARTIA XT) 120 MG 24 hr capsule Take 1 capsule (120 mg) by mouth every evening 90 capsule 3     ferrous gluconate (FERGON) 324 (38 FE) MG tablet TAKE 1 TABLET (324 MG) BY MOUTH DAILY (WITH BREAKFAST) 100 tablet 3     fish oil-omega-3 fatty acids (FISH OIL) 1000 MG capsule Take 1 g by mouth 2 times daily        furosemide (LASIX) 20 MG tablet Take 1 tablet (20 mg) by mouth daily 30 tablet 3     glipiZIDE (GLUCOTROL XL) 10 MG 24 hr tablet TAKE 1 TABLET (10 MG) BY MOUTH 2 TIMES DAILY 180 tablet 0     loperamide (IMODIUM A-D) 2 MG tablet daily  30 tablet 0     metFORMIN (GLUCOPHAGE) 500 MG tablet Take 1 tablet (500 mg) by mouth 2 times daily (with meals) 180 tablet 1     multivitamin, therapeutic (THERA-VIT) TABS tablet Take 0.5 tablets by mouth 2 times daily       ONETOUCH ULTRA test strip USE TO TEST DAILY 100 strip 2     pioglitazone (ACTOS) 45 MG tablet TAKE 1 TABLET (45 MG) BY MOUTH DAILY 90 tablet 0     senna-docusate (SENOKOT-S;PERICOLACE) 8.6-50 MG per tablet Take 1  tablet by mouth 2 times daily as needed for constipation 100 tablet      warfarin (COUMADIN) 5 MG tablet TAKE 1 TABLET BY MOUTH TUESDAY,THURS AND SUN. TAKE 1/2 TABLET ON MON,WED,FRI AND SAT. OR AS DIRECTED 60 tablet 3     ACE NOT PRESCRIBED, INTENTIONAL, 1 each daily ACE Inhibitor not prescribed due to Risk for drug interaction 0 each 0     ASPIRIN NOT PRESCRIBED, INTENTIONAL, by Other route continuous prn Reported on 3/7/2017       STATIN NOT PRESCRIBED, INTENTIONAL, 1 each At Bedtime Statin not prescribed intentionally due to Risk for drug interaction (Patient not taking: Reported on 8/30/2018) 0 each 0     tamsulosin (FLOMAX) 0.4 MG capsule Take 1 capsule (0.4 mg) by mouth daily (Patient not taking: Reported on 6/22/2018) 30 capsule 11     [DISCONTINUED] metFORMIN (GLUCOPHAGE) 500 MG tablet TAKE 1 TABLET (500 MG) BY MOUTH 2 TIMES DAILY (WITH MEALS) 180 tablet 0               Review of Systems:   Review of Systems:  11 point review of systems performed and negative except as for HPI and PMH              Data:   All laboratory data reviewed  Lab Results   Component Value Date    CHOL 140 12/27/2017     Lab Results   Component Value Date    HDL 62 12/27/2017     Lab Results   Component Value Date    LDL 58 12/27/2017     Lab Results   Component Value Date    TRIG 100 12/27/2017     Lab Results   Component Value Date    CHOLHDLRATIO 2.0 03/20/2015     TSH   Date Value Ref Range Status   09/18/2017 3.07 0.40 - 4.00 mU/L Final     Last Basic Metabolic Panel:  Lab Results   Component Value Date     10/03/2018      Lab Results   Component Value Date    POTASSIUM 4.2 10/03/2018     Lab Results   Component Value Date    CHLORIDE 105 10/03/2018     Lab Results   Component Value Date    MICHAEL 8.9 10/03/2018     Lab Results   Component Value Date    CO2 28 10/03/2018     Lab Results   Component Value Date    BUN 15 10/03/2018     Lab Results   Component Value Date    CR 0.92 10/03/2018     Lab Results   Component Value  Date     10/03/2018     Lab Results   Component Value Date    WBC 6.6 06/22/2018     Lab Results   Component Value Date    RBC 4.15 06/22/2018     Lab Results   Component Value Date    HGB 13.1 06/22/2018     Lab Results   Component Value Date    HCT 40.1 06/22/2018     Lab Results   Component Value Date    MCV 97 06/22/2018     Lab Results   Component Value Date    MCH 31.6 06/22/2018     Lab Results   Component Value Date    MCHC 32.7 06/22/2018     Lab Results   Component Value Date    RDW 14.9 06/22/2018     Lab Results   Component Value Date     06/22/2018       Thank you for allowing me to participate in the care of your patient.    Sincerely,     Herbert Gleason MD     [facility]

## 2018-10-09 ENCOUNTER — ANTICOAGULATION THERAPY VISIT (OUTPATIENT)
Dept: NURSING | Facility: CLINIC | Age: 83
End: 2018-10-09
Payer: COMMERCIAL

## 2018-10-09 DIAGNOSIS — I48.20 CHRONIC ATRIAL FIBRILLATION (H): ICD-10-CM

## 2018-10-09 LAB — INR POINT OF CARE: 2.3 (ref 0.86–1.14)

## 2018-10-09 PROCEDURE — 99207 ZZC NO CHARGE NURSE ONLY: CPT

## 2018-10-09 PROCEDURE — 36416 COLLJ CAPILLARY BLOOD SPEC: CPT

## 2018-10-09 PROCEDURE — 85610 PROTHROMBIN TIME: CPT | Mod: QW

## 2018-10-09 NOTE — PROGRESS NOTES
ANTICOAGULATION FOLLOW-UP CLINIC VISIT    Patient Name:  Louis Keller Jr.  Date:  10/9/2018  Contact Type:  Face to Face    SUBJECTIVE:     Patient Findings     Positives No Problem Findings           OBJECTIVE    INR Protime   Date Value Ref Range Status   10/09/2018 2.3 (A) 0.86 - 1.14 Final       ASSESSMENT / PLAN  INR assessment THER    Recheck INR In: 6 WEEKS    INR Location Clinic      Anticoagulation Summary as of 10/9/2018     INR goal 2.0-3.0   Today's INR 2.3   Warfarin maintenance plan 5 mg (5 mg x 1) on Sun, Tue, Thu; 2.5 mg (5 mg x 0.5) all other days   Full warfarin instructions 5 mg on Sun, Tue, Thu; 2.5 mg all other days   Weekly warfarin total 25 mg   No change documented Hope Baumann RN   Plan last modified Monica Souza RN (5/12/2016)   Next INR check 11/20/2018   Priority INR   Target end date     Indications   Long-term (current) use of anticoagulants [Z79.01] [Z79.01]  Atrial fibrillation (H) [I48.91]         Anticoagulation Episode Summary     INR check location     Preferred lab     Send INR reminders to [facility]    Comments likes calendar printout.        Anticoagulation Care Providers     Provider Role Specialty Phone number    Joselito Armas MD Responsible Internal Medicine [phone]            See the Encounter Report to view Anticoagulation Flowsheet and Dosing Calendar (Go to Encounters tab in chart review, and find the Anticoagulation Therapy Visit)        Hope Baumann, RN

## 2018-10-09 NOTE — MR AVS SNAPSHOT
Louis Keller Jr.   10/9/2018 11:15 AM   Anticoagulation Therapy Visit    Description:  86 year old male   Provider:  [facility]   Department:  [facility] Nurse           INR as of 10/9/2018     Today's INR 2.3      Anticoagulation Summary as of 10/9/2018     INR goal 2.0-3.0   Today's INR 2.3   Full warfarin instructions 5 mg on Sun, Tue, Thu; 2.5 mg all other days   Next INR check 11/20/2018    Indications   Long-term (current) use of anticoagulants [Z79.01] [Z79.01]  Atrial fibrillation (H) [I48.91]         Your next Anticoagulation Clinic appointment(s)     Nov 20, 2018 11:15 AM CST   Anticoagulation Visit with [facility]   [facility]    [address] [place] [id number] [phone]              Contact Numbers     Clinic Number:         October 2018 Details    Sun Mon Tue Wed Thu Fri Sat      1               2               3               4               5               6                 7               8               9      5 mg   See details      10      2.5 mg         11      5 mg         12      2.5 mg         13      2.5 mg           14      5 mg         15      2.5 mg         16      5 mg         17      2.5 mg         18      5 mg         19      2.5 mg         20      2.5 mg           21      5 mg         22      2.5 mg         23      5 mg         24      2.5 mg         25      5 mg         26      2.5 mg         27      2.5 mg           28      5 mg         29      2.5 mg         30      5 mg         31      2.5 mg             Date Details   10/09 This INR check               How to take your warfarin dose     To take:  2.5 mg Take 0.5 of a 5 mg tablet.    To take:  5 mg Take 1 of the 5 mg tablets.           November 2018 Details    Sun Mon Tue Wed Thu Fri Sat         1      5 mg         2      2.5 mg         3      2.5 mg           4      5 mg         5      2.5 mg         6      5 mg         7       2.5 mg         8      5 mg         9      2.5 mg         10      2.5 mg           11      5 mg         12      2.5 mg         13      5 mg         14      2.5 mg         15      5 mg         16      2.5 mg         17      2.5 mg           18      5 mg         19      2.5 mg         20            21               22               23               24                 25               26               27               28               29               30                 Date Details   No additional details    Date of next INR:  11/20/2018         How to take your warfarin dose     To take:  2.5 mg Take 0.5 of a 5 mg tablet.    To take:  5 mg Take 1 of the 5 mg tablets.

## 2018-10-17 DIAGNOSIS — K91.89 POSTCHOLECYSTECTOMY DIARRHEA: ICD-10-CM

## 2018-10-17 DIAGNOSIS — E78.5 HYPERLIPIDEMIA LDL GOAL <100: ICD-10-CM

## 2018-10-17 DIAGNOSIS — R19.7 POSTCHOLECYSTECTOMY DIARRHEA: ICD-10-CM

## 2018-10-17 NOTE — TELEPHONE ENCOUNTER
"Requested Prescriptions   Pending Prescriptions Disp Refills     cholestyramine (QUESTRAN) 4 g Packet [Pharmacy Med Name: CHOLESTYRAMINE PACKET] 180 packet 2    Last Written Prescription Date:  historic  Last Fill Quantity: n/a,  # refills: n/a   Last office visit: 9/21/2018 with prescribing provider:     Future Office Visit:   Next 5 appointments (look out 90 days)     Nov 15, 2018  1:10 PM CST   Return Visit with Susanna Haque PA-C   [facility] ([facility])    [address]  [place] [id number] [phone]            Dec 19, 2018  7:40 AM CST   SHORT with Joselito Armas MD   [facility] ([facility]    [address]  [place] [id number] [phone]                Sig: TAKE 1 PACKET (4 G) BY MOUTH 2 TIMES DAILY (WITH MEALS)    Bile Acid Sequestrant Agents Passed    10/17/2018 11:37 AM       Passed - Lipid panel on file in past 12 mos    Recent Labs   Lab Test  12/27/17   1156   03/20/15   0836   CHOL  140   < >  131   TRIG  100   < >  96   HDL  62   < >  66   LDL  58   < >  46   NHDL  78   < >   --    VLDL   --    --   19   CHOLHDLRATIO   --    --   2.0    < > = values in this interval not displayed.              Passed - Recent (12 mo) or future (30 days) visit within the authorizing provider's specialty    Patient had office visit in the last 12 months or has a visit in the next 30 days with authorizing provider or within the authorizing provider's specialty.  See \"Patient Info\" tab in inbasket, or \"Choose Columns\" in Meds & Orders section of the refill encounter.             Passed - Patient is age 18 years or older        "

## 2018-10-18 RX ORDER — CHOLESTYRAMINE 4 G/9G
POWDER, FOR SUSPENSION ORAL
Qty: 180 PACKET | Refills: 0 | Status: SHIPPED | OUTPATIENT
Start: 2018-10-18 | End: 2019-01-17

## 2018-10-18 NOTE — TELEPHONE ENCOUNTER
Prescription approved per [facility] Refill Protocol.  Tata Alexis RN

## 2018-11-03 DIAGNOSIS — E11.22 TYPE 2 DIABETES MELLITUS WITH STAGE 3 CHRONIC KIDNEY DISEASE, WITHOUT LONG-TERM CURRENT USE OF INSULIN (H): ICD-10-CM

## 2018-11-03 DIAGNOSIS — N18.30 TYPE 2 DIABETES MELLITUS WITH STAGE 3 CHRONIC KIDNEY DISEASE, WITHOUT LONG-TERM CURRENT USE OF INSULIN (H): ICD-10-CM

## 2018-11-05 NOTE — TELEPHONE ENCOUNTER
Requested Prescriptions   Pending Prescriptions Disp Refills     pioglitazone (ACTOS) 45 MG tablet [Pharmacy Med Name: PIOGLITAZONE HCL 45 MG TABLET] 90 tablet 0    Last Written Prescription Date:  08/14/2018  Last Fill Quantity: 90,  # refills: 0   Last office visit: 9/21/2018 with prescribing provider:     Future Office Visit:   Next 5 appointments (look out 90 days)     Nov 15, 2018  1:10 PM CST   Return Visit with Susanna Haque PA-C   [facility] ([facility])    [address]  [place] [address]   [phone]            Dec 19, 2018  7:40 AM CST   SHORT with Joselito Armas MD   [facility] ([facility])    [address]  [facility] [id number] [phone]                Sig: TAKE 1 TABLET (45 MG) BY MOUTH DAILY    Thiazolidinedione Agents (TZDs)  Passed    11/3/2018  9:34 PM       Passed - Blood pressure less than 140/90 in past 6 months    BP Readings from Last 3 Encounters:   10/03/18 136/74   09/21/18 118/68   08/30/18 138/78                Passed - Patient has documented LDL within the past 12 mos.    Recent Labs   Lab Test  12/27/17   1156   LDL  58            Passed - Patient has a normal ALT within the past 12 mos.    Recent Labs   Lab Test  06/22/18   1142   ALT  27            Passed - Patient has a normal AST within the past 12 mos.     Recent Labs   Lab Test  06/22/18   1142   AST  34            Passed - Patient has had a Microalbumin in the past 12 mos.    Recent Labs   Lab Test  12/27/17   1156   MICROL  27   UMALCR  47.56*            Passed - Patient has documented A1c within the specified period of time.    If HgbA1C is 8 or greater, it needs to be on file within the past 3 months.  If less than 8, must be on file within the past 6 months.     Recent Labs   Lab Test  06/22/18   1142   A1C  7.1*            Passed - Diagnosis not CHF       Passed - Patient is age 18 or older     "   Passed - Patient has a normal serum Creatinine in the past 12 months    Recent Labs   Lab Test  10/03/18   1216   04/09/12   1526   CR  0.92   < >   --    CREAT   --    --   1.1    < > = values in this interval not displayed.            Passed - Recent (6 mo) or future (30 days) visit within the authorizing provider's specialty    Patient had office visit in the last 6 months or has a visit in the next 30 days with authorizing provider or within the authorizing provider's specialty.  See \"Patient Info\" tab in inbasket, or \"Choose Columns\" in Meds & Orders section of the refill encounter.            "

## 2018-11-07 RX ORDER — PIOGLITAZONEHYDROCHLORIDE 45 MG/1
TABLET ORAL
Qty: 90 TABLET | Refills: 0 | Status: SHIPPED | OUTPATIENT
Start: 2018-11-07 | End: 2018-12-19

## 2018-11-07 NOTE — TELEPHONE ENCOUNTER
Prescription approved per [facility] Refill Protocol.  Navya ELLISON RN

## 2018-11-10 DIAGNOSIS — E11.9 DM TYPE 2 (DIABETES MELLITUS, TYPE 2) (H): ICD-10-CM

## 2018-11-12 NOTE — TELEPHONE ENCOUNTER
Requested Prescriptions   Pending Prescriptions Disp Refills     metFORMIN (GLUCOPHAGE) 500 MG tablet [Pharmacy Med Name: METFORMIN  MG TABLET] 180 tablet 0    Last Written Prescription Date:  12/29/2017  Last Fill Quantity: 180,  # refills: 1   Last office visit: 9/21/2018 with prescribing provider:     Future Office Visit:   Next 5 appointments (look out 90 days)     Nov 15, 2018  1:10 PM CST   Return Visit with Susanna Haque PA-C   [facility] ([facility])    [address]  [place] [id number] [phone]            Dec 19, 2018  7:40 AM CST   SHORT with Joselito Armas MD   [facility]    [address]  [place] [id number] [phone]                Sig: TAKE 1 TABLET (500 MG) BY MOUTH 2 TIMES DAILY (WITH MEALS)    Biguanide Agents Passed    11/10/2018  1:06 PM       Passed - Blood pressure less than 140/90 in past 6 months    BP Readings from Last 3 Encounters:   10/03/18 136/74   09/21/18 118/68   08/30/18 138/78                Passed - Patient has documented LDL within the past 12 mos.    Recent Labs   Lab Test  12/27/17   1156   LDL  58            Passed - Patient has had a Microalbumin in the past 15 mos.    Recent Labs   Lab Test  12/27/17   1156   MICROL  27   UMALCR  47.56*            Passed - Patient is age 10 or older       Passed - Patient has documented A1c within the specified period of time.    If HgbA1C is 8 or greater, it needs to be on file within the past 3 months.  If less than 8, must be on file within the past 6 months.     Recent Labs   Lab Test  06/22/18   1142   A1C  7.1*            Passed - Patient's CR is NOT>1.4 OR Patient's EGFR is NOT<45 within past 12 mos.    Recent Labs   Lab Test  10/03/18   1216   GFRESTIMATED  78   GFRESTBLACK  >90       Recent Labs   Lab Test  10/03/18   1216   CR  0.92            Passed - Patient does NOT have a  "diagnosis of CHF.       Passed - Recent (6 mo) or future (30 days) visit within the authorizing provider's specialty    Patient had office visit in the last 6 months or has a visit in the next 30 days with authorizing provider or within the authorizing provider's specialty.  See \"Patient Info\" tab in inbasket, or \"Choose Columns\" in Meds & Orders section of the refill encounter.            "

## 2018-11-13 NOTE — TELEPHONE ENCOUNTER
Prescription approved per [facility] Refill Protocol.  Tata Alexis RN

## 2018-11-15 ENCOUNTER — OFFICE VISIT (OUTPATIENT)
Dept: CARDIOLOGY | Facility: CLINIC | Age: 83
End: 2018-11-15
Attending: INTERNAL MEDICINE
Payer: COMMERCIAL

## 2018-11-15 VITALS
BODY MASS INDEX: 26.67 KG/M2 | HEART RATE: 83 BPM | DIASTOLIC BLOOD PRESSURE: 60 MMHG | HEIGHT: 71 IN | SYSTOLIC BLOOD PRESSURE: 112 MMHG | WEIGHT: 190.5 LBS

## 2018-11-15 DIAGNOSIS — I87.2 VENOUS (PERIPHERAL) INSUFFICIENCY: ICD-10-CM

## 2018-11-15 PROCEDURE — 99214 OFFICE O/P EST MOD 30 MIN: CPT | Performed by: PHYSICIAN ASSISTANT

## 2018-11-15 NOTE — LETTER
11/15/2018    Joselito Armas MD  [address] [facility] [address]    RE: Louis Bassdevin Willis       Dear Colleague,    I had the pleasure of seeing Louis Keller Jr. in the [facility].    CARDIOLOGY CLINIC PROGRESS NOTE    DOS: 11/15/2018      Louis Keller Jr.  : 1932, 86 year old  MRN: [id number]      History: I had the pleasure of following up with Louis Keller Jr. today in the cardiology clinic.  He is a patient of Dr. Gleason.  His wife, Laura (also sees myself and Dr. Gleason).     Louis is a pleasant 86 year old man with history of chronic atrial fibrillation, DM2.  He failed cardioversion in the past.  He is maintained on rate control strategy and anticoagulation.  He has had negative stress testing previously in ,  and .      He saw Dr. Gleason 18.  He had some QUESADA and R>L LE exertional heaviness.    After 3 months of trying to increase walking, his sxs were unchanged. Echo, treadmill nuc, ABIs and venous comp study were ordered.   Echo showed normal LVEF, grade 3 diastolic dysfunction.  Nuc was without e/o ischemia.   Resting and exercise ABIs are normal.  Venous comp study is abnormal showing severe reflux in both right and left legs.  Given the QUESADA, edema, and the echo showing grade 3 diastolic dysfunction, we trialed low dose diuretic - Lasix 20 mg daily.  Also compression stockings were ordered.    He saw Dr. Gleason 10/3/18 for follow up. He had R>L LE extremity heaviness.        Interval History:   He presents today for follow up.   He walks on the treadmill every day.   He is still SOB with exertion - stairs, any increased speed. But this is stable.   Continues to have leg fatigue/heaviness.  He thinks the RLE has gotten a little better, as it feels more like the LLE now.   He would like to postpone the vein procedure since he is feeling ok.   He wore the compression stockings one day - he was able to get them on, but getting them off  was very hard.  He could not do it by himself.   He takes the Lasix most days, but due to his schedule, some days he does not take it.   His weight is up 4 lbs.  We discussed continuing walking, taking his Lasix, and watching the sweets.   No chest pain.       ROS:  Skin:  not assessed     Eyes:  not assessed    ENT:  Positive for    Respiratory:  Positive for dyspnea on exertion  Cardiovascular:    Positive for;edema;fatigue  Gastroenterology: Positive for diarrhea  Genitourinary:  Positive for nocturia  Musculoskeletal:  Negative    Neurologic:  Positive for numbness or tingling of feet  Psychiatric:  Negative    Heme/Lymph/Imm:  Negative for    Endocrine:  Positive for diabetes    PAST MEDICAL HISTORY:  Past Medical History:   Diagnosis Date     Antiplatelet or antithrombotic long-term use      Atrial fibrillation (H)      Diarrhea      Diastolic murmur      QUESADA (dyspnea on exertion)      Gout      Hemorrhage of gastrointestinal tract, unspecified 63,65,and 1971    hospitalized     History of blood transfusion      Hyperlipidemia LDL goal <100 4/18/2012     Long-term (current) use of anticoagulants [Z79.01] 3/31/2016     Mumps      Palpitations      Physical deconditioning 8/9/2013     Scarlet fever      Spider veins      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     Unspecified disease of pancreas 1990    pancreatitis       PAST SURGICAL HISTORY:  Past Surgical History:   Procedure Laterality Date     ARTHROPLASTY KNEE  8/5/2013    Procedure: ARTHROPLASTY KNEE;  Left Total Knee Arthroplasty       C NONSPECIFIC PROCEDURE  Child    T&A     C NONSPECIFIC PROCEDURE  ; also 11/03    Colonoscopy     C NONSPECIFIC PROCEDURE      Basal cell cancer of the nose     C NONSPECIFIC PROCEDURE  1990    Cholecystectomy     CARDIOVERSION  9/6/11    failed       SOCIAL HISTORY:  Social History     Social History     Marital status:      Spouse name: N/A     Number of children: N/A     Years of  education: N/A     Occupational History           Semi-retired     Social History Main Topics     Smoking status: Never Smoker     Smokeless tobacco: Never Used     Alcohol use 0.0 oz/week     0 Standard drinks or equivalent per week      Comment: 1 glass of wine every day     Drug use: No     Sexual activity: No     Other Topics Concern     Caffeine Concern No     14 oz per day     Sleep Concern No     sometimes - nocturia 4 times per night     Special Diet No     Exercise Yes     treadmill/walking 15-20 minutes 6 days per week     Social History Narrative       FAMILY HISTORY:  Family History   Problem Relation Age of Onset     Alzheimer Disease Maternal Grandmother      Arthritis Maternal Grandmother      Cancer Mother      breast/ 1983/colon     Eye Disorder Mother      glaucoma and cataracts     HEART DISEASE Mother      angina/CABG     Hypertension Mother      Cancer Father      colon     Diabetes Maternal Aunt      AoDM       MEDS:   Current Outpatient Prescriptions on File Prior to Visit:  ACE NOT PRESCRIBED, INTENTIONAL, 1 each daily ACE Inhibitor not prescribed due to Risk for drug interaction   Acetaminophen (TYLENOL PO) Take 1,000 mg by mouth 3 times daily   ASPIRIN NOT PRESCRIBED, INTENTIONAL, by Other route continuous prn Reported on 3/7/2017   cholestyramine (QUESTRAN) 4 g Packet TAKE 1 PACKET (4 G) BY MOUTH 2 TIMES DAILY (WITH MEALS)   cholestyramine (QUESTRAN) 4 G Packet Take 1 packet by mouth every evening   Chromium 1000 MCG TABS Take 500 mcg by mouth daily   diltiazem (CARDIZEM CD/CARTIA XT) 120 MG 24 hr capsule Take 1 capsule (120 mg) by mouth every evening   ferrous gluconate (FERGON) 324 (38 FE) MG tablet TAKE 1 TABLET (324 MG) BY MOUTH DAILY (WITH BREAKFAST)   fish oil-omega-3 fatty acids (FISH OIL) 1000 MG capsule Take 1 g by mouth 2 times daily    furosemide (LASIX) 20 MG tablet Take 1 tablet (20 mg) by mouth daily   glipiZIDE (GLUCOTROL XL) 10 MG 24 hr tablet TAKE 1 TABLET  "(10 MG) BY MOUTH 2 TIMES DAILY   loperamide (IMODIUM A-D) 2 MG tablet daily    metFORMIN (GLUCOPHAGE) 500 MG tablet TAKE 1 TABLET (500 MG) BY MOUTH 2 TIMES DAILY (WITH MEALS)   metFORMIN (GLUCOPHAGE) 500 MG tablet Take 1 tablet (500 mg) by mouth 2 times daily (with meals)   multivitamin, therapeutic (THERA-VIT) TABS tablet Take 0.5 tablets by mouth 2 times daily   ONETOUCH ULTRA test strip USE TO TEST DAILY   pioglitazone (ACTOS) 45 MG tablet TAKE 1 TABLET (45 MG) BY MOUTH DAILY   senna-docusate (SENOKOT-S;PERICOLACE) 8.6-50 MG per tablet Take 1 tablet by mouth 2 times daily as needed for constipation   STATIN NOT PRESCRIBED, INTENTIONAL, 1 each At Bedtime Statin not prescribed intentionally due to Risk for drug interaction   warfarin (COUMADIN) 5 MG tablet TAKE 1 TABLET BY MOUTH TUESDAY,THURS AND SUN. TAKE 1/2 TABLET ON MON,WED,FRI AND SAT. OR AS DIRECTED   tamsulosin (FLOMAX) 0.4 MG capsule Take 1 capsule (0.4 mg) by mouth daily     No current facility-administered medications on file prior to visit.     ALLERGIES:   Allergies   Allergen Reactions     No Known Drug Allergies        PHYSICAL EXAM:  Vitals: /60  Pulse 83  Ht 1.803 m (5' 11\")  Wt 86.4 kg (190 lb 8 oz)  BMI 26.57 kg/m2  Constitutional:  cooperative, alert and oriented, well developed, well nourished, in no acute distress        Skin:  warm and dry to the touch, no apparent skin lesions or masses noted        Head:  normocephalic, no masses or lesions        Eyes:  pupils equal and round;conjunctivae and lids unremarkable;sclera white;no xanthalasma        ENT:  no pallor or cyanosis, dentition good        Neck:  JVP normal        Respiratory:  normal breath sounds, clear to auscultation, normal A-P diameter, normal symmetry, normal respiratory excursion, no use of accessory muscles        Cardiac:   irregularly irregular rhythm         grade 1;early diastolic murmur good rate control, frequent ectopy    GI:  abdomen soft;BS normoactive    "     Vascular:                                        Extremities and Musculoskeletal:  no deformities, clubbing, cyanosis, erythema observed stasis pigmentation      Neurological:  no gross motor deficits;affect appropriate          LABS/DATA:  I reviewed the followin18 treadmill nuc stress test:  Impression  1.  Myocardial perfusion imaging using single isotope technique  demonstrated no significant perfusion defect consistent with  significant ischemia or infarct. There is mild decrease in inferior  activity at the base on rest and stress images most likely consistent  with diaphragm attenuation/bowel uptake artifact..   2. Gated images demonstrated normal size left ventricle with adequate  to good overall contractility and no significant regional wall motion  abnormality.  The left ventricular systolic function is normal with  ejection fraction 57% (accuracy maybe affected by presence of atrial  fibrillation).  3. Compared to the prior study from not applicable .      8/10/18 resting and exercise ABIs:  IMPRESSION:  1. Normal resting and exercise ABIs bilaterally       8/10/18 echo:  Interpretation Summary     Left ventricular systolic function is normal.The visual ejection fraction is  estimated at 55-60%.  Diastolic Doppler findings (E/E' ratio and/or other parameters) suggest left  ventricular filling pressures are increased.  The right ventricular systolic function is normal.  Pulmonary hypertension- RVSP 40 mm hg +RA.  There is trace aortic regurgitation.  Mild aortic root and ascending aorta dilatation.      18 venous comp study:  Impression:  1. Right leg: No DVT. Severe reflux (5.2s, 4.0mm) of the proximal GSV  in the thigh and superficial tributary (5.4s, 4.7mm) in the calf as  well as severe reflux in the small saphenous vein in the calf (6.4s,  2.2mm) with resultant refluxing venous varicosities (4.5s, 2.9mm).     2. Left leg: No DVT. Severe reflux (5.0s, 2.9mm) limited to distal GSV  at  the ankle.        If clinically indicated could treat:  Right leg: RF ablation of GSV, RF ablation of SSV, sclerotherapy of  superficial tributary and varicose veins with phlebectomy of varicose  veins.     Left leg: Sclerotherapy of distal GSV vs. RF ablation of GSV +  sclerotherapy.        ASSESSMENT/PLAN:  Chronic afib  - Rate controlled on dilt cd 120 mg.  I continue the current dose given his rate in the 80s today.   - On warfarin    QUESADA  - Echo shows normal LVEF, grade 3 diastolic dysfunction  - Nuc without e/o ischemia  - Some contribution could be from the chronic afib as well  - Given the QUESADA, edema, and the echo showing grade 3 diastolic dysfunction, will trial some low dose diuretic - Lasix 20 mg daily.  This may have helped a little.   - I did want to have his PCP consider a substitute medication for Actos given his significant diastolic dysfunction/QUESADA/edema.  Dr. Armas is considering changing when he sees him in December.    - Overall stable, and not too limiting    LE edema  Exertional leg heaviness  Varicose veins  Telangiectasias  - Resting and exercise ABIs are normal  - Venous comp study is abnormal showing severe reflux in both right and left legs.    -- Right leg:  No DVT.  There is severe reflux (5.2s, 4.0mm) of the proximal GSV in the thigh and superficial tributary (5.4s, 4.7mm) in the calf as well as severe reflux in the small saphenous vein in the calf (6.4s, 2.2mm) with resultant refluxing venous varicosities (4.5s, 2.9mm).     --Left leg: No DVT. Severe reflux (5.0s, 2.9mm) limited to distal GSV at the ankle.  - The LE edema could be in aprt from the diastolic dysfunction.  He will continue Lasix.    - His HR was in the 80s on check by the rooming staff and myself, so I continued dilt at the current dose.  - He also he has severe reflux in bilateral LEs.     -- He tried compression stockings, but could not get them off.  We discussed considering a zipper pair, or going to a medical  supply store where they can help him find a pair that fits well.    -- Continue to ambulate   -- Elevation    -- For now, he would like to hold off on any vein procedure. He will follow up in 6 months. If more bothersome, we can consider RLE vena seal of the greater saphenous vein, small saphenous vein and superficial tributary plus or minus sclerotherapy and phlebectomy of the varicose veins.  The varicose veins seem fairly small and may close up without aggressive therapy.  And then pending RLE response, could consider LLE cclerotherapy of distal GSV vs. RF ablation of GSV + sclerotherapy.         Follow up:  6 months     Susanna Haque PA-C    Thank you for allowing me to participate in the care of your patient.    Sincerely,     Susanna Haque PA-C     [facility] [place]

## 2018-11-15 NOTE — PROGRESS NOTES
CARDIOLOGY CLINIC PROGRESS NOTE    DOS: 11/15/2018      Louis Keller Jr.  : 1932, 86 year old  MRN: [id number]      History: I had the pleasure of following up with Louis Keller Jr. today in the cardiology clinic.  He is a patient of Dr. Gleason.  His wife, Laura (also sees myself and Dr. Gleason).     Louis is a pleasant 86 year old man with history of chronic atrial fibrillation, DM2.  He failed cardioversion in the past.  He is maintained on rate control strategy and anticoagulation.  He has had negative stress testing previously in ,  and .      He saw Dr. Gleason 18.  He had some QUESADA and R>L LE exertional heaviness.    After 3 months of trying to increase walking, his sxs were unchanged. Echo, treadmill nuc, ABIs and venous comp study were ordered.   Echo showed normal LVEF, grade 3 diastolic dysfunction.  Nuc was without e/o ischemia.   Resting and exercise ABIs are normal.  Venous comp study is abnormal showing severe reflux in both right and left legs.  Given the QUESADA, edema, and the echo showing grade 3 diastolic dysfunction, we trialed low dose diuretic - Lasix 20 mg daily.  Also compression stockings were ordered.    He saw Dr. Gleason 10/3/18 for follow up. He had R>L LE extremity heaviness.        Interval History:   He presents today for follow up.   He walks on the treadmill every day.   He is still SOB with exertion - stairs, any increased speed. But this is stable.   Continues to have leg fatigue/heaviness.  He thinks the RLE has gotten a little better, as it feels more like the LLE now.   He would like to postpone the vein procedure since he is feeling ok.   He wore the compression stockings one day - he was able to get them on, but getting them off was very hard.  He could not do it by himself.   He takes the Lasix most days, but due to his schedule, some days he does not take it.   His weight is up 4 lbs.  We discussed continuing walking, taking his Lasix, and watching the  sweets.   No chest pain.       ROS:  Skin:  not assessed     Eyes:  not assessed    ENT:  Positive for    Respiratory:  Positive for dyspnea on exertion  Cardiovascular:    Positive for;edema;fatigue  Gastroenterology: Positive for diarrhea  Genitourinary:  Positive for nocturia  Musculoskeletal:  Negative    Neurologic:  Positive for numbness or tingling of feet  Psychiatric:  Negative    Heme/Lymph/Imm:  Negative for    Endocrine:  Positive for diabetes    PAST MEDICAL HISTORY:  Past Medical History:   Diagnosis Date     Antiplatelet or antithrombotic long-term use      Atrial fibrillation (H)      Diarrhea      Diastolic murmur      QUESADA (dyspnea on exertion)      Gout      Hemorrhage of gastrointestinal tract, unspecified 63,65,and 1971    hospitalized     History of blood transfusion      Hyperlipidemia LDL goal <100 4/18/2012     Long-term (current) use of anticoagulants [Z79.01] 3/31/2016     Mumps      Palpitations      Physical deconditioning 8/9/2013     Scarlet fever      Spider veins      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     Unspecified disease of pancreas 1990    pancreatitis       PAST SURGICAL HISTORY:  Past Surgical History:   Procedure Laterality Date     ARTHROPLASTY KNEE  8/5/2013    Procedure: ARTHROPLASTY KNEE;  Left Total Knee Arthroplasty       C NONSPECIFIC PROCEDURE  Child    T&A     C NONSPECIFIC PROCEDURE  ; also 11/03    Colonoscopy     C NONSPECIFIC PROCEDURE      Basal cell cancer of the nose     C NONSPECIFIC PROCEDURE  1990    Cholecystectomy     CARDIOVERSION  9/6/11    failed       SOCIAL HISTORY:  Social History     Social History     Marital status:      Spouse name: N/A     Number of children: N/A     Years of education: N/A     Occupational History           Semi-retired     Social History Main Topics     Smoking status: Never Smoker     Smokeless tobacco: Never Used     Alcohol use 0.0 oz/week     0 Standard drinks or equivalent per  week      Comment: 1 glass of wine every day     Drug use: No     Sexual activity: No     Other Topics Concern     Caffeine Concern No     14 oz per day     Sleep Concern No     sometimes - nocturia 4 times per night     Special Diet No     Exercise Yes     treadmill/walking 15-20 minutes 6 days per week     Social History Narrative       FAMILY HISTORY:  Family History   Problem Relation Age of Onset     Alzheimer Disease Maternal Grandmother      Arthritis Maternal Grandmother      Cancer Mother      breast/ 1983/colon     Eye Disorder Mother      glaucoma and cataracts     HEART DISEASE Mother      angina/CABG     Hypertension Mother      Cancer Father      colon     Diabetes Maternal Aunt      AoDM       MEDS:   Current Outpatient Prescriptions on File Prior to Visit:  ACE NOT PRESCRIBED, INTENTIONAL, 1 each daily ACE Inhibitor not prescribed due to Risk for drug interaction   Acetaminophen (TYLENOL PO) Take 1,000 mg by mouth 3 times daily   ASPIRIN NOT PRESCRIBED, INTENTIONAL, by Other route continuous prn Reported on 3/7/2017   cholestyramine (QUESTRAN) 4 g Packet TAKE 1 PACKET (4 G) BY MOUTH 2 TIMES DAILY (WITH MEALS)   cholestyramine (QUESTRAN) 4 G Packet Take 1 packet by mouth every evening   Chromium 1000 MCG TABS Take 500 mcg by mouth daily   diltiazem (CARDIZEM CD/CARTIA XT) 120 MG 24 hr capsule Take 1 capsule (120 mg) by mouth every evening   ferrous gluconate (FERGON) 324 (38 FE) MG tablet TAKE 1 TABLET (324 MG) BY MOUTH DAILY (WITH BREAKFAST)   fish oil-omega-3 fatty acids (FISH OIL) 1000 MG capsule Take 1 g by mouth 2 times daily    furosemide (LASIX) 20 MG tablet Take 1 tablet (20 mg) by mouth daily   glipiZIDE (GLUCOTROL XL) 10 MG 24 hr tablet TAKE 1 TABLET (10 MG) BY MOUTH 2 TIMES DAILY   loperamide (IMODIUM A-D) 2 MG tablet daily    metFORMIN (GLUCOPHAGE) 500 MG tablet TAKE 1 TABLET (500 MG) BY MOUTH 2 TIMES DAILY (WITH MEALS)   metFORMIN (GLUCOPHAGE) 500 MG tablet Take 1 tablet (500 mg)  "by mouth 2 times daily (with meals)   multivitamin, therapeutic (THERA-VIT) TABS tablet Take 0.5 tablets by mouth 2 times daily   ONETOUCH ULTRA test strip USE TO TEST DAILY   pioglitazone (ACTOS) 45 MG tablet TAKE 1 TABLET (45 MG) BY MOUTH DAILY   senna-docusate (SENOKOT-S;PERICOLACE) 8.6-50 MG per tablet Take 1 tablet by mouth 2 times daily as needed for constipation   STATIN NOT PRESCRIBED, INTENTIONAL, 1 each At Bedtime Statin not prescribed intentionally due to Risk for drug interaction   warfarin (COUMADIN) 5 MG tablet TAKE 1 TABLET BY MOUTH TUESDAY,THURS AND SUN. TAKE 1/2 TABLET ON MON,WED,FRI AND SAT. OR AS DIRECTED   tamsulosin (FLOMAX) 0.4 MG capsule Take 1 capsule (0.4 mg) by mouth daily     No current facility-administered medications on file prior to visit.     ALLERGIES:   Allergies   Allergen Reactions     No Known Drug Allergies        PHYSICAL EXAM:  Vitals: /60  Pulse 83  Ht 1.803 m (5' 11\")  Wt 86.4 kg (190 lb 8 oz)  BMI 26.57 kg/m2  Constitutional:  cooperative, alert and oriented, well developed, well nourished, in no acute distress        Skin:  warm and dry to the touch, no apparent skin lesions or masses noted        Head:  normocephalic, no masses or lesions        Eyes:  pupils equal and round;conjunctivae and lids unremarkable;sclera white;no xanthalasma        ENT:  no pallor or cyanosis, dentition good        Neck:  JVP normal        Respiratory:  normal breath sounds, clear to auscultation, normal A-P diameter, normal symmetry, normal respiratory excursion, no use of accessory muscles        Cardiac:   irregularly irregular rhythm         grade 1;early diastolic murmur good rate control, frequent ectopy    GI:  abdomen soft;BS normoactive        Vascular:                                        Extremities and Musculoskeletal:  no deformities, clubbing, cyanosis, erythema observed stasis pigmentation      Neurological:  no gross motor deficits;affect appropriate    "       LABS/DATA:  I reviewed the followin18 treadmill nuc stress test:  Impression  1.  Myocardial perfusion imaging using single isotope technique  demonstrated no significant perfusion defect consistent with  significant ischemia or infarct. There is mild decrease in inferior  activity at the base on rest and stress images most likely consistent  with diaphragm attenuation/bowel uptake artifact..   2. Gated images demonstrated normal size left ventricle with adequate  to good overall contractility and no significant regional wall motion  abnormality.  The left ventricular systolic function is normal with  ejection fraction 57% (accuracy maybe affected by presence of atrial  fibrillation).  3. Compared to the prior study from not applicable .      8/10/18 resting and exercise ABIs:  IMPRESSION:  1. Normal resting and exercise ABIs bilaterally       8/10/18 echo:  Interpretation Summary     Left ventricular systolic function is normal.The visual ejection fraction is  estimated at 55-60%.  Diastolic Doppler findings (E/E' ratio and/or other parameters) suggest left  ventricular filling pressures are increased.  The right ventricular systolic function is normal.  Pulmonary hypertension- RVSP 40 mm hg +RA.  There is trace aortic regurgitation.  Mild aortic root and ascending aorta dilatation.      18 venous comp study:  Impression:  1. Right leg: No DVT. Severe reflux (5.2s, 4.0mm) of the proximal GSV  in the thigh and superficial tributary (5.4s, 4.7mm) in the calf as  well as severe reflux in the small saphenous vein in the calf (6.4s,  2.2mm) with resultant refluxing venous varicosities (4.5s, 2.9mm).     2. Left leg: No DVT. Severe reflux (5.0s, 2.9mm) limited to distal GSV  at the ankle.        If clinically indicated could treat:  Right leg: RF ablation of GSV, RF ablation of SSV, sclerotherapy of  superficial tributary and varicose veins with phlebectomy of varicose  veins.     Left leg:  Sclerotherapy of distal GSV vs. RF ablation of GSV +  sclerotherapy.        ASSESSMENT/PLAN:  Chronic afib  - Rate controlled on dilt cd 120 mg.  I continue the current dose given his rate in the 80s today.   - On warfarin    QUESADA  - Echo shows normal LVEF, grade 3 diastolic dysfunction  - Nuc without e/o ischemia  - Some contribution could be from the chronic afib as well  - Given the QUESADA, edema, and the echo showing grade 3 diastolic dysfunction, will trial some low dose diuretic - Lasix 20 mg daily.  This may have helped a little.   - I did want to have his PCP consider a substitute medication for Actos given his significant diastolic dysfunction/QUESADA/edema.  Dr. Armas is considering changing when he sees him in December.    - Overall stable, and not too limiting    LE edema  Exertional leg heaviness  Varicose veins  Telangiectasias  - Resting and exercise ABIs are normal  - Venous comp study is abnormal showing severe reflux in both right and left legs.    -- Right leg:  No DVT.  There is severe reflux (5.2s, 4.0mm) of the proximal GSV in the thigh and superficial tributary (5.4s, 4.7mm) in the calf as well as severe reflux in the small saphenous vein in the calf (6.4s, 2.2mm) with resultant refluxing venous varicosities (4.5s, 2.9mm).     --Left leg: No DVT. Severe reflux (5.0s, 2.9mm) limited to distal GSV at the ankle.  - The LE edema could be in aprt from the diastolic dysfunction.  He will continue Lasix.    - His HR was in the 80s on check by the rooming staff and myself, so I continued dilt at the current dose.  - He also he has severe reflux in bilateral LEs.     -- He tried compression stockings, but could not get them off.  We discussed considering a zipper pair, or going to a medical supply store where they can help him find a pair that fits well.    -- Continue to ambulate   -- Elevation    -- For now, he would like to hold off on any vein procedure. He will follow up in 6 months. If more bothersome,  we can consider RLE vena seal of the greater saphenous vein, small saphenous vein and superficial tributary plus or minus sclerotherapy and phlebectomy of the varicose veins.  The varicose veins seem fairly small and may close up without aggressive therapy.  And then pending RLE response, could consider LLE cclerotherapy of distal GSV vs. RF ablation of GSV + sclerotherapy.         Follow up:  6 months     Susanna Haque PA-C

## 2018-11-15 NOTE — LETTER
11/15/2018    Joselito Armas MD  [address] [facility] [address]    RE: Louis Bassdevin Willis       Dear Colleague,    I had the pleasure of seeing Louis Keller Jr. in the [facility].    CARDIOLOGY CLINIC PROGRESS NOTE    DOS: 11/15/2018      Louis Keller Jr.  : 1932, 86 year old  MRN: [id number]      History: I had the pleasure of following up with Louis Keller Jr. today in the cardiology clinic.  He is a patient of Dr. Gleason.  His wife, Laura (also sees myself and Dr. Gleason).     Louis is a pleasant 86 year old man with history of chronic atrial fibrillation, DM2.  He failed cardioversion in the past.  He is maintained on rate control strategy and anticoagulation.  He has had negative stress testing previously in ,  and .      He saw Dr. Gleason 18.  He had some QUESADA and R>L LE exertional heaviness.    After 3 months of trying to increase walking, his sxs were unchanged. Echo, treadmill nuc, ABIs and venous comp study were ordered.   Echo showed normal LVEF, grade 3 diastolic dysfunction.  Nuc was without e/o ischemia.   Resting and exercise ABIs are normal.  Venous comp study is abnormal showing severe reflux in both right and left legs.  Given the QUESADA, edema, and the echo showing grade 3 diastolic dysfunction, we trialed low dose diuretic - Lasix 20 mg daily.  Also compression stockings were ordered.    He saw Dr. Gleason 10/3/18 for follow up. He had R>L LE extremity heaviness.        Interval History:   He presents today for follow up.   He walks on the treadmill every day.   He is still SOB with exertion - stairs, any increased speed. But this is stable.   Continues to have leg fatigue/heaviness.  He thinks the RLE has gotten a little better, as it feels more like the LLE now.   He would like to postpone the vein procedure since he is feeling ok.   He wore the compression stockings one day - he was able to get them on, but getting them off  was very hard.  He could not do it by himself.   He takes the Lasix most days, but due to his schedule, some days he does not take it.   His weight is up 4 lbs.  We discussed continuing walking, taking his Lasix, and watching the sweets.   No chest pain.       ROS:  Skin:  not assessed     Eyes:  not assessed    ENT:  Positive for    Respiratory:  Positive for dyspnea on exertion  Cardiovascular:    Positive for;edema;fatigue  Gastroenterology: Positive for diarrhea  Genitourinary:  Positive for nocturia  Musculoskeletal:  Negative    Neurologic:  Positive for numbness or tingling of feet  Psychiatric:  Negative    Heme/Lymph/Imm:  Negative for    Endocrine:  Positive for diabetes    PAST MEDICAL HISTORY:  Past Medical History:   Diagnosis Date     Antiplatelet or antithrombotic long-term use      Atrial fibrillation (H)      Diarrhea      Diastolic murmur      QUESADA (dyspnea on exertion)      Gout      Hemorrhage of gastrointestinal tract, unspecified 63,65,and 1971    hospitalized     History of blood transfusion      Hyperlipidemia LDL goal <100 4/18/2012     Long-term (current) use of anticoagulants [Z79.01] 3/31/2016     Mumps      Palpitations      Physical deconditioning 8/9/2013     Scarlet fever      Spider veins      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     Unspecified disease of pancreas 1990    pancreatitis       PAST SURGICAL HISTORY:  Past Surgical History:   Procedure Laterality Date     ARTHROPLASTY KNEE  8/5/2013    Procedure: ARTHROPLASTY KNEE;  Left Total Knee Arthroplasty       C NONSPECIFIC PROCEDURE  Child    T&A     C NONSPECIFIC PROCEDURE  ; also 11/03    Colonoscopy     C NONSPECIFIC PROCEDURE      Basal cell cancer of the nose     C NONSPECIFIC PROCEDURE  1990    Cholecystectomy     CARDIOVERSION  9/6/11    failed       SOCIAL HISTORY:  Social History     Social History     Marital status:      Spouse name: N/A     Number of children: N/A     Years of  education: N/A     Occupational History           Semi-retired     Social History Main Topics     Smoking status: Never Smoker     Smokeless tobacco: Never Used     Alcohol use 0.0 oz/week     0 Standard drinks or equivalent per week      Comment: 1 glass of wine every day     Drug use: No     Sexual activity: No     Other Topics Concern     Caffeine Concern No     14 oz per day     Sleep Concern No     sometimes - nocturia 4 times per night     Special Diet No     Exercise Yes     treadmill/walking 15-20 minutes 6 days per week     Social History Narrative       FAMILY HISTORY:  Family History   Problem Relation Age of Onset     Alzheimer Disease Maternal Grandmother      Arthritis Maternal Grandmother      Cancer Mother      breast/ 1983/colon     Eye Disorder Mother      glaucoma and cataracts     HEART DISEASE Mother      angina/CABG     Hypertension Mother      Cancer Father      colon     Diabetes Maternal Aunt      AoDM       MEDS:   Current Outpatient Prescriptions on File Prior to Visit:  ACE NOT PRESCRIBED, INTENTIONAL, 1 each daily ACE Inhibitor not prescribed due to Risk for drug interaction   Acetaminophen (TYLENOL PO) Take 1,000 mg by mouth 3 times daily   ASPIRIN NOT PRESCRIBED, INTENTIONAL, by Other route continuous prn Reported on 3/7/2017   cholestyramine (QUESTRAN) 4 g Packet TAKE 1 PACKET (4 G) BY MOUTH 2 TIMES DAILY (WITH MEALS)   cholestyramine (QUESTRAN) 4 G Packet Take 1 packet by mouth every evening   Chromium 1000 MCG TABS Take 500 mcg by mouth daily   diltiazem (CARDIZEM CD/CARTIA XT) 120 MG 24 hr capsule Take 1 capsule (120 mg) by mouth every evening   ferrous gluconate (FERGON) 324 (38 FE) MG tablet TAKE 1 TABLET (324 MG) BY MOUTH DAILY (WITH BREAKFAST)   fish oil-omega-3 fatty acids (FISH OIL) 1000 MG capsule Take 1 g by mouth 2 times daily    furosemide (LASIX) 20 MG tablet Take 1 tablet (20 mg) by mouth daily   glipiZIDE (GLUCOTROL XL) 10 MG 24 hr tablet TAKE 1 TABLET  "(10 MG) BY MOUTH 2 TIMES DAILY   loperamide (IMODIUM A-D) 2 MG tablet daily    metFORMIN (GLUCOPHAGE) 500 MG tablet TAKE 1 TABLET (500 MG) BY MOUTH 2 TIMES DAILY (WITH MEALS)   metFORMIN (GLUCOPHAGE) 500 MG tablet Take 1 tablet (500 mg) by mouth 2 times daily (with meals)   multivitamin, therapeutic (THERA-VIT) TABS tablet Take 0.5 tablets by mouth 2 times daily   ONETOUCH ULTRA test strip USE TO TEST DAILY   pioglitazone (ACTOS) 45 MG tablet TAKE 1 TABLET (45 MG) BY MOUTH DAILY   senna-docusate (SENOKOT-S;PERICOLACE) 8.6-50 MG per tablet Take 1 tablet by mouth 2 times daily as needed for constipation   STATIN NOT PRESCRIBED, INTENTIONAL, 1 each At Bedtime Statin not prescribed intentionally due to Risk for drug interaction   warfarin (COUMADIN) 5 MG tablet TAKE 1 TABLET BY MOUTH TUESDAY,THURS AND SUN. TAKE 1/2 TABLET ON MON,WED,FRI AND SAT. OR AS DIRECTED   tamsulosin (FLOMAX) 0.4 MG capsule Take 1 capsule (0.4 mg) by mouth daily     No current facility-administered medications on file prior to visit.     ALLERGIES:   Allergies   Allergen Reactions     No Known Drug Allergies        PHYSICAL EXAM:  Vitals: /60  Pulse 83  Ht 1.803 m (5' 11\")  Wt 86.4 kg (190 lb 8 oz)  BMI 26.57 kg/m2  Constitutional:  cooperative, alert and oriented, well developed, well nourished, in no acute distress        Skin:  warm and dry to the touch, no apparent skin lesions or masses noted        Head:  normocephalic, no masses or lesions        Eyes:  pupils equal and round;conjunctivae and lids unremarkable;sclera white;no xanthalasma        ENT:  no pallor or cyanosis, dentition good        Neck:  JVP normal        Respiratory:  normal breath sounds, clear to auscultation, normal A-P diameter, normal symmetry, normal respiratory excursion, no use of accessory muscles        Cardiac:   irregularly irregular rhythm         grade 1;early diastolic murmur good rate control, frequent ectopy    GI:  abdomen soft;BS normoactive    "     Vascular:                                        Extremities and Musculoskeletal:  no deformities, clubbing, cyanosis, erythema observed stasis pigmentation      Neurological:  no gross motor deficits;affect appropriate          LABS/DATA:  I reviewed the followin18 treadmill nuc stress test:  Impression  1.  Myocardial perfusion imaging using single isotope technique  demonstrated no significant perfusion defect consistent with  significant ischemia or infarct. There is mild decrease in inferior  activity at the base on rest and stress images most likely consistent  with diaphragm attenuation/bowel uptake artifact..   2. Gated images demonstrated normal size left ventricle with adequate  to good overall contractility and no significant regional wall motion  abnormality.  The left ventricular systolic function is normal with  ejection fraction 57% (accuracy maybe affected by presence of atrial  fibrillation).  3. Compared to the prior study from not applicable .      8/10/18 resting and exercise ABIs:  IMPRESSION:  1. Normal resting and exercise ABIs bilaterally       8/10/18 echo:  Interpretation Summary     Left ventricular systolic function is normal.The visual ejection fraction is  estimated at 55-60%.  Diastolic Doppler findings (E/E' ratio and/or other parameters) suggest left  ventricular filling pressures are increased.  The right ventricular systolic function is normal.  Pulmonary hypertension- RVSP 40 mm hg +RA.  There is trace aortic regurgitation.  Mild aortic root and ascending aorta dilatation.      18 venous comp study:  Impression:  1. Right leg: No DVT. Severe reflux (5.2s, 4.0mm) of the proximal GSV  in the thigh and superficial tributary (5.4s, 4.7mm) in the calf as  well as severe reflux in the small saphenous vein in the calf (6.4s,  2.2mm) with resultant refluxing venous varicosities (4.5s, 2.9mm).     2. Left leg: No DVT. Severe reflux (5.0s, 2.9mm) limited to distal GSV  at  the ankle.        If clinically indicated could treat:  Right leg: RF ablation of GSV, RF ablation of SSV, sclerotherapy of  superficial tributary and varicose veins with phlebectomy of varicose  veins.     Left leg: Sclerotherapy of distal GSV vs. RF ablation of GSV +  sclerotherapy.        ASSESSMENT/PLAN:  Chronic afib  - Rate controlled on dilt cd 120 mg.  I continue the current dose given his rate in the 80s today.   - On warfarin    QUESADA  - Echo shows normal LVEF, grade 3 diastolic dysfunction  - Nuc without e/o ischemia  - Some contribution could be from the chronic afib as well  - Given the QUESADA, edema, and the echo showing grade 3 diastolic dysfunction, will trial some low dose diuretic - Lasix 20 mg daily.  This may have helped a little.   - I did want to have his PCP consider a substitute medication for Actos given his significant diastolic dysfunction/QUESADA/edema.  Dr. Armas is considering changing when he sees him in December.    - Overall stable, and not too limiting    LE edema  Exertional leg heaviness  Varicose veins  Telangiectasias  - Resting and exercise ABIs are normal  - Venous comp study is abnormal showing severe reflux in both right and left legs.    -- Right leg:  No DVT.  There is severe reflux (5.2s, 4.0mm) of the proximal GSV in the thigh and superficial tributary (5.4s, 4.7mm) in the calf as well as severe reflux in the small saphenous vein in the calf (6.4s, 2.2mm) with resultant refluxing venous varicosities (4.5s, 2.9mm).     --Left leg: No DVT. Severe reflux (5.0s, 2.9mm) limited to distal GSV at the ankle.  - The LE edema could be in aprt from the diastolic dysfunction.  He will continue Lasix.    - His HR was in the 80s on check by the rooming staff and myself, so I continued dilt at the current dose.  - He also he has severe reflux in bilateral LEs.     -- He tried compression stockings, but could not get them off.  We discussed considering a zipper pair, or going to a medical  supply store where they can help him find a pair that fits well.    -- Continue to ambulate   -- Elevation    -- For now, he would like to hold off on any vein procedure. He will follow up in 6 months. If more bothersome, we can consider RLE vena seal of the greater saphenous vein, small saphenous vein and superficial tributary plus or minus sclerotherapy and phlebectomy of the varicose veins.  The varicose veins seem fairly small and may close up without aggressive therapy.  And then pending RLE response, could consider LLE cclerotherapy of distal GSV vs. RF ablation of GSV + sclerotherapy.         Follow up:  6 months     Susanna Haque PA-C    Thank you for allowing me to participate in the care of your patient.      Sincerely,     Susanna Haque PA-C     [facility]    cc:   Herbert Gleason MD  [address]  [place] [address]

## 2018-11-20 ENCOUNTER — ANTICOAGULATION THERAPY VISIT (OUTPATIENT)
Dept: NURSING | Facility: CLINIC | Age: 83
End: 2018-11-20
Payer: COMMERCIAL

## 2018-11-20 DIAGNOSIS — I48.20 CHRONIC ATRIAL FIBRILLATION (H): ICD-10-CM

## 2018-11-20 LAB — INR POINT OF CARE: 2.3 (ref 0.86–1.14)

## 2018-11-20 PROCEDURE — 85610 PROTHROMBIN TIME: CPT | Mod: QW

## 2018-11-20 PROCEDURE — 36416 COLLJ CAPILLARY BLOOD SPEC: CPT

## 2018-11-20 NOTE — MR AVS SNAPSHOT
Louis Keller Jr.   11/20/2018 11:15 AM   Anticoagulation Therapy Visit    Description:  86 year old male   Provider:  [facility]   Department:  [facility] Nurse           INR as of 11/20/2018     Today's INR 2.3      Anticoagulation Summary as of 11/20/2018     INR goal 2.0-3.0   Today's INR 2.3   Full warfarin instructions 5 mg on Sun, Tue, Thu; 2.5 mg all other days   Next INR check 1/3/2019    Indications   Long-term (current) use of anticoagulants [Z79.01] [Z79.01]  Atrial fibrillation (H) [I48.91]         Your next Anticoagulation Clinic appointment(s)     Jan 03, 2019 11:00 AM CST   Anticoagulation Visit with [facility]   [facility]    [address] [place] [id number]   [phone]              Contact Numbers     Clinic Number:         November 2018 Details    Sun Mon Tue Wed Thu Fri Sat         1               2               3                 4               5               6               7               8               9               10                 11               12               13               14               15               16               17                 18               19               20      5 mg   See details      21      2.5 mg         22      5 mg         23      2.5 mg         24      2.5 mg           25      5 mg         26      2.5 mg         27      5 mg         28      2.5 mg         29      5 mg         30      2.5 mg           Date Details   11/20 This INR check               How to take your warfarin dose     To take:  2.5 mg Take 0.5 of a 5 mg tablet.    To take:  5 mg Take 1 of the 5 mg tablets.           December 2018 Details    Sun Mon Tue Wed Thu Fri Sat           1      2.5 mg           2      5 mg         3      2.5 mg         4      5 mg         5      2.5 mg         6      5 mg         7      2.5 mg         8      2.5 mg           9      5 mg         10      2.5  mg         11      5 mg         12      2.5 mg         13      5 mg         14      2.5 mg         15      2.5 mg           16      5 mg         17      2.5 mg         18      5 mg         19      2.5 mg         20      5 mg         21      2.5 mg         22      2.5 mg           23      5 mg         24      2.5 mg         25      5 mg         26      2.5 mg         27      5 mg         28      2.5 mg         29      2.5 mg           30      5 mg         31      2.5 mg               Date Details   No additional details            How to take your warfarin dose     To take:  2.5 mg Take 0.5 of a 5 mg tablet.    To take:  5 mg Take 1 of the 5 mg tablets.           January 2019 Details    Sun Mon Tue Wed Thu Fri Sat       1      5 mg         2      2.5 mg         3            4               5                 6               7               8               9               10               11               12                 13               14               15               16               17               18               19                 20               21               22               23               24               25               26                 27               28               29               30               31                  Date Details   No additional details    Date of next INR:  1/3/2019         How to take your warfarin dose     To take:  2.5 mg Take 0.5 of a 5 mg tablet.    To take:  5 mg Take 1 of the 5 mg tablets.

## 2018-11-20 NOTE — PROGRESS NOTES
ANTICOAGULATION FOLLOW-UP CLINIC VISIT    Patient Name:  Louis Keller Jr.  Date:  11/20/2018  Contact Type:  Face to Face    SUBJECTIVE:     Patient Findings     Positives No Problem Findings           OBJECTIVE    INR Protime   Date Value Ref Range Status   11/20/2018 2.3 (A) 0.86 - 1.14 Final       ASSESSMENT / PLAN  INR assessment THER    Recheck INR In: 6 WEEKS    INR Location Clinic      Anticoagulation Summary as of 11/20/2018     INR goal 2.0-3.0   Today's INR 2.3   Warfarin maintenance plan 5 mg (5 mg x 1) on Sun, Tue, Thu; 2.5 mg (5 mg x 0.5) all other days   Full warfarin instructions 5 mg on Sun, Tue, Thu; 2.5 mg all other days   Weekly warfarin total 25 mg   No change documented Hope Baumann RN   Plan last modified Monica oSuza RN (5/12/2016)   Next INR check 1/3/2019   Priority INR   Target end date     Indications   Long-term (current) use of anticoagulants [Z79.01] [Z79.01]  Atrial fibrillation (H) [I48.91]         Anticoagulation Episode Summary     INR check location     Preferred lab     Send INR reminders to [facility]    Comments likes calendar printout.        Anticoagulation Care Providers     Provider Role Specialty Phone number    Joselito Armas MD Responsible Internal Medicine [phone]            See the Encounter Report to view Anticoagulation Flowsheet and Dosing Calendar (Go to Encounters tab in chart review, and find the Anticoagulation Therapy Visit)        Hope Baumann, RN

## 2018-11-27 DIAGNOSIS — R06.09 DOE (DYSPNEA ON EXERTION): ICD-10-CM

## 2018-11-27 DIAGNOSIS — R60.0 BILATERAL LEG EDEMA: ICD-10-CM

## 2018-11-27 DIAGNOSIS — I51.89 DIASTOLIC DYSFUNCTION: ICD-10-CM

## 2018-11-27 RX ORDER — FUROSEMIDE 20 MG
20 TABLET ORAL DAILY
Qty: 90 TABLET | Refills: 2 | Status: SHIPPED | OUTPATIENT
Start: 2018-11-27 | End: 2019-10-01

## 2018-11-27 NOTE — TELEPHONE ENCOUNTER
Received refill request for:  Furosemide - 90 day request  Last OV was: 11/15/2018 with JILLIAN Ward  Labs/EKG: last BMP 10/3/2018  F/U scheduled: orders in Epic for 5/2019  New script sent to: [facility]

## 2018-11-30 DIAGNOSIS — I48.91 NEW ONSET ATRIAL FIBRILLATION (H): ICD-10-CM

## 2018-11-30 DIAGNOSIS — Z79.01 CHRONIC ANTICOAGULATION: ICD-10-CM

## 2018-11-30 DIAGNOSIS — R00.2 HEART PALPITATIONS: ICD-10-CM

## 2018-11-30 RX ORDER — WARFARIN SODIUM 5 MG/1
TABLET ORAL
Qty: 65 TABLET | Refills: 2 | Status: SHIPPED | OUTPATIENT
Start: 2018-11-30 | End: 2019-10-07

## 2018-11-30 NOTE — TELEPHONE ENCOUNTER
"Requested Prescriptions   Pending Prescriptions Disp Refills     warfarin (COUMADIN) 5 MG tablet 65 tablet 2     Sig: TAKE 1 TABLET BY MOUTH TUESDAY,THURS AND SUN. TAKE 1/2 TABLET ON MON,WED,FRI AND SAT. OR AS DIRECTED    Vitamin K Antagonists Failed    11/30/2018 10:29 AM       Failed - INR is within goal in the past 6 weeks    Confirm INR is within goal in the past 6 weeks.     Recent Labs   Lab Test 11/20/18   INR  2.3*                      Passed - Recent (12 mo) or future (30 days) visit within the authorizing provider's specialty    Patient had office visit in the last 12 months or has a visit in the next 30 days with authorizing provider or within the authorizing provider's specialty.  See \"Patient Info\" tab in inbasket, or \"Choose Columns\" in Meds & Orders section of the refill encounter.             Passed - Patient is 18 years of age or older        Last office visit 9/21/18.  Warfarin dosing is managed by INR Clinic.  Prescription approved per [facility] Refill Protocol.  Tata Alexis RN    "

## 2018-12-17 ENCOUNTER — TRANSFERRED RECORDS (OUTPATIENT)
Dept: HEALTH INFORMATION MANAGEMENT | Facility: CLINIC | Age: 83
End: 2018-12-17

## 2018-12-19 ENCOUNTER — OFFICE VISIT (OUTPATIENT)
Dept: INTERNAL MEDICINE | Facility: CLINIC | Age: 83
End: 2018-12-19
Payer: COMMERCIAL

## 2018-12-19 VITALS
HEART RATE: 86 BPM | TEMPERATURE: 97.9 F | SYSTOLIC BLOOD PRESSURE: 124 MMHG | BODY MASS INDEX: 26.04 KG/M2 | WEIGHT: 186 LBS | HEIGHT: 71 IN | OXYGEN SATURATION: 99 % | DIASTOLIC BLOOD PRESSURE: 66 MMHG

## 2018-12-19 DIAGNOSIS — E78.5 HYPERLIPIDEMIA LDL GOAL <100: Primary | ICD-10-CM

## 2018-12-19 DIAGNOSIS — N18.30 TYPE 2 DIABETES MELLITUS WITH STAGE 3 CHRONIC KIDNEY DISEASE, WITHOUT LONG-TERM CURRENT USE OF INSULIN (H): ICD-10-CM

## 2018-12-19 DIAGNOSIS — I48.20 CHRONIC ATRIAL FIBRILLATION (H): ICD-10-CM

## 2018-12-19 DIAGNOSIS — E11.22 TYPE 2 DIABETES MELLITUS WITH STAGE 3 CHRONIC KIDNEY DISEASE, WITHOUT LONG-TERM CURRENT USE OF INSULIN (H): ICD-10-CM

## 2018-12-19 LAB
ALBUMIN SERPL-MCNC: 3.9 G/DL (ref 3.4–5)
ALP SERPL-CCNC: 60 U/L (ref 40–150)
ALT SERPL W P-5'-P-CCNC: 29 U/L (ref 0–70)
ANION GAP SERPL CALCULATED.3IONS-SCNC: 6 MMOL/L (ref 3–14)
AST SERPL W P-5'-P-CCNC: 32 U/L (ref 0–45)
BILIRUB SERPL-MCNC: 1.6 MG/DL (ref 0.2–1.3)
BUN SERPL-MCNC: 16 MG/DL (ref 7–30)
CALCIUM SERPL-MCNC: 9.4 MG/DL (ref 8.5–10.1)
CHLORIDE SERPL-SCNC: 105 MMOL/L (ref 94–109)
CHOLEST SERPL-MCNC: 128 MG/DL
CO2 SERPL-SCNC: 26 MMOL/L (ref 20–32)
CREAT SERPL-MCNC: 1.03 MG/DL (ref 0.66–1.25)
CREAT UR-MCNC: 72 MG/DL
GFR SERPL CREATININE-BSD FRML MDRD: 65 ML/MIN/{1.73_M2}
GLUCOSE SERPL-MCNC: 142 MG/DL (ref 70–99)
HBA1C MFR BLD: 7.3 % (ref 0–5.6)
HDLC SERPL-MCNC: 58 MG/DL
LDLC SERPL CALC-MCNC: 52 MG/DL
MICROALBUMIN UR-MCNC: 30 MG/L
MICROALBUMIN/CREAT UR: 41.08 MG/G CR (ref 0–17)
NONHDLC SERPL-MCNC: 70 MG/DL
POTASSIUM SERPL-SCNC: 4 MMOL/L (ref 3.4–5.3)
PROT SERPL-MCNC: 7.2 G/DL (ref 6.8–8.8)
SODIUM SERPL-SCNC: 137 MMOL/L (ref 133–144)
T4 FREE SERPL-MCNC: 1.19 NG/DL (ref 0.76–1.46)
TRIGL SERPL-MCNC: 91 MG/DL
TSH SERPL DL<=0.005 MIU/L-ACNC: 4.29 MU/L (ref 0.4–4)

## 2018-12-19 PROCEDURE — 36415 COLL VENOUS BLD VENIPUNCTURE: CPT | Performed by: INTERNAL MEDICINE

## 2018-12-19 PROCEDURE — 84443 ASSAY THYROID STIM HORMONE: CPT | Performed by: INTERNAL MEDICINE

## 2018-12-19 PROCEDURE — 80061 LIPID PANEL: CPT | Performed by: INTERNAL MEDICINE

## 2018-12-19 PROCEDURE — 80053 COMPREHEN METABOLIC PANEL: CPT | Performed by: INTERNAL MEDICINE

## 2018-12-19 PROCEDURE — 99214 OFFICE O/P EST MOD 30 MIN: CPT | Performed by: INTERNAL MEDICINE

## 2018-12-19 PROCEDURE — 83036 HEMOGLOBIN GLYCOSYLATED A1C: CPT | Performed by: INTERNAL MEDICINE

## 2018-12-19 PROCEDURE — 82043 UR ALBUMIN QUANTITATIVE: CPT | Performed by: INTERNAL MEDICINE

## 2018-12-19 PROCEDURE — 84439 ASSAY OF FREE THYROXINE: CPT | Performed by: INTERNAL MEDICINE

## 2018-12-19 RX ORDER — PIOGLITAZONEHYDROCHLORIDE 45 MG/1
45 TABLET ORAL DAILY
Qty: 90 TABLET | Refills: 3 | Status: SHIPPED | OUTPATIENT
Start: 2018-12-19 | End: 2019-12-31

## 2018-12-19 ASSESSMENT — MIFFLIN-ST. JEOR: SCORE: 1545.82

## 2018-12-19 NOTE — PROGRESS NOTES
SUBJECTIVE:   Louis Keller Jr. is a 86 year old male who presents to clinic today for the following health issues:    Patient is seen for a follow up visit.  No acute complaints, no medication change or new medical conditions.      Diabetes Follow-up  Has H/O DM. On diet , exercise and PO treatment. Blood sugars are controlled. No parestesias. No hypoglycemias.    Patient is checking blood sugars: once daily.  Results are as follows:         lunchtime - 130's    Diabetic concerns: None     Symptoms of hypoglycemia (low blood sugar): none     Paresthesias (numbness or burning in feet) or sores: Yes , occasional Lt foot numbness, Rt leg/foot pain     Date of last diabetic eye exam: 12/17/18    BP Readings from Last 2 Encounters:   11/15/18 112/60   10/03/18 136/74     Hemoglobin A1C (%)   Date Value   06/22/2018 7.1 (H)   03/29/2018 7.6 (H)     LDL Cholesterol Calculated (mg/dL)   Date Value   12/27/2017 58   03/14/2016 76       Diabetes Management Resources  Hyperlipidemia Follow-Up  Has H/O hyperlipidemia. On medical treatment and diet. No side effects. No muscle weakness, myalgias or upset stomach.       Rate your low fat/cholesterol diet?: fair    Taking statin?  No    Other lipid medications/supplements?:  Fish oil/Omega       Amount of exercise or physical activity: 20 minute walk/6 days a week    Problems taking medications regularly: No    Medication side effects: none    Diet: regular         PROBLEMS TO ADD ON...  Has history of atrial fibrillation. On anticoagulation with Coumadin and rate control medications. Asymptonatic - no chest pains , palpitations,  no side effects from medications.      Problem list and histories reviewed & adjusted, as indicated.  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     Anemia due to blood loss, acute     Physical deconditioning     Diabetes mellitus, type 2 (H)     Atrial fibrillation (H)     QUESADA (dyspnea on exertion)     Diastolic murmur     Pain of right thigh     Long-term (current) use of anticoagulants [Z79.01]     Knee effusion, right     Joint effusion of knee     Past Surgical History:   Procedure Laterality Date     ARTHROPLASTY KNEE  2013    Procedure: ARTHROPLASTY KNEE;  Left Total Knee Arthroplasty       C NONSPECIFIC PROCEDURE  Child    T&A     C NONSPECIFIC PROCEDURE  ; also     Colonoscopy     C NONSPECIFIC PROCEDURE      Basal cell cancer of the nose     C NONSPECIFIC PROCEDURE      Cholecystectomy     CARDIOVERSION  11    failed       Social History     Tobacco Use     Smoking status: Never Smoker     Smokeless tobacco: Never Used   Substance Use Topics     Alcohol use: Yes     Alcohol/week: 0.0 oz     Comment: 1 glass of wine every day     Family History   Problem Relation Age of Onset     Alzheimer Disease Maternal Grandmother      Arthritis Maternal Grandmother      Cancer Mother         breast/ /colon     Eye Disorder Mother         glaucoma and cataracts     Heart Disease Mother         angina/CABG     Hypertension Mother      Cancer Father         colon     Diabetes Maternal Aunt         AoDM         Current Outpatient Medications   Medication Sig Dispense Refill     Acetaminophen (TYLENOL PO) Take 1,000 mg by mouth 3 times daily       cholestyramine (QUESTRAN) 4 g Packet TAKE 1 PACKET (4 G) BY MOUTH 2 TIMES DAILY (WITH MEALS) 180 packet 0     Chromium 1000 MCG TABS Take 500 mcg by mouth daily       diltiazem (CARDIZEM CD/CARTIA XT) 120 MG 24 hr capsule Take 1 capsule (120 mg) by mouth every  "evening 90 capsule 3     ferrous gluconate (FERGON) 324 (38 FE) MG tablet TAKE 1 TABLET (324 MG) BY MOUTH DAILY (WITH BREAKFAST) 100 tablet 3     fish oil-omega-3 fatty acids (FISH OIL) 1000 MG capsule Take 1 g by mouth 2 times daily        furosemide (LASIX) 20 MG tablet Take 1 tablet (20 mg) by mouth daily 90 tablet 2     glipiZIDE (GLUCOTROL XL) 10 MG 24 hr tablet TAKE 1 TABLET (10 MG) BY MOUTH 2 TIMES DAILY 180 tablet 0     loperamide (IMODIUM A-D) 2 MG tablet daily  30 tablet 0     metFORMIN (GLUCOPHAGE) 500 MG tablet TAKE 1 TABLET (500 MG) BY MOUTH 2 TIMES DAILY (WITH MEALS) 180 tablet 0     multivitamin, therapeutic (THERA-VIT) TABS tablet Take 0.5 tablets by mouth 2 times daily       pioglitazone (ACTOS) 45 MG tablet Take 1 tablet (45 mg) by mouth daily 90 tablet 3     senna-docusate (SENOKOT-S;PERICOLACE) 8.6-50 MG per tablet Take 1 tablet by mouth 2 times daily as needed for constipation 100 tablet      tamsulosin (FLOMAX) 0.4 MG capsule Take 1 capsule (0.4 mg) by mouth daily 30 capsule 11     warfarin (COUMADIN) 5 MG tablet TAKE 1 TABLET BY MOUTH TUESDAY,THURS AND SUN. TAKE 1/2 TABLET ON MON,WED,FRI AND SAT. OR AS DIRECTED 65 tablet 2     ACE NOT PRESCRIBED, INTENTIONAL, 1 each daily ACE Inhibitor not prescribed due to Risk for drug interaction 0 each 0     ASPIRIN NOT PRESCRIBED, INTENTIONAL, by Other route continuous prn Reported on 3/7/2017       ONETOUCH ULTRA test strip USE TO TEST DAILY 100 strip 2     STATIN NOT PRESCRIBED, INTENTIONAL, 1 each At Bedtime Statin not prescribed intentionally due to Risk for drug interaction 0 each 0       Reviewed and updated as needed this visit by clinical staff       Reviewed and updated as needed this visit by Provider         ROS:  Constitutional, HEENT, cardiovascular, pulmonary, gi and gu systems are negative, except as otherwise noted.    OBJECTIVE:     /66   Pulse 86   Temp 97.9  F (36.6  C) (Oral)   Ht 1.803 m (5' 11\")   Wt 84.4 kg (186 lb)   " SpO2 99%   BMI 25.94 kg/m    Body mass index is 25.94 kg/m .   GENERAL: healthy, alert and no distress  EYES: Eyes grossly normal to inspection, PERRL and conjunctivae and sclerae normal  HENT: ear canals and TM's normal, nose and mouth without ulcers or lesions  NECK: no adenopathy, no asymmetry, masses, or scars and thyroid normal to palpation  RESP: lungs clear to auscultation - no rales, rhonchi or wheezes  CV: irregular rate and rhythm, normal S1 S2, no S3 or S4, no murmur, click or rub,  and peripheral pulses strong  ABDOMEN: soft, nontender, no hepatosplenomegaly, no masses and bowel sounds normal  MS: no gross musculoskeletal defects noted, trace LE edema  SKIN: no suspicious lesions or rashes  NEURO: Normal strength and tone, mentation intact and speech normal  PSYCH: mentation appears normal, affect normal/bright    Diagnostic Test Results:  none     ASSESSMENT/PLAN:     Problem List Items Addressed This Visit     Hyperlipidemia LDL goal <100 - Primary    Diabetes mellitus, type 2 (H)    Relevant Medications    pioglitazone (ACTOS) 45 MG tablet    Other Relevant Orders    Comprehensive metabolic panel    Lipid panel reflex to direct LDL Fasting    Hemoglobin A1c    Albumin Random Urine Quantitative with Creat Ratio    TSH with free T4 reflex    Atrial fibrillation (H)           Assess DM control   Cont treatment   Cont diet, exercise     Follow-Up:in 6 months     Joselito Armas MD  [facility]

## 2018-12-19 NOTE — NURSING NOTE
"Vital signs:  Temp: 97.9  F (36.6  C) Temp src: Oral BP: 124/66 Pulse: 86     SpO2: 99 %     Height: 180.3 cm (5' 11\") Weight: 84.4 kg (186 lb)  Estimated body mass index is 25.94 kg/m  as calculated from the following:    Height as of this encounter: 1.803 m (5' 11\").    Weight as of this encounter: 84.4 kg (186 lb).          "

## 2018-12-28 DIAGNOSIS — I48.20 CHRONIC ATRIAL FIBRILLATION (H): ICD-10-CM

## 2018-12-28 DIAGNOSIS — L57.0 ACTINIC KERATOSIS: ICD-10-CM

## 2018-12-28 NOTE — TELEPHONE ENCOUNTER
"Requested Prescriptions   Pending Prescriptions Disp Refills     glipiZIDE (GLUCOTROL XL) 10 MG 24 hr tablet  Last Written Prescription Date:  10/01/18  Last Fill Quantity: 180,  # refills: 0   Last office visit: 12/19/2018 with prescribing provider:  12/19/18   Future Office Visit:     180 tablet 0    Sulfonylurea Agents Passed - 12/28/2018 10:26 AM       Passed - Blood pressure less than 140/90 in past 6 months    BP Readings from Last 3 Encounters:   12/19/18 124/66   11/15/18 112/60   10/03/18 136/74                Passed - Patient has documented LDL within the past 12 mos.    Recent Labs   Lab Test 12/19/18  0814   LDL 52            Passed - Patient has had a Microalbumin in the past 12 mos.    Recent Labs   Lab Test 12/19/18  0815   MICROL 30   UMALCR 41.08*            Passed - Patient has documented A1c within the specified period of time.    If HgbA1C is 8 or greater, it needs to be on file within the past 3 months.  If less than 8, must be on file within the past 6 months.     Recent Labs   Lab Test 12/19/18  0814   A1C 7.3*            Passed - Patient is age 18 or older       Passed - Patient has a recent creatinine (normal) within the past 12 mos.    Recent Labs   Lab Test 12/19/18  0814  04/09/12  1526   CR 1.03   < >  --    CREAT  --   --  1.1    < > = values in this interval not displayed.            Passed - Recent (6 mo) or future (30 days) visit within the authorizing provider's specialty    Patient had office visit in the last 6 months or has a visit in the next 30 days with authorizing provider or within the authorizing provider's specialty.  See \"Patient Info\" tab in inbasket, or \"Choose Columns\" in Meds & Orders section of the refill encounter.              "

## 2018-12-29 DIAGNOSIS — L57.0 ACTINIC KERATOSIS: ICD-10-CM

## 2018-12-29 DIAGNOSIS — I48.20 CHRONIC ATRIAL FIBRILLATION (H): ICD-10-CM

## 2018-12-31 NOTE — TELEPHONE ENCOUNTER
"Requested Prescriptions   Pending Prescriptions Disp Refills     glipiZIDE (GLUCOTROL XL) 10 MG 24 hr tablet [Pharmacy Med Name: GLIPIZIDE ER 10 MG TABLET] 180 tablet 0    Last Written Prescription Date:  10/01/2018  Last Fill Quantity: 180,  # refills: 0   Last office visit: 12/19/2018 with prescribing provider:     Future Office Visit:   Sig: TAKE 1 TABLET (10 MG) BY MOUTH 2 TIMES DAILY    Sulfonylurea Agents Passed - 12/29/2018 10:06 PM       Passed - Blood pressure less than 140/90 in past 6 months    BP Readings from Last 3 Encounters:   12/19/18 124/66   11/15/18 112/60   10/03/18 136/74                Passed - Patient has documented LDL within the past 12 mos.    Recent Labs   Lab Test 12/19/18  0814   LDL 52            Passed - Patient has had a Microalbumin in the past 12 mos.    Recent Labs   Lab Test 12/19/18  0815   MICROL 30   UMALCR 41.08*            Passed - Patient has documented A1c within the specified period of time.    If HgbA1C is 8 or greater, it needs to be on file within the past 3 months.  If less than 8, must be on file within the past 6 months.     Recent Labs   Lab Test 12/19/18  0814   A1C 7.3*            Passed - Patient is age 18 or older       Passed - Patient has a recent creatinine (normal) within the past 12 mos.    Recent Labs   Lab Test 12/19/18  0814  04/09/12  1526   CR 1.03   < >  --    CREAT  --   --  1.1    < > = values in this interval not displayed.            Passed - Recent (6 mo) or future (30 days) visit within the authorizing provider's specialty    Patient had office visit in the last 6 months or has a visit in the next 30 days with authorizing provider or within the authorizing provider's specialty.  See \"Patient Info\" tab in inbasket, or \"Choose Columns\" in Meds & Orders section of the refill encounter.            "

## 2019-01-01 RX ORDER — GLIPIZIDE 10 MG/1
TABLET, FILM COATED, EXTENDED RELEASE ORAL
Qty: 180 TABLET | Refills: 1 | Status: SHIPPED | OUTPATIENT
Start: 2019-01-01 | End: 2019-05-30

## 2019-01-01 RX ORDER — GLIPIZIDE 10 MG/1
TABLET, FILM COATED, EXTENDED RELEASE ORAL
Qty: 180 TABLET | Refills: 1 | Status: SHIPPED | OUTPATIENT
Start: 2019-01-01 | End: 2019-07-19

## 2019-01-02 NOTE — TELEPHONE ENCOUNTER
Prescription approved per [facility] Refill Protocol.    Signed Prescriptions:                        Disp   Refills    glipiZIDE (GLUCOTROL XL) 10 MG 24 hr [id number]*1        Sig: TAKE 1 TABLET (10 MG) BY MOUTH 2 TIMES DAILY  Authorizing Provider: OLGA MCMAHON  Ordering User: LITTLE POLANCO      Closing encounter - no further actions needed at this time    Little Polanco RN

## 2019-01-07 ENCOUNTER — ANTICOAGULATION THERAPY VISIT (OUTPATIENT)
Dept: NURSING | Facility: CLINIC | Age: 84
End: 2019-01-07
Payer: COMMERCIAL

## 2019-01-07 DIAGNOSIS — I48.20 CHRONIC ATRIAL FIBRILLATION (H): ICD-10-CM

## 2019-01-07 LAB — INR POINT OF CARE: 2.5 (ref 0.86–1.14)

## 2019-01-07 PROCEDURE — 85610 PROTHROMBIN TIME: CPT | Mod: QW

## 2019-01-07 PROCEDURE — 36416 COLLJ CAPILLARY BLOOD SPEC: CPT

## 2019-01-07 NOTE — PROGRESS NOTES
ANTICOAGULATION FOLLOW-UP CLINIC VISIT    Patient Name:  Louis Keller Jr.  Date:  2019  Contact Type:  Face to Face    SUBJECTIVE:     Patient Findings     Positives:   No Problem Findings           OBJECTIVE    INR Protime   Date Value Ref Range Status   2019 2.5 (A) 0.86 - 1.14 Final       ASSESSMENT / PLAN  INR assessment THER    Recheck INR In: 6 WEEKS    INR Location Clinic      Anticoagulation Summary  As of 2019    INR goal:   2.0-3.0   TTR:   86.2 % (2.7 y)   INR used for dosin.5 (2019)   Warfarin maintenance plan:   5 mg (5 mg x 1) every Sun, Tue, Thu; 2.5 mg (5 mg x 0.5) all other days   Full warfarin instructions:   5 mg every Sun, Tue, Thu; 2.5 mg all other days   Weekly warfarin total:   25 mg   No change documented:   Hope Baumann RN   Plan last modified:   Monica Souza RN (2016)   Next INR check:   2019   Priority:   INR   Target end date:       Indications    Long-term (current) use of anticoagulants [Z79.01] [Z79.01]  Atrial fibrillation (H) [I48.91]             Anticoagulation Episode Summary     INR check location:       Preferred lab:       Send INR reminders to:   [facility]    Comments:   likes calendar printout.        Anticoagulation Care Providers     Provider Role Specialty Phone number    Joselito Armas MD [facility] Internal Medicine [phone]            See the Encounter Report to view Anticoagulation Flowsheet and Dosing Calendar (Go to Encounters tab in chart review, and find the Anticoagulation Therapy Visit)        Hope Baumann RN

## 2019-01-17 DIAGNOSIS — E78.5 HYPERLIPIDEMIA LDL GOAL <100: ICD-10-CM

## 2019-01-17 DIAGNOSIS — R19.7 POSTCHOLECYSTECTOMY DIARRHEA: ICD-10-CM

## 2019-01-17 DIAGNOSIS — K91.89 POSTCHOLECYSTECTOMY DIARRHEA: ICD-10-CM

## 2019-01-17 NOTE — TELEPHONE ENCOUNTER
"Requested Prescriptions   Pending Prescriptions Disp Refills     cholestyramine (QUESTRAN) 4 g packet [Pharmacy Med Name: CHOLESTYRAMINE PACKET] 180 packet 0    Last Written Prescription Date:  10/18/2018  Last Fill Quantity: 180,  # refills: 0   Last office visit: 12/19/2018 with prescribing provider:     Future Office Visit:   Sig: TAKE 1 PACKET (4 G) BY MOUTH 2 TIMES DAILY (WITH MEALS)    Bile Acid Sequestrant Agents Passed - 1/17/2019  4:12 AM       Passed - Lipid panel on file in past 12 mos    Recent Labs   Lab Test 12/19/18  0814  03/20/15  0836   CHOL 128   < > 131   TRIG 91   < > 96   HDL 58   < > 66   LDL 52   < > 46   NHDL 70   < >  --    VLDL  --   --  19   CHOLHDLRATIO  --   --  2.0    < > = values in this interval not displayed.              Passed - Recent (12 mo) or future (30 days) visit within the authorizing provider's specialty    Patient had office visit in the last 12 months or has a visit in the next 30 days with authorizing provider or within the authorizing provider's specialty.  See \"Patient Info\" tab in inbasket, or \"Choose Columns\" in Meds & Orders section of the refill encounter.             Passed - Medication is active on med list       Passed - Patient is age 18 years or older        "

## 2019-01-21 RX ORDER — CHOLESTYRAMINE 4 G/9G
POWDER, FOR SUSPENSION ORAL
Qty: 180 PACKET | Refills: 1 | Status: SHIPPED | OUTPATIENT
Start: 2019-01-21 | End: 2019-10-04

## 2019-02-05 DIAGNOSIS — E11.9 DM TYPE 2 (DIABETES MELLITUS, TYPE 2) (H): ICD-10-CM

## 2019-02-06 NOTE — TELEPHONE ENCOUNTER
Prescription approved per [facility] Refill Protocol. TONE Mcqueen R.N.

## 2019-02-14 ENCOUNTER — ANTICOAGULATION THERAPY VISIT (OUTPATIENT)
Dept: NURSING | Facility: CLINIC | Age: 84
End: 2019-02-14
Payer: COMMERCIAL

## 2019-02-14 DIAGNOSIS — I48.20 CHRONIC ATRIAL FIBRILLATION (H): ICD-10-CM

## 2019-02-14 LAB — INR POINT OF CARE: 2.7 (ref 0.86–1.14)

## 2019-02-14 PROCEDURE — 85610 PROTHROMBIN TIME: CPT | Mod: QW

## 2019-02-14 PROCEDURE — 99207 ZZC NO CHARGE NURSE ONLY: CPT

## 2019-02-14 PROCEDURE — 36416 COLLJ CAPILLARY BLOOD SPEC: CPT

## 2019-02-14 NOTE — PROGRESS NOTES
ANTICOAGULATION FOLLOW-UP CLINIC VISIT    Patient Name:  Louis Keller Jr.  Date:  2019  Contact Type:  Face to Face    SUBJECTIVE:     Patient Findings     Positives:   No Problem Findings           OBJECTIVE    INR Protime   Date Value Ref Range Status   2019 2.7 (A) 0.86 - 1.14 Final       ASSESSMENT / PLAN  INR assessment THER    Recheck INR In: 6 WEEKS    INR Location Clinic      Anticoagulation Summary  As of 2019    INR goal:   2.0-3.0   TTR:   86.7 % (2.8 y)   INR used for dosin.7 (2019)   Warfarin maintenance plan:   5 mg (5 mg x 1) every Sun, Tue, Thu; 2.5 mg (5 mg x 0.5) all other days   Full warfarin instructions:   5 mg every Sun, Tue, Thu; 2.5 mg all other days   Weekly warfarin total:   25 mg   No change documented:   Hope Baumann RN   Plan last modified:   Monica Souza RN (2016)   Next INR check:   3/28/2019   Priority:   INR   Target end date:       Indications    Long-term (current) use of anticoagulants [Z79.01] [Z79.01]  Atrial fibrillation (H) [I48.91]             Anticoagulation Episode Summary     INR check location:       Preferred lab:       Send INR reminders to:   [facility]    Comments:   likes calendar printout.        Anticoagulation Care Providers     Provider Role Specialty Phone number    Joselito Armas MD [facility] Internal Medicine [phone]            See the Encounter Report to view Anticoagulation Flowsheet and Dosing Calendar (Go to Encounters tab in chart review, and find the Anticoagulation Therapy Visit)        Hope Baumann RN

## 2019-02-28 ENCOUNTER — TRANSFERRED RECORDS (OUTPATIENT)
Dept: HEALTH INFORMATION MANAGEMENT | Facility: CLINIC | Age: 84
End: 2019-02-28

## 2019-03-09 DIAGNOSIS — D62 ANEMIA DUE TO BLOOD LOSS, ACUTE: ICD-10-CM

## 2019-03-11 NOTE — TELEPHONE ENCOUNTER
"Requested Prescriptions   Pending Prescriptions Disp Refills     Ferrous Gluconate 324 (37.5 Fe) MG TABS [Pharmacy Med Name: FERROUS GLUCONATE 324 MG TAB] 100 tablet 3    Last Written Prescription Date:  01/10/2018  Last Fill Quantity: 100,  # refills: 3   Last office visit: 12/19/2018 with prescribing provider:     Future Office Visit:   Sig: TAKE 1 TABLET (324 MG) BY MOUTH DAILY (WITH BREAKFAST)    Iron Supplements Passed - 3/9/2019  9:36 PM       Passed - Patient is 12 years of age or older       Passed - Recent (12 mo) or future (30 days) visit within the authorizing provider's specialty    Patient had office visit in the last 12 months or has a visit in the next 30 days with authorizing provider or within the authorizing provider's specialty.  See \"Patient Info\" tab in inbasket, or \"Choose Columns\" in Meds & Orders section of the refill encounter.             Passed - Hgb OR Hct on record within the past 12 mos.    Patient need only have had a HGB or HCT on file in the past 12 mos. That result does not need to be normal.    Recent Labs   Lab Test 06/22/18  1142 03/08/18 03/06/18  0624   HGB 13.1* 11.6* 11.1*     Recent Labs   Lab Test 06/22/18  1142 03/06/18  0624 03/05/18  1006   HCT 40.1 33.7* 38.1*       Please verify a HGB or HCT has been checked SINCE THE LAST DOSE CHANGE.           Passed - Medication is active on med list        "

## 2019-03-12 RX ORDER — FERROUS GLUCONATE 324(37.5)
TABLET ORAL
Qty: 100 TABLET | Refills: 0 | Status: SHIPPED | OUTPATIENT
Start: 2019-03-12 | End: 2019-06-19

## 2019-03-12 NOTE — TELEPHONE ENCOUNTER
Prescription approved per [facility] Refill Protocol.  Tata Alexis RN

## 2019-03-28 ENCOUNTER — ANTICOAGULATION THERAPY VISIT (OUTPATIENT)
Dept: NURSING | Facility: CLINIC | Age: 84
End: 2019-03-28
Payer: COMMERCIAL

## 2019-03-28 DIAGNOSIS — I48.20 CHRONIC ATRIAL FIBRILLATION (H): ICD-10-CM

## 2019-03-28 LAB — INR POINT OF CARE: 3.1 (ref 0.86–1.14)

## 2019-03-28 PROCEDURE — 36416 COLLJ CAPILLARY BLOOD SPEC: CPT

## 2019-03-28 PROCEDURE — 85610 PROTHROMBIN TIME: CPT | Mod: QW

## 2019-03-28 PROCEDURE — 99207 ZZC NO CHARGE NURSE ONLY: CPT

## 2019-03-28 NOTE — PROGRESS NOTES
"ANTICOAGULATION FOLLOW-UP CLINIC VISIT    Patient Name:  Louis Keller Jr.  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  ), Bruising (1 small bruise on top of each hand.)    Comments:   Cardiology visit scheduled for 04/18/19, patient thinks he may need to wear a monitor but he is unsure.  INR has been trending up, patient will eat greens today and I will recheck INR in 2 weeks.           OBJECTIVE    INR Protime   Date Value Ref Range Status   03/28/2019 3.1 (A) 0.86 - 1.14 Final       ASSESSMENT / PLAN  INR assessment THER    Recheck INR In: 2 WEEKS    INR Location Clinic      Anticoagulation Summary  As of 3/28/2019    INR goal:   2.0-3.0   TTR:   86.3 % (3 y)   INR used for dosing:   3.1! (3/28/2019)   Warfarin maintenance plan:   5 mg (5 mg x 1) every Sun, Tue, Thu; 2.5 mg (5 mg x 0.5) all other days   Full warfarin instructions:   5 mg every Sun, Tue, Thu; 2.5 mg all other days   Weekly warfarin total:   25 mg   No change documented:   Hope Baumann RN   Plan last modified:   Monica Souza RN (5/12/2016)   Next INR check:   4/11/2019   Priority:   INR   Target end date:       Indications    Long-term (current) use of anticoagulants [Z79.01] [Z79.01]  Atrial fibrillation (H) [I48.91]             Anticoagulation Episode Summary     INR check location:       Preferred lab:       Send INR reminders to:   [facility]    Comments:   likes calendar printout.        Anticoagulation Care Providers     Provider Role Specialty Phone number    Joselito Armas MD Responsible Internal Medicine [phone]            See the Encounter Report to view Anticoagulation Flowsheet and Dosing Calendar (Go to Encounters tab in " chart review, and find the Anticoagulation Therapy Visit)        Hope Baumann RN

## 2019-04-03 ENCOUNTER — TRANSFERRED RECORDS (OUTPATIENT)
Dept: HEALTH INFORMATION MANAGEMENT | Facility: CLINIC | Age: 84
End: 2019-04-03

## 2019-04-18 ENCOUNTER — OFFICE VISIT (OUTPATIENT)
Dept: CARDIOLOGY | Facility: CLINIC | Age: 84
End: 2019-04-18
Payer: COMMERCIAL

## 2019-04-18 ENCOUNTER — ANTICOAGULATION THERAPY VISIT (OUTPATIENT)
Dept: NURSING | Facility: CLINIC | Age: 84
End: 2019-04-18
Payer: COMMERCIAL

## 2019-04-18 ENCOUNTER — HOSPITAL ENCOUNTER (OUTPATIENT)
Dept: CARDIOLOGY | Facility: CLINIC | Age: 84
Discharge: HOME | End: 2019-04-18
Attending: PHYSICIAN ASSISTANT | Admitting: PHYSICIAN ASSISTANT
Payer: COMMERCIAL

## 2019-04-18 VITALS
OXYGEN SATURATION: 96 % | DIASTOLIC BLOOD PRESSURE: 70 MMHG | HEART RATE: 84 BPM | BODY MASS INDEX: 26.32 KG/M2 | SYSTOLIC BLOOD PRESSURE: 116 MMHG | WEIGHT: 188 LBS | HEIGHT: 71 IN

## 2019-04-18 DIAGNOSIS — I48.20 CHRONIC A-FIB (H): ICD-10-CM

## 2019-04-18 DIAGNOSIS — R55 NEAR SYNCOPE: ICD-10-CM

## 2019-04-18 DIAGNOSIS — R55 NEAR SYNCOPE: Primary | ICD-10-CM

## 2019-04-18 DIAGNOSIS — I48.20 CHRONIC ATRIAL FIBRILLATION (H): ICD-10-CM

## 2019-04-18 LAB — INR POINT OF CARE: 2.7 (ref 0.86–1.14)

## 2019-04-18 PROCEDURE — 93270 REMOTE 30 DAY ECG REV/REPORT: CPT

## 2019-04-18 PROCEDURE — 99214 OFFICE O/P EST MOD 30 MIN: CPT | Performed by: PHYSICIAN ASSISTANT

## 2019-04-18 PROCEDURE — 85610 PROTHROMBIN TIME: CPT | Mod: QW

## 2019-04-18 PROCEDURE — 93272 ECG/REVIEW INTERPRET ONLY: CPT | Performed by: INTERNAL MEDICINE

## 2019-04-18 PROCEDURE — 99207 ZZC NO CHARGE NURSE ONLY: CPT

## 2019-04-18 PROCEDURE — 36416 COLLJ CAPILLARY BLOOD SPEC: CPT

## 2019-04-18 RX ORDER — LATANOPROST 50 UG/ML
1 SOLUTION/ DROPS OPHTHALMIC DAILY
COMMUNITY
Start: 2019-04-18

## 2019-04-18 ASSESSMENT — MIFFLIN-ST. JEOR: SCORE: 1554.89

## 2019-04-18 NOTE — LETTER
2019    Joselito Armas MD  [address] [facility] [address]    RE: Louis Keller        Dear Colleague,    I had the pleasure of seeing Louis Keller JrJana in the [facility].    CARDIOLOGY CLINIC PROGRESS NOTE    DOS: 2019      Louis Keller Jr.  : 1932, 86 year old  MRN: [id number]      History: I had the pleasure of following up with Louis Keller Jr. today in the cardiology clinic.  He is a patient of Dr. Gleason.  His wife, Laura (also sees myself and Dr. Gleason).     Louis is a pleasant 86 year old man with history of chronic atrial fibrillation, DM2.  He failed cardioversion in the past.  He is maintained on rate control strategy and anticoagulation.  He has had negative stress testing previously in ,  and .      He saw Dr. Gleason 18.  He had some QUESADA and R>L LE exertional heaviness.    After 3 months of trying to increase walking, his sxs were unchanged. Echo, treadmill nuc, ABIs and venous comp study were ordered.   Echo showed normal LVEF, grade 3 diastolic dysfunction.  Nuc was without e/o ischemia.   Resting and exercise ABIs are normal.  Venous comp study is abnormal showing severe reflux in both right and left legs.  Given the QUESADA, edema, and the echo showing grade 3 diastolic dysfunction, we trialed low dose diuretic - Lasix 20 mg daily.  Also compression stockings were ordered.    He saw Dr. Gleason 10/3/18 for follow up. He had R>L LE extremity heaviness.      I last saw Louis 11/15/18.  At that time he continued to have leg fatigue/heaviness, though perhaps the RLE was a little better.  He wanted to postpone the vein procedure since he was feeling ok.  He still had some stable QUESADA.      Interval History:   19 saw GI for chronic loose stools.     3/14/19 his wife saw Dr. Gleason and she reported that Louis had an episode of near syncope, and Dr. Gleason recommended that I see him in clinic.      About 1 week before Laura  saw Dr. Gleason Louis was sitting reading the newspaper.  He felt prickly, warm sensation on his face (both sides).  The vision seemed to go a little gray and he felt like he would pass out, but this only lasted a couple seconds.  He hadn't had this before, and has not had it since.   No chest pain with the episodes.   No palpitations.   No recent travel, no change in medications.     He had an echo last summer that showed normal LV systolic function, increase in estimated RVSP, trace AI, mild aortic root and ascending aorta dilatation.   He does have chronic afib. INRs have been therapeutic.     He walks on the treadmill, but he is more limited by his legs lately.  The legs are getting more tired.  He used to do 15-20 minutes.  Lately he has been doing 12 minutes because of leg fatigue.       ROS:  Skin:  Positive for     Eyes:  Positive for glasses  ENT:  Positive for hearing loss  Respiratory:  Positive for dyspnea on exertion  Cardiovascular:    Positive for;edema;fatigue  Gastroenterology: Negative    Genitourinary:  Positive for nocturia;urinary frequency  Musculoskeletal:  Positive for joint pain;gout;foot pain;nocturnal cramping  Neurologic:  Positive for numbness or tingling of feet  Psychiatric:  Positive for sleep disturbances  Heme/Lymph/Imm:  Negative    Endocrine:  Positive for diabetes    PAST MEDICAL HISTORY:  Past Medical History:   Diagnosis Date     Antiplatelet or antithrombotic long-term use      Atrial fibrillation (H)      Diarrhea      Diastolic murmur      QUESADA (dyspnea on exertion)      Gout      Hemorrhage of gastrointestinal tract, unspecified 63,65,and 1971    hospitalized     History of blood transfusion      Hyperlipidemia LDL goal <100 4/18/2012     Long-term (current) use of anticoagulants [Z79.01] 3/31/2016     Mumps      Palpitations      Physical deconditioning 8/9/2013     Scarlet fever      Spider veins      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      Unspecified disease of pancreas 1990    pancreatitis       PAST SURGICAL HISTORY:  Past Surgical History:   Procedure Laterality Date     ARTHROPLASTY KNEE  8/5/2013    Procedure: ARTHROPLASTY KNEE;  Left Total Knee Arthroplasty       C NONSPECIFIC PROCEDURE  Child    T&A     C NONSPECIFIC PROCEDURE  ; also 11/03    Colonoscopy     C NONSPECIFIC PROCEDURE      Basal cell cancer of the nose     C NONSPECIFIC PROCEDURE  1990    Cholecystectomy     CARDIOVERSION  9/6/11    failed       SOCIAL HISTORY:  Social History     Socioeconomic History     Marital status:      Spouse name: None     Number of children: None     Years of education: None     Highest education level: None   Occupational History     Occupation: Lab tech     Comment: Semi-retired   Social Needs     Financial resource strain: None     Food insecurity:     Worry: None     Inability: None     Transportation needs:     Medical: None     Non-medical: None   Tobacco Use     Smoking status: Never Smoker     Smokeless tobacco: Never Used   Substance and Sexual Activity     Alcohol use: Yes     Alcohol/week: 0.0 oz     Comment: 1 glass of wine every day     Drug use: No     Sexual activity: Never     Partners: Female   Lifestyle     Physical activity:     Days per week: None     Minutes per session: None     Stress: None   Relationships     Social connections:     Talks on phone: None     Gets together: None     Attends Taoist service: None     Active member of club or organization: None     Attends meetings of clubs or organizations: None     Relationship status: None     Intimate partner violence:     Fear of current or ex partner: None     Emotionally abused: None     Physically abused: None     Forced sexual activity: None   Other Topics Concern      Service Not Asked     Blood Transfusions Not Asked     Caffeine Concern No     Comment: 14 oz per day     Occupational Exposure Not Asked     Hobby Hazards Not Asked     Sleep Concern No      Comment: sometimes - nocturia 4 times per night     Stress Concern Not Asked     Weight Concern Not Asked     Special Diet No     Back Care Not Asked     Exercise Yes     Comment: treadmill/walking 15-20 minutes 6 days per week     Bike Helmet Not Asked     Seat Belt Not Asked     Self-Exams Not Asked     Parent/sibling w/ CABG, MI or angioplasty before 65F 55M? Not Asked   Social History Narrative     None       FAMILY HISTORY:  Family History   Problem Relation Age of Onset     Alzheimer Disease Maternal Grandmother      Arthritis Maternal Grandmother      Cancer Mother         breast/ 1983/colon     Eye Disorder Mother         glaucoma and cataracts     Heart Disease Mother         angina/CABG     Hypertension Mother      Cancer Father         colon     Diabetes Maternal Aunt         AoDM       MEDS:   Current Outpatient Medications on File Prior to Visit:  Acetaminophen (TYLENOL PO) Take 1,000 mg by mouth 3 times daily   cholestyramine (QUESTRAN) 4 g packet TAKE 1 PACKET (4 G) BY MOUTH 2 TIMES DAILY (WITH MEALS)   Chromium 1000 MCG TABS Take 500 mcg by mouth daily   diltiazem (CARDIZEM CD/CARTIA XT) 120 MG 24 hr capsule Take 1 capsule (120 mg) by mouth every evening   Ferrous Gluconate 324 (37.5 Fe) MG TABS TAKE 1 TABLET (324 MG) BY MOUTH DAILY (WITH BREAKFAST)   fish oil-omega-3 fatty acids (FISH OIL) 1000 MG capsule Take 1 g by mouth 2 times daily    furosemide (LASIX) 20 MG tablet Take 1 tablet (20 mg) by mouth daily   glipiZIDE (GLUCOTROL XL) 10 MG 24 hr tablet TAKE 1 TABLET (10 MG) BY MOUTH 2 TIMES DAILY   loperamide (IMODIUM A-D) 2 MG tablet daily    metFORMIN (GLUCOPHAGE) 500 MG tablet Take 1 tablet (500 mg) by mouth 2 times daily (with meals)   multivitamin, therapeutic (THERA-VIT) TABS tablet Take 0.5 tablets by mouth 2 times daily   ONETOUCH ULTRA test strip USE TO TEST DAILY   pioglitazone (ACTOS) 45 MG tablet Take 1 tablet (45 mg) by mouth daily   tamsulosin (FLOMAX) 0.4 MG capsule Take 1  "capsule (0.4 mg) by mouth daily   warfarin (COUMADIN) 5 MG tablet TAKE 1 TABLET BY MOUTH TUESDAY,THURS AND SUN. TAKE 1/2 TABLET ON MON,WED,FRI AND SAT. OR AS DIRECTED   ACE NOT PRESCRIBED, INTENTIONAL, 1 each daily ACE Inhibitor not prescribed due to Risk for drug interaction (Patient not taking: Reported on 4/18/2019)   ASPIRIN NOT PRESCRIBED, INTENTIONAL, by Other route continuous prn Reported on 3/7/2017   glipiZIDE (GLUCOTROL XL) 10 MG 24 hr tablet TAKE 1 TABLET (10 MG) BY MOUTH 2 TIMES DAILY (Patient not taking: Reported on 4/18/2019)   senna-docusate (SENOKOT-S;PERICOLACE) 8.6-50 MG per tablet Take 1 tablet by mouth 2 times daily as needed for constipation (Patient not taking: Reported on 4/18/2019)   STATIN NOT PRESCRIBED, INTENTIONAL, 1 each At Bedtime Statin not prescribed intentionally due to Risk for drug interaction (Patient not taking: Reported on 4/18/2019)     No current facility-administered medications on file prior to visit.     ALLERGIES:   Allergies   Allergen Reactions     No Known Drug Allergies        PHYSICAL EXAM:  Vitals: /70 (BP Location: Right arm, Patient Position: Chair, Cuff Size: Adult Regular)   Pulse 84   Ht 1.803 m (5' 11\")   Wt 85.3 kg (188 lb)   SpO2 96%   BMI 26.22 kg/m     Constitutional:  cooperative, alert and oriented, well developed, well nourished, in no acute distress        Skin:  warm and dry to the touch, no apparent skin lesions or masses noted        Head:  normocephalic, no masses or lesions        Eyes:  pupils equal and round;conjunctivae and lids unremarkable;sclera white;no xanthalasma        ENT:  no pallor or cyanosis, dentition good        Neck:  JVP normal        Respiratory:  normal breath sounds, clear to auscultation, normal A-P diameter, normal symmetry, normal respiratory excursion, no use of accessory muscles        Cardiac:   irregularly irregular rhythm         grade 1;early diastolic murmur good rate control    GI:  abdomen soft;BS " normoactive        Vascular:                                        Extremities and Musculoskeletal:  no deformities, clubbing, cyanosis, erythema observed stasis pigmentation     Bilateral edema L>R, bilateral varicose veins and telangiectasias       Neurological:  no gross motor deficits;affect appropriate              LABS/DATA:  I reviewed the followin18 treadmill nuc stress test:  Impression  1.  Myocardial perfusion imaging using single isotope technique  demonstrated no significant perfusion defect consistent with  significant ischemia or infarct. There is mild decrease in inferior  activity at the base on rest and stress images most likely consistent  with diaphragm attenuation/bowel uptake artifact..   2. Gated images demonstrated normal size left ventricle with adequate  to good overall contractility and no significant regional wall motion  abnormality.  The left ventricular systolic function is normal with  ejection fraction 57% (accuracy maybe affected by presence of atrial  fibrillation).  3. Compared to the prior study from not applicable .      8/10/18 resting and exercise ABIs:  IMPRESSION:  1. Normal resting and exercise ABIs bilaterally       8/10/18 echo:  Interpretation Summary     Left ventricular systolic function is normal.The visual ejection fraction is  estimated at 55-60%.  Diastolic Doppler findings (E/E' ratio and/or other parameters) suggest left  ventricular filling pressures are increased.  The right ventricular systolic function is normal.  Pulmonary hypertension- RVSP 40 mm hg +RA.  There is trace aortic regurgitation.  Mild aortic root and ascending aorta dilatation.      18 venous comp study:  Impression:  1. Right leg: No DVT. Severe reflux (5.2s, 4.0mm) of the proximal GSV  in the thigh and superficial tributary (5.4s, 4.7mm) in the calf as  well as severe reflux in the small saphenous vein in the calf (6.4s,  2.2mm) with resultant refluxing venous varicosities  (4.5s, 2.9mm).     2. Left leg: No DVT. Severe reflux (5.0s, 2.9mm) limited to distal GSV  at the ankle.        If clinically indicated could treat:  Right leg: RF ablation of GSV, RF ablation of SSV, sclerotherapy of  superficial tributary and varicose veins with phlebectomy of varicose  veins.     Left leg: Sclerotherapy of distal GSV vs. RF ablation of GSV +  sclerotherapy.        ASSESSMENT/PLAN:  Chronic afib  - Rate controlled on dilt cd 120 mg.     - On warfarin with therapeutic INRs    QUESADA  - Echo shows normal LVEF, grade 3 diastolic dysfunction  - Nuc without e/o ischemia  - Some contribution could be from the chronic afib as well  - With Lasix 20 mg, he had some improvement   - I did want to have his PCP consider a substitute medication for Actos given his significant diastolic dysfunction/QUESADA/edema.  Dr. Armas is considering changing per patient report    - Overall stable, and not too limiting    LE edema  Exertional leg heaviness  Varicose veins  Telangiectasias  - Resting and exercise ABIs are normal  - Venous comp study is abnormal showing severe reflux in both right and left legs.    -- Right leg:  No DVT.  There is severe reflux (5.2s, 4.0mm) of the proximal GSV in the thigh and superficial tributary (5.4s, 4.7mm) in the calf as well as severe reflux in the small saphenous vein in the calf (6.4s, 2.2mm) with resultant refluxing venous varicosities (4.5s, 2.9mm).     --Left leg: No DVT. Severe reflux (5.0s, 2.9mm) limited to distal GSV at the ankle.  - The LE edema could be in aprt from the diastolic dysfunction.  He will continue Lasix.    - His HR was in the 80s on check by the rooming staff and myself, so I continued dilt at the current dose.  - He also he has severe reflux in bilateral LEs.     -- He tried compression stockings, but could not get them off.  We discussed considering a zipper pair, or going to a medical supply store where they can help him find a pair that fits well.    --  Continue to ambulate   -- Elevation    -- His leg heaviness and fatigue is a bit worse the past couple weeks.  However, for now, he would like to hold off on any vein procedure.  We will discuss again when I see him back in about 6 weeks.  We can consider RLE vena seal of the greater saphenous vein, small saphenous vein and superficial tributary plus or minus sclerotherapy and phlebectomy of the varicose veins.  The varicose veins seem fairly small and may close up without aggressive therapy.  And then pending RLE response, could consider LLE cclerotherapy of distal GSV vs. RF ablation of GSV + sclerotherapy.        Near syncope  - A single short episode while sitting reading the newspaper.  He felt prickly, warm sensation on his face (both sides).  The vision seemed to go a little gray and he felt like he would pass out, but this only lasted a couple seconds.     He is not certain what his BP was, but BP has been ok lately.   No chest pain with the episode.   No palpitations.   No recent travel, no change in medications.   He had an echo last summer that showed normal LV systolic function, increase in estimated RVSP, trace AI, mild aortic root and ascending aorta dilatation.   He does have chronic afib.  Is on rate control with diltiazem.  INRs have been therapeutic.   - No recurrence  - Unlikely TIA with his whole face being affected and therapeutic INRs  - Will place a 30 day event monitor to evaluate for arrhythmias        Follow up:  6 weeks      Thank you for allowing me to participate in the care of your patient.    Sincerely,     Susanna Haque PA-C     [facility]

## 2019-04-18 NOTE — PATIENT INSTRUCTIONS
We will place a 30 day monitor to look for any heart arrhythmias that could have caused your symptoms.     If you have symptoms, you can activate the monitor.     I will see you back in about 6 weeks to go over results and follow up.

## 2019-04-18 NOTE — PROGRESS NOTES
CARDIOLOGY CLINIC PROGRESS NOTE    DOS: 2019      Louis Keller Jr.  : 1932, 86 year old  MRN: [id number]      History: I had the pleasure of following up with Louis Keller Jr. today in the cardiology clinic.  He is a patient of Dr. Gleason.  His wife, Laura (also sees myself and Dr. Gleason).     Louis is a pleasant 86 year old man with history of chronic atrial fibrillation, DM2.  He failed cardioversion in the past.  He is maintained on rate control strategy and anticoagulation.  He has had negative stress testing previously in ,  and .      He saw Dr. Gleason 18.  He had some QUESADA and R>L LE exertional heaviness.    After 3 months of trying to increase walking, his sxs were unchanged. Echo, treadmill nuc, ABIs and venous comp study were ordered.   Echo showed normal LVEF, grade 3 diastolic dysfunction.  Nuc was without e/o ischemia.   Resting and exercise ABIs are normal.  Venous comp study is abnormal showing severe reflux in both right and left legs.  Given the QUESADA, edema, and the echo showing grade 3 diastolic dysfunction, we trialed low dose diuretic - Lasix 20 mg daily.  Also compression stockings were ordered.    He saw Dr. Gleason 10/3/18 for follow up. He had R>L LE extremity heaviness.      I last saw Louis 11/15/18.  At that time he continued to have leg fatigue/heaviness, though perhaps the RLE was a little better.  He wanted to postpone the vein procedure since he was feeling ok.  He still had some stable QUESADA.      Interval History:   19 saw GI for chronic loose stools.     3/14/19 his wife saw Dr. Gleason and she reported that Louis had an episode of near syncope, and Dr. Gleason recommended that I see him in clinic.      About 1 week before Laura saw Dr. Gleason, Louis was sitting reading the newspaper.  He felt prickly, warm sensation on his face (both sides).  The vision seemed to go a little gray and he felt like he would pass out, but this only lasted a couple seconds.  He  hadn't had this before, and has not had it since.   No chest pain with the episodes.   No palpitations.   No recent travel, no change in medications.     He had an echo last summer that showed normal LV systolic function, increase in estimated RVSP, trace AI, mild aortic root and ascending aorta dilatation.   He does have chronic afib. INRs have been therapeutic.     He walks on the treadmill, but he is more limited by his legs lately.  The legs are getting more tired.  He used to do 15-20 minutes.  Lately he has been doing 12 minutes because of leg fatigue.       ROS:  Skin:  Positive for     Eyes:  Positive for glasses  ENT:  Positive for hearing loss  Respiratory:  Positive for dyspnea on exertion  Cardiovascular:    Positive for;edema;fatigue  Gastroenterology: Negative    Genitourinary:  Positive for nocturia;urinary frequency  Musculoskeletal:  Positive for joint pain;gout;foot pain;nocturnal cramping  Neurologic:  Positive for numbness or tingling of feet  Psychiatric:  Positive for sleep disturbances  Heme/Lymph/Imm:  Negative    Endocrine:  Positive for diabetes    PAST MEDICAL HISTORY:  Past Medical History:   Diagnosis Date     Antiplatelet or antithrombotic long-term use      Atrial fibrillation (H)      Diarrhea      Diastolic murmur      QUESADA (dyspnea on exertion)      Gout      Hemorrhage of gastrointestinal tract, unspecified 63,65,and 1971    hospitalized     History of blood transfusion      Hyperlipidemia LDL goal <100 4/18/2012     Long-term (current) use of anticoagulants [Z79.01] 3/31/2016     Mumps      Palpitations      Physical deconditioning 8/9/2013     Scarlet fever      Spider veins      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     Unspecified disease of pancreas 1990    pancreatitis       PAST SURGICAL HISTORY:  Past Surgical History:   Procedure Laterality Date     ARTHROPLASTY KNEE  8/5/2013    Procedure: ARTHROPLASTY KNEE;  Left Total Knee Arthroplasty       C  NONSPECIFIC PROCEDURE  Child    T&A     C NONSPECIFIC PROCEDURE  ; also 11/03    Colonoscopy     C NONSPECIFIC PROCEDURE      Basal cell cancer of the nose     C NONSPECIFIC PROCEDURE  1990    Cholecystectomy     CARDIOVERSION  9/6/11    failed       SOCIAL HISTORY:  Social History     Socioeconomic History     Marital status:      Spouse name: None     Number of children: None     Years of education: None     Highest education level: None   Occupational History     Occupation: Lab tech     Comment: Semi-retired   Social Needs     Financial resource strain: None     Food insecurity:     Worry: None     Inability: None     Transportation needs:     Medical: None     Non-medical: None   Tobacco Use     Smoking status: Never Smoker     Smokeless tobacco: Never Used   Substance and Sexual Activity     Alcohol use: Yes     Alcohol/week: 0.0 oz     Comment: 1 glass of wine every day     Drug use: No     Sexual activity: Never     Partners: Female   Lifestyle     Physical activity:     Days per week: None     Minutes per session: None     Stress: None   Relationships     Social connections:     Talks on phone: None     Gets together: None     Attends [employer] service: None     Active member of club or organization: None     Attends meetings of clubs or organizations: None     Relationship status: None     Intimate partner violence:     Fear of current or ex partner: None     Emotionally abused: None     Physically abused: None     Forced sexual activity: None   Other Topics Concern      Service Not Asked     Blood Transfusions Not Asked     Caffeine Concern No     Comment: 14 oz per day     Occupational Exposure Not Asked     Hobby Hazards Not Asked     Sleep Concern No     Comment: sometimes - nocturia 4 times per night     Stress Concern Not Asked     Weight Concern Not Asked     Special Diet No     Back Care Not Asked     Exercise Yes     Comment: treadmill/walking 15-20 minutes 6 days per week     Bike  Helmet Not Asked     Seat Belt Not Asked     Self-Exams Not Asked     Parent/sibling w/ CABG, MI or angioplasty before 65F 55M? Not Asked   Social History Narrative     None       FAMILY HISTORY:  Family History   Problem Relation Age of Onset     Alzheimer Disease Maternal Grandmother      Arthritis Maternal Grandmother      Cancer Mother         breast/ 1983/colon     Eye Disorder Mother         glaucoma and cataracts     Heart Disease Mother         angina/CABG     Hypertension Mother      Cancer Father         colon     Diabetes Maternal Aunt         AoDM       MEDS:   Current Outpatient Medications on File Prior to Visit:  Acetaminophen (TYLENOL PO) Take 1,000 mg by mouth 3 times daily   cholestyramine (QUESTRAN) 4 g packet TAKE 1 PACKET (4 G) BY MOUTH 2 TIMES DAILY (WITH MEALS)   Chromium 1000 MCG TABS Take 500 mcg by mouth daily   diltiazem (CARDIZEM CD/CARTIA XT) 120 MG 24 hr capsule Take 1 capsule (120 mg) by mouth every evening   Ferrous Gluconate 324 (37.5 Fe) MG TABS TAKE 1 TABLET (324 MG) BY MOUTH DAILY (WITH BREAKFAST)   fish oil-omega-3 fatty acids (FISH OIL) 1000 MG capsule Take 1 g by mouth 2 times daily    furosemide (LASIX) 20 MG tablet Take 1 tablet (20 mg) by mouth daily   glipiZIDE (GLUCOTROL XL) 10 MG 24 hr tablet TAKE 1 TABLET (10 MG) BY MOUTH 2 TIMES DAILY   loperamide (IMODIUM A-D) 2 MG tablet daily    metFORMIN (GLUCOPHAGE) 500 MG tablet Take 1 tablet (500 mg) by mouth 2 times daily (with meals)   multivitamin, therapeutic (THERA-VIT) TABS tablet Take 0.5 tablets by mouth 2 times daily   ONETOUCH ULTRA test strip USE TO TEST DAILY   pioglitazone (ACTOS) 45 MG tablet Take 1 tablet (45 mg) by mouth daily   tamsulosin (FLOMAX) 0.4 MG capsule Take 1 capsule (0.4 mg) by mouth daily   warfarin (COUMADIN) 5 MG tablet TAKE 1 TABLET BY MOUTH TUESDAY,THURS AND SUN. TAKE 1/2 TABLET ON MON,WED,FRI AND SAT. OR AS DIRECTED   ACE NOT PRESCRIBED, INTENTIONAL, 1 each daily ACE Inhibitor not  "prescribed due to Risk for drug interaction (Patient not taking: Reported on 4/18/2019)   ASPIRIN NOT PRESCRIBED, INTENTIONAL, by Other route continuous prn Reported on 3/7/2017   glipiZIDE (GLUCOTROL XL) 10 MG 24 hr tablet TAKE 1 TABLET (10 MG) BY MOUTH 2 TIMES DAILY (Patient not taking: Reported on 4/18/2019)   senna-docusate (SENOKOT-S;PERICOLACE) 8.6-50 MG per tablet Take 1 tablet by mouth 2 times daily as needed for constipation (Patient not taking: Reported on 4/18/2019)   STATIN NOT PRESCRIBED, INTENTIONAL, 1 each At Bedtime Statin not prescribed intentionally due to Risk for drug interaction (Patient not taking: Reported on 4/18/2019)     No current facility-administered medications on file prior to visit.     ALLERGIES:   Allergies   Allergen Reactions     No Known Drug Allergies        PHYSICAL EXAM:  Vitals: /70 (BP Location: Right arm, Patient Position: Chair, Cuff Size: Adult Regular)   Pulse 84   Ht 1.803 m (5' 11\")   Wt 85.3 kg (188 lb)   SpO2 96%   BMI 26.22 kg/m    Constitutional:  cooperative, alert and oriented, well developed, well nourished, in no acute distress        Skin:  warm and dry to the touch, no apparent skin lesions or masses noted        Head:  normocephalic, no masses or lesions        Eyes:  pupils equal and round;conjunctivae and lids unremarkable;sclera white;no xanthalasma        ENT:  no pallor or cyanosis, dentition good        Neck:  JVP normal        Respiratory:  normal breath sounds, clear to auscultation, normal A-P diameter, normal symmetry, normal respiratory excursion, no use of accessory muscles        Cardiac:   irregularly irregular rhythm         grade 1;early diastolic murmur good rate control    GI:  abdomen soft;BS normoactive        Vascular:                                        Extremities and Musculoskeletal:  no deformities, clubbing, cyanosis, erythema observed stasis pigmentation     Bilateral edema L>R, bilateral varicose veins and " telangiectasias       Neurological:  no gross motor deficits;affect appropriate              LABS/DATA:  I reviewed the followin18 treadmill nuc stress test:  Impression  1.  Myocardial perfusion imaging using single isotope technique  demonstrated no significant perfusion defect consistent with  significant ischemia or infarct. There is mild decrease in inferior  activity at the base on rest and stress images most likely consistent  with diaphragm attenuation/bowel uptake artifact..   2. Gated images demonstrated normal size left ventricle with adequate  to good overall contractility and no significant regional wall motion  abnormality.  The left ventricular systolic function is normal with  ejection fraction 57% (accuracy maybe affected by presence of atrial  fibrillation).  3. Compared to the prior study from not applicable .      8/10/18 resting and exercise ABIs:  IMPRESSION:  1. Normal resting and exercise ABIs bilaterally       8/10/18 echo:  Interpretation Summary     Left ventricular systolic function is normal.The visual ejection fraction is  estimated at 55-60%.  Diastolic Doppler findings (E/E' ratio and/or other parameters) suggest left  ventricular filling pressures are increased.  The right ventricular systolic function is normal.  Pulmonary hypertension- RVSP 40 mm hg +RA.  There is trace aortic regurgitation.  Mild aortic root and ascending aorta dilatation.      18 venous comp study:  Impression:  1. Right leg: No DVT. Severe reflux (5.2s, 4.0mm) of the proximal GSV  in the thigh and superficial tributary (5.4s, 4.7mm) in the calf as  well as severe reflux in the small saphenous vein in the calf (6.4s,  2.2mm) with resultant refluxing venous varicosities (4.5s, 2.9mm).     2. Left leg: No DVT. Severe reflux (5.0s, 2.9mm) limited to distal GSV  at the ankle.        If clinically indicated could treat:  Right leg: RF ablation of GSV, RF ablation of SSV, sclerotherapy of  superficial  tributary and varicose veins with phlebectomy of varicose  veins.     Left leg: Sclerotherapy of distal GSV vs. RF ablation of GSV +  sclerotherapy.        ASSESSMENT/PLAN:  Chronic afib  - Rate controlled on dilt cd 120 mg.     - On warfarin with therapeutic INRs    QUESADA  - Echo shows normal LVEF, grade 3 diastolic dysfunction  - Nuc without e/o ischemia  - Some contribution could be from the chronic afib as well  - With Lasix 20 mg, he had some improvement   - I did want to have his PCP consider a substitute medication for Actos given his significant diastolic dysfunction/QUESADA/edema.  Dr. Armas is considering changing per patient report    - Overall stable, and not too limiting    LE edema  Exertional leg heaviness  Varicose veins  Telangiectasias  - Resting and exercise ABIs are normal  - Venous comp study is abnormal showing severe reflux in both right and left legs.    -- Right leg:  No DVT.  There is severe reflux (5.2s, 4.0mm) of the proximal GSV in the thigh and superficial tributary (5.4s, 4.7mm) in the calf as well as severe reflux in the small saphenous vein in the calf (6.4s, 2.2mm) with resultant refluxing venous varicosities (4.5s, 2.9mm).     --Left leg: No DVT. Severe reflux (5.0s, 2.9mm) limited to distal GSV at the ankle.  - The LE edema could be in aprt from the diastolic dysfunction.  He will continue Lasix.    - His HR was in the 80s on check by the rooming staff and myself, so I continued dilt at the current dose.  - He also he has severe reflux in bilateral LEs.     -- He tried compression stockings, but could not get them off.  We discussed considering a zipper pair, or going to a medical supply store where they can help him find a pair that fits well.    -- Continue to ambulate   -- Elevation    -- His leg heaviness and fatigue is a bit worse the past couple weeks.  However, for now, he would like to hold off on any vein procedure.  We will discuss again when I see him back in about 6 weeks.   We can consider RLE vena seal of the greater saphenous vein, small saphenous vein and superficial tributary plus or minus sclerotherapy and phlebectomy of the varicose veins.  The varicose veins seem fairly small and may close up without aggressive therapy.  And then pending RLE response, could consider LLE cclerotherapy of distal GSV vs. RF ablation of GSV + sclerotherapy.        Near syncope  - A single short episode while sitting reading the newspaper.  He felt prickly, warm sensation on his face (both sides).  The vision seemed to go a little gray and he felt like he would pass out, but this only lasted a couple seconds.     He is not certain what his BP was, but BP has been ok lately.   No chest pain with the episode.   No palpitations.   No recent travel, no change in medications.   He had an echo last summer that showed normal LV systolic function, increase in estimated RVSP, trace AI, mild aortic root and ascending aorta dilatation.   He does have chronic afib.  Is on rate control with diltiazem.  INRs have been therapeutic.   - No recurrence  - Unlikely TIA with his whole face being affected and therapeutic INRs  - Will place a 30 day event monitor to evaluate for arrhythmias        Follow up:  6 weeks    Susanna Haque PA-C

## 2019-04-18 NOTE — PROGRESS NOTES
ANTICOAGULATION FOLLOW-UP CLINIC VISIT    Patient Name:  Louis Keller Jr.  Date:  2019  Contact Type:  Face to Face    SUBJECTIVE:     Patient Findings     Positives:   Change in activity (Pt has been on treadmill most days of the week.)    Comments:   Patient seeing cardiology today           OBJECTIVE    INR Protime   Date Value Ref Range Status   2019 2.7 (A) 0.86 - 1.14 Final       ASSESSMENT / PLAN  INR assessment THER    Recheck INR In: 6 WEEKS    INR Location Clinic      Anticoagulation Summary  As of 2019    INR goal:   2.0-3.0   TTR:   86.0 % (3 y)   INR used for dosin.7 (2019)   Warfarin maintenance plan:   5 mg (5 mg x 1) every Sun, Tue, Thu; 2.5 mg (5 mg x 0.5) all other days   Full warfarin instructions:   5 mg every Sun, Tue, Thu; 2.5 mg all other days   Weekly warfarin total:   25 mg   No change documented:   Hope Baumann RN   Plan last modified:   Monica Souza RN (2016)   Next INR check:   2019   Priority:   INR   Target end date:       Indications    Long-term (current) use of anticoagulants [Z79.01] [Z79.01]  Atrial fibrillation (H) [I48.91]             Anticoagulation Episode Summary     INR check location:       Preferred lab:       Send INR reminders to:   [facility]    Comments:   likes calendar printout.        Anticoagulation Care Providers     Provider Role Specialty Phone number    Joselito Armas MD [facility] Internal Medicine [phone]            See the Encounter Report to view Anticoagulation Flowsheet and Dosing Calendar (Go to Encounters tab in chart review, and find the Anticoagulation Therapy Visit)        Hope Baumann RN

## 2019-04-18 NOTE — LETTER
2019    Joselito Armas MD  [address] [facility] [address]    RE: Louis Keller        Dear Colleague,    I had the pleasure of seeing Louis Keller JrJana in the [facility].    CARDIOLOGY CLINIC PROGRESS NOTE    DOS: 2019      Louis Keller Jr.  : 1932, 86 year old  MRN: [id number]      History: I had the pleasure of following up with Louis Keller Jr. today in the cardiology clinic.  He is a patient of Dr. Gleason.  His wife, Laura (also sees myself and Dr. Gleason).     Louis is a pleasant 86 year old man with history of chronic atrial fibrillation, DM2.  He failed cardioversion in the past.  He is maintained on rate control strategy and anticoagulation.  He has had negative stress testing previously in ,  and .      He saw Dr. Gleason 18.  He had some QUESADA and R>L LE exertional heaviness.    After 3 months of trying to increase walking, his sxs were unchanged. Echo, treadmill nuc, ABIs and venous comp study were ordered.   Echo showed normal LVEF, grade 3 diastolic dysfunction.  Nuc was without e/o ischemia.   Resting and exercise ABIs are normal.  Venous comp study is abnormal showing severe reflux in both right and left legs.  Given the QUESADA, edema, and the echo showing grade 3 diastolic dysfunction, we trialed low dose diuretic - Lasix 20 mg daily.  Also compression stockings were ordered.    He saw Dr. Gleason 10/3/18 for follow up. He had R>L LE extremity heaviness.      I last saw Louis 11/15/18.  At that time he continued to have leg fatigue/heaviness, though perhaps the RLE was a little better.  He wanted to postpone the vein procedure since he was feeling ok.  He still had some stable QUESADA.      Interval History:   19 saw GI for chronic loose stools.     3/14/19 his wife saw Dr. Gleason and she reported that Louis had an episode of near syncope, and Dr. Gleason recommended that I see him in clinic.      About 1 week before Laura  saw Dr. Gleason Louis was sitting reading the newspaper.  He felt prickly, warm sensation on his face (both sides).  The vision seemed to go a little gray and he felt like he would pass out, but this only lasted a couple seconds.  He hadn't had this before, and has not had it since.   No chest pain with the episodes.   No palpitations.   No recent travel, no change in medications.     He had an echo last summer that showed normal LV systolic function, increase in estimated RVSP, trace AI, mild aortic root and ascending aorta dilatation.   He does have chronic afib. INRs have been therapeutic.     He walks on the treadmill, but he is more limited by his legs lately.  The legs are getting more tired.  He used to do 15-20 minutes.  Lately he has been doing 12 minutes because of leg fatigue.       ROS:  Skin:  Positive for     Eyes:  Positive for glasses  ENT:  Positive for hearing loss  Respiratory:  Positive for dyspnea on exertion  Cardiovascular:    Positive for;edema;fatigue  Gastroenterology: Negative    Genitourinary:  Positive for nocturia;urinary frequency  Musculoskeletal:  Positive for joint pain;gout;foot pain;nocturnal cramping  Neurologic:  Positive for numbness or tingling of feet  Psychiatric:  Positive for sleep disturbances  Heme/Lymph/Imm:  Negative    Endocrine:  Positive for diabetes    PAST MEDICAL HISTORY:  Past Medical History:   Diagnosis Date     Antiplatelet or antithrombotic long-term use      Atrial fibrillation (H)      Diarrhea      Diastolic murmur      QUESADA (dyspnea on exertion)      Gout      Hemorrhage of gastrointestinal tract, unspecified 63,65,and 1971    hospitalized     History of blood transfusion      Hyperlipidemia LDL goal <100 4/18/2012     Long-term (current) use of anticoagulants [Z79.01] 3/31/2016     Mumps      Palpitations      Physical deconditioning 8/9/2013     Scarlet fever      Spider veins      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      Unspecified disease of pancreas 1990    pancreatitis       PAST SURGICAL HISTORY:  Past Surgical History:   Procedure Laterality Date     ARTHROPLASTY KNEE  8/5/2013    Procedure: ARTHROPLASTY KNEE;  Left Total Knee Arthroplasty       C NONSPECIFIC PROCEDURE  Child    T&A     C NONSPECIFIC PROCEDURE  ; also 11/03    Colonoscopy     C NONSPECIFIC PROCEDURE      Basal cell cancer of the nose     C NONSPECIFIC PROCEDURE  1990    Cholecystectomy     CARDIOVERSION  9/6/11    failed       SOCIAL HISTORY:  Social History     Socioeconomic History     Marital status:      Spouse name: None     Number of children: None     Years of education: None     Highest education level: None   Occupational History     Occupation: Lab tech     Comment: Semi-retired   Social Needs     Financial resource strain: None     Food insecurity:     Worry: None     Inability: None     Transportation needs:     Medical: None     Non-medical: None   Tobacco Use     Smoking status: Never Smoker     Smokeless tobacco: Never Used   Substance and Sexual Activity     Alcohol use: Yes     Alcohol/week: 0.0 oz     Comment: 1 glass of wine every day     Drug use: No     Sexual activity: Never     Partners: Female   Lifestyle     Physical activity:     Days per week: None     Minutes per session: None     Stress: None   Relationships     Social connections:     Talks on phone: None     Gets together: None     Attends Zoroastrianism service: None     Active member of club or organization: None     Attends meetings of clubs or organizations: None     Relationship status: None     Intimate partner violence:     Fear of current or ex partner: None     Emotionally abused: None     Physically abused: None     Forced sexual activity: None   Other Topics Concern      Service Not Asked     Blood Transfusions Not Asked     Caffeine Concern No     Comment: 14 oz per day     Occupational Exposure Not Asked     Hobby Hazards Not Asked     Sleep Concern No      Comment: sometimes - nocturia 4 times per night     Stress Concern Not Asked     Weight Concern Not Asked     Special Diet No     Back Care Not Asked     Exercise Yes     Comment: treadmill/walking 15-20 minutes 6 days per week     Bike Helmet Not Asked     Seat Belt Not Asked     Self-Exams Not Asked     Parent/sibling w/ CABG, MI or angioplasty before 65F 55M? Not Asked   Social History Narrative     None       FAMILY HISTORY:  Family History   Problem Relation Age of Onset     Alzheimer Disease Maternal Grandmother      Arthritis Maternal Grandmother      Cancer Mother         breast/ 1983/colon     Eye Disorder Mother         glaucoma and cataracts     Heart Disease Mother         angina/CABG     Hypertension Mother      Cancer Father         colon     Diabetes Maternal Aunt         AoDM       MEDS:   Current Outpatient Medications on File Prior to Visit:  Acetaminophen (TYLENOL PO) Take 1,000 mg by mouth 3 times daily   cholestyramine (QUESTRAN) 4 g packet TAKE 1 PACKET (4 G) BY MOUTH 2 TIMES DAILY (WITH MEALS)   Chromium 1000 MCG TABS Take 500 mcg by mouth daily   diltiazem (CARDIZEM CD/CARTIA XT) 120 MG 24 hr capsule Take 1 capsule (120 mg) by mouth every evening   Ferrous Gluconate 324 (37.5 Fe) MG TABS TAKE 1 TABLET (324 MG) BY MOUTH DAILY (WITH BREAKFAST)   fish oil-omega-3 fatty acids (FISH OIL) 1000 MG capsule Take 1 g by mouth 2 times daily    furosemide (LASIX) 20 MG tablet Take 1 tablet (20 mg) by mouth daily   glipiZIDE (GLUCOTROL XL) 10 MG 24 hr tablet TAKE 1 TABLET (10 MG) BY MOUTH 2 TIMES DAILY   loperamide (IMODIUM A-D) 2 MG tablet daily    metFORMIN (GLUCOPHAGE) 500 MG tablet Take 1 tablet (500 mg) by mouth 2 times daily (with meals)   multivitamin, therapeutic (THERA-VIT) TABS tablet Take 0.5 tablets by mouth 2 times daily   ONETOUCH ULTRA test strip USE TO TEST DAILY   pioglitazone (ACTOS) 45 MG tablet Take 1 tablet (45 mg) by mouth daily   tamsulosin (FLOMAX) 0.4 MG capsule Take 1  "capsule (0.4 mg) by mouth daily   warfarin (COUMADIN) 5 MG tablet TAKE 1 TABLET BY MOUTH TUESDAY,THURS AND SUN. TAKE 1/2 TABLET ON MON,WED,FRI AND SAT. OR AS DIRECTED   ACE NOT PRESCRIBED, INTENTIONAL, 1 each daily ACE Inhibitor not prescribed due to Risk for drug interaction (Patient not taking: Reported on 4/18/2019)   ASPIRIN NOT PRESCRIBED, INTENTIONAL, by Other route continuous prn Reported on 3/7/2017   glipiZIDE (GLUCOTROL XL) 10 MG 24 hr tablet TAKE 1 TABLET (10 MG) BY MOUTH 2 TIMES DAILY (Patient not taking: Reported on 4/18/2019)   senna-docusate (SENOKOT-S;PERICOLACE) 8.6-50 MG per tablet Take 1 tablet by mouth 2 times daily as needed for constipation (Patient not taking: Reported on 4/18/2019)   STATIN NOT PRESCRIBED, INTENTIONAL, 1 each At Bedtime Statin not prescribed intentionally due to Risk for drug interaction (Patient not taking: Reported on 4/18/2019)     No current facility-administered medications on file prior to visit.     ALLERGIES:   Allergies   Allergen Reactions     No Known Drug Allergies        PHYSICAL EXAM:  Vitals: /70 (BP Location: Right arm, Patient Position: Chair, Cuff Size: Adult Regular)   Pulse 84   Ht 1.803 m (5' 11\")   Wt 85.3 kg (188 lb)   SpO2 96%   BMI 26.22 kg/m     Constitutional:  cooperative, alert and oriented, well developed, well nourished, in no acute distress        Skin:  warm and dry to the touch, no apparent skin lesions or masses noted        Head:  normocephalic, no masses or lesions        Eyes:  pupils equal and round;conjunctivae and lids unremarkable;sclera white;no xanthalasma        ENT:  no pallor or cyanosis, dentition good        Neck:  JVP normal        Respiratory:  normal breath sounds, clear to auscultation, normal A-P diameter, normal symmetry, normal respiratory excursion, no use of accessory muscles        Cardiac:   irregularly irregular rhythm         grade 1;early diastolic murmur good rate control    GI:  abdomen soft;BS " normoactive        Vascular:                                        Extremities and Musculoskeletal:  no deformities, clubbing, cyanosis, erythema observed stasis pigmentation     Bilateral edema L>R, bilateral varicose veins and telangiectasias       Neurological:  no gross motor deficits;affect appropriate              LABS/DATA:  I reviewed the followin18 treadmill nuc stress test:  Impression  1.  Myocardial perfusion imaging using single isotope technique  demonstrated no significant perfusion defect consistent with  significant ischemia or infarct. There is mild decrease in inferior  activity at the base on rest and stress images most likely consistent  with diaphragm attenuation/bowel uptake artifact..   2. Gated images demonstrated normal size left ventricle with adequate  to good overall contractility and no significant regional wall motion  abnormality.  The left ventricular systolic function is normal with  ejection fraction 57% (accuracy maybe affected by presence of atrial  fibrillation).  3. Compared to the prior study from not applicable .      8/10/18 resting and exercise ABIs:  IMPRESSION:  1. Normal resting and exercise ABIs bilaterally       8/10/18 echo:  Interpretation Summary     Left ventricular systolic function is normal.The visual ejection fraction is  estimated at 55-60%.  Diastolic Doppler findings (E/E' ratio and/or other parameters) suggest left  ventricular filling pressures are increased.  The right ventricular systolic function is normal.  Pulmonary hypertension- RVSP 40 mm hg +RA.  There is trace aortic regurgitation.  Mild aortic root and ascending aorta dilatation.      18 venous comp study:  Impression:  1. Right leg: No DVT. Severe reflux (5.2s, 4.0mm) of the proximal GSV  in the thigh and superficial tributary (5.4s, 4.7mm) in the calf as  well as severe reflux in the small saphenous vein in the calf (6.4s,  2.2mm) with resultant refluxing venous varicosities  (4.5s, 2.9mm).     2. Left leg: No DVT. Severe reflux (5.0s, 2.9mm) limited to distal GSV  at the ankle.        If clinically indicated could treat:  Right leg: RF ablation of GSV, RF ablation of SSV, sclerotherapy of  superficial tributary and varicose veins with phlebectomy of varicose  veins.     Left leg: Sclerotherapy of distal GSV vs. RF ablation of GSV +  sclerotherapy.        ASSESSMENT/PLAN:  Chronic afib  - Rate controlled on dilt cd 120 mg.     - On warfarin with therapeutic INRs    QUESADA  - Echo shows normal LVEF, grade 3 diastolic dysfunction  - Nuc without e/o ischemia  - Some contribution could be from the chronic afib as well  - With Lasix 20 mg, he had some improvement   - I did want to have his PCP consider a substitute medication for Actos given his significant diastolic dysfunction/QUESADA/edema.  Dr. Armas is considering changing per patient report    - Overall stable, and not too limiting    LE edema  Exertional leg heaviness  Varicose veins  Telangiectasias  - Resting and exercise ABIs are normal  - Venous comp study is abnormal showing severe reflux in both right and left legs.    -- Right leg:  No DVT.  There is severe reflux (5.2s, 4.0mm) of the proximal GSV in the thigh and superficial tributary (5.4s, 4.7mm) in the calf as well as severe reflux in the small saphenous vein in the calf (6.4s, 2.2mm) with resultant refluxing venous varicosities (4.5s, 2.9mm).     --Left leg: No DVT. Severe reflux (5.0s, 2.9mm) limited to distal GSV at the ankle.  - The LE edema could be in aprt from the diastolic dysfunction.  He will continue Lasix.    - His HR was in the 80s on check by the rooming staff and myself, so I continued dilt at the current dose.  - He also he has severe reflux in bilateral LEs.     -- He tried compression stockings, but could not get them off.  We discussed considering a zipper pair, or going to a medical supply store where they can help him find a pair that fits well.    --  Continue to ambulate   -- Elevation    -- His leg heaviness and fatigue is a bit worse the past couple weeks.  However, for now, he would like to hold off on any vein procedure.  We will discuss again when I see him back in about 6 weeks.  We can consider RLE vena seal of the greater saphenous vein, small saphenous vein and superficial tributary plus or minus sclerotherapy and phlebectomy of the varicose veins.  The varicose veins seem fairly small and may close up without aggressive therapy.  And then pending RLE response, could consider LLE cclerotherapy of distal GSV vs. RF ablation of GSV + sclerotherapy.        Near syncope  - A single short episode while sitting reading the newspaper.  He felt prickly, warm sensation on his face (both sides).  The vision seemed to go a little gray and he felt like he would pass out, but this only lasted a couple seconds.     He is not certain what his BP was, but BP has been ok lately.   No chest pain with the episode.   No palpitations.   No recent travel, no change in medications.   He had an echo last summer that showed normal LV systolic function, increase in estimated RVSP, trace AI, mild aortic root and ascending aorta dilatation.   He does have chronic afib.  Is on rate control with diltiazem.  INRs have been therapeutic.   - No recurrence  - Unlikely TIA with his whole face being affected and therapeutic INRs  - Will place a 30 day event monitor to evaluate for arrhythmias        Follow up:  6 weeks    Susanna Haque PA-C    Thank you for allowing me to participate in the care of your patient.      Sincerely,     Susanna Haque PA-C     [facility]    cc:   Susanna Haque PA-C  [address] [place] [address]

## 2019-04-18 NOTE — PROGRESS NOTES
30 day event monitor placed with patient's spouse present. Both were able to verbalize understanding of instructions.

## 2019-04-23 ENCOUNTER — DOCUMENTATION ONLY (OUTPATIENT)
Dept: CARDIOLOGY | Facility: CLINIC | Age: 84
End: 2019-04-23

## 2019-04-23 ENCOUNTER — HOSPITAL ENCOUNTER (EMERGENCY)
Facility: CLINIC | Age: 84
Discharge: HOME | End: 2019-04-23
Attending: EMERGENCY MEDICINE | Admitting: EMERGENCY MEDICINE
Payer: COMMERCIAL

## 2019-04-23 VITALS
DIASTOLIC BLOOD PRESSURE: 84 MMHG | WEIGHT: 188.93 LBS | HEART RATE: 89 BPM | TEMPERATURE: 98 F | RESPIRATION RATE: 16 BRPM | HEIGHT: 71 IN | BODY MASS INDEX: 26.45 KG/M2 | SYSTOLIC BLOOD PRESSURE: 159 MMHG | OXYGEN SATURATION: 97 %

## 2019-04-23 DIAGNOSIS — H92.21 BLOOD IN EAR CANAL, RIGHT: ICD-10-CM

## 2019-04-23 PROCEDURE — 99282 EMERGENCY DEPT VISIT SF MDM: CPT

## 2019-04-23 ASSESSMENT — MIFFLIN-ST. JEOR: SCORE: 1559.13

## 2019-04-23 NOTE — ED TRIAGE NOTES
Patient A&O X3, ABC's intact upon arrival to triage.  Patient awoke with moderate amount of bleeding from RT ear.  Dried blood noted to external ear.  Patient reports he has noted that his hearing has been muffled for past week in his Right hear.  Patient able to hear this writer speaking in a normal tone.

## 2019-04-23 NOTE — ED AVS SNAPSHOT
[facility]  [address]  [facility] [address]  Phone:  [phone]  Fax:  [phone]                                    Louis Keller Jr.   MRN: [id number]    Department:  [facility] Emergency Department   Date of Visit:  4/23/2019           After Visit Summary Signature Page    I have received my discharge instructions, and my questions have been answered. I have discussed any challenges I see with this plan with the nurse or doctor.    ..........................................................................................................................................  Patient/Patient Representative Signature      ..........................................................................................................................................  Patient Representative Print Name and Relationship to Patient    ..................................................               ................................................  Date                                   Time    ..........................................................................................................................................  Reviewed by Signature/Title    ...................................................              ..............................................  Date                                               Time          22EPIC Rev 08/18

## 2019-04-23 NOTE — ED PROVIDER NOTES
[facility] ED Provider Note  [facility] Emergency Department  12:18 PM  4/23/2019    Louis Keller .  86 year oldmale    Chief Complaint   Patient presents with     Hearing Problem       HPI:    86-year-old gentleman on warfarin for atrial fibrillation here with bright red blood from his right ear noted on the pillowcase this morning.  He is a Q-tip last night but does not know of any other falls or trauma.  Over the last couple weeks he has noted decreased hearing initially in the left ear and now both ears.  He denies any vision changes balance changes new sensory deficits or weakness.  Last INR was April 18 and the number was 2.7.  Denies any bleeding from the mouth nose left ear nor is or blood in the urine or stool.No CP or SOB    ROS: ROS is negative other than mentioned above in HPI    Past Medical History:   Diagnosis Date     Antiplatelet or antithrombotic long-term use      Atrial fibrillation (H)      Diarrhea      Diastolic murmur      QUESADA (dyspnea on exertion)      Gout      Hemorrhage of gastrointestinal tract, unspecified 63,65,and 1971    hospitalized     History of blood transfusion      Hyperlipidemia LDL goal <100 4/18/2012     Long-term (current) use of anticoagulants [Z79.01] 3/31/2016     Mumps      Palpitations      Physical deconditioning 8/9/2013     Scarlet fever      Spider veins      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     Unspecified disease of pancreas 1990    pancreatitis     Past Surgical History:   Procedure Laterality Date     ARTHROPLASTY KNEE  8/5/2013    Procedure: ARTHROPLASTY KNEE;  Left Total Knee Arthroplasty       C NONSPECIFIC PROCEDURE  Child    T&A     C NONSPECIFIC PROCEDURE  ; also 11/03    Colonoscopy     C NONSPECIFIC PROCEDURE      Basal cell cancer of the nose     C NONSPECIFIC PROCEDURE  1990    Cholecystectomy     CARDIOVERSION  9/6/11    failed     Family History   Problem Relation Age of Onset     Alzheimer Disease Maternal  Grandmother      Arthritis Maternal Grandmother      Cancer Mother         breast/ 1983/colon     Eye Disorder Mother         glaucoma and cataracts     Heart Disease Mother         angina/CABG     Hypertension Mother      Cancer Father         colon     Diabetes Maternal Aunt         AoDM     Social History     Socioeconomic History     Marital status:      Spouse name: Not on file     Number of children: Not on file     Years of education: Not on file     Highest education level: Not on file   Occupational History     Occupation: Lab tech     Comment: Semi-retired   Social Needs     Financial resource strain: Not on file     Food insecurity:     Worry: Not on file     Inability: Not on file     Transportation needs:     Medical: Not on file     Non-medical: Not on file   Tobacco Use     Smoking status: Never Smoker     Smokeless tobacco: Never Used   Substance and Sexual Activity     Alcohol use: Yes     Alcohol/week: 0.0 oz     Comment: 1 glass of wine every day     Drug use: No     Sexual activity: Never     Partners: Female   Lifestyle     Physical activity:     Days per week: Not on file     Minutes per session: Not on file     Stress: Not on file   Relationships     Social connections:     Talks on phone: Not on file     Gets together: Not on file     Attends Rastafari service: Not on file     Active member of club or organization: Not on file     Attends meetings of clubs or organizations: Not on file     Relationship status: Not on file     Intimate partner violence:     Fear of current or ex partner: Not on file     Emotionally abused: Not on file     Physically abused: Not on file     Forced sexual activity: Not on file   Other Topics Concern      Service Not Asked     Blood Transfusions Not Asked     Caffeine Concern No     Comment: 14 oz per day     Occupational Exposure Not Asked     Hobby Hazards Not Asked     Sleep Concern No     Comment: sometimes - nocturia 4 times per night  "    Stress Concern Not Asked     Weight Concern Not Asked     Special Diet No     Back Care Not Asked     Exercise Yes     Comment: treadmill/walking 15-20 minutes 6 days per week     Bike Helmet Not Asked     Seat Belt Not Asked     Self-Exams Not Asked     Parent/sibling w/ CABG, MI or angioplasty before 65F 55M? Not Asked   Social History Narrative     Not on file     No current facility-administered medications for this encounter.      Current Outpatient Medications   Medication     Acetaminophen (TYLENOL PO)     cholestyramine (QUESTRAN) 4 g packet     Chromium 1000 MCG TABS     diltiazem (CARDIZEM CD/CARTIA XT) 120 MG 24 hr capsule     Ferrous Gluconate 324 (37.5 Fe) MG TABS     fish oil-omega-3 fatty acids (FISH OIL) 1000 MG capsule     furosemide (LASIX) 20 MG tablet     glipiZIDE (GLUCOTROL XL) 10 MG 24 hr tablet     latanoprost (XALATAN) 0.005 % ophthalmic solution     loperamide (IMODIUM A-D) 2 MG tablet     metFORMIN (GLUCOPHAGE) 500 MG tablet     multivitamin, therapeutic (THERA-VIT) TABS tablet     ONETOUCH ULTRA test strip     pioglitazone (ACTOS) 45 MG tablet     warfarin (COUMADIN) 5 MG tablet     ACE NOT PRESCRIBED, INTENTIONAL,     ASPIRIN NOT PRESCRIBED, INTENTIONAL,     glipiZIDE (GLUCOTROL XL) 10 MG 24 hr tablet     senna-docusate (SENOKOT-S;PERICOLACE) 8.6-50 MG per tablet     STATIN NOT PRESCRIBED, INTENTIONAL,     tamsulosin (FLOMAX) 0.4 MG capsule        Allergies   Allergen Reactions     No Known Drug Allergies          Physical Exam  /84   Pulse 89   Temp 98  F (36.7  C) (Oral)   Resp 16   Ht 1.803 m (5' 11\")   Wt 85.7 kg (188 lb 15 oz)   SpO2 97%   BMI 26.35 kg/m      HEENT: Left external ear canal and TM are normal.  The right external ear there is some dried bright red blood in the canal on the floor of the canal but [facility] back there is a superficial skin deficit with mild ongoing oozing TM is normal Mmm  Neck: supple  CV: ppi, regular   Resp: speaking in full sentences " with any resp distress  Skin: warm dry well perfused  Neuro: Alert, no gross motor or sensory deficits,  gait stable      Labs and Imaging:        Medical Decision Makin-year-old gentleman on warfarin here with a superficial skin defect in the canal of the right ear is a source of his bleeding and clot.  Small piece of Gelfoam was placed on the floor here to help clot the tail of the form was left long enough for his wife we will do easily.  With a tweezers later tonight.  No other concern for significant bleeding or secondary blood loss I do not think we need to repeat his INR at this time.  There were comfortable and agreeable with that plan will return with new or worsening symptoms and recommend he see an ENT/audiologist regarding the recent decreased or muffled hearing is I do not see cerumen impaction middle ear effusion or other obvious cause.    Diagnosis:    ICD-10-CM    1. Blood in ear canal, right H92.21        Disposition:    Home    Seferino Dukes MD  [facility]  [facility]                       Seferino Dukes MD  [phone]

## 2019-04-23 NOTE — PROGRESS NOTES
[employer] EVENT MONITOR STRIPS RECEIVED    Date/time: 4/18/19 at 1414.   Patient reported: None-  Baseline recording.   Rhythm: Atrial Fibrillation/flutter, HR 80 bpm.     Date/time: 4/18/19 at 1602.   Patient reported: Resting.   Rhythm: Atrial Fibrillation/flutter, HR 70 bpm.     Date/time: 4/18/19 at 1950.   Patient reported: None.   Rhythm: Atrial Fibrillation/flutter w/ PVC,  bpm.     Date/time: 4/19/19 at 0642.   Patient reported: none.   Rhythm: Atrial Fibrillation/flutter, HR 50 bpm.     Date/time: 4/19/19 at 1441.   Patient reported: None.   Rhythm: Atrial Fibrillation/flutter w/ PVC,  bpm.     Date/time: 4/20/19 at 0013.   Patient reported: None.   Rhythm: Atrial Fibrillation/flutter, HR 70 bpm.     Date/time: 4/20/19 at 1222.   Patient reported: None.   Rhythm: Atrial Fibrillation/flutter,  bpm.     Date/time: 4/20/19 at 1418.   Patient reported: None.   Rhythm: Atrial Fibrillation/flutter,  bpm.     Date/time: 4/21/19 at 0024.   Patient reported: None.   Rhythm: Atrial Fibrillation/flutter, HR 60 bpm.       Strips filed. Will continue to monitor.   William OBRIEN

## 2019-04-25 ENCOUNTER — DOCUMENTATION ONLY (OUTPATIENT)
Dept: CARDIOLOGY | Facility: CLINIC | Age: 84
End: 2019-04-25

## 2019-04-25 NOTE — PROGRESS NOTES
Since he is not having sxs with either the rate of 60 or the 150-160s, this isn't the cause of his episode.    With these rates, he may have had a tachybrady episode, so let's continue to monitor.      If he has prolonged episodes of RVR, would send to EP re PPM as we cannot increase rate control with rates otherwise in the 60s.     We can also consider a Holter after to better evaluate rate control.

## 2019-04-25 NOTE — PROGRESS NOTES
[email or web address] EVENT MONITOR STRIPS RECEIVED    Date/time: 4/22/19 at 0117.   Patient reported: automatic event, no sxs.   Rhythm: Atrial fibrillation/flutter, HR 70 bpm.     Date/time: 4/22/19 at 1544.   Patient reported: Automatic event, no sxs.   Rhythm: Atrial fibrillation/flutter,  bpm.     Date/time: 4/23/19 at 0503.   Patient reported: Automatic event, no sxs.   Rhythm: Atrial fibrillation/flutter, HR 60 bpm.     Date/time: 4/23/19 at 1623.   Patient reported: Automatic event, no sxs.   Rhythm: Atrial fibrillation/flutter,  bpm.     Strips filed. Will continue to monitor.   William OBRIEN

## 2019-04-26 ENCOUNTER — DOCUMENTATION ONLY (OUTPATIENT)
Dept: CARDIOLOGY | Facility: CLINIC | Age: 84
End: 2019-04-26

## 2019-04-26 ENCOUNTER — OFFICE VISIT (OUTPATIENT)
Dept: FAMILY MEDICINE | Facility: CLINIC | Age: 84
End: 2019-04-26
Payer: COMMERCIAL

## 2019-04-26 VITALS
DIASTOLIC BLOOD PRESSURE: 64 MMHG | OXYGEN SATURATION: 99 % | WEIGHT: 190 LBS | BODY MASS INDEX: 26.5 KG/M2 | HEART RATE: 82 BPM | TEMPERATURE: 97.6 F | SYSTOLIC BLOOD PRESSURE: 122 MMHG

## 2019-04-26 DIAGNOSIS — H90.71 MIXED CONDUCTIVE AND SENSORINEURAL HEARING LOSS OF RIGHT EAR, UNSPECIFIED HEARING STATUS ON CONTRALATERAL SIDE: Primary | ICD-10-CM

## 2019-04-26 PROCEDURE — 99214 OFFICE O/P EST MOD 30 MIN: CPT | Performed by: PHYSICIAN ASSISTANT

## 2019-04-26 RX ORDER — FLUTICASONE PROPIONATE 50 MCG
1-2 SPRAY, SUSPENSION (ML) NASAL DAILY
Qty: 16 G | Refills: 3 | Status: SHIPPED | OUTPATIENT
Start: 2019-04-26 | End: 2019-05-30

## 2019-04-26 NOTE — PROGRESS NOTES
SUBJECTIVE:   Louis Keller Jr. is a 86 year old male who presents to clinic today for the following health issues:      HPI  ED/UC Followup:    Facility:  [facility]  Date of visit: 4/23/19  Reason for visit: blood in right ear canal  Current Status: trouble hearing, bleeding stopped. No pain. Decreased hearing in right ear for 1-2 weeks. No trauma. No dizziness. No confusion or headaches. Does admit to ringing in bilateral ears. No change for years.        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     Anemia due to blood loss, acute     Physical deconditioning     Diabetes mellitus, type 2 (H)     Atrial fibrillation (H)     QUESADA (dyspnea on exertion)     Diastolic murmur     Pain of right thigh     Long-term (current) use of anticoagulants [Z79.01]     Knee effusion, right     Joint effusion of knee     Past Surgical History:   Procedure Laterality Date     ARTHROPLASTY KNEE  8/5/2013    Procedure: ARTHROPLASTY KNEE;  Left Total Knee Arthroplasty       C NONSPECIFIC PROCEDURE  Child    T&A     C NONSPECIFIC PROCEDURE  ; also 11/03    Colonoscopy     C NONSPECIFIC PROCEDURE      Basal cell cancer of the nose     C NONSPECIFIC PROCEDURE  1990    Cholecystectomy     CARDIOVERSION  9/6/11    failed       Social History     Tobacco Use     Smoking status: Never Smoker     Smokeless  tobacco: Never Used   Substance Use Topics     Alcohol use: Yes     Alcohol/week: 0.0 oz     Comment: 1 glass of wine every day     Family History   Problem Relation Age of Onset     Alzheimer Disease Maternal Grandmother      Arthritis Maternal Grandmother      Cancer Mother         breast/ 1983/colon     Eye Disorder Mother         glaucoma and cataracts     Heart Disease Mother         angina/CABG     Hypertension Mother      Cancer Father         colon     Diabetes Maternal Aunt         AoDM         Current Outpatient Medications   Medication Sig Dispense Refill     ACE NOT PRESCRIBED, INTENTIONAL, 1 each daily ACE Inhibitor not prescribed due to Risk for drug interaction 0 each 0     Acetaminophen (TYLENOL PO) Take 1,000 mg by mouth 3 times daily       ASPIRIN NOT PRESCRIBED, INTENTIONAL, by Other route continuous prn Reported on 3/7/2017       cholestyramine (QUESTRAN) 4 g packet TAKE 1 PACKET (4 G) BY MOUTH 2 TIMES DAILY (WITH MEALS) 180 packet 1     Chromium 1000 MCG TABS Take 500 mcg by mouth daily       diltiazem (CARDIZEM CD/CARTIA XT) 120 MG 24 hr capsule Take 1 capsule (120 mg) by mouth every evening 90 capsule 3     Ferrous Gluconate 324 (37.5 Fe) MG TABS TAKE 1 TABLET (324 MG) BY MOUTH DAILY (WITH BREAKFAST) 100 tablet 0     fish oil-omega-3 fatty acids (FISH OIL) 1000 MG capsule Take 1 g by mouth 2 times daily        fluticasone (FLONASE) 50 MCG/ACT nasal spray Spray 1-2 sprays into both nostrils daily 16 g 3     furosemide (LASIX) 20 MG tablet Take 1 tablet (20 mg) by mouth daily 90 tablet 2     glipiZIDE (GLUCOTROL XL) 10 MG 24 hr tablet TAKE 1 TABLET (10 MG) BY MOUTH 2 TIMES DAILY 180 tablet 1     latanoprost (XALATAN) 0.005 % ophthalmic solution Place 1 drop Into the left eye daily       loperamide (IMODIUM A-D) 2 MG tablet daily  30 tablet 0     metFORMIN (GLUCOPHAGE) 500 MG tablet Take 1 tablet (500 mg) by mouth 2 times daily (with meals) 180 tablet 1     multivitamin, therapeutic  (THERA-VIT) TABS tablet Take 0.5 tablets by mouth 2 times daily       ONETOUCH ULTRA test strip USE TO TEST DAILY 100 strip 2     pioglitazone (ACTOS) 45 MG tablet Take 1 tablet (45 mg) by mouth daily 90 tablet 3     STATIN NOT PRESCRIBED, INTENTIONAL, 1 each At Bedtime Statin not prescribed intentionally due to Risk for drug interaction 0 each 0     warfarin (COUMADIN) 5 MG tablet TAKE 1 TABLET BY MOUTH TUESDAY,THURS AND SUN. TAKE 1/2 TABLET ON MON,WED,FRI AND SAT. OR AS DIRECTED 65 tablet 2     glipiZIDE (GLUCOTROL XL) 10 MG 24 hr tablet TAKE 1 TABLET (10 MG) BY MOUTH 2 TIMES DAILY (Patient not taking: Reported on 4/18/2019) 180 tablet 1     senna-docusate (SENOKOT-S;PERICOLACE) 8.6-50 MG per tablet Take 1 tablet by mouth 2 times daily as needed for constipation (Patient not taking: Reported on 4/26/2019) 100 tablet      tamsulosin (FLOMAX) 0.4 MG capsule Take 1 capsule (0.4 mg) by mouth daily (Patient not taking: Reported on 4/26/2019) 30 capsule 11     Allergies   Allergen Reactions     No Known Drug Allergies      BP Readings from Last 3 Encounters:   04/26/19 122/64   04/23/19 159/84   04/18/19 116/70    Wt Readings from Last 3 Encounters:   04/26/19 86.2 kg (190 lb)   04/23/19 85.7 kg (188 lb 15 oz)   04/18/19 85.3 kg (188 lb)                    ROS:  Constitutional, HEENT, neuro, psych, cardiovascular, pulmonary, gi and gu systems are negative, except as otherwise noted.    OBJECTIVE:     /64 (BP Location: Right arm, Patient Position: Sitting, Cuff Size: Adult Regular)   Pulse 82   Temp 97.6  F (36.4  C) (Oral)   Wt 86.2 kg (190 lb)   SpO2 99%   BMI 26.50 kg/m    Body mass index is 26.5 kg/m .  GENERAL: alert and no distress  HENT: normal cephalic/atraumatic, right ear: clear effusion and scabbed abrasion present without bleeding, left ear: clear effusion, nose and mouth without ulcers or lesions, oropharynx clear and oral mucous membranes moist. Aguirre/Rinne (conductive>sensorineural)  NECK: no  adenopathy, no asymmetry, masses, or scars and thyroid normal to palpation  CV: regular rates and rhythm  PSYCH: mentation appears normal, affect normal/bright    Diagnostic Test Results:  none     ASSESSMENT/PLAN:     (H90.71) Mixed conductive and sensorineural hearing loss of right ear, unspecified hearing status on contralateral side  (primary encounter diagnosis)  Comment: unclear cause of symptoms. Aguirre and rinne is concerning for sensorineuro hearing loss. Given acuity, recommending seeing ent for second opinion. However, ETD possible. Recommending trial of flonase and if symptoms fail to improve with this, can cancel ENT evaluation.   Plan: OTOLARYNGOLOGY REFERRAL, fluticasone (FLONASE)         50 MCG/ACT nasal spray        -Medication use and side effects discussed with the patient. Patient is in complete understanding and agreement with plan.         Follow up: as above     Leighton Caldwell PA-C  [facility]

## 2019-04-26 NOTE — PROGRESS NOTES
[employer] EVENT MONITOR STRIPS RECEIVED    Date/time: 4/24/19 at 1132.   Patient reported: automatic event, no symptoms.   Rhythm: Atrial fibrillation/flutter,  bpm.     Strips filed. Will continue to monitor.   William OBRIEN

## 2019-04-29 ENCOUNTER — TRANSFERRED RECORDS (OUTPATIENT)
Dept: HEALTH INFORMATION MANAGEMENT | Facility: CLINIC | Age: 84
End: 2019-04-29

## 2019-04-29 ENCOUNTER — DOCUMENTATION ONLY (OUTPATIENT)
Dept: CARDIOLOGY | Facility: CLINIC | Age: 84
End: 2019-04-29

## 2019-04-29 NOTE — PROGRESS NOTES
[employer] EVENT MONITOR STRIPS RECEIVED    Date/time: 4/25/19 at 0300.   Patient reported: Automatic event, no symptoms.   Rhythm: Atrial Fibrillation/flutter, HR 60 bpm.     Date/time: 4/25/19 at 0527.   Patient reported: Automatic event, no symptoms.   Rhythm: Atrial Fibrillation/flutter,  bpm.       Strips filed. Will continue to monitor.   William OBRIEN

## 2019-05-01 ENCOUNTER — DOCUMENTATION ONLY (OUTPATIENT)
Dept: CARDIOLOGY | Facility: CLINIC | Age: 84
End: 2019-05-01

## 2019-05-01 NOTE — PROGRESS NOTES
[employer] EVENT MONITOR STRIPS RECEIVED    Date/time: 4/26/19 at 0354.   Patient reported: Automatic event, no sxs.   Rhythm: Atrial fibrillation/flutter, HR 70 bpm.     Date/time: 4/27/19 at 0132.   Patient reported: Automatic event, no sxs.   Rhythm: Atrial fibrillation/flutter, HR 70 bpm.     Strips filed. Will continue to monitor.   William OBRIEN

## 2019-05-03 ENCOUNTER — TRANSFERRED RECORDS (OUTPATIENT)
Dept: HEALTH INFORMATION MANAGEMENT | Facility: CLINIC | Age: 84
End: 2019-05-03

## 2019-05-03 ENCOUNTER — DOCUMENTATION ONLY (OUTPATIENT)
Dept: CARDIOLOGY | Facility: CLINIC | Age: 84
End: 2019-05-03

## 2019-05-03 NOTE — PROGRESS NOTES
[employer] EVENT MONITOR STRIPS RECEIVED    Date/time: 4/28/19 at 0048.   Patient reported: Automatic Event, no sxs.   Rhythm: Atrial fibrillation/flutter, HR 70 bpm.     Date/time: 4/29/19 at 0246.   Patient reported: Automatic event, no sxs.   Rhythm: Atrial fibrillation/flutter, HR 70 bpm.     Strips filed. Will continue to monitor.   William OBRIEN

## 2019-05-07 ENCOUNTER — TRANSFERRED RECORDS (OUTPATIENT)
Dept: HEALTH INFORMATION MANAGEMENT | Facility: CLINIC | Age: 84
End: 2019-05-07

## 2019-05-08 ENCOUNTER — DOCUMENTATION ONLY (OUTPATIENT)
Dept: CARDIOLOGY | Facility: CLINIC | Age: 84
End: 2019-05-08

## 2019-05-08 NOTE — PROGRESS NOTES
[employer] EVENT MONITOR STRIPS RECEIVED    Date/time: 5/4/19 at 2232.   Patient reported: Automatic event, no sxs.   Rhythm: Atrial Fibrillation/flutter,  bpm.   Follow-up scheduled 5/30/19 w/ Susanna Haque PA-C   Strips filed. Will continue to monitor.   William OBRIEN

## 2019-05-09 ENCOUNTER — DOCUMENTATION ONLY (OUTPATIENT)
Dept: CARDIOLOGY | Facility: CLINIC | Age: 84
End: 2019-05-09

## 2019-05-09 NOTE — PROGRESS NOTES
[employer] EVENT MONITOR STRIPS RECEIVED    Date/time: 5/5/19 at 0440.   Patient reported: Automatic event, no sxs.   Rhythm: Atrial fibrillation w/ PVC, HR 60 bpm.     Date/time: 5/6/19 at 0708.   Patient reported:  Automatic event, no sxs.   Rhythm: Atrial Fibrillation /flutter, HR 90 bpm.     Date/time: 5/6/19 at 1551.   Patient reported:  Automatic event, no sxs.   Rhythm: Atrial fibrillation w/ PVCs and Ventricular run,  bpm.     Date/time: 5/7/19 at 0055.   Patient reported: Automatic event, no sxs.   Rhythm: Atrial fibrillation, HR 80 bpm.     Date/time: 5/7/19 at 1654.   Patient reported: Automatic event, no sxs.   Rhythm: Atrial fibrillation/flutter,  bpm.     Date/time: 5/7/19 at 2046.   Patient reported: Automatic event, no sxs.   Rhythm: Atrial fibrillation/flutter,  bpm.     Strips filed. Will continue to monitor.   William OBRIEN

## 2019-05-10 ENCOUNTER — DOCUMENTATION ONLY (OUTPATIENT)
Dept: CARDIOLOGY | Facility: CLINIC | Age: 84
End: 2019-05-10

## 2019-05-10 DIAGNOSIS — I48.20 CHRONIC ATRIAL FIBRILLATION (H): ICD-10-CM

## 2019-05-10 DIAGNOSIS — R06.09 DOE (DYSPNEA ON EXERTION): ICD-10-CM

## 2019-05-10 DIAGNOSIS — R55 NEAR SYNCOPE: Primary | ICD-10-CM

## 2019-05-10 DIAGNOSIS — I47.20 VENTRICULAR TACHYCARDIA (H): ICD-10-CM

## 2019-05-10 RX ORDER — DILTIAZEM HYDROCHLORIDE 120 MG/1
120 CAPSULE, COATED, EXTENDED RELEASE ORAL EVERY EVENING
Qty: 90 CAPSULE | Refills: 3 | Status: SHIPPED | OUTPATIENT
Start: 2019-05-10 | End: 2019-06-05

## 2019-05-10 NOTE — PROGRESS NOTES
[email or web address] EVENT MONITOR STRIPS RECEIVED    Date/time: 5/8/19 at 0349.   Patient reported: Automatic event.   Rhythm: Atrial fibrillation, HR 69 bpm.     Date/time: 5/8/19 at 1048.   Patient reported: Automatic event.   Rhythm: Atrial fibrillation/flutter,  bpm.     Date/time: 5/8/19 at 1400.   Patient reported: Automatic event.   Rhythm: Sinus rhythm w/ ventricular tachycardia 14 beats, rate 180 bpm.       Called pt to see if he had any symptoms w/ Vtach on 5/8/19 at 1400. Pt was pushing a cart to his car. Levan a lot of pain on his side and his back. It was so bad that he thought he would fall but was hanging onto the cart so he didn't. It resolved within 30 seconds.   Has not had this feeling ever before.   Routing to Susanna Haque PA-C.     Susanna Haque PA-C and Dr. Gleason are both out of the office.   Reviewed w/ Dr. Estrada who advised pt should have treadmill nuc stress test (if cannot walk on treadmill, can do lexiscan) and have BMP. Follow-up as planned 5/30/19 w/ Susanna Haque PA-C.     Called pt and reviewed these recommendations, he agrees w/ plan. Entered orders. Advised to expect call from scheduling next week to setup stress test and lab. If symptoms occur again and/or do not resolve/get worse, go to ED. Pt understood.     Strips filed. Will continue to monitor.   William OBRIEN

## 2019-05-13 ENCOUNTER — DOCUMENTATION ONLY (OUTPATIENT)
Dept: CARDIOLOGY | Facility: CLINIC | Age: 84
End: 2019-05-13

## 2019-05-13 NOTE — PROGRESS NOTES
[employer] EVENT MONITOR STRIPS RECEIVED    Date/time: 5/9/19 at 0017.   Patient reported: Automatic event, no sxs.   Rhythm: AFib HR 80 bpm.     Date/time: 5/9/19 at 1056.   Patient reported: Automatic event, no sxs.   Rhythm: Afib/flutter  bpm.     Date/time: 5/9/19 at 1101.   Patient reported: Automatic event, no sxs.   Rhythm: AFib/flutter  bpm.     Date/time: 5/9/19 at 1105.   Patient reported: Automatic event, no sxs.   Rhythm: AFib/flutter  bpm.    Date/time: 5/10/19 at 0158.   Patient reported: Automatic event, no sxs.   Rhythm: AFib/flutter HR 80 bpm.     Date/time: 5/11/19 at 0008.   Patient reported: Automatic event, no sxs.   Rhythm: AFib/flutter HR 80 bpm.     Date/time: 5/11/19 at 1603.   Patient reported: Automatic event, no sxs.   Rhythm: AFib  bpm.     Strips filed. Will continue to monitor.   William OBRIEN

## 2019-05-14 ENCOUNTER — DOCUMENTATION ONLY (OUTPATIENT)
Dept: CARDIOLOGY | Facility: CLINIC | Age: 84
End: 2019-05-14

## 2019-05-15 ENCOUNTER — DOCUMENTATION ONLY (OUTPATIENT)
Dept: CARDIOLOGY | Facility: CLINIC | Age: 84
End: 2019-05-15

## 2019-05-15 DIAGNOSIS — E11.22 TYPE 2 DIABETES MELLITUS WITH STAGE 3 CHRONIC KIDNEY DISEASE (H): ICD-10-CM

## 2019-05-15 DIAGNOSIS — N18.30 TYPE 2 DIABETES MELLITUS WITH STAGE 3 CHRONIC KIDNEY DISEASE (H): ICD-10-CM

## 2019-05-15 NOTE — TELEPHONE ENCOUNTER
"Requested Prescriptions   Pending Prescriptions Disp Refills     ONETOUCH ULTRA test strip [Pharmacy Med Name: ONE TOUCH ULTRA BLUE TEST STRP] 100 strip 2     Sig: USE TO TEST DAILY   Last Written Prescription Date:  05/09/2018  Last Fill Quantity: 100,  # refills: 2   Last office visit: 12/19/2018 with prescribing provider:     Future Office Visit:   Next 5 appointments (look out 90 days)    May 30, 2019  1:10 PM CDT  Return Visit with Susanna Haque PA-C  [facility] ([facility]) [address]  [facility] [address]  [phone]   Jun 17, 2019 11:00 AM CDT  SHORT with Joselito Armas MD  [facility] [address]  [place] [facility] [address]  [phone]           Diabetic Supplies Protocol Passed - 5/15/2019 11:49 AM        Passed - Medication is active on med list        Passed - Patient is 18 years of age or older        Passed - Recent (6 mo) or future (30 days) visit within the authorizing provider's specialty     Patient had office visit in the last 6 months or has a visit in the next 30 days with authorizing provider.  See \"Patient Info\" tab in inbasket, or \"Choose Columns\" in Meds & Orders section of the refill encounter.            "

## 2019-05-15 NOTE — PROGRESS NOTES
[employer] EVENT MONITOR STRIPS RECEIVED    Date/time: 5/13/19 at 0403.   Patient reported: Automatic event, no sxs/activities.   Rhythm: Atrial fibrillation/flutter, HR 60 bpm.      Strips filed. Will continue to monitor.   William OBRIEN

## 2019-05-16 ENCOUNTER — HOSPITAL ENCOUNTER (OUTPATIENT)
Dept: NUCLEAR MEDICINE | Facility: CLINIC | Age: 84
Setting detail: NUCLEAR MEDICINE
End: 2019-05-16
Attending: INTERNAL MEDICINE
Payer: COMMERCIAL

## 2019-05-16 ENCOUNTER — HOSPITAL ENCOUNTER (OUTPATIENT)
Dept: LAB | Facility: CLINIC | Age: 84
Setting detail: NUCLEAR MEDICINE
End: 2019-05-16
Attending: INTERNAL MEDICINE
Payer: COMMERCIAL

## 2019-05-16 ENCOUNTER — HOSPITAL ENCOUNTER (OUTPATIENT)
Dept: CARDIOLOGY | Facility: CLINIC | Age: 84
Discharge: HOME | End: 2019-05-16
Attending: INTERNAL MEDICINE | Admitting: INTERNAL MEDICINE
Payer: COMMERCIAL

## 2019-05-16 ENCOUNTER — DOCUMENTATION ONLY (OUTPATIENT)
Dept: CARDIOLOGY | Facility: CLINIC | Age: 84
End: 2019-05-16

## 2019-05-16 DIAGNOSIS — I47.20 VENTRICULAR TACHYCARDIA (H): ICD-10-CM

## 2019-05-16 DIAGNOSIS — R06.09 DOE (DYSPNEA ON EXERTION): ICD-10-CM

## 2019-05-16 DIAGNOSIS — R55 NEAR SYNCOPE: ICD-10-CM

## 2019-05-16 DIAGNOSIS — I48.20 CHRONIC ATRIAL FIBRILLATION (H): ICD-10-CM

## 2019-05-16 LAB
ANION GAP SERPL CALCULATED.3IONS-SCNC: 5 MMOL/L (ref 3–14)
BUN SERPL-MCNC: 17 MG/DL (ref 7–30)
CALCIUM SERPL-MCNC: 9 MG/DL (ref 8.5–10.1)
CHLORIDE SERPL-SCNC: 104 MMOL/L (ref 94–109)
CO2 SERPL-SCNC: 28 MMOL/L (ref 20–32)
CREAT SERPL-MCNC: 0.85 MG/DL (ref 0.66–1.25)
GFR SERPL CREATININE-BSD FRML MDRD: 78 ML/MIN/{1.73_M2}
GLUCOSE SERPL-MCNC: 153 MG/DL (ref 70–99)
POTASSIUM SERPL-SCNC: 4 MMOL/L (ref 3.4–5.3)
SODIUM SERPL-SCNC: 137 MMOL/L (ref 133–144)

## 2019-05-16 PROCEDURE — 93016 CV STRESS TEST SUPVJ ONLY: CPT | Performed by: INTERNAL MEDICINE

## 2019-05-16 PROCEDURE — 78452 HT MUSCLE IMAGE SPECT MULT: CPT

## 2019-05-16 PROCEDURE — 93017 CV STRESS TEST TRACING ONLY: CPT

## 2019-05-16 PROCEDURE — A9502 TC99M TETROFOSMIN: HCPCS | Performed by: INTERNAL MEDICINE

## 2019-05-16 PROCEDURE — 80048 BASIC METABOLIC PNL TOTAL CA: CPT | Performed by: INTERNAL MEDICINE

## 2019-05-16 PROCEDURE — 93018 CV STRESS TEST I&R ONLY: CPT | Performed by: INTERNAL MEDICINE

## 2019-05-16 PROCEDURE — 78452 HT MUSCLE IMAGE SPECT MULT: CPT | Mod: 26 | Performed by: INTERNAL MEDICINE

## 2019-05-16 PROCEDURE — 34300033 ZZH RX 343: Performed by: INTERNAL MEDICINE

## 2019-05-16 RX ADMIN — TETROFOSMIN 32 MCI.: 1.38 INJECTION, POWDER, LYOPHILIZED, FOR SOLUTION INTRAVENOUS at 12:39

## 2019-05-16 RX ADMIN — TETROFOSMIN 11 MCI.: 1.38 INJECTION, POWDER, LYOPHILIZED, FOR SOLUTION INTRAVENOUS at 10:58

## 2019-05-16 NOTE — PROGRESS NOTES
[employer] EVENT MONITOR STRIPS RECEIVED    Date/time: 5/14/19 at 0638.   Patient reported: Automatic event, no sxs/activities.   Rhythm: Atrial fibrillation/flutter, HR 70 bpm.     Strips filed. Will continue to monitor.   William OBRIEN

## 2019-05-23 ENCOUNTER — TELEPHONE (OUTPATIENT)
Dept: CARDIOLOGY | Facility: CLINIC | Age: 84
End: 2019-05-23

## 2019-05-23 NOTE — TELEPHONE ENCOUNTER
Patient's wife magali called to inquire about patient's NM stress test results. RN provided patient's wife a very high level overview of the results and informed her that they would be reviewed in further detail with her and patient at his upcoming OV on 5/30/19 with KONSTANTIN Susanna Haque. Patients' wife had no further questions at this time and will call us prior to OV if anything comes up.

## 2019-05-29 ENCOUNTER — TELEPHONE (OUTPATIENT)
Dept: CARDIOLOGY | Facility: CLINIC | Age: 84
End: 2019-05-29

## 2019-05-29 NOTE — TELEPHONE ENCOUNTER
Hi,   When you saw Louis's wife Laura, she reported that Louis had an episode of near syncope, and you recommended he see me.     An event monitor was placed.     I now have the report and strips, though still prelim report.     Afib with rates ranging from 40s-180.  No sxs.   Pauses up to 2.5 secs 0409 on 4/30/19.  No sxs.   Runs of NSVT, 6 beats on 4/25/19 at 0527, 13 beats on 5/3/19 at 0458, 14 beats on 5/8/19 at 1400.      He did have CP with at least one NSVT so treadmill nuc done and negative for ischemia.       1.  Do you want anything else done for the NSVT?  2.  Re the RVR, I am hesitant to increase his rate controlling meds since he drops to 40s with 2.5 sec pauses.  Since he is asymptomatic, just leave things as they are?        ADDENDUM  NM, I sent him to EP.     Thanks,   Susanna Haque PA-C 5/30/2019 2:02 PM               Susanna Haque PA-C

## 2019-05-30 ENCOUNTER — OFFICE VISIT (OUTPATIENT)
Dept: CARDIOLOGY | Facility: CLINIC | Age: 84
End: 2019-05-30
Attending: PHYSICIAN ASSISTANT
Payer: COMMERCIAL

## 2019-05-30 ENCOUNTER — ANTICOAGULATION THERAPY VISIT (OUTPATIENT)
Dept: NURSING | Facility: CLINIC | Age: 84
End: 2019-05-30
Payer: COMMERCIAL

## 2019-05-30 VITALS
OXYGEN SATURATION: 97 % | SYSTOLIC BLOOD PRESSURE: 120 MMHG | WEIGHT: 185 LBS | BODY MASS INDEX: 26.48 KG/M2 | HEART RATE: 90 BPM | HEIGHT: 70 IN | DIASTOLIC BLOOD PRESSURE: 64 MMHG

## 2019-05-30 DIAGNOSIS — I48.20 CHRONIC A-FIB (H): ICD-10-CM

## 2019-05-30 DIAGNOSIS — I87.2 VENOUS (PERIPHERAL) INSUFFICIENCY: Primary | ICD-10-CM

## 2019-05-30 DIAGNOSIS — I48.20 CHRONIC ATRIAL FIBRILLATION (H): ICD-10-CM

## 2019-05-30 DIAGNOSIS — R55 NEAR SYNCOPE: ICD-10-CM

## 2019-05-30 LAB — INR POINT OF CARE: 4.5 (ref 0.86–1.14)

## 2019-05-30 PROCEDURE — 36416 COLLJ CAPILLARY BLOOD SPEC: CPT

## 2019-05-30 PROCEDURE — 99214 OFFICE O/P EST MOD 30 MIN: CPT | Performed by: PHYSICIAN ASSISTANT

## 2019-05-30 PROCEDURE — 85610 PROTHROMBIN TIME: CPT | Mod: QW

## 2019-05-30 PROCEDURE — 93000 ELECTROCARDIOGRAM COMPLETE: CPT | Performed by: PHYSICIAN ASSISTANT

## 2019-05-30 PROCEDURE — 99207 ZZC NO CHARGE NURSE ONLY: CPT

## 2019-05-30 ASSESSMENT — MIFFLIN-ST. JEOR: SCORE: 1525.4

## 2019-05-30 NOTE — PATIENT INSTRUCTIONS
Due to the pauses and fast rhythm on the monitor, we will send you to Dr. Pacheco.     I will also check your carotid arteries to see if there is any blockages.     See Dr. Gleason in 3 months.  At that time, you can reconsider the leg procedure.

## 2019-05-30 NOTE — LETTER
2019    Joselito Armas MD  [address] [facility] [address]    RE: Louis Bassdevin Willis       Dear Colleague,    I had the pleasure of seeing Louis Keller JrJana in the [facility].    CARDIOLOGY CLINIC PROGRESS NOTE    DOS: 2019      Louis Keller Jr.  : 1932, 86 year old  MRN: [id number]      History: I had the pleasure of following up with Louis Keller Jr. today in the cardiology clinic.  He is a patient of Dr. Gleason.  His wife, Laura (also sees myself and Dr. Gleason) accompanies him.     Louis is a pleasant 86 year old man with history of chronic atrial fibrillation, DM2.  He failed cardioversion in the past.  He is maintained on rate control strategy and anticoagulation.  He has had negative stress testing previously in ,  and .      He saw Dr. Gleason 18.  He had some QUESADA and R>L LE exertional heaviness.    After 3 months of trying to increase walking, his sxs were unchanged. Echo, treadmill nuc, ABIs and venous comp study were ordered.   Echo showed normal LVEF, grade 3 diastolic dysfunction.  Nuc was without e/o ischemia.   Resting and exercise ABIs are normal.  Venous comp study is abnormal showing severe reflux in both right and left legs.  Given the QUESADA, edema, and the echo showing grade 3 diastolic dysfunction, we trialed low dose diuretic - Lasix 20 mg daily.  Also compression stockings were ordered.    He saw Dr. Gleason 10/3/18 for follow up. He had R>L LE extremity heaviness.      I saw Louis 11/15/18.  At that time he continued to have leg fatigue/heaviness, though perhaps the RLE was a little better.  He wanted to postpone the vein procedure since he was feeling ok.  He still had some stable QUESADA.      19 saw GI for chronic loose stools.     3/14/19 his wife saw Dr. Gleason and she reported that Louis had an episode of near syncope, and Dr. Gleason recommended that I see him in clinic.      Louis saw me 19.  He told me  that about 1 week before Laura saw Dr. Gleason, he was sitting reading the newspaper.  He felt prickly, warm sensation on his face (both sides).  The vision seemed to go a little gray and he felt like he would pass out, but this only lasted a couple seconds.  He hadn't had this before. No CP.  No palpitations.   An event monitor was placed.     The results are still prelim, but the monitor does show afib with rates ranging from 40s-180.  With these rates, he had no sxs.  He had pauses up to 2.5 secs 0409 on 4/30/19 without sxs.  He had runs of NSVT, 6 beats on 4/25/19 at 0527, 13 beats on 5/3/19 at 0458, 14 beats on 5/8/19 at 1400 .  Per nursing notes, he did have some chest pain with at least one of the NSVT episodes.  A treadmill nuc was ordered and done on 5/16/19.  This showed normal perfusion, normal LVEF.      He presents today for follow up.   He tells me that when he had the NSVT, he had chest pain and lightheadedness.    No syncope.   He did have some bleeding in hsi ear.  He saw ENT and had MRI of brain and no concerns.       ROS:  Skin:  Negative     Eyes:  Positive for glasses  ENT:  Negative    Respiratory:  Positive for shortness of breath  Cardiovascular:  Negative for;palpitations;chest pain;edema;dizziness fatigue;lightheadedness  Gastroenterology: Negative    Genitourinary:  Negative for    Musculoskeletal:  Positive for arthritis;joint pain  Neurologic:  Negative    Psychiatric:  Negative    Heme/Lymph/Imm:  Positive for easy bruising  Endocrine:  Positive for diabetes    PAST MEDICAL HISTORY:  Past Medical History:   Diagnosis Date     Antiplatelet or antithrombotic long-term use      Atrial fibrillation (H)      Diarrhea      Diastolic murmur      QUESADA (dyspnea on exertion)      Gout      Hemorrhage of gastrointestinal tract, unspecified 63,65,and 1971    hospitalized     History of blood transfusion      Hyperlipidemia LDL goal <100 4/18/2012     Long-term (current) use of anticoagulants [Z79.01]  3/31/2016     Mumps      Palpitations      Physical deconditioning 8/9/2013     Scarlet fever      Spider veins      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     Unspecified disease of pancreas 1990    pancreatitis       PAST SURGICAL HISTORY:  Past Surgical History:   Procedure Laterality Date     ARTHROPLASTY KNEE  8/5/2013    Procedure: ARTHROPLASTY KNEE;  Left Total Knee Arthroplasty       C NONSPECIFIC PROCEDURE  Child    T&A     C NONSPECIFIC PROCEDURE  ; also 11/03    Colonoscopy     C NONSPECIFIC PROCEDURE      Basal cell cancer of the nose     C NONSPECIFIC PROCEDURE  1990    Cholecystectomy     CARDIOVERSION  9/6/11    failed       SOCIAL HISTORY:  Social History     Socioeconomic History     Marital status:      Spouse name: None     Number of children: None     Years of education: None     Highest education level: None   Occupational History     Occupation: Lab tech     Comment: Semi-retired   Social Needs     Financial resource strain: None     Food insecurity:     Worry: None     Inability: None     Transportation needs:     Medical: None     Non-medical: None   Tobacco Use     Smoking status: Never Smoker     Smokeless tobacco: Never Used   Substance and Sexual Activity     Alcohol use: Yes     Alcohol/week: 0.0 oz     Comment: 1 glass of wine every day     Drug use: No     Sexual activity: Never     Partners: Female   Lifestyle     Physical activity:     Days per week: None     Minutes per session: None     Stress: None   Relationships     Social connections:     Talks on phone: None     Gets together: None     Attends [employer] service: None     Active member of club or organization: None     Attends meetings of clubs or organizations: None     Relationship status: None     Intimate partner violence:     Fear of current or ex partner: None     Emotionally abused: None     Physically abused: None     Forced sexual activity: None   Other Topics Concern       Service Not Asked     Blood Transfusions Not Asked     Caffeine Concern No     Comment: 14 oz per day     Occupational Exposure Not Asked     Hobby Hazards Not Asked     Sleep Concern No     Comment: sometimes - nocturia 4 times per night     Stress Concern Not Asked     Weight Concern Not Asked     Special Diet No     Back Care Not Asked     Exercise Yes     Comment: treadmill/walking 15-20 minutes 6 days per week     Bike Helmet Not Asked     Seat Belt Not Asked     Self-Exams Not Asked     Parent/sibling w/ CABG, MI or angioplasty before 65F 55M? Not Asked   Social History Narrative     None       FAMILY HISTORY:  Family History   Problem Relation Age of Onset     Alzheimer Disease Maternal Grandmother      Arthritis Maternal Grandmother      Cancer Mother         breast/ 1983/colon     Eye Disorder Mother         glaucoma and cataracts     Heart Disease Mother         angina/CABG     Hypertension Mother      Cancer Father         colon     Diabetes Maternal Aunt         AoDM       MEDS:   Current Outpatient Medications on File Prior to Visit:  Acetaminophen (TYLENOL PO) Take 1,000 mg by mouth 3 times daily   cholestyramine (QUESTRAN) 4 g packet TAKE 1 PACKET (4 G) BY MOUTH 2 TIMES DAILY (WITH MEALS)   Chromium 1000 MCG TABS Take 200 mcg by mouth daily    diltiazem ER COATED BEADS (CARDIZEM CD/CARTIA XT) 120 MG 24 hr capsule Take 1 capsule (120 mg) by mouth every evening   Ferrous Gluconate 324 (37.5 Fe) MG TABS TAKE 1 TABLET (324 MG) BY MOUTH DAILY (WITH BREAKFAST)   fish oil-omega-3 fatty acids (FISH OIL) 1000 MG capsule Take 1 g by mouth 2 times daily    furosemide (LASIX) 20 MG tablet Take 1 tablet (20 mg) by mouth daily   glipiZIDE (GLUCOTROL XL) 10 MG 24 hr tablet TAKE 1 TABLET (10 MG) BY MOUTH 2 TIMES DAILY (Patient taking differently: daily TAKE 1 TABLET (10 MG) BY MOUTH 2 TIMES DAILY)   latanoprost (XALATAN) 0.005 % ophthalmic solution Place 1 drop Into the left eye daily   loperamide (IMODIUM  "A-D) 2 MG tablet daily    metFORMIN (GLUCOPHAGE) 500 MG tablet Take 1 tablet (500 mg) by mouth 2 times daily (with meals)   multivitamin, therapeutic (THERA-VIT) TABS tablet Take 0.5 tablets by mouth 2 times daily   ONETOUCH ULTRA test strip USE TO TEST DAILY   pioglitazone (ACTOS) 45 MG tablet Take 1 tablet (45 mg) by mouth daily   warfarin (COUMADIN) 5 MG tablet TAKE 1 TABLET BY MOUTH TUESDAY,THURS AND SUN. TAKE 1/2 TABLET ON MON,WED,FRI AND SAT. OR AS DIRECTED   ACE NOT PRESCRIBED, INTENTIONAL, 1 each daily ACE Inhibitor not prescribed due to Risk for drug interaction (Patient not taking: Reported on 5/30/2019)   ASPIRIN NOT PRESCRIBED, INTENTIONAL, by Other route continuous prn Reported on 3/7/2017   senna-docusate (SENOKOT-S;PERICOLACE) 8.6-50 MG per tablet Take 1 tablet by mouth 2 times daily as needed for constipation (Patient not taking: Reported on 4/26/2019)   STATIN NOT PRESCRIBED, INTENTIONAL, 1 each At Bedtime Statin not prescribed intentionally due to Risk for drug interaction (Patient not taking: Reported on 5/30/2019)   tamsulosin (FLOMAX) 0.4 MG capsule Take 1 capsule (0.4 mg) by mouth daily (Patient not taking: Reported on 4/26/2019)     No current facility-administered medications on file prior to visit.     ALLERGIES:   Allergies   Allergen Reactions     No Known Drug Allergies        PHYSICAL EXAM:  Vitals: /64 (BP Location: Right arm, Patient Position: Sitting, Cuff Size: Adult Regular)   Pulse 90   Ht 1.778 m (5' 10\")   Wt 83.9 kg (185 lb)   SpO2 97%   BMI 26.54 kg/m     Constitutional:  cooperative, alert and oriented, well developed, well nourished, in no acute distress        Skin:  warm and dry to the touch, no apparent skin lesions or masses noted        Head:  normocephalic, no masses or lesions        Eyes:  pupils equal and round;conjunctivae and lids unremarkable;sclera white;no xanthalasma        ENT:  no pallor or cyanosis, dentition good        Neck:  JVP normal;no " carotid bruit        Respiratory:  normal breath sounds, clear to auscultation, normal A-P diameter, normal symmetry, normal respiratory excursion, no use of accessory muscles        Cardiac:   irregularly irregular rhythm         grade 1;early diastolic murmur good rate control    GI:  abdomen soft;BS normoactive        Vascular:                                        Extremities and Musculoskeletal:  no deformities, clubbing, cyanosis, erythema observed stasis pigmentation     Bilateral edema L>R, bilateral varicose veins and telangiectasias       Neurological:  no gross motor deficits;affect appropriate              LABS/DATA:  I reviewed the followin18 treadmill nuc stress test:  Impression  1.  Myocardial perfusion imaging using single isotope technique  demonstrated no significant perfusion defect consistent with  significant ischemia or infarct. There is mild decrease in inferior  activity at the base on rest and stress images most likely consistent  with diaphragm attenuation/bowel uptake artifact..   2. Gated images demonstrated normal size left ventricle with adequate  to good overall contractility and no significant regional wall motion  abnormality.  The left ventricular systolic function is normal with  ejection fraction 57% (accuracy maybe affected by presence of atrial  fibrillation).  3. Compared to the prior study from not applicable .      8/10/18 resting and exercise ABIs:  IMPRESSION:  1. Normal resting and exercise ABIs bilaterally       8/10/18 echo:  Interpretation Summary  Left ventricular systolic function is normal.The visual ejection fraction is  estimated at 55-60%.  Diastolic Doppler findings (E/E' ratio and/or other parameters) suggest left  ventricular filling pressures are increased.  The right ventricular systolic function is normal.  Pulmonary hypertension- RVSP 40 mm hg +RA.  There is trace aortic regurgitation.  Mild aortic root and ascending aorta dilatation.      18  venous comp study:  Impression:  1. Right leg: No DVT. Severe reflux (5.2s, 4.0mm) of the proximal GSV  in the thigh and superficial tributary (5.4s, 4.7mm) in the calf as  well as severe reflux in the small saphenous vein in the calf (6.4s,  2.2mm) with resultant refluxing venous varicosities (4.5s, 2.9mm).     2. Left leg: No DVT. Severe reflux (5.0s, 2.9mm) limited to distal GSV  at the ankle.        If clinically indicated could treat:  Right leg: RF ablation of GSV, RF ablation of SSV, sclerotherapy of  superficial tributary and varicose veins with phlebectomy of varicose  veins.     Left leg: Sclerotherapy of distal GSV vs. RF ablation of GSV +  sclerotherapy.      Cardiac event monitor 4/18/19-5/17/19, prelim:  Afib with rates ranging from 40s-180  Pauses up to 2.5 secs 0409 on 4/30/19  Runs of NSVT, 6 beats on 4/25/19 at 0527, 13 beats on 5/3/19 at 0458, 14 beats on 5/8/19 at 1400       Treadmill nuc 5/16/19:  Impression  1.  Myocardial perfusion imaging using single isotope technique  demonstrated normal myocardial perfusion.   2. Gated images demonstrated normal wall motion.  The left ventricular  systolic function is normal with a calculated ejection fraction of  62%.  3. Compared to the prior study from 2018, no significant changes are  noted .             ASSESSMENT/PLAN:  Chronic afib  - Rate control: dilt cd 120 mg.   Recent event monitor done due to near syncope.  Rates range from 40s-180s.  Pauses up to 2.5 seconds.  No sxs with either extreme.    With his recent episode of near syncope, wonder if he had longer pauses that could have caused his sxs or if he could have tachybrady.    I am hesitant to increase dilt with the rates into the 40s and 2.5 sec pauses.   Will send to EP re rate control options/PPM.   - On warfarin with therapeutic INRs      QUESADA  - Echo shows normal LVEF, grade 3 diastolic dysfunction  - Nuc without e/o ischemia  - Some contribution could be from the chronic afib as well  -  With Lasix 20 mg, he had some improvement   - I did want to have his PCP consider a substitute medication for Actos given his significant diastolic dysfunction/QUESADA/edema.  Dr. Armas is considering changing per patient report    - Overall stable, and not too limiting      LE edema  Exertional leg heaviness  Varicose veins  Telangiectasias  - Resting and exercise ABIs are normal  - Venous comp study is abnormal showing severe reflux in both right and left legs.    -- Right leg:  No DVT.  There is severe reflux (5.2s, 4.0mm) of the proximal GSV in the thigh and superficial tributary (5.4s, 4.7mm) in the calf as well as severe reflux in the small saphenous vein in the calf (6.4s, 2.2mm) with resultant refluxing venous varicosities (4.5s, 2.9mm).     --Left leg: No DVT. Severe reflux (5.0s, 2.9mm) limited to distal GSV at the ankle.  - The LE edema could be in aprt from the diastolic dysfunction.  He will continue Lasix.    - His HR was in the 80s on check by the rooming staff and myself, so I continued dilt at the current dose.  - He also he has severe reflux in bilateral LEs.     -- He tried compression stockings, but could not get them off.  We discussed considering a zipper pair, or going to a medical supply store where they can help him find a pair that fits well.    -- Continue to ambulate   -- Elevation    -- His leg heaviness and fatigue is a bit worse the past couple weeks.  However, for now, he would like to hold off on any vein procedure.  We will discuss again when I see him back in about 6 weeks.  We can consider RLE vena seal of the greater saphenous vein, small saphenous vein and superficial tributary plus or minus sclerotherapy and phlebectomy of the varicose veins.  The varicose veins seem fairly small and may close up without aggressive therapy.  And then pending RLE response, could consider LLE cclerotherapy of distal GSV vs. RF ablation of GSV + sclerotherapy.   - We will put this on the back burner  until his rhythm is fully addressed       Near syncope  - 4/2019 a single short episode while sitting reading the newspaper.  He felt prickly, warm sensation on his face (both sides).  The vision seemed to go a little gray and he felt like he would pass out, but this only lasted a couple seconds.     He is not certain what his BP was, but BP has been ok lately.   No chest pain with the episode.   No palpitations.   No recent travel, no change in medications.   He had an echo last summer that showed normal LV systolic function, increase in estimated RVSP, trace AI, mild aortic root and ascending aorta dilatation.   Just wore an event monitor that showed (prelim) chronic afib with rates 40s-180s.  Pauses of up to 2.5 secs, though during sleeping hours.  Wonder if he has some tachybrady that could have caused episode.  Will send to EP as noted above.   The event monitor also showed runs of NSVT.  He had CP and LH with this.  LVEF is normal.  Treadmill stress test showed normal perfusion.  I am hesitant to add a BB given his rates into the 40s.  Will send to EP.  Will get an EKG today for baseline  - Will get carotid US  - Unlikely TIA with his whole face being affected and therapeutic INRs          Follow up:  Dr. Pacheco to consider options for rate control vs PPM  Dr. Gleason in 3 months      Susanna Haque PA-C    Thank you for allowing me to participate in the care of your patient.      Sincerely,     Susanna Haque PA-C     [facility]

## 2019-05-30 NOTE — PROGRESS NOTES
CARDIOLOGY CLINIC PROGRESS NOTE    DOS: 2019      Louis Keller Jr.  : 1932, 86 year old  MRN: [id number]      History: I had the pleasure of following up with Louis Keller Jr. today in the cardiology clinic.  He is a patient of Dr. Gleason.  His wife, Laura (also sees myself and Dr. Gleason) accompanies him.     Louis is a pleasant 86 year old man with history of chronic atrial fibrillation, DM2.  He failed cardioversion in the past.  He is maintained on rate control strategy and anticoagulation.  He has had negative stress testing previously in ,  and .      He saw Dr. Gleason 18.  He had some QUESADA and R>L LE exertional heaviness.    After 3 months of trying to increase walking, his sxs were unchanged. Echo, treadmill nuc, ABIs and venous comp study were ordered.   Echo showed normal LVEF, grade 3 diastolic dysfunction.  Nuc was without e/o ischemia.   Resting and exercise ABIs are normal.  Venous comp study is abnormal showing severe reflux in both right and left legs.  Given the QUESADA, edema, and the echo showing grade 3 diastolic dysfunction, we trialed low dose diuretic - Lasix 20 mg daily.  Also compression stockings were ordered.    He saw Dr. Gleason 10/3/18 for follow up. He had R>L LE extremity heaviness.      I saw Louis 11/15/18.  At that time he continued to have leg fatigue/heaviness, though perhaps the RLE was a little better.  He wanted to postpone the vein procedure since he was feeling ok.  He still had some stable QUESADA.      19 saw GI for chronic loose stools.     3/14/19 his wife saw Dr. Gleason and she reported that Louis had an episode of near syncope, and Dr. Gleason recommended that I see him in clinic.      Louis saw me 19.  He told me that about 1 week before Laura saw Dr. Gleason, he was sitting reading the newspaper.  He felt prickly, warm sensation on his face (both sides).  The vision seemed to go a little gray and he felt like he would pass out, but this only  lasted a couple seconds.  He hadn't had this before. No CP.  No palpitations.   An event monitor was placed.     The results are still prelim, but the monitor does show afib with rates ranging from 40s-180.  With these rates, he had no sxs.  He had pauses up to 2.5 secs 0409 on 4/30/19 without sxs.  He had runs of NSVT, 6 beats on 4/25/19 at 0527, 13 beats on 5/3/19 at 0458, 14 beats on 5/8/19 at 1400 .  Per nursing notes, he did have some chest pain with at least one of the NSVT episodes.  A treadmill nuc was ordered and done on 5/16/19.  This showed normal perfusion, normal LVEF.      He presents today for follow up.   He tells me that when he had the NSVT, he had chest pain and lightheadedness.    No syncope.   He did have some bleeding in hsi ear.  He saw ENT and had MRI of brain and no concerns.       ROS:  Skin:  Negative     Eyes:  Positive for glasses  ENT:  Negative    Respiratory:  Positive for shortness of breath  Cardiovascular:  Negative for;palpitations;chest pain;edema;dizziness fatigue;lightheadedness  Gastroenterology: Negative    Genitourinary:  Negative for    Musculoskeletal:  Positive for arthritis;joint pain  Neurologic:  Negative    Psychiatric:  Negative    Heme/Lymph/Imm:  Positive for easy bruising  Endocrine:  Positive for diabetes    PAST MEDICAL HISTORY:  Past Medical History:   Diagnosis Date     Antiplatelet or antithrombotic long-term use      Atrial fibrillation (H)      Diarrhea      Diastolic murmur      QUESADA (dyspnea on exertion)      Gout      Hemorrhage of gastrointestinal tract, unspecified 63,65,and 1971    hospitalized     History of blood transfusion      Hyperlipidemia LDL goal <100 4/18/2012     Long-term (current) use of anticoagulants [Z79.01] 3/31/2016     Mumps      Palpitations      Physical deconditioning 8/9/2013     Scarlet fever      Spider veins      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     Unspecified disease of pancreas 1990     pancreatitis       PAST SURGICAL HISTORY:  Past Surgical History:   Procedure Laterality Date     ARTHROPLASTY KNEE  8/5/2013    Procedure: ARTHROPLASTY KNEE;  Left Total Knee Arthroplasty       C NONSPECIFIC PROCEDURE  Child    T&A     C NONSPECIFIC PROCEDURE  ; also 11/03    Colonoscopy     C NONSPECIFIC PROCEDURE      Basal cell cancer of the nose     C NONSPECIFIC PROCEDURE  1990    Cholecystectomy     CARDIOVERSION  9/6/11    failed       SOCIAL HISTORY:  Social History     Socioeconomic History     Marital status:      Spouse name: None     Number of children: None     Years of education: None     Highest education level: None   Occupational History     Occupation: Lab tech     Comment: Semi-retired   Social Needs     Financial resource strain: None     Food insecurity:     Worry: None     Inability: None     Transportation needs:     Medical: None     Non-medical: None   Tobacco Use     Smoking status: Never Smoker     Smokeless tobacco: Never Used   Substance and Sexual Activity     Alcohol use: Yes     Alcohol/week: 0.0 oz     Comment: 1 glass of wine every day     Drug use: No     Sexual activity: Never     Partners: Female   Lifestyle     Physical activity:     Days per week: None     Minutes per session: None     Stress: None   Relationships     Social connections:     Talks on phone: None     Gets together: None     Attends Christian service: None     Active member of club or organization: None     Attends meetings of clubs or organizations: None     Relationship status: None     Intimate partner violence:     Fear of current or ex partner: None     Emotionally abused: None     Physically abused: None     Forced sexual activity: None   Other Topics Concern      Service Not Asked     Blood Transfusions Not Asked     Caffeine Concern No     Comment: 14 oz per day     Occupational Exposure Not Asked     Hobby Hazards Not Asked     Sleep Concern No     Comment: sometimes - nocturia 4 times  per night     Stress Concern Not Asked     Weight Concern Not Asked     Special Diet No     Back Care Not Asked     Exercise Yes     Comment: treadmill/walking 15-20 minutes 6 days per week     Bike Helmet Not Asked     Seat Belt Not Asked     Self-Exams Not Asked     Parent/sibling w/ CABG, MI or angioplasty before 65F 55M? Not Asked   Social History Narrative     None       FAMILY HISTORY:  Family History   Problem Relation Age of Onset     Alzheimer Disease Maternal Grandmother      Arthritis Maternal Grandmother      Cancer Mother         breast/ 1983/colon     Eye Disorder Mother         glaucoma and cataracts     Heart Disease Mother         angina/CABG     Hypertension Mother      Cancer Father         colon     Diabetes Maternal Aunt         AoDM       MEDS:   Current Outpatient Medications on File Prior to Visit:  Acetaminophen (TYLENOL PO) Take 1,000 mg by mouth 3 times daily   cholestyramine (QUESTRAN) 4 g packet TAKE 1 PACKET (4 G) BY MOUTH 2 TIMES DAILY (WITH MEALS)   Chromium 1000 MCG TABS Take 200 mcg by mouth daily    diltiazem ER COATED BEADS (CARDIZEM CD/CARTIA XT) 120 MG 24 hr capsule Take 1 capsule (120 mg) by mouth every evening   Ferrous Gluconate 324 (37.5 Fe) MG TABS TAKE 1 TABLET (324 MG) BY MOUTH DAILY (WITH BREAKFAST)   fish oil-omega-3 fatty acids (FISH OIL) 1000 MG capsule Take 1 g by mouth 2 times daily    furosemide (LASIX) 20 MG tablet Take 1 tablet (20 mg) by mouth daily   glipiZIDE (GLUCOTROL XL) 10 MG 24 hr tablet TAKE 1 TABLET (10 MG) BY MOUTH 2 TIMES DAILY (Patient taking differently: daily TAKE 1 TABLET (10 MG) BY MOUTH 2 TIMES DAILY)   latanoprost (XALATAN) 0.005 % ophthalmic solution Place 1 drop Into the left eye daily   loperamide (IMODIUM A-D) 2 MG tablet daily    metFORMIN (GLUCOPHAGE) 500 MG tablet Take 1 tablet (500 mg) by mouth 2 times daily (with meals)   multivitamin, therapeutic (THERA-VIT) TABS tablet Take 0.5 tablets by mouth 2 times daily   ONETOUCH ULTRA  "test strip USE TO TEST DAILY   pioglitazone (ACTOS) 45 MG tablet Take 1 tablet (45 mg) by mouth daily   warfarin (COUMADIN) 5 MG tablet TAKE 1 TABLET BY MOUTH TUESDAY,THURS AND SUN. TAKE 1/2 TABLET ON MON,WED,FRI AND SAT. OR AS DIRECTED   ACE NOT PRESCRIBED, INTENTIONAL, 1 each daily ACE Inhibitor not prescribed due to Risk for drug interaction (Patient not taking: Reported on 5/30/2019)   ASPIRIN NOT PRESCRIBED, INTENTIONAL, by Other route continuous prn Reported on 3/7/2017   senna-docusate (SENOKOT-S;PERICOLACE) 8.6-50 MG per tablet Take 1 tablet by mouth 2 times daily as needed for constipation (Patient not taking: Reported on 4/26/2019)   STATIN NOT PRESCRIBED, INTENTIONAL, 1 each At Bedtime Statin not prescribed intentionally due to Risk for drug interaction (Patient not taking: Reported on 5/30/2019)   tamsulosin (FLOMAX) 0.4 MG capsule Take 1 capsule (0.4 mg) by mouth daily (Patient not taking: Reported on 4/26/2019)     No current facility-administered medications on file prior to visit.     ALLERGIES:   Allergies   Allergen Reactions     No Known Drug Allergies        PHYSICAL EXAM:  Vitals: /64 (BP Location: Right arm, Patient Position: Sitting, Cuff Size: Adult Regular)   Pulse 90   Ht 1.778 m (5' 10\")   Wt 83.9 kg (185 lb)   SpO2 97%   BMI 26.54 kg/m    Constitutional:  cooperative, alert and oriented, well developed, well nourished, in no acute distress        Skin:  warm and dry to the touch, no apparent skin lesions or masses noted        Head:  normocephalic, no masses or lesions        Eyes:  pupils equal and round;conjunctivae and lids unremarkable;sclera white;no xanthalasma        ENT:  no pallor or cyanosis, dentition good        Neck:  JVP normal;no carotid bruit        Respiratory:  normal breath sounds, clear to auscultation, normal A-P diameter, normal symmetry, normal respiratory excursion, no use of accessory muscles        Cardiac:   irregularly irregular rhythm         grade " 1;early diastolic murmur good rate control    GI:  abdomen soft;BS normoactive        Vascular:                                        Extremities and Musculoskeletal:  no deformities, clubbing, cyanosis, erythema observed stasis pigmentation     Bilateral edema L>R, bilateral varicose veins and telangiectasias       Neurological:  no gross motor deficits;affect appropriate              LABS/DATA:  I reviewed the followin18 treadmill nuc stress test:  Impression  1.  Myocardial perfusion imaging using single isotope technique  demonstrated no significant perfusion defect consistent with  significant ischemia or infarct. There is mild decrease in inferior  activity at the base on rest and stress images most likely consistent  with diaphragm attenuation/bowel uptake artifact..   2. Gated images demonstrated normal size left ventricle with adequate  to good overall contractility and no significant regional wall motion  abnormality.  The left ventricular systolic function is normal with  ejection fraction 57% (accuracy maybe affected by presence of atrial  fibrillation).  3. Compared to the prior study from not applicable .      8/10/18 resting and exercise ABIs:  IMPRESSION:  1. Normal resting and exercise ABIs bilaterally       8/10/18 echo:  Interpretation Summary  Left ventricular systolic function is normal.The visual ejection fraction is  estimated at 55-60%.  Diastolic Doppler findings (E/E' ratio and/or other parameters) suggest left  ventricular filling pressures are increased.  The right ventricular systolic function is normal.  Pulmonary hypertension- RVSP 40 mm hg +RA.  There is trace aortic regurgitation.  Mild aortic root and ascending aorta dilatation.      18 venous comp study:  Impression:  1. Right leg: No DVT. Severe reflux (5.2s, 4.0mm) of the proximal GSV  in the thigh and superficial tributary (5.4s, 4.7mm) in the calf as  well as severe reflux in the small saphenous vein in the calf  (6.4s,  2.2mm) with resultant refluxing venous varicosities (4.5s, 2.9mm).     2. Left leg: No DVT. Severe reflux (5.0s, 2.9mm) limited to distal GSV  at the ankle.        If clinically indicated could treat:  Right leg: RF ablation of GSV, RF ablation of SSV, sclerotherapy of  superficial tributary and varicose veins with phlebectomy of varicose  veins.     Left leg: Sclerotherapy of distal GSV vs. RF ablation of GSV +  sclerotherapy.      Cardiac event monitor 4/18/19-5/17/19, prelim:  Afib with rates ranging from 40s-180  Pauses up to 2.5 secs 0409 on 4/30/19  Runs of NSVT, 6 beats on 4/25/19 at 0527, 13 beats on 5/3/19 at 0458, 14 beats on 5/8/19 at 1400       Treadmill nuc 5/16/19:  Impression  1.  Myocardial perfusion imaging using single isotope technique  demonstrated normal myocardial perfusion.   2. Gated images demonstrated normal wall motion.  The left ventricular  systolic function is normal with a calculated ejection fraction of  62%.  3. Compared to the prior study from 2018, no significant changes are  noted .             ASSESSMENT/PLAN:  Chronic afib  - Rate control: dilt cd 120 mg.   Recent event monitor done due to near syncope.  Rates range from 40s-180s.  Pauses up to 2.5 seconds.  No sxs with either extreme.    With his recent episode of near syncope, wonder if he had longer pauses that could have caused his sxs or if he could have tachybrady.    I am hesitant to increase dilt with the rates into the 40s and 2.5 sec pauses.   Will send to EP re rate control options/PPM.   - On warfarin with therapeutic INRs      QUESADA  - Echo shows normal LVEF, grade 3 diastolic dysfunction  - Nuc without e/o ischemia  - Some contribution could be from the chronic afib as well  - With Lasix 20 mg, he had some improvement   - I did want to have his PCP consider a substitute medication for Actos given his significant diastolic dysfunction/QUESADA/edema.  Dr. Armas is considering changing per patient report    -  Overall stable, and not too limiting      LE edema  Exertional leg heaviness  Varicose veins  Telangiectasias  - Resting and exercise ABIs are normal  - Venous comp study is abnormal showing severe reflux in both right and left legs.    -- Right leg:  No DVT.  There is severe reflux (5.2s, 4.0mm) of the proximal GSV in the thigh and superficial tributary (5.4s, 4.7mm) in the calf as well as severe reflux in the small saphenous vein in the calf (6.4s, 2.2mm) with resultant refluxing venous varicosities (4.5s, 2.9mm).     --Left leg: No DVT. Severe reflux (5.0s, 2.9mm) limited to distal GSV at the ankle.  - The LE edema could be in aprt from the diastolic dysfunction.  He will continue Lasix.    - His HR was in the 80s on check by the rooming staff and myself, so I continued dilt at the current dose.  - He also he has severe reflux in bilateral LEs.     -- He tried compression stockings, but could not get them off.  We discussed considering a zipper pair, or going to a medical supply store where they can help him find a pair that fits well.    -- Continue to ambulate   -- Elevation    -- His leg heaviness and fatigue is a bit worse the past couple weeks.  However, for now, he would like to hold off on any vein procedure.  We will discuss again when I see him back in about 6 weeks.  We can consider RLE vena seal of the greater saphenous vein, small saphenous vein and superficial tributary plus or minus sclerotherapy and phlebectomy of the varicose veins.  The varicose veins seem fairly small and may close up without aggressive therapy.  And then pending RLE response, could consider LLE cclerotherapy of distal GSV vs. RF ablation of GSV + sclerotherapy.   - We will put this on the back burner until his rhythm is fully addressed       Near syncope  - 4/2019 a single short episode while sitting reading the newspaper.  He felt prickly, warm sensation on his face (both sides).  The vision seemed to go a little gray and he  felt like he would pass out, but this only lasted a couple seconds.     He is not certain what his BP was, but BP has been ok lately.   No chest pain with the episode.   No palpitations.   No recent travel, no change in medications.   He had an echo last summer that showed normal LV systolic function, increase in estimated RVSP, trace AI, mild aortic root and ascending aorta dilatation.   Just wore an event monitor that showed (prelim) chronic afib with rates 40s-180s.  Pauses of up to 2.5 secs, though during sleeping hours.  Wonder if he has some tachybrady that could have caused episode.  Will send to EP as noted above.   The event monitor also showed runs of NSVT.  He had CP and LH with this.  LVEF is normal.  Treadmill stress test showed normal perfusion.  I am hesitant to add a BB given his rates into the 40s.  Will send to EP.  Will get an EKG today for baseline  - Will get carotid US  - Unlikely TIA with his whole face being affected and therapeutic INRs          Follow up:  Dr. Pacheco to consider options for rate control vs PPM  Dr. Gleason in 3 months      Susanna Haque PA-C

## 2019-05-30 NOTE — PROGRESS NOTES
ANTICOAGULATION FOLLOW-UP CLINIC VISIT    Patient Name:  Louis Keller Jr.  Date:  2019  Contact Type:  Face to Face    SUBJECTIVE:  Patient Findings     Positives:   Signs/symptoms of bleeding (Pt seen in ER on  for bloody drainage out of R ear.  Pt did follow up with ENT, no interventions were necessary.), Change in health (Intermittent R ankle swelling.  2 episodes of near syncope, pt following up with cardiology today for results of recent cardiac monitor.), Change in activity (Less active recently), Change in diet/appetite (Less greens in the past week, pt will resume 3 servings per week.)        Clinical Outcomes     Negatives:   Major bleeding event, Thromboembolic event, Anticoagulation-related hospital admission, Anticoagulation-related ED visit, Anticoagulation-related fatality           OBJECTIVE    INR Protime   Date Value Ref Range Status   2019 4.5 (A) 0.86 - 1.14 Final       ASSESSMENT / PLAN  INR assessment SUPRA    Recheck INR In: 1 WEEK    INR Location Clinic      Anticoagulation Summary  As of 2019    INR goal:   2.0-3.0   TTR:   83.5 % (3.1 y)   INR used for dosin.5! (2019)   Warfarin maintenance plan:   5 mg (5 mg x 1) every Sun, Tue, Thu; 2.5 mg (5 mg x 0.5) all other days   Full warfarin instructions:   : Hold; Otherwise 5 mg every Sun, Tue, Thu; 2.5 mg all other days   Weekly warfarin total:   25 mg   Plan last modified:   Monica Souza RN (2016)   Next INR check:   2019   Priority:   INR   Target end date:       Indications    Long-term (current) use of anticoagulants [Z79.01] [Z79.01]  Atrial fibrillation (H) [I48.91]             Anticoagulation Episode Summary     INR check location:       Preferred lab:       Send INR reminders to:   [facility]    Comments:   likes calendar printout.        Anticoagulation Care Providers     Provider Role Specialty Phone number    Joselito Armas MD [facility] [phone]             See the Encounter Report to view Anticoagulation Flowsheet and Dosing Calendar (Go to Encounters tab in chart review, and find the Anticoagulation Therapy Visit)        Hope Baumann RN

## 2019-06-04 ENCOUNTER — HOSPITAL ENCOUNTER (OUTPATIENT)
Dept: CARDIOLOGY | Facility: CLINIC | Age: 84
Discharge: HOME | End: 2019-06-04
Attending: PHYSICIAN ASSISTANT | Admitting: PHYSICIAN ASSISTANT
Payer: COMMERCIAL

## 2019-06-04 DIAGNOSIS — R55 NEAR SYNCOPE: ICD-10-CM

## 2019-06-04 PROCEDURE — 93880 EXTRACRANIAL BILAT STUDY: CPT

## 2019-06-04 PROCEDURE — 93880 EXTRACRANIAL BILAT STUDY: CPT | Mod: 26 | Performed by: INTERNAL MEDICINE

## 2019-06-05 ENCOUNTER — OFFICE VISIT (OUTPATIENT)
Dept: CARDIOLOGY | Facility: CLINIC | Age: 84
End: 2019-06-05
Payer: COMMERCIAL

## 2019-06-05 VITALS
SYSTOLIC BLOOD PRESSURE: 122 MMHG | BODY MASS INDEX: 26.3 KG/M2 | HEIGHT: 70 IN | WEIGHT: 183.7 LBS | DIASTOLIC BLOOD PRESSURE: 56 MMHG | HEART RATE: 68 BPM

## 2019-06-05 DIAGNOSIS — I48.20 CHRONIC A-FIB (H): ICD-10-CM

## 2019-06-05 DIAGNOSIS — R55 NEAR SYNCOPE: ICD-10-CM

## 2019-06-05 DIAGNOSIS — I47.29 NSVT (NONSUSTAINED VENTRICULAR TACHYCARDIA) (H): Primary | ICD-10-CM

## 2019-06-05 PROCEDURE — 99203 OFFICE O/P NEW LOW 30 MIN: CPT | Performed by: INTERNAL MEDICINE

## 2019-06-05 RX ORDER — METOPROLOL TARTRATE 25 MG/1
25 TABLET, FILM COATED ORAL 2 TIMES DAILY
Qty: 60 TABLET | Refills: 11 | Status: SHIPPED | OUTPATIENT
Start: 2019-06-05 | End: 2020-05-18

## 2019-06-05 ASSESSMENT — MIFFLIN-ST. JEOR: SCORE: 1519.51

## 2019-06-05 NOTE — NURSING NOTE
A decision aid for implantable cardioverter-defibrillatiors (ICD) given to patient at office visit to review before seeing provider.   Sharon Turcios, CMA

## 2019-06-05 NOTE — PROGRESS NOTES
"Service Date: 06/05/2019      CONSULTATION       HISTORY OF PRESENT ILLNESS:    It is my pleasure seeing Mr. Louis Keller, a delightful 86-year-old gentleman who has been referred by Dr. Herbert Gleason and Susanna Haque PA-C, for evaluation of near syncope.      Mr. Keller has the following cardiac/medical issues:   A.  Permanent atrial fibrillation.  Treated with rate control (diltiazem) and anticoagulation (warfarin).   B.  Diabetes mellitus type 2.   C.  Dyslipidemia.   D.  Irritable bowel syndrome with intermittent diarrhea.   E.  Mild aortic root dilatation (4.2 cm).     Mr. Keller had a near-syncopal episode in mid April.  He was sitting reading the paper when he felt a \"prickly warm sensation\" in his cheeks.  His vision grayed out and felt he would pass out, but he did not.  He fully and completely recovered.  He has not had other such events.  He has never had full syncope.      He is modestly active.  He tells me that up to 2 years ago, he used to walk 3-5 miles a day at a good clip.  He now can walk not even half of that distance and he has to walk at a much slower pace.      He is a nonsmoker.  Until recently, he drank 1 glass of wine per day.      PHYSICAL EXAMINATION:   VITAL SIGNS:  Blood pressure 122/56, pulse 68 and irregular, weight 83 kg, height 178 cm.   GENERAL:  He is a pleasant elderly male in no distress.  He is accompanied by his wife.   HEENT:  Head normocephalic, atraumatic.  Sclerae anicteric.  Mouth, oropharynx clear.   NECK:  Supple without carotid bruits.   LUNGS:  With mildly decreased breath sounds.  No crackles or wheezes.   CARDIOVASCULAR:  Normal JVP, irregularly irregular rhythm, no apparent gallop or murmur.   ABDOMEN:  Mildly obese, soft, nontender.   EXTREMITIES:  No significant edema.   SKIN:  Few lesions of seborrheic keratosis, otherwise normal.    NEUROLOGIC:  Alert and oriented x3.   VASCULAR:  Has 2+ carotid and radial pulses bilaterally.      DIAGNOSTIC STUDIES:  "   Recent labs:  Sodium 137, potassium 4.0, creatinine 0.85.  Hematocrit 40%.      Most recent ECG from 05/30/2019 shows atrial fibrillation with controlled ventricular rate and slow R-wave progression in the precordium.      Echocardiogram in 08/2018 showed normal LVEF of 55%-60%, mild LVH, mild RV dilatation.  No significant valvular abnormalities.  Aortic root was dilated at 4.2 cm.      Nuclear stress test in 05/2019 showed normal perfusion.      A 30-day cardiac event monitor, attached after his near-syncopal event in April, was reviewed today.  It shows persistent atrial fibrillation with ventricular rates that ranged from the 40s up to the 170s.  Most bradycardic events occurred at nighttime and there were no pauses exceeding 3 seconds.  The patient had occasional PVCs and 3 runs of VT, longest of 14 beats.        IMPRESSION:    1. Near-syncopal episode while sitting.  I do agree with my colleagues that this event is highly suspicious for arrhythmic etiology, given its brief duration and occurrence in the sitting position.  My primary suspicion is for an episode of NSVT, given the 3 documented occurrences on his 30-day monitor.  Alternatively, slow atrial fibrillation with a pause may have caused the symptom.      The patient has normal LV function and had no ischemia or scar on his recent nuclear stress test.  The cause of VT is unclear in his case.      I have recommended a change from diltiazem to metoprolol and see if we can pharmacologically suppress the VT.  I would then repeat a cardiac monitor.      RECOMMENDATIONS:   A.  Stop diltiazem.   B.  Start metoprolol tartrate 25 mg b.i.d.   C.  One to two weeks later, hook up a 14-day Zio Patch monitor.  We will call the patient with results as soon as they become available.  We will then plan the next step in his evaluation.      I do appreciate the opportunity to be involved in this delightful patient's care.  Please feel free to contact me with any  questions or concerns.        LOLLY BARRETO MD, FACC           cc:   Joselito Armas MD    [facility]    [address], [facility] [address]   [place] [address]      [place] [address]             D: 2019   T: 2019   MT: BELGICA      Name:     KATHY LOPEZ   MRN:      [id number]        Account:      [id number]   :      1932           Service Date: 2019      Document: [id number]

## 2019-06-05 NOTE — LETTER
6/5/2019    Joselito Armas MD  [address] [facility] [address]    RE: Louis Keller Jr.       Dear Colleague,    I had the pleasure of seeing Louis Keller Jr. in the [facility].    HPI and Plan:   See dictation    Orders Placed This Encounter   Procedures     Zio Patch Holter Adult Pediatric Greater than 48 hrs       Orders Placed This Encounter   Medications     metoprolol tartrate (LOPRESSOR) 25 MG tablet     Sig: Take 1 tablet (25 mg) by mouth 2 times daily     Dispense:  60 tablet     Refill:  11       Medications Discontinued During This Encounter   Medication Reason     diltiazem ER COATED BEADS (CARDIZEM CD/CARTIA XT) 120 MG 24 hr capsule          Encounter Diagnoses   Name Primary?     Near syncope      Chronic a-fib (H)        CURRENT MEDICATIONS:  Current Outpatient Medications   Medication Sig Dispense Refill     Acetaminophen (TYLENOL PO) Take 1,000 mg by mouth 3 times daily       cholestyramine (QUESTRAN) 4 g packet TAKE 1 PACKET (4 G) BY MOUTH 2 TIMES DAILY (WITH MEALS) 180 packet 1     Chromium 1000 MCG TABS Take 200 mcg by mouth daily        Ferrous Gluconate 324 (37.5 Fe) MG TABS TAKE 1 TABLET (324 MG) BY MOUTH DAILY (WITH BREAKFAST) 100 tablet 0     fish oil-omega-3 fatty acids (FISH OIL) 1000 MG capsule Take 1 g by mouth 2 times daily        furosemide (LASIX) 20 MG tablet Take 1 tablet (20 mg) by mouth daily 90 tablet 2     glipiZIDE (GLUCOTROL XL) 10 MG 24 hr tablet TAKE 1 TABLET (10 MG) BY MOUTH 2 TIMES DAILY (Patient taking differently: daily TAKE 1 TABLET (10 MG) BY MOUTH 2 TIMES DAILY) 180 tablet 1     latanoprost (XALATAN) 0.005 % ophthalmic solution Place 1 drop Into the left eye daily       loperamide (IMODIUM A-D) 2 MG tablet daily  30 tablet 0     metFORMIN (GLUCOPHAGE) 500 MG tablet Take 1 tablet (500 mg) by mouth 2 times daily (with meals) 180 tablet 1     metoprolol tartrate (LOPRESSOR) 25 MG tablet Take 1 tablet (25 mg) by mouth 2  times daily 60 tablet 11     multivitamin, therapeutic (THERA-VIT) TABS tablet Take 0.5 tablets by mouth 2 times daily       ONETOUCH ULTRA test strip USE TO TEST DAILY 100 strip 0     pioglitazone (ACTOS) 45 MG tablet Take 1 tablet (45 mg) by mouth daily 90 tablet 3     warfarin (COUMADIN) 5 MG tablet TAKE 1 TABLET BY MOUTH TUESDAY,THURS AND SUN. TAKE 1/2 TABLET ON MON,WED,FRI AND SAT. OR AS DIRECTED 65 tablet 2     ACE NOT PRESCRIBED, INTENTIONAL, 1 each daily ACE Inhibitor not prescribed due to Risk for drug interaction (Patient not taking: Reported on 5/30/2019) 0 each 0     ASPIRIN NOT PRESCRIBED, INTENTIONAL, by Other route continuous prn Reported on 3/7/2017       senna-docusate (SENOKOT-S;PERICOLACE) 8.6-50 MG per tablet Take 1 tablet by mouth 2 times daily as needed for constipation (Patient not taking: Reported on 4/26/2019) 100 tablet      STATIN NOT PRESCRIBED, INTENTIONAL, 1 each At Bedtime Statin not prescribed intentionally due to Risk for drug interaction (Patient not taking: Reported on 5/30/2019) 0 each 0     tamsulosin (FLOMAX) 0.4 MG capsule Take 1 capsule (0.4 mg) by mouth daily (Patient not taking: Reported on 4/26/2019) 30 capsule 11       ALLERGIES     Allergies   Allergen Reactions     No Known Drug Allergies        PAST MEDICAL HISTORY:  Past Medical History:   Diagnosis Date     Antiplatelet or antithrombotic long-term use      Atrial fibrillation (H)      Diarrhea      Diastolic murmur      QUESADA (dyspnea on exertion)      Gout      Hemorrhage of gastrointestinal tract, unspecified 63,65,and 1971    hospitalized     History of blood transfusion      Hyperlipidemia LDL goal <100 4/18/2012     Long-term (current) use of anticoagulants [Z79.01] 3/31/2016     Mumps      Palpitations      Physical deconditioning 8/9/2013     Scarlet fever      Spider veins      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     Unspecified disease of pancreas 1990    pancreatitis       PAST  SURGICAL HISTORY:  Past Surgical History:   Procedure Laterality Date     ARTHROPLASTY KNEE  2013    Procedure: ARTHROPLASTY KNEE;  Left Total Knee Arthroplasty       C NONSPECIFIC PROCEDURE  Child    T&A     C NONSPECIFIC PROCEDURE  ; also     Colonoscopy     C NONSPECIFIC PROCEDURE      Basal cell cancer of the nose     C NONSPECIFIC PROCEDURE      Cholecystectomy     CARDIOVERSION  11    failed       FAMILY HISTORY:  Family History   Problem Relation Age of Onset     Alzheimer Disease Maternal Grandmother      Arthritis Maternal Grandmother      Cancer Mother         breast/ 1983/colon     Eye Disorder Mother         glaucoma and cataracts     Heart Disease Mother         angina/CABG     Hypertension Mother      Cancer Father         colon     Diabetes Maternal Aunt         AoDM       SOCIAL HISTORY:  Social History     Socioeconomic History     Marital status:      Spouse name: None     Number of children: None     Years of education: None     Highest education level: None   Occupational History     Occupation: Lab tech     Comment: Semi-retired   Social Needs     Financial resource strain: None     Food insecurity:     Worry: None     Inability: None     Transportation needs:     Medical: None     Non-medical: None   Tobacco Use     Smoking status: Never Smoker     Smokeless tobacco: Never Used   Substance and Sexual Activity     Alcohol use: Yes     Alcohol/week: 0.0 oz     Comment: 1 glass of wine every day     Drug use: No     Sexual activity: Never     Partners: Female   Lifestyle     Physical activity:     Days per week: None     Minutes per session: None     Stress: None   Relationships     Social connections:     Talks on phone: None     Gets together: None     Attends Yarsanism service: None     Active member of club or organization: None     Attends meetings of clubs or organizations: None     Relationship status: None     Intimate partner violence:     Fear of current  or ex partner: None     Emotionally abused: None     Physically abused: None     Forced sexual activity: None   Other Topics Concern      Service Not Asked     Blood Transfusions Not Asked     Caffeine Concern No     Comment: 14 oz per day     Occupational Exposure Not Asked     Hobby Hazards Not Asked     Sleep Concern No     Comment: sometimes - nocturia 4 times per night     Stress Concern Not Asked     Weight Concern Not Asked     Special Diet No     Back Care Not Asked     Exercise Yes     Comment: treadmill/walking 15-20 minutes 6 days per week     Bike Helmet Not Asked     Seat Belt Not Asked     Self-Exams Not Asked     Parent/sibling w/ CABG, MI or angioplasty before 65F 55M? Not Asked   Social History Narrative     None       Review of Systems:  Skin:  Negative       Eyes:  Positive for glasses    ENT:  Positive for hearing loss    Respiratory:  Positive for shortness of breath     Cardiovascular:    chest pain;Positive for    Gastroenterology: Negative      Genitourinary:         Musculoskeletal:         Neurologic:         Psychiatric:         Heme/Lymph/Imm:         Endocrine:           [id number]            Thank you for allowing me to participate in the care of your patient.      Sincerely,     Darling Pacheco MD     [facility]    cc:   Susanna Haque PA-C  [address]  [place] [address]

## 2019-06-05 NOTE — PATIENT INSTRUCTIONS
1.  Stop diltiazem.  2.  Start metoprolol 25 mg twice daily.  3.  In about 2 weeks we will repeat a heart monitor.   4.  We will call you with results about 1 week after you return the monitor.

## 2019-06-05 NOTE — PROGRESS NOTES
HPI and Plan:   See dictation    Orders Placed This Encounter   Procedures     Zio Patch Holter Adult Pediatric Greater than 48 hrs       Orders Placed This Encounter   Medications     metoprolol tartrate (LOPRESSOR) 25 MG tablet     Sig: Take 1 tablet (25 mg) by mouth 2 times daily     Dispense:  60 tablet     Refill:  11       Medications Discontinued During This Encounter   Medication Reason     diltiazem ER COATED BEADS (CARDIZEM CD/CARTIA XT) 120 MG 24 hr capsule          Encounter Diagnoses   Name Primary?     Near syncope      Chronic a-fib (H)        CURRENT MEDICATIONS:  Current Outpatient Medications   Medication Sig Dispense Refill     Acetaminophen (TYLENOL PO) Take 1,000 mg by mouth 3 times daily       cholestyramine (QUESTRAN) 4 g packet TAKE 1 PACKET (4 G) BY MOUTH 2 TIMES DAILY (WITH MEALS) 180 packet 1     Chromium 1000 MCG TABS Take 200 mcg by mouth daily        Ferrous Gluconate 324 (37.5 Fe) MG TABS TAKE 1 TABLET (324 MG) BY MOUTH DAILY (WITH BREAKFAST) 100 tablet 0     fish oil-omega-3 fatty acids (FISH OIL) 1000 MG capsule Take 1 g by mouth 2 times daily        furosemide (LASIX) 20 MG tablet Take 1 tablet (20 mg) by mouth daily 90 tablet 2     glipiZIDE (GLUCOTROL XL) 10 MG 24 hr tablet TAKE 1 TABLET (10 MG) BY MOUTH 2 TIMES DAILY (Patient taking differently: daily TAKE 1 TABLET (10 MG) BY MOUTH 2 TIMES DAILY) 180 tablet 1     latanoprost (XALATAN) 0.005 % ophthalmic solution Place 1 drop Into the left eye daily       loperamide (IMODIUM A-D) 2 MG tablet daily  30 tablet 0     metFORMIN (GLUCOPHAGE) 500 MG tablet Take 1 tablet (500 mg) by mouth 2 times daily (with meals) 180 tablet 1     metoprolol tartrate (LOPRESSOR) 25 MG tablet Take 1 tablet (25 mg) by mouth 2 times daily 60 tablet 11     multivitamin, therapeutic (THERA-VIT) TABS tablet Take 0.5 tablets by mouth 2 times daily       ONETOUCH ULTRA test strip USE TO TEST DAILY 100 strip 0     pioglitazone (ACTOS) 45 MG tablet Take 1 tablet  (45 mg) by mouth daily 90 tablet 3     warfarin (COUMADIN) 5 MG tablet TAKE 1 TABLET BY MOUTH TUESDAY,THURS AND SUN. TAKE 1/2 TABLET ON MON,WED,FRI AND SAT. OR AS DIRECTED 65 tablet 2     ACE NOT PRESCRIBED, INTENTIONAL, 1 each daily ACE Inhibitor not prescribed due to Risk for drug interaction (Patient not taking: Reported on 5/30/2019) 0 each 0     ASPIRIN NOT PRESCRIBED, INTENTIONAL, by Other route continuous prn Reported on 3/7/2017       senna-docusate (SENOKOT-S;PERICOLACE) 8.6-50 MG per tablet Take 1 tablet by mouth 2 times daily as needed for constipation (Patient not taking: Reported on 4/26/2019) 100 tablet      STATIN NOT PRESCRIBED, INTENTIONAL, 1 each At Bedtime Statin not prescribed intentionally due to Risk for drug interaction (Patient not taking: Reported on 5/30/2019) 0 each 0     tamsulosin (FLOMAX) 0.4 MG capsule Take 1 capsule (0.4 mg) by mouth daily (Patient not taking: Reported on 4/26/2019) 30 capsule 11       ALLERGIES     Allergies   Allergen Reactions     No Known Drug Allergies        PAST MEDICAL HISTORY:  Past Medical History:   Diagnosis Date     Antiplatelet or antithrombotic long-term use      Atrial fibrillation (H)      Diarrhea      Diastolic murmur      QUESADA (dyspnea on exertion)      Gout      Hemorrhage of gastrointestinal tract, unspecified 63,65,and 1971    hospitalized     History of blood transfusion      Hyperlipidemia LDL goal <100 4/18/2012     Long-term (current) use of anticoagulants [Z79.01] 3/31/2016     Mumps      Palpitations      Physical deconditioning 8/9/2013     Scarlet fever      Spider veins      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     Unspecified disease of pancreas 1990    pancreatitis       PAST SURGICAL HISTORY:  Past Surgical History:   Procedure Laterality Date     ARTHROPLASTY KNEE  8/5/2013    Procedure: ARTHROPLASTY KNEE;  Left Total Knee Arthroplasty       C NONSPECIFIC PROCEDURE  Child    T&A     C NONSPECIFIC PROCEDURE   ; also     Colonoscopy     C NONSPECIFIC PROCEDURE      Basal cell cancer of the nose     C NONSPECIFIC PROCEDURE      Cholecystectomy     CARDIOVERSION  11    failed       FAMILY HISTORY:  Family History   Problem Relation Age of Onset     Alzheimer Disease Maternal Grandmother      Arthritis Maternal Grandmother      Cancer Mother         breast/ 1983/colon     Eye Disorder Mother         glaucoma and cataracts     Heart Disease Mother         angina/CABG     Hypertension Mother      Cancer Father         colon     Diabetes Maternal Aunt         AoDM       SOCIAL HISTORY:  Social History     Socioeconomic History     Marital status:      Spouse name: None     Number of children: None     Years of education: None     Highest education level: None   Occupational History     Occupation: Lab tech     Comment: Semi-retired   Social Needs     Financial resource strain: None     Food insecurity:     Worry: None     Inability: None     Transportation needs:     Medical: None     Non-medical: None   Tobacco Use     Smoking status: Never Smoker     Smokeless tobacco: Never Used   Substance and Sexual Activity     Alcohol use: Yes     Alcohol/week: 0.0 oz     Comment: 1 glass of wine every day     Drug use: No     Sexual activity: Never     Partners: Female   Lifestyle     Physical activity:     Days per week: None     Minutes per session: None     Stress: None   Relationships     Social connections:     Talks on phone: None     Gets together: None     Attends Religion service: None     Active member of club or organization: None     Attends meetings of clubs or organizations: None     Relationship status: None     Intimate partner violence:     Fear of current or ex partner: None     Emotionally abused: None     Physically abused: None     Forced sexual activity: None   Other Topics Concern      Service Not Asked     Blood Transfusions Not Asked     Caffeine Concern No     Comment: 14  oz per day     Occupational Exposure Not Asked     Hobby Hazards Not Asked     Sleep Concern No     Comment: sometimes - nocturia 4 times per night     Stress Concern Not Asked     Weight Concern Not Asked     Special Diet No     Back Care Not Asked     Exercise Yes     Comment: treadmill/walking 15-20 minutes 6 days per week     Bike Helmet Not Asked     Seat Belt Not Asked     Self-Exams Not Asked     Parent/sibling w/ CABG, MI or angioplasty before 65F 55M? Not Asked   Social History Narrative     None       Review of Systems:  Skin:  Negative       Eyes:  Positive for glasses    ENT:  Positive for hearing loss    Respiratory:  Positive for shortness of breath     Cardiovascular:    chest pain;Positive for    Gastroenterology: Negative      Genitourinary:         Musculoskeletal:         Neurologic:         Psychiatric:         Heme/Lymph/Imm:         Endocrine:           [id number]

## 2019-06-05 NOTE — LETTER
"6/5/2019      Joselito Armas MD  [address] [facility] [address]      RE: Louis Keller Jr.       Dear Colleague,    I had the pleasure of seeing Louis Keller Jr. in the [facility].    Service Date: 06/05/2019      CONSULTATION       HISTORY OF PRESENT ILLNESS:    It is my pleasure seeing Mr. Louis Keller, a delightful 86-year-old gentleman who has been referred by Dr. Herbert Gleason and Susanna Haque PA-C, for evaluation of near syncope.      Mr. Keller has the following cardiac/medical issues:   A.  Permanent atrial fibrillation.  Treated with rate control (diltiazem) and anticoagulation (warfarin).   B.  Diabetes mellitus type 2.   C.  Dyslipidemia.   D.  Irritable bowel syndrome with intermittent diarrhea.   E.  Mild aortic root dilatation (4.2 cm).     Mr. Keller had a near-syncopal episode in mid April.  He was sitting reading the paper when he felt a \"prickly warm sensation\" in his cheeks.  His vision grayed out and felt he would pass out, but he did not.  He fully and completely recovered.  He has not had other such events.  He has never had full syncope.      He is modestly active.  He tells me that up to 2 years ago, he used to walk 3-5 miles a day at a good clip.  He now can walk not even half of that distance and he has to walk at a much slower pace.      He is a nonsmoker.  Until recently, he drank 1 glass of wine per day.      PHYSICAL EXAMINATION:   VITAL SIGNS:  Blood pressure 122/56, pulse 68 and irregular, weight 83 kg, height 178 cm.   GENERAL:  He is a pleasant elderly male in no distress.  He is accompanied by his wife.   HEENT:  Head normocephalic, atraumatic.  Sclerae anicteric.  Mouth, oropharynx clear.   NECK:  Supple without carotid bruits.   LUNGS:  With mildly decreased breath sounds.  No crackles or wheezes.   CARDIOVASCULAR:  Normal JVP, irregularly irregular rhythm, no apparent gallop or murmur.   ABDOMEN:  Mildly obese, soft, " nontender.   EXTREMITIES:  No significant edema.   SKIN:  Few lesions of seborrheic keratosis, otherwise normal.    NEUROLOGIC:  Alert and oriented x3.   VASCULAR:  Has 2+ carotid and radial pulses bilaterally.      DIAGNOSTIC STUDIES:    Recent labs:  Sodium 137, potassium 4.0, creatinine 0.85.  Hematocrit 40%.      Most recent ECG from 05/30/2019 shows atrial fibrillation with controlled ventricular rate and slow R-wave progression in the precordium.      Echocardiogram in 08/2018 showed normal LVEF of 55%-60%, mild LVH, mild RV dilatation.  No significant valvular abnormalities.  Aortic root was dilated at 4.2 cm.      Nuclear stress test in 05/2019 showed normal perfusion.      A 30-day cardiac event monitor, attached after his near-syncopal event in April, was reviewed today.  It shows persistent atrial fibrillation with ventricular rates that ranged from the 40s up to the 170s.  Most bradycardic events occurred at nighttime and there were no pauses exceeding 3 seconds.  The patient had occasional PVCs and 3 runs of VT, longest of 14 beats.        IMPRESSION:    1. Near-syncopal episode while sitting.  I do agree with my colleagues that this event is highly suspicious for arrhythmic etiology, given its brief duration and occurrence in the sitting position.  My primary suspicion is for an episode of NSVT, given the 3 documented occurrences on his 30-day monitor.  Alternatively, slow atrial fibrillation with a pause may have caused the symptom.      The patient has normal LV function and had no ischemia or scar on his recent nuclear stress test.  The cause of VT is unclear in his case.      I have recommended a change from diltiazem to metoprolol and see if we can pharmacologically suppress the VT.  I would then repeat a cardiac monitor.      RECOMMENDATIONS:   A.  Stop diltiazem.   B.  Start metoprolol tartrate 25 mg b.i.d.   C.  One to two weeks later, hook up a 14-day Zio Patch monitor.  We will call the  patient with results as soon as they become available.  We will then plan the next step in his evaluation.      I do appreciate the opportunity to be involved in this delightful patient's care.  Please feel free to contact me with any questions or concerns.        LOLLY BARRETO MD, [facility]           cc:   Joselito Armas MD    [facility]    [address] [facility]   [place] [address]      [place] [address]             D: 2019   T: 2019   MT: BELGICA      Name:     KATHY LOPEZ   MRN:      [id number]        Account:      [id number]   :      1932           Service Date: 2019      Document: [id number]           Outpatient Encounter Medications as of 2019   Medication Sig Dispense Refill     Acetaminophen (TYLENOL PO) Take 1,000 mg by mouth 3 times daily       cholestyramine (QUESTRAN) 4 g packet TAKE 1 PACKET (4 G) BY MOUTH 2 TIMES DAILY (WITH MEALS) 180 packet 1     Chromium 1000 MCG TABS Take 200 mcg by mouth daily        Ferrous Gluconate 324 (37.5 Fe) MG TABS TAKE 1 TABLET (324 MG) BY MOUTH DAILY (WITH BREAKFAST) 100 tablet 0     fish oil-omega-3 fatty acids (FISH OIL) 1000 MG capsule Take 1 g by mouth 2 times daily        furosemide (LASIX) 20 MG tablet Take 1 tablet (20 mg) by mouth daily 90 tablet 2     glipiZIDE (GLUCOTROL XL) 10 MG 24 hr tablet TAKE 1 TABLET (10 MG) BY MOUTH 2 TIMES DAILY (Patient taking differently: daily TAKE 1 TABLET (10 MG) BY MOUTH 2 TIMES DAILY) 180 tablet 1     latanoprost (XALATAN) 0.005 % ophthalmic solution Place 1 drop Into the left eye daily       loperamide (IMODIUM A-D) 2 MG tablet daily  30 tablet 0     metFORMIN (GLUCOPHAGE) 500 MG tablet Take 1 tablet (500 mg) by mouth 2 times daily (with meals) 180 tablet 1     metoprolol tartrate (LOPRESSOR) 25 MG tablet Take 1 tablet (25 mg) by mouth 2 times daily 60 tablet 11     multivitamin, therapeutic (THERA-VIT)  TABS tablet Take 0.5 tablets by mouth 2 times daily       ONETOUCH ULTRA test strip USE TO TEST DAILY 100 strip 0     pioglitazone (ACTOS) 45 MG tablet Take 1 tablet (45 mg) by mouth daily 90 tablet 3     warfarin (COUMADIN) 5 MG tablet TAKE 1 TABLET BY MOUTH TUESDAY,THURS AND SUN. TAKE 1/2 TABLET ON MON,WED,FRI AND SAT. OR AS DIRECTED 65 tablet 2     ACE NOT PRESCRIBED, INTENTIONAL, 1 each daily ACE Inhibitor not prescribed due to Risk for drug interaction (Patient not taking: Reported on 5/30/2019) 0 each 0     ASPIRIN NOT PRESCRIBED, INTENTIONAL, by Other route continuous prn Reported on 3/7/2017       senna-docusate (SENOKOT-S;PERICOLACE) 8.6-50 MG per tablet Take 1 tablet by mouth 2 times daily as needed for constipation (Patient not taking: Reported on 4/26/2019) 100 tablet      STATIN NOT PRESCRIBED, INTENTIONAL, 1 each At Bedtime Statin not prescribed intentionally due to Risk for drug interaction (Patient not taking: Reported on 5/30/2019) 0 each 0     tamsulosin (FLOMAX) 0.4 MG capsule Take 1 capsule (0.4 mg) by mouth daily (Patient not taking: Reported on 4/26/2019) 30 capsule 11     [DISCONTINUED] diltiazem ER COATED BEADS (CARDIZEM CD/CARTIA XT) 120 MG 24 hr capsule Take 1 capsule (120 mg) by mouth every evening 90 capsule 3     No facility-administered encounter medications on file as of 6/5/2019.        Again, thank you for allowing me to participate in the care of your patient.      Sincerely,    Darling Pacheco MD     [facility]

## 2019-06-06 ENCOUNTER — ANTICOAGULATION THERAPY VISIT (OUTPATIENT)
Dept: NURSING | Facility: CLINIC | Age: 84
End: 2019-06-06
Payer: COMMERCIAL

## 2019-06-06 ENCOUNTER — DOCUMENTATION ONLY (OUTPATIENT)
Dept: CARDIOLOGY | Facility: CLINIC | Age: 84
End: 2019-06-06

## 2019-06-06 DIAGNOSIS — I48.20 CHRONIC ATRIAL FIBRILLATION (H): ICD-10-CM

## 2019-06-06 LAB — INR POINT OF CARE: 2.3 (ref 0.86–1.14)

## 2019-06-06 PROCEDURE — 36416 COLLJ CAPILLARY BLOOD SPEC: CPT

## 2019-06-06 PROCEDURE — 85610 PROTHROMBIN TIME: CPT | Mod: QW

## 2019-06-06 PROCEDURE — 99207 ZZC NO CHARGE NURSE ONLY: CPT

## 2019-06-06 NOTE — PROGRESS NOTES
ANTICOAGULATION FOLLOW-UP CLINIC VISIT    Patient Name:  Louis Keller Jr.  Date:  2019  Contact Type:  Face to Face    SUBJECTIVE:  Patient Findings     Positives:   Missed doses (Intentional hold on  due to supra INR.), Change in medications (Cardiologist discontinued Diltiazem and started pt on Metoprolol 25mg BID--effective ), Change in diet/appetite (Has eaten more salads in the past week, I encouraged consistency)    Comments:   Patient will wear Zio-patch monitor x 14 days, he will get it in about 2 weeks.        Clinical Outcomes     Negatives:   Major bleeding event, Thromboembolic event, Anticoagulation-related hospital admission, Anticoagulation-related ED visit, Anticoagulation-related fatality    Comments:   Patient will wear Zio-patch monitor x 14 days, he will get it in about 2 weeks.           OBJECTIVE    INR Protime   Date Value Ref Range Status   2019 2.3 (A) 0.86 - 1.14 Final       ASSESSMENT / PLAN  INR assessment THER    Recheck INR In: 2 WEEKS    INR Location Clinic      Anticoagulation Summary  As of 2019    INR goal:   2.0-3.0   TTR:   83.2 % (3.2 y)   INR used for dosin.3 (2019)   Warfarin maintenance plan:   5 mg (5 mg x 1) every Sun, Tue, Thu; 2.5 mg (5 mg x 0.5) all other days   Full warfarin instructions:   5 mg every Sun, Tue, Thu; 2.5 mg all other days   Weekly warfarin total:   25 mg   No change documented:   Hope Baumann RN   Plan last modified:   Monica Souza RN (2016)   Next INR check:   2019   Priority:   INR   Target end date:       Indications    Long-term (current) use of anticoagulants [Z79.01] [Z79.01]  Atrial fibrillation (H) [I48.91]             Anticoagulation Episode Summary     INR check location:       Preferred lab:       Send INR reminders to:   [facility]    Comments:           Anticoagulation Care Providers     Provider Role Specialty Phone number    Joselito Armas MD Responsible Internal Medicine  [phone]            See the Encounter Report to view Anticoagulation Flowsheet and Dosing Calendar (Go to Encounters tab in chart review, and find the Anticoagulation Therapy Visit)        Hope Baumann RN

## 2019-06-06 NOTE — PROGRESS NOTES
Event monitor end of summary report signed by Dr. Gleason, also reviewed by Dr. Pacheco yesterday at , sent to scan into Epic.   William OBRIEN

## 2019-06-17 ENCOUNTER — OFFICE VISIT (OUTPATIENT)
Dept: INTERNAL MEDICINE | Facility: CLINIC | Age: 84
End: 2019-06-17
Payer: COMMERCIAL

## 2019-06-17 VITALS
TEMPERATURE: 99.4 F | BODY MASS INDEX: 26.36 KG/M2 | SYSTOLIC BLOOD PRESSURE: 104 MMHG | RESPIRATION RATE: 16 BRPM | DIASTOLIC BLOOD PRESSURE: 64 MMHG | HEIGHT: 70 IN | WEIGHT: 184.1 LBS | OXYGEN SATURATION: 98 % | HEART RATE: 78 BPM

## 2019-06-17 DIAGNOSIS — E11.22 TYPE 2 DIABETES MELLITUS WITH STAGE 3 CHRONIC KIDNEY DISEASE, WITHOUT LONG-TERM CURRENT USE OF INSULIN (H): Primary | ICD-10-CM

## 2019-06-17 DIAGNOSIS — I48.20 CHRONIC ATRIAL FIBRILLATION (H): ICD-10-CM

## 2019-06-17 DIAGNOSIS — E78.5 HYPERLIPIDEMIA LDL GOAL <100: ICD-10-CM

## 2019-06-17 DIAGNOSIS — N18.30 TYPE 2 DIABETES MELLITUS WITH STAGE 3 CHRONIC KIDNEY DISEASE, WITHOUT LONG-TERM CURRENT USE OF INSULIN (H): Primary | ICD-10-CM

## 2019-06-17 LAB
ERYTHROCYTE [DISTWIDTH] IN BLOOD BY AUTOMATED COUNT: 14.7 % (ref 10–15)
HBA1C MFR BLD: 6.9 % (ref 0–5.6)
HCT VFR BLD AUTO: 38.6 % (ref 40–53)
HGB BLD-MCNC: 12.2 G/DL (ref 13.3–17.7)
MCH RBC QN AUTO: 30.7 PG (ref 26.5–33)
MCHC RBC AUTO-ENTMCNC: 31.6 G/DL (ref 31.5–36.5)
MCV RBC AUTO: 97 FL (ref 78–100)
PLATELET # BLD AUTO: 176 10E9/L (ref 150–450)
RBC # BLD AUTO: 3.98 10E12/L (ref 4.4–5.9)
WBC # BLD AUTO: 6.1 10E9/L (ref 4–11)

## 2019-06-17 PROCEDURE — 83036 HEMOGLOBIN GLYCOSYLATED A1C: CPT | Performed by: INTERNAL MEDICINE

## 2019-06-17 PROCEDURE — 85027 COMPLETE CBC AUTOMATED: CPT | Performed by: INTERNAL MEDICINE

## 2019-06-17 PROCEDURE — 99214 OFFICE O/P EST MOD 30 MIN: CPT | Performed by: INTERNAL MEDICINE

## 2019-06-17 PROCEDURE — 36415 COLL VENOUS BLD VENIPUNCTURE: CPT | Performed by: INTERNAL MEDICINE

## 2019-06-17 ASSESSMENT — MIFFLIN-ST. JEOR: SCORE: 1521.32

## 2019-06-17 NOTE — PROGRESS NOTES
Subjective     Louis Keller Jr. is a 86 year old male who presents to clinic today for the following health issues:    HPI   Diabetes Follow-up    Has H/O DM. On diet , exercise and oral treatment. Blood sugars are controlled. No parestesias. No hypoglycemias.        How often are you checking your blood sugar? One time daily    What time of day are you checking your blood sugars (select all that apply)?  Before meals    Have you had any blood sugars above 200?  No    Have you had any blood sugars below 70?  No    What symptoms do you notice when your blood sugar is low?  None    What concerns do you have today about your diabetes? None     Do you have any of these symptoms? (Select all that apply)  Numbness in feet, Redness, sores, or blisters on feet and Weight loss (redness), swollen left ankle     Have you had a diabetic eye exam in the last 12 months? Yes- Date of last eye exam: 11/2018    BP Readings from Last 2 Encounters:   06/17/19 104/64   06/05/19 122/56     Hemoglobin A1C (%)   Date Value   12/19/2018 7.3 (H)   06/22/2018 7.1 (H)     LDL Cholesterol Calculated (mg/dL)   Date Value   12/19/2018 52   12/27/2017 58       Diabetes Management Resources  Hyperlipidemia Follow-Up  Has H/O hyperlipidemia. On medical treatment and diet. No side effects. No muscle weakness, myalgias or upset stomach.       Are you having any of the following symptoms? (Select all that apply)  Shortness of breath and Pain in calves when walking 1-2 blocks-left knee replacement and hammer toe, bad right knee    Are you regularly taking any medication or supplement to lower your cholesterol?   No    Are you having muscle aches or other side effects that you think could be caused by your cholesterol lowering medication?  No        Amount of exercise or physical activity: 6-7 days/week for an average of 15-30 minutes    Problems taking medications regularly: No    Medication side effects: none    Diet: regular (no  restrictions)      PROBLEMS TO ADD ON...  Has history of atrial fibrillation. On anticoagulation with Coumadin and rate control medications. Asymptonatic - no chest pains , palpitations,  no side effects from medications.  Seeing cardiology, has an event monitor for assessment of arrhythmia, changed from Cardizem to Metoprolol treatment.   Has h/o right knee OA, has had increased pain with ambulation.     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     Anemia due to blood loss, acute     Physical deconditioning     Diabetes mellitus, type 2 (H)     Atrial fibrillation (H)     QUESADA (dyspnea on exertion)     Diastolic murmur     Pain of right thigh     Long-term (current) use of anticoagulants [Z79.01]     Knee effusion, right     Joint effusion of knee     Past Surgical History:   Procedure Laterality Date     ARTHROPLASTY KNEE  8/5/2013    Procedure: ARTHROPLASTY KNEE;  Left Total Knee Arthroplasty       C NONSPECIFIC PROCEDURE  Child    T&A     C NONSPECIFIC PROCEDURE  ; also 11/03    Colonoscopy     C NONSPECIFIC PROCEDURE      Basal cell cancer of the nose     C NONSPECIFIC PROCEDURE  1990    Cholecystectomy     CARDIOVERSION  9/6/11    failed       Social History     Tobacco Use     Smoking status: Never Smoker     Smokeless tobacco: Never Used   Substance Use Topics     Alcohol use: Yes     Alcohol/week: 0.0 oz     Comment: 1 glass of wine every day     Family History   Problem Relation Age of Onset     Alzheimer Disease Maternal Grandmother      Arthritis Maternal Grandmother      Cancer Mother          breast/ 1983/colon     Eye Disorder Mother         glaucoma and cataracts     Heart Disease Mother         angina/CABG     Hypertension Mother      Cancer Father         colon     Diabetes Maternal Aunt         AoDM     No Known Problems Daughter          Current Outpatient Medications   Medication Sig Dispense Refill     Acetaminophen (TYLENOL PO) Take 1,000 mg by mouth 3 times daily       cholestyramine (QUESTRAN) 4 g packet TAKE 1 PACKET (4 G) BY MOUTH 2 TIMES DAILY (WITH MEALS) 180 packet 1     Chromium 1000 MCG TABS Take 200 mcg by mouth daily        Ferrous Gluconate 324 (37.5 Fe) MG TABS TAKE 1 TABLET (324 MG) BY MOUTH DAILY (WITH BREAKFAST) 100 tablet 0     fish oil-omega-3 fatty acids (FISH OIL) 1000 MG capsule Take 1 g by mouth 2 times daily        furosemide (LASIX) 20 MG tablet Take 1 tablet (20 mg) by mouth daily 90 tablet 2     glipiZIDE (GLUCOTROL XL) 10 MG 24 hr tablet TAKE 1 TABLET (10 MG) BY MOUTH 2 TIMES DAILY (Patient taking differently: daily TAKE 1 TABLET (10 MG) BY MOUTH 2 TIMES DAILY) 180 tablet 1     latanoprost (XALATAN) 0.005 % ophthalmic solution Place 1 drop Into the left eye daily       loperamide (IMODIUM A-D) 2 MG tablet daily  30 tablet 0     metFORMIN (GLUCOPHAGE) 500 MG tablet Take 1 tablet (500 mg) by mouth 2 times daily (with meals) 180 tablet 1     metoprolol tartrate (LOPRESSOR) 25 MG tablet Take 1 tablet (25 mg) by mouth 2 times daily 60 tablet 11     multivitamin, therapeutic (THERA-VIT) TABS tablet Take 0.5 tablets by mouth 2 times daily       ONETOUCH ULTRA test strip USE TO TEST DAILY 100 strip 0     pioglitazone (ACTOS) 45 MG tablet Take 1 tablet (45 mg) by mouth daily 90 tablet 3     tamsulosin (FLOMAX) 0.4 MG capsule Take 1 capsule (0.4 mg) by mouth daily 30 capsule 11     warfarin (COUMADIN) 5 MG tablet TAKE 1 TABLET BY MOUTH TUESDAY,THURS AND SUN. TAKE 1/2 TABLET ON MON,WED,FRI AND SAT. OR AS DIRECTED 65 tablet 2     ACE NOT PRESCRIBED, INTENTIONAL, 1 each  "daily ACE Inhibitor not prescribed due to Risk for drug interaction (Patient not taking: Reported on 6/17/2019) 0 each 0     ASPIRIN NOT PRESCRIBED, INTENTIONAL, by Other route continuous prn Reported on 3/7/2017       senna-docusate (SENOKOT-S;PERICOLACE) 8.6-50 MG per tablet Take 1 tablet by mouth 2 times daily as needed for constipation (Patient not taking: Reported on 6/17/2019) 100 tablet      STATIN NOT PRESCRIBED, INTENTIONAL, 1 each At Bedtime Statin not prescribed intentionally due to Risk for drug interaction (Patient not taking: Reported on 6/17/2019) 0 each 0         Reviewed and updated as needed this visit by Provider         Review of Systems   ROS COMP: Constitutional, HEENT, cardiovascular, pulmonary, gi and gu systems are negative, except as otherwise noted.      Objective    /64 (BP Location: Left arm, Patient Position: Sitting, Cuff Size: Adult Large)   Pulse 78   Temp 99.4  F (37.4  C) (Oral)   Resp 16   Ht 1.778 m (5' 10\")   Wt 83.5 kg (184 lb 1.6 oz)   SpO2 98%   BMI 26.42 kg/m    Body mass index is 26.42 kg/m .  Physical Exam   GENERAL: healthy, alert and no distress  NECK: no adenopathy, no asymmetry, masses, or scars and thyroid normal to palpation  RESP: lungs clear to auscultation - no rales, rhonchi or wheezes  CV: irregular rate and rhythm, normal S1 S2, no S3 or S4, no murmur, click or rub, no peripheral edema and peripheral pulses strong  ABDOMEN: soft, nontender, no hepatosplenomegaly, no masses and bowel sounds normal  MS: no gross musculoskeletal defects noted, trace LE edema, right knee OA changes     Diagnostic Test Results:  Labs reviewed in Epic        Assessment & Plan   Problem List Items Addressed This Visit     Hyperlipidemia LDL goal <100    Diabetes mellitus, type 2 (H) - Primary    Relevant Orders    Hemoglobin A1c    Atrial fibrillation (H)    Relevant Orders    CBC with platelets         Assess lab work   Follow up with cardiology   Follow up with ortho " "for knee OA, possible steroid injection     BMI:   Estimated body mass index is 26.42 kg/m  as calculated from the following:    Height as of this encounter: 1.778 m (5' 10\").    Weight as of this encounter: 83.5 kg (184 lb 1.6 oz).           See Patient Instructions  Return in about 6 months (around 12/17/2019) for Physical Exam.    Joselito Armas MD  [facility]        "

## 2019-06-19 ENCOUNTER — HOSPITAL ENCOUNTER (OUTPATIENT)
Dept: CARDIOLOGY | Facility: CLINIC | Age: 84
Discharge: HOME | End: 2019-06-19
Attending: INTERNAL MEDICINE | Admitting: INTERNAL MEDICINE
Payer: COMMERCIAL

## 2019-06-19 DIAGNOSIS — D62 ANEMIA DUE TO BLOOD LOSS, ACUTE: ICD-10-CM

## 2019-06-19 DIAGNOSIS — R55 NEAR SYNCOPE: ICD-10-CM

## 2019-06-19 DIAGNOSIS — I48.20 CHRONIC A-FIB (H): ICD-10-CM

## 2019-06-19 PROCEDURE — 0298T ZIO PATCH HOLTER ADULT PEDIATRIC GREATER THAN 48 HRS: CPT | Performed by: INTERNAL MEDICINE

## 2019-06-19 PROCEDURE — 0296T ZIO PATCH HOLTER ADULT PEDIATRIC GREATER THAN 48 HRS: CPT

## 2019-06-19 NOTE — TELEPHONE ENCOUNTER
"Requested Prescriptions   Pending Prescriptions Disp Refills     Ferrous Gluconate 324 (37.5 Fe) MG TABS [Pharmacy Med Name: FERROUS GLUCONATE 324 MG TAB] 100 tablet 0     Sig: TAKE 1 TABLET (324 MG) BY MOUTH DAILY (WITH BREAKFAST)   Last Written Prescription Date:  03/12/2019  Last Fill Quantity: 100,  # refills: 0   Last office visit: 6/17/2019 with prescribing provider:     Future Office Visit:   Next 5 appointments (look out 90 days)    Sep 12, 2019  2:15 PM CDT  Return Visit with Herbert Gleason MD  [facility] ([facility]) [address]  [facility] [id number] [phone]           Iron Supplements Passed - 6/19/2019  9:56 AM        Passed - Patient is 12 years of age or older        Passed - Recent (12 mo) or future (30 days) visit within the authorizing provider's specialty     Patient had office visit in the last 12 months or has a visit in the next 30 days with authorizing provider or within the authorizing provider's specialty.  See \"Patient Info\" tab in inbasket, or \"Choose Columns\" in Meds & Orders section of the refill encounter.              Passed - Hgb OR Hct on record within the past 12 mos.     Patient need only have had a HGB or HCT on file in the past 12 mos. That result does not need to be normal.    Recent Labs   Lab Test 06/17/19  1145 06/22/18  1142 03/08/18   HGB 12.2* 13.1* 11.6*     Recent Labs   Lab Test 06/17/19  1145 06/22/18  1142 03/06/18  0624   HCT 38.6* 40.1 33.7*       Please verify a HGB or HCT has been checked SINCE THE LAST DOSE CHANGE.            Passed - Medication is active on med list        "

## 2019-06-20 ENCOUNTER — ANTICOAGULATION THERAPY VISIT (OUTPATIENT)
Dept: NURSING | Facility: CLINIC | Age: 84
End: 2019-06-20
Payer: COMMERCIAL

## 2019-06-20 DIAGNOSIS — I48.20 CHRONIC ATRIAL FIBRILLATION (H): ICD-10-CM

## 2019-06-20 DIAGNOSIS — Z79.01 LONG TERM CURRENT USE OF ANTICOAGULANT THERAPY: ICD-10-CM

## 2019-06-20 LAB — INR POINT OF CARE: 2.8 (ref 0.86–1.14)

## 2019-06-20 PROCEDURE — 36416 COLLJ CAPILLARY BLOOD SPEC: CPT

## 2019-06-20 PROCEDURE — 85610 PROTHROMBIN TIME: CPT | Mod: QW

## 2019-06-20 RX ORDER — FERROUS GLUCONATE 324(37.5)
TABLET ORAL
Qty: 100 TABLET | Refills: 0 | Status: SHIPPED | OUTPATIENT
Start: 2019-06-20 | End: 2019-09-21

## 2019-06-20 NOTE — PROGRESS NOTES
ANTICOAGULATION FOLLOW-UP CLINIC VISIT    Patient Name:  Louis Keller Jr.  Date:  2019  Contact Type:  Face to Face    SUBJECTIVE:  Patient Findings     Positives:   Change in health (Pt is feeling well, no more syncopal events.)    Comments:    Zio-patch monitor placed on , will wear x 14 days.        Clinical Outcomes     Negatives:   Major bleeding event, Thromboembolic event, Anticoagulation-related hospital admission, Anticoagulation-related ED visit, Anticoagulation-related fatality    Comments:    Zio-patch monitor placed on , will wear x 14 days.           OBJECTIVE    INR Protime   Date Value Ref Range Status   2019 2.8 (A) 0.86 - 1.14 Final       ASSESSMENT / PLAN  INR assessment THER    Recheck INR In: 3 WEEKS    INR Location Clinic      Anticoagulation Summary  As of 2019    INR goal:   2.0-3.0   TTR:   83.4 % (3.2 y)   INR used for dosin.8 (2019)   Warfarin maintenance plan:   5 mg (5 mg x 1) every Sun, Tue, Thu; 2.5 mg (5 mg x 0.5) all other days   Full warfarin instructions:   5 mg every Sun, Tue, Thu; 2.5 mg all other days   Weekly warfarin total:   25 mg   No change documented:   Hope Baumann RN   Plan last modified:   Monica Souza RN (2016)   Next INR check:   2019   Priority:   INR   Target end date:       Indications    Long-term (current) use of anticoagulants [Z79.01] [Z79.01]  Atrial fibrillation (H) [I48.91]             Anticoagulation Episode Summary     INR check location:       Preferred lab:       Send INR reminders to:   [facility]    Comments:           Anticoagulation Care Providers     Provider Role Specialty Phone number    Joselito Armas MD Responsible Internal Medicine [phone]            See the Encounter Report to view Anticoagulation Flowsheet and Dosing Calendar (Go to Encounters tab in chart review, and find the Anticoagulation Therapy Visit)        Hope Baumann RN

## 2019-07-02 DIAGNOSIS — L57.0 ACTINIC KERATOSIS: ICD-10-CM

## 2019-07-02 DIAGNOSIS — I48.20 CHRONIC ATRIAL FIBRILLATION (H): ICD-10-CM

## 2019-07-02 NOTE — TELEPHONE ENCOUNTER
Requested Prescriptions   Pending Prescriptions Disp Refills     glipiZIDE (GLUCOTROL XL) 10 MG 24 hr tablet [Pharmacy Med Name: GLIPIZIDE   ER 10 MG TABLET]    Last Written Prescription Date:  1/1/19  Last Fill Quantity: 180,  # refills: 1   Last office visit: 6/17/2019 with prescribing provider:  Chanda   Future Office Visit:   Next 5 appointments (look out 90 days)    Sep 12, 2019  2:15 PM CDT  Return Visit with Herbert Gleason MD  [facility] ([facility]) [id number] [place] [address] [address]  [facility] [id number] [phone]          180 tablet 1     Sig: TAKE 1 TABLET (10 MG) BY MOUTH 2 TIMES DAILY       Sulfonylurea Agents Passed - 7/2/2019  2:13 PM        Passed - Blood pressure less than 140/90 in past 6 months     BP Readings from Last 3 Encounters:   06/17/19 104/64   06/05/19 122/56   05/30/19 120/64                 Passed - Patient has documented LDL within the past 12 mos.     Recent Labs   Lab Test 12/19/18  0814   LDL 52             Passed - Patient has had a Microalbumin in the past 15 mos.     Recent Labs   Lab Test 12/19/18  0815   MICROL 30   UMALCR 41.08*             Passed - Patient has documented A1c within the specified period of time.     If HgbA1C is 8 or greater, it needs to be on file within the past 3 months.  If less than 8, must be on file within the past 6 months.     Recent Labs   Lab Test 06/17/19  1145   A1C 6.9*             Passed - Medication is active on med list        Passed - Patient is age 18 or older        Passed - Patient has a recent creatinine (normal) within the past 12 mos.     Recent Labs   Lab Test 05/16/19  1427  04/09/12  1526   CR 0.85   < >  --    CREAT  --   --  1.1    < > = values in this interval not displayed.             Passed - Recent (6 mo) or future (30 days) visit within the authorizing provider's specialty     Patient had office visit in the last 6 months or has a visit in the next 30 days with  "authorizing provider or within the authorizing provider's specialty.  See \"Patient Info\" tab in inbasket, or \"Choose Columns\" in Meds & Orders section of the refill encounter.              "

## 2019-07-05 RX ORDER — GLIPIZIDE 10 MG/1
TABLET, FILM COATED, EXTENDED RELEASE ORAL
Qty: 180 TABLET | Refills: 1 | Status: SHIPPED | OUTPATIENT
Start: 2019-07-05 | End: 2020-06-19

## 2019-07-11 ENCOUNTER — TELEPHONE (OUTPATIENT)
Dept: CARDIOLOGY | Facility: CLINIC | Age: 84
End: 2019-07-11

## 2019-07-11 DIAGNOSIS — I47.29 NSVT (NONSUSTAINED VENTRICULAR TACHYCARDIA) (H): ICD-10-CM

## 2019-07-11 DIAGNOSIS — R55 NEAR SYNCOPE: ICD-10-CM

## 2019-07-11 DIAGNOSIS — I49.9 SINUS NODE ARRHYTHMIA: Primary | ICD-10-CM

## 2019-07-11 NOTE — TELEPHONE ENCOUNTER
Darling Pacheco MD [facility] Heart Ep Nurse             Please see my recent note.   This monitor shows NSVT despite taking a BB.  No severe alvin-.   Has he had further episodes of syncope?   At this point it remains unclear why he fainted (we do have suspicion for arrhythmia but no definite explanation).   An ILR is reasonable for long-term monitoring.  Please call him and arrange KONSTANTIN f/up to discuss.   Thdunia, DI      Call to pt with above results and recommendations.  Pt understands and is agreeable to proceed.  He is wondering about driving, as this was discussed at the consult, Dr Pacheco waiting for the results of this test. Will ask. Order placed for OV with KONSTANTIN/ implant of Loop recorder. [email or web address]

## 2019-07-12 ENCOUNTER — ANTICOAGULATION THERAPY VISIT (OUTPATIENT)
Dept: NURSING | Facility: CLINIC | Age: 84
End: 2019-07-12
Payer: COMMERCIAL

## 2019-07-12 DIAGNOSIS — I48.20 CHRONIC ATRIAL FIBRILLATION (H): ICD-10-CM

## 2019-07-12 DIAGNOSIS — Z79.01 LONG TERM CURRENT USE OF ANTICOAGULANT THERAPY: ICD-10-CM

## 2019-07-12 LAB — INR POINT OF CARE: ABNORMAL (ref 0.86–1.14)

## 2019-07-12 PROCEDURE — 85610 PROTHROMBIN TIME: CPT | Mod: QW

## 2019-07-12 PROCEDURE — 36416 COLLJ CAPILLARY BLOOD SPEC: CPT

## 2019-07-12 PROCEDURE — 99207 ZZC NO CHARGE NURSE ONLY: CPT

## 2019-07-12 NOTE — TELEPHONE ENCOUNTER
It has been 3 months since his syncope.  Yes, he can drive, but would limit driving to short distances.  Avoid highways.  ERVIN

## 2019-07-12 NOTE — PROGRESS NOTES
ANTICOAGULATION FOLLOW-UP CLINIC VISIT    Patient Name:  Louis Keller Jr.  Date:  2019  Contact Type:  Face to Face    SUBJECTIVE:  Patient Findings     Positives:   Change in health (Scheduled to get cardiac Loop recorder on 19), Change in diet/appetite (No greens in the past week, pt will resume today and will try to eat 2-3 servings per week.), Bruising (4 small bruises on L forearm)    Comments:   Patient aware to go to ER if he develops any signs/symptoms of bleeding, vitamin K NOT given today.        Clinical Outcomes     Negatives:   Major bleeding event, Thromboembolic event, Anticoagulation-related hospital admission, Anticoagulation-related ED visit, Anticoagulation-related fatality    Comments:   Patient aware to go to ER if he develops any signs/symptoms of bleeding, vitamin K NOT given today.           OBJECTIVE    INR Protime   Date Value Ref Range Status   2019 5.4/5.4 (A) 0.86 - 1.14 Final       ASSESSMENT / PLAN  INR assessment SUPRA    Recheck INR In: 3 DAYS    INR Location Clinic    Vit K given? None      Anticoagulation Summary  As of 2019    INR goal:   2.0-3.0   TTR:   83.4 % (3.2 y)   INR used for dosin.4/5.4 (2019)   Warfarin maintenance plan:   5 mg (5 mg x 1) every Sun, Tue, Thu; 2.5 mg (5 mg x 0.5) all other days   Full warfarin instructions:   : Hold; : Hold; Otherwise 5 mg every Sun, Tue, Thu; 2.5 mg all other days   Weekly warfarin total:   25 mg   Plan last modified:   Monica Souza RN (2016)   Next INR check:   7/15/2019   Priority:   INR   Target end date:       Indications    Long-term (current) use of anticoagulants [Z79.01] [Z79.01]  Atrial fibrillation (H) [I48.91]             Anticoagulation Episode Summary     INR check location:       Preferred lab:       Send INR reminders to:   [facility]    Comments:           Anticoagulation Care Providers     Provider Role Specialty Phone number    Joselito Armas MD  [facility] Internal Medicine [phone]            See the Encounter Report to view Anticoagulation Flowsheet and Dosing Calendar (Go to Encounters tab in chart review, and find the Anticoagulation Therapy Visit)        Hope Baumann RN

## 2019-07-15 ENCOUNTER — ANTICOAGULATION THERAPY VISIT (OUTPATIENT)
Dept: NURSING | Facility: CLINIC | Age: 84
End: 2019-07-15
Payer: COMMERCIAL

## 2019-07-15 DIAGNOSIS — I48.20 CHRONIC ATRIAL FIBRILLATION (H): ICD-10-CM

## 2019-07-15 DIAGNOSIS — Z79.01 LONG TERM CURRENT USE OF ANTICOAGULANT THERAPY: ICD-10-CM

## 2019-07-15 LAB — INR POINT OF CARE: 2.3 (ref 0.86–1.14)

## 2019-07-15 PROCEDURE — 85610 PROTHROMBIN TIME: CPT | Mod: QW

## 2019-07-15 PROCEDURE — 36416 COLLJ CAPILLARY BLOOD SPEC: CPT

## 2019-07-15 PROCEDURE — 99207 ZZC NO CHARGE NURSE ONLY: CPT

## 2019-07-15 NOTE — PROGRESS NOTES
ANTICOAGULATION FOLLOW-UP CLINIC VISIT    Patient Name:  Louis Keller Jr.  Date:  7/15/2019  Contact Type:  Face to Face    SUBJECTIVE:  Patient Findings     Positives:   Change in health (Fell on  early a.m. in the bathroom. Pt denies hitting his head, he was using his cane.  2 small abrasions on R forearm.), Missed doses (Intentional hold on  and  due to supra INR.), Change in diet/appetite (3 salads since last INR visit on .)        Clinical Outcomes     Negatives:   Major bleeding event, Thromboembolic event, Anticoagulation-related hospital admission, Anticoagulation-related ED visit, Anticoagulation-related fatality           OBJECTIVE    INR Protime   Date Value Ref Range Status   07/15/2019 2.3 (A) 0.86 - 1.14 Final       ASSESSMENT / PLAN  INR assessment THER    Recheck INR In: 1 WEEK    INR Location Clinic      Anticoagulation Summary  As of 7/15/2019    INR goal:   2.0-3.0   TTR:   83.7 % (3.3 y)   INR used for dosin.3 (7/15/2019)   Warfarin maintenance plan:   5 mg (5 mg x 1) every Sun, Tue, Thu; 2.5 mg (5 mg x 0.5) all other days   Full warfarin instructions:   5 mg every Sun, Tue, Thu; 2.5 mg all other days   Weekly warfarin total:   25 mg   No change documented:   Hope Baumann, RN   Plan last modified:   Monica Souza, RN (2016)   Next INR check:   2019   Priority:   INR   Target end date:       Indications    Long-term (current) use of anticoagulants [Z79.01] [Z79.01]  Atrial fibrillation (H) [I48.91]             Anticoagulation Episode Summary     INR check location:       Preferred lab:       Send INR reminders to:   [facility]    Comments:           Anticoagulation Care Providers     Provider Role Specialty Phone number    Joselito Armas MD [facility] Internal Medicine [phone]            See the Encounter Report to view Anticoagulation Flowsheet and Dosing Calendar (Go to Encounters tab in chart review, and find the Anticoagulation  Therapy Visit)        Hope Baumann RN

## 2019-07-18 NOTE — PROGRESS NOTES
"HPI:  Louis Keller Jr. is a 87 year old male who presents for H&P for loop recorder implant.  He is a patient of Dr. Gleason.  His past medical history includes    1. Permanent atrial fibrillation.  Treated with rate control (metoprolol) and anticoagulation (warfarin).  2. NSVT.  Noted on zio patch. No BB.    3. Diabetes mellitus type 2.   4. Dyslipidemia.   5. Irritable bowel syndrome with intermittent diarrhea.   6. Mild aortic root dilatation (4.2 cm).     Diagnostics:  Zio patch (2019) revealed persistent atrial fibrillation average heart rate 77 bpm, 8 runs of VT longest 11 seconds average heart rate 134 bpm.  No symptoms recorded  Nuclear stress test (2019) revealed normal myocardial perfusion with no ischemia  Bilateral carotid ultrasound (2019) revealed normal flow with no significant stenosis.    He recently saw Dr. Pacheco in June 2019.  Patient had near syncopal episode in April 2019 where he was sitting reading the paper and felt a prickly warm sensation in his cheeks, his vision went gray and he felt like he would pass out however he did not. Dr. Pacheco felt that this was likely related to nonsustained VT as he has a history of this on his 30-day monitor.  However possibly could be slow atrial fibrillation with a pause.  It was recommended he stop his diltiazem and changed to metoprolol with the hope of suppressing NSVT.  He then wore a 14-day ZIO Patch    Today he presents with his wife stating proximally 5 days ago he fell in the bathroom at 4:00 in the morning.    Prior to his fall he had complaints of leg pain and denied dizziness, lightheadedness, shortness of breath chest discomfort.   Patient's leg pain is chronic as he \"needs a right knee replacement\".  He did not loose consciousness and did not sustain an injury.    Cardiovascularly his biggest complaint is shortness of breath while climbing stairs.  He does use his treadmill 15-20 minutes most days.  He has not had any other episodes as described " "in April and denies chest pain or pressure, syncope, angina, dyspnea at rest, palpitations, orthopnea, PND, abdominal pain.  He takes warfarin for anticoagulation and denies bleeding, hematuria, hematochezia, epistaxis and signs/symptoms of stroke     ASSESSMENT AND PLAN    Chronic atrial fibrillation.    In June 2019, patient's rate control medication changed from diltiazem to metoprolol  tartrate 25 mg twice daily.  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  Overall his INR is within goal.  However he does have 2 episodes of supra therapeutic INR.    Lightheadedness/dizziness/presyncope    Patient had an episode in April of feeling warm prickly sensation in his cheeks, decreased vision and feel like he was going to \"pass out\" but did not    Has not had a recurrent episode    Monitors have revealed episodes of ventricular tachycardia longest 11 seconds and no symptoms have been recorded    Today we discussed implantation of a loop recorder.  Procedural risks were discussed in detail and include such concerns as:  Internal bleeding, localized bleeding and bruising, infection (immediate and long-term) and side effects of sedation and local anesthetic. Patient voices understanding of the reasons for recommending loop recorder placement and denies any questions at this time. Procedural physician will sign consent.    Non sustained Ventricular tachycardia.    Noted on ZIO Patch to have 8 runs longest 11 seconds    Nuclear stress test negative for ischemia    Patient currently taking metoprolol at rate 25 mg twice daily      Patient expresses understanding and agreement with the plan.     I appreciate the chance to help with Louis Keller Jr. Please let me know if you have any questions or concerns.    Marsha Cedeno, APRN, CNP    This note was completed in " part using Dragon voice recognition software. Although reviewed after completion, some word and grammatical errors may occur.    No orders of the defined types were placed in this encounter.    No orders of the defined types were placed in this encounter.    Medications Discontinued During This Encounter   Medication Reason     glipiZIDE (GLUCOTROL XL) 10 MG 24 hr tablet Medication Reconciliation Clean Up         Encounter Diagnoses   Name Primary?     Sinus node arrhythmia      NSVT (nonsustained ventricular tachycardia) (H)      Near syncope        CURRENT MEDICATIONS:  Current Outpatient Medications   Medication Sig Dispense Refill     Acetaminophen (TYLENOL PO) Take 1,000 mg by mouth 3 times daily       cholestyramine (QUESTRAN) 4 g packet TAKE 1 PACKET (4 G) BY MOUTH 2 TIMES DAILY (WITH MEALS) 180 packet 1     Chromium 1000 MCG TABS Take 200 mcg by mouth daily        Ferrous Gluconate 324 (37.5 Fe) MG TABS TAKE 1 TABLET (324 MG) BY MOUTH DAILY (WITH BREAKFAST) 100 tablet 0     furosemide (LASIX) 20 MG tablet Take 1 tablet (20 mg) by mouth daily 90 tablet 2     glipiZIDE (GLUCOTROL XL) 10 MG 24 hr tablet TAKE 1 TABLET (10 MG) BY MOUTH 2 TIMES DAILY 180 tablet 1     latanoprost (XALATAN) 0.005 % ophthalmic solution Place 1 drop Into the left eye daily       loperamide (IMODIUM A-D) 2 MG tablet daily  30 tablet 0     metFORMIN (GLUCOPHAGE) 500 MG tablet Take 1 tablet (500 mg) by mouth 2 times daily (with meals) 180 tablet 1     metoprolol tartrate (LOPRESSOR) 25 MG tablet Take 1 tablet (25 mg) by mouth 2 times daily 60 tablet 11     multivitamin, therapeutic (THERA-VIT) TABS tablet Take 0.5 tablets by mouth 2 times daily       ONETOUCH ULTRA test strip USE TO TEST DAILY 100 strip 0     pioglitazone (ACTOS) 45 MG tablet Take 1 tablet (45 mg) by mouth daily 90 tablet 3     warfarin (COUMADIN) 5 MG tablet TAKE 1 TABLET BY MOUTH TUESDAY,THURS AND SUN. TAKE 1/2 TABLET ON MON,WED,FRI AND SAT. OR AS DIRECTED 65 tablet 2      ACE NOT PRESCRIBED, INTENTIONAL, 1 each daily ACE Inhibitor not prescribed due to Risk for drug interaction (Patient not taking: Reported on 6/17/2019) 0 each 0     ASPIRIN NOT PRESCRIBED, INTENTIONAL, by Other route continuous prn Reported on 3/7/2017       senna-docusate (SENOKOT-S;PERICOLACE) 8.6-50 MG per tablet Take 1 tablet by mouth 2 times daily as needed for constipation (Patient not taking: Reported on 6/17/2019) 100 tablet      STATIN NOT PRESCRIBED, INTENTIONAL, 1 each At Bedtime Statin not prescribed intentionally due to Risk for drug interaction (Patient not taking: Reported on 6/17/2019) 0 each 0     tamsulosin (FLOMAX) 0.4 MG capsule Take 1 capsule (0.4 mg) by mouth daily (Patient not taking: Reported on 7/19/2019) 30 capsule 11       ALLERGIES     Allergies   Allergen Reactions     No Known Drug Allergies        PAST MEDICAL HISTORY:  Past Medical History:   Diagnosis Date     Antiplatelet or antithrombotic long-term use      Atrial fibrillation (H)      Diarrhea      Diastolic murmur      QUESADA (dyspnea on exertion)      Gout      Hemorrhage of gastrointestinal tract, unspecified 63,65,and 1971    hospitalized     History of blood transfusion      Hyperlipidemia LDL goal <100 4/18/2012     Long-term (current) use of anticoagulants [Z79.01] 3/31/2016     Mumps      Palpitations      Physical deconditioning 8/9/2013     Scarlet fever      Spider veins      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     Unspecified disease of pancreas 1990    pancreatitis       PAST SURGICAL HISTORY:  Past Surgical History:   Procedure Laterality Date     ARTHROPLASTY KNEE  8/5/2013    Procedure: ARTHROPLASTY KNEE;  Left Total Knee Arthroplasty       C NONSPECIFIC PROCEDURE  Child    T&A     C NONSPECIFIC PROCEDURE  ; also 11/03    Colonoscopy     C NONSPECIFIC PROCEDURE      Basal cell cancer of the nose     C NONSPECIFIC PROCEDURE  1990    Cholecystectomy     CARDIOVERSION  9/6/11    failed        FAMILY HISTORY:  Family History   Problem Relation Age of Onset     Alzheimer Disease Maternal Grandmother      Arthritis Maternal Grandmother      Cancer Mother         breast/ 1983/colon     Eye Disorder Mother         glaucoma and cataracts     Heart Disease Mother         angina/CABG     Hypertension Mother      Cancer Father         colon     Diabetes Maternal Aunt         AoDM     No Known Problems Daughter        SOCIAL HISTORY:  Social History     Socioeconomic History     Marital status:      Spouse name: None     Number of children: None     Years of education: None     Highest education level: None   Occupational History     Occupation:      Comment: Semi-retired   Social Needs     Financial resource strain: None     Food insecurity:     Worry: None     Inability: None     Transportation needs:     Medical: None     Non-medical: None   Tobacco Use     Smoking status: Never Smoker     Smokeless tobacco: Never Used   Substance and Sexual Activity     Alcohol use: Yes     Alcohol/week: 0.0 oz     Comment: 1 glass of wine every day     Drug use: No     Sexual activity: Never     Partners: Female   Lifestyle     Physical activity:     Days per week: None     Minutes per session: None     Stress: None   Relationships     Social connections:     Talks on phone: None     Gets together: None     Attends Mandaen service: None     Active member of club or organization: None     Attends meetings of clubs or organizations: None     Relationship status: None     Intimate partner violence:     Fear of current or ex partner: None     Emotionally abused: None     Physically abused: None     Forced sexual activity: None   Other Topics Concern      Service Not Asked     Blood Transfusions Not Asked     Caffeine Concern No     Comment: 14 oz per day     Occupational Exposure Not Asked     Hobby Hazards Not Asked     Sleep Concern No     Comment: sometimes - nocturia 4 times per night  "    Stress Concern Not Asked     Weight Concern Not Asked     Special Diet No     Back Care Not Asked     Exercise Yes     Comment: treadmill/walking 15-20 minutes 6 days per week     Bike Helmet Not Asked     Seat Belt Not Asked     Self-Exams Not Asked     Parent/sibling w/ CABG, MI or angioplasty before 65F 55M? Not Asked   Social History Narrative     None       Review of Systems:  Skin:  Negative     Eyes:  Positive for glasses  ENT:  Positive for hearing loss  Respiratory:  Positive for shortness of breath;dyspnea on exertion  Cardiovascular:    Positive for;edema  Gastroenterology: Negative    Genitourinary:  Positive for nocturia;urinary frequency  Musculoskeletal:  Positive for arthritis;joint pain  Neurologic:  Negative    Psychiatric:  Negative    Heme/Lymph/Imm:  Positive for easy bruising  Endocrine:  Positive for diabetes    Physical Exam:  Vitals: /68 (BP Location: Right arm, Patient Position: Sitting, Cuff Size: Adult Regular)   Pulse 88   Ht 1.778 m (5' 10\")   Wt 84.1 kg (185 lb 8 oz)   BMI 26.62 kg/m      Constitutional:  cooperative, alert and oriented, well developed, well nourished, in no acute distress thin      Skin:  warm and dry to the touch, no apparent skin lesions or masses noted        Head:  normocephalic, no masses or lesions        Eyes:  pupils equal and round;sclera white        ENT:  no pallor or cyanosis, dentition good        Neck:  JVP normal;no carotid bruit        Chest:  normal breath sounds, clear to auscultation, normal A-P diameter, normal symmetry, normal respiratory excursion, no use of accessory muscles        Cardiac:   irregularly irregular rhythm                Abdomen:  abdomen soft        Vascular: pulses full and equal     right radial artery;2+             left radial artery;2+                  Extremities and Back:  no deformities, clubbing, cyanosis, erythema observed stasis pigmentation      Neurological:  no gross motor deficits;affect appropriate   "        Recent Lab Results:  LIPID RESULTS:  Lab Results   Component Value Date    CHOL 128 12/19/2018    HDL 58 12/19/2018    LDL 52 12/19/2018    TRIG 91 12/19/2018    CHOLHDLRATIO 2.0 03/20/2015       LIVER ENZYME RESULTS:  Lab Results   Component Value Date    AST 32 12/19/2018    ALT 29 12/19/2018       CBC RESULTS:  Lab Results   Component Value Date    WBC 6.1 06/17/2019    RBC 3.98 (L) 06/17/2019    HGB 12.2 (L) 06/17/2019    HCT 38.6 (L) 06/17/2019    MCV 97 06/17/2019    MCH 30.7 06/17/2019    MCHC 31.6 06/17/2019    RDW 14.7 06/17/2019     06/17/2019       BMP RESULTS:  Lab Results   Component Value Date     05/16/2019    POTASSIUM 4.0 05/16/2019    CHLORIDE 104 05/16/2019    CO2 28 05/16/2019    ANIONGAP 5 05/16/2019     (H) 05/16/2019    BUN 17 05/16/2019    CR 0.85 05/16/2019    GFRESTIMATED 78 05/16/2019    GFRESTBLACK >90 05/16/2019    MICHAEL 9.0 05/16/2019        A1C RESULTS:  Lab Results   Component Value Date    A1C 6.9 (H) 06/17/2019       INR RESULTS:  Lab Results   Component Value Date    INR 2.3 (A) 07/15/2019    INR 5.4/5.4 (A) 07/12/2019    INR 2.54 (H) 03/06/2018    INR 3.06 (H) 03/05/2018           Darling Pacheco MD  [id number]  [place] [id number]

## 2019-07-19 ENCOUNTER — OFFICE VISIT (OUTPATIENT)
Dept: CARDIOLOGY | Facility: CLINIC | Age: 84
End: 2019-07-19
Attending: INTERNAL MEDICINE
Payer: COMMERCIAL

## 2019-07-19 VITALS
DIASTOLIC BLOOD PRESSURE: 68 MMHG | WEIGHT: 185.5 LBS | BODY MASS INDEX: 26.56 KG/M2 | HEART RATE: 88 BPM | HEIGHT: 70 IN | SYSTOLIC BLOOD PRESSURE: 118 MMHG

## 2019-07-19 DIAGNOSIS — I49.9 SINUS NODE ARRHYTHMIA: ICD-10-CM

## 2019-07-19 DIAGNOSIS — I47.29 NSVT (NONSUSTAINED VENTRICULAR TACHYCARDIA) (H): ICD-10-CM

## 2019-07-19 DIAGNOSIS — R55 NEAR SYNCOPE: ICD-10-CM

## 2019-07-19 PROCEDURE — 99214 OFFICE O/P EST MOD 30 MIN: CPT | Performed by: NURSE PRACTITIONER

## 2019-07-19 ASSESSMENT — MIFFLIN-ST. JEOR: SCORE: 1522.67

## 2019-07-19 NOTE — LETTER
"7/19/2019    Joselito Armas MD  [address] [facility] [address]    RE: Louis Keller        Dear Colleague,    I had the pleasure of seeing Louis Keller Jr. in the [facility].    HPI:  Louis Keller Jr. is a 87 year old male who presents for H&P for loop recorder implant.  He is a patient of Dr. Gleason.  His past medical history includes    1. Permanent atrial fibrillation.  Treated with rate control (metoprolol) and anticoagulation (warfarin).  2. NSVT.  Noted on zio patch. No BB.    3. Diabetes mellitus type 2.   4. Dyslipidemia.   5. Irritable bowel syndrome with intermittent diarrhea.   6. Mild aortic root dilatation (4.2 cm).     Diagnostics:  Zio patch (2019) revealed persistent atrial fibrillation average heart rate 77 bpm, 8 runs of VT longest 11 seconds average heart rate 134 bpm.  No symptoms recorded  Nuclear stress test (2019) revealed normal myocardial perfusion with no ischemia  Bilateral carotid ultrasound (2019) revealed normal flow with no significant stenosis.    He recently saw Dr. Pacheco in June 2019.  Patient had near syncopal episode in April 2019 where he was sitting reading the paper and felt a prickly warm sensation in his cheeks, his vision went gray and he felt like he would pass out however he did not. Dr. Pacheco felt that this was likely related to nonsustained VT as he has a history of this on his 30-day monitor.  However possibly could be slow atrial fibrillation with a pause.  It was recommended he stop his diltiazem and changed to metoprolol with the hope of suppressing NSVT.  He then wore a 14-day ZIO Patch    Today he presents with his wife stating proximally 5 days ago he fell in the bathroom at 4:00 in the morning.    Prior to his fall he had complaints of leg pain and denied dizziness, lightheadedness, shortness of breath chest discomfort.   Patient's leg pain is chronic as he \"needs a right knee replacement\".  He did not " "loose consciousness and did not sustain an injury.    Cardiovascularly his biggest complaint is shortness of breath while climbing stairs.  He does use his treadmill 15-20 minutes most days.  He has not had any other episodes as described in April and denies chest pain or pressure, syncope, angina, dyspnea at rest, palpitations, orthopnea, PND, abdominal pain.  He takes warfarin for anticoagulation and denies bleeding, hematuria, hematochezia, epistaxis and signs/symptoms of stroke     ASSESSMENT AND PLAN    Chronic atrial fibrillation.    In June 2019, patient's rate control medication changed from diltiazem to metoprolol  tartrate 25 mg twice daily.  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  Overall his INR is within goal.  However he does have 2 episodes of supra therapeutic INR.    Lightheadedness/dizziness/presyncope    Patient had an episode in April of feeling warm prickly sensation in his cheeks, decreased vision and feel like he was going to \"pass out\" but did not    Has not had a recurrent episode    Monitors have revealed episodes of ventricular tachycardia longest 11 seconds and no symptoms have been recorded    Today we discussed implantation of a loop recorder.  Procedural risks were discussed in detail and include such concerns as:  Internal bleeding, localized bleeding and bruising, infection (immediate and long-term) and side effects of sedation and local anesthetic. Patient voices understanding of the reasons for recommending loop recorder placement and denies any questions at this time. Procedural physician will sign consent.    Non sustained Ventricular tachycardia.    Noted on ZIO Patch to have 8 runs longest 11 seconds    Nuclear stress test negative for ischemia    Patient currently taking metoprolol at rate 25 mg twice " daily      Patient expresses understanding and agreement with the plan.     I appreciate the chance to help with Louis Keller Jr. Please let me know if you have any questions or concerns.    MARTHA Rice, CNP    This note was completed in part using Dragon voice recognition software. Although reviewed after completion, some word and grammatical errors may occur.    No orders of the defined types were placed in this encounter.    No orders of the defined types were placed in this encounter.    Medications Discontinued During This Encounter   Medication Reason     glipiZIDE (GLUCOTROL XL) 10 MG 24 hr tablet Medication Reconciliation Clean Up         Encounter Diagnoses   Name Primary?     Sinus node arrhythmia      NSVT (nonsustained ventricular tachycardia) (H)      Near syncope        CURRENT MEDICATIONS:  Current Outpatient Medications   Medication Sig Dispense Refill     Acetaminophen (TYLENOL PO) Take 1,000 mg by mouth 3 times daily       cholestyramine (QUESTRAN) 4 g packet TAKE 1 PACKET (4 G) BY MOUTH 2 TIMES DAILY (WITH MEALS) 180 packet 1     Chromium 1000 MCG TABS Take 200 mcg by mouth daily        Ferrous Gluconate 324 (37.5 Fe) MG TABS TAKE 1 TABLET (324 MG) BY MOUTH DAILY (WITH BREAKFAST) 100 tablet 0     furosemide (LASIX) 20 MG tablet Take 1 tablet (20 mg) by mouth daily 90 tablet 2     glipiZIDE (GLUCOTROL XL) 10 MG 24 hr tablet TAKE 1 TABLET (10 MG) BY MOUTH 2 TIMES DAILY 180 tablet 1     latanoprost (XALATAN) 0.005 % ophthalmic solution Place 1 drop Into the left eye daily       loperamide (IMODIUM A-D) 2 MG tablet daily  30 tablet 0     metFORMIN (GLUCOPHAGE) 500 MG tablet Take 1 tablet (500 mg) by mouth 2 times daily (with meals) 180 tablet 1     metoprolol tartrate (LOPRESSOR) 25 MG tablet Take 1 tablet (25 mg) by mouth 2 times daily 60 tablet 11     multivitamin, therapeutic (THERA-VIT) TABS tablet Take 0.5 tablets by mouth 2 times daily       ONETOUCH ULTRA test strip USE TO TEST  DAILY 100 strip 0     pioglitazone (ACTOS) 45 MG tablet Take 1 tablet (45 mg) by mouth daily 90 tablet 3     warfarin (COUMADIN) 5 MG tablet TAKE 1 TABLET BY MOUTH TUESDAY,THURS AND SUN. TAKE 1/2 TABLET ON MON,WED,FRI AND SAT. OR AS DIRECTED 65 tablet 2     ACE NOT PRESCRIBED, INTENTIONAL, 1 each daily ACE Inhibitor not prescribed due to Risk for drug interaction (Patient not taking: Reported on 6/17/2019) 0 each 0     ASPIRIN NOT PRESCRIBED, INTENTIONAL, by Other route continuous prn Reported on 3/7/2017       senna-docusate (SENOKOT-S;PERICOLACE) 8.6-50 MG per tablet Take 1 tablet by mouth 2 times daily as needed for constipation (Patient not taking: Reported on 6/17/2019) 100 tablet      STATIN NOT PRESCRIBED, INTENTIONAL, 1 each At Bedtime Statin not prescribed intentionally due to Risk for drug interaction (Patient not taking: Reported on 6/17/2019) 0 each 0     tamsulosin (FLOMAX) 0.4 MG capsule Take 1 capsule (0.4 mg) by mouth daily (Patient not taking: Reported on 7/19/2019) 30 capsule 11       ALLERGIES     Allergies   Allergen Reactions     No Known Drug Allergies        PAST MEDICAL HISTORY:  Past Medical History:   Diagnosis Date     Antiplatelet or antithrombotic long-term use      Atrial fibrillation (H)      Diarrhea      Diastolic murmur      QUESADA (dyspnea on exertion)      Gout      Hemorrhage of gastrointestinal tract, unspecified 63,65,and 1971    hospitalized     History of blood transfusion      Hyperlipidemia LDL goal <100 4/18/2012     Long-term (current) use of anticoagulants [Z79.01] 3/31/2016     Mumps      Palpitations      Physical deconditioning 8/9/2013     Scarlet fever      Spider veins      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     Unspecified disease of pancreas 1990    pancreatitis       PAST SURGICAL HISTORY:  Past Surgical History:   Procedure Laterality Date     ARTHROPLASTY KNEE  8/5/2013    Procedure: ARTHROPLASTY KNEE;  Left Total Knee Arthroplasty        C NONSPECIFIC PROCEDURE  Child    T&A     C NONSPECIFIC PROCEDURE  ; also     Colonoscopy     C NONSPECIFIC PROCEDURE      Basal cell cancer of the nose     C NONSPECIFIC PROCEDURE      Cholecystectomy     CARDIOVERSION  11    failed       FAMILY HISTORY:  Family History   Problem Relation Age of Onset     Alzheimer Disease Maternal Grandmother      Arthritis Maternal Grandmother      Cancer Mother         breast/ /colon     Eye Disorder Mother         glaucoma and cataracts     Heart Disease Mother         angina/CABG     Hypertension Mother      Cancer Father         colon     Diabetes Maternal Aunt         AoDM     No Known Problems Daughter        SOCIAL HISTORY:  Social History     Socioeconomic History     Marital status:      Spouse name: None     Number of children: None     Years of education: None     Highest education level: None   Occupational History     Occupation: Lab tech     Comment: Semi-retired   Social Needs     Financial resource strain: None     Food insecurity:     Worry: None     Inability: None     Transportation needs:     Medical: None     Non-medical: None   Tobacco Use     Smoking status: Never Smoker     Smokeless tobacco: Never Used   Substance and Sexual Activity     Alcohol use: Yes     Alcohol/week: 0.0 oz     Comment: 1 glass of wine every day     Drug use: No     Sexual activity: Never     Partners: Female   Lifestyle     Physical activity:     Days per week: None     Minutes per session: None     Stress: None   Relationships     Social connections:     Talks on phone: None     Gets together: None     Attends Tenriism service: None     Active member of club or organization: None     Attends meetings of clubs or organizations: None     Relationship status: None     Intimate partner violence:     Fear of current or ex partner: None     Emotionally abused: None     Physically abused: None     Forced sexual activity: None   Other Topics Concern      " Service Not Asked     Blood Transfusions Not Asked     Caffeine Concern No     Comment: 14 oz per day     Occupational Exposure Not Asked     Hobby Hazards Not Asked     Sleep Concern No     Comment: sometimes - nocturia 4 times per night     Stress Concern Not Asked     Weight Concern Not Asked     Special Diet No     Back Care Not Asked     Exercise Yes     Comment: treadmill/walking 15-20 minutes 6 days per week     Bike Helmet Not Asked     Seat Belt Not Asked     Self-Exams Not Asked     Parent/sibling w/ CABG, MI or angioplasty before 65F 55M? Not Asked   Social History Narrative     None       Review of Systems:  Skin:  Negative     Eyes:  Positive for glasses  ENT:  Positive for hearing loss  Respiratory:  Positive for shortness of breath;dyspnea on exertion  Cardiovascular:    Positive for;edema  Gastroenterology: Negative    Genitourinary:  Positive for nocturia;urinary frequency  Musculoskeletal:  Positive for arthritis;joint pain  Neurologic:  Negative    Psychiatric:  Negative    Heme/Lymph/Imm:  Positive for easy bruising  Endocrine:  Positive for diabetes    Physical Exam:  Vitals: /68 (BP Location: Right arm, Patient Position: Sitting, Cuff Size: Adult Regular)   Pulse 88   Ht 1.778 m (5' 10\")   Wt 84.1 kg (185 lb 8 oz)   BMI 26.62 kg/m       Constitutional:  cooperative, alert and oriented, well developed, well nourished, in no acute distress thin      Skin:  warm and dry to the touch, no apparent skin lesions or masses noted        Head:  normocephalic, no masses or lesions        Eyes:  pupils equal and round;sclera white        ENT:  no pallor or cyanosis, dentition good        Neck:  JVP normal;no carotid bruit        Chest:  normal breath sounds, clear to auscultation, normal A-P diameter, normal symmetry, normal respiratory excursion, no use of accessory muscles        Cardiac:   irregularly irregular rhythm                Abdomen:  abdomen soft        Vascular: pulses full " and equal     right radial artery;2+             left radial artery;2+                  Extremities and Back:  no deformities, clubbing, cyanosis, erythema observed stasis pigmentation      Neurological:  no gross motor deficits;affect appropriate          Recent Lab Results:  LIPID RESULTS:  Lab Results   Component Value Date    CHOL 128 12/19/2018    HDL 58 12/19/2018    LDL 52 12/19/2018    TRIG 91 12/19/2018    CHOLHDLRATIO 2.0 03/20/2015       LIVER ENZYME RESULTS:  Lab Results   Component Value Date    AST 32 12/19/2018    ALT 29 12/19/2018       CBC RESULTS:  Lab Results   Component Value Date    WBC 6.1 06/17/2019    RBC 3.98 (L) 06/17/2019    HGB 12.2 (L) 06/17/2019    HCT 38.6 (L) 06/17/2019    MCV 97 06/17/2019    MCH 30.7 06/17/2019    MCHC 31.6 06/17/2019    RDW 14.7 06/17/2019     06/17/2019       BMP RESULTS:  Lab Results   Component Value Date     05/16/2019    POTASSIUM 4.0 05/16/2019    CHLORIDE 104 05/16/2019    CO2 28 05/16/2019    ANIONGAP 5 05/16/2019     (H) 05/16/2019    BUN 17 05/16/2019    CR 0.85 05/16/2019    GFRESTIMATED 78 05/16/2019    GFRESTBLACK >90 05/16/2019    MICHAEL 9.0 05/16/2019        A1C RESULTS:  Lab Results   Component Value Date    A1C 6.9 (H) 06/17/2019       INR RESULTS:  Lab Results   Component Value Date    INR 2.3 (A) 07/15/2019    INR 5.4/5.4 (A) 07/12/2019    INR 2.54 (H) 03/06/2018    INR 3.06 (H) 03/05/2018           Darling Pacheco MD  [address]  [place] [address]                Thank you for allowing me to participate in the care of your patient.    Sincerely,     MARTHA Giles [facility]

## 2019-07-22 ENCOUNTER — ANTICOAGULATION THERAPY VISIT (OUTPATIENT)
Dept: NURSING | Facility: CLINIC | Age: 84
End: 2019-07-22
Payer: COMMERCIAL

## 2019-07-22 DIAGNOSIS — Z79.01 LONG TERM CURRENT USE OF ANTICOAGULANT THERAPY: ICD-10-CM

## 2019-07-22 DIAGNOSIS — I48.20 CHRONIC ATRIAL FIBRILLATION (H): ICD-10-CM

## 2019-07-22 LAB — INR POINT OF CARE: 3.3 (ref 0.86–1.14)

## 2019-07-22 PROCEDURE — 36416 COLLJ CAPILLARY BLOOD SPEC: CPT

## 2019-07-22 PROCEDURE — 99207 ZZC NO CHARGE NURSE ONLY: CPT

## 2019-07-22 PROCEDURE — 85610 PROTHROMBIN TIME: CPT | Mod: QW

## 2019-07-22 NOTE — PROGRESS NOTES
ANTICOAGULATION FOLLOW-UP CLINIC VISIT    Patient Name:  Louis Keller Jr.  Date:  7/22/2019  Contact Type:  Face to Face    SUBJECTIVE:  Patient Findings     Positives:   Upcoming invasive procedure (Cardiac Loop recorder scheduled to be placed on 08/05/19, pt informed me that he does NOT have to hold Warfarin prior to procedure.), Change in diet/appetite (Inconsistent greens, I encouraged pt to eat 3 small servings per week.)        Clinical Outcomes     Negatives:   Major bleeding event, Thromboembolic event, Anticoagulation-related hospital admission, Anticoagulation-related ED visit, Anticoagulation-related fatality           OBJECTIVE    INR Protime   Date Value Ref Range Status   07/22/2019 3.3 (A) 0.86 - 1.14 Final       ASSESSMENT / PLAN  INR assessment SUPRA    Recheck INR In: 10 DAYS    INR Location Clinic      Anticoagulation Summary  As of 7/22/2019    INR goal:   2.0-3.0   TTR:   83.7 % (3.3 y)   INR used for dosing:   3.3! (7/22/2019)   Warfarin maintenance plan:   5 mg (5 mg x 1) every Sun, Tue, Thu; 2.5 mg (5 mg x 0.5) all other days   Full warfarin instructions:   5 mg every Sun, Tue, Thu; 2.5 mg all other days   Weekly warfarin total:   25 mg   No change documented:   Hope Baumann, RN   Plan last modified:   Monica Souza, RN (5/12/2016)   Next INR check:   8/1/2019   Priority:   INR   Target end date:       Indications    Long-term (current) use of anticoagulants [Z79.01] [Z79.01]  Atrial fibrillation (H) [I48.91]             Anticoagulation Episode Summary     INR check location:       Preferred lab:       Send INR reminders to:   [facility]    Comments:           Anticoagulation Care Providers     Provider Role Specialty Phone number    Joselito Armas MD Responsible Internal Medicine [phone]            See the Encounter Report to view Anticoagulation Flowsheet and Dosing Calendar (Go to Encounters tab in chart review, and find the Anticoagulation Therapy  Visit)        Hope Baumann RN

## 2019-07-29 ENCOUNTER — TRANSFERRED RECORDS (OUTPATIENT)
Dept: HEALTH INFORMATION MANAGEMENT | Facility: CLINIC | Age: 84
End: 2019-07-29

## 2019-07-31 RX ORDER — CEFAZOLIN SODIUM 2 G/100ML
2 INJECTION, SOLUTION INTRAVENOUS
Status: CANCELLED | OUTPATIENT
Start: 2019-07-31

## 2019-07-31 RX ORDER — LIDOCAINE 40 MG/G
CREAM TOPICAL
Status: CANCELLED | OUTPATIENT
Start: 2019-07-31

## 2019-07-31 RX ORDER — SODIUM CHLORIDE 450 MG/100ML
INJECTION, SOLUTION INTRAVENOUS CONTINUOUS
Status: CANCELLED | OUTPATIENT
Start: 2019-07-31

## 2019-07-31 NOTE — PROGRESS NOTES
Called patient with pre-procedure instructions for device implant:     Anticoagulation: Pt instructed to hold Warfarin on Saturday, 8/3/19.  Okay to resume it on 8/4/19 per Dr. Pacheco.   Oral diabetes meds: Pt instructed to hold his Glipizide, Gluchophage, and Actos the morning of the procedure.  Insulin: N/A  Diuretic: Pt instructed to hold his lasix the morning of the procedure.  Contrast allergy: N/A    Pt informed to be NPO at midnight.  Pt may have clear liquid breakfast before 8am.    Instructed pt to shower the morning of the procedure, and then put on a clean shirt in order to help prevent infection.     Pt has post-procedure transportation set up.   Pt aware of no driving for 24 hours post procedure due to sedation.     Pt aware of arrival time of 1200 and location. Pt verbalized understanding of instructions. Device Nurse phone number provided for patient to call if he has any questions.     CAMILLE Jack

## 2019-08-01 ENCOUNTER — ANTICOAGULATION THERAPY VISIT (OUTPATIENT)
Dept: NURSING | Facility: CLINIC | Age: 84
End: 2019-08-01
Payer: COMMERCIAL

## 2019-08-01 DIAGNOSIS — Z79.01 LONG TERM CURRENT USE OF ANTICOAGULANT THERAPY: ICD-10-CM

## 2019-08-01 DIAGNOSIS — I48.20 CHRONIC ATRIAL FIBRILLATION (H): ICD-10-CM

## 2019-08-01 LAB — INR POINT OF CARE: 5.6 (ref 0.86–1.14)

## 2019-08-01 PROCEDURE — 85610 PROTHROMBIN TIME: CPT | Mod: QW

## 2019-08-01 PROCEDURE — 36416 COLLJ CAPILLARY BLOOD SPEC: CPT

## 2019-08-01 PROCEDURE — 99207 ZZC NO CHARGE NURSE ONLY: CPT

## 2019-08-01 NOTE — PROGRESS NOTES
"ANTICOAGULATION FOLLOW-UP CLINIC VISIT    Patient Name:  Louis Keller Jr.  Date:  2019  Contact Type:  Face to Face with follow up phone call regarding Warfarin hold per Dr. Pacheco office.    SUBJECTIVE:  Patient Findings     Positives:   Change in health (Diarrhea intermittently for \"years\" so this is not a change. Cortisone injection in R knee 2 days ago, pt denies much pain now or prior to injection.), Upcoming invasive procedure (Cardiac Loop recorder scheduled for .  Pt states Dr. Pacheco wants him to HOLD Warfarin  but OK to take all other days, which is interesting.), Change in medications (Pt states he stopped Fish Oil about 1 month ago.)    Comments:   I called Dr. Garrison's office regarding Warfarin hold prior to device implant. Due to supra INR today, they agreed to Warfarin hold x 3 days but OK to take 5mg Warfarin on , 19.  Dr. Garrison's nurse informed me that they will check INR at [facility] prior to the procedure so patient does NOT need INR appt. Here on that day.        Clinical Outcomes     Negatives:   Major bleeding event, Thromboembolic event, Anticoagulation-related hospital admission, Anticoagulation-related ED visit, Anticoagulation-related fatality    Comments:   I called Dr. Garrison's office regarding Warfarin hold prior to device implant. Due to supra INR today, they agreed to Warfarin hold x 3 days but OK to take 5mg Warfarin on , 19.  Dr. Garrison's nurse informed me that they will check INR at [facility] prior to the procedure so patient does NOT need INR appt. Here on that day.           OBJECTIVE    INR Protime   Date Value Ref Range Status   2019 5.6 (A) 0.86 - 1.14 Final       ASSESSMENT / PLAN  INR assessment SUPRA    Recheck INR In: 10 DAYS    INR Location Clinic      Anticoagulation Summary  As of 2019    INR goal:   2.0-3.0   TTR:   83.0 % (3.3 y)   INR used for dosin.6! (2019)   Warfarin maintenance plan:   5 mg (5 mg x 1) every Sun, e, Thu; 2.5 " mg (5 mg x 0.5) all other days   Full warfarin instructions:   8/1: Hold; 8/2: Hold; 8/3: Hold; Otherwise 5 mg every Sun, Tue, Thu; 2.5 mg all other days   Weekly warfarin total:   25 mg   Plan last modified:   Hope Baumann RN (8/1/2019)   Next INR check:   8/12/2019   Priority:   INR   Target end date:       Indications    Long-term (current) use of anticoagulants [Z79.01] [Z79.01]  Atrial fibrillation (H) [I48.91]             Anticoagulation Episode Summary     INR check location:       Preferred lab:       Send INR reminders to:   [facility]    Comments:           Anticoagulation Care Providers     Provider Role Specialty Phone number    Joselito Armas MD [facility] Internal Medicine [phone]            See the Encounter Report to view Anticoagulation Flowsheet and Dosing Calendar (Go to Encounters tab in chart review, and find the Anticoagulation Therapy Visit)        Hope Baumann RN

## 2019-08-01 NOTE — PROGRESS NOTES
Received a message from patient's INR clinic that his INR today was 5.6. Pt is having ILR implant on Monday with Dr. Pacheco. His ChadsVasc is 3 and his INR goal os 2.0-3.0. Original plan was to hold warfarin Saturday and resume Sunday for the procedure Monday. Spoke with Dr. Pacheco and he recommends holding Thursday, Friday, and Saturday, and resuming Sunday. We will check INR pre-procedure on Monday.     Called Hope at INR clinic at [phone] and gave her this information. She will call pt to let him know the plan.

## 2019-08-05 ENCOUNTER — TELEPHONE (OUTPATIENT)
Dept: INTERNAL MEDICINE | Facility: CLINIC | Age: 84
End: 2019-08-05

## 2019-08-05 ENCOUNTER — HOSPITAL ENCOUNTER (OUTPATIENT)
Facility: CLINIC | Age: 84
Discharge: HOME | End: 2019-08-05
Admitting: INTERNAL MEDICINE
Payer: COMMERCIAL

## 2019-08-05 VITALS
WEIGHT: 180.3 LBS | DIASTOLIC BLOOD PRESSURE: 84 MMHG | HEIGHT: 71 IN | SYSTOLIC BLOOD PRESSURE: 134 MMHG | TEMPERATURE: 97.9 F | HEART RATE: 79 BPM | RESPIRATION RATE: 18 BRPM | BODY MASS INDEX: 25.24 KG/M2 | OXYGEN SATURATION: 97 %

## 2019-08-05 DIAGNOSIS — I49.9 SINUS NODE ARRHYTHMIA: ICD-10-CM

## 2019-08-05 DIAGNOSIS — I47.29 NSVT (NONSUSTAINED VENTRICULAR TACHYCARDIA) (H): ICD-10-CM

## 2019-08-05 DIAGNOSIS — I48.20 CHRONIC ATRIAL FIBRILLATION (H): Primary | ICD-10-CM

## 2019-08-05 DIAGNOSIS — R55 NEAR SYNCOPE: ICD-10-CM

## 2019-08-05 LAB
ANION GAP SERPL CALCULATED.3IONS-SCNC: 4 MMOL/L (ref 3–14)
BUN SERPL-MCNC: 17 MG/DL (ref 7–30)
CALCIUM SERPL-MCNC: 9.2 MG/DL (ref 8.5–10.1)
CHLORIDE SERPL-SCNC: 105 MMOL/L (ref 94–109)
CO2 SERPL-SCNC: 29 MMOL/L (ref 20–32)
CREAT SERPL-MCNC: 0.88 MG/DL (ref 0.66–1.25)
ERYTHROCYTE [DISTWIDTH] IN BLOOD BY AUTOMATED COUNT: 15.3 % (ref 10–15)
GFR SERPL CREATININE-BSD FRML MDRD: 77 ML/MIN/{1.73_M2}
GLUCOSE SERPL-MCNC: 133 MG/DL (ref 70–99)
HCT VFR BLD AUTO: 42.4 % (ref 40–53)
HGB BLD-MCNC: 13.9 G/DL (ref 13.3–17.7)
INR PPP: 1.43 (ref 0.86–1.14)
MCH RBC QN AUTO: 30.8 PG (ref 26.5–33)
MCHC RBC AUTO-ENTMCNC: 32.8 G/DL (ref 31.5–36.5)
MCV RBC AUTO: 94 FL (ref 78–100)
PLATELET # BLD AUTO: 171 10E9/L (ref 150–450)
POTASSIUM SERPL-SCNC: 4 MMOL/L (ref 3.4–5.3)
RBC # BLD AUTO: 4.52 10E12/L (ref 4.4–5.9)
SODIUM SERPL-SCNC: 138 MMOL/L (ref 133–144)
WBC # BLD AUTO: 7.3 10E9/L (ref 4–11)

## 2019-08-05 PROCEDURE — 25800029 ZZH RX IP 258 OP 250: Performed by: INTERNAL MEDICINE

## 2019-08-05 PROCEDURE — 85027 COMPLETE CBC AUTOMATED: CPT | Performed by: INTERNAL MEDICINE

## 2019-08-05 PROCEDURE — 85610 PROTHROMBIN TIME: CPT | Performed by: INTERNAL MEDICINE

## 2019-08-05 PROCEDURE — C1764 EVENT RECORDER, CARDIAC: HCPCS | Performed by: INTERNAL MEDICINE

## 2019-08-05 PROCEDURE — 27210794 ZZH OR GENERAL SUPPLY STERILE: Performed by: INTERNAL MEDICINE

## 2019-08-05 PROCEDURE — 25000125 ZZHC RX 250: Performed by: INTERNAL MEDICINE

## 2019-08-05 PROCEDURE — 85610 PROTHROMBIN TIME: CPT | Mod: QW | Performed by: INTERNAL MEDICINE

## 2019-08-05 PROCEDURE — 33285 INSJ SUBQ CAR RHYTHM MNTR: CPT | Performed by: INTERNAL MEDICINE

## 2019-08-05 PROCEDURE — 40000852 ZZH STATISTIC HEART CATH LAB OR EP LAB

## 2019-08-05 PROCEDURE — 33208 INSRT HEART PM ATRIAL & VENT: CPT | Performed by: INTERNAL MEDICINE

## 2019-08-05 PROCEDURE — 80048 BASIC METABOLIC PNL TOTAL CA: CPT | Performed by: INTERNAL MEDICINE

## 2019-08-05 DEVICE — SYS INS CARD MNTR REVEAL LINQ: Type: IMPLANTABLE DEVICE | Status: FUNCTIONAL

## 2019-08-05 RX ORDER — ACETAMINOPHEN 325 MG/1
650 TABLET ORAL EVERY 4 HOURS PRN
Status: DISCONTINUED | OUTPATIENT
Start: 2019-08-05 | End: 2019-08-05 | Stop reason: HOSPADM

## 2019-08-05 RX ORDER — LIDOCAINE HYDROCHLORIDE AND EPINEPHRINE 10; 10 MG/ML; UG/ML
INJECTION, SOLUTION INFILTRATION; PERINEURAL
Status: DISCONTINUED | OUTPATIENT
Start: 2019-08-05 | End: 2019-08-05 | Stop reason: HOSPADM

## 2019-08-05 RX ORDER — SODIUM CHLORIDE 450 MG/100ML
INJECTION, SOLUTION INTRAVENOUS CONTINUOUS
Status: DISCONTINUED | OUTPATIENT
Start: 2019-08-05 | End: 2019-08-05 | Stop reason: HOSPADM

## 2019-08-05 RX ORDER — LIDOCAINE 40 MG/G
CREAM TOPICAL
Status: DISCONTINUED | OUTPATIENT
Start: 2019-08-05 | End: 2019-08-05 | Stop reason: HOSPADM

## 2019-08-05 RX ORDER — CEFAZOLIN SODIUM 2 G/100ML
2 INJECTION, SOLUTION INTRAVENOUS
Status: DISCONTINUED | OUTPATIENT
Start: 2019-08-05 | End: 2019-08-05 | Stop reason: HOSPADM

## 2019-08-05 RX ADMIN — SODIUM CHLORIDE: 4.5 INJECTION, SOLUTION INTRAVENOUS at 12:48

## 2019-08-05 ASSESSMENT — MIFFLIN-ST. JEOR: SCORE: 1514.97

## 2019-08-05 NOTE — DISCHARGE INSTRUCTIONS
EP Study & Loop Recorder Implant Discharge Instructions    After you go home:      Have an adult stay with you until tomorrow.    You may resume your normal diet.       For 24 hours - due to the sedation you received:    Relax and take it easy.    Do NOT make any important or legal decisions.    Do NOT drive or operate machines at home or at work.    Do NOT drink alcohol.    Care of Chest Incision:      Keep the bandage on for 3 days. You may remove the dressing on Thursday aug 8 . Change it only if it gets loose or soaked. If you need to change it, use 4x4-inch gauze and a large clear bandage.     Leave the strips of tape on. They will fall off on their own, or we will remove them at your first check-up.    Check your wound daily for signs of infection, such as increased redness, severe swelling or draining. Fever may also be a sign of infection. Call us if you see any of these signs.    If there are no signs of infection, you may shower after the bandage comes off in 3 days. If you take a tub bath, keep the wound dry.    No soaking the incision (swimming pool, bathtub, hot tub) for 2 weeks.    You may have mild to medium pain for 3 to 5 days. Take Acetaminophen (Tylenol) or Ibuprofen (Advil) for the pain. If the pain persists or is severe, call us.    Care of Groin Puncture Site:      For the first 24 hrs - check the puncture site every 1-2 hours while awake.    For the first 2 days, when you cough, sneeze, laugh or move your bowels, hold your hand over the puncture site and press firmly.    Remove the bandaid after 24 hours. If there is minor oozing, apply another bandaid and remove it after 12 hours.    It is normal to have a small bruise or pea size lump at the site.    Do NOT take a bath, or use a hot tub or pool for at least 3 days. Do NOT scrub the site. Do not use lotion or powder near the puncture site.    Activity    Chest:    Avoid strenuous activity until incision well healed.    Groin:    For 2  days:    No stooping or squatting    Do NOT do any heavy activity such as exercise, lifting, or straining.     No housework, yard work or any activity that make you sweat    Do NOT lift more than 10 pounds    Bleeding:    Chest:    If you start bleeding from the incision site, sit down and press firmly on the site for 10 minutes.     Once bleeding stops, call [facility] as soon as you can.    Groin:     If you start bleeding from the site in your groin, lie down flat and press firmly on the site for 10 minutes.     Once bleeding stops, lay flat for 2 hours.     Call [facility] as soon as you can.       Call 911 right away if you have heavy bleeding or bleeding that does not stop.      Medicines:      Take your medications, including blood thinners, unless your provider tells you not to.    If you have stopped any medicines, check with your provider about when to restart them.    If you have pain or shortness of breath, you may take Advil (ibuprofen) or Tylenol (acetaminophen).    Follow Up Appointments:      Follow up with Device Clinic at [facility] of patient preference in 7-10 days.    Call the clinic if:      You have increased pain or a large or growing hard lump around the site.    The site is red, swollen, hot or tender.    Blood or fluid is draining from the site.    You have chills or a fever greater than 101 F (38 C).    Your leg feels numb, cool or changes color.    Increased pain in the chest and/or groin.    New pain in the back or belly that you cannot control with Tylenol.    Shortness of breath or chest pain that is not relieved by Advil or Tylenol.    Recurrent irregular or fast heart rate lasting over 2 hours.    Any questions or concerns.    Heart rhythms:    You may have some premature heartbeats. These feel very strong. They may make you feel that the fast heart rhythm (tachycardia) is going to start again.  Give it time. The premature beats should occur less often.    Telling  others about your device:      Before you leave the hospital, you will receive a temporary ID card. A permanent card will be mailed to you about 6 to 8 weeks later. Always carry the ID card with you. It has important details about your device.    You may also get a Medical Alert bracelet or tag that says you have a pacemaker or a defibrillator (ICD). Go to [email or web address].     Always tell doctors, dentists and other care providers that you have a device implanted in you.    Let us know before you plan any surgeries. Your care team must take special steps to keep you safe during certain procedures. These steps will depend on the type of device you have. Your provider will need to see your ID card. They may need to call us for instructions.    Device Safety:      Please refer to device  s booklet for further information.      [facility] [place]:    [phone] [facility] (7 days a week)

## 2019-08-05 NOTE — PRE-PROCEDURE
GENERAL PRE-PROCEDURE:   Procedure:  Loop recorder  Date/Time:  8/5/2019 3:21 PM    Written consent obtained?: Yes    Risks and benefits: Risks, benefits and alternatives were discussed    Consent given by:  Patient  Patient states understanding of procedure being performed: Yes    Patient's understanding of procedure matches consent: Yes    Procedure consent matches procedure scheduled: Yes    Expected level of sedation:  Moderate  Appropriately NPO:  Yes  ASA Class:  Class 2- mild systemic disease, no acute problems, no functional limitations  Mallampati  :  Grade 2- soft palate, base of uvula, tonsillar pillars, and portion of posterior pharyngeal wall visible  Lungs:  Lungs clear with good breath sounds bilaterally  History & Physical reviewed:  History and physical reviewed and no updates needed  Statement of review:  I have reviewed the lab findings, diagnostic data, medications, and the plan for sedation

## 2019-08-05 NOTE — PROGRESS NOTES
Care Suites Admission Nursing Note    Reason for admission: Loop recorder implant  CS arrival time: 1200  Accompanied by: spouse and DTR  Name/phone of DC : Bhavana COLLINS [phone]  Medications held: none  Consent signed: to be done at bedside by MD  Abnormal assessment/labs: pending  If abnormal, provider notified: n/a  Education/questions answered: questions answered pre loop implant  Plan: Proceed

## 2019-08-05 NOTE — PROGRESS NOTES
Pt up - walked in room   Site remains dry and intact   VSS  Ate courtesy meal - taking fluids  + urination > 250 ml  IV Dc'd  AVS given   Pt ready for discharge home with wife and daughter

## 2019-08-06 ENCOUNTER — DOCUMENTATION ONLY (OUTPATIENT)
Dept: CARDIOLOGY | Facility: CLINIC | Age: 84
End: 2019-08-06

## 2019-08-06 DIAGNOSIS — Z45.09 ENCOUNTER FOR LOOP RECORDER CHECK: Primary | ICD-10-CM

## 2019-08-06 NOTE — TELEPHONE ENCOUNTER
Remote alert received for tachy episode. EGM shows irregular VS for RVR with median ventricular rate in the 150s. This episode lasted 25 seconds. Patient has chronic afib. On warfarin and metoprolol. Implanted for syncope.   CAMILLE REAVES

## 2019-08-06 NOTE — PROGRESS NOTES
Post device implant discharge phone call.    Reviewed the following:    Remove outer dressing Thursday after implant. May shower after outer dressing removed. Leave steri-strips in place, will be removed at 1 week device check    Watch for redness, drainage, warmth, or fever. Call device clinic if any signs of infection.     1 week device check scheduled: 8/14/19 @ 12 p in [place]    Pt states understanding of all instructions.

## 2019-08-07 ENCOUNTER — DOCUMENTATION ONLY (OUTPATIENT)
Dept: CARDIOLOGY | Facility: CLINIC | Age: 84
End: 2019-08-07

## 2019-08-07 NOTE — TELEPHONE ENCOUNTER
Remote alert for tachy episode. 2 episodes recorded, EGMs show AF with RVR (irregular rates and no change in morphology). ILR was implanted for presyncope and known NSVTs. Pt has known AF and takes warfarin. Will continue to monitor for cause of presyncope and ventricular arrhythmias. RICARDO RN

## 2019-08-08 ENCOUNTER — TRANSFERRED RECORDS (OUTPATIENT)
Dept: HEALTH INFORMATION MANAGEMENT | Facility: CLINIC | Age: 84
End: 2019-08-08

## 2019-08-10 DIAGNOSIS — E11.9 DM TYPE 2 (DIABETES MELLITUS, TYPE 2) (H): ICD-10-CM

## 2019-08-12 ENCOUNTER — ANTICOAGULATION THERAPY VISIT (OUTPATIENT)
Dept: NURSING | Facility: CLINIC | Age: 84
End: 2019-08-12
Payer: COMMERCIAL

## 2019-08-12 DIAGNOSIS — I48.20 CHRONIC ATRIAL FIBRILLATION (H): ICD-10-CM

## 2019-08-12 DIAGNOSIS — Z79.01 LONG TERM CURRENT USE OF ANTICOAGULANT THERAPY: ICD-10-CM

## 2019-08-12 LAB — INR POINT OF CARE: 2 (ref 0.86–1.14)

## 2019-08-12 PROCEDURE — 36416 COLLJ CAPILLARY BLOOD SPEC: CPT

## 2019-08-12 PROCEDURE — 85610 PROTHROMBIN TIME: CPT | Mod: QW

## 2019-08-12 PROCEDURE — 99207 ZZC NO CHARGE NURSE ONLY: CPT

## 2019-08-12 NOTE — PROGRESS NOTES
ANTICOAGULATION FOLLOW-UP CLINIC VISIT    Patient Name:  Louis Keller Jr.  Date:  2019  Contact Type:  Face to Face    SUBJECTIVE:  Patient Findings     Positives:   Upcoming invasive procedure (Cardiac Loop recorder placed on 19.), Missed doses (Intentional hold - for cardiac Loop recorder placement.)    Comments:             Clinical Outcomes     Negatives:   Major bleeding event, Thromboembolic event, Anticoagulation-related hospital admission, Anticoagulation-related ED visit, Anticoagulation-related fatality    Comments:                OBJECTIVE    INR Protime   Date Value Ref Range Status   2019 2.0 (A) 0.86 - 1.14 Final       ASSESSMENT / PLAN  INR assessment THER    Recheck INR In: 10 DAYS    INR Location Clinic      Anticoagulation Summary  As of 2019    INR goal:   2.0-3.0   TTR:   82.3 % (3.3 y)   INR used for dosin.0 (2019)   Warfarin maintenance plan:   5 mg (5 mg x 1) every Sun, Tue, Thu; 2.5 mg (5 mg x 0.5) all other days   Full warfarin instructions:   5 mg every Sun, Tue, Thu; 2.5 mg all other days   Weekly warfarin total:   25 mg   No change documented:   Hope Baumann RN   Plan last modified:   Hope Baumann RN (2019)   Next INR check:   2019   Priority:   INR   Target end date:       Indications    Long-term (current) use of anticoagulants [Z79.01] [Z79.01]  Atrial fibrillation (H) [I48.91]             Anticoagulation Episode Summary     INR check location:       Preferred lab:       Send INR reminders to:   [facility]    Comments:           Anticoagulation Care Providers     Provider Role Specialty Phone number    Joselito Armas MD [facility] Internal Medicine [phone]            See the Encounter Report to view Anticoagulation Flowsheet and Dosing Calendar (Go to Encounters tab in chart review, and find the Anticoagulation Therapy Visit)        Hope Baumann RN

## 2019-08-12 NOTE — TELEPHONE ENCOUNTER
Requested Prescriptions   Pending Prescriptions Disp Refills     metFORMIN (GLUCOPHAGE) 500 MG tablet [Pharmacy Med Name: METFORMIN  Last Written Prescription Date:  2/6/2019  Last Fill Quantity: 180,  # refills: 1   Last office visit: 6/17/2019 with prescribing provider:     Future Office Visit:   Next 5 appointments (look out 90 days)    Sep 12, 2019  2:15 PM CDT  Return Visit with Herbert Gleason MD  [facility] ([facility]) [id number] [place] [id number] [phone]         MG TABLET] 180 tablet 1     Sig: TAKE 1 TABLET (500 MG) BY MOUTH 2 TIMES DAILY (WITH MEALS)       Biguanide Agents Passed - 8/10/2019  8:15 PM        Passed - Blood pressure less than 140/90 in past 6 months     BP Readings from Last 3 Encounters:   08/05/19 134/84   07/19/19 118/68   06/17/19 104/64                 Passed - Patient has documented LDL within the past 12 mos.     Recent Labs   Lab Test 12/19/18  0814   LDL 52             Passed - Patient has had a Microalbumin in the past 15 mos.     Recent Labs   Lab Test 12/19/18  0815   MICROL 30   UMALCR 41.08*             Passed - Patient is age 10 or older        Passed - Patient has documented A1c within the specified period of time.     If HgbA1C is 8 or greater, it needs to be on file within the past 3 months.  If less than 8, must be on file within the past 6 months.     Recent Labs   Lab Test 06/17/19  1145   A1C 6.9*             Passed - Patient's CR is NOT>1.4 OR Patient's EGFR is NOT<45 within past 12 mos.     Recent Labs   Lab Test 08/05/19  1220   GFRESTIMATED 77   GFRESTBLACK 89       Recent Labs   Lab Test 08/05/19  1220   CR 0.88             Passed - Patient does NOT have a diagnosis of CHF.        Passed - Medication is active on med list        Passed - Recent (6 mo) or future (30 days) visit within the authorizing provider's specialty     Patient had office visit in the last 6 months or  "has a visit in the next 30 days with authorizing provider or within the authorizing provider's specialty.  See \"Patient Info\" tab in inbasket, or \"Choose Columns\" in Meds & Orders section of the refill encounter.            "

## 2019-08-14 ENCOUNTER — ANCILLARY PROCEDURE (OUTPATIENT)
Dept: CARDIOLOGY | Facility: CLINIC | Age: 84
End: 2019-08-14
Attending: INTERNAL MEDICINE
Payer: COMMERCIAL

## 2019-08-14 DIAGNOSIS — Z45.09 ENCOUNTER FOR LOOP RECORDER CHECK: ICD-10-CM

## 2019-08-14 PROCEDURE — 93291 INTERROG DEV EVAL SCRMS IP: CPT | Performed by: INTERNAL MEDICINE

## 2019-08-20 DIAGNOSIS — N18.30 TYPE 2 DIABETES MELLITUS WITH STAGE 3 CHRONIC KIDNEY DISEASE (H): ICD-10-CM

## 2019-08-20 DIAGNOSIS — E11.22 TYPE 2 DIABETES MELLITUS WITH STAGE 3 CHRONIC KIDNEY DISEASE (H): ICD-10-CM

## 2019-08-20 NOTE — TELEPHONE ENCOUNTER
"Requested Prescriptions   Pending Prescriptions Disp Refills     ONETOUCH ULTRA test strip [Pharmacy Med Name: ONE TOUCH ULTRA BLUE TEST STRP]    Last Written Prescription Date:  5/16/19  Last Fill Quantity: 100,  # refills: 0   Last office visit: 6/17/2019 with prescribing provider:  Chanda   Future Office Visit:   Next 5 appointments (look out 90 days)    Sep 12, 2019  2:15 PM CDT  Return Visit with Herbert Gleason MD  [facility] ([facility]) [id number] [place] [id number] [phone]          100 strip 0     Sig: USE TO TEST DAILY       Diabetic Supplies Protocol Passed - 8/20/2019 10:33 AM        Passed - Medication is active on med list        Passed - Patient is 18 years of age or older        Passed - Recent (6 mo) or future (30 days) visit within the authorizing provider's specialty     Patient had office visit in the last 6 months or has a visit in the next 30 days with authorizing provider.  See \"Patient Info\" tab in inbasket, or \"Choose Columns\" in Meds & Orders section of the refill encounter.              "

## 2019-08-23 ENCOUNTER — ANTICOAGULATION THERAPY VISIT (OUTPATIENT)
Dept: NURSING | Facility: CLINIC | Age: 84
End: 2019-08-23
Payer: COMMERCIAL

## 2019-08-23 DIAGNOSIS — I48.20 CHRONIC ATRIAL FIBRILLATION (H): ICD-10-CM

## 2019-08-23 DIAGNOSIS — Z79.01 LONG TERM CURRENT USE OF ANTICOAGULANT THERAPY: ICD-10-CM

## 2019-08-23 LAB — INR POINT OF CARE: 2.7 (ref 0.86–1.14)

## 2019-08-23 PROCEDURE — 85610 PROTHROMBIN TIME: CPT | Mod: QW

## 2019-08-23 PROCEDURE — 36416 COLLJ CAPILLARY BLOOD SPEC: CPT

## 2019-08-23 PROCEDURE — 99207 ZZC NO CHARGE NURSE ONLY: CPT

## 2019-08-23 NOTE — PROGRESS NOTES
ANTICOAGULATION FOLLOW-UP CLINIC VISIT    Patient Name:  Louis Keller Jr.  Date:  2019  Contact Type:  Face to Face    SUBJECTIVE:  Patient Findings     Positives:   Bruising (small bruise on top of L hand)             OBJECTIVE    INR Protime   Date Value Ref Range Status   2019 2.7 (A) 0.86 - 1.14 Final       ASSESSMENT / PLAN  INR assessment THER    Recheck INR In: 2 WEEKS    INR Location Clinic      Anticoagulation Summary  As of 2019    INR goal:   2.0-3.0   TTR:   82.5 % (3.4 y)   INR used for dosin.7 (2019)   Warfarin maintenance plan:   5 mg (5 mg x 1) every Sun, Tue, Thu; 2.5 mg (5 mg x 0.5) all other days   Full warfarin instructions:   5 mg every Sun, Tue, Thu; 2.5 mg all other days   Weekly warfarin total:   25 mg   No change documented:   Hope Baumann RN   Plan last modified:   Hope Baumann RN (2019)   Next INR check:   2019   Priority:   INR   Target end date:       Indications    Long-term (current) use of anticoagulants [Z79.01] [Z79.01]  Atrial fibrillation (H) [I48.91]             Anticoagulation Episode Summary     INR check location:       Preferred lab:       Send INR reminders to:   [facility]    Comments:           Anticoagulation Care Providers     Provider Role Specialty Phone number    Joselito Armas MD [facility] Internal Medicine [phone]            See the Encounter Report to view Anticoagulation Flowsheet and Dosing Calendar (Go to Encounters tab in chart review, and find the Anticoagulation Therapy Visit)        Hope Baumann RN

## 2019-08-28 LAB
MDC_IDC_EPISODE_DTM: NORMAL
MDC_IDC_EPISODE_DURATION: 114 S
MDC_IDC_EPISODE_ID: 30
MDC_IDC_EPISODE_TYPE: NORMAL
MDC_IDC_MSMT_BATTERY_STATUS: NORMAL
MDC_IDC_PG_IMPLANT_DTM: NORMAL
MDC_IDC_PG_MFG: NORMAL
MDC_IDC_PG_MODEL: NORMAL
MDC_IDC_PG_SERIAL: NORMAL
MDC_IDC_PG_TYPE: NORMAL
MDC_IDC_SESS_CLINIC_NAME: NORMAL
MDC_IDC_SESS_DTM: NORMAL
MDC_IDC_SESS_TYPE: NORMAL
MDC_IDC_SET_ZONE_DETECTION_INTERVAL: 2000 MS
MDC_IDC_SET_ZONE_DETECTION_INTERVAL: 3000 MS
MDC_IDC_SET_ZONE_DETECTION_INTERVAL: 420 MS
MDC_IDC_SET_ZONE_TYPE: NORMAL
MDC_IDC_STAT_AT_BURDEN_PERCENT: 35.9 %
MDC_IDC_STAT_AT_DTM_END: NORMAL
MDC_IDC_STAT_AT_DTM_START: NORMAL
MDC_IDC_STAT_EPISODE_RECENT_COUNT: 0
MDC_IDC_STAT_EPISODE_RECENT_COUNT: 1
MDC_IDC_STAT_EPISODE_RECENT_COUNT: 24
MDC_IDC_STAT_EPISODE_RECENT_COUNT: 5
MDC_IDC_STAT_EPISODE_RECENT_COUNT_DTM_END: NORMAL
MDC_IDC_STAT_EPISODE_RECENT_COUNT_DTM_START: NORMAL
MDC_IDC_STAT_EPISODE_TOTAL_COUNT: 0
MDC_IDC_STAT_EPISODE_TOTAL_COUNT: 1
MDC_IDC_STAT_EPISODE_TOTAL_COUNT: 24
MDC_IDC_STAT_EPISODE_TOTAL_COUNT: 5
MDC_IDC_STAT_EPISODE_TOTAL_COUNT_DTM_END: NORMAL
MDC_IDC_STAT_EPISODE_TOTAL_COUNT_DTM_START: NORMAL
MDC_IDC_STAT_EPISODE_TYPE: NORMAL

## 2019-08-30 ENCOUNTER — DOCUMENTATION ONLY (OUTPATIENT)
Dept: CARDIOLOGY | Facility: CLINIC | Age: 84
End: 2019-08-30

## 2019-08-30 NOTE — TELEPHONE ENCOUNTER
[employer] loop recorder    Remote alert for AF and a tachy event (RVR during PAF). AF lasted 1 hour and 50 minutes, the avg V rate during this time was 125 BPM. Pt had one burst of RVR Lasting 1 minute and 24 seconds at a rate of 162 BPM. Pt is on AC. Current AF burden is 42%. Overall HR histogram shows a bump at  BPM but otherwise looks good. CAMILLE Carpio

## 2019-09-04 ENCOUNTER — DOCUMENTATION ONLY (OUTPATIENT)
Dept: CARDIOLOGY | Facility: CLINIC | Age: 84
End: 2019-09-04

## 2019-09-04 NOTE — TELEPHONE ENCOUNTER
[employer] loop recorder    Remote alert for tachy event. EGM shows 30 minutes of sustained RVR at an avg rate of 176 BPM during PAF. THis occurred at 1008 hours yesterday. Patient is in AF 45.8% of the time since yesterday and 38% of time since implant (8-2019. We are watching for cause for syncope, looking for NSVT), known AF is on AC. Pt is seeing Dr Gleason in 10 days, I will update Dr Gleason with rates,. CAMILLE Carpio

## 2019-09-06 ENCOUNTER — ANTICOAGULATION THERAPY VISIT (OUTPATIENT)
Dept: NURSING | Facility: CLINIC | Age: 84
End: 2019-09-06
Payer: COMMERCIAL

## 2019-09-06 DIAGNOSIS — Z79.01 LONG TERM CURRENT USE OF ANTICOAGULANT THERAPY: ICD-10-CM

## 2019-09-06 DIAGNOSIS — I48.20 CHRONIC ATRIAL FIBRILLATION (H): ICD-10-CM

## 2019-09-06 LAB — INR POINT OF CARE: 3.8 (ref 0.86–1.14)

## 2019-09-06 PROCEDURE — 99207 ZZC NO CHARGE NURSE ONLY: CPT

## 2019-09-06 PROCEDURE — 36416 COLLJ CAPILLARY BLOOD SPEC: CPT

## 2019-09-06 PROCEDURE — 85610 PROTHROMBIN TIME: CPT | Mod: QW

## 2019-09-06 NOTE — PROGRESS NOTES
ANTICOAGULATION FOLLOW-UP CLINIC VISIT    Patient Name:  Louis Keller Jr.  Date:  9/6/2019  Contact Type:  Face to Face    SUBJECTIVE:  Patient Findings     Positives:   Change in health (R knee and ankle pain and swelling. 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 I encouraged pt to be seen at  today.), Bruising (a cluster of small bruises on top of L hand near thumb.)             OBJECTIVE    INR Protime   Date Value Ref Range Status   09/06/2019 3.8 (A) 0.86 - 1.14 Final       ASSESSMENT / PLAN  INR assessment SUPRA    Recheck INR In: 4 DAYS    INR Location Clinic      Anticoagulation Summary  As of 9/6/2019    INR goal:   2.0-3.0   TTR:   81.8 % (3.4 y)   INR used for dosing:   3.8! (9/6/2019)   Warfarin maintenance plan:   5 mg (5 mg x 1) every Sun, Tue, Thu; 2.5 mg (5 mg x 0.5) all other days   Full warfarin instructions:   9/6: Hold; Otherwise 5 mg every Sun, Tue, Thu; 2.5 mg all other days   Weekly warfarin total:   25 mg   Plan last modified:   Hope Baumann RN (8/1/2019)   Next INR check:   9/10/2019   Priority:   INR   Target end date:       Indications    Long-term (current) use of anticoagulants [Z79.01] [Z79.01]  Atrial fibrillation (H) [I48.91]             Anticoagulation Episode Summary     INR check location:       Preferred lab:       Send INR reminders to:   [facility] [employer] [facility]    Comments:           Anticoagulation Care Providers     Provider Role Specialty Phone number    Joselito Armas MD [facility] Internal Medicine [phone]            See the Encounter Report to view Anticoagulation Flowsheet and Dosing Calendar (Go to Encounters tab in chart review, and find the Anticoagulation Therapy Visit)        Hope Baumann RN

## 2019-09-09 ENCOUNTER — DOCUMENTATION ONLY (OUTPATIENT)
Dept: CARDIOLOGY | Facility: CLINIC | Age: 84
End: 2019-09-09

## 2019-09-09 NOTE — TELEPHONE ENCOUNTER
[employer] Loop recorder  Remote loop recorder alert for tachy event lasting 1 minute and 44 seconds with an avg V rate of 162 BPM. He has been in AF approx 60 % of the time. Pt remains on warfarin and overall HR is well controlled. Fer Carpio

## 2019-09-10 ENCOUNTER — ANTICOAGULATION THERAPY VISIT (OUTPATIENT)
Dept: NURSING | Facility: CLINIC | Age: 84
End: 2019-09-10
Payer: COMMERCIAL

## 2019-09-10 DIAGNOSIS — I48.20 CHRONIC ATRIAL FIBRILLATION (H): ICD-10-CM

## 2019-09-10 DIAGNOSIS — Z79.01 LONG TERM CURRENT USE OF ANTICOAGULANT THERAPY: ICD-10-CM

## 2019-09-10 LAB — INR POINT OF CARE: 2.3 (ref 0.86–1.14)

## 2019-09-10 PROCEDURE — 85610 PROTHROMBIN TIME: CPT | Mod: QW

## 2019-09-10 PROCEDURE — 99207 ZZC NO CHARGE NURSE ONLY: CPT

## 2019-09-10 PROCEDURE — 36416 COLLJ CAPILLARY BLOOD SPEC: CPT

## 2019-09-10 NOTE — PROGRESS NOTES
ANTICOAGULATION FOLLOW-UP CLINIC VISIT    Patient Name:  Louis Keller Jr.  Date:  9/10/2019  Contact Type:  Face to Face    SUBJECTIVE:  Patient Findings     Positives:   Change in health (Pain and swelling in R knee has improved, pt did NOT seek medical attention. Pt denies SOB.), Missed doses (Intentional hold on  due to supra INR.), Change in diet/appetite (NO change, pt eats 3-4 greens per week.)        Clinical Outcomes     Negatives:   Major bleeding event, Thromboembolic event, Anticoagulation-related hospital admission, Anticoagulation-related ED visit, Anticoagulation-related fatality           OBJECTIVE    INR Protime   Date Value Ref Range Status   09/10/2019 2.3 (A) 0.86 - 1.14 Final       ASSESSMENT / PLAN  INR assessment THER    Recheck INR In: 10 DAYS    INR Location Clinic      Anticoagulation Summary  As of 9/10/2019    INR goal:   2.0-3.0   TTR:   81.7 % (3.4 y)   INR used for dosin.3 (9/10/2019)   Warfarin maintenance plan:   5 mg (5 mg x 1) every Sun, Tue, Thu; 2.5 mg (5 mg x 0.5) all other days   Full warfarin instructions:   5 mg every Sun, Tue, Thu; 2.5 mg all other days   Weekly warfarin total:   25 mg   No change documented:   Hope Baumann RN   Plan last modified:   Hope Baumann RN (2019)   Next INR check:   2019   Priority:   INR   Target end date:       Indications    Long-term (current) use of anticoagulants [Z79.01] [Z79.01]  Atrial fibrillation (H) [I48.91]             Anticoagulation Episode Summary     INR check location:       Preferred lab:       Send INR reminders to:   [facility]    Comments:           Anticoagulation Care Providers     Provider Role Specialty Phone number    Joselito Armas MD Responsible Internal Medicine [phone]            See the Encounter Report to view Anticoagulation Flowsheet and Dosing Calendar (Go to Encounters tab in chart review, and find the Anticoagulation Therapy Visit)        Hope Baumann RN

## 2019-09-12 ENCOUNTER — DOCUMENTATION ONLY (OUTPATIENT)
Dept: CARDIOLOGY | Facility: CLINIC | Age: 84
End: 2019-09-12

## 2019-09-12 ENCOUNTER — OFFICE VISIT (OUTPATIENT)
Dept: CARDIOLOGY | Facility: CLINIC | Age: 84
End: 2019-09-12
Attending: PHYSICIAN ASSISTANT
Payer: COMMERCIAL

## 2019-09-12 VITALS
HEIGHT: 70 IN | HEART RATE: 74 BPM | DIASTOLIC BLOOD PRESSURE: 72 MMHG | SYSTOLIC BLOOD PRESSURE: 126 MMHG | WEIGHT: 184.8 LBS | BODY MASS INDEX: 26.45 KG/M2

## 2019-09-12 DIAGNOSIS — R55 NEAR SYNCOPE: ICD-10-CM

## 2019-09-12 DIAGNOSIS — L97.319 VENOUS ULCER OF ANKLE, RIGHT (H): Primary | ICD-10-CM

## 2019-09-12 DIAGNOSIS — I87.2 VENOUS (PERIPHERAL) INSUFFICIENCY: ICD-10-CM

## 2019-09-12 DIAGNOSIS — R60.0 LOCALIZED EDEMA: ICD-10-CM

## 2019-09-12 DIAGNOSIS — I87.331 STASIS DERMATITIS OF RIGHT LOWER EXTREMITY WITH VENOUS ULCER DUE TO CHRONIC PERIPHERAL VENOUS HYPERTENSION (H): ICD-10-CM

## 2019-09-12 DIAGNOSIS — I83.893 SYMPTOMATIC VARICOSE VEINS OF BOTH LOWER EXTREMITIES: ICD-10-CM

## 2019-09-12 DIAGNOSIS — I87.303 VENOUS HYPERTENSION OF BOTH LOWER EXTREMITIES: ICD-10-CM

## 2019-09-12 DIAGNOSIS — I48.20 CHRONIC A-FIB (H): ICD-10-CM

## 2019-09-12 DIAGNOSIS — I83.013 VENOUS ULCER OF ANKLE, RIGHT (H): Primary | ICD-10-CM

## 2019-09-12 LAB
ANION GAP SERPL CALCULATED.3IONS-SCNC: 4 MMOL/L (ref 3–14)
BUN SERPL-MCNC: 18 MG/DL (ref 7–30)
CALCIUM SERPL-MCNC: 9 MG/DL (ref 8.5–10.1)
CHLORIDE SERPL-SCNC: 105 MMOL/L (ref 94–109)
CO2 SERPL-SCNC: 28 MMOL/L (ref 20–32)
CREAT SERPL-MCNC: 0.84 MG/DL (ref 0.66–1.25)
GFR SERPL CREATININE-BSD FRML MDRD: 79 ML/MIN/{1.73_M2}
GLUCOSE SERPL-MCNC: 134 MG/DL (ref 70–99)
POTASSIUM SERPL-SCNC: 4.1 MMOL/L (ref 3.4–5.3)
SODIUM SERPL-SCNC: 137 MMOL/L (ref 133–144)

## 2019-09-12 PROCEDURE — 80048 BASIC METABOLIC PNL TOTAL CA: CPT | Performed by: PHYSICIAN ASSISTANT

## 2019-09-12 PROCEDURE — 99214 OFFICE O/P EST MOD 30 MIN: CPT | Performed by: INTERNAL MEDICINE

## 2019-09-12 PROCEDURE — 36415 COLL VENOUS BLD VENIPUNCTURE: CPT | Performed by: PHYSICIAN ASSISTANT

## 2019-09-12 RX ORDER — AMIODARONE HYDROCHLORIDE 100 MG/1
100 TABLET ORAL DAILY
Qty: 90 TABLET | Refills: 3 | Status: SHIPPED | OUTPATIENT
Start: 2019-09-12 | End: 2019-10-14

## 2019-09-12 ASSESSMENT — MIFFLIN-ST. JEOR: SCORE: 1519.5

## 2019-09-12 NOTE — LETTER
9/12/2019    Joselito Armas MD  [address] [facility] [address]    RE: Louis Keller Jr.       Dear Colleague,    I had the pleasure of seeing Louis Keller Jr. in the [facility].    Cardiology Progress Note          Assessment and Plan:     1. Severe, symptomatic right lower extremity venous incompetence with right malleoli venous ulcer with    Plan right GSV and SSV VenaSeal ablation with sclerotherapy and phlebectomy of resultant varicosities.    Patient may have a post ablation ultrasound done in [place] for his convenience.    Follow-up with Susanna Haque in 3 months      2. Atrial fibrillation with intermittent rapid ventricular response, minimally symptomatic    Restart amiodarone at a lower dose 100 mg daily.  He was on this previously in 2015 but discontinued secondary to light sensitivity.  Continue anticoagulation.      This note was transcribed using electronic voice recognition software and there may be typographical errors present.                Interval History:   The patient is a very pleasant 87 year old whom I have been following for atrial fibrillation.  He has a loop recorder that has noticed some rapid atrial fibrillation.  He was previously on amiodarone but stopped it due to light sensitivity back in 2015.  He does not really notice when he is having the rapid atrial fibrillation.      In addition, he has had right medial malleoli or venous ulcer and worsening edema.  He has severe saphenous, tributary and varicose vein reflux last checked in 2018.  He is now interested in ablation therapy.                     Review of Systems:   Review of Systems:  Skin:  Positive for bruising   Eyes:  Positive for glasses  ENT:  Positive for hearing loss  Respiratory:  Positive for dyspnea on exertion  Cardiovascular:    Positive for;edema;lightheadedness;fatigue  Gastroenterology: Positive for diarrhea  Genitourinary:  Positive for nocturia;urinary  "frequency  Musculoskeletal:  Positive for arthritis;joint pain;foot pain  Neurologic:  Positive for numbness or tingling of feet  Psychiatric:  Positive for sleep disturbances  Heme/Lymph/Imm:  Positive for easy bruising  Endocrine:  Positive for diabetes              Physical Exam:     Vitals: /72 (BP Location: Right arm, Patient Position: Chair, Cuff Size: Adult Regular)   Pulse 74   Ht 1.778 m (5' 10\")   Wt 83.8 kg (184 lb 12.8 oz)   BMI 26.52 kg/m     Constitutional:  cooperative, alert and oriented, well developed, well nourished, in no acute distress thin      Skin:  warm and dry to the touch, no apparent skin lesions or masses noted        Head:  normocephalic, no masses or lesions        Eyes:  pupils equal and round;sclera white        ENT:  no pallor or cyanosis, dentition good        Neck:  JVP normal;no carotid bruit        Chest:  normal breath sounds, clear to auscultation, normal A-P diameter, normal symmetry, normal respiratory excursion, no use of accessory muscles        Cardiac:   irregularly irregular rhythm                Abdomen:  BS normoactive        Vascular: pulses full and equal                                      Extremities and Back:  no deformities, clubbing, cyanosis, erythema observed stasis pigmentation right medial malleoli venous ulcer    Neurological:  no gross motor deficits;affect appropriate                 Medications:     Current Outpatient Medications   Medication Sig Dispense Refill     Acetaminophen (TYLENOL PO) Take 1,000 mg by mouth 3 times daily       amiodarone (PACERONE/CODARONE) 100 MG TABS tablet Take 1 tablet (100 mg) by mouth daily 90 tablet 3     cholestyramine (QUESTRAN) 4 g packet TAKE 1 PACKET (4 G) BY MOUTH 2 TIMES DAILY (WITH MEALS) 180 packet 1     Chromium 1000 MCG TABS Take 200 mcg by mouth daily        Ferrous Gluconate 324 (37.5 Fe) MG TABS TAKE 1 TABLET (324 MG) BY MOUTH DAILY (WITH BREAKFAST) 100 tablet 0     furosemide (LASIX) 20 MG tablet " Take 1 tablet (20 mg) by mouth daily 90 tablet 2     glipiZIDE (GLUCOTROL XL) 10 MG 24 hr tablet TAKE 1 TABLET (10 MG) BY MOUTH 2 TIMES DAILY 180 tablet 1     latanoprost (XALATAN) 0.005 % ophthalmic solution Place 1 drop Into the left eye daily       loperamide (IMODIUM A-D) 2 MG tablet daily  30 tablet 0     metFORMIN (GLUCOPHAGE) 500 MG tablet TAKE 1 TABLET (500 MG) BY MOUTH 2 TIMES DAILY (WITH MEALS) 180 tablet 0     metoprolol tartrate (LOPRESSOR) 25 MG tablet Take 1 tablet (25 mg) by mouth 2 times daily 60 tablet 11     multivitamin, therapeutic (THERA-VIT) TABS tablet Take 0.5 tablets by mouth 2 times daily       ONETOUCH ULTRA test strip USE TO TEST DAILY 100 strip 1     pioglitazone (ACTOS) 45 MG tablet Take 1 tablet (45 mg) by mouth daily 90 tablet 3     senna-docusate (SENOKOT-S;PERICOLACE) 8.6-50 MG per tablet Take 1 tablet by mouth 2 times daily as needed for constipation 100 tablet      warfarin (COUMADIN) 5 MG tablet TAKE 1 TABLET BY MOUTH TUESDAY,THURS AND SUN. TAKE 1/2 TABLET ON MON,WED,FRI AND SAT. OR AS DIRECTED 65 tablet 2     ACE NOT PRESCRIBED, INTENTIONAL, 1 each daily ACE Inhibitor not prescribed due to Risk for drug interaction (Patient not taking: Reported on 6/17/2019) 0 each 0     ASPIRIN NOT PRESCRIBED, INTENTIONAL, by Other route continuous prn Reported on 3/7/2017       STATIN NOT PRESCRIBED, INTENTIONAL, 1 each At Bedtime Statin not prescribed intentionally due to Risk for drug interaction (Patient not taking: Reported on 6/17/2019) 0 each 0     tamsulosin (FLOMAX) 0.4 MG capsule Take 1 capsule (0.4 mg) by mouth daily (Patient not taking: Reported on 9/12/2019) 30 capsule 11                Data:   All laboratory data reviewed  Results for orders placed or performed in visit on 09/12/19 (from the past 24 hour(s))   Basic metabolic panel   Result Value Ref Range    Sodium 137 133 - 144 mmol/L    Potassium 4.1 3.4 - 5.3 mmol/L    Chloride 105 94 - 109 mmol/L    Carbon Dioxide 28 20 - 32  mmol/L    Anion Gap 4 3 - 14 mmol/L    Glucose 134 (H) 70 - 99 mg/dL    Urea Nitrogen 18 7 - 30 mg/dL    Creatinine 0.84 0.66 - 1.25 mg/dL    GFR Estimate 79 >60 mL/min/[1.73_m2]    GFR Estimate If Black >90 >60 mL/min/[1.73_m2]    Calcium 9.0 8.5 - 10.1 mg/dL       All laboratory data reviewed  Lab Results   Component Value Date    CHOL 128 12/19/2018     Lab Results   Component Value Date    HDL 58 12/19/2018     Lab Results   Component Value Date    LDL 52 12/19/2018     Lab Results   Component Value Date    TRIG 91 12/19/2018     Lab Results   Component Value Date    CHOLHDLRATIO 2.0 03/20/2015     TSH   Date Value Ref Range Status   12/19/2018 4.29 (H) 0.40 - 4.00 mU/L Final     Last Basic Metabolic Panel:  Lab Results   Component Value Date     09/12/2019      Lab Results   Component Value Date    POTASSIUM 4.1 09/12/2019     Lab Results   Component Value Date    CHLORIDE 105 09/12/2019     Lab Results   Component Value Date    MICHAEL 9.0 09/12/2019     Lab Results   Component Value Date    CO2 28 09/12/2019     Lab Results   Component Value Date    BUN 18 09/12/2019     Lab Results   Component Value Date    CR 0.84 09/12/2019     Lab Results   Component Value Date     09/12/2019     Lab Results   Component Value Date    WBC 7.3 08/05/2019     Lab Results   Component Value Date    RBC 4.52 08/05/2019     Lab Results   Component Value Date    HGB 13.9 08/05/2019     Lab Results   Component Value Date    HCT 42.4 08/05/2019     Lab Results   Component Value Date    MCV 94 08/05/2019     Lab Results   Component Value Date    MCH 30.8 08/05/2019     Lab Results   Component Value Date    MCHC 32.8 08/05/2019     Lab Results   Component Value Date    RDW 15.3 08/05/2019     Lab Results   Component Value Date     08/05/2019                 Thank you for allowing me to participate in the care of your patient.      Sincerely,     Herbert Gleason MD     [facility]  Care    cc:   Susanna Haque PA-C  [id number] [place] [address]

## 2019-09-12 NOTE — LETTER
9/12/2019    Joselito Armas MD  [address] [facility] [address]    RE: Louis Keller Jr.       Dear Colleague,    I had the pleasure of seeing Louis Keller Jr. in the [facility].    Cardiology Progress Note          Assessment and Plan:     1. Severe, symptomatic right lower extremity venous incompetence with right malleoli venous ulcer with    Plan right GSV and SSV VenaSeal ablation with sclerotherapy and phlebectomy of resultant varicosities.    Patient may have a post ablation ultrasound done in [place] for his convenience.    Follow-up with Susanna Haque in 3 months      2. Atrial fibrillation with intermittent rapid ventricular response, minimally symptomatic    Restart amiodarone at a lower dose 100 mg daily.  He was on this previously in 2015 but discontinued secondary to light sensitivity.  Continue anticoagulation.      This note was transcribed using electronic voice recognition software and there may be typographical errors present.                Interval History:   The patient is a very pleasant 87 year old whom I have been following for atrial fibrillation.  He has a loop recorder that has noticed some rapid atrial fibrillation.  He was previously on amiodarone but stopped it due to light sensitivity back in 2015.  He does not really notice when he is having the rapid atrial fibrillation.      In addition, he has had right medial malleoli or venous ulcer and worsening edema.  He has severe saphenous, tributary and varicose vein reflux last checked in 2018.  He is now interested in ablation therapy.                     Review of Systems:   Review of Systems:  Skin:  Positive for bruising   Eyes:  Positive for glasses  ENT:  Positive for hearing loss  Respiratory:  Positive for dyspnea on exertion  Cardiovascular:    Positive for;edema;lightheadedness;fatigue  Gastroenterology: Positive for diarrhea  Genitourinary:  Positive for nocturia;urinary  "frequency  Musculoskeletal:  Positive for arthritis;joint pain;foot pain  Neurologic:  Positive for numbness or tingling of feet  Psychiatric:  Positive for sleep disturbances  Heme/Lymph/Imm:  Positive for easy bruising  Endocrine:  Positive for diabetes              Physical Exam:     Vitals: /72 (BP Location: Right arm, Patient Position: Chair, Cuff Size: Adult Regular)   Pulse 74   Ht 1.778 m (5' 10\")   Wt 83.8 kg (184 lb 12.8 oz)   BMI 26.52 kg/m     Constitutional:  cooperative, alert and oriented, well developed, well nourished, in no acute distress thin      Skin:  warm and dry to the touch, no apparent skin lesions or masses noted        Head:  normocephalic, no masses or lesions        Eyes:  pupils equal and round;sclera white        ENT:  no pallor or cyanosis, dentition good        Neck:  JVP normal;no carotid bruit        Chest:  normal breath sounds, clear to auscultation, normal A-P diameter, normal symmetry, normal respiratory excursion, no use of accessory muscles        Cardiac:   irregularly irregular rhythm                Abdomen:  BS normoactive        Vascular: pulses full and equal                                      Extremities and Back:  no deformities, clubbing, cyanosis, erythema observed stasis pigmentation right medial malleoli venous ulcer    Neurological:  no gross motor deficits;affect appropriate                 Medications:     Current Outpatient Medications   Medication Sig Dispense Refill     Acetaminophen (TYLENOL PO) Take 1,000 mg by mouth 3 times daily       amiodarone (PACERONE/CODARONE) 100 MG TABS tablet Take 1 tablet (100 mg) by mouth daily 90 tablet 3     cholestyramine (QUESTRAN) 4 g packet TAKE 1 PACKET (4 G) BY MOUTH 2 TIMES DAILY (WITH MEALS) 180 packet 1     Chromium 1000 MCG TABS Take 200 mcg by mouth daily        Ferrous Gluconate 324 (37.5 Fe) MG TABS TAKE 1 TABLET (324 MG) BY MOUTH DAILY (WITH BREAKFAST) 100 tablet 0     furosemide (LASIX) 20 MG tablet " Take 1 tablet (20 mg) by mouth daily 90 tablet 2     glipiZIDE (GLUCOTROL XL) 10 MG 24 hr tablet TAKE 1 TABLET (10 MG) BY MOUTH 2 TIMES DAILY 180 tablet 1     latanoprost (XALATAN) 0.005 % ophthalmic solution Place 1 drop Into the left eye daily       loperamide (IMODIUM A-D) 2 MG tablet daily  30 tablet 0     metFORMIN (GLUCOPHAGE) 500 MG tablet TAKE 1 TABLET (500 MG) BY MOUTH 2 TIMES DAILY (WITH MEALS) 180 tablet 0     metoprolol tartrate (LOPRESSOR) 25 MG tablet Take 1 tablet (25 mg) by mouth 2 times daily 60 tablet 11     multivitamin, therapeutic (THERA-VIT) TABS tablet Take 0.5 tablets by mouth 2 times daily       ONETOUCH ULTRA test strip USE TO TEST DAILY 100 strip 1     pioglitazone (ACTOS) 45 MG tablet Take 1 tablet (45 mg) by mouth daily 90 tablet 3     senna-docusate (SENOKOT-S;PERICOLACE) 8.6-50 MG per tablet Take 1 tablet by mouth 2 times daily as needed for constipation 100 tablet      warfarin (COUMADIN) 5 MG tablet TAKE 1 TABLET BY MOUTH TUESDAY,THURS AND SUN. TAKE 1/2 TABLET ON MON,WED,FRI AND SAT. OR AS DIRECTED 65 tablet 2     ACE NOT PRESCRIBED, INTENTIONAL, 1 each daily ACE Inhibitor not prescribed due to Risk for drug interaction (Patient not taking: Reported on 6/17/2019) 0 each 0     ASPIRIN NOT PRESCRIBED, INTENTIONAL, by Other route continuous prn Reported on 3/7/2017       STATIN NOT PRESCRIBED, INTENTIONAL, 1 each At Bedtime Statin not prescribed intentionally due to Risk for drug interaction (Patient not taking: Reported on 6/17/2019) 0 each 0     tamsulosin (FLOMAX) 0.4 MG capsule Take 1 capsule (0.4 mg) by mouth daily (Patient not taking: Reported on 9/12/2019) 30 capsule 11                Data:   All laboratory data reviewed  Results for orders placed or performed in visit on 09/12/19 (from the past 24 hour(s))   Basic metabolic panel   Result Value Ref Range    Sodium 137 133 - 144 mmol/L    Potassium 4.1 3.4 - 5.3 mmol/L    Chloride 105 94 - 109 mmol/L    Carbon Dioxide 28 20 - 32  mmol/L    Anion Gap 4 3 - 14 mmol/L    Glucose 134 (H) 70 - 99 mg/dL    Urea Nitrogen 18 7 - 30 mg/dL    Creatinine 0.84 0.66 - 1.25 mg/dL    GFR Estimate 79 >60 mL/min/[1.73_m2]    GFR Estimate If Black >90 >60 mL/min/[1.73_m2]    Calcium 9.0 8.5 - 10.1 mg/dL       All laboratory data reviewed  Lab Results   Component Value Date    CHOL 128 12/19/2018     Lab Results   Component Value Date    HDL 58 12/19/2018     Lab Results   Component Value Date    LDL 52 12/19/2018     Lab Results   Component Value Date    TRIG 91 12/19/2018     Lab Results   Component Value Date    CHOLHDLRATIO 2.0 03/20/2015     TSH   Date Value Ref Range Status   12/19/2018 4.29 (H) 0.40 - 4.00 mU/L Final     Last Basic Metabolic Panel:  Lab Results   Component Value Date     09/12/2019      Lab Results   Component Value Date    POTASSIUM 4.1 09/12/2019     Lab Results   Component Value Date    CHLORIDE 105 09/12/2019     Lab Results   Component Value Date    MICHAEL 9.0 09/12/2019     Lab Results   Component Value Date    CO2 28 09/12/2019     Lab Results   Component Value Date    BUN 18 09/12/2019     Lab Results   Component Value Date    CR 0.84 09/12/2019     Lab Results   Component Value Date     09/12/2019     Lab Results   Component Value Date    WBC 7.3 08/05/2019     Lab Results   Component Value Date    RBC 4.52 08/05/2019     Lab Results   Component Value Date    HGB 13.9 08/05/2019     Lab Results   Component Value Date    HCT 42.4 08/05/2019     Lab Results   Component Value Date    MCV 94 08/05/2019     Lab Results   Component Value Date    MCH 30.8 08/05/2019     Lab Results   Component Value Date    MCHC 32.8 08/05/2019     Lab Results   Component Value Date    RDW 15.3 08/05/2019     Lab Results   Component Value Date     08/05/2019       Thank you for allowing me to participate in the care of your patient.    Sincerely,     Herbert Gleason MD     [facility]

## 2019-09-12 NOTE — TELEPHONE ENCOUNTER
Remote alert for tachy event lasting 12 minutes at a rate of 167 BPM. EGM shows AF with RVR. On warfarin, overall HR is stable.

## 2019-09-12 NOTE — PROGRESS NOTES
Cardiology Progress Note          Assessment and Plan:     1. Severe, symptomatic right lower extremity venous incompetence with right malleoli venous ulcer with    Plan right GSV and SSV VenaSeal ablation with sclerotherapy and phlebectomy of resultant varicosities.    Patient may have a post ablation ultrasound done in [place] for his convenience.    Follow-up with Susanna Haque in 3 months      2. Atrial fibrillation with intermittent rapid ventricular response, minimally symptomatic    Restart amiodarone at a lower dose 100 mg daily.  He was on this previously in 2015 but discontinued secondary to light sensitivity.  Continue anticoagulation.      This note was transcribed using electronic voice recognition software and there may be typographical errors present.                Interval History:   The patient is a very pleasant 87 year old whom I have been following for atrial fibrillation.  He has a loop recorder that has noticed some rapid atrial fibrillation.  He was previously on amiodarone but stopped it due to light sensitivity back in 2015.  He does not really notice when he is having the rapid atrial fibrillation.      In addition, he has had right medial malleoli or venous ulcer and worsening edema.  He has severe saphenous, tributary and varicose vein reflux last checked in 2018.  He is now interested in ablation therapy.                     Review of Systems:   Review of Systems:  Skin:  Positive for bruising   Eyes:  Positive for glasses  ENT:  Positive for hearing loss  Respiratory:  Positive for dyspnea on exertion  Cardiovascular:    Positive for;edema;lightheadedness;fatigue  Gastroenterology: Positive for diarrhea  Genitourinary:  Positive for nocturia;urinary frequency  Musculoskeletal:  Positive for arthritis;joint pain;foot pain  Neurologic:  Positive for numbness or tingling of feet  Psychiatric:  Positive for sleep disturbances  Heme/Lymph/Imm:  Positive for easy bruising  Endocrine:   "Positive for diabetes              Physical Exam:     Vitals: /72 (BP Location: Right arm, Patient Position: Chair, Cuff Size: Adult Regular)   Pulse 74   Ht 1.778 m (5' 10\")   Wt 83.8 kg (184 lb 12.8 oz)   BMI 26.52 kg/m    Constitutional:  cooperative, alert and oriented, well developed, well nourished, in no acute distress thin      Skin:  warm and dry to the touch, no apparent skin lesions or masses noted        Head:  normocephalic, no masses or lesions        Eyes:  pupils equal and round;sclera white        ENT:  no pallor or cyanosis, dentition good        Neck:  JVP normal;no carotid bruit        Chest:  normal breath sounds, clear to auscultation, normal A-P diameter, normal symmetry, normal respiratory excursion, no use of accessory muscles        Cardiac:   irregularly irregular rhythm                Abdomen:  BS normoactive        Vascular: pulses full and equal                                      Extremities and Back:  no deformities, clubbing, cyanosis, erythema observed stasis pigmentation right medial malleoli venous ulcer    Neurological:  no gross motor deficits;affect appropriate                 Medications:     Current Outpatient Medications   Medication Sig Dispense Refill     Acetaminophen (TYLENOL PO) Take 1,000 mg by mouth 3 times daily       amiodarone (PACERONE/CODARONE) 100 MG TABS tablet Take 1 tablet (100 mg) by mouth daily 90 tablet 3     cholestyramine (QUESTRAN) 4 g packet TAKE 1 PACKET (4 G) BY MOUTH 2 TIMES DAILY (WITH MEALS) 180 packet 1     Chromium 1000 MCG TABS Take 200 mcg by mouth daily        Ferrous Gluconate 324 (37.5 Fe) MG TABS TAKE 1 TABLET (324 MG) BY MOUTH DAILY (WITH BREAKFAST) 100 tablet 0     furosemide (LASIX) 20 MG tablet Take 1 tablet (20 mg) by mouth daily 90 tablet 2     glipiZIDE (GLUCOTROL XL) 10 MG 24 hr tablet TAKE 1 TABLET (10 MG) BY MOUTH 2 TIMES DAILY 180 tablet 1     latanoprost (XALATAN) 0.005 % ophthalmic solution Place 1 drop Into the " left eye daily       loperamide (IMODIUM A-D) 2 MG tablet daily  30 tablet 0     metFORMIN (GLUCOPHAGE) 500 MG tablet TAKE 1 TABLET (500 MG) BY MOUTH 2 TIMES DAILY (WITH MEALS) 180 tablet 0     metoprolol tartrate (LOPRESSOR) 25 MG tablet Take 1 tablet (25 mg) by mouth 2 times daily 60 tablet 11     multivitamin, therapeutic (THERA-VIT) TABS tablet Take 0.5 tablets by mouth 2 times daily       ONETOUCH ULTRA test strip USE TO TEST DAILY 100 strip 1     pioglitazone (ACTOS) 45 MG tablet Take 1 tablet (45 mg) by mouth daily 90 tablet 3     senna-docusate (SENOKOT-S;PERICOLACE) 8.6-50 MG per tablet Take 1 tablet by mouth 2 times daily as needed for constipation 100 tablet      warfarin (COUMADIN) 5 MG tablet TAKE 1 TABLET BY MOUTH TUESDAY,THURS AND SUN. TAKE 1/2 TABLET ON MON,WED,FRI AND SAT. OR AS DIRECTED 65 tablet 2     ACE NOT PRESCRIBED, INTENTIONAL, 1 each daily ACE Inhibitor not prescribed due to Risk for drug interaction (Patient not taking: Reported on 6/17/2019) 0 each 0     ASPIRIN NOT PRESCRIBED, INTENTIONAL, by Other route continuous prn Reported on 3/7/2017       STATIN NOT PRESCRIBED, INTENTIONAL, 1 each At Bedtime Statin not prescribed intentionally due to Risk for drug interaction (Patient not taking: Reported on 6/17/2019) 0 each 0     tamsulosin (FLOMAX) 0.4 MG capsule Take 1 capsule (0.4 mg) by mouth daily (Patient not taking: Reported on 9/12/2019) 30 capsule 11                Data:   All laboratory data reviewed  Results for orders placed or performed in visit on 09/12/19 (from the past 24 hour(s))   Basic metabolic panel   Result Value Ref Range    Sodium 137 133 - 144 mmol/L    Potassium 4.1 3.4 - 5.3 mmol/L    Chloride 105 94 - 109 mmol/L    Carbon Dioxide 28 20 - 32 mmol/L    Anion Gap 4 3 - 14 mmol/L    Glucose 134 (H) 70 - 99 mg/dL    Urea Nitrogen 18 7 - 30 mg/dL    Creatinine 0.84 0.66 - 1.25 mg/dL    GFR Estimate 79 >60 mL/min/[1.73_m2]    GFR Estimate If Black >90 >60 mL/min/[1.73_m2]     Calcium 9.0 8.5 - 10.1 mg/dL       All laboratory data reviewed  Lab Results   Component Value Date    CHOL 128 12/19/2018     Lab Results   Component Value Date    HDL 58 12/19/2018     Lab Results   Component Value Date    LDL 52 12/19/2018     Lab Results   Component Value Date    TRIG 91 12/19/2018     Lab Results   Component Value Date    CHOLHDLRATIO 2.0 03/20/2015     TSH   Date Value Ref Range Status   12/19/2018 4.29 (H) 0.40 - 4.00 mU/L Final     Last Basic Metabolic Panel:  Lab Results   Component Value Date     09/12/2019      Lab Results   Component Value Date    POTASSIUM 4.1 09/12/2019     Lab Results   Component Value Date    CHLORIDE 105 09/12/2019     Lab Results   Component Value Date    MICHAEL 9.0 09/12/2019     Lab Results   Component Value Date    CO2 28 09/12/2019     Lab Results   Component Value Date    BUN 18 09/12/2019     Lab Results   Component Value Date    CR 0.84 09/12/2019     Lab Results   Component Value Date     09/12/2019     Lab Results   Component Value Date    WBC 7.3 08/05/2019     Lab Results   Component Value Date    RBC 4.52 08/05/2019     Lab Results   Component Value Date    HGB 13.9 08/05/2019     Lab Results   Component Value Date    HCT 42.4 08/05/2019     Lab Results   Component Value Date    MCV 94 08/05/2019     Lab Results   Component Value Date    MCH 30.8 08/05/2019     Lab Results   Component Value Date    MCHC 32.8 08/05/2019     Lab Results   Component Value Date    RDW 15.3 08/05/2019     Lab Results   Component Value Date     08/05/2019

## 2019-09-16 ENCOUNTER — DOCUMENTATION ONLY (OUTPATIENT)
Dept: CARDIOLOGY | Facility: CLINIC | Age: 84
End: 2019-09-16

## 2019-09-16 NOTE — TELEPHONE ENCOUNTER
Remote alert for tachy episode. EGM shows afib with RVR. Episode lasted 2 min with rates in the 160s. On amio and warfarin.  JELLY RN

## 2019-09-20 ENCOUNTER — ANTICOAGULATION THERAPY VISIT (OUTPATIENT)
Dept: NURSING | Facility: CLINIC | Age: 84
End: 2019-09-20
Payer: COMMERCIAL

## 2019-09-20 DIAGNOSIS — Z79.01 LONG TERM CURRENT USE OF ANTICOAGULANT THERAPY: ICD-10-CM

## 2019-09-20 DIAGNOSIS — I48.20 CHRONIC ATRIAL FIBRILLATION (H): ICD-10-CM

## 2019-09-20 LAB — INR POINT OF CARE: 3.6 (ref 0.86–1.14)

## 2019-09-20 PROCEDURE — 85610 PROTHROMBIN TIME: CPT | Mod: QW

## 2019-09-20 PROCEDURE — 99207 ZZC NO CHARGE NURSE ONLY: CPT

## 2019-09-20 PROCEDURE — 36416 COLLJ CAPILLARY BLOOD SPEC: CPT

## 2019-09-20 NOTE — PROGRESS NOTES
ANTICOAGULATION FOLLOW-UP CLINIC VISIT    Patient Name:  Louis Keller Jr.  Date:  9/20/2019  Contact Type:  Face to Face    SUBJECTIVE:  Patient Findings     Positives:   Upcoming invasive procedure (Pt states vein procedure is scheduled for November, he is uncertain if he will have to hold Warfarin.), Change in medications (Started Amiodarone 09/13/19, per cardiology.  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  Plan right GSV and SSV VenaSeal ablation with sclerotherapy and phlebectomy of resultant varicosities.  INR supra most likely due to Amiodarone so I decreased patient's weekly Warfarin dose about 15%.        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  Plan right GSV and SSV VenaSeal ablation with sclerotherapy and phlebectomy of resultant varicosities.  INR supra most likely due to Amiodarone so I decreased patient's weekly Warfarin dose about 15%.           OBJECTIVE    INR Protime   Date Value Ref Range Status   09/20/2019 3.6 (A) 0.86 - 1.14 Final       ASSESSMENT / PLAN  INR assessment SUPRA    Recheck INR In: 4 DAYS    INR Location Clinic      Anticoagulation Summary  As of 9/20/2019    INR goal:   2.0-3.0   TTR:   81.5 % (3.4 y)   INR used for dosing:   3.6! (9/20/2019)   Warfarin maintenance plan:   5 mg (5 mg x 1) every Sun, Tue, Thu; 2.5 mg (5 mg x 0.5) all other days   Full warfarin instructions:   9/20: Hold; 9/22: 2.5 mg; Otherwise 5 mg every Sun, Tue, Thu; 2.5 mg all other days   Weekly warfarin total:   25 mg   Plan last modified:   Hope Baumann  CHRISTINE RN (8/1/2019)   Next INR check:   9/24/2019   Priority:   INR   Target end date:       Indications    Long-term (current) use of anticoagulants [Z79.01] [Z79.01]  Atrial fibrillation (H) [I48.91]             Anticoagulation Episode Summary     INR check location:       Preferred lab:       Send INR reminders to:   [facility] [place]    Comments:           Anticoagulation Care Providers     Provider Role Specialty Phone number    Joselito Armas MD [facility] Internal Medicine [phone]            See the Encounter Report to view Anticoagulation Flowsheet and Dosing Calendar (Go to Encounters tab in chart review, and find the Anticoagulation Therapy Visit)        Hope Baumann RN

## 2019-09-21 DIAGNOSIS — D62 ANEMIA DUE TO BLOOD LOSS, ACUTE: ICD-10-CM

## 2019-09-23 ENCOUNTER — DOCUMENTATION ONLY (OUTPATIENT)
Dept: CARDIOLOGY | Facility: CLINIC | Age: 84
End: 2019-09-23

## 2019-09-23 ENCOUNTER — TELEPHONE (OUTPATIENT)
Dept: CARDIOLOGY | Facility: CLINIC | Age: 84
End: 2019-09-23

## 2019-09-23 NOTE — TELEPHONE ENCOUNTER
Pt calling to report he lost the tala that he uses to trigger the loop recorder implant if he feels and episode or has syncope. Gave him [employer]'s # to call and ask about a replacement.

## 2019-09-23 NOTE — TELEPHONE ENCOUNTER
[employer] Linq Loop recorder    Remote alert for tachy event lasting 55 seconds, EGM shows irregular VS events during AF (which he is in 45% of the time)  HR histogram continues to show overall controlled rates. On warfarin. CAMILLE Carpio

## 2019-09-23 NOTE — TELEPHONE ENCOUNTER
"Requested Prescriptions   Pending Prescriptions Disp Refills     Ferrous Gluconate 324 (37.5 Fe) MG TABS [Pharmacy Med Name: FERROUS GLUCONATE 324 MG TAB] 100 tablet 0     Sig: TAKE 1 TABLET (324 MG) BY MOUTH DAILY (WITH BREAKFAST)   Last Written Prescription Date:  06/20/2019  Last Fill Quantity: 100,  # refills: 0   Last office visit: 6/17/2019 with prescribing provider:     Future Office Visit:      Iron Supplements Passed - 9/21/2019  9:01 PM        Passed - Patient is 12 years of age or older        Passed - Recent (12 mo) or future (30 days) visit within the authorizing provider's specialty     Patient had office visit in the last 12 months or has a visit in the next 30 days with authorizing provider or within the authorizing provider's specialty.  See \"Patient Info\" tab in inbasket, or \"Choose Columns\" in Meds & Orders section of the refill encounter.              Passed - Hgb OR Hct on record within the past 12 mos.     Patient need only have had a HGB or HCT on file in the past 12 mos. That result does not need to be normal.    Recent Labs   Lab Test 08/05/19  1220 06/17/19  1145 06/22/18  1142   HGB 13.9 12.2* 13.1*     Recent Labs   Lab Test 08/05/19  1220 06/17/19  1145 06/22/18  1142   HCT 42.4 38.6* 40.1       Please verify a HGB or HCT has been checked SINCE THE LAST DOSE CHANGE.            Passed - Medication is active on med list        "

## 2019-09-24 ENCOUNTER — ANTICOAGULATION THERAPY VISIT (OUTPATIENT)
Dept: NURSING | Facility: CLINIC | Age: 84
End: 2019-09-24
Payer: COMMERCIAL

## 2019-09-24 DIAGNOSIS — I48.20 CHRONIC ATRIAL FIBRILLATION (H): ICD-10-CM

## 2019-09-24 DIAGNOSIS — Z79.01 LONG TERM CURRENT USE OF ANTICOAGULANT THERAPY: ICD-10-CM

## 2019-09-24 LAB — INR POINT OF CARE: 3.3 (ref 0.86–1.14)

## 2019-09-24 PROCEDURE — 99207 ZZC NO CHARGE NURSE ONLY: CPT

## 2019-09-24 PROCEDURE — 36416 COLLJ CAPILLARY BLOOD SPEC: CPT

## 2019-09-24 PROCEDURE — 85610 PROTHROMBIN TIME: CPT | Mod: QW

## 2019-09-24 RX ORDER — FERROUS GLUCONATE 324(37.5)
TABLET ORAL
Qty: 100 TABLET | Refills: 0 | Status: SHIPPED | OUTPATIENT
Start: 2019-09-24 | End: 2019-12-31

## 2019-09-24 NOTE — PROGRESS NOTES
ANTICOAGULATION FOLLOW-UP CLINIC VISIT    Patient Name:  Louis Keller Jr.  Date:  9/24/2019  Contact Type:  Face to Face    SUBJECTIVE:  Patient Findings     Positives:   Missed doses (Held Warfarin on 09/20 due to supra INR and recent Amiodarone start-up.), Change in medications (Started Amiodarone on 09/13/19), Bruising (2 small bruises near L elbow)    Comments:   I reduced weekly Warfarin dose again due to the addition of Amiodarone on 09/13/19.        Clinical Outcomes     Negatives:   Major bleeding event, Thromboembolic event, Anticoagulation-related hospital admission, Anticoagulation-related ED visit, Anticoagulation-related fatality    Comments:   I reduced weekly Warfarin dose again due to the addition of Amiodarone on 09/13/19.           OBJECTIVE    INR Protime   Date Value Ref Range Status   09/24/2019 3.3 (A) 0.86 - 1.14 Final       ASSESSMENT / PLAN  INR assessment SUPRA    Recheck INR In: 6 DAYS    INR Location Clinic      Anticoagulation Summary  As of 9/24/2019    INR goal:   2.0-3.0   TTR:   81.2 % (3.5 y)   INR used for dosing:   3.3! (9/24/2019)   Warfarin maintenance plan:   2.5 mg (5 mg x 0.5) every day   Full warfarin instructions:   2.5 mg every day   Weekly warfarin total:   17.5 mg   Plan last modified:   Hope Baumann RN (9/24/2019)   Next INR check:   9/30/2019   Priority:   INR   Target end date:       Indications    Long-term (current) use of anticoagulants [Z79.01] [Z79.01]  Atrial fibrillation (H) [I48.91]             Anticoagulation Episode Summary     INR check location:       Preferred lab:       Send INR reminders to:   [facility] [place]    Comments:           Anticoagulation Care Providers     Provider Role Specialty Phone number    Joselito Armas MD Responsible Internal Medicine [phone]            See the Encounter Report to view Anticoagulation Flowsheet and Dosing Calendar (Go to Encounters tab in chart review, and find the Anticoagulation Therapy  Visit)        Hope Baumann RN

## 2019-09-25 ENCOUNTER — DOCUMENTATION ONLY (OUTPATIENT)
Dept: CARDIOLOGY | Facility: CLINIC | Age: 84
End: 2019-09-25

## 2019-09-25 NOTE — TELEPHONE ENCOUNTER
[employer] ILR    Remote alert for tachy episode. EGM shows AF with RVR lasting 32 seconds, average V rate 167 bpm. Pt has known AF and takes warfarin. Dr. Gleason recently restarted pt's amiodarone on 9/12/2019, will continue to monitor. EC RN.

## 2019-09-26 ENCOUNTER — TRANSFERRED RECORDS (OUTPATIENT)
Dept: HEALTH INFORMATION MANAGEMENT | Facility: CLINIC | Age: 84
End: 2019-09-26

## 2019-09-26 ENCOUNTER — DOCUMENTATION ONLY (OUTPATIENT)
Dept: CARDIOLOGY | Facility: CLINIC | Age: 84
End: 2019-09-26

## 2019-09-26 NOTE — TELEPHONE ENCOUNTER
[employer] Linq    Remote alert on Loop recorder with Tachy event. EGM shows AF with RVR, duration of RVR was 4 minutes annd 40 seconds with an avg rate of 158 BPM. Patient has recently been in AF 34% of the time. Since loop was implanted in August of this year he has been in AF 44% of the time. The histogram is still showing overall stable heart rates despite frequent bursts of RVR. No NSVT detected. Will continue to monitor. CAMILLE Carpio

## 2019-09-30 ENCOUNTER — ANTICOAGULATION THERAPY VISIT (OUTPATIENT)
Dept: NURSING | Facility: CLINIC | Age: 84
End: 2019-09-30
Payer: COMMERCIAL

## 2019-09-30 DIAGNOSIS — I48.20 CHRONIC ATRIAL FIBRILLATION (H): ICD-10-CM

## 2019-09-30 DIAGNOSIS — Z79.01 LONG TERM CURRENT USE OF ANTICOAGULANT THERAPY: ICD-10-CM

## 2019-09-30 LAB — INR POINT OF CARE: 1.9 (ref 0.86–1.14)

## 2019-09-30 PROCEDURE — 36416 COLLJ CAPILLARY BLOOD SPEC: CPT

## 2019-09-30 PROCEDURE — 85610 PROTHROMBIN TIME: CPT | Mod: QW

## 2019-09-30 NOTE — PROGRESS NOTES
ANTICOAGULATION FOLLOW-UP CLINIC VISIT    Patient Name:  Louis Keller Jr.  Date:  2019  Contact Type:  Face to Face    SUBJECTIVE:  Patient Findings     Comments:   The patient was assessed for diet, medication, and activity level changes, missed or extra doses, bruising or bleeding, with no problem findings.  Amio was started         Clinical Outcomes     Comments:   The patient was assessed for diet, medication, and activity level changes, missed or extra doses, bruising or bleeding, with no problem findings.  Amio was started            OBJECTIVE    INR Protime   Date Value Ref Range Status   2019 1.9 (A) 0.86 - 1.14 Final       ASSESSMENT / PLAN  No question data found.  Anticoagulation Summary  As of 2019    INR goal:   2.0-3.0   TTR:   81.2 % (3.5 y)   INR used for dosin.9! (2019)   Warfarin maintenance plan:   2.5 mg (5 mg x 0.5) every day   Full warfarin instructions:   : 5 mg; Otherwise 2.5 mg every day   Weekly warfarin total:   17.5 mg   Plan last modified:   Hope Baumann RN (2019)   Next INR check:   10/7/2019   Priority:   INR   Target end date:       Indications    Long-term (current) use of anticoagulants [Z79.01] [Z79.01]  Atrial fibrillation (H) [I48.91]             Anticoagulation Episode Summary     INR check location:       Preferred lab:       Send INR reminders to:   [facility] [employer] [place]    Comments:           Anticoagulation Care Providers     Provider Role Specialty Phone number    Joselito Armas MD [facility] Internal Medicine [phone]            See the Encounter Report to view Anticoagulation Flowsheet and Dosing Calendar (Go to Encounters tab in chart review, and find the Anticoagulation Therapy Visit)    Dosage adjustment made based on physician directed care plan.    Carolyn Lynn, RN

## 2019-10-01 DIAGNOSIS — I51.89 DIASTOLIC DYSFUNCTION: ICD-10-CM

## 2019-10-01 DIAGNOSIS — R06.09 DOE (DYSPNEA ON EXERTION): ICD-10-CM

## 2019-10-01 DIAGNOSIS — R60.0 BILATERAL LEG EDEMA: ICD-10-CM

## 2019-10-01 RX ORDER — FUROSEMIDE 20 MG
20 TABLET ORAL DAILY
Qty: 90 TABLET | Refills: 1 | Status: SHIPPED | OUTPATIENT
Start: 2019-10-01 | End: 2020-03-04

## 2019-10-01 NOTE — TELEPHONE ENCOUNTER
Medication Refilled: furosemide  Last office visit: 19  Last Labs/EK19  Next office visit: 2019 orders in [facility]  Pharmacy sent to: MARIJA De Souza RN

## 2019-10-03 ENCOUNTER — DOCUMENTATION ONLY (OUTPATIENT)
Dept: CARDIOLOGY | Facility: CLINIC | Age: 84
End: 2019-10-03

## 2019-10-03 NOTE — TELEPHONE ENCOUNTER
Remote alert received for tachy episode. EGM shows RVR lasting 1 min 42 sec with rate in 150s. AT/AF burden 24%.   Patient is on amio and warfarin.

## 2019-10-04 DIAGNOSIS — R19.7 POSTCHOLECYSTECTOMY DIARRHEA: ICD-10-CM

## 2019-10-04 DIAGNOSIS — K91.89 POSTCHOLECYSTECTOMY DIARRHEA: ICD-10-CM

## 2019-10-04 DIAGNOSIS — E78.5 HYPERLIPIDEMIA LDL GOAL <100: ICD-10-CM

## 2019-10-04 NOTE — TELEPHONE ENCOUNTER
"Requested Prescriptions   Pending Prescriptions Disp Refills     cholestyramine (QUESTRAN) 4 g packet [Pharmacy Med Name: CHOLESTYRAMINE PACKET] 180 packet 1     Sig: TAKE 1 PACKET (4 G) BY MOUTH 2 TIMES DAILY (WITH MEALS)   Last Written Prescription Date:  01/21/2019  Last Fill Quantity: 180,  # refills: 01   Last office visit: 6/17/2019 with prescribing provider:     Future Office Visit:      Bile Acid Sequestrant Agents Passed - 10/4/2019  2:33 PM        Passed - Lipid panel on file in past 12 mos     Recent Labs   Lab Test 12/19/18  0814  03/20/15  0836   CHOL 128   < > 131   TRIG 91   < > 96   HDL 58   < > 66   LDL 52   < > 46   NHDL 70   < >  --    VLDL  --   --  19   CHOLHDLRATIO  --   --  2.0    < > = values in this interval not displayed.               Passed - Recent (12 mo) or future (30 days) visit within the authorizing provider's specialty     Patient has had an office visit with the authorizing provider or a provider within the authorizing providers department within the previous 12 mos or has a future within next 30 days. See \"Patient Info\" tab in inbasket, or \"Choose Columns\" in Meds & Orders section of the refill encounter.              Passed - Medication is active on med list        Passed - Patient is age 18 years or older        "

## 2019-10-07 ENCOUNTER — ANTICOAGULATION THERAPY VISIT (OUTPATIENT)
Dept: NURSING | Facility: CLINIC | Age: 84
End: 2019-10-07
Payer: COMMERCIAL

## 2019-10-07 DIAGNOSIS — R00.2 HEART PALPITATIONS: ICD-10-CM

## 2019-10-07 DIAGNOSIS — Z79.01 LONG TERM CURRENT USE OF ANTICOAGULANT THERAPY: ICD-10-CM

## 2019-10-07 DIAGNOSIS — I48.91 ATRIAL FIBRILLATION (H): ICD-10-CM

## 2019-10-07 DIAGNOSIS — I48.91 NEW ONSET ATRIAL FIBRILLATION (H): ICD-10-CM

## 2019-10-07 DIAGNOSIS — Z79.01 CHRONIC ANTICOAGULATION: ICD-10-CM

## 2019-10-07 DIAGNOSIS — Z79.01 LONG TERM CURRENT USE OF ANTICOAGULANTS WITH INR GOAL OF 2.0-3.0: Primary | ICD-10-CM

## 2019-10-07 LAB — INR POINT OF CARE: 1.9 (ref 0.86–1.14)

## 2019-10-07 PROCEDURE — 36416 COLLJ CAPILLARY BLOOD SPEC: CPT

## 2019-10-07 PROCEDURE — 99207 ZZC NO CHARGE NURSE ONLY: CPT

## 2019-10-07 PROCEDURE — 85610 PROTHROMBIN TIME: CPT | Mod: QW

## 2019-10-07 RX ORDER — WARFARIN SODIUM 5 MG/1
TABLET ORAL
Qty: 65 TABLET | Refills: 1 | Status: SHIPPED | OUTPATIENT
Start: 2019-10-07 | End: 2019-11-07 | Stop reason: DRUGHIGH

## 2019-10-07 RX ORDER — CHOLESTYRAMINE 4 G/9G
POWDER, FOR SUSPENSION ORAL
Qty: 180 PACKET | Refills: 0 | Status: SHIPPED | OUTPATIENT
Start: 2019-10-07 | End: 2019-12-31

## 2019-10-07 NOTE — PROGRESS NOTES
ANTICOAGULATION FOLLOW-UP CLINIC VISIT    Patient Name:  Louis Keller Jr.  Date:  10/7/2019  Contact Type:  Face to Face    SUBJECTIVE:  Patient Findings     Positives:   Upcoming invasive procedure (Venous ablation R leg scheduled for )    Comments:   2nd sub-therapeutic INR so I increased patient's weekly Warfarin dose. Patient has been on Amiodarone since .        Clinical Outcomes     Negatives:   Major bleeding event, Thromboembolic event, Anticoagulation-related hospital admission, Anticoagulation-related ED visit, Anticoagulation-related fatality    Comments:   2nd sub-therapeutic INR so I increased patient's weekly Warfarin dose. Patient has been on Amiodarone since .           OBJECTIVE    INR Protime   Date Value Ref Range Status   10/07/2019 1.9 (A) 0.86 - 1.14 Final       ASSESSMENT / PLAN  INR assessment SUB    Recheck INR In: 1 WEEK    INR Location Clinic      Anticoagulation Summary  As of 10/7/2019    INR goal:   2.0-3.0   TTR:   80.8 % (3.5 y)   INR used for dosin.9! (10/7/2019)   Warfarin maintenance plan:   5 mg (5 mg x 1) every Mon; 2.5 mg (5 mg x 0.5) all other days   Full warfarin instructions:   5 mg every Mon; 2.5 mg all other days   Weekly warfarin total:   20 mg   Plan last modified:   Hope Baumann RN (10/7/2019)   Next INR check:   10/14/2019   Priority:   INR   Target end date:       Indications    Long-term (current) use of anticoagulants [Z79.01] [Z79.01]  Atrial fibrillation (H) [I48.91]             Anticoagulation Episode Summary     INR check location:       Preferred lab:       Send INR reminders to:   [facility] [employer] [place]    Comments:           Anticoagulation Care Providers     Provider Role Specialty Phone number    Joselito Armas MD Responsible Internal Medicine [phone]            See the Encounter Report to view Anticoagulation Flowsheet and Dosing Calendar (Go to Encounters tab in chart review, and find the Anticoagulation Therapy  Visit)        Hope Baumann RN

## 2019-10-13 ENCOUNTER — DOCUMENTATION ONLY (OUTPATIENT)
Dept: CARDIOLOGY | Facility: CLINIC | Age: 84
End: 2019-10-13

## 2019-10-13 DIAGNOSIS — R55 NEAR SYNCOPE: ICD-10-CM

## 2019-10-13 DIAGNOSIS — I48.20 CHRONIC A-FIB (H): ICD-10-CM

## 2019-10-13 DIAGNOSIS — I87.2 VENOUS (PERIPHERAL) INSUFFICIENCY: ICD-10-CM

## 2019-10-13 NOTE — TELEPHONE ENCOUNTER
[employer] Loop recorder- No charge    Remote alert for 5 tachy events in past 2 days. The one stored EGM again shows RVR lasting 4 minutes and 2 seconds with an average rate of 158 BPM.  Patient has remained in PAF about 25 % of the time while on Amio X 1 month. He has frequent short bursts of RVR but the overall HR histogram appears stable. Loog book shows PAF events lasting 1-3 hours with overall avg V response during event at 62-78 BPM.  I will update Dr Gleason. Fer Carpio

## 2019-10-14 ENCOUNTER — ANTICOAGULATION THERAPY VISIT (OUTPATIENT)
Dept: NURSING | Facility: CLINIC | Age: 84
End: 2019-10-14
Payer: COMMERCIAL

## 2019-10-14 DIAGNOSIS — I48.91 ATRIAL FIBRILLATION (H): ICD-10-CM

## 2019-10-14 DIAGNOSIS — Z79.01 LONG TERM CURRENT USE OF ANTICOAGULANT THERAPY: ICD-10-CM

## 2019-10-14 LAB — INR POINT OF CARE: 2.1 (ref 0.86–1.14)

## 2019-10-14 PROCEDURE — 36416 COLLJ CAPILLARY BLOOD SPEC: CPT

## 2019-10-14 PROCEDURE — 85610 PROTHROMBIN TIME: CPT | Mod: QW

## 2019-10-14 PROCEDURE — 99207 ZZC NO CHARGE NURSE ONLY: CPT

## 2019-10-14 RX ORDER — AMIODARONE HYDROCHLORIDE 200 MG/1
200 TABLET ORAL DAILY
Qty: 90 TABLET | Refills: 3 | Status: SHIPPED | OUTPATIENT
Start: 2019-10-14 | End: 2020-09-14

## 2019-10-14 NOTE — PROGRESS NOTES
RN called patient and reviewed with him Dr. Gleason's recommendations. RN updated patient's med list to reflect changes and sent in new RX for 200mg tablets of amiodarone. Patient verbalized understanding of plan and will call clinic if he develops any new symptoms or if he has any further questions.           Dr. Gleason's response  I'll forward to team 4 to have him increase his amiodarone from 100mg to 200mg daily.

## 2019-10-14 NOTE — PROGRESS NOTES
ANTICOAGULATION FOLLOW-UP CLINIC VISIT    Patient Name:  Louis Keller Jr.  Date:  10/14/2019  Contact Type:  Face to Face    SUBJECTIVE:  Patient Findings     Positives:   Change in medications (Cardiology increased Amiodarone from 100mg to 200mg daily effective today, 10/14/19.)    Comments:   I had increased patient's weekly Warfarin dose last week due to 2 sub-therapeutic INR's, now with the Amiodarone increase, I will continue to monitor INR closely.        Clinical Outcomes     Negatives:   Major bleeding event, Thromboembolic event, Anticoagulation-related hospital admission, Anticoagulation-related ED visit, Anticoagulation-related fatality    Comments:   I had increased patient's weekly Warfarin dose last week due to 2 sub-therapeutic INR's, now with the Amiodarone increase, I will continue to monitor INR closely.           OBJECTIVE    INR Protime   Date Value Ref Range Status   10/14/2019 2.1 (A) 0.86 - 1.14 Final       ASSESSMENT / PLAN  INR assessment THER    Recheck INR In: 10 DAYS    INR Location Clinic      Anticoagulation Summary  As of 10/14/2019    INR goal:   2.0-3.0   TTR:   80.6 % (3.5 y)   INR used for dosin.1 (10/14/2019)   Warfarin maintenance plan:   5 mg (5 mg x 1) every Mon; 2.5 mg (5 mg x 0.5) all other days   Full warfarin instructions:   5 mg every Mon; 2.5 mg all other days   Weekly warfarin total:   20 mg   No change documented:   Hope Baumann, RN   Plan last modified:   Hope Baumann RN (10/7/2019)   Next INR check:   10/24/2019   Priority:   INR   Target end date:       Indications    Long-term (current) use of anticoagulants [Z79.01] [Z79.01]  Atrial fibrillation (H) [I48.91]             Anticoagulation Episode Summary     INR check location:       Preferred lab:       Send INR reminders to:   [employer] [place]    Comments:           Anticoagulation Care Providers     Provider Role Specialty Phone number    Joselito Armas MD Responsible Internal Medicine  [phone]            See the Encounter Report to view Anticoagulation Flowsheet and Dosing Calendar (Go to Encounters tab in chart review, and find the Anticoagulation Therapy Visit)        Hope Baumann RN

## 2019-10-17 DIAGNOSIS — I87.331: Primary | ICD-10-CM

## 2019-10-24 ENCOUNTER — ANTICOAGULATION THERAPY VISIT (OUTPATIENT)
Dept: NURSING | Facility: CLINIC | Age: 84
End: 2019-10-24
Payer: COMMERCIAL

## 2019-10-24 DIAGNOSIS — Z79.01 LONG TERM CURRENT USE OF ANTICOAGULANT THERAPY: ICD-10-CM

## 2019-10-24 DIAGNOSIS — I48.91 ATRIAL FIBRILLATION (H): ICD-10-CM

## 2019-10-24 LAB — INR POINT OF CARE: 3.4 (ref 0.86–1.14)

## 2019-10-24 PROCEDURE — 99207 ZZC NO CHARGE NURSE ONLY: CPT

## 2019-10-24 PROCEDURE — 85610 PROTHROMBIN TIME: CPT | Mod: QW

## 2019-10-24 PROCEDURE — 36416 COLLJ CAPILLARY BLOOD SPEC: CPT

## 2019-10-24 NOTE — PROGRESS NOTES
ANTICOAGULATION FOLLOW-UP CLINIC VISIT    Patient Name:  Louis Keller Jr.  Date:  10/24/2019  Contact Type:  Face to Face    SUBJECTIVE:  Patient Findings     Positives:   Change in medications (Amiodarone increased from 100mg to 200mg daily on 10/14/19)    Comments:   INR herman due to increase in Amiodarone dose increase on 10/14 so patient will hold Warfarin today since we can't get his dose lower than 2.5mg today.  I informed patient I may switch his Warfarin to 2.5mg tablets when he gets close to needing a refill.        Clinical Outcomes     Negatives:   Major bleeding event, Thromboembolic event, Anticoagulation-related hospital admission, Anticoagulation-related ED visit, Anticoagulation-related fatality    Comments:   INR herman due to increase in Amiodarone dose increase on 10/14 so patient will hold Warfarin today since we can't get his dose lower than 2.5mg today.  I informed patient I may switch his Warfarin to 2.5mg tablets when he gets close to needing a refill.           OBJECTIVE    INR Protime   Date Value Ref Range Status   10/24/2019 3.4 (A) 0.86 - 1.14 Final       ASSESSMENT / PLAN  INR assessment SUPRA    Recheck INR In: 8 DAYS    INR Location Clinic      Anticoagulation Summary  As of 10/24/2019    INR goal:   2.0-3.0   TTR:   80.5 % (3.5 y)   INR used for dosing:   3.4! (10/24/2019)   Warfarin maintenance plan:   5 mg (5 mg x 1) every Mon; 2.5 mg (5 mg x 0.5) all other days   Full warfarin instructions:   10/24: Hold; Otherwise 5 mg every Mon; 2.5 mg all other days   Weekly warfarin total:   20 mg   Plan last modified:   Hope Baumann RN (10/24/2019)   Next INR check:   11/1/2019   Priority:   INR   Target end date:       Indications    Long-term (current) use of anticoagulants [Z79.01] [Z79.01]  Atrial fibrillation (H) [I48.91]             Anticoagulation Episode Summary     INR check location:       Preferred lab:       Send INR reminders to:   [facility]    Comments:            Anticoagulation Care Providers     Provider Role Specialty Phone number    Joselito Armas MD Responsible Internal Medicine [phone]            See the Encounter Report to view Anticoagulation Flowsheet and Dosing Calendar (Go to Encounters tab in chart review, and find the Anticoagulation Therapy Visit)        Hope Baumann RN

## 2019-10-31 ENCOUNTER — DOCUMENTATION ONLY (OUTPATIENT)
Dept: CARDIOLOGY | Facility: CLINIC | Age: 84
End: 2019-10-31

## 2019-10-31 NOTE — TELEPHONE ENCOUNTER
[employer] Loop recorder    Remote alert for tachy event. 1 stored EGM showed a 33 second burst of RVR at a rate of 154 BPM. Pt is on AC. Overall HR is controlled. Will continue to monitor AF burden while on Amiodarone and update Dr Gleason as needed. Current burden is 25%.  CAMILLE Carpio

## 2019-11-01 ENCOUNTER — ANTICOAGULATION THERAPY VISIT (OUTPATIENT)
Dept: NURSING | Facility: CLINIC | Age: 84
End: 2019-11-01
Payer: COMMERCIAL

## 2019-11-01 DIAGNOSIS — I48.91 ATRIAL FIBRILLATION (H): ICD-10-CM

## 2019-11-01 DIAGNOSIS — Z79.01 LONG TERM CURRENT USE OF ANTICOAGULANT THERAPY: ICD-10-CM

## 2019-11-01 LAB — INR POINT OF CARE: 3.3 (ref 0.86–1.14)

## 2019-11-01 PROCEDURE — 36416 COLLJ CAPILLARY BLOOD SPEC: CPT

## 2019-11-01 PROCEDURE — 85610 PROTHROMBIN TIME: CPT | Mod: QW

## 2019-11-01 PROCEDURE — 99207 ZZC NO CHARGE NURSE ONLY: CPT

## 2019-11-01 NOTE — PROGRESS NOTES
ANTICOAGULATION FOLLOW-UP CLINIC VISIT    Patient Name:  Louis Keller Jr.  Date:  11/1/2019  Contact Type:  Face to Face    SUBJECTIVE:  Patient Findings     Positives:   Missed doses (Intentional Warfarin hold on 10/24 due to supra INR and recent Amiodarone increase.), Change in medications (Amiodarone dose increased to 200mg daily on 10/14/19), Bruising (3 small bruises on top of L hand)    Comments:   INR still trending high since Amiodarone increase; however, I gave patient wrong Warfarin dosing this week, in error.    I held patient's Warfarin dose on 10/24 but that was 8 days ago so not applicable to today's INR, unfortunately.  Weekly Warfarin dose decreased 1 step=12.5%. Patient will eat greens today.        Clinical Outcomes     Negatives:   Major bleeding event, Thromboembolic event, Anticoagulation-related hospital admission, Anticoagulation-related ED visit, Anticoagulation-related fatality    Comments:   INR still trending high since Amiodarone increase; however, I gave patient wrong Warfarin dosing this week, in error.    I held patient's Warfarin dose on 10/24 but that was 8 days ago so not applicable to today's INR, unfortunately.  Weekly Warfarin dose decreased 1 step=12.5%. Patient will eat greens today.           OBJECTIVE    INR Protime   Date Value Ref Range Status   11/01/2019 3.3 (A) 0.86 - 1.14 Final       ASSESSMENT / PLAN  INR assessment SUPRA    Recheck INR In: 6 DAYS    INR Location Clinic      Anticoagulation Summary  As of 11/1/2019    INR goal:   2.0-3.0   TTR:   80.0 % (3.6 y)   INR used for dosing:   3.3! (11/1/2019)   Warfarin maintenance plan:   2.5 mg (5 mg x 0.5) every day   Full warfarin instructions:   2.5 mg every day   Weekly warfarin total:   17.5 mg   Plan last modified:   Hope Baumann RN (11/1/2019)   Next INR check:   11/8/2019   Priority:   INR   Target end date:       Indications    Long-term (current) use of anticoagulants [Z79.01] [Z79.01]  Atrial fibrillation  (H) [I48.91]             Anticoagulation Episode Summary     INR check location:       Preferred lab:       Send INR reminders to:   [facility] [employer] [place]    Comments:           Anticoagulation Care Providers     Provider Role Specialty Phone number    Joselito Armas MD [facility] Internal Medicine [phone]            See the Encounter Report to view Anticoagulation Flowsheet and Dosing Calendar (Go to Encounters tab in chart review, and find the Anticoagulation Therapy Visit)        Hope Baumann RN

## 2019-11-07 ENCOUNTER — ANTICOAGULATION THERAPY VISIT (OUTPATIENT)
Dept: NURSING | Facility: CLINIC | Age: 84
End: 2019-11-07
Payer: COMMERCIAL

## 2019-11-07 DIAGNOSIS — Z79.01 LONG TERM CURRENT USE OF ANTICOAGULANT THERAPY: ICD-10-CM

## 2019-11-07 DIAGNOSIS — I48.91 ATRIAL FIBRILLATION (H): ICD-10-CM

## 2019-11-07 LAB — INR POINT OF CARE: 3 (ref 0.86–1.14)

## 2019-11-07 PROCEDURE — 85610 PROTHROMBIN TIME: CPT | Mod: QW

## 2019-11-07 PROCEDURE — 36416 COLLJ CAPILLARY BLOOD SPEC: CPT

## 2019-11-07 PROCEDURE — 99207 ZZC NO CHARGE NURSE ONLY: CPT

## 2019-11-07 RX ORDER — WARFARIN SODIUM 2.5 MG/1
TABLET ORAL
Qty: 90 TABLET | Refills: 0 | Status: SHIPPED | OUTPATIENT
Start: 2019-11-07 | End: 2020-05-27

## 2019-11-07 NOTE — PROGRESS NOTES
ANTICOAGULATION FOLLOW-UP CLINIC VISIT    Patient Name:  Louis Keller Jr.  Date:  11/7/2019  Contact Type:  Face to Face    SUBJECTIVE:  Patient Findings     Positives:   Change in health (nose running more lately, doesn't seem to be a cold), Change in medications (amiodarone increased 10/14/2019, stopped chromium piccolate)        Clinical Outcomes     Negatives:   Major bleeding event, Thromboembolic event, Anticoagulation-related hospital admission, Anticoagulation-related ED visit, Anticoagulation-related fatality           OBJECTIVE    INR Protime   Date Value Ref Range Status   11/07/2019 3.0 (A) 0.86 - 1.14 Final       ASSESSMENT / PLAN  INR assessment THER    Recheck INR In: 1 WEEK    INR Location Clinic      Anticoagulation Summary  As of 11/7/2019    INR goal:   2.0-3.0   TTR:   79.6 % (3.6 y)   INR used for dosing:   3.0 (11/7/2019)   Warfarin maintenance plan:   2.5 mg (5 mg x 0.5) every day   Full warfarin instructions:   2.5 mg every day   Weekly warfarin total:   17.5 mg   Plan last modified:   Hope Baumann RN (11/1/2019)   Next INR check:   11/15/2019   Priority:   INR   Target end date:       Indications    Long-term (current) use of anticoagulants [Z79.01] [Z79.01]  Atrial fibrillation (H) [I48.91]             Anticoagulation Episode Summary     INR check location:       Preferred lab:       Send INR reminders to:   [facility]    Comments:           Anticoagulation Care Providers     Provider Role Specialty Phone number    Joselito Armas MD [facility] Internal Medicine [phone]            See the Encounter Report to view Anticoagulation Flowsheet and Dosing Calendar (Go to Encounters tab in chart review, and find the Anticoagulation Therapy Visit)    Will continue 2.5mg daily, discussed that may still see INR changes with amiodarone effect next week, but most recent dose change kept Louis in range.  Did send new RX to [facility] for future refills, and advised keeping on  file until we see INR next week.      Sera Souza, [facility]

## 2019-11-09 DIAGNOSIS — E11.9 DM TYPE 2 (DIABETES MELLITUS, TYPE 2) (H): ICD-10-CM

## 2019-11-11 ENCOUNTER — OFFICE VISIT (OUTPATIENT)
Dept: INTERNAL MEDICINE | Facility: CLINIC | Age: 84
End: 2019-11-11
Payer: COMMERCIAL

## 2019-11-11 VITALS
OXYGEN SATURATION: 98 % | BODY MASS INDEX: 25.62 KG/M2 | SYSTOLIC BLOOD PRESSURE: 134 MMHG | HEIGHT: 70 IN | DIASTOLIC BLOOD PRESSURE: 78 MMHG | HEART RATE: 82 BPM | WEIGHT: 179 LBS | RESPIRATION RATE: 12 BRPM | TEMPERATURE: 98 F

## 2019-11-11 DIAGNOSIS — I48.0 PAROXYSMAL ATRIAL FIBRILLATION (H): ICD-10-CM

## 2019-11-11 DIAGNOSIS — E78.5 HYPERLIPIDEMIA LDL GOAL <100: ICD-10-CM

## 2019-11-11 DIAGNOSIS — N18.30 TYPE 2 DIABETES MELLITUS WITH STAGE 3 CHRONIC KIDNEY DISEASE, WITHOUT LONG-TERM CURRENT USE OF INSULIN (H): ICD-10-CM

## 2019-11-11 DIAGNOSIS — E11.22 TYPE 2 DIABETES MELLITUS WITH STAGE 3 CHRONIC KIDNEY DISEASE, WITHOUT LONG-TERM CURRENT USE OF INSULIN (H): ICD-10-CM

## 2019-11-11 DIAGNOSIS — Z12.5 SCREENING FOR PROSTATE CANCER: ICD-10-CM

## 2019-11-11 DIAGNOSIS — Z00.00 ENCOUNTER FOR PREVENTATIVE ADULT HEALTH CARE EXAMINATION: Primary | ICD-10-CM

## 2019-11-11 LAB
ALBUMIN SERPL-MCNC: 4 G/DL (ref 3.4–5)
ALBUMIN UR-MCNC: NEGATIVE MG/DL
ALP SERPL-CCNC: 78 U/L (ref 40–150)
ALT SERPL W P-5'-P-CCNC: 34 U/L (ref 0–70)
APPEARANCE UR: CLEAR
AST SERPL W P-5'-P-CCNC: 34 U/L (ref 0–45)
BILIRUB DIRECT SERPL-MCNC: 0.4 MG/DL (ref 0–0.2)
BILIRUB SERPL-MCNC: 1.3 MG/DL (ref 0.2–1.3)
BILIRUB UR QL STRIP: NEGATIVE
CHOLEST SERPL-MCNC: 120 MG/DL
COLOR UR AUTO: YELLOW
GLUCOSE UR STRIP-MCNC: NEGATIVE MG/DL
HBA1C MFR BLD: 6.7 % (ref 0–5.6)
HDLC SERPL-MCNC: 55 MG/DL
HGB UR QL STRIP: ABNORMAL
KETONES UR STRIP-MCNC: NEGATIVE MG/DL
LDLC SERPL CALC-MCNC: 44 MG/DL
LEUKOCYTE ESTERASE UR QL STRIP: NEGATIVE
NITRATE UR QL: NEGATIVE
NONHDLC SERPL-MCNC: 65 MG/DL
PH UR STRIP: 5 PH (ref 5–7)
PROT SERPL-MCNC: 7.3 G/DL (ref 6.8–8.8)
PSA SERPL-ACNC: 4.21 UG/L (ref 0–4)
RBC #/AREA URNS AUTO: NORMAL /HPF
SOURCE: ABNORMAL
SP GR UR STRIP: 1.01 (ref 1–1.03)
T4 FREE SERPL-MCNC: 1.09 NG/DL (ref 0.76–1.46)
TRIGL SERPL-MCNC: 104 MG/DL
TSH SERPL DL<=0.005 MIU/L-ACNC: 6.38 MU/L (ref 0.4–4)
UROBILINOGEN UR STRIP-ACNC: 0.2 EU/DL (ref 0.2–1)
WBC #/AREA URNS AUTO: NORMAL /HPF

## 2019-11-11 PROCEDURE — 81001 URINALYSIS AUTO W/SCOPE: CPT | Performed by: INTERNAL MEDICINE

## 2019-11-11 PROCEDURE — 84443 ASSAY THYROID STIM HORMONE: CPT | Performed by: INTERNAL MEDICINE

## 2019-11-11 PROCEDURE — 99207 C PAF COMPLETED  NO CHARGE: CPT | Mod: 25 | Performed by: INTERNAL MEDICINE

## 2019-11-11 PROCEDURE — 80061 LIPID PANEL: CPT | Performed by: INTERNAL MEDICINE

## 2019-11-11 PROCEDURE — 99214 OFFICE O/P EST MOD 30 MIN: CPT | Mod: 25 | Performed by: INTERNAL MEDICINE

## 2019-11-11 PROCEDURE — 82043 UR ALBUMIN QUANTITATIVE: CPT | Performed by: INTERNAL MEDICINE

## 2019-11-11 PROCEDURE — G0103 PSA SCREENING: HCPCS | Performed by: INTERNAL MEDICINE

## 2019-11-11 PROCEDURE — 83036 HEMOGLOBIN GLYCOSYLATED A1C: CPT | Performed by: INTERNAL MEDICINE

## 2019-11-11 PROCEDURE — 80076 HEPATIC FUNCTION PANEL: CPT | Performed by: INTERNAL MEDICINE

## 2019-11-11 PROCEDURE — 36415 COLL VENOUS BLD VENIPUNCTURE: CPT | Performed by: INTERNAL MEDICINE

## 2019-11-11 PROCEDURE — 84439 ASSAY OF FREE THYROXINE: CPT | Performed by: INTERNAL MEDICINE

## 2019-11-11 PROCEDURE — 99397 PER PM REEVAL EST PAT 65+ YR: CPT | Performed by: INTERNAL MEDICINE

## 2019-11-11 ASSESSMENT — ENCOUNTER SYMPTOMS
HEADACHES: 0
PARESTHESIAS: 1
CONSTIPATION: 0
EYE PAIN: 0
DIARRHEA: 1
HEARTBURN: 0
SHORTNESS OF BREATH: 1
PALPITATIONS: 0
ABDOMINAL PAIN: 1
DIZZINESS: 0
COUGH: 0
NAUSEA: 0
JOINT SWELLING: 1
MYALGIAS: 1
WEAKNESS: 1
CHILLS: 0
SORE THROAT: 0
NERVOUS/ANXIOUS: 0
ARTHRALGIAS: 1
HEMATOCHEZIA: 0
FEVER: 0
HEMATURIA: 0
FREQUENCY: 1
DYSURIA: 0

## 2019-11-11 ASSESSMENT — MIFFLIN-ST. JEOR: SCORE: 1493.19

## 2019-11-11 ASSESSMENT — ACTIVITIES OF DAILY LIVING (ADL)
CURRENT_FUNCTION: SHOPPING REQUIRES ASSISTANCE
CURRENT_FUNCTION: NO ASSISTANCE NEEDED
CURRENT_FUNCTION: HOUSEWORK REQUIRES ASSISTANCE

## 2019-11-11 NOTE — NURSING NOTE
"Vital signs:  Temp: 98  F (36.7  C) Temp src: Oral BP: 134/78 Pulse: 82   Resp: 12 SpO2: 98 %     Height: 177.8 cm (5' 10\") Weight: 81.2 kg (179 lb)  Estimated body mass index is 25.68 kg/m  as calculated from the following:    Height as of this encounter: 1.778 m (5' 10\").    Weight as of this encounter: 81.2 kg (179 lb).          "

## 2019-11-11 NOTE — TELEPHONE ENCOUNTER
Requested Prescriptions   Pending Prescriptions Disp Refills     metFORMIN (GLUCOPHAGE) 500 MG tablet [Pharmacy Med Name: METFORMIN  MG TABLET] 180 tablet 0     Sig: TAKE 1 TABLET (500 MG) BY MOUTH 2 TIMES DAILY (WITH MEALS)   Last Written Prescription Date:  11/11/2019  Last Fill Quantity: 180,  # refills: 01   Last office visit: 6/17/2019 with prescribing provider:     Future Office Visit:   Next 5 appointments (look out 90 days)    Dec 18, 2019  1:10 PM CST  Return Visit with Susanna Haque PA-C  [facility] ([facility]) [address]  [facility] [id number] [phone]           Biguanide Agents Passed - 11/9/2019  9:26 PM        Passed - Blood pressure less than 140/90 in past 6 months     BP Readings from Last 3 Encounters:   11/11/19 134/78   09/12/19 126/72   08/05/19 134/84                 Passed - Patient has documented LDL within the past 12 mos.     Recent Labs   Lab Test 12/19/18  0814   LDL 52             Passed - Patient has had a Microalbumin in the past 15 mos.     Recent Labs   Lab Test 12/19/18  0815   MICROL 30   UMALCR 41.08*             Passed - Patient is age 10 or older        Passed - Patient has documented A1c within the specified period of time.     If HgbA1C is 8 or greater, it needs to be on file within the past 3 months.  If less than 8, must be on file within the past 6 months.     Recent Labs   Lab Test 11/11/19  0840   A1C 6.7*             Passed - Patient's CR is NOT>1.4 OR Patient's EGFR is NOT<45 within past 12 mos.     Recent Labs   Lab Test 09/12/19  1301   GFRESTIMATED 79   GFRESTBLACK >90       Recent Labs   Lab Test 09/12/19  1301   CR 0.84             Passed - Patient does NOT have a diagnosis of CHF.        Passed - Medication is active on med list        Passed - Recent (6 mo) or future (30 days) visit within the authorizing provider's specialty     Patient had office visit in the last 6 months or  "has a visit in the next 30 days with authorizing provider or within the authorizing provider's specialty.  See \"Patient Info\" tab in inbasket, or \"Choose Columns\" in Meds & Orders section of the refill encounter.            "

## 2019-11-11 NOTE — PROGRESS NOTES
"SUBJECTIVE:   Louis Keller Jr. is a 87 year old male who presents for Preventive Visit.      Are you in the first 12 months of your Medicare coverage?  No    Healthy Habits:     In general, how would you rate your overall health?  Fair    Frequency of exercise:  4-5 days/week    Duration of exercise:  15-30 minutes    Do you usually eat at least 4 servings of fruit and vegetables a day, include whole grains    & fiber and avoid regularly eating high fat or \"junk\" foods?  No    Taking medications regularly:  Yes    Medication side effects:  Lightheadedness    Ability to successfully perform activities of daily living:  Shopping requires assistance, housework requires assistance and no assistance needed    Home Safety:  Throw rugs in the hallway    Hearing Impairment:  Difficulty following a conversation in a noisy restaurant or crowded room, feel that people are mumbling or not speaking clearly, difficult to understand a speaker at a public meeting or Voodoo service, find that men's voices are easier to understand than woman's and difficulty understanding soft or whispered speech    In the past 6 months, have you been bothered by leaking of urine?  No    In general, how would you rate your overall mental or emotional health?  Good      PHQ-2 Total Score: 0    Additional concerns today:  Yes    Do you feel safe in your environment? Yes    Have you ever done Advance Care Planning? (For example, a Health Directive, POLST, or a discussion with a medical provider or your loved ones about your wishes): Yes, advance care planning is on file.      Fall risk  Fallen 2 or more times in the past year?: Yes  Any fall with injury in the past year?: No    Cognitive Screening   1) Repeat 3 items (Leader, Season, Table)    2) Clock draw: NORMAL  3) 3 item recall: Recalls 3 objects  Results: 3 items recalled: COGNITIVE IMPAIRMENT LESS LIKELY    Mini-CogTM Copyright S Nidhi. Licensed by the author for use in [facility] " Services; reprinted with permission (solori@[facility]). All rights reserved.      Do you have sleep apnea, excessive snoring or daytime drowsiness?: no    Reviewed and updated as needed this visit by clinical staff  Tobacco  Allergies  Meds  Med Hx  Surg Hx  Fam Hx  Soc Hx        Reviewed and updated as needed this visit by Provider        Social History     Tobacco Use     Smoking status: Never Smoker     Smokeless tobacco: Never Used   Substance Use Topics     Alcohol use: Yes     Alcohol/week: 0.0 standard drinks     Comment: wine - currently rare     If you drink alcohol do you typically have >3 drinks per day or >7 drinks per week? No    Alcohol Use 11/11/2019   Prescreen: >3 drinks/day or >7 drinks/week? No   Prescreen: >3 drinks/day or >7 drinks/week? -           PROBLEMS TO ADD ON...    Has H/O DM. On diet , exercise and Oral treatment - Metformin and Actos. Blood sugars are controlled- average 138 past month. No parestesias. No hypoglycemias.  Has H/O hyperlipidemia. On medical treatment and diet. No side effects. No muscle weakness, myalgias or upset stomach.   Has history of atrial fibrillation. On anticoagulation with Coumadin and rhythm control medication- Amiodarone. Asymptonatic - no chest pains , palpitations,  no side effects from medications. Seeing cardiology.       Current providers sharing in care for this patient include:   Patient Care Team:  Joselito Armas MD as PCP - General (Internal Medicine)  Joselito Amras MD as Assigned PCP    The following health maintenance items are reviewed in Epic and correct as of today:  Health Maintenance   Topic Date Due     ZOSTER IMMUNIZATION (1 of 2) 06/26/1982     ADVANCE CARE PLANNING  05/07/2017     OP ANNUAL INR REFERRAL  08/16/2018     DIABETIC FOOT EXAM  09/18/2018     MEDICARE ANNUAL WELLNESS VISIT  09/21/2019     FALL RISK ASSESSMENT  09/21/2019     A1C  12/17/2019     EYE EXAM  12/17/2019     LIPID  12/19/2019     MICROALBUMIN  12/19/2019  "    BMP  09/12/2020     TSH W/FREE T4 REFLEX  12/19/2020     DTAP/TDAP/TD IMMUNIZATION (4 - Td) 07/11/2023     PHQ-2  Completed     INFLUENZA VACCINE  Completed     PNEUMOCOCCAL IMMUNIZATION 65+ LOW/MEDIUM RISK  Completed     IPV IMMUNIZATION  Aged Out     MENINGITIS IMMUNIZATION  Aged Out     Lab work is in process  Labs reviewed in EPIC      Review of Systems   Constitutional: Negative for chills and fever.   HENT: Positive for congestion, ear pain and hearing loss. Negative for sore throat.    Eyes: Negative for pain and visual disturbance.   Respiratory: Positive for shortness of breath. Negative for cough.    Cardiovascular: Negative for chest pain, palpitations and peripheral edema.   Gastrointestinal: Positive for abdominal pain and diarrhea. Negative for constipation, heartburn, hematochezia and nausea.   Genitourinary: Positive for frequency, impotence and urgency. Negative for discharge, dysuria, genital sores and hematuria.   Musculoskeletal: Positive for arthralgias, joint swelling and myalgias.   Skin: Negative for rash.   Neurological: Positive for weakness and paresthesias. Negative for dizziness and headaches.   Psychiatric/Behavioral: Negative for mood changes. The patient is not nervous/anxious.          OBJECTIVE:   /78   Pulse 82   Temp 98  F (36.7  C) (Oral)   Resp 12   Ht 1.778 m (5' 10\")   Wt 81.2 kg (179 lb)   SpO2 98%   BMI 25.68 kg/m   Estimated body mass index is 25.68 kg/m  as calculated from the following:    Height as of this encounter: 1.778 m (5' 10\").    Weight as of this encounter: 81.2 kg (179 lb).  Physical Exam  GENERAL: elderly, frail , alert and no distress  EYES: Eyes grossly normal to inspection, PERRL and conjunctivae and sclerae normal  HENT: ear canals and TM's normal, nose and mouth without ulcers or lesions  NECK: no adenopathy, no asymmetry, masses, or scars and thyroid normal to palpation  RESP: lungs clear to auscultation - no rales, rhonchi or " "wheezes  CV: regular rate and rhythm, normal S1 S2, no S3 or S4, no murmur, click or rub, no peripheral edema and peripheral pulses strong  ABDOMEN: soft, nontender, no hepatosplenomegaly, no masses and bowel sounds normal  MS: no gross musculoskeletal defects noted, no edema  SKIN: no suspicious lesions or rashes  NEURO: Normal strength and tone, mentation intact and speech normal, generalized weakness, impaired balance, needs cane for support   PSYCH: mentation appears normal, affect normal/bright    Diagnostic Test Results:  Labs reviewed in Epic    ASSESSMENT / PLAN:       ICD-10-CM    1. Encounter for preventative adult health care examination Z00.00 Hepatic panel     Lipid panel reflex to direct LDL Fasting     TSH with free T4 reflex     Hemoglobin A1c     Prostate spec antigen screen     Albumin Random Urine Quantitative with Creat Ratio     *UA reflex to Microscopic   2. Type 2 diabetes mellitus with stage 3 chronic kidney disease, without long-term current use of insulin (H) E11.22 metFORMIN (GLUCOPHAGE) 500 MG tablet    N18.3 Hepatic panel     Hemoglobin A1c     Albumin Random Urine Quantitative with Creat Ratio     *UA reflex to Microscopic     OFFICE/OUTPT VISIT,EST,LEVL III   3. Hyperlipidemia LDL goal <100 E78.5 Hepatic panel     Lipid panel reflex to direct LDL Fasting     OFFICE/OUTPT VISIT,EST,LEVL III   4. Screening for prostate cancer Z12.5 Prostate spec antigen screen   5. Paroxysmal atrial fibrillation (H) I48.0 TSH with free T4 reflex     OFFICE/OUTPT VISIT,EST,LEVL III       Assess diabetic control  Cont treatment   Controlled heart rhythm and rate on treatment     COUNSELING:  Reviewed preventive health counseling, as reflected in patient instructions       Regular exercise       Healthy diet/nutrition       Vision screening       Hearing screening    Estimated body mass index is 25.68 kg/m  as calculated from the following:    Height as of this encounter: 1.778 m (5' 10\").    Weight as of " this encounter: 81.2 kg (179 lb).         reports that he has never smoked. He has never used smokeless tobacco.      Appropriate preventive services were discussed with this patient, including applicable screening as appropriate for cardiovascular disease, diabetes, osteopenia/osteoporosis, and glaucoma.  As appropriate for age/gender, discussed screening for colorectal cancer, prostate cancer, breast cancer, and cervical cancer. Checklist reviewing preventive services available has been given to the patient.    Reviewed patients plan of care and provided an AVS. The Intermediate Care Plan ( asthma action plan, low back pain action plan, and migraine action plan) for Louis meets the Care Plan requirement. This Care Plan has been established and reviewed with the Patient.    Counseling Resources:  ATP IV Guidelines  Pooled Cohorts Equation Calculator  Breast Cancer Risk Calculator  FRAX Risk Assessment  ICSI Preventive Guidelines  Dietary Guidelines for Americans, 2010  USDA's MyPlate  ASA Prophylaxis  Lung CA Screening    Joselito Armas MD  [facility]    Identified Health Risks:

## 2019-11-12 ENCOUNTER — TELEPHONE (OUTPATIENT)
Dept: CARDIOLOGY | Facility: CLINIC | Age: 84
End: 2019-11-12

## 2019-11-12 DIAGNOSIS — I87.2 VENOUS (PERIPHERAL) INSUFFICIENCY: Primary | ICD-10-CM

## 2019-11-12 LAB
CREAT UR-MCNC: 52 MG/DL
MICROALBUMIN UR-MCNC: 21 MG/L
MICROALBUMIN/CREAT UR: 41.55 MG/G CR (ref 0–17)

## 2019-11-12 RX ORDER — DIAZEPAM 5 MG
5 TABLET ORAL SEE ADMIN INSTRUCTIONS
Qty: 2 TABLET | Refills: 0 | Status: SHIPPED | OUTPATIENT
Start: 2019-11-12 | End: 2019-12-18

## 2019-11-12 NOTE — TELEPHONE ENCOUNTER
Patient is scheduled for vein procedure on 11/18/19. Will route to Dr. Gleason for valium script.

## 2019-11-14 ENCOUNTER — TELEPHONE (OUTPATIENT)
Dept: CARDIOLOGY | Facility: CLINIC | Age: 84
End: 2019-11-14

## 2019-11-14 ENCOUNTER — ANCILLARY PROCEDURE (OUTPATIENT)
Dept: CARDIOLOGY | Facility: CLINIC | Age: 84
End: 2019-11-14
Attending: INTERNAL MEDICINE
Payer: COMMERCIAL

## 2019-11-14 ENCOUNTER — DOCUMENTATION ONLY (OUTPATIENT)
Dept: CARDIOLOGY | Facility: CLINIC | Age: 84
End: 2019-11-14

## 2019-11-14 DIAGNOSIS — Z45.09 ENCOUNTER FOR LOOP RECORDER CHECK: ICD-10-CM

## 2019-11-14 PROCEDURE — 93298 REM INTERROG DEV EVAL SCRMS: CPT | Performed by: INTERNAL MEDICINE

## 2019-11-14 PROCEDURE — 93299 CARDIAC DEVICE CHECK - REMOTE: CPT | Performed by: INTERNAL MEDICINE

## 2019-11-14 NOTE — TELEPHONE ENCOUNTER
[employer] Reveal Loop Recorder Transmission/ requested per Dr Gleason to assess AF burden/HR  Time in AT/AF: 34.4% since implant 8-5-19. Note on 11-12-19 was 25%  Heart rate: Rates fairly well controlled  bpm; approx 15% rates are seen 100-200 bpm/ These short bursts of RVR, not sustained.     Careplan: Pt takes AC; his amiodarone recently increase per Dr Gleason. Will forward this report with copies of the rate histogram. SueLangenbrunnerRN

## 2019-11-14 NOTE — TELEPHONE ENCOUNTER
Called patient to review Pre- Ablation orders:    Patient will increase fluid the day before the procedure.  Patient will hold furosemide until after the ablation.  Patient will not wear compression stockings the day before or the day of the ablation.  Valium was explained to patient and ordered to pharmacy of choice.  Patient aware to bring Valium to clinic.  Patient will have a  to bring them home.  Follow-up ultrasound for Wednesday scheduled.  Follow-up OV for 1 month scheduled.     Patient has no questions.

## 2019-11-15 ENCOUNTER — ANTICOAGULATION THERAPY VISIT (OUTPATIENT)
Dept: NURSING | Facility: CLINIC | Age: 84
End: 2019-11-15
Payer: COMMERCIAL

## 2019-11-15 DIAGNOSIS — I48.0 PAROXYSMAL ATRIAL FIBRILLATION (H): ICD-10-CM

## 2019-11-15 DIAGNOSIS — Z79.01 LONG TERM CURRENT USE OF ANTICOAGULANT THERAPY: ICD-10-CM

## 2019-11-15 LAB — INR POINT OF CARE: 2.6 (ref 0.86–1.14)

## 2019-11-15 PROCEDURE — 36416 COLLJ CAPILLARY BLOOD SPEC: CPT

## 2019-11-15 PROCEDURE — 99207 ZZC NO CHARGE NURSE ONLY: CPT

## 2019-11-15 PROCEDURE — 85610 PROTHROMBIN TIME: CPT | Mod: QW

## 2019-11-15 NOTE — PROGRESS NOTES
ANTICOAGULATION FOLLOW-UP CLINIC VISIT    Patient Name:  Louis Keller Jr.  Date:  11/15/2019  Contact Type:  Face to Face    SUBJECTIVE:  Patient Findings     Positives:   Upcoming invasive procedure (Vein procedure-R leg scheduled for , pt states he was NOT advised to hold Warfarin.), Change in medications (Amiodarone increased on 10/14/19)        Clinical Outcomes     Negatives:   Major bleeding event, Thromboembolic event, Anticoagulation-related hospital admission, Anticoagulation-related ED visit, Anticoagulation-related fatality           OBJECTIVE    INR Protime   Date Value Ref Range Status   11/15/2019 2.6 (A) 0.86 - 1.14 Final       ASSESSMENT / PLAN  INR assessment THER    Recheck INR In: 10 DAYS    INR Location Clinic      Anticoagulation Summary  As of 11/15/2019    INR goal:   2.0-3.0   TTR:   79.8 % (3.6 y)   INR used for dosin.6 (11/15/2019)   Warfarin maintenance plan:   2.5 mg (5 mg x 0.5) every day   Full warfarin instructions:   2.5 mg every day   Weekly warfarin total:   17.5 mg   Plan last modified:   Hope Baumann RN (11/15/2019)   Next INR check:   2019   Priority:   Maintenance   Target end date:       Indications    Long-term (current) use of anticoagulants [Z79.01] [Z79.01]  Atrial fibrillation (H) [I48.91]             Anticoagulation Episode Summary     INR check location:       Preferred lab:       Send INR reminders to:   [facility]    Comments:           Anticoagulation Care Providers     Provider Role Specialty Phone number    Joselito Armas MD [facility] [phone]            See the Encounter Report to view Anticoagulation Flowsheet and Dosing Calendar (Go to Encounters tab in chart review, and find the Anticoagulation Therapy Visit)        Hope Baumann RN

## 2019-11-18 ENCOUNTER — ANCILLARY PROCEDURE (OUTPATIENT)
Dept: VASCULAR ULTRASOUND | Facility: CLINIC | Age: 84
End: 2019-11-18
Attending: INTERNAL MEDICINE
Payer: COMMERCIAL

## 2019-11-18 VITALS
OXYGEN SATURATION: 96 % | DIASTOLIC BLOOD PRESSURE: 72 MMHG | SYSTOLIC BLOOD PRESSURE: 118 MMHG | RESPIRATION RATE: 14 BRPM | HEART RATE: 60 BPM

## 2019-11-18 DIAGNOSIS — I48.20 CHRONIC A-FIB (H): ICD-10-CM

## 2019-11-18 DIAGNOSIS — I87.331: ICD-10-CM

## 2019-11-18 DIAGNOSIS — I87.2 VENOUS (PERIPHERAL) INSUFFICIENCY: ICD-10-CM

## 2019-11-18 DIAGNOSIS — L97.319 VENOUS ULCER OF ANKLE, RIGHT (H): ICD-10-CM

## 2019-11-18 DIAGNOSIS — R60.0 LOCALIZED EDEMA: ICD-10-CM

## 2019-11-18 DIAGNOSIS — I87.331 STASIS DERMATITIS OF RIGHT LOWER EXTREMITY WITH VENOUS ULCER DUE TO CHRONIC PERIPHERAL VENOUS HYPERTENSION (H): ICD-10-CM

## 2019-11-18 DIAGNOSIS — I83.013 VENOUS ULCER OF ANKLE, RIGHT (H): ICD-10-CM

## 2019-11-18 DIAGNOSIS — I83.893 SYMPTOMATIC VARICOSE VEINS OF BOTH LOWER EXTREMITIES: ICD-10-CM

## 2019-11-18 DIAGNOSIS — I87.303 VENOUS HYPERTENSION OF BOTH LOWER EXTREMITIES: ICD-10-CM

## 2019-11-18 PROCEDURE — 36476 ENDOVENOUS RF VEIN ADD-ON: CPT | Mod: RT | Performed by: INTERNAL MEDICINE

## 2019-11-18 PROCEDURE — 36475 ENDOVENOUS RF 1ST VEIN: CPT | Mod: RT | Performed by: INTERNAL MEDICINE

## 2019-11-18 PROCEDURE — 36470 NJX SCLRSNT 1 INCMPTNT VEIN: CPT | Mod: RT | Performed by: INTERNAL MEDICINE

## 2019-11-18 NOTE — PROGRESS NOTES
Successful R) GSV and SSV VenaSeal completed. 5 mg valium given per Dr. Gleason. Patient tolerated procedure well.

## 2019-11-19 ENCOUNTER — TELEPHONE (OUTPATIENT)
Dept: CARDIOLOGY | Facility: CLINIC | Age: 84
End: 2019-11-19

## 2019-11-19 NOTE — TELEPHONE ENCOUNTER
VM from patient inquiring about post vein ablation questions. Patient wondered how long he needs to keep his leg wraps on. Reviewed with patient that he should keep the legs wraps on until Wednesday. Patient also inquired about the Allegra. Reviewed with patient that he should take 180 mg twice daily for 2 weeks. Patient verbalized understanding and agreed with plan of care. No further questions.

## 2019-11-22 ENCOUNTER — HOSPITAL ENCOUNTER (OUTPATIENT)
Dept: CARDIOLOGY | Facility: CLINIC | Age: 84
Discharge: HOME | End: 2019-11-22
Attending: INTERNAL MEDICINE | Admitting: INTERNAL MEDICINE
Payer: COMMERCIAL

## 2019-11-22 DIAGNOSIS — L97.319 VENOUS ULCER OF ANKLE, RIGHT (H): ICD-10-CM

## 2019-11-22 DIAGNOSIS — I83.013 VENOUS ULCER OF ANKLE, RIGHT (H): ICD-10-CM

## 2019-11-22 DIAGNOSIS — I87.2 VENOUS (PERIPHERAL) INSUFFICIENCY: ICD-10-CM

## 2019-11-22 PROCEDURE — 93971 EXTREMITY STUDY: CPT | Mod: 26 | Performed by: INTERNAL MEDICINE

## 2019-11-22 PROCEDURE — 93971 EXTREMITY STUDY: CPT | Mod: RT

## 2019-11-25 ENCOUNTER — ANTICOAGULATION THERAPY VISIT (OUTPATIENT)
Dept: NURSING | Facility: CLINIC | Age: 84
End: 2019-11-25
Payer: COMMERCIAL

## 2019-11-25 ENCOUNTER — DOCUMENTATION ONLY (OUTPATIENT)
Dept: CARDIOLOGY | Facility: CLINIC | Age: 84
End: 2019-11-25

## 2019-11-25 DIAGNOSIS — I48.0 PAROXYSMAL ATRIAL FIBRILLATION (H): ICD-10-CM

## 2019-11-25 DIAGNOSIS — Z79.01 LONG TERM CURRENT USE OF ANTICOAGULANT THERAPY: ICD-10-CM

## 2019-11-25 LAB — INR POINT OF CARE: 2.3 (ref 0.86–1.14)

## 2019-11-25 PROCEDURE — 99207 ZZC NO CHARGE NURSE ONLY: CPT

## 2019-11-25 PROCEDURE — 85610 PROTHROMBIN TIME: CPT | Mod: QW

## 2019-11-25 PROCEDURE — 36416 COLLJ CAPILLARY BLOOD SPEC: CPT

## 2019-11-25 NOTE — TELEPHONE ENCOUNTER
[employer] Loop recorder    Remote alert for tachy event lasting 21 seconds with a avg rate at 154 BPM. EGM shows a burst of RVR. Pt is on low dose Amio with a PAF burden at 26%. He is on warfarin as well. Overall Heart rate histogram is stable. Will continue to monitor. CAMILLE Carpio

## 2019-12-02 DIAGNOSIS — Z45.09 ENCOUNTER FOR LOOP RECORDER CHECK: Primary | ICD-10-CM

## 2019-12-02 NOTE — PROGRESS NOTES
[employer] Loop Alert  Remote alert for AF and a tachy event. Pt on warfarin (as well as Amio). Current % of time in AF is 21 %. One Tachy event showed AF with RVR lasting 28 seconds a an avg V rate of 154  BPM. Will continue to monitor. Please send Dr Gleason an updated report on AF burden at his 3 month remote check in February. Fer Carpio

## 2019-12-10 LAB
MDC_IDC_MSMT_BATTERY_STATUS: NORMAL
MDC_IDC_PG_IMPLANT_DTM: NORMAL
MDC_IDC_PG_MFG: NORMAL
MDC_IDC_PG_MODEL: NORMAL
MDC_IDC_PG_SERIAL: NORMAL
MDC_IDC_PG_TYPE: NORMAL
MDC_IDC_SESS_CLINIC_NAME: NORMAL
MDC_IDC_SESS_DTM: NORMAL
MDC_IDC_SESS_TYPE: NORMAL
MDC_IDC_SET_ZONE_DETECTION_INTERVAL: 2000 MS
MDC_IDC_SET_ZONE_DETECTION_INTERVAL: 3000 MS
MDC_IDC_SET_ZONE_DETECTION_INTERVAL: 420 MS
MDC_IDC_SET_ZONE_TYPE: NORMAL
MDC_IDC_STAT_AT_BURDEN_PERCENT: 35.4 %
MDC_IDC_STAT_AT_DTM_END: NORMAL
MDC_IDC_STAT_AT_DTM_START: NORMAL
MDC_IDC_STAT_EPISODE_RECENT_COUNT: 0
MDC_IDC_STAT_EPISODE_RECENT_COUNT: 16
MDC_IDC_STAT_EPISODE_RECENT_COUNT: 305
MDC_IDC_STAT_EPISODE_RECENT_COUNT_DTM_END: NORMAL
MDC_IDC_STAT_EPISODE_RECENT_COUNT_DTM_START: NORMAL
MDC_IDC_STAT_EPISODE_TOTAL_COUNT: 0
MDC_IDC_STAT_EPISODE_TOTAL_COUNT: 1
MDC_IDC_STAT_EPISODE_TOTAL_COUNT: 17
MDC_IDC_STAT_EPISODE_TOTAL_COUNT: 305
MDC_IDC_STAT_EPISODE_TOTAL_COUNT_DTM_END: NORMAL
MDC_IDC_STAT_EPISODE_TOTAL_COUNT_DTM_START: NORMAL
MDC_IDC_STAT_EPISODE_TYPE: NORMAL

## 2019-12-13 DIAGNOSIS — E11.22 TYPE 2 DIABETES MELLITUS WITH STAGE 3 CHRONIC KIDNEY DISEASE (H): ICD-10-CM

## 2019-12-13 DIAGNOSIS — N18.30 TYPE 2 DIABETES MELLITUS WITH STAGE 3 CHRONIC KIDNEY DISEASE (H): ICD-10-CM

## 2019-12-16 ENCOUNTER — ANTICOAGULATION THERAPY VISIT (OUTPATIENT)
Dept: NURSING | Facility: CLINIC | Age: 84
End: 2019-12-16
Payer: COMMERCIAL

## 2019-12-16 ENCOUNTER — DOCUMENTATION ONLY (OUTPATIENT)
Dept: CARDIOLOGY | Facility: CLINIC | Age: 84
End: 2019-12-16

## 2019-12-16 DIAGNOSIS — I48.0 PAROXYSMAL ATRIAL FIBRILLATION (H): ICD-10-CM

## 2019-12-16 DIAGNOSIS — Z79.01 LONG TERM CURRENT USE OF ANTICOAGULANT THERAPY: ICD-10-CM

## 2019-12-16 LAB — INR POINT OF CARE: 2.9 (ref 0.86–1.14)

## 2019-12-16 PROCEDURE — 99207 ZZC NO CHARGE NURSE ONLY: CPT

## 2019-12-16 PROCEDURE — 85610 PROTHROMBIN TIME: CPT | Mod: QW

## 2019-12-16 PROCEDURE — 36416 COLLJ CAPILLARY BLOOD SPEC: CPT

## 2019-12-16 NOTE — PROGRESS NOTES
ANTICOAGULATION FOLLOW-UP CLINIC VISIT    Patient Name:  Louis Keller Jr.  Date:  2019  Contact Type:  Face to Face    SUBJECTIVE:  Patient Findings     Comments:   The patient was assessed for diet, medication, and activity level changes, missed or extra doses, bruising or bleeding, with no problem findings.          Clinical Outcomes     Negatives:   Major bleeding event, Thromboembolic event, Anticoagulation-related hospital admission, Anticoagulation-related ED visit, Anticoagulation-related fatality    Comments:   The patient was assessed for diet, medication, and activity level changes, missed or extra doses, bruising or bleeding, with no problem findings.             OBJECTIVE    INR Protime   Date Value Ref Range Status   2019 2.9 (A) 0.86 - 1.14 Final       ASSESSMENT / PLAN  INR assessment THER    Recheck INR In: 4 WEEKS    INR Location Clinic      Anticoagulation Summary  As of 2019    INR goal:   2.0-3.0   TTR:   65.0 % (1 y)   INR used for dosin.9 (2019)   Warfarin maintenance plan:   2.5 mg (5 mg x 0.5) every day   Full warfarin instructions:   2.5 mg every day   Weekly warfarin total:   17.5 mg   No change documented:   Hope Baumann, RN   Plan last modified:   Hope Baumann, RN (11/15/2019)   Next INR check:   2020   Priority:   Maintenance   Target end date:       Indications    Long-term (current) use of anticoagulants [Z79.01] [Z79.01]  Atrial fibrillation (H) [I48.91]             Anticoagulation Episode Summary     INR check location:       Preferred lab:       Send INR reminders to:   [facility]    Comments:           Anticoagulation Care Providers     Provider Role Specialty Phone number    Joselito Armas MD [facility] Internal Medicine [phone]            See the Encounter Report to view Anticoagulation Flowsheet and Dosing Calendar (Go to Encounters tab in chart review, and find the Anticoagulation Therapy Visit)        Hope Baumann  RN

## 2019-12-16 NOTE — TELEPHONE ENCOUNTER
Remote alert for received for tachy episode. EGM shows RVR. Episode lasted 1 min 29 seconds with rate in 150s. He is on amio and warfarin.   AT/AF burden since 12/02/19 is 23%.   He is scheduled to see Diana Shabnam 12/18/19.  MILANA REAVES

## 2019-12-18 ENCOUNTER — OFFICE VISIT (OUTPATIENT)
Dept: CARDIOLOGY | Facility: CLINIC | Age: 84
End: 2019-12-18
Attending: INTERNAL MEDICINE
Payer: COMMERCIAL

## 2019-12-18 VITALS
HEIGHT: 70 IN | BODY MASS INDEX: 25.44 KG/M2 | WEIGHT: 177.7 LBS | HEART RATE: 72 BPM | DIASTOLIC BLOOD PRESSURE: 70 MMHG | SYSTOLIC BLOOD PRESSURE: 126 MMHG | OXYGEN SATURATION: 98 %

## 2019-12-18 DIAGNOSIS — Z79.899 ON AMIODARONE THERAPY: Primary | ICD-10-CM

## 2019-12-18 DIAGNOSIS — R55 NEAR SYNCOPE: ICD-10-CM

## 2019-12-18 DIAGNOSIS — I87.2 VENOUS (PERIPHERAL) INSUFFICIENCY: ICD-10-CM

## 2019-12-18 DIAGNOSIS — I48.20 CHRONIC A-FIB (H): ICD-10-CM

## 2019-12-18 DIAGNOSIS — I83.013 VENOUS ULCER OF ANKLE, RIGHT (H): ICD-10-CM

## 2019-12-18 DIAGNOSIS — L97.319 VENOUS ULCER OF ANKLE, RIGHT (H): ICD-10-CM

## 2019-12-18 PROCEDURE — 99214 OFFICE O/P EST MOD 30 MIN: CPT | Performed by: PHYSICIAN ASSISTANT

## 2019-12-18 ASSESSMENT — MIFFLIN-ST. JEOR: SCORE: 1487.29

## 2019-12-18 NOTE — LETTER
2019    Joselito Armas MD  [address] [facility] [address]    RE: Louis Keller        Dear Colleague,    I had the pleasure of seeing Louis Keller JrJana in the [facility].    CARDIOLOGY CLINIC PROGRESS NOTE and VEIN CLINIC progress note    DOS: 2019      Louis Keller Jr.  : 1932, 87 year old  MRN: [id number]      History: I had the pleasure of following up with Louis Keller Jr. today in the cardiology clinic.  He is a patient of Dr. Gleason.  His wife, Laura (also sees myself and Dr. Gleason) accompanies him.     Louis is a pleasant 87 year old man with history of chronic atrial fibrillation, DM2.  He failed cardioversion in the past.  He is maintained on rate control strategy and anticoagulation.  He has had negative stress testing previously in ,  and .      He saw Dr. Gleason 18.  He had some QUESADA and R>L LE exertional heaviness.    After 3 months of trying to increase walking, his sxs were unchanged. Echo, treadmill nuc, ABIs and venous comp study were ordered.   Echo showed normal LVEF, grade 3 diastolic dysfunction.  Nuc was without e/o ischemia.   Resting and exercise ABIs are normal.  Venous comp study is abnormal showing severe reflux in both right and left legs.  Given the QUESADA, edema, and the echo showing grade 3 diastolic dysfunction, we trialed low dose diuretic - Lasix 20 mg daily.  Also compression stockings were ordered.    He saw Dr. Gleason 10/3/18 for follow up. He had R>L LE extremity heaviness.      I saw Louis 11/15/18.  At that time he continued to have leg fatigue/heaviness, though perhaps the RLE was a little better.  He wanted to postpone the vein procedure since he was feeling ok.  He still had some stable QUESADA.      19 saw GI for chronic loose stools.     3/14/19 his wife saw Dr. Gleason and she reported that Louis had an episode of near syncope, and Dr. Gleason recommended that I see him in clinic.      Louis  saw me 4/18/19.  He told me that about 1 week before Laura saw Dr. Gleason, he was sitting reading the newspaper.  He felt prickly, warm sensation on his face (both sides).  The vision seemed to go a little gray and he felt like he would pass out, but this only lasted a couple seconds.  He did not actually pass out.  He hadn't had this before. No CP.  No palpitations.   An event monitor was placed. The monitor showed persistent atrial fibrillation with ventricular rates that ranged from the 40s up to the 170s.  Most bradycardic events occurred at nighttime and there were no pauses exceeding 3 seconds.  The patient had occasional PVCs and 3 runs of VT, longest of 14 beats.   Per nursing notes, he did have some chest pain with at least one of the NSVT episodes.  A treadmill nuc was ordered and done on 5/16/19.  This showed normal perfusion, normal LVEF.      He saw Dr. Pacheco 6/5/19.  He agreed that his event was highly suspicious for arrhythmic etiology, given its brief duration and occurrence in the sitting position.   He changed from diltiazem to metoprolol.  Repeat monitor showed 8 episodes of NSVT up to 11 beats, but he had no recurrence of sxs. A loop recorder was placed.   Loop recorder showed some rapid afib.  He was restarted on amio 100 mg daily when he saw Dr. Gleason 9/12/19.  In addition, Dr. Gleason ordered right GSV and SSV VenaSeal ablation with sclerotherapy and phlebectomy of resultant varicosities.   He continued to have afib with RVR, so amio increased to 200 mg mid Oct 2019.   11/18/19 Successful Right GSV and SSV Venaseal completed.   11/22/19 LE US without DVT and showed GSV closed from the proximal to distal thigh and mid-calf to ankle,  patent at knee and proximal calf, no reflux.  Tributary of the GSV closed at mid calf, patent at the knee with  mild reflux.  SSV closed distal to the knee with open varicose veins with moderate reflux.    Repeat alerts show short bursts of RVR (<30 secs) and decreasing  afib burden (21%, was 100% on 6/2019 Zio monitor).     Interval History:  He presents today for follow up.   He has no significant improvement in the right LE heaviness/fatigue.  But he did have a wound on his right medial ankle that is now healed after the ablation.   No further episodes of feeling like he will pass out.     He tells me that when he had the NSVT, he had chest pain and lightheadedness.    No syncope.   He did have some bleeding in hsi ear.  He saw ENT and had MRI of brain and no concerns.       ROS:  Skin:  Positive for     Eyes:  Positive for glasses;tearing  ENT:  Positive for hearing loss  Respiratory:  Positive for dyspnea on exertion  Cardiovascular:    Positive for;fatigue  Gastroenterology: Positive for diarrhea  Genitourinary:  Positive for nocturia;urinary frequency  Musculoskeletal:  Positive for arthritis;joint pain;foot pain  Neurologic:  Positive for numbness or tingling of feet  Psychiatric:  Positive for sleep disturbances  Heme/Lymph/Imm:  Positive for    Endocrine:  Positive for diabetes    PAST MEDICAL HISTORY:  Past Medical History:   Diagnosis Date     Antiplatelet or antithrombotic long-term use      Atrial fibrillation (H)      Diarrhea      Diastolic murmur      QUESADA (dyspnea on exertion)      Gout      Hemorrhage of gastrointestinal tract, unspecified 63,65,and 1971    hospitalized     History of blood transfusion      Hyperlipidemia LDL goal <100 4/18/2012     Long-term (current) use of anticoagulants [Z79.01] 3/31/2016     Mumps      Palpitations      Physical deconditioning 8/9/2013     Scarlet fever      Spider veins      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     Unspecified disease of pancreas 1990    pancreatitis       PAST SURGICAL HISTORY:  Past Surgical History:   Procedure Laterality Date     ARTHROPLASTY KNEE  8/5/2013    Procedure: ARTHROPLASTY KNEE;  Left Total Knee Arthroplasty       C NONSPECIFIC PROCEDURE  Child    T&A     C  NONSPECIFIC PROCEDURE  ; also 11/03    Colonoscopy     C NONSPECIFIC PROCEDURE      Basal cell cancer of the nose     C NONSPECIFIC PROCEDURE  1990    Cholecystectomy     CARDIOVERSION  9/6/11    failed     EP LOOP RECORDER IMPLANT N/A 8/5/2019    Procedure: EP Loop Recorder Implant;  Surgeon: Darling Pacheco MD;  Location:  HEART [facility]ARD[facility] CATH LAB       SOCIAL HISTORY:  Social History     Socioeconomic History     Marital status:      Spouse name: None     Number of children: None     Years of education: None     Highest education level: None   Occupational History     Occupation:      Comment: Semi-retired   Social Needs     Financial resource strain: None     Food insecurity:     Worry: None     Inability: None     Transportation needs:     Medical: None     Non-medical: None   Tobacco Use     Smoking status: Never Smoker     Smokeless tobacco: Never Used   Substance and Sexual Activity     Alcohol use: Yes     Alcohol/week: 0.0 oz     Comment: 1 glass of wine every day     Drug use: No     Sexual activity: Never     Partners: Female   Lifestyle     Physical activity:     Days per week: None     Minutes per session: None     Stress: None   Relationships     Social connections:     Talks on phone: None     Gets together: None     Attends Muslim service: None     Active member of club or organization: None     Attends meetings of clubs or organizations: None     Relationship status: None     Intimate partner violence:     Fear of current or ex partner: None     Emotionally abused: None     Physically abused: None     Forced sexual activity: None   Other Topics Concern      Service Not Asked     Blood Transfusions Not Asked     Caffeine Concern No     Comment: 14 oz per day     Occupational Exposure Not Asked     Hobby Hazards Not Asked     Sleep Concern No     Comment: sometimes - nocturia 4 times per night     Stress Concern Not Asked     Weight Concern Not Asked     Special  Diet No     Back Care Not Asked     Exercise Yes     Comment: treadmill/walking 15-20 minutes 6 days per week     Bike Helmet Not Asked     Seat Belt Not Asked     Self-Exams Not Asked     Parent/sibling w/ CABG, MI or angioplasty before 65F 55M? Not Asked   Social History Narrative     None       FAMILY HISTORY:  Family History   Problem Relation Age of Onset     Alzheimer Disease Maternal Grandmother      Arthritis Maternal Grandmother      Cancer Mother         breast/ 1983/colon     Eye Disorder Mother         glaucoma and cataracts     Heart Disease Mother         angina/CABG     Hypertension Mother      Cancer Father         colon     Diabetes Maternal Aunt         AoDM     No Known Problems Daughter        MEDS: Acetaminophen (TYLENOL PO), Take 1,000 mg by mouth 3 times daily  amiodarone (PACERONE/CODARONE) 200 MG tablet, Take 1 tablet (200 mg) by mouth daily  cholestyramine (QUESTRAN) 4 g packet, TAKE 1 PACKET (4 G) BY MOUTH 2 TIMES DAILY (WITH MEALS)  Ferrous Gluconate 324 (37.5 Fe) MG TABS, TAKE 1 TABLET (324 MG) BY MOUTH DAILY (WITH BREAKFAST)  furosemide (LASIX) 20 MG tablet, Take 1 tablet (20 mg) by mouth daily  glipiZIDE (GLUCOTROL XL) 10 MG 24 hr tablet, TAKE 1 TABLET (10 MG) BY MOUTH 2 TIMES DAILY  latanoprost (XALATAN) 0.005 % ophthalmic solution, Place 1 drop Into the left eye daily  loperamide (IMODIUM A-D) 2 MG tablet, daily   metFORMIN (GLUCOPHAGE) 500 MG tablet, Take 1 tablet (500 mg) by mouth 2 times daily (with meals)  metoprolol tartrate (LOPRESSOR) 25 MG tablet, Take 1 tablet (25 mg) by mouth 2 times daily  multivitamin, therapeutic (THERA-VIT) TABS tablet, Take 0.5 tablets by mouth 2 times daily  ONETOUCH ULTRA test strip, USE TO TEST DAILY  pioglitazone (ACTOS) 45 MG tablet, Take 1 tablet (45 mg) by mouth daily  warfarin ANTICOAGULANT (COUMADIN) 2.5 MG tablet, Take 1 tablet daily or as directed by INR clinic  ACE NOT PRESCRIBED, INTENTIONAL,, 1 each daily ACE Inhibitor not  "prescribed due to Risk for drug interaction (Patient not taking: Reported on 2019)  ASPIRIN NOT PRESCRIBED, INTENTIONAL,, by Other route continuous prn Reported on 3/7/2017  STATIN NOT PRESCRIBED, INTENTIONAL,, 1 each At Bedtime Statin not prescribed intentionally due to Risk for drug interaction (Patient not taking: Reported on 2019)    No current facility-administered medications on file prior to visit.       ALLERGIES:   Allergies   Allergen Reactions     No Known Drug Allergies        PHYSICAL EXAM:  Vitals: /70 (BP Location: Right arm, Patient Position: Sitting, Cuff Size: Adult Regular)   Pulse 72   Ht 1.778 m (5' 10\")   Wt 80.6 kg (177 lb 11.2 oz)   SpO2 98%   BMI 25.50 kg/m     Constitutional:  cooperative, alert and oriented, well developed, well nourished, in no acute distress thin      Skin:  warm and dry to the touch, no apparent skin lesions or masses noted        Head:  normocephalic, no masses or lesions        Eyes:  pupils equal and round;sclera white;conjunctivae and lids unremarkable        ENT:  no pallor or cyanosis, dentition good        Neck:  JVP normal        Respiratory:  normal breath sounds, clear to auscultation, normal A-P diameter, normal symmetry, normal respiratory excursion, no use of accessory muscles        Cardiac:   irregularly irregular rhythm                GI:  BS normoactive;abdomen soft        Vascular: pulses full and equal                                      Extremities and Musculoskeletal:  no deformities, clubbing, cyanosis, erythema observed stasis pigmentation right medial malleoli venous ulcer is healed   Bilateral edema L>R, bilateral varicose veins and telangiectasias       Neurological:  no gross motor deficits;affect appropriate              LABS/DATA:  I reviewed the followin18 treadmill nuc stress test:  Impression  1.  Myocardial perfusion imaging using single isotope technique  demonstrated no significant perfusion defect " consistent with  significant ischemia or infarct. There is mild decrease in inferior  activity at the base on rest and stress images most likely consistent  with diaphragm attenuation/bowel uptake artifact..   2. Gated images demonstrated normal size left ventricle with adequate  to good overall contractility and no significant regional wall motion  abnormality.  The left ventricular systolic function is normal with  ejection fraction 57% (accuracy maybe affected by presence of atrial  fibrillation).  3. Compared to the prior study from not applicable .      8/10/18 resting and exercise ABIs:  IMPRESSION:  1. Normal resting and exercise ABIs bilaterally       8/10/18 echo:  Interpretation Summary  Left ventricular systolic function is normal.The visual ejection fraction is  estimated at 55-60%.  Diastolic Doppler findings (E/E' ratio and/or other parameters) suggest left  ventricular filling pressures are increased.  The right ventricular systolic function is normal.  Pulmonary hypertension- RVSP 40 mm hg +RA.  There is trace aortic regurgitation.  Mild aortic root and ascending aorta dilatation.      8/21/18 venous comp study:  Impression:  1. Right leg: No DVT. Severe reflux (5.2s, 4.0mm) of the proximal GSV  in the thigh and superficial tributary (5.4s, 4.7mm) in the calf as  well as severe reflux in the small saphenous vein in the calf (6.4s,  2.2mm) with resultant refluxing venous varicosities (4.5s, 2.9mm).     2. Left leg: No DVT. Severe reflux (5.0s, 2.9mm) limited to distal GSV  at the ankle.        If clinically indicated could treat:  Right leg: RF ablation of GSV, RF ablation of SSV, sclerotherapy of  superficial tributary and varicose veins with phlebectomy of varicose  veins.     Left leg: Sclerotherapy of distal GSV vs. RF ablation of GSV +  sclerotherapy.      Cardiac event monitor 4/18/19-5/17/19:  Afib with rates ranging from 40s-180  Pauses up to 2.5 secs 0409 on 4/30/19  Runs of NSVT, 6 beats on  "4/25/19 at 0527, 13 beats on 5/3/19 at 0458, 14 beats on 5/8/19 at 1400       Treadmill nuc 5/16/19:  Impression  1.  Myocardial perfusion imaging using single isotope technique  demonstrated normal myocardial perfusion.   2. Gated images demonstrated normal wall motion.  The left ventricular  systolic function is normal with a calculated ejection fraction of  62%.  3. Compared to the prior study from 2018, no significant changes are  noted .       6/2019 14 day Zio:  Persistent afib, average rate 77, 8 runs of NSVT longest 11 beats, no sxs.       LE US 11/22/19:  CONCLUSION:  Right lower extremity and limited iliac veins:  1. No DVT  2. GSV closed from the proximal to distal thigh and mid-calf to ankle,  patent at knee and proximal calf, no reflux  3. Tributary of the GSV closed at mid calf, patent at the knee with  mild reflux  4. SSV closed distal to the knee with open varicose veins with  moderate reflux     Limited left distal external iliac and femoral veins:     The duplex ultrasound exam did not demonstrate any signs of deep  venous thrombosis in the visualized veins of the left lower extremity  including the distal external iliac, and the femoral.  The visualization of the calf veins was limited.        ASSESSMENT/PLAN:  Chronic afib  - Rate control: June 2019 dilt cd 120 mg changed to metoprolol 25 mg BID due to NSVT.      10/2019 Amio restarted at 100 mg due to episodes of afib with RVR on a Loop recorder.  Later 11/2019 amio increased to 200 mg daily due to continued RVR episodes.    Valmora has gone from 100% on Zio 6/2019 to about 20% now.   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        Lightheadedness/dizziness/presyncope  - Patient had an episode in April 2019 of feeling warm prickly sensation in his cheeks, decreased vision and feel like he was going to \"pass out\" but did not  -  Event monitor 4/2019 showed persistent atrial fibrillation with ventricular " rates that ranged from the 40s up to the 170s.  Most bradycardic events occurred at nighttime and there were no pauses exceeding 3 seconds.  The patient had occasional PVCs and 3 runs of VT, longest of 14 beats.   - Treadmill nuc 5/16/19 showed normal perfusion, normal LVEF.    - 6/2019 Dr. Pacheco changed from diltiazem to metoprolol.  Repeat monitor showed 8 episodes of NSVT up to 11 beats, but he had no recurrence of sxs.   - A loop recorder was placed       Non sustained Ventricular tachycardia  - Noted on monitor 4/2019 to have 2 episodes  - Nuclear stress test 5/16/19 negative for ischemia  - Dilt changed to metoprolol, and repeat 6/2019 14 day ZIO Patch showed 8 runs longest 11 seconds  - Currently on metoprolol 25 mg BID and amio 200 mg daily       QUESADA  - Echo shows normal LVEF, grade 3 diastolic dysfunction  - Nuc without e/o ischemia  - Some contribution could be from the chronic afib as well  - With Lasix 20 mg, he had some improvement   - I did want to have his PCP consider a substitute medication for Actos given his significant diastolic dysfunction/QUESADA/edema.  Dr. Armas is considering changing per patient report    - Overall stable, and not too limiting      LE edema  Exertional leg heaviness  Varicose veins  Telangiectasias  - Resting and exercise ABIs are normal  - Venous comp study is abnormal showing severe reflux in both right and left legs.    -- Right leg:  No DVT.  There is severe reflux (5.2s, 4.0mm) of the proximal GSV in the thigh and superficial tributary (5.4s, 4.7mm) in the calf as well as severe reflux in the small saphenous vein in the calf (6.4s, 2.2mm) with resultant refluxing venous varicosities (4.5s, 2.9mm).     --Left leg: No DVT. Severe reflux (5.0s, 2.9mm) limited to distal GSV at the ankle.  - The LE edema could be in aprt from the diastolic dysfunction.  He will continue Lasix.    - 11/18/19 Successful Right GSV and SSV Venaseal     - 11/22/19 LE US without DVT and showed GSV  closed from the proximal to distal thigh and mid-calf to ankle,  patent at knee and proximal calf, no reflux.  Tributary of the GSV closed at mid calf, patent at the knee with  mild reflux.  SSV closed distal to the knee with open varicose veins with moderate reflux.  - His right LE heaviness and fatigue is not much improved/changed, but he did have an ulcer that has healed now           Follow up:  EP KONSTANTIN in 3 months, TSH and LFTs.  He has been on amio before, so uncertain if they will want CXR and PFTs  General cards KONSTANTIN in 6 months  Dr. Gleason in 1 year      Susanna Haque PA-C    Thank you for allowing me to participate in the care of your patient.      Sincerely,     Susanna Haque PA-C     [facility]    cc:   Herbert Gleason MD  [id number]  [place] [address]

## 2019-12-18 NOTE — LETTER
2019    Joselito Armas MD  [address] [facility] [address]    RE: Louis Keller        Dear Colleague,    I had the pleasure of seeing Louis Keller JrJana in the [facility].    CARDIOLOGY CLINIC PROGRESS NOTE and VEIN CLINIC progress note    DOS: 2019      Louis Keller Jr.  : 1932, 87 year old  MRN: [id number]      History: I had the pleasure of following up with Louis Keller Jr. today in the cardiology clinic.  He is a patient of Dr. Gleason.  His wife, Laura (also sees myself and Dr. Gleason) accompanies him.     Louis is a pleasant 87 year old man with history of chronic atrial fibrillation, DM2.  He failed cardioversion in the past.  He is maintained on rate control strategy and anticoagulation.  He has had negative stress testing previously in ,  and .      He saw Dr. Gleason 18.  He had some QUESADA and R>L LE exertional heaviness.    After 3 months of trying to increase walking, his sxs were unchanged. Echo, treadmill nuc, ABIs and venous comp study were ordered.   Echo showed normal LVEF, grade 3 diastolic dysfunction.  Nuc was without e/o ischemia.   Resting and exercise ABIs are normal.  Venous comp study is abnormal showing severe reflux in both right and left legs.  Given the QUESADA, edema, and the echo showing grade 3 diastolic dysfunction, we trialed low dose diuretic - Lasix 20 mg daily.  Also compression stockings were ordered.    He saw Dr. Gleason 10/3/18 for follow up. He had R>L LE extremity heaviness.      I saw Louis 11/15/18.  At that time he continued to have leg fatigue/heaviness, though perhaps the RLE was a little better.  He wanted to postpone the vein procedure since he was feeling ok.  He still had some stable QUESADA.      19 saw GI for chronic loose stools.     3/14/19 his wife saw Dr. Gleason and she reported that Louis had an episode of near syncope, and Dr. Gleason recommended that I see him in clinic.      Louis  saw me 4/18/19.  He told me that about 1 week before Laura saw Dr. Gleason, he was sitting reading the newspaper.  He felt prickly, warm sensation on his face (both sides).  The vision seemed to go a little gray and he felt like he would pass out, but this only lasted a couple seconds.  He did not actually pass out.  He hadn't had this before. No CP.  No palpitations.   An event monitor was placed. The monitor showed persistent atrial fibrillation with ventricular rates that ranged from the 40s up to the 170s.  Most bradycardic events occurred at nighttime and there were no pauses exceeding 3 seconds.  The patient had occasional PVCs and 3 runs of VT, longest of 14 beats.   Per nursing notes, he did have some chest pain with at least one of the NSVT episodes.  A treadmill nuc was ordered and done on 5/16/19.  This showed normal perfusion, normal LVEF.      He saw Dr. Pacheco 6/5/19.  He agreed that his event was highly suspicious for arrhythmic etiology, given its brief duration and occurrence in the sitting position.   He changed from diltiazem to metoprolol.  Repeat monitor showed 8 episodes of NSVT up to 11 beats, but he had no recurrence of sxs. A loop recorder was placed.   Loop recorder showed some rapid afib.  He was restarted on amio 100 mg daily when he saw Dr. Gleason 9/12/19.  In addition, Dr. Gleason ordered right GSV and SSV VenaSeal ablation with sclerotherapy and phlebectomy of resultant varicosities.   He continued to have afib with RVR, so amio increased to 200 mg mid Oct 2019.   11/18/19 Successful Right GSV and SSV Venaseal completed.   11/22/19 LE US without DVT and showed GSV closed from the proximal to distal thigh and mid-calf to ankle,  patent at knee and proximal calf, no reflux.  Tributary of the GSV closed at mid calf, patent at the knee with  mild reflux.  SSV closed distal to the knee with open varicose veins with moderate reflux.    Repeat alerts show short bursts of RVR (<30 secs) and decreasing  afib burden (21%, was 100% on 6/2019 Zio monitor).     Interval History:  He presents today for follow up.   He has no significant improvement in the right LE heaviness/fatigue.  But he did have a wound on his right medial ankle that is now healed after the ablation.   No further episodes of feeling like he will pass out.     He tells me that when he had the NSVT, he had chest pain and lightheadedness.    No syncope.   He did have some bleeding in hsi ear.  He saw ENT and had MRI of brain and no concerns.       ROS:  Skin:  Positive for     Eyes:  Positive for glasses;tearing  ENT:  Positive for hearing loss  Respiratory:  Positive for dyspnea on exertion  Cardiovascular:    Positive for;fatigue  Gastroenterology: Positive for diarrhea  Genitourinary:  Positive for nocturia;urinary frequency  Musculoskeletal:  Positive for arthritis;joint pain;foot pain  Neurologic:  Positive for numbness or tingling of feet  Psychiatric:  Positive for sleep disturbances  Heme/Lymph/Imm:  Positive for    Endocrine:  Positive for diabetes    PAST MEDICAL HISTORY:  Past Medical History:   Diagnosis Date     Antiplatelet or antithrombotic long-term use      Atrial fibrillation (H)      Diarrhea      Diastolic murmur      QUESADA (dyspnea on exertion)      Gout      Hemorrhage of gastrointestinal tract, unspecified 63,65,and 1971    hospitalized     History of blood transfusion      Hyperlipidemia LDL goal <100 4/18/2012     Long-term (current) use of anticoagulants [Z79.01] 3/31/2016     Mumps      Palpitations      Physical deconditioning 8/9/2013     Scarlet fever      Spider veins      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     Unspecified disease of pancreas 1990    pancreatitis       PAST SURGICAL HISTORY:  Past Surgical History:   Procedure Laterality Date     ARTHROPLASTY KNEE  8/5/2013    Procedure: ARTHROPLASTY KNEE;  Left Total Knee Arthroplasty       C NONSPECIFIC PROCEDURE  Child    T&A     C  NONSPECIFIC PROCEDURE  ; also 11/03    Colonoscopy     C NONSPECIFIC PROCEDURE      Basal cell cancer of the nose     C NONSPECIFIC PROCEDURE  1990    Cholecystectomy     CARDIOVERSION  9/6/11    failed     EP LOOP RECORDER IMPLANT N/A 8/5/2019    Procedure: EP Loop Recorder Implant;  Surgeon: Darling Pacheco MD;  Location:  HEART CARDI[facility] CATH LAB       SOCIAL HISTORY:  Social History     Socioeconomic History     Marital status:      Spouse name: None     Number of children: None     Years of education: None     Highest education level: None   Occupational History     Occupation:      Comment: Semi-retired   Social Needs     Financial resource strain: None     Food insecurity:     Worry: None     Inability: None     Transportation needs:     Medical: None     Non-medical: None   Tobacco Use     Smoking status: Never Smoker     Smokeless tobacco: Never Used   Substance and Sexual Activity     Alcohol use: Yes     Alcohol/week: 0.0 oz     Comment: 1 glass of wine every day     Drug use: No     Sexual activity: Never     Partners: Female   Lifestyle     Physical activity:     Days per week: None     Minutes per session: None     Stress: None   Relationships     Social connections:     Talks on phone: None     Gets together: None     Attends Oriental orthodox service: None     Active member of club or organization: None     Attends meetings of clubs or organizations: None     Relationship status: None     Intimate partner violence:     Fear of current or ex partner: None     Emotionally abused: None     Physically abused: None     Forced sexual activity: None   Other Topics Concern      Service Not Asked     Blood Transfusions Not Asked     Caffeine Concern No     Comment: 14 oz per day     Occupational Exposure Not Asked     Hobby Hazards Not Asked     Sleep Concern No     Comment: sometimes - nocturia 4 times per night     Stress Concern Not Asked     Weight Concern Not Asked     Special  Diet No     Back Care Not Asked     Exercise Yes     Comment: treadmill/walking 15-20 minutes 6 days per week     Bike Helmet Not Asked     Seat Belt Not Asked     Self-Exams Not Asked     Parent/sibling w/ CABG, MI or angioplasty before 65F 55M? Not Asked   Social History Narrative     None       FAMILY HISTORY:  Family History   Problem Relation Age of Onset     Alzheimer Disease Maternal Grandmother      Arthritis Maternal Grandmother      Cancer Mother         breast/ 1983/colon     Eye Disorder Mother         glaucoma and cataracts     Heart Disease Mother         angina/CABG     Hypertension Mother      Cancer Father         colon     Diabetes Maternal Aunt         AoDM     No Known Problems Daughter        MEDS: Acetaminophen (TYLENOL PO), Take 1,000 mg by mouth 3 times daily  amiodarone (PACERONE/CODARONE) 200 MG tablet, Take 1 tablet (200 mg) by mouth daily  cholestyramine (QUESTRAN) 4 g packet, TAKE 1 PACKET (4 G) BY MOUTH 2 TIMES DAILY (WITH MEALS)  Ferrous Gluconate 324 (37.5 Fe) MG TABS, TAKE 1 TABLET (324 MG) BY MOUTH DAILY (WITH BREAKFAST)  furosemide (LASIX) 20 MG tablet, Take 1 tablet (20 mg) by mouth daily  glipiZIDE (GLUCOTROL XL) 10 MG 24 hr tablet, TAKE 1 TABLET (10 MG) BY MOUTH 2 TIMES DAILY  latanoprost (XALATAN) 0.005 % ophthalmic solution, Place 1 drop Into the left eye daily  loperamide (IMODIUM A-D) 2 MG tablet, daily   metFORMIN (GLUCOPHAGE) 500 MG tablet, Take 1 tablet (500 mg) by mouth 2 times daily (with meals)  metoprolol tartrate (LOPRESSOR) 25 MG tablet, Take 1 tablet (25 mg) by mouth 2 times daily  multivitamin, therapeutic (THERA-VIT) TABS tablet, Take 0.5 tablets by mouth 2 times daily  ONETOUCH ULTRA test strip, USE TO TEST DAILY  pioglitazone (ACTOS) 45 MG tablet, Take 1 tablet (45 mg) by mouth daily  warfarin ANTICOAGULANT (COUMADIN) 2.5 MG tablet, Take 1 tablet daily or as directed by INR clinic  ACE NOT PRESCRIBED, INTENTIONAL,, 1 each daily ACE Inhibitor not  "prescribed due to Risk for drug interaction (Patient not taking: Reported on 2019)  ASPIRIN NOT PRESCRIBED, INTENTIONAL,, by Other route continuous prn Reported on 3/7/2017  STATIN NOT PRESCRIBED, INTENTIONAL,, 1 each At Bedtime Statin not prescribed intentionally due to Risk for drug interaction (Patient not taking: Reported on 2019)    No current facility-administered medications on file prior to visit.       ALLERGIES:   Allergies   Allergen Reactions     No Known Drug Allergies        PHYSICAL EXAM:  Vitals: /70 (BP Location: Right arm, Patient Position: Sitting, Cuff Size: Adult Regular)   Pulse 72   Ht 1.778 m (5' 10\")   Wt 80.6 kg (177 lb 11.2 oz)   SpO2 98%   BMI 25.50 kg/m     Constitutional:  cooperative, alert and oriented, well developed, well nourished, in no acute distress thin      Skin:  warm and dry to the touch, no apparent skin lesions or masses noted        Head:  normocephalic, no masses or lesions        Eyes:  pupils equal and round;sclera white;conjunctivae and lids unremarkable        ENT:  no pallor or cyanosis, dentition good        Neck:  JVP normal        Respiratory:  normal breath sounds, clear to auscultation, normal A-P diameter, normal symmetry, normal respiratory excursion, no use of accessory muscles        Cardiac:   irregularly irregular rhythm                GI:  BS normoactive;abdomen soft        Vascular: pulses full and equal                                      Extremities and Musculoskeletal:  no deformities, clubbing, cyanosis, erythema observed stasis pigmentation right medial malleoli venous ulcer is healed   Bilateral edema L>R, bilateral varicose veins and telangiectasias       Neurological:  no gross motor deficits;affect appropriate              LABS/DATA:  I reviewed the followin18 treadmill nuc stress test:  Impression  1.  Myocardial perfusion imaging using single isotope technique  demonstrated no significant perfusion defect " consistent with  significant ischemia or infarct. There is mild decrease in inferior  activity at the base on rest and stress images most likely consistent  with diaphragm attenuation/bowel uptake artifact..   2. Gated images demonstrated normal size left ventricle with adequate  to good overall contractility and no significant regional wall motion  abnormality.  The left ventricular systolic function is normal with  ejection fraction 57% (accuracy maybe affected by presence of atrial  fibrillation).  3. Compared to the prior study from not applicable .      8/10/18 resting and exercise ABIs:  IMPRESSION:  1. Normal resting and exercise ABIs bilaterally       8/10/18 echo:  Interpretation Summary  Left ventricular systolic function is normal.The visual ejection fraction is  estimated at 55-60%.  Diastolic Doppler findings (E/E' ratio and/or other parameters) suggest left  ventricular filling pressures are increased.  The right ventricular systolic function is normal.  Pulmonary hypertension- RVSP 40 mm hg +RA.  There is trace aortic regurgitation.  Mild aortic root and ascending aorta dilatation.      8/21/18 venous comp study:  Impression:  1. Right leg: No DVT. Severe reflux (5.2s, 4.0mm) of the proximal GSV  in the thigh and superficial tributary (5.4s, 4.7mm) in the calf as  well as severe reflux in the small saphenous vein in the calf (6.4s,  2.2mm) with resultant refluxing venous varicosities (4.5s, 2.9mm).     2. Left leg: No DVT. Severe reflux (5.0s, 2.9mm) limited to distal GSV  at the ankle.        If clinically indicated could treat:  Right leg: RF ablation of GSV, RF ablation of SSV, sclerotherapy of  superficial tributary and varicose veins with phlebectomy of varicose  veins.     Left leg: Sclerotherapy of distal GSV vs. RF ablation of GSV +  sclerotherapy.      Cardiac event monitor 4/18/19-5/17/19:  Afib with rates ranging from 40s-180  Pauses up to 2.5 secs 0409 on 4/30/19  Runs of NSVT, 6 beats on  "4/25/19 at 0527, 13 beats on 5/3/19 at 0458, 14 beats on 5/8/19 at 1400       Treadmill nuc 5/16/19:  Impression  1.  Myocardial perfusion imaging using single isotope technique  demonstrated normal myocardial perfusion.   2. Gated images demonstrated normal wall motion.  The left ventricular  systolic function is normal with a calculated ejection fraction of  62%.  3. Compared to the prior study from 2018, no significant changes are  noted .       6/2019 14 day Zio:  Persistent afib, average rate 77, 8 runs of NSVT longest 11 beats, no sxs.       LE US 11/22/19:  CONCLUSION:  Right lower extremity and limited iliac veins:  1. No DVT  2. GSV closed from the proximal to distal thigh and mid-calf to ankle,  patent at knee and proximal calf, no reflux  3. Tributary of the GSV closed at mid calf, patent at the knee with  mild reflux  4. SSV closed distal to the knee with open varicose veins with  moderate reflux     Limited left distal external iliac and femoral veins:     The duplex ultrasound exam did not demonstrate any signs of deep  venous thrombosis in the visualized veins of the left lower extremity  including the distal external iliac, and the femoral.  The visualization of the calf veins was limited.        ASSESSMENT/PLAN:  Chronic afib  - Rate control: June 2019 dilt cd 120 mg changed to metoprolol 25 mg BID due to NSVT.      10/2019 Amio restarted at 100 mg due to episodes of afib with RVR on a Loop recorder.  Later 11/2019 amio increased to 200 mg daily due to continued RVR episodes.    Venango has gone from 100% on Zio 6/2019 to about 20% now.   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        Lightheadedness/dizziness/presyncope  - Patient had an episode in April 2019 of feeling warm prickly sensation in his cheeks, decreased vision and feel like he was going to \"pass out\" but did not  -  Event monitor 4/2019 showed persistent atrial fibrillation with ventricular " rates that ranged from the 40s up to the 170s.  Most bradycardic events occurred at nighttime and there were no pauses exceeding 3 seconds.  The patient had occasional PVCs and 3 runs of VT, longest of 14 beats.   - Treadmill nuc 5/16/19 showed normal perfusion, normal LVEF.    - 6/2019 Dr. Pacheco changed from diltiazem to metoprolol.  Repeat monitor showed 8 episodes of NSVT up to 11 beats, but he had no recurrence of sxs.   - A loop recorder was placed       Non sustained Ventricular tachycardia  - Noted on monitor 4/2019 to have 2 episodes  - Nuclear stress test 5/16/19 negative for ischemia  - Dilt changed to metoprolol, and repeat 6/2019 14 day ZIO Patch showed 8 runs longest 11 seconds  - Currently on metoprolol 25 mg BID and amio 200 mg daily       QUESADA  - Echo shows normal LVEF, grade 3 diastolic dysfunction  - Nuc without e/o ischemia  - Some contribution could be from the chronic afib as well  - With Lasix 20 mg, he had some improvement   - I did want to have his PCP consider a substitute medication for Actos given his significant diastolic dysfunction/QUESADA/edema.  Dr. Armas is considering changing per patient report    - Overall stable, and not too limiting      LE edema  Exertional leg heaviness  Varicose veins  Telangiectasias  - Resting and exercise ABIs are normal  - Venous comp study is abnormal showing severe reflux in both right and left legs.    -- Right leg:  No DVT.  There is severe reflux (5.2s, 4.0mm) of the proximal GSV in the thigh and superficial tributary (5.4s, 4.7mm) in the calf as well as severe reflux in the small saphenous vein in the calf (6.4s, 2.2mm) with resultant refluxing venous varicosities (4.5s, 2.9mm).     --Left leg: No DVT. Severe reflux (5.0s, 2.9mm) limited to distal GSV at the ankle.  - The LE edema could be in aprt from the diastolic dysfunction.  He will continue Lasix.    - 11/18/19 Successful Right GSV and SSV Venaseal     - 11/22/19 LE US without DVT and showed GSV  closed from the proximal to distal thigh and mid-calf to ankle,  patent at knee and proximal calf, no reflux.  Tributary of the GSV closed at mid calf, patent at the knee with  mild reflux.  SSV closed distal to the knee with open varicose veins with moderate reflux.  - His right LE heaviness and fatigue is not much improved/changed, but he did have an ulcer that has healed now           Follow up:  EP KONSTANTIN in 3 months, TSH and LFTs.  He has been on amio before, so uncertain if they will want CXR and PFTs  General cards KONSTANTIN in 6 months  Dr. Gleason in 1 year      Thank you for allowing me to participate in the care of your patient.    Sincerely,     Susanna Haque PA-C     [facility]

## 2019-12-18 NOTE — PROGRESS NOTES
CARDIOLOGY CLINIC PROGRESS NOTE and VEIN CLINIC progress note    DOS: 2019      oLuis Keller Jr.  : 1932, 87 year old  MRN: [id number]      History: I had the pleasure of following up with Louis Keller Jr. today in the cardiology clinic.  He is a patient of Dr. Gleason.  His wife, Laura (also sees myself and Dr. Gleason) accompanies him.     Louis is a pleasant 87 year old man with history of chronic atrial fibrillation, DM2.  He failed cardioversion in the past.  He is maintained on rate control strategy and anticoagulation.  He has had negative stress testing previously in ,  and .      He saw Dr. Gleason 18.  He had some QUESADA and R>L LE exertional heaviness.    After 3 months of trying to increase walking, his sxs were unchanged. Echo, treadmill nuc, ABIs and venous comp study were ordered.   Echo showed normal LVEF, grade 3 diastolic dysfunction.  Nuc was without e/o ischemia.   Resting and exercise ABIs are normal.  Venous comp study is abnormal showing severe reflux in both right and left legs.  Given the QUESADA, edema, and the echo showing grade 3 diastolic dysfunction, we trialed low dose diuretic - Lasix 20 mg daily.  Also compression stockings were ordered.    He saw Dr. Gleason 10/3/18 for follow up. He had R>L LE extremity heaviness.      I saw Louis 11/15/18.  At that time he continued to have leg fatigue/heaviness, though perhaps the RLE was a little better.  He wanted to postpone the vein procedure since he was feeling ok.  He still had some stable QUESADA.      19 saw GI for chronic loose stools.     3/14/19 his wife saw Dr. Gleason and she reported that Louis had an episode of near syncope, and Dr. Gleason recommended that I see him in clinic.      Louis saw me 19.  He told me that about 1 week before Laura saw Dr. Gleason, he was sitting reading the newspaper.  He felt prickly, warm sensation on his face (both sides).  The vision seemed to go a little gray and he felt like he  would pass out, but this only lasted a couple seconds.  He did not actually pass out.  He hadn't had this before. No CP.  No palpitations.   An event monitor was placed. The monitor showed persistent atrial fibrillation with ventricular rates that ranged from the 40s up to the 170s.  Most bradycardic events occurred at nighttime and there were no pauses exceeding 3 seconds.  The patient had occasional PVCs and 3 runs of VT, longest of 14 beats.   Per nursing notes, he did have some chest pain with at least one of the NSVT episodes.  A treadmill nuc was ordered and done on 5/16/19.  This showed normal perfusion, normal LVEF.      He saw Dr. Pacheco 6/5/19.  He agreed that his event was highly suspicious for arrhythmic etiology, given its brief duration and occurrence in the sitting position.  He changed from diltiazem to metoprolol.  Repeat monitor showed 8 episodes of NSVT up to 11 beats, but he had no recurrence of sxs. A loop recorder was placed.   Loop recorder showed some rapid afib.  He was restarted on amio 100 mg daily when he saw Dr. Gleason 9/12/19.  In addition, Dr. Gleason ordered right GSV and SSV VenaSeal ablation with sclerotherapy and phlebectomy of resultant varicosities.   He continued to have afib with RVR, so amio increased to 200 mg mid Oct 2019.   11/18/19 Successful Right GSV and SSV Venaseal completed.   11/22/19 LE US without DVT and showed GSV closed from the proximal to distal thigh and mid-calf to ankle,  patent at knee and proximal calf, no reflux.  Tributary of the GSV closed at mid calf, patent at the knee with  mild reflux.  SSV closed distal to the knee with open varicose veins with moderate reflux.    Repeat alerts show short bursts of RVR (<30 secs) and decreasing afib burden (21%, was 100% on 6/2019 Zio monitor).     Interval History:  He presents today for follow up.   He has no significant improvement in the right LE heaviness/fatigue.  But he did have a wound on his right medial ankle  that is now healed after the ablation.   No further episodes of feeling like he will pass out.     He tells me that when he had the NSVT, he had chest pain and lightheadedness.    No syncope.   He did have some bleeding in hsi ear.  He saw ENT and had MRI of brain and no concerns.       ROS:  Skin:  Positive for     Eyes:  Positive for glasses;tearing  ENT:  Positive for hearing loss  Respiratory:  Positive for dyspnea on exertion  Cardiovascular:    Positive for;fatigue  Gastroenterology: Positive for diarrhea  Genitourinary:  Positive for nocturia;urinary frequency  Musculoskeletal:  Positive for arthritis;joint pain;foot pain  Neurologic:  Positive for numbness or tingling of feet  Psychiatric:  Positive for sleep disturbances  Heme/Lymph/Imm:  Positive for    Endocrine:  Positive for diabetes    PAST MEDICAL HISTORY:  Past Medical History:   Diagnosis Date     Antiplatelet or antithrombotic long-term use      Atrial fibrillation (H)      Diarrhea      Diastolic murmur      QUESADA (dyspnea on exertion)      Gout      Hemorrhage of gastrointestinal tract, unspecified 63,65,and 1971    hospitalized     History of blood transfusion      Hyperlipidemia LDL goal <100 4/18/2012     Long-term (current) use of anticoagulants [Z79.01] 3/31/2016     Mumps      Palpitations      Physical deconditioning 8/9/2013     Scarlet fever      Spider veins      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     Unspecified disease of pancreas 1990    pancreatitis       PAST SURGICAL HISTORY:  Past Surgical History:   Procedure Laterality Date     ARTHROPLASTY KNEE  8/5/2013    Procedure: ARTHROPLASTY KNEE;  Left Total Knee Arthroplasty       C NONSPECIFIC PROCEDURE  Child    T&A     C NONSPECIFIC PROCEDURE  ; also 11/03    Colonoscopy     C NONSPECIFIC PROCEDURE      Basal cell cancer of the nose     C NONSPECIFIC PROCEDURE  1990    Cholecystectomy     CARDIOVERSION  9/6/11    failed     EP LOOP RECORDER IMPLANT N/A  8/5/2019    Procedure: EP Loop Recorder Implant;  Surgeon: Darling Pacheco MD;  Location:  [facility]       SOCIAL HISTORY:  Social History     Socioeconomic History     Marital status:      Spouse name: None     Number of children: None     Years of education: None     Highest education level: None   Occupational History     Occupation:      Comment: Semi-retired   Social Needs     Financial resource strain: None     Food insecurity:     Worry: None     Inability: None     Transportation needs:     Medical: None     Non-medical: None   Tobacco Use     Smoking status: Never Smoker     Smokeless tobacco: Never Used   Substance and Sexual Activity     Alcohol use: Yes     Alcohol/week: 0.0 oz     Comment: 1 glass of wine every day     Drug use: No     Sexual activity: Never     Partners: Female   Lifestyle     Physical activity:     Days per week: None     Minutes per session: None     Stress: None   Relationships     Social connections:     Talks on phone: None     Gets together: None     Attends Orthodoxy service: None     Active member of club or organization: None     Attends meetings of clubs or organizations: None     Relationship status: None     Intimate partner violence:     Fear of current or ex partner: None     Emotionally abused: None     Physically abused: None     Forced sexual activity: None   Other Topics Concern      Service Not Asked     Blood Transfusions Not Asked     Caffeine Concern No     Comment: 14 oz per day     Occupational Exposure Not Asked     Hobby Hazards Not Asked     Sleep Concern No     Comment: sometimes - nocturia 4 times per night     Stress Concern Not Asked     Weight Concern Not Asked     Special Diet No     Back Care Not Asked     Exercise Yes     Comment: treadmill/walking 15-20 minutes 6 days per week     Bike Helmet Not Asked     Seat Belt Not Asked     Self-Exams Not Asked     Parent/sibling w/ CABG, MI or angioplasty  before 65F 55M? Not Asked   Social History Narrative     None       FAMILY HISTORY:  Family History   Problem Relation Age of Onset     Alzheimer Disease Maternal Grandmother      Arthritis Maternal Grandmother      Cancer Mother         breast/ 1983/colon     Eye Disorder Mother         glaucoma and cataracts     Heart Disease Mother         angina/CABG     Hypertension Mother      Cancer Father         colon     Diabetes Maternal Aunt         AoDM     No Known Problems Daughter        MEDS: Acetaminophen (TYLENOL PO), Take 1,000 mg by mouth 3 times daily  amiodarone (PACERONE/CODARONE) 200 MG tablet, Take 1 tablet (200 mg) by mouth daily  cholestyramine (QUESTRAN) 4 g packet, TAKE 1 PACKET (4 G) BY MOUTH 2 TIMES DAILY (WITH MEALS)  Ferrous Gluconate 324 (37.5 Fe) MG TABS, TAKE 1 TABLET (324 MG) BY MOUTH DAILY (WITH BREAKFAST)  furosemide (LASIX) 20 MG tablet, Take 1 tablet (20 mg) by mouth daily  glipiZIDE (GLUCOTROL XL) 10 MG 24 hr tablet, TAKE 1 TABLET (10 MG) BY MOUTH 2 TIMES DAILY  latanoprost (XALATAN) 0.005 % ophthalmic solution, Place 1 drop Into the left eye daily  loperamide (IMODIUM A-D) 2 MG tablet, daily   metFORMIN (GLUCOPHAGE) 500 MG tablet, Take 1 tablet (500 mg) by mouth 2 times daily (with meals)  metoprolol tartrate (LOPRESSOR) 25 MG tablet, Take 1 tablet (25 mg) by mouth 2 times daily  multivitamin, therapeutic (THERA-VIT) TABS tablet, Take 0.5 tablets by mouth 2 times daily  ONETOUCH ULTRA test strip, USE TO TEST DAILY  pioglitazone (ACTOS) 45 MG tablet, Take 1 tablet (45 mg) by mouth daily  warfarin ANTICOAGULANT (COUMADIN) 2.5 MG tablet, Take 1 tablet daily or as directed by INR clinic  ACE NOT PRESCRIBED, INTENTIONAL,, 1 each daily ACE Inhibitor not prescribed due to Risk for drug interaction (Patient not taking: Reported on 2019)  ASPIRIN NOT PRESCRIBED, INTENTIONAL,, by Other route continuous prn Reported on 3/7/2017  STATIN NOT PRESCRIBED, INTENTIONAL,, 1 each At Bedtime  "Statin not prescribed intentionally due to Risk for drug interaction (Patient not taking: Reported on 2019)    No current facility-administered medications on file prior to visit.       ALLERGIES:   Allergies   Allergen Reactions     No Known Drug Allergies        PHYSICAL EXAM:  Vitals: /70 (BP Location: Right arm, Patient Position: Sitting, Cuff Size: Adult Regular)   Pulse 72   Ht 1.778 m (5' 10\")   Wt 80.6 kg (177 lb 11.2 oz)   SpO2 98%   BMI 25.50 kg/m    Constitutional:  cooperative, alert and oriented, well developed, well nourished, in no acute distress thin      Skin:  warm and dry to the touch, no apparent skin lesions or masses noted        Head:  normocephalic, no masses or lesions        Eyes:  pupils equal and round;sclera white;conjunctivae and lids unremarkable        ENT:  no pallor or cyanosis, dentition good        Neck:  JVP normal        Respiratory:  normal breath sounds, clear to auscultation, normal A-P diameter, normal symmetry, normal respiratory excursion, no use of accessory muscles        Cardiac:   irregularly irregular rhythm                GI:  BS normoactive;abdomen soft        Vascular: pulses full and equal                                      Extremities and Musculoskeletal:  no deformities, clubbing, cyanosis, erythema observed stasis pigmentation right medial malleoli venous ulcer is healed   Bilateral edema L>R, bilateral varicose veins and telangiectasias       Neurological:  no gross motor deficits;affect appropriate              LABS/DATA:  I reviewed the followin18 treadmill nuc stress test:  Impression  1.  Myocardial perfusion imaging using single isotope technique  demonstrated no significant perfusion defect consistent with  significant ischemia or infarct. There is mild decrease in inferior  activity at the base on rest and stress images most likely consistent  with diaphragm attenuation/bowel uptake artifact..   2. Gated images demonstrated " normal size left ventricle with adequate  to good overall contractility and no significant regional wall motion  abnormality.  The left ventricular systolic function is normal with  ejection fraction 57% (accuracy maybe affected by presence of atrial  fibrillation).  3. Compared to the prior study from not applicable .      8/10/18 resting and exercise ABIs:  IMPRESSION:  1. Normal resting and exercise ABIs bilaterally       8/10/18 echo:  Interpretation Summary  Left ventricular systolic function is normal.The visual ejection fraction is  estimated at 55-60%.  Diastolic Doppler findings (E/E' ratio and/or other parameters) suggest left  ventricular filling pressures are increased.  The right ventricular systolic function is normal.  Pulmonary hypertension- RVSP 40 mm hg +RA.  There is trace aortic regurgitation.  Mild aortic root and ascending aorta dilatation.      8/21/18 venous comp study:  Impression:  1. Right leg: No DVT. Severe reflux (5.2s, 4.0mm) of the proximal GSV  in the thigh and superficial tributary (5.4s, 4.7mm) in the calf as  well as severe reflux in the small saphenous vein in the calf (6.4s,  2.2mm) with resultant refluxing venous varicosities (4.5s, 2.9mm).     2. Left leg: No DVT. Severe reflux (5.0s, 2.9mm) limited to distal GSV  at the ankle.        If clinically indicated could treat:  Right leg: RF ablation of GSV, RF ablation of SSV, sclerotherapy of  superficial tributary and varicose veins with phlebectomy of varicose  veins.     Left leg: Sclerotherapy of distal GSV vs. RF ablation of GSV +  sclerotherapy.      Cardiac event monitor 4/18/19-5/17/19:  Afib with rates ranging from 40s-180  Pauses up to 2.5 secs 0409 on 4/30/19  Runs of NSVT, 6 beats on 4/25/19 at 0527, 13 beats on 5/3/19 at 0458, 14 beats on 5/8/19 at 1400       Treadmill nuc 5/16/19:  Impression  1.  Myocardial perfusion imaging using single isotope technique  demonstrated normal myocardial perfusion.   2. Gated images  "demonstrated normal wall motion.  The left ventricular  systolic function is normal with a calculated ejection fraction of  62%.  3. Compared to the prior study from 2018, no significant changes are  noted .       6/2019 14 day Zio:  Persistent afib, average rate 77, 8 runs of NSVT longest 11 beats, no sxs.       LE US 11/22/19:  CONCLUSION:  Right lower extremity and limited iliac veins:  1. No DVT  2. GSV closed from the proximal to distal thigh and mid-calf to ankle,  patent at knee and proximal calf, no reflux  3. Tributary of the GSV closed at mid calf, patent at the knee with  mild reflux  4. SSV closed distal to the knee with open varicose veins with  moderate reflux     Limited left distal external iliac and femoral veins:     The duplex ultrasound exam did not demonstrate any signs of deep  venous thrombosis in the visualized veins of the left lower extremity  including the distal external iliac, and the femoral.  The visualization of the calf veins was limited.        ASSESSMENT/PLAN:  Chronic afib  - Rate control: June 2019 dilt cd 120 mg changed to metoprolol 25 mg BID due to NSVT.      10/2019 Amio restarted at 100 mg due to episodes of afib with RVR on a Loop recorder.  Later 11/2019 amio increased to 200 mg daily due to continued RVR episodes.    Clements has gone from 100% on Zio 6/2019 to about 20% now.   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        Lightheadedness/dizziness/presyncope  - Patient had an episode in April 2019 of feeling warm prickly sensation in his cheeks, decreased vision and feel like he was going to \"pass out\" but did not  -  Event monitor 4/2019 showed persistent atrial fibrillation with ventricular rates that ranged from the 40s up to the 170s.  Most bradycardic events occurred at nighttime and there were no pauses exceeding 3 seconds.  The patient had occasional PVCs and 3 runs of VT, longest of 14 beats.   - Treadmill nuc 5/16/19 " showed normal perfusion, normal LVEF.    - 6/2019 Dr. Pacheco changed from diltiazem to metoprolol.  Repeat monitor showed 8 episodes of NSVT up to 11 beats, but he had no recurrence of sxs.   - A loop recorder was placed       Non sustained Ventricular tachycardia  - Noted on monitor 4/2019 to have 2 episodes  - Nuclear stress test 5/16/19 negative for ischemia  - Dilt changed to metoprolol, and repeat 6/2019 14 day ZIO Patch showed 8 runs longest 11 seconds  - Currently on metoprolol 25 mg BID and amio 200 mg daily       QUESADA  - Echo shows normal LVEF, grade 3 diastolic dysfunction  - Nuc without e/o ischemia  - Some contribution could be from the chronic afib as well  - With Lasix 20 mg, he had some improvement   - I did want to have his PCP consider a substitute medication for Actos given his significant diastolic dysfunction/QUESADA/edema.  Dr. Armas is considering changing per patient report    - Overall stable, and not too limiting      LE edema  Exertional leg heaviness  Varicose veins  Telangiectasias  - Resting and exercise ABIs are normal  - Venous comp study is abnormal showing severe reflux in both right and left legs.    -- Right leg:  No DVT.  There is severe reflux (5.2s, 4.0mm) of the proximal GSV in the thigh and superficial tributary (5.4s, 4.7mm) in the calf as well as severe reflux in the small saphenous vein in the calf (6.4s, 2.2mm) with resultant refluxing venous varicosities (4.5s, 2.9mm).     --Left leg: No DVT. Severe reflux (5.0s, 2.9mm) limited to distal GSV at the ankle.  - The LE edema could be in aprt from the diastolic dysfunction.  He will continue Lasix.    - 11/18/19 Successful Right GSV and SSV Venaseal     - 11/22/19 LE US without DVT and showed GSV closed from the proximal to distal thigh and mid-calf to ankle,  patent at knee and proximal calf, no reflux.  Tributary of the GSV closed at mid calf, patent at the knee with  mild reflux.  SSV closed distal to the knee with open  varicose veins with moderate reflux.  - His right LE heaviness and fatigue is not much improved/changed, but he did have an ulcer that has healed now           Follow up:  EP KONSTANTIN in 3 months, TSH and LFTs.  He has been on amio before, so uncertain if they will want CXR and PFTs  General cards KONSTANTIN in 6 months  Dr. Gleason in 1 year      Susanna Haque PA-C

## 2019-12-26 ENCOUNTER — TELEPHONE (OUTPATIENT)
Dept: INTERNAL MEDICINE | Facility: CLINIC | Age: 84
End: 2019-12-26

## 2019-12-26 DIAGNOSIS — J10.1 INFLUENZA A: Primary | ICD-10-CM

## 2019-12-26 RX ORDER — OSELTAMIVIR PHOSPHATE 75 MG/1
75 CAPSULE ORAL 2 TIMES DAILY
Qty: 10 CAPSULE | Refills: 0 | Status: SHIPPED | OUTPATIENT
Start: 2019-12-26 | End: 2020-02-04

## 2019-12-26 NOTE — TELEPHONE ENCOUNTER
Reason for call:  Patient reporting a symptom    Symptom or request: congestion, drainage down throat, headache    Duration (how long have symptoms been present): 2 days    Have you been treated for this before? No    Additional comments: Wife was diagnosed with influenza on Tuesday 12/24/19, has been in the hospital ever since. Patient has been exposed to influenza and is similar symptoms as she had and is wondering if he should start on Tamaflu. Please call him back to advise.     Phone Number patient can be reached at:  Home number on file [phone] (home)    Best Time:  any    Can we leave a detailed message on this number:  YES    Call taken on 12/26/2019 at 11:35 AM by Dena Ceballos

## 2019-12-28 DIAGNOSIS — E11.22 TYPE 2 DIABETES MELLITUS WITH STAGE 3 CHRONIC KIDNEY DISEASE, WITHOUT LONG-TERM CURRENT USE OF INSULIN (H): ICD-10-CM

## 2019-12-28 DIAGNOSIS — N18.30 TYPE 2 DIABETES MELLITUS WITH STAGE 3 CHRONIC KIDNEY DISEASE, WITHOUT LONG-TERM CURRENT USE OF INSULIN (H): ICD-10-CM

## 2019-12-28 DIAGNOSIS — D62 ANEMIA DUE TO BLOOD LOSS, ACUTE: ICD-10-CM

## 2019-12-29 DIAGNOSIS — E78.5 HYPERLIPIDEMIA LDL GOAL <100: ICD-10-CM

## 2019-12-29 DIAGNOSIS — R19.7 POSTCHOLECYSTECTOMY DIARRHEA: ICD-10-CM

## 2019-12-29 DIAGNOSIS — K91.89 POSTCHOLECYSTECTOMY DIARRHEA: ICD-10-CM

## 2019-12-30 ENCOUNTER — DOCUMENTATION ONLY (OUTPATIENT)
Dept: CARDIOLOGY | Facility: CLINIC | Age: 84
End: 2019-12-30

## 2019-12-30 NOTE — TELEPHONE ENCOUNTER
"Requested Prescriptions   Pending Prescriptions Disp Refills     cholestyramine (QUESTRAN) 4 g packet [Pharmacy Med Name:  Last Written Prescription Date:  10/7/2019  Last Fill Quantity: 180,  # refills: 0   Last office visit: 11/11/2019 with prescribing provider:     Future Office Visit:   CHOLESTYRAMINE PACKET] 180 packet 0     Sig: TAKE 1 PACKET (4 G) BY MOUTH 2 TIMES DAILY (WITH MEALS)       Bile Acid Sequestrant Agents Passed - 12/29/2019  1:05 AM        Passed - Lipid panel on file in past 12 mos     Recent Labs   Lab Test 11/11/19  0840  03/20/15  0836   CHOL 120   < > 131   TRIG 104   < > 96   HDL 55   < > 66   LDL 44   < > 46   NHDL 65   < >  --    VLDL  --   --  19   CHOLHDLRATIO  --   --  2.0    < > = values in this interval not displayed.               Passed - Recent (12 mo) or future (30 days) visit within the authorizing provider's specialty     Patient has had an office visit with the authorizing provider or a provider within the authorizing providers department within the previous 12 mos or has a future within next 30 days. See \"Patient Info\" tab in inbasket, or \"Choose Columns\" in Meds & Orders section of the refill encounter.              Passed - Medication is active on med list        Passed - Patient is age 18 years or older        "

## 2019-12-30 NOTE — TELEPHONE ENCOUNTER
Requested Prescriptions   Pending Prescriptions Disp Refills     pioglitazone (ACTOS) 45 MG tablet [Pharmacy Med Name: PIOGLITAZONE HCL 45  Last Written Prescription Date:  12/19/2019  Last Fill Quantity: 90,  # refills: 3   Last office visit: 11/11/2019 with prescribing provider:     Future Office Visit:   MG TABLET] 90 tablet 3     Sig: TAKE 1 TABLET BY MOUTH DAILY       Thiazolidinedione Agents (TZDs)  Passed - 12/28/2019  8:15 PM        Passed - Blood pressure less than 140/90 in past 6 months     BP Readings from Last 3 Encounters:   12/18/19 126/70   11/18/19 118/72   11/11/19 134/78                 Passed - Patient has documented LDL within the past 12 mos.     Recent Labs   Lab Test 11/11/19  0840   LDL 44             Passed - Patient has a normal ALT within the past 12 mos.     Recent Labs   Lab Test 11/11/19  0840   ALT 34             Passed - Patient has a normal AST within the past 12 mos.      Recent Labs   Lab Test 11/11/19  0840   AST 34             Passed - Patient has had a Microalbumin in the past 12 mos.     Recent Labs   Lab Test 11/11/19  0841   MICROL 21   UMALCR 41.55*             Passed - Patient has documented A1c within the specified period of time.     If HgbA1C is 8 or greater, it needs to be on file within the past 3 months.  If less than 8, must be on file within the past 6 months.     Recent Labs   Lab Test 11/11/19  0840   A1C 6.7*             Passed - Diagnosis not CHF        Passed - Medication is active on med list        Passed - Patient is age 18 or older        Passed - Patient has a normal serum Creatinine in the past 12 months     Recent Labs   Lab Test 09/12/19  1301  04/09/12  1526   CR 0.84   < >  --    CREAT  --   --  1.1    < > = values in this interval not displayed.             Passed - Recent (6 mo) or future (30 days) visit within the authorizing provider's specialty     Patient had office visit in the last 6 months or has a visit in the next 30 days with authorizing  "provider or within the authorizing provider's specialty.  See \"Patient Info\" tab in inbasket, or \"Choose Columns\" in Meds & Orders section of the refill encounter.            ferrous gluconate (FERGON) 324 (38 Fe) MG tablet [Pharmacy Med Name:  Last Written Prescription Date:  9/24/2019  Last Fill Quantity: 100,  # refills: 0   Last office visit: 11/11/2019 with prescribing provider:     Future Office Visit:   FERROUS GLUCONATE 324 MG TAB] 100 tablet 0     Sig: TAKE 1 TABLET (324 MG) BY MOUTH DAILY (WITH BREAKFAST)       Iron Supplements Passed - 12/28/2019  8:15 PM        Passed - Patient is 12 years of age or older        Passed - Recent (12 mo) or future (30 days) visit within the authorizing provider's specialty     Patient has had an office visit with the authorizing provider or a provider within the authorizing providers department within the previous 12 mos or has a future within next 30 days. See \"Patient Info\" tab in inbasket, or \"Choose Columns\" in Meds & Orders section of the refill encounter.              Passed - Hgb OR Hct on record within the past 12 mos.     Patient need only have had a HGB or HCT on file in the past 12 mos. That result does not need to be normal.    Recent Labs   Lab Test 08/05/19  1220 06/17/19  1145 06/22/18  1142   HGB 13.9 12.2* 13.1*     Recent Labs   Lab Test 08/05/19  1220 06/17/19  1145 06/22/18  1142   HCT 42.4 38.6* 40.1       Please verify a HGB or HCT has been checked SINCE THE LAST DOSE CHANGE.            Passed - Medication is active on med list        "

## 2019-12-30 NOTE — TELEPHONE ENCOUNTER
[employer] Loop recorder remote alert      Remote alert for tachy event lasting 1 min and 14 seconds at a rate of 158 BPM. EGM shows irregular VS events. Report shows pt to be in AF 31 % of the time. On average the HR is controlled when in AF. He remains on Amio and OAC. CAMILLE Carpio

## 2019-12-31 RX ORDER — PIOGLITAZONEHYDROCHLORIDE 45 MG/1
TABLET ORAL
Qty: 90 TABLET | Refills: 1 | Status: SHIPPED | OUTPATIENT
Start: 2019-12-31 | End: 2020-06-19

## 2019-12-31 RX ORDER — FERROUS GLUCONATE 324(38)MG
TABLET ORAL
Qty: 100 TABLET | Refills: 1 | Status: SHIPPED | OUTPATIENT
Start: 2019-12-31 | End: 2020-07-14

## 2019-12-31 RX ORDER — CHOLESTYRAMINE 4 G/9G
POWDER, FOR SUSPENSION ORAL
Qty: 180 PACKET | Refills: 1 | Status: SHIPPED | OUTPATIENT
Start: 2019-12-31 | End: 2020-02-13

## 2019-12-31 NOTE — TELEPHONE ENCOUNTER
Last OV on 11/11/19     Prescription approved per [facility] Refill Protocol.    Hemoglobin   Date Value Ref Range Status   08/05/2019 13.9 13.3 - 17.7 g/dL Final   ]

## 2020-01-14 ENCOUNTER — ANTICOAGULATION THERAPY VISIT (OUTPATIENT)
Dept: NURSING | Facility: CLINIC | Age: 85
End: 2020-01-14
Payer: COMMERCIAL

## 2020-01-14 ENCOUNTER — DOCUMENTATION ONLY (OUTPATIENT)
Dept: CARDIOLOGY | Facility: CLINIC | Age: 85
End: 2020-01-14

## 2020-01-14 DIAGNOSIS — Z79.01 LONG TERM CURRENT USE OF ANTICOAGULANT THERAPY: ICD-10-CM

## 2020-01-14 DIAGNOSIS — I48.0 PAROXYSMAL ATRIAL FIBRILLATION (H): ICD-10-CM

## 2020-01-14 LAB — INR POINT OF CARE: 2.9 (ref 0.86–1.14)

## 2020-01-14 PROCEDURE — 36416 COLLJ CAPILLARY BLOOD SPEC: CPT

## 2020-01-14 PROCEDURE — 85610 PROTHROMBIN TIME: CPT | Mod: QW

## 2020-01-14 PROCEDURE — 99207 ZZC NO CHARGE NURSE ONLY: CPT

## 2020-01-14 NOTE — TELEPHONE ENCOUNTER
Medtronic loop recorder    Remote alert for Tachy event. Pt is in AF 20% of the time- on warfarin. SVT vs RVR lasting 8 minutes at an avg rate of 162 BPM. His histograms still show overall stable rates. CAMILLE Carpio

## 2020-01-14 NOTE — PROGRESS NOTES
ANTICOAGULATION FOLLOW-UP CLINIC VISIT    Patient Name:  Louis Keller Jr.  Date:  2020  Contact Type:  Face to Face    SUBJECTIVE:  Patient Findings     Comments:   Patient had cardiology follow up on , no changes or concerns noted. Patient states legs feel fine currently.        Clinical Outcomes     Negatives:   Major bleeding event, Thromboembolic event, Anticoagulation-related hospital admission, Anticoagulation-related ED visit, Anticoagulation-related fatality    Comments:   Patient had cardiology follow up on , no changes or concerns noted. Patient states legs feel fine currently.           OBJECTIVE    INR Protime   Date Value Ref Range Status   2020 2.9 (A) 0.86 - 1.14 Final       ASSESSMENT / PLAN  INR assessment THER    Recheck INR In: 4 WEEKS    INR Location Clinic      Anticoagulation Summary  As of 2020    INR goal:   2.0-3.0   TTR:   65.0 % (1 y)   INR used for dosin.9 (2020)   Warfarin maintenance plan:   2.5 mg (5 mg x 0.5) every day   Full warfarin instructions:   2.5 mg every day   Weekly warfarin total:   17.5 mg   No change documented:   Hope Baumann RN   Plan last modified:   Hope Baumann RN (11/15/2019)   Next INR check:   2020   Priority:   Maintenance   Target end date:       Indications    Long-term (current) use of anticoagulants [Z79.01] [Z79.01]  Atrial fibrillation (H) [I48.91]             Anticoagulation Episode Summary     INR check location:       Preferred lab:       Send INR reminders to:   [facility]    Comments:           Anticoagulation Care Providers     Provider Role Specialty Phone number    Joselito Armas MD [facility] Internal Medicine [phone]            See the Encounter Report to view Anticoagulation Flowsheet and Dosing Calendar (Go to Encounters tab in chart review, and find the Anticoagulation Therapy Visit)        Hope Baumann RN

## 2020-02-04 ENCOUNTER — OFFICE VISIT (OUTPATIENT)
Dept: INTERNAL MEDICINE | Facility: CLINIC | Age: 85
End: 2020-02-04
Payer: COMMERCIAL

## 2020-02-04 VITALS
WEIGHT: 175 LBS | HEIGHT: 70 IN | TEMPERATURE: 97.5 F | DIASTOLIC BLOOD PRESSURE: 76 MMHG | RESPIRATION RATE: 12 BRPM | OXYGEN SATURATION: 98 % | BODY MASS INDEX: 25.05 KG/M2 | SYSTOLIC BLOOD PRESSURE: 130 MMHG | HEART RATE: 77 BPM

## 2020-02-04 DIAGNOSIS — R53.83 FATIGUE, UNSPECIFIED TYPE: ICD-10-CM

## 2020-02-04 DIAGNOSIS — E11.22 TYPE 2 DIABETES MELLITUS WITH STAGE 3 CHRONIC KIDNEY DISEASE, WITHOUT LONG-TERM CURRENT USE OF INSULIN (H): Primary | ICD-10-CM

## 2020-02-04 DIAGNOSIS — N18.30 TYPE 2 DIABETES MELLITUS WITH STAGE 3 CHRONIC KIDNEY DISEASE, WITHOUT LONG-TERM CURRENT USE OF INSULIN (H): Primary | ICD-10-CM

## 2020-02-04 DIAGNOSIS — R06.09 DOE (DYSPNEA ON EXERTION): ICD-10-CM

## 2020-02-04 DIAGNOSIS — I48.0 PAROXYSMAL ATRIAL FIBRILLATION (H): ICD-10-CM

## 2020-02-04 LAB
ERYTHROCYTE [DISTWIDTH] IN BLOOD BY AUTOMATED COUNT: 14.9 % (ref 10–15)
HCT VFR BLD AUTO: 40 % (ref 40–53)
HGB BLD-MCNC: 12.6 G/DL (ref 13.3–17.7)
MCH RBC QN AUTO: 30.5 PG (ref 26.5–33)
MCHC RBC AUTO-ENTMCNC: 31.5 G/DL (ref 31.5–36.5)
MCV RBC AUTO: 97 FL (ref 78–100)
PLATELET # BLD AUTO: 167 10E9/L (ref 150–450)
RBC # BLD AUTO: 4.13 10E12/L (ref 4.4–5.9)
WBC # BLD AUTO: 6.1 10E9/L (ref 4–11)

## 2020-02-04 PROCEDURE — 99214 OFFICE O/P EST MOD 30 MIN: CPT | Performed by: INTERNAL MEDICINE

## 2020-02-04 PROCEDURE — 36415 COLL VENOUS BLD VENIPUNCTURE: CPT | Performed by: INTERNAL MEDICINE

## 2020-02-04 PROCEDURE — 84439 ASSAY OF FREE THYROXINE: CPT | Performed by: INTERNAL MEDICINE

## 2020-02-04 PROCEDURE — 85027 COMPLETE CBC AUTOMATED: CPT | Performed by: INTERNAL MEDICINE

## 2020-02-04 PROCEDURE — 84443 ASSAY THYROID STIM HORMONE: CPT | Performed by: INTERNAL MEDICINE

## 2020-02-04 PROCEDURE — 80053 COMPREHEN METABOLIC PANEL: CPT | Performed by: INTERNAL MEDICINE

## 2020-02-04 ASSESSMENT — MIFFLIN-ST. JEOR: SCORE: 1475.04

## 2020-02-04 NOTE — NURSING NOTE
"Vital signs:  Temp: 97.5  F (36.4  C) Temp src: Oral BP: (!) 146/78 Pulse: 77   Resp: 12 SpO2: 98 %     Height: 177.8 cm (5' 10\") Weight: 79.4 kg (175 lb)  Estimated body mass index is 25.11 kg/m  as calculated from the following:    Height as of this encounter: 1.778 m (5' 10\").    Weight as of this encounter: 79.4 kg (175 lb).        "

## 2020-02-05 LAB
ALBUMIN SERPL-MCNC: 3.9 G/DL (ref 3.4–5)
ALP SERPL-CCNC: 69 U/L (ref 40–150)
ALT SERPL W P-5'-P-CCNC: 34 U/L (ref 0–70)
ANION GAP SERPL CALCULATED.3IONS-SCNC: 4 MMOL/L (ref 3–14)
AST SERPL W P-5'-P-CCNC: 33 U/L (ref 0–45)
BILIRUB SERPL-MCNC: 1.3 MG/DL (ref 0.2–1.3)
BUN SERPL-MCNC: 16 MG/DL (ref 7–30)
CALCIUM SERPL-MCNC: 8.9 MG/DL (ref 8.5–10.1)
CHLORIDE SERPL-SCNC: 103 MMOL/L (ref 94–109)
CO2 SERPL-SCNC: 27 MMOL/L (ref 20–32)
CREAT SERPL-MCNC: 0.93 MG/DL (ref 0.66–1.25)
GFR SERPL CREATININE-BSD FRML MDRD: 73 ML/MIN/{1.73_M2}
GLUCOSE SERPL-MCNC: 212 MG/DL (ref 70–99)
POTASSIUM SERPL-SCNC: 4.4 MMOL/L (ref 3.4–5.3)
PROT SERPL-MCNC: 7.3 G/DL (ref 6.8–8.8)
SODIUM SERPL-SCNC: 134 MMOL/L (ref 133–144)
T4 FREE SERPL-MCNC: 1.07 NG/DL (ref 0.76–1.46)
TSH SERPL DL<=0.005 MIU/L-ACNC: 5.44 MU/L (ref 0.4–4)

## 2020-02-12 ENCOUNTER — DOCUMENTATION ONLY (OUTPATIENT)
Dept: CARDIOLOGY | Facility: CLINIC | Age: 85
End: 2020-02-12

## 2020-02-12 NOTE — TELEPHONE ENCOUNTER
Medtronic Loop recorder    Remote alert for tachy event. EGM shows irregular VS events lasting 2 minutes 24 seconds at avg rate of 162 BPM. Pt is on Amiodarone as well as warfarin. In past 21 days he has been in PAF 20% of the time. When in AF rates are slightly elevated at the 110-120 BPM conor. I will update Dr Gleason. Fer Carpio

## 2020-02-13 ENCOUNTER — ANTICOAGULATION THERAPY VISIT (OUTPATIENT)
Dept: NURSING | Facility: CLINIC | Age: 85
End: 2020-02-13
Payer: COMMERCIAL

## 2020-02-13 DIAGNOSIS — R19.7 POSTCHOLECYSTECTOMY DIARRHEA: ICD-10-CM

## 2020-02-13 DIAGNOSIS — E78.5 HYPERLIPIDEMIA LDL GOAL <100: ICD-10-CM

## 2020-02-13 DIAGNOSIS — Z79.01 LONG TERM CURRENT USE OF ANTICOAGULANT THERAPY: ICD-10-CM

## 2020-02-13 DIAGNOSIS — I48.0 PAROXYSMAL ATRIAL FIBRILLATION (H): ICD-10-CM

## 2020-02-13 DIAGNOSIS — K91.89 POSTCHOLECYSTECTOMY DIARRHEA: ICD-10-CM

## 2020-02-13 LAB — INR POINT OF CARE: 2.2 (ref 0.86–1.14)

## 2020-02-13 PROCEDURE — 99207 ZZC NO CHARGE NURSE ONLY: CPT

## 2020-02-13 PROCEDURE — 85610 PROTHROMBIN TIME: CPT | Mod: QW

## 2020-02-13 PROCEDURE — 36416 COLLJ CAPILLARY BLOOD SPEC: CPT

## 2020-02-13 RX ORDER — CHOLESTYRAMINE 4 G/9G
1 POWDER, FOR SUSPENSION ORAL DAILY
Qty: 180 PACKET | Refills: 1 | COMMUNITY
Start: 2020-02-10 | End: 2020-07-01

## 2020-02-17 ENCOUNTER — ANCILLARY PROCEDURE (OUTPATIENT)
Dept: CARDIOLOGY | Facility: CLINIC | Age: 85
End: 2020-02-17
Attending: INTERNAL MEDICINE
Payer: COMMERCIAL

## 2020-02-17 DIAGNOSIS — R55 NEAR SYNCOPE: Primary | ICD-10-CM

## 2020-02-17 DIAGNOSIS — Z45.09 ENCOUNTER FOR LOOP RECORDER CHECK: ICD-10-CM

## 2020-02-17 PROCEDURE — 93297 REM INTERROG DEV EVAL ICPMS: CPT | Performed by: INTERNAL MEDICINE

## 2020-02-17 PROCEDURE — G2066 INTER DEVC REMOTE 30D: HCPCS | Performed by: INTERNAL MEDICINE

## 2020-02-24 ENCOUNTER — DOCUMENTATION ONLY (OUTPATIENT)
Dept: CARDIOLOGY | Facility: CLINIC | Age: 85
End: 2020-02-24

## 2020-02-24 NOTE — TELEPHONE ENCOUNTER
Alert received for tachy episode. EGM shows irregular VS lasting 1 minute 48 seconds with average V rates in the 150's. Patient already on Amiodarone and Warfarin. Dr. Gleason was notified on 02/12/2020 for similar episodes.

## 2020-02-25 LAB
MDC_IDC_MSMT_BATTERY_STATUS: NORMAL
MDC_IDC_PG_IMPLANT_DTM: NORMAL
MDC_IDC_PG_MFG: NORMAL
MDC_IDC_PG_MODEL: NORMAL
MDC_IDC_PG_SERIAL: NORMAL
MDC_IDC_PG_TYPE: NORMAL
MDC_IDC_SESS_CLINIC_NAME: NORMAL
MDC_IDC_SESS_DTM: NORMAL
MDC_IDC_SESS_TYPE: NORMAL
MDC_IDC_SET_ZONE_DETECTION_INTERVAL: 2000 MS
MDC_IDC_SET_ZONE_DETECTION_INTERVAL: 3000 MS
MDC_IDC_SET_ZONE_DETECTION_INTERVAL: 420 MS
MDC_IDC_SET_ZONE_TYPE: NORMAL
MDC_IDC_STAT_AT_BURDEN_PERCENT: 23.4 %
MDC_IDC_STAT_AT_DTM_END: NORMAL
MDC_IDC_STAT_AT_DTM_START: NORMAL
MDC_IDC_STAT_EPISODE_RECENT_COUNT: 0
MDC_IDC_STAT_EPISODE_RECENT_COUNT: 11
MDC_IDC_STAT_EPISODE_RECENT_COUNT: 45
MDC_IDC_STAT_EPISODE_RECENT_COUNT_DTM_END: NORMAL
MDC_IDC_STAT_EPISODE_RECENT_COUNT_DTM_START: NORMAL
MDC_IDC_STAT_EPISODE_TOTAL_COUNT: 0
MDC_IDC_STAT_EPISODE_TOTAL_COUNT: 1
MDC_IDC_STAT_EPISODE_TOTAL_COUNT: 28
MDC_IDC_STAT_EPISODE_TOTAL_COUNT: 350
MDC_IDC_STAT_EPISODE_TOTAL_COUNT_DTM_END: NORMAL
MDC_IDC_STAT_EPISODE_TOTAL_COUNT_DTM_START: NORMAL
MDC_IDC_STAT_EPISODE_TYPE: NORMAL

## 2020-03-04 ENCOUNTER — OFFICE VISIT (OUTPATIENT)
Dept: CARDIOLOGY | Facility: CLINIC | Age: 85
End: 2020-03-04
Attending: PHYSICIAN ASSISTANT
Payer: COMMERCIAL

## 2020-03-04 VITALS
WEIGHT: 172 LBS | SYSTOLIC BLOOD PRESSURE: 98 MMHG | BODY MASS INDEX: 24.62 KG/M2 | HEART RATE: 66 BPM | HEIGHT: 70 IN | DIASTOLIC BLOOD PRESSURE: 56 MMHG | OXYGEN SATURATION: 98 %

## 2020-03-04 DIAGNOSIS — R06.09 DOE (DYSPNEA ON EXERTION): ICD-10-CM

## 2020-03-04 DIAGNOSIS — R55 NEAR SYNCOPE: ICD-10-CM

## 2020-03-04 DIAGNOSIS — Z79.899 ON AMIODARONE THERAPY: ICD-10-CM

## 2020-03-04 DIAGNOSIS — R60.0 BILATERAL LEG EDEMA: ICD-10-CM

## 2020-03-04 DIAGNOSIS — I48.0 PAROXYSMAL ATRIAL FIBRILLATION (H): Primary | ICD-10-CM

## 2020-03-04 DIAGNOSIS — I48.20 CHRONIC A-FIB (H): ICD-10-CM

## 2020-03-04 DIAGNOSIS — I51.89 DIASTOLIC DYSFUNCTION: ICD-10-CM

## 2020-03-04 LAB
ALBUMIN SERPL-MCNC: 4 G/DL (ref 3.4–5)
ALP SERPL-CCNC: 65 U/L (ref 40–150)
ALT SERPL W P-5'-P-CCNC: 27 U/L (ref 0–70)
AST SERPL W P-5'-P-CCNC: 28 U/L (ref 0–45)
BILIRUB DIRECT SERPL-MCNC: 0.4 MG/DL (ref 0–0.2)
BILIRUB SERPL-MCNC: 1.6 MG/DL (ref 0.2–1.3)
PROT SERPL-MCNC: 7.5 G/DL (ref 6.8–8.8)
T4 FREE SERPL-MCNC: 1.16 NG/DL (ref 0.76–1.46)
TSH SERPL DL<=0.005 MIU/L-ACNC: 5.47 MU/L (ref 0.4–4)

## 2020-03-04 PROCEDURE — 36415 COLL VENOUS BLD VENIPUNCTURE: CPT | Performed by: INTERNAL MEDICINE

## 2020-03-04 PROCEDURE — 80076 HEPATIC FUNCTION PANEL: CPT | Performed by: INTERNAL MEDICINE

## 2020-03-04 PROCEDURE — 84439 ASSAY OF FREE THYROXINE: CPT | Performed by: INTERNAL MEDICINE

## 2020-03-04 PROCEDURE — 84443 ASSAY THYROID STIM HORMONE: CPT | Performed by: INTERNAL MEDICINE

## 2020-03-04 PROCEDURE — 99214 OFFICE O/P EST MOD 30 MIN: CPT | Performed by: NURSE PRACTITIONER

## 2020-03-04 RX ORDER — FUROSEMIDE 20 MG
20 TABLET ORAL
Qty: 90 TABLET | Refills: 0 | Status: SHIPPED | OUTPATIENT
Start: 2020-03-04 | End: 2021-05-10

## 2020-03-04 ASSESSMENT — MIFFLIN-ST. JEOR: SCORE: 1461.44

## 2020-03-04 NOTE — PATIENT INSTRUCTIONS
If you have problems or questions please call the RNs (Nicky Segovia, & Emely) at [phone]    Decrease your lasix to every 3rd day.  If you start to get short of breath, swollen then start taking daily and call us to let us know    I am going to talk to Dr. Pacheco about the amount of atrial fibrillation you are having and at times it is fast.

## 2020-03-04 NOTE — LETTER
3/4/2020    Joselito Armas MD  [address] [facility] [address]    RE: Louis Keller JrJana       Dear Colleague,    I had the pleasure of seeing Louis Keller Jr. in the [facility].    HPI:  Louis Keller Jr. is a 87 year old male who presents for atrial fibrillation follow up.  He is a patient of Dr. Gleason.  His past medical history includes     1. Permanent atrial fibrillation.   2. NSVT.  Noted on zio patch.   3. Syncope s/p Loop recorder implanted (7/2019)  4. Diabetes mellitus type 2.   5. Dyslipidemia.   6. Irritable bowel syndrome with intermittent diarrhea.   7. Mild aortic root dilatation (4.2 cm).  8. Severe, symptomatic right lower extremity venous incompetence with right malleoli venous ulcer      Diagnostics:  Loop recorder (2/2/2019) 23% atrial fibrillation histogram shows sinus rhythm between 30 and 160 but primarily between 40 and 90 bpm.  Histogram for atrial fibrillation shows heart rates between 30 and 170 and rates approximately over 100 bpm 20% of the time.  Nuclear stress test (2019) revealed normal myocardial perfusion with no ischemia  Bilateral carotid ultrasound (2019) revealed normal flow with no significant stenosis.  ECHO (2018) revealed EF 55-60%, pulmonary HTN RVSP 40 mmHG +RA.       In 2015, patient's amiodarone was discontinued due to light sensivity.      He recently saw Dr. Pacheco in June 2019.  Patient had near syncopal episode in April 2019 where he was sitting reading the paper and felt a prickly warm sensation in his cheeks, his vision went gray and he felt like he would pass out however he did not. Dr. Pacheco felt that this was likely related to nonsustained VT as he has a history of this on his 30-day monitor.  However possibly could be slow atrial fibrillation with a pause.  It was recommended he stop his diltiazem and changed to metoprolol with the hope of suppressing NSVT.      In September 2019, patient saw Dr. Gleason and his  amiodarone was started at 100 mg daily due to intermittent symptomatic atrial  fibrillation with RVR      In September 2019, Patient's loop recorder revealed atrial fibrillation with RVR.  He has been in atrial fibrillation 44% of the time however the histogram shows overall stable heart rates despite frequent bursts of RVR.    On 10/13/2019, patient's amiodarone was increased from 100 to 200 mg daily     In December 2018 patient saw Anjali Haque his BRANDON. fib burden was approximately 20% while on 200 mg of amiodarone    On 2/2/2019, patient had loop recorder interrogated he is having 23% atrial fibrillation histogram shows sinus rhythm between 30 and 160 but primarily between 40 and 90 bpm.  Histogram for atrial fibrillation shows heart rates between 30 and 170 and rates approximately over 100 bpm 20% of the time.      Today he presents with symptoms of shortness of breath with walking from the clinic waiting room to the exam room however uses the treadmill for 30 minutes daily and does not have QUESADA.   He denies chest pain or pressure, dizziness, syncope, angina, dyspnea at rest, palpitations, orthopnea, PND, or edema. He is taking his medications as prescribed including warfarin and denies bleeding, hematuria, hematochezia, epistaxis and signs/symptoms of stroke.  We discussed his atrial fibrillation burden and he was in 100% atrial fibrillation with average HR 75 bpm in 6/2019 per zio patch and now is only having 23% atrial fibrillation burden while on amiodarone and metoprolol.      ASSESSMENT AND PLAN    Paroxsymal atrial fibrillation.    In June 2019, patient's rate control medication changed from diltiazem to metoprolol  tartrate 25 mg twice daily.  His atrial fibrillation was rate controlled has verification on a Zio patch which revealed average heart rate 77 bpm.   In 9/2019, he was started on amiodarone for slightly symptomatic atrial fibrillation with intermittent RVR. In 10/2019, it was increased 200 mg  "daily. He continues to taking metoprolol tartrate 25 mg twice daily Now he is having 23% afib burden and 20% of the time he is above 100 bpm.   He does not have HF symptoms but is complaining of shortness of breath with walking when not on the treadmill.   His BP is soft today and does not allow for increase in AV node blockers.      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  His INR is within goal.      Amiodarone therapy    ALT/AST 27/28 (3/4/2020)    TSH=5.47 and Free T4=1.16 (3/4/2020)     Lightheadedness/dizziness/presyncope    Patient had an episode in April of feeling warm prickly sensation in his cheeks, decreased vision and feel like he was going to \"pass out\" but did not    Monitors have revealed episodes of ventricular tachycardia longest 11 seconds and no symptoms have been recorded.  Loop recorder has not noted any other arrhythmias.    No recurrence     Non sustained Ventricular tachycardia.    Noted on ZIO Patch to have 8 runs longest 11 seconds    Nuclear stress test negative for ischemia    Patient currently taking metoprolol at rate 25 mg twice daily    LE edema    Currently taking lasix 20 mg daily.    Due to soft blood pressures and improved LE EDEMA will decrease lasix to every 3rd day     Plan:    Due to soft blood pressures and improved LE EDEMA will decrease lasix to every 3rd day     Will discuss long term plan with Dr. Pacheco regarding patient's atrial fibrillation.      Patient expresses understanding and agreement with the plan.     I appreciate the chance to help with Louis Keller Jr. Please let me know if you have any questions or concerns.    MARTHA Rice, CNP    This note was completed in part using Dragon voice recognition software. Although reviewed after completion, some word and grammatical errors may occur.    No orders of the defined types were placed in this encounter.    Orders Placed This Encounter   Medications     " furosemide (LASIX) 20 MG tablet     Sig: Take 1 tablet (20 mg) by mouth every 3 days     Dispense:  90 tablet     Refill:  0     Pt will call for refills     Medications Discontinued During This Encounter   Medication Reason     furosemide (LASIX) 20 MG tablet          Encounter Diagnoses   Name Primary?     Near syncope      Chronic a-fib      QUESADA (dyspnea on exertion)      Diastolic dysfunction      Bilateral leg edema        CURRENT MEDICATIONS:  Current Outpatient Medications   Medication Sig Dispense Refill     Acetaminophen (TYLENOL PO) Take 1,000 mg by mouth 3 times daily       amiodarone (PACERONE/CODARONE) 200 MG tablet Take 1 tablet (200 mg) by mouth daily 90 tablet 3     cholestyramine (QUESTRAN) 4 g packet 1 packet (4 g) daily 180 packet 1     ferrous gluconate (FERGON) 324 (38 Fe) MG tablet TAKE 1 TABLET (324 MG) BY MOUTH DAILY (WITH BREAKFAST) 100 tablet 1     furosemide (LASIX) 20 MG tablet Take 1 tablet (20 mg) by mouth every 3 days 90 tablet 0     glipiZIDE (GLUCOTROL XL) 10 MG 24 hr tablet TAKE 1 TABLET (10 MG) BY MOUTH 2 TIMES DAILY 180 tablet 1     latanoprost (XALATAN) 0.005 % ophthalmic solution Place 1 drop Into the left eye daily       loperamide (IMODIUM A-D) 2 MG tablet daily  30 tablet 0     metFORMIN (GLUCOPHAGE) 500 MG tablet Take 1 tablet (500 mg) by mouth 2 times daily (with meals) 180 tablet 1     metoprolol tartrate (LOPRESSOR) 25 MG tablet Take 1 tablet (25 mg) by mouth 2 times daily 60 tablet 11     multivitamin, therapeutic (THERA-VIT) TABS tablet Take 0.5 tablets by mouth 2 times daily       ONETOUCH ULTRA test strip USE TO TEST DAILY 50 strip 11     pioglitazone (ACTOS) 45 MG tablet TAKE 1 TABLET BY MOUTH DAILY 90 tablet 1     warfarin ANTICOAGULANT (COUMADIN) 2.5 MG tablet Take 1 tablet daily or as directed by INR clinic 90 tablet 0     ACE NOT PRESCRIBED, INTENTIONAL, 1 each daily ACE Inhibitor not prescribed due to Risk for drug interaction (Patient not taking: Reported on  2019) 0 each 0     ASPIRIN NOT PRESCRIBED, INTENTIONAL, by Other route continuous prn Reported on 3/7/2017       STATIN NOT PRESCRIBED, INTENTIONAL, 1 each At Bedtime Statin not prescribed intentionally due to Risk for drug interaction (Patient not taking: Reported on 2019) 0 each 0       ALLERGIES     Allergies   Allergen Reactions     No Known Drug Allergies        PAST MEDICAL HISTORY:  Past Medical History:   Diagnosis Date     Antiplatelet or antithrombotic long-term use      Atrial fibrillation (H)      Diarrhea      Diastolic murmur      QUESADA (dyspnea on exertion)      Gout      Hemorrhage of gastrointestinal tract, unspecified 63,65,and 1971    hospitalized     History of blood transfusion      Hyperlipidemia LDL goal <100 2012     Long-term (current) use of anticoagulants [Z79.01] 3/31/2016     Mumps      Palpitations      Physical deconditioning 2013     Scarlet fever      Spider veins      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     Unspecified disease of pancreas     pancreatitis       PAST SURGICAL HISTORY:  Past Surgical History:   Procedure Laterality Date     ARTHROPLASTY KNEE  2013    Procedure: ARTHROPLASTY KNEE;  Left Total Knee Arthroplasty       C NONSPECIFIC PROCEDURE  Child    T&A     C NONSPECIFIC PROCEDURE  ; also     Colonoscopy     C NONSPECIFIC PROCEDURE      Basal cell cancer of the nose     C NONSPECIFIC PROCEDURE      Cholecystectomy     CARDIOVERSION  11    failed     EP LOOP RECORDER IMPLANT N/A 2019    Procedure: EP Loop Recorder Implant;  Surgeon: Darling Pacheco MD;  Location:  [facility]       FAMILY HISTORY:  Family History   Problem Relation Age of Onset     Alzheimer Disease Maternal Grandmother      Arthritis Maternal Grandmother      Cancer Mother         breast/ 1983/colon     Eye Disorder Mother         glaucoma and cataracts     Heart Disease Mother         angina/CABG      Hypertension Mother      Cancer Father         colon     Diabetes Maternal Aunt         AoDM     No Known Problems Daughter        SOCIAL HISTORY:  Social History     Socioeconomic History     Marital status:      Spouse name: None     Number of children: None     Years of education: None     Highest education level: None   Occupational History     Occupation: Lab tech     Comment: Semi-retired   Social Needs     Financial resource strain: None     Food insecurity:     Worry: None     Inability: None     Transportation needs:     Medical: None     Non-medical: None   Tobacco Use     Smoking status: Never Smoker     Smokeless tobacco: Never Used   Substance and Sexual Activity     Alcohol use: Yes     Alcohol/week: 0.0 standard drinks     Comment: wine - currently rare     Drug use: No     Sexual activity: Never     Partners: Female   Lifestyle     Physical activity:     Days per week: None     Minutes per session: None     Stress: None   Relationships     Social connections:     Talks on phone: None     Gets together: None     Attends Nondenominational service: None     Active member of club or organization: None     Attends meetings of clubs or organizations: None     Relationship status: None     Intimate partner violence:     Fear of current or ex partner: None     Emotionally abused: None     Physically abused: None     Forced sexual activity: None   Other Topics Concern      Service Not Asked     Blood Transfusions Not Asked     Caffeine Concern No     Comment: 14 oz per day     Occupational Exposure Not Asked     Hobby Hazards Not Asked     Sleep Concern No     Comment: sometimes - nocturia 4 times per night     Stress Concern Not Asked     Weight Concern Not Asked     Special Diet No     Back Care Not Asked     Exercise Yes     Comment: treadmill/walking 15-20 minutes 6 days per week     Bike Helmet Not Asked     Seat Belt Not Asked     Self-Exams Not Asked     Parent/sibling w/ CABG, MI or  "angioplasty before 65F 55M? Not Asked   Social History Narrative     None       Review of Systems:  Skin:  Positive for bruising   Eyes:  Positive for glasses;tearing  ENT:  Positive for hearing loss  Respiratory:  Negative    Cardiovascular:  Negative for;palpitations;chest pain;edema;lightheadedness;dizziness Positive for;fatigue  Gastroenterology: Negative    Genitourinary:  Positive for nocturia;urinary frequency  Musculoskeletal:  Positive for arthritis;joint pain;foot pain  Neurologic:  Negative    Psychiatric:  Positive for sleep disturbances  Heme/Lymph/Imm:  Positive for easy bruising  Endocrine:  Positive for diabetes    Physical Exam:  Vitals: BP 98/56 (BP Location: Right arm, Patient Position: Sitting, Cuff Size: Adult Regular)   Pulse 66   Ht 1.778 m (5' 10\")   Wt 78 kg (172 lb)   SpO2 98%   BMI 24.68 kg/m      Constitutional:  cooperative, alert and oriented, well developed, well nourished, in no acute distress thin;frail uses a walker    Skin:  warm and dry to the touch, no apparent skin lesions or masses noted        Head:  normocephalic        Eyes:  pupils equal and round        ENT:  no pallor or cyanosis        Neck:  JVP normal        Chest:  clear to auscultation        Cardiac:   irregularly irregular rhythm         early diastolic murmur good rate control    Abdomen:  abdomen soft        Vascular: pulses full and equal     right radial artery;2+             left radial artery;2+                  Extremities and Back:  no deformities, clubbing, cyanosis, erythema observed   right medial malleoli venous ulcer is healed    Neurological:  no gross motor deficits          Recent Lab Results:  LIPID RESULTS:  Lab Results   Component Value Date    CHOL 120 11/11/2019    HDL 55 11/11/2019    LDL 44 11/11/2019    TRIG 104 11/11/2019    CHOLHDLRATIO 2.0 03/20/2015       LIVER ENZYME RESULTS:  Lab Results   Component Value Date    AST 28 03/04/2020    ALT 27 03/04/2020       CBC RESULTS:  Lab " Results   Component Value Date    WBC 6.1 02/04/2020    RBC 4.13 (L) 02/04/2020    HGB 12.6 (L) 02/04/2020    HCT 40.0 02/04/2020    MCV 97 02/04/2020    MCH 30.5 02/04/2020    MCHC 31.5 02/04/2020    RDW 14.9 02/04/2020     02/04/2020       BMP RESULTS:  Lab Results   Component Value Date     02/04/2020    POTASSIUM 4.4 02/04/2020    CHLORIDE 103 02/04/2020    CO2 27 02/04/2020    ANIONGAP 4 02/04/2020     (H) 02/04/2020    BUN 16 02/04/2020    CR 0.93 02/04/2020    GFRESTIMATED 73 02/04/2020    GFRESTBLACK 85 02/04/2020    MICHAEL 8.9 02/04/2020        A1C RESULTS:  Lab Results   Component Value Date    A1C 6.7 (H) 11/11/2019       INR RESULTS:  Lab Results   Component Value Date    INR 2.2 (A) 02/13/2020    INR 2.9 (A) 01/14/2020    INR 1.43 (H) 08/05/2019    INR 2.54 (H) 03/06/2018                         Thank you for allowing me to participate in the care of your patient.    Sincerely,     MARTHA [facility]

## 2020-03-04 NOTE — PROGRESS NOTES
HPI:  Louis Keller Jr. is a 87 year old male who presents for atrial fibrillation follow up.  He is a patient of Dr. Gleason.  His past medical history includes     1. Permanent atrial fibrillation.   2. NSVT.  Noted on zio patch.   3. Syncope s/p Loop recorder implanted (7/2019)  4. Diabetes mellitus type 2.   5. Dyslipidemia.   6. Irritable bowel syndrome with intermittent diarrhea.   7. Mild aortic root dilatation (4.2 cm).  8. Severe, symptomatic right lower extremity venous incompetence with right malleoli venous ulcer      Diagnostics:  Loop recorder (2/2/2019) 23% atrial fibrillation histogram shows sinus rhythm between 30 and 160 but primarily between 40 and 90 bpm.  Histogram for atrial fibrillation shows heart rates between 30 and 170 and rates approximately over 100 bpm 20% of the time.  Nuclear stress test (2019) revealed normal myocardial perfusion with no ischemia  Bilateral carotid ultrasound (2019) revealed normal flow with no significant stenosis.  ECHO (2018) revealed EF 55-60%, pulmonary HTN RVSP 40 mmHG +RA.       In 2015, patient's amiodarone was discontinued due to light sensivity.      He recently saw Dr. Pacheco in June 2019.  Patient had near syncopal episode in April 2019 where he was sitting reading the paper and felt a prickly warm sensation in his cheeks, his vision went gray and he felt like he would pass out however he did not. Dr. Pacheco felt that this was likely related to nonsustained VT as he has a history of this on his 30-day monitor.  However possibly could be slow atrial fibrillation with a pause.  It was recommended he stop his diltiazem and changed to metoprolol with the hope of suppressing NSVT.      In September 2019, patient saw Dr. Gleason and his amiodarone was started at 100 mg daily due to intermittent symptomatic atrial  fibrillation with RVR      In September 2019, Patient's loop recorder revealed atrial fibrillation with RVR.  He has been in atrial fibrillation 44% of the  time however the histogram shows overall stable heart rates despite frequent bursts of RVR.    On 10/13/2019, patient's amiodarone was increased from 100 to 200 mg daily     In December 2018 patient saw Anjali Haque his BRANDON. fib burden was approximately 20% while on 200 mg of amiodarone    On 2/2/2019, patient had loop recorder interrogated he is having 23% atrial fibrillation histogram shows sinus rhythm between 30 and 160 but primarily between 40 and 90 bpm.  Histogram for atrial fibrillation shows heart rates between 30 and 170 and rates approximately over 100 bpm 20% of the time.      Today he presents with symptoms of shortness of breath with walking from the clinic waiting room to the exam room however uses the treadmill for 30 minutes daily and does not have QUESADA.   He denies chest pain or pressure, dizziness, syncope, angina, dyspnea at rest, palpitations, orthopnea, PND, or edema. He is taking his medications as prescribed including warfarin and denies bleeding, hematuria, hematochezia, epistaxis and signs/symptoms of stroke.  We discussed his atrial fibrillation burden and he was in 100% atrial fibrillation with average HR 75 bpm in 6/2019 per zio patch and now is only having 23% atrial fibrillation burden while on amiodarone and metoprolol.      ASSESSMENT AND PLAN    Paroxsymal atrial fibrillation.    In June 2019, patient's rate control medication changed from diltiazem to metoprolol  tartrate 25 mg twice daily.  His atrial fibrillation was rate controlled has verification on a Zio patch which revealed average heart rate 77 bpm.   In 9/2019, he was started on amiodarone for slightly symptomatic atrial fibrillation with intermittent RVR. In 10/2019, it was increased 200 mg daily. He continues to taking metoprolol tartrate 25 mg twice daily Now he is having 23% afib burden and 20% of the time he is above 100 bpm.   He does not have HF symptoms but is complaining of shortness of breath with walking when not  "on the treadmill.   His BP is soft today and does not allow for increase in AV node blockers.      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  His INR is within goal.      Amiodarone therapy    ALT/AST 27/28 (3/4/2020)    TSH=5.47 and Free T4=1.16 (3/4/2020)     Lightheadedness/dizziness/presyncope    Patient had an episode in April of feeling warm prickly sensation in his cheeks, decreased vision and feel like he was going to \"pass out\" but did not    Monitors have revealed episodes of ventricular tachycardia longest 11 seconds and no symptoms have been recorded.  Loop recorder has not noted any other arrhythmias.    No recurrence     Non sustained Ventricular tachycardia.    Noted on ZIO Patch to have 8 runs longest 11 seconds    Nuclear stress test negative for ischemia    Patient currently taking metoprolol at rate 25 mg twice daily    LE edema    Currently taking lasix 20 mg daily.    Due to soft blood pressures and improved LE EDEMA will decrease lasix to every 3rd day     Plan:    Due to soft blood pressures and improved LE EDEMA will decrease lasix to every 3rd day     Will discuss long term plan with Dr. Pacheco regarding patient's atrial fibrillation.       Addendum    In the setting of COVID 19 pandemic and social distancing will not make any changes to medications.  However will refer patient to Dr. Pacheco to help manage patient's atrial fibrillation.  Will set up appoint for approximately 6 months as COVID 19 pandemic has not peaked in [place]     Patient expresses understanding and agreement with the plan.     I appreciate the chance to help with Louis Keller Jr. Please let me know if you have any questions or concerns.    MARTHA Rice, CNP    This note was completed in part using Dragon voice recognition software. Although reviewed after completion, some word and grammatical errors may occur.    No orders of the defined types were placed in this " encounter.    Orders Placed This Encounter   Medications     furosemide (LASIX) 20 MG tablet     Sig: Take 1 tablet (20 mg) by mouth every 3 days     Dispense:  90 tablet     Refill:  0     Pt will call for refills     Medications Discontinued During This Encounter   Medication Reason     furosemide (LASIX) 20 MG tablet          Encounter Diagnoses   Name Primary?     Near syncope      Chronic a-fib      QUESADA (dyspnea on exertion)      Diastolic dysfunction      Bilateral leg edema        CURRENT MEDICATIONS:  Current Outpatient Medications   Medication Sig Dispense Refill     Acetaminophen (TYLENOL PO) Take 1,000 mg by mouth 3 times daily       amiodarone (PACERONE/CODARONE) 200 MG tablet Take 1 tablet (200 mg) by mouth daily 90 tablet 3     cholestyramine (QUESTRAN) 4 g packet 1 packet (4 g) daily 180 packet 1     ferrous gluconate (FERGON) 324 (38 Fe) MG tablet TAKE 1 TABLET (324 MG) BY MOUTH DAILY (WITH BREAKFAST) 100 tablet 1     furosemide (LASIX) 20 MG tablet Take 1 tablet (20 mg) by mouth every 3 days 90 tablet 0     glipiZIDE (GLUCOTROL XL) 10 MG 24 hr tablet TAKE 1 TABLET (10 MG) BY MOUTH 2 TIMES DAILY 180 tablet 1     latanoprost (XALATAN) 0.005 % ophthalmic solution Place 1 drop Into the left eye daily       loperamide (IMODIUM A-D) 2 MG tablet daily  30 tablet 0     metFORMIN (GLUCOPHAGE) 500 MG tablet Take 1 tablet (500 mg) by mouth 2 times daily (with meals) 180 tablet 1     metoprolol tartrate (LOPRESSOR) 25 MG tablet Take 1 tablet (25 mg) by mouth 2 times daily 60 tablet 11     multivitamin, therapeutic (THERA-VIT) TABS tablet Take 0.5 tablets by mouth 2 times daily       ONETOUCH ULTRA test strip USE TO TEST DAILY 50 strip 11     pioglitazone (ACTOS) 45 MG tablet TAKE 1 TABLET BY MOUTH DAILY 90 tablet 1     warfarin ANTICOAGULANT (COUMADIN) 2.5 MG tablet Take 1 tablet daily or as directed by INR clinic 90 tablet 0     ACE NOT PRESCRIBED, INTENTIONAL, 1 each daily ACE Inhibitor not prescribed due to  Risk for drug interaction (Patient not taking: Reported on 2019) 0 each 0     ASPIRIN NOT PRESCRIBED, INTENTIONAL, by Other route continuous prn Reported on 3/7/2017       STATIN NOT PRESCRIBED, INTENTIONAL, 1 each At Bedtime Statin not prescribed intentionally due to Risk for drug interaction (Patient not taking: Reported on 2019) 0 each 0       ALLERGIES     Allergies   Allergen Reactions     No Known Drug Allergies        PAST MEDICAL HISTORY:  Past Medical History:   Diagnosis Date     Antiplatelet or antithrombotic long-term use      Atrial fibrillation (H)      Diarrhea      Diastolic murmur      QUESADA (dyspnea on exertion)      Gout      Hemorrhage of gastrointestinal tract, unspecified 63,65,and 1971    hospitalized     History of blood transfusion      Hyperlipidemia LDL goal <100 2012     Long-term (current) use of anticoagulants [Z79.01] 3/31/2016     Mumps      Palpitations      Physical deconditioning 2013     Scarlet fever      Spider veins      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     Unspecified disease of pancreas     pancreatitis       PAST SURGICAL HISTORY:  Past Surgical History:   Procedure Laterality Date     ARTHROPLASTY KNEE  2013    Procedure: ARTHROPLASTY KNEE;  Left Total Knee Arthroplasty       C NONSPECIFIC PROCEDURE  Child    T&A     C NONSPECIFIC PROCEDURE  ; also     Colonoscopy     C NONSPECIFIC PROCEDURE      Basal cell cancer of the nose     C NONSPECIFIC PROCEDURE      Cholecystectomy     CARDIOVERSION  11    failed     EP LOOP RECORDER IMPLANT N/A 2019    Procedure: EP Loop Recorder Implant;  Surgeon: Darling Pacheco MD;  Location:  [facility]       FAMILY HISTORY:  Family History   Problem Relation Age of Onset     Alzheimer Disease Maternal Grandmother      Arthritis Maternal Grandmother      Cancer Mother         breast/ /colon     Eye Disorder Mother         glaucoma  and cataracts     Heart Disease Mother         angina/CABG     Hypertension Mother      Cancer Father         colon     Diabetes Maternal Aunt         AoDM     No Known Problems Daughter        SOCIAL HISTORY:  Social History     Socioeconomic History     Marital status:      Spouse name: None     Number of children: None     Years of education: None     Highest education level: None   Occupational History     Occupation: Lab tech     Comment: Semi-retired   Social Needs     Financial resource strain: None     Food insecurity:     Worry: None     Inability: None     Transportation needs:     Medical: None     Non-medical: None   Tobacco Use     Smoking status: Never Smoker     Smokeless tobacco: Never Used   Substance and Sexual Activity     Alcohol use: Yes     Alcohol/week: 0.0 standard drinks     Comment: wine - currently rare     Drug use: No     Sexual activity: Never     Partners: Female   Lifestyle     Physical activity:     Days per week: None     Minutes per session: None     Stress: None   Relationships     Social connections:     Talks on phone: None     Gets together: None     Attends [employer] service: None     Active member of club or organization: None     Attends meetings of clubs or organizations: None     Relationship status: None     Intimate partner violence:     Fear of current or ex partner: None     Emotionally abused: None     Physically abused: None     Forced sexual activity: None   Other Topics Concern      Service Not Asked     Blood Transfusions Not Asked     Caffeine Concern No     Comment: 14 oz per day     Occupational Exposure Not Asked     Hobby Hazards Not Asked     Sleep Concern No     Comment: sometimes - nocturia 4 times per night     Stress Concern Not Asked     Weight Concern Not Asked     Special Diet No     Back Care Not Asked     Exercise Yes     Comment: treadmill/walking 15-20 minutes 6 days per week     Bike Helmet Not Asked     Seat Belt Not Asked      "Self-Exams Not Asked     Parent/sibling w/ CABG, MI or angioplasty before 65F 55M? Not Asked   Social History Narrative     None       Review of Systems:  Skin:  Positive for bruising   Eyes:  Positive for glasses;tearing  ENT:  Positive for hearing loss  Respiratory:  Negative    Cardiovascular:  Negative for;palpitations;chest pain;edema;lightheadedness;dizziness Positive for;fatigue  Gastroenterology: Negative    Genitourinary:  Positive for nocturia;urinary frequency  Musculoskeletal:  Positive for arthritis;joint pain;foot pain  Neurologic:  Negative    Psychiatric:  Positive for sleep disturbances  Heme/Lymph/Imm:  Positive for easy bruising  Endocrine:  Positive for diabetes    Physical Exam:  Vitals: BP 98/56 (BP Location: Right arm, Patient Position: Sitting, Cuff Size: Adult Regular)   Pulse 66   Ht 1.778 m (5' 10\")   Wt 78 kg (172 lb)   SpO2 98%   BMI 24.68 kg/m      Constitutional:  cooperative, alert and oriented, well developed, well nourished, in no acute distress thin;frail uses a walker    Skin:  warm and dry to the touch, no apparent skin lesions or masses noted        Head:  normocephalic        Eyes:  pupils equal and round        ENT:  no pallor or cyanosis        Neck:  JVP normal        Chest:  clear to auscultation        Cardiac:   irregularly irregular rhythm         early diastolic murmur good rate control    Abdomen:  abdomen soft        Vascular: pulses full and equal     right radial artery;2+             left radial artery;2+                  Extremities and Back:  no deformities, clubbing, cyanosis, erythema observed   right medial malleoli venous ulcer is healed    Neurological:  no gross motor deficits          Recent Lab Results:  LIPID RESULTS:  Lab Results   Component Value Date    CHOL 120 11/11/2019    HDL 55 11/11/2019    LDL 44 11/11/2019    TRIG 104 11/11/2019    CHOLHDLRATIO 2.0 03/20/2015       LIVER ENZYME RESULTS:  Lab Results   Component Value Date    AST 28 " 03/04/2020    ALT 27 03/04/2020       CBC RESULTS:  Lab Results   Component Value Date    WBC 6.1 02/04/2020    RBC 4.13 (L) 02/04/2020    HGB 12.6 (L) 02/04/2020    HCT 40.0 02/04/2020    MCV 97 02/04/2020    MCH 30.5 02/04/2020    MCHC 31.5 02/04/2020    RDW 14.9 02/04/2020     02/04/2020       BMP RESULTS:  Lab Results   Component Value Date     02/04/2020    POTASSIUM 4.4 02/04/2020    CHLORIDE 103 02/04/2020    CO2 27 02/04/2020    ANIONGAP 4 02/04/2020     (H) 02/04/2020    BUN 16 02/04/2020    CR 0.93 02/04/2020    GFRESTIMATED 73 02/04/2020    GFRESTBLACK 85 02/04/2020    MICHAEL 8.9 02/04/2020        A1C RESULTS:  Lab Results   Component Value Date    A1C 6.7 (H) 11/11/2019       INR RESULTS:  Lab Results   Component Value Date    INR 2.2 (A) 02/13/2020    INR 2.9 (A) 01/14/2020    INR 1.43 (H) 08/05/2019    INR 2.54 (H) 03/06/2018           CC  Joselito Armas MD  [address]  [place] [address]

## 2020-03-12 ENCOUNTER — ANTICOAGULATION THERAPY VISIT (OUTPATIENT)
Dept: NURSING | Facility: CLINIC | Age: 85
End: 2020-03-12
Payer: COMMERCIAL

## 2020-03-12 DIAGNOSIS — I48.0 PAROXYSMAL ATRIAL FIBRILLATION (H): ICD-10-CM

## 2020-03-12 DIAGNOSIS — Z79.01 LONG TERM CURRENT USE OF ANTICOAGULANT THERAPY: ICD-10-CM

## 2020-03-12 LAB — INR POINT OF CARE: 1.8 (ref 0.86–1.14)

## 2020-03-12 PROCEDURE — 36416 COLLJ CAPILLARY BLOOD SPEC: CPT

## 2020-03-12 PROCEDURE — 85610 PROTHROMBIN TIME: CPT | Mod: QW

## 2020-03-12 PROCEDURE — 99207 ZZC NO CHARGE NURSE ONLY: CPT

## 2020-03-12 NOTE — PROGRESS NOTES
ANTICOAGULATION FOLLOW-UP CLINIC VISIT    Patient Name:  Louis Keller Jr.  Date:  3/12/2020  Contact Type:  Face to Face    SUBJECTIVE:  Patient Findings     Positives:   Change in diet/appetite (Ate a large salad about 3 days ago, I encouraged small portions and consistency)        Clinical Outcomes     Negatives:   Major bleeding event, Thromboembolic event, Anticoagulation-related hospital admission, Anticoagulation-related ED visit, Anticoagulation-related fatality           OBJECTIVE    INR Protime   Date Value Ref Range Status   2020 1.8 (A) 0.86 - 1.14 Final       ASSESSMENT / PLAN  INR assessment SUB    Recheck INR In: 2 WEEKS    INR Location Clinic      Anticoagulation Summary  As of 3/12/2020    INR goal:   2.0-3.0   TTR:   61.2 % (1 y)   INR used for dosin.8! (3/12/2020)   Warfarin maintenance plan:   2.5 mg (2.5 mg x 1) every day   Full warfarin instructions:   3/12: 5 mg; Otherwise 2.5 mg every day   Weekly warfarin total:   17.5 mg   Plan last modified:   Hope Baumann RN (3/12/2020)   Next INR check:   3/26/2020   Priority:   High   Target end date:       Indications    Long-term (current) use of anticoagulants [Z79.01] [Z79.01]  Atrial fibrillation (H) [I48.91]             Anticoagulation Episode Summary     INR check location:       Preferred lab:       Send INR reminders to:   [facility]    Comments:           Anticoagulation Care Providers     Provider Role Specialty Phone number    Joselito Armas MD [facility] Internal Medicine [phone]            See the Encounter Report to view Anticoagulation Flowsheet and Dosing Calendar (Go to Encounters tab in chart review, and find the Anticoagulation Therapy Visit)        Hope Baumann RN

## 2020-03-19 DIAGNOSIS — Z79.01 LONG TERM CURRENT USE OF ANTICOAGULANT THERAPY: ICD-10-CM

## 2020-03-19 DIAGNOSIS — I48.0 PAROXYSMAL ATRIAL FIBRILLATION (H): Primary | ICD-10-CM

## 2020-03-27 ENCOUNTER — TELEPHONE (OUTPATIENT)
Dept: INTERNAL MEDICINE | Facility: CLINIC | Age: 85
End: 2020-03-27

## 2020-03-27 DIAGNOSIS — Z79.01 LONG TERM CURRENT USE OF ANTICOAGULANT THERAPY: ICD-10-CM

## 2020-03-27 DIAGNOSIS — I48.0 PAROXYSMAL ATRIAL FIBRILLATION (H): ICD-10-CM

## 2020-03-27 LAB
CAPILLARY BLOOD COLLECTION: NORMAL
INR PPP: 1.9 (ref 0.86–1.14)

## 2020-03-27 PROCEDURE — 36416 COLLJ CAPILLARY BLOOD SPEC: CPT | Performed by: INTERNAL MEDICINE

## 2020-03-27 PROCEDURE — 85610 PROTHROMBIN TIME: CPT | Performed by: INTERNAL MEDICINE

## 2020-03-27 NOTE — TELEPHONE ENCOUNTER
Left message for patient to call back anticoag nurse to verify dosing and any changes in regards to INR result from today.

## 2020-03-27 NOTE — TELEPHONE ENCOUNTER
ANTICOAGULATION MANAGEMENT     Patient Name:  Louis Keller Jana  Date:  3/27/2020    ASSESSMENT /SUBJECTIVE:      Today's INR result of 1.90 is subtherapeutic. Goal INR of 2.0-3.0      Warfarin dose taken: Warfarin taken as previously instructed    Diet: No new diet changes affecting INR (last subtherapeutic INR, pt had a large portion of greens that week, denies any increased intake in greens this week)    Medication changes/ interactions: No new medications/supplements affecting INR    Previous INR: Subtherapeutic     S/S of bleeding or thromboembolism: No    New injury or illness:  No    Upcoming surgery, procedure or cardioversion:  No    Additional findings: None    PLAN:    Spoke with Louis regarding INR result and instructed:     Warfarin Dosing Instructions: Change your warfarin dose to 3.75mg Fri; 2.5mg all other days (7.1% increase in weekly maintenance dose)    Instructed patient to follow up no later than: 2 weeks  Lab visit scheduled    Education provided: Yes: Significance of INR      Louis verbalizes understanding and agrees to warfarin dosing plan.    Instructed to call the Anticoagulation Clinic for any changes, questions or concerns. (#[phone])        OBJECTIVE:  INR   Date Value Ref Range Status   2020 1.90 (H) 0.86 - 1.14 Final     Comment:     This test is intended for monitoring Coumadin therapy.  Results are not   accurate in patients with prolonged INR due to factor deficiency.               Anticoagulation Summary  As of 3/27/2020    INR goal:   2.0-3.0   TTR:   59.8 % (1 y)   INR used for dosin.90! (3/27/2020)   Warfarin maintenance plan:   3.75 mg (2.5 mg x 1.5) every Fri; 2.5 mg (2.5 mg x 1) all other days   Full warfarin instructions:   3.75 mg every Fri; 2.5 mg all other days   Weekly warfarin total:   18.75 mg   Plan last modified:   Carla Streeter RN (3/27/2020)   Next INR check:   4/10/2020   Priority:   High   Target end date:       Indications    Long-term  (current) use of anticoagulants [Z79.01] [Z79.01]  Atrial fibrillation (H) [I48.91]             Anticoagulation Episode Summary     INR check location:       Preferred lab:       Send INR reminders to:   [facility]    Comments:           Anticoagulation Care Providers     Provider Role Specialty Phone number    Joselito Armas MD [facility] Internal Medicine [phone]

## 2020-04-10 ENCOUNTER — ANTICOAGULATION THERAPY VISIT (OUTPATIENT)
Dept: NURSING | Facility: CLINIC | Age: 85
End: 2020-04-10

## 2020-04-10 DIAGNOSIS — Z79.01 LONG TERM CURRENT USE OF ANTICOAGULANT THERAPY: ICD-10-CM

## 2020-04-10 DIAGNOSIS — I48.0 PAROXYSMAL ATRIAL FIBRILLATION (H): ICD-10-CM

## 2020-04-10 DIAGNOSIS — I48.91 ATRIAL FIBRILLATION (H): ICD-10-CM

## 2020-04-10 LAB
CAPILLARY BLOOD COLLECTION: NORMAL
INR PPP: 2.3 (ref 0.86–1.14)

## 2020-04-10 PROCEDURE — 99207 ZZC NO CHARGE NURSE ONLY: CPT

## 2020-04-10 PROCEDURE — 36416 COLLJ CAPILLARY BLOOD SPEC: CPT | Performed by: INTERNAL MEDICINE

## 2020-04-10 PROCEDURE — 85610 PROTHROMBIN TIME: CPT | Performed by: INTERNAL MEDICINE

## 2020-04-10 NOTE — PROGRESS NOTES
ANTICOAGULATION FOLLOW-UP CLINIC VISIT    Patient Name:  Louis Keller Jr.  Date:  4/10/2020  Contact Type:  Telephone    SUBJECTIVE:  Patient Findings     Positives:   Extra doses (Pt is very certain that he took 5mg on the past 2 , he was supposed to take 3.75mg.  Pt states he does not remember splitting any pills.), Change in diet/appetite (Eating 1-2 servings of greens every week, I encouraged pt to try to stay consistent with 2 servings per week.)    Comments:   Since INR is still on low end with the patient error in dosing, I will have him continue the 20mg/week dosing and recheck INR in 2 weeks.  Patient states he has plenty of Warfarin, will update prescription at next visit if 20mg/week works well.        Clinical Outcomes     Negatives:   Major bleeding event, Thromboembolic event, Anticoagulation-related hospital admission, Anticoagulation-related ED visit, Anticoagulation-related fatality    Comments:   Since INR is still on low end with the patient error in dosing, I will have him continue the 20mg/week dosing and recheck INR in 2 weeks.  Patient states he has plenty of Warfarin, will update prescription at next visit if 20mg/week works well.           OBJECTIVE    INR   Date Value Ref Range Status   04/10/2020 2.30 (H) 0.86 - 1.14 Final     Comment:     This test is intended for monitoring Coumadin therapy.  Results are not   accurate in patients with prolonged INR due to factor deficiency.         ASSESSMENT / PLAN  INR assessment THER    Recheck INR In: 2 WEEKS    INR Location Clinic      Anticoagulation Summary  As of 4/10/2020    INR goal:   2.0-3.0   TTR:   60.3 % (1 y)   INR used for dosin.30 (4/10/2020)   Warfarin maintenance plan:   5 mg (2.5 mg x 2) every Fri; 2.5 mg (2.5 mg x 1) all other days   Full warfarin instructions:   5 mg every Fri; 2.5 mg all other days   Weekly warfarin total:   20 mg   Plan last modified:   Hope Baumann RN (4/10/2020)   Next INR check:   2020    Priority:   High   Target end date:       Indications    Long-term (current) use of anticoagulants [Z79.01] [Z79.01]  Atrial fibrillation (H) [I48.91]             Anticoagulation Episode Summary     INR check location:       Preferred lab:       Send INR reminders to:   [facility]    Comments:           Anticoagulation Care Providers     Provider Role Specialty Phone number    Joselito Armas MD [facility] Internal Medicine [phone]            See the Encounter Report to view Anticoagulation Flowsheet and Dosing Calendar (Go to Encounters tab in chart review, and find the Anticoagulation Therapy Visit)        Hope Baumann RN

## 2020-04-17 ENCOUNTER — DOCUMENTATION ONLY (OUTPATIENT)
Dept: CARDIOLOGY | Facility: CLINIC | Age: 85
End: 2020-04-17

## 2020-04-17 NOTE — PROGRESS NOTES
Remote alert received for tachy episode. EGM shows irregular VS lasting 24 seconds with V rates in the 150's. This is not a new finding. Patient already on Amiodarone, Metoprolol, and Warfarin.

## 2020-04-27 ENCOUNTER — ANTICOAGULATION THERAPY VISIT (OUTPATIENT)
Dept: NURSING | Facility: CLINIC | Age: 85
End: 2020-04-27

## 2020-04-27 DIAGNOSIS — I48.0 PAROXYSMAL ATRIAL FIBRILLATION (H): ICD-10-CM

## 2020-04-27 DIAGNOSIS — Z79.01 LONG TERM CURRENT USE OF ANTICOAGULANT THERAPY: ICD-10-CM

## 2020-04-27 LAB
CAPILLARY BLOOD COLLECTION: NORMAL
INR PPP: 3.3 (ref 0.86–1.14)

## 2020-04-27 PROCEDURE — 36416 COLLJ CAPILLARY BLOOD SPEC: CPT | Performed by: INTERNAL MEDICINE

## 2020-04-27 PROCEDURE — 99207 ZZC NO CHARGE NURSE ONLY: CPT

## 2020-04-27 PROCEDURE — 85610 PROTHROMBIN TIME: CPT | Performed by: INTERNAL MEDICINE

## 2020-04-27 NOTE — PROGRESS NOTES
ANTICOAGULATION FOLLOW-UP CLINIC VISIT    Patient Name:  Louis Keller Jr.  Date:  4/27/2020  Contact Type:  Telephone    SUBJECTIVE:  Patient Findings     Positives:   Change in diet/appetite (No greens in the past 1-2 weeks.  Pt and his wife agree that 3 small servings per week is doable and pt will eat greens tonight. I reviewed serving size with pt.)        Clinical Outcomes     Negatives:   Major bleeding event, Thromboembolic event, Anticoagulation-related hospital admission, Anticoagulation-related ED visit, Anticoagulation-related fatality           OBJECTIVE    INR   Date Value Ref Range Status   04/27/2020 3.30 (H) 0.86 - 1.14 Final     Comment:     This test is intended for monitoring Coumadin therapy.  Results are not   accurate in patients with prolonged INR due to factor deficiency.         ASSESSMENT / PLAN  INR assessment SUPRA    Recheck INR In: 2 WEEKS    INR Location Clinic      Anticoagulation Summary  As of 4/27/2020    INR goal:   2.0-3.0   TTR:   59.7 % (1 y)   INR used for dosing:   3.30! (4/27/2020)   Warfarin maintenance plan:   5 mg (2.5 mg x 2) every Fri; 2.5 mg (2.5 mg x 1) all other days   Full warfarin instructions:   5 mg every Fri; 2.5 mg all other days   Weekly warfarin total:   20 mg   Plan last modified:   Hope Baumann RN (4/27/2020)   Next INR check:   5/11/2020   Priority:   High   Target end date:       Indications    Long-term (current) use of anticoagulants [Z79.01] [Z79.01]  Atrial fibrillation (H) [I48.91]             Anticoagulation Episode Summary     INR check location:       Preferred lab:       Send INR reminders to:   [facility] [place]    Comments:           Anticoagulation Care Providers     Provider Role Specialty Phone number    Joselito Armas MD [facility] Internal Medicine [phone]            See the Encounter Report to view Anticoagulation Flowsheet and Dosing Calendar (Go to Encounters tab in chart review, and find the Anticoagulation Therapy  Visit)        Hope Baumann RN

## 2020-05-09 DIAGNOSIS — E11.22 TYPE 2 DIABETES MELLITUS WITH STAGE 3 CHRONIC KIDNEY DISEASE, WITHOUT LONG-TERM CURRENT USE OF INSULIN (H): ICD-10-CM

## 2020-05-09 DIAGNOSIS — N18.30 TYPE 2 DIABETES MELLITUS WITH STAGE 3 CHRONIC KIDNEY DISEASE, WITHOUT LONG-TERM CURRENT USE OF INSULIN (H): ICD-10-CM

## 2020-05-09 NOTE — TELEPHONE ENCOUNTER
Pending Prescriptions:                       Disp   Refills    metFORMIN (GLUCOPHAGE) 500 MG tablet [Pha*180 ta*0            Sig: TAKE 1 TABLET BY MOUTH TWICE A DAY WITH MEALS    Prescription approved per [facility] Refill Protocol.

## 2020-05-11 ENCOUNTER — ANTICOAGULATION THERAPY VISIT (OUTPATIENT)
Dept: NURSING | Facility: CLINIC | Age: 85
End: 2020-05-11

## 2020-05-11 DIAGNOSIS — Z79.01 LONG TERM CURRENT USE OF ANTICOAGULANT THERAPY: ICD-10-CM

## 2020-05-11 DIAGNOSIS — I48.0 PAROXYSMAL ATRIAL FIBRILLATION (H): ICD-10-CM

## 2020-05-11 LAB
CAPILLARY BLOOD COLLECTION: NORMAL
INR PPP: 2.8 (ref 0.86–1.14)

## 2020-05-11 PROCEDURE — 85610 PROTHROMBIN TIME: CPT | Performed by: INTERNAL MEDICINE

## 2020-05-11 PROCEDURE — 99207 ZZC NO CHARGE NURSE ONLY: CPT

## 2020-05-11 PROCEDURE — 36416 COLLJ CAPILLARY BLOOD SPEC: CPT | Performed by: INTERNAL MEDICINE

## 2020-05-11 NOTE — PROGRESS NOTES
ANTICOAGULATION FOLLOW-UP CLINIC VISIT    Patient Name:  Louis Keller Jr.  Date:  2020  Contact Type:  Telephone    SUBJECTIVE:  Patient Findings     Comments:   The patient was assessed for diet, medication, and activity level changes, missed or extra doses, bruising or bleeding, with no problem findings.          Clinical Outcomes     Negatives:   Major bleeding event, Thromboembolic event, Anticoagulation-related hospital admission, Anticoagulation-related ED visit, Anticoagulation-related fatality    Comments:   The patient was assessed for diet, medication, and activity level changes, missed or extra doses, bruising or bleeding, with no problem findings.             OBJECTIVE    Recent labs: (last 7 days)     20  1359   INR 2.80*       ASSESSMENT / PLAN  INR assessment THER    Recheck INR In: 3 WEEKS    INR Location Clinic      Anticoagulation Summary  As of 2020    INR goal:   2.0-3.0   TTR:   61.3 % (1 y)   INR used for dosin.80 (2020)   Warfarin maintenance plan:   5 mg (2.5 mg x 2) every Fri; 2.5 mg (2.5 mg x 1) all other days   Full warfarin instructions:   5 mg every Fri; 2.5 mg all other days   Weekly warfarin total:   20 mg   No change documented:   Hope Baumann, RN   Plan last modified:   Hope Baumann RN (2020)   Next INR check:   2020   Priority:   Maintenance   Target end date:       Indications    Long-term (current) use of anticoagulants [Z79.01] [Z79.01]  Atrial fibrillation (H) [I48.91]             Anticoagulation Episode Summary     INR check location:       Preferred lab:       Send INR reminders to:   [facility] [place]    Comments:           Anticoagulation Care Providers     Provider Role Specialty Phone number    Joselito Armas MD [facility] Internal Medicine [phone]            See the Encounter Report to view Anticoagulation Flowsheet and Dosing Calendar (Go to Encounters tab in chart review, and find the Anticoagulation Therapy  Visit)        Hope Baumann RN

## 2020-05-18 ENCOUNTER — ANCILLARY PROCEDURE (OUTPATIENT)
Dept: CARDIOLOGY | Facility: CLINIC | Age: 85
End: 2020-05-18
Attending: INTERNAL MEDICINE
Payer: COMMERCIAL

## 2020-05-18 DIAGNOSIS — Z45.09 ENCOUNTER FOR LOOP RECORDER CHECK: ICD-10-CM

## 2020-05-18 DIAGNOSIS — I48.20 CHRONIC A-FIB (H): ICD-10-CM

## 2020-05-18 DIAGNOSIS — R55 NEAR SYNCOPE: ICD-10-CM

## 2020-05-18 DIAGNOSIS — R55 NEAR SYNCOPE: Primary | ICD-10-CM

## 2020-05-18 PROCEDURE — 93297 REM INTERROG DEV EVAL ICPMS: CPT | Performed by: INTERNAL MEDICINE

## 2020-05-18 PROCEDURE — G2066 INTER DEVC REMOTE 30D: HCPCS | Performed by: INTERNAL MEDICINE

## 2020-05-18 RX ORDER — METOPROLOL TARTRATE 25 MG/1
25 TABLET, FILM COATED ORAL 2 TIMES DAILY
Qty: 180 TABLET | Refills: 1 | Status: SHIPPED | OUTPATIENT
Start: 2020-05-18 | End: 2020-09-14

## 2020-05-23 DIAGNOSIS — Z79.01 LONG TERM CURRENT USE OF ANTICOAGULANT THERAPY: ICD-10-CM

## 2020-05-23 DIAGNOSIS — I48.91 ATRIAL FIBRILLATION (H): ICD-10-CM

## 2020-05-26 LAB
MDC_IDC_MSMT_BATTERY_STATUS: NORMAL
MDC_IDC_PG_IMPLANT_DTM: NORMAL
MDC_IDC_PG_MFG: NORMAL
MDC_IDC_PG_MODEL: NORMAL
MDC_IDC_PG_SERIAL: NORMAL
MDC_IDC_PG_TYPE: NORMAL
MDC_IDC_SESS_CLINIC_NAME: NORMAL
MDC_IDC_SESS_DTM: NORMAL
MDC_IDC_SESS_TYPE: NORMAL
MDC_IDC_SET_ZONE_DETECTION_INTERVAL: 2000 MS
MDC_IDC_SET_ZONE_DETECTION_INTERVAL: 3000 MS
MDC_IDC_SET_ZONE_DETECTION_INTERVAL: 420 MS
MDC_IDC_SET_ZONE_TYPE: NORMAL
MDC_IDC_STAT_AT_BURDEN_PERCENT: 19.6 %
MDC_IDC_STAT_AT_DTM_END: NORMAL
MDC_IDC_STAT_AT_DTM_START: NORMAL
MDC_IDC_STAT_EPISODE_RECENT_COUNT: 0
MDC_IDC_STAT_EPISODE_RECENT_COUNT: 5
MDC_IDC_STAT_EPISODE_RECENT_COUNT: 9
MDC_IDC_STAT_EPISODE_RECENT_COUNT_DTM_END: NORMAL
MDC_IDC_STAT_EPISODE_RECENT_COUNT_DTM_START: NORMAL
MDC_IDC_STAT_EPISODE_TOTAL_COUNT: 0
MDC_IDC_STAT_EPISODE_TOTAL_COUNT: 1
MDC_IDC_STAT_EPISODE_TOTAL_COUNT: 355
MDC_IDC_STAT_EPISODE_TOTAL_COUNT: 37
MDC_IDC_STAT_EPISODE_TOTAL_COUNT_DTM_END: NORMAL
MDC_IDC_STAT_EPISODE_TOTAL_COUNT_DTM_START: NORMAL
MDC_IDC_STAT_EPISODE_TYPE: NORMAL

## 2020-05-27 RX ORDER — WARFARIN SODIUM 2.5 MG/1
TABLET ORAL
Qty: 110 TABLET | Refills: 0 | Status: SHIPPED | OUTPATIENT
Start: 2020-05-27 | End: 2020-06-29

## 2020-05-27 NOTE — TELEPHONE ENCOUNTER
"Requested Prescriptions   Pending Prescriptions Disp Refills     warfarin ANTICOAGULANT (COUMADIN) 2.5 MG tablet [Pharmacy Med Name: WARFARIN SODIUM 2.5 MG TABLET] 90 tablet 0     Sig: TAKE 1 TABLET DAILY OR AS DIRECTED BY INR CLINIC       Vitamin K Antagonists Failed - 5/23/2020  8:37 PM        Failed - INR is within goal in the past 6 weeks     Confirm INR is within goal in the past 6 weeks.     Recent Labs   Lab Test 05/11/20  1359   INR 2.80*                       Passed - Recent (12 mo) or future (30 days) visit within the authorizing provider's specialty     Patient has had an office visit with the authorizing provider or a provider within the authorizing providers department within the previous 12 mos or has a future within next 30 days. See \"Patient Info\" tab in inbasket, or \"Choose Columns\" in Meds & Orders section of the refill encounter.              Passed - Medication is active on med list        Passed - Patient is 18 years of age or older           Last office visit 2/4/20.  Warfarin dosing is managed by INR Clinic.  Prescription approved per [facility] Refill Protocol.  Tata Alexis RN    "

## 2020-05-27 NOTE — TELEPHONE ENCOUNTER
Pending Prescriptions:                       Disp   Refills    warfarin ANTICOAGULANT (COUMADIN) 2.5 MG t*90 tab*0        Sig: Take 1 tablet daily or as directed by INR clinic

## 2020-06-01 ENCOUNTER — ANTICOAGULATION THERAPY VISIT (OUTPATIENT)
Dept: NURSING | Facility: CLINIC | Age: 85
End: 2020-06-01

## 2020-06-01 DIAGNOSIS — Z79.01 LONG TERM CURRENT USE OF ANTICOAGULANT THERAPY: ICD-10-CM

## 2020-06-01 DIAGNOSIS — I48.0 PAROXYSMAL ATRIAL FIBRILLATION (H): ICD-10-CM

## 2020-06-01 LAB
CAPILLARY BLOOD COLLECTION: NORMAL
INR PPP: 3.4 (ref 0.86–1.14)

## 2020-06-01 PROCEDURE — 85610 PROTHROMBIN TIME: CPT | Performed by: INTERNAL MEDICINE

## 2020-06-01 PROCEDURE — 36416 COLLJ CAPILLARY BLOOD SPEC: CPT | Performed by: INTERNAL MEDICINE

## 2020-06-01 PROCEDURE — 99207 ZZC NO CHARGE NURSE ONLY: CPT

## 2020-06-01 NOTE — PROGRESS NOTES
Anticoagulation Management    Unable to reach Louis today.    Today's INR result of 3.4 is supratherapeutic (goal INR of 2.0-3.0).  Result received from: Clinic Lab    Follow up required to confirm warfarin dose taken and assess for changes    Left message with pt's wife to have Louis call [facility] and ask to be transferred to [phone]      Anticoagulation clinic to follow up    Hope Baumann RN    ANTICOAGULATION FOLLOW-UP CLINIC VISIT    Patient Name:  Louis Keller Jr.  Date:  6/1/2020  Contact Type:  Telephone,patient returned call    SUBJECTIVE:  Patient Findings     Positives:   Signs/symptoms of bleeding (sore on nose that bled about 1-2 weeks ago, pt put a bandage on it x 2 days and bleeding stopped.), Change in diet/appetite (May not have had as many greens in the past week)    Comments:   Patient prefers to reduce weekly warfarin dose versus changing his greens intake.  I informed patient we will try 3.7mg on Fridays vs. 5mg that patient had mistakenly gone back to taking.        Clinical Outcomes     Negatives:   Major bleeding event, Thromboembolic event, Anticoagulation-related hospital admission, Anticoagulation-related ED visit, Anticoagulation-related fatality    Comments:   Patient prefers to reduce weekly warfarin dose versus changing his greens intake.  I informed patient we will try 3.7mg on Fridays vs. 5mg that patient had mistakenly gone back to taking.           OBJECTIVE    Recent labs: (last 7 days)     06/01/20  1418   INR 3.40*       ASSESSMENT / PLAN  INR assessment SUPRA    Recheck INR In: 2 WEEKS    INR Location Clinic      Anticoagulation Summary  As of 6/1/2020    INR goal:   2.0-3.0   TTR:   63.2 % (1 y)   INR used for dosing:   3.40! (6/1/2020)   Warfarin maintenance plan:   3.75 mg (2.5 mg x 1.5) every Fri; 2.5 mg (2.5 mg x 1) all other days   Full warfarin instructions:   3.75 mg every Fri; 2.5 mg all other days   Weekly warfarin total:   18.75 mg   Plan last modified:    Hope Baumann RN (6/1/2020)   Next INR check:   6/15/2020   Priority:   High   Target end date:       Indications    Long-term (current) use of anticoagulants [Z79.01] [Z79.01]  Atrial fibrillation (H) [I48.91]             Anticoagulation Episode Summary     INR check location:       Preferred lab:       Send INR reminders to:   [facility] [employer] [place]    Comments:           Anticoagulation Care Providers     Provider Role Specialty Phone number    Joselito Armas MD [facility] Internal Medicine [phone]            See the Encounter Report to view Anticoagulation Flowsheet and Dosing Calendar (Go to Encounters tab in chart review, and find the Anticoagulation Therapy Visit)        Hope Baumann RN

## 2020-06-15 ENCOUNTER — ANTICOAGULATION THERAPY VISIT (OUTPATIENT)
Dept: NURSING | Facility: CLINIC | Age: 85
End: 2020-06-15

## 2020-06-15 DIAGNOSIS — I48.0 PAROXYSMAL ATRIAL FIBRILLATION (H): ICD-10-CM

## 2020-06-15 DIAGNOSIS — Z79.01 LONG TERM CURRENT USE OF ANTICOAGULANT THERAPY: ICD-10-CM

## 2020-06-15 LAB
CAPILLARY BLOOD COLLECTION: NORMAL
INR PPP: 3.1 (ref 0.86–1.14)

## 2020-06-15 PROCEDURE — 99207 ZZC NO CHARGE NURSE ONLY: CPT

## 2020-06-15 PROCEDURE — 36416 COLLJ CAPILLARY BLOOD SPEC: CPT | Performed by: INTERNAL MEDICINE

## 2020-06-15 PROCEDURE — 85610 PROTHROMBIN TIME: CPT | Performed by: INTERNAL MEDICINE

## 2020-06-15 NOTE — PROGRESS NOTES
ANTICOAGULATION FOLLOW-UP CLINIC VISIT    Patient Name:  Louis Keller Jr.  Date:  6/15/2020  Contact Type:  Telephone. Patient will eat greens today and then continue with at least 3 servings of either kamari, broccoli or greens beans.  If next INR is supra, may reduce weekly warfarin dose again.    SUBJECTIVE:  Patient Findings     Positives:   Signs/symptoms of bleeding (cut toenail too short, did not bleed long), Change in diet/appetite (Eating 3-4 greens per week, pt will continue and I emphasized the importance of consistency)        Clinical Outcomes     Negatives:   Major bleeding event, Thromboembolic event, Anticoagulation-related hospital admission, Anticoagulation-related ED visit, Anticoagulation-related fatality           OBJECTIVE    Recent labs: (last 7 days)     06/15/20  1116   INR 3.10*       ASSESSMENT / PLAN  INR assessment SUPRA    Recheck INR In: 2 WEEKS    INR Location Clinic      Anticoagulation Summary  As of 6/15/2020    INR goal:   2.0-3.0   TTR:   59.9 % (1 y)   INR used for dosing:   3.10! (6/15/2020)   Warfarin maintenance plan:   3.75 mg (2.5 mg x 1.5) every Fri; 2.5 mg (2.5 mg x 1) all other days   Full warfarin instructions:   3.75 mg every Fri; 2.5 mg all other days   Weekly warfarin total:   18.75 mg   Plan last modified:   Hope Baumann RN (6/15/2020)   Next INR check:   6/29/2020   Priority:   High   Target end date:       Indications    Long-term (current) use of anticoagulants [Z79.01] [Z79.01]  Atrial fibrillation (H) [I48.91]             Anticoagulation Episode Summary     INR check location:       Preferred lab:       Send INR reminders to:   [facility] [employer] [place]    Comments:           Anticoagulation Care Providers     Provider Role Specialty Phone number    Joselito Armas MD Responsible Internal Medicine [phone]            See the Encounter Report to view Anticoagulation Flowsheet and Dosing Calendar (Go to Encounters tab in chart review, and find the  Anticoagulation Therapy Visit)        Hope Baumann RN

## 2020-06-17 DIAGNOSIS — N18.30 TYPE 2 DIABETES MELLITUS WITH STAGE 3 CHRONIC KIDNEY DISEASE, WITHOUT LONG-TERM CURRENT USE OF INSULIN (H): ICD-10-CM

## 2020-06-17 DIAGNOSIS — I48.20 CHRONIC ATRIAL FIBRILLATION (H): ICD-10-CM

## 2020-06-17 DIAGNOSIS — E11.22 TYPE 2 DIABETES MELLITUS WITH STAGE 3 CHRONIC KIDNEY DISEASE, WITHOUT LONG-TERM CURRENT USE OF INSULIN (H): ICD-10-CM

## 2020-06-17 DIAGNOSIS — L57.0 ACTINIC KERATOSIS: ICD-10-CM

## 2020-06-19 RX ORDER — GLIPIZIDE 10 MG/1
TABLET, FILM COATED, EXTENDED RELEASE ORAL
Qty: 180 TABLET | Refills: 1 | Status: SHIPPED | OUTPATIENT
Start: 2020-06-19 | End: 2020-12-22

## 2020-06-19 RX ORDER — PIOGLITAZONEHYDROCHLORIDE 45 MG/1
TABLET ORAL
Qty: 90 TABLET | Refills: 1 | Status: SHIPPED | OUTPATIENT
Start: 2020-06-19 | End: 2020-12-22

## 2020-06-19 NOTE — TELEPHONE ENCOUNTER
Pending Prescriptions:                       Disp   Refills    pioglitazone (ACTOS) 45 MG tablet [Pharmac*90 tab*1        Sig: TAKE 1 TABLET BY MOUTH EVERY DAY    glipiZIDE (GLUCOTROL XL) 10 MG 24 hr table*180 ta*1        Sig: TAKE 1 TABLET (10 MG) BY MOUTH 2 TIMES DAILY      Routing refill request to provider for review/approval because:  Protocol failed.    Please advise, thanks.

## 2020-06-29 ENCOUNTER — ANTICOAGULATION THERAPY VISIT (OUTPATIENT)
Dept: NURSING | Facility: CLINIC | Age: 85
End: 2020-06-29

## 2020-06-29 DIAGNOSIS — Z79.01 LONG TERM CURRENT USE OF ANTICOAGULANTS WITH INR GOAL OF 2.0-3.0: Primary | ICD-10-CM

## 2020-06-29 DIAGNOSIS — Z79.01 LONG TERM CURRENT USE OF ANTICOAGULANT THERAPY: ICD-10-CM

## 2020-06-29 DIAGNOSIS — I48.0 PAROXYSMAL ATRIAL FIBRILLATION (H): ICD-10-CM

## 2020-06-29 DIAGNOSIS — I48.91 ATRIAL FIBRILLATION (H): ICD-10-CM

## 2020-06-29 LAB
CAPILLARY BLOOD COLLECTION: NORMAL
INR PPP: 2.8 (ref 0.86–1.14)

## 2020-06-29 PROCEDURE — 99207 ZZC NO CHARGE NURSE ONLY: CPT

## 2020-06-29 PROCEDURE — 85610 PROTHROMBIN TIME: CPT | Performed by: INTERNAL MEDICINE

## 2020-06-29 PROCEDURE — 36416 COLLJ CAPILLARY BLOOD SPEC: CPT | Performed by: INTERNAL MEDICINE

## 2020-06-29 RX ORDER — WARFARIN SODIUM 2.5 MG/1
TABLET ORAL
Qty: 110 TABLET | Refills: 0
Start: 2020-06-29 | End: 2020-08-19

## 2020-06-29 NOTE — PROGRESS NOTES
ANTICOAGULATION FOLLOW-UP CLINIC VISIT    Patient Name:  Louis Keller Jr.  Date:  2020  Contact Type:  Telephone    SUBJECTIVE:  Patient Findings     Comments:   The patient was assessed for diet, medication, and activity level changes, missed or extra doses, bruising or bleeding, with no problem findings.          Clinical Outcomes     Negatives:   Major bleeding event, Thromboembolic event, Anticoagulation-related hospital admission, Anticoagulation-related ED visit, Anticoagulation-related fatality    Comments:   The patient was assessed for diet, medication, and activity level changes, missed or extra doses, bruising or bleeding, with no problem findings.             OBJECTIVE    Recent labs: (last 7 days)     20  1116   INR 2.80*       ASSESSMENT / PLAN  INR assessment THER    Recheck INR In: 2 WEEKS    INR Location Clinic      Anticoagulation Summary  As of 2020    INR goal:   2.0-3.0   TTR:   58.6 % (1 y)   INR used for dosin.80 (2020)   Warfarin maintenance plan:   3.75 mg (2.5 mg x 1.5) every Fri; 2.5 mg (2.5 mg x 1) all other days   Full warfarin instructions:   3.75 mg every Fri; 2.5 mg all other days   Weekly warfarin total:   18.75 mg   No change documented:   Hope Baumann, RN   Plan last modified:   Hope Baumann, RN (6/15/2020)   Next INR check:   2020   Priority:   High   Target end date:       Indications    Long-term (current) use of anticoagulants [Z79.01] [Z79.01]  Atrial fibrillation (H) [I48.91]             Anticoagulation Episode Summary     INR check location:       Preferred lab:       Send INR reminders to:   [facility] [place]    Comments:           Anticoagulation Care Providers     Provider Role Specialty Phone number    Joselito Armas MD [facility] Internal Medicine [phone]            See the Encounter Report to view Anticoagulation Flowsheet and Dosing Calendar (Go to Encounters tab in chart review, and find the Anticoagulation Therapy  Visit)        Hope Baumann RN

## 2020-07-01 DIAGNOSIS — K91.89 POSTCHOLECYSTECTOMY DIARRHEA: ICD-10-CM

## 2020-07-01 DIAGNOSIS — R19.7 POSTCHOLECYSTECTOMY DIARRHEA: ICD-10-CM

## 2020-07-01 DIAGNOSIS — E78.5 HYPERLIPIDEMIA LDL GOAL <100: ICD-10-CM

## 2020-07-01 RX ORDER — CHOLESTYRAMINE 4 G/9G
POWDER, FOR SUSPENSION ORAL
Qty: 180 PACKET | Refills: 2 | Status: SHIPPED | OUTPATIENT
Start: 2020-07-01 | End: 2021-04-23

## 2020-07-11 DIAGNOSIS — D62 ANEMIA DUE TO BLOOD LOSS, ACUTE: ICD-10-CM

## 2020-07-13 ENCOUNTER — DOCUMENTATION ONLY (OUTPATIENT)
Dept: NURSING | Facility: CLINIC | Age: 85
End: 2020-07-13

## 2020-07-13 ENCOUNTER — ANTICOAGULATION THERAPY VISIT (OUTPATIENT)
Dept: NURSING | Facility: CLINIC | Age: 85
End: 2020-07-13

## 2020-07-13 DIAGNOSIS — I48.0 PAROXYSMAL ATRIAL FIBRILLATION (H): Primary | ICD-10-CM

## 2020-07-13 DIAGNOSIS — Z79.01 LONG TERM CURRENT USE OF ANTICOAGULANTS WITH INR GOAL OF 2.0-3.0: ICD-10-CM

## 2020-07-13 DIAGNOSIS — I48.0 PAROXYSMAL ATRIAL FIBRILLATION (H): ICD-10-CM

## 2020-07-13 DIAGNOSIS — Z79.01 LONG TERM CURRENT USE OF ANTICOAGULANT THERAPY: ICD-10-CM

## 2020-07-13 LAB
CAPILLARY BLOOD COLLECTION: NORMAL
INR PPP: 2.9 (ref 0.86–1.14)

## 2020-07-13 PROCEDURE — 99207 ZZC NO CHARGE NURSE ONLY: CPT

## 2020-07-13 PROCEDURE — 85610 PROTHROMBIN TIME: CPT | Performed by: INTERNAL MEDICINE

## 2020-07-13 PROCEDURE — 36415 COLL VENOUS BLD VENIPUNCTURE: CPT | Performed by: INTERNAL MEDICINE

## 2020-07-13 NOTE — PROGRESS NOTES
ANTICOAGULATION MANAGEMENT      Louis Keller Jr. due for annual renewal of referral to anticoagulation monitoring. Order pended for your review and signature.      ANTICOAGULATION SUMMARY      Warfarin indication(s)     Atrial fibrillation    Heart valve present?  NO       Current goal range   INR: 2.0-3.0     Goal appropriate for indication? Yes, INR 2-3 appropriate for hx of DVT, PE, hypercoagulable state, Afib, LVAD, or bileaflet AVR without risk factors     Current duration of therapy Indefinite/long term therapy   Time in Therapeutic Range (TTR)  (Goal > 60%) 58.6 %       Office visit with referring provider's group within last year yes on 02/04/2020       Hpoe Baumann RN

## 2020-07-13 NOTE — PROGRESS NOTES
ANTICOAGULATION FOLLOW-UP CLINIC VISIT    Patient Name:  Louis Keller Jr.  Date:  2020  Contact Type:  Telephone    SUBJECTIVE:  Patient Findings     Positives:   Change in diet/appetite (NO change-eating 2-3 servings of greens per week)        Clinical Outcomes     Negatives:   Major bleeding event, Thromboembolic event, Anticoagulation-related hospital admission, Anticoagulation-related ED visit, Anticoagulation-related fatality           OBJECTIVE    Recent labs: (last 7 days)     20  1052   INR 2.90*       ASSESSMENT / PLAN  INR assessment THER    Recheck INR In: 3 WEEKS    INR Location Clinic      Anticoagulation Summary  As of 2020    INR goal:   2.0-3.0   TTR:   58.6 % (1 y)   INR used for dosin.90 (2020)   Warfarin maintenance plan:   3.75 mg (2.5 mg x 1.5) every Fri; 2.5 mg (2.5 mg x 1) all other days   Full warfarin instructions:   3.75 mg every Fri; 2.5 mg all other days   Weekly warfarin total:   18.75 mg   Plan last modified:   Hope Baumann RN (2020)   Next INR check:   8/3/2020   Priority:   High   Target end date:       Indications    Long-term (current) use of anticoagulants [Z79.01] [Z79.01]  Atrial fibrillation (H) [I48.91]             Anticoagulation Episode Summary     INR check location:       Preferred lab:       Send INR reminders to:   [facility] [place]    Comments:           Anticoagulation Care Providers     Provider Role Specialty Phone number    Joselito Armas MD [facility] Internal Medicine [phone]            See the Encounter Report to view Anticoagulation Flowsheet and Dosing Calendar (Go to Encounters tab in chart review, and find the Anticoagulation Therapy Visit)        Hope Baumann RN

## 2020-07-14 PROBLEM — I48.0 PAROXYSMAL ATRIAL FIBRILLATION (H): Status: ACTIVE | Noted: 2020-07-14

## 2020-07-14 PROBLEM — Z79.01 LONG TERM CURRENT USE OF ANTICOAGULANTS WITH INR GOAL OF 2.0-3.0: Status: ACTIVE | Noted: 2020-07-14

## 2020-07-14 RX ORDER — FERROUS GLUCONATE 324(38)MG
TABLET ORAL
Qty: 100 TABLET | Refills: 1 | Status: SHIPPED | OUTPATIENT
Start: 2020-07-14 | End: 2021-01-25

## 2020-07-31 DIAGNOSIS — E11.22 TYPE 2 DIABETES MELLITUS WITH STAGE 3 CHRONIC KIDNEY DISEASE, WITHOUT LONG-TERM CURRENT USE OF INSULIN (H): ICD-10-CM

## 2020-07-31 DIAGNOSIS — N18.30 TYPE 2 DIABETES MELLITUS WITH STAGE 3 CHRONIC KIDNEY DISEASE, WITHOUT LONG-TERM CURRENT USE OF INSULIN (H): ICD-10-CM

## 2020-08-03 ENCOUNTER — ANTICOAGULATION THERAPY VISIT (OUTPATIENT)
Dept: NURSING | Facility: CLINIC | Age: 85
End: 2020-08-03

## 2020-08-03 DIAGNOSIS — Z79.01 LONG TERM CURRENT USE OF ANTICOAGULANTS WITH INR GOAL OF 2.0-3.0: ICD-10-CM

## 2020-08-03 DIAGNOSIS — I48.0 PAROXYSMAL ATRIAL FIBRILLATION (H): ICD-10-CM

## 2020-08-03 DIAGNOSIS — Z79.01 LONG TERM CURRENT USE OF ANTICOAGULANT THERAPY: ICD-10-CM

## 2020-08-03 DIAGNOSIS — I48.91 ATRIAL FIBRILLATION (H): ICD-10-CM

## 2020-08-03 LAB
CAPILLARY BLOOD COLLECTION: NORMAL
INR PPP: 2.3 (ref 0.86–1.14)

## 2020-08-03 PROCEDURE — 36416 COLLJ CAPILLARY BLOOD SPEC: CPT | Performed by: INTERNAL MEDICINE

## 2020-08-03 PROCEDURE — 85610 PROTHROMBIN TIME: CPT | Performed by: INTERNAL MEDICINE

## 2020-08-03 PROCEDURE — 99207 ZZC NO CHARGE NURSE ONLY: CPT

## 2020-08-03 NOTE — PROGRESS NOTES
ANTICOAGULATION FOLLOW-UP CLINIC VISIT    Patient Name:  Louis Keller Jr.  Date:  8/3/2020  Contact Type:  Telephone    SUBJECTIVE:  Patient Findings     Positives:   Change in medications (taking an extra vitamin daily (see below))    Comments:   Called patient to discuss today's INR results: Had been taking 1 vit/mineral supplements (1/2 in a.m. and 1/2 in p.m.), he started taking 1 full in a.m. and 1 full in evening since they are going to  soon. He was wondering if there is any danger in doing this? Advised patient if there is vit K in his vitamins it could lower his INR if taking more than his usual dose. He should keep vit K as stable as possible, whether it is in food, drink or tablet form. Patient stated understanding and will check the label. If it contains vit K, he will return to prior dosing. If no vit K, he plans to take as he has been these past 2 weeks. Otherwise, The patient was assessed for diet, medication, and activity level changes, missed or extra doses, bruising or bleeding, with no problem findings. Reviewed maintenance warfarin dosing with patient. Patient will remain on the same dose until next INR check. No other questions or concerns. Scheduled next lab-only INR in [place] after his appt with Dr. Armas.  Tere AKBAR RN  Anticoagulation Clinic  [place]          Clinical Outcomes     Comments:   Called patient to discuss today's INR results: Had been taking 1 vit/mineral supplements (1/2 in a.m. and 1/2 in p.m.), he started taking 1 full in a.m. and 1 full in evening since they are going to  soon. He was wondering if there is any danger in doing this? Advised patient if there is vit K in his vitamins it could lower his INR if taking more than his usual dose. He should keep vit K as stable as possible, whether it is in food, drink or tablet form. Patient stated understanding and will check the label. If it contains vit K, he will return to prior dosing. If no vit K, he  plans to take as he has been these past 2 weeks. Otherwise, The patient was assessed for diet, medication, and activity level changes, missed or extra doses, bruising or bleeding, with no problem findings. Reviewed maintenance warfarin dosing with patient. Patient will remain on the same dose until next INR check. No other questions or concerns. Scheduled next lab-only INR in [place] after his appt with Dr. Armas.  Tere AKBAR RN  Anticoagulation Clinic  [place]             OBJECTIVE    Recent labs: (last 7 days)     20  1149   INR 2.30*       ASSESSMENT / PLAN  INR assessment THER    Recheck INR In: 4 WEEKS    INR Location Clinic      Anticoagulation Summary  As of 8/3/2020    INR goal:   2.0-3.0   TTR:   62.7 % (1 y)   INR used for dosin.30 (8/3/2020)   Warfarin maintenance plan:   3.75 mg (2.5 mg x 1.5) every Fri; 2.5 mg (2.5 mg x 1) all other days   Full warfarin instructions:   3.75 mg every Fri; 2.5 mg all other days   Weekly warfarin total:   18.75 mg   No change documented:   Tere Westbrook RN   Plan last modified:   Hope Baumann RN (2020)   Next INR check:   2020   Priority:   Maintenance   Target end date:   Indefinite    Indications    Long-term (current) use of anticoagulants [Z79.01] [Z79.01]  Atrial fibrillation (H) [I48.91]  Paroxysmal atrial fibrillation (H) [I48.0]  Long term current use of anticoagulants with INR goal of 2.0-3.0 [Z79.01]             Anticoagulation Episode Summary     INR check location:       Preferred lab:       Send INR reminders to:   [facility] [place]    Comments:           Anticoagulation Care Providers     Provider Role Specialty Phone number    Joselito Armas MD Referring Internal Medicine [phone]            See the Encounter Report to view Anticoagulation Flowsheet and Dosing Calendar (Go to Encounters tab in chart review, and find the Anticoagulation Therapy Visit)    Tere Westbrook, RN

## 2020-08-17 ENCOUNTER — ANCILLARY PROCEDURE (OUTPATIENT)
Dept: CARDIOLOGY | Facility: CLINIC | Age: 85
End: 2020-08-17
Attending: INTERNAL MEDICINE
Payer: COMMERCIAL

## 2020-08-17 DIAGNOSIS — R55 NEAR SYNCOPE: ICD-10-CM

## 2020-08-17 DIAGNOSIS — Z79.01 LONG TERM CURRENT USE OF ANTICOAGULANTS WITH INR GOAL OF 2.0-3.0: ICD-10-CM

## 2020-08-17 DIAGNOSIS — Z45.09 ENCOUNTER FOR LOOP RECORDER CHECK: ICD-10-CM

## 2020-08-17 DIAGNOSIS — I48.0 PAROXYSMAL ATRIAL FIBRILLATION (H): ICD-10-CM

## 2020-08-17 PROCEDURE — 93298 REM INTERROG DEV EVAL SCRMS: CPT | Performed by: INTERNAL MEDICINE

## 2020-08-17 PROCEDURE — G2066 INTER DEVC REMOTE 30D: HCPCS | Performed by: INTERNAL MEDICINE

## 2020-08-19 RX ORDER — WARFARIN SODIUM 2.5 MG/1
TABLET ORAL
Qty: 104 TABLET | Refills: 0 | Status: SHIPPED | OUTPATIENT
Start: 2020-08-19 | End: 2020-11-18

## 2020-08-19 NOTE — TELEPHONE ENCOUNTER
"Requested Prescriptions   Pending Prescriptions Disp Refills     warfarin ANTICOAGULANT (COUMADIN) 2.5 MG tablet [Pharmacy Med Name: WARFARIN SODIUM 2.5 MG TABLET] 104 tablet 1     Sig: TAKE 1 TABLET DAILY EXCEPT 2 TABLETS ON FRIDAY OR AS DIRECTED BY INR CLINIC       Vitamin K Antagonists Failed - 8/18/2020  4:47 PM        Failed - INR is within goal in the past 6 weeks     Confirm INR is within goal in the past 6 weeks.     Recent Labs   Lab Test 08/03/20  1149   INR 2.30*                       Passed - Recent (12 mo) or future (30 days) visit within the authorizing provider's specialty     Patient has had an office visit with the authorizing provider or a provider within the authorizing providers department within the previous 12 mos or has a future within next 30 days. See \"Patient Info\" tab in inbasket, or \"Choose Columns\" in Meds & Orders section of the refill encounter.              Passed - Medication is active on med list        Passed - Patient is 18 years of age or older           Last office visit 2/4/20.  Warfarin dosing is managed by INR Clinic.  Prescription approved per [facility] Refill Protocol.  Tata Alexis RN    "

## 2020-09-02 ENCOUNTER — ANTICOAGULATION THERAPY VISIT (OUTPATIENT)
Dept: ANTICOAGULATION | Facility: CLINIC | Age: 85
End: 2020-09-02

## 2020-09-02 ENCOUNTER — OFFICE VISIT (OUTPATIENT)
Dept: INTERNAL MEDICINE | Facility: CLINIC | Age: 85
End: 2020-09-02
Payer: COMMERCIAL

## 2020-09-02 VITALS
WEIGHT: 176 LBS | BODY MASS INDEX: 24.64 KG/M2 | RESPIRATION RATE: 14 BRPM | DIASTOLIC BLOOD PRESSURE: 80 MMHG | TEMPERATURE: 99.2 F | HEIGHT: 71 IN | OXYGEN SATURATION: 96 % | HEART RATE: 66 BPM | SYSTOLIC BLOOD PRESSURE: 138 MMHG

## 2020-09-02 DIAGNOSIS — Z79.01 LONG TERM CURRENT USE OF ANTICOAGULANT THERAPY: ICD-10-CM

## 2020-09-02 DIAGNOSIS — Z79.01 LONG TERM CURRENT USE OF ANTICOAGULANTS WITH INR GOAL OF 2.0-3.0: ICD-10-CM

## 2020-09-02 DIAGNOSIS — I48.0 PAROXYSMAL ATRIAL FIBRILLATION (H): ICD-10-CM

## 2020-09-02 DIAGNOSIS — N18.30 TYPE 2 DIABETES MELLITUS WITH STAGE 3 CHRONIC KIDNEY DISEASE, WITHOUT LONG-TERM CURRENT USE OF INSULIN (H): Primary | ICD-10-CM

## 2020-09-02 DIAGNOSIS — Z23 NEED FOR PROPHYLACTIC VACCINATION AND INOCULATION AGAINST INFLUENZA: ICD-10-CM

## 2020-09-02 DIAGNOSIS — E11.22 TYPE 2 DIABETES MELLITUS WITH STAGE 3 CHRONIC KIDNEY DISEASE, WITHOUT LONG-TERM CURRENT USE OF INSULIN (H): Primary | ICD-10-CM

## 2020-09-02 DIAGNOSIS — E78.5 HYPERLIPIDEMIA LDL GOAL <100: ICD-10-CM

## 2020-09-02 LAB
ERYTHROCYTE [DISTWIDTH] IN BLOOD BY AUTOMATED COUNT: 14.9 % (ref 10–15)
HBA1C MFR BLD: 6.4 % (ref 0–5.6)
HCT VFR BLD AUTO: 37.2 % (ref 40–53)
HGB BLD-MCNC: 12.1 G/DL (ref 13.3–17.7)
INR PPP: 2.5 (ref 0.86–1.14)
MCH RBC QN AUTO: 32 PG (ref 26.5–33)
MCHC RBC AUTO-ENTMCNC: 32.5 G/DL (ref 31.5–36.5)
MCV RBC AUTO: 98 FL (ref 78–100)
PLATELET # BLD AUTO: 164 10E9/L (ref 150–450)
RBC # BLD AUTO: 3.78 10E12/L (ref 4.4–5.9)
WBC # BLD AUTO: 6.2 10E9/L (ref 4–11)

## 2020-09-02 PROCEDURE — 36415 COLL VENOUS BLD VENIPUNCTURE: CPT | Performed by: INTERNAL MEDICINE

## 2020-09-02 PROCEDURE — 85610 PROTHROMBIN TIME: CPT | Performed by: INTERNAL MEDICINE

## 2020-09-02 PROCEDURE — 99214 OFFICE O/P EST MOD 30 MIN: CPT | Mod: 25 | Performed by: INTERNAL MEDICINE

## 2020-09-02 PROCEDURE — 99207 ZZC NO CHARGE NURSE ONLY: CPT | Performed by: INTERNAL MEDICINE

## 2020-09-02 PROCEDURE — G0008 ADMIN INFLUENZA VIRUS VAC: HCPCS | Performed by: INTERNAL MEDICINE

## 2020-09-02 PROCEDURE — 85027 COMPLETE CBC AUTOMATED: CPT | Performed by: INTERNAL MEDICINE

## 2020-09-02 PROCEDURE — 80053 COMPREHEN METABOLIC PANEL: CPT | Performed by: INTERNAL MEDICINE

## 2020-09-02 PROCEDURE — 90662 IIV NO PRSV INCREASED AG IM: CPT | Performed by: INTERNAL MEDICINE

## 2020-09-02 PROCEDURE — 83036 HEMOGLOBIN GLYCOSYLATED A1C: CPT | Performed by: INTERNAL MEDICINE

## 2020-09-02 ASSESSMENT — MIFFLIN-ST. JEOR: SCORE: 1490.46

## 2020-09-02 NOTE — PROGRESS NOTES
ANTICOAGULATION FOLLOW-UP CLINIC VISIT    Patient Name:  Louis Keller Jr.  Date:  2020  Contact Type:  Telephone/ Called patient, he denies any changes. Orders discussed with the patient, he agrees with the plan. Lab INR appointment scheduled on 20.    SUBJECTIVE:  Patient Findings     Comments:   The patient was assessed for diet, medication, and activity level changes, missed or extra doses, bruising or bleeding, with no problem findings. Maintenance warfarin dosing was reviewed with patient and will remain on the same dose until next INR check. Patient did not have any questions or concerns.             Clinical Outcomes     Negatives:   Major bleeding event, Thromboembolic event, Anticoagulation-related hospital admission, Anticoagulation-related ED visit, Anticoagulation-related fatality    Comments:   The patient was assessed for diet, medication, and activity level changes, missed or extra doses, bruising or bleeding, with no problem findings. Maintenance warfarin dosing was reviewed with patient and will remain on the same dose until next INR check. Patient did not have any questions or concerns.                OBJECTIVE    Recent labs: (last 7 days)     20  1504   INR 2.50*       ASSESSMENT / PLAN  INR assessment THER    Recheck INR In: 4 WEEKS    INR Location Outside lab      Anticoagulation Summary  As of 2020    INR goal:   2.0-3.0   TTR:   66.6 % (1 y)   INR used for dosin.50 (2020)   Warfarin maintenance plan:   3.75 mg (2.5 mg x 1.5) every Fri; 2.5 mg (2.5 mg x 1) all other days   Full warfarin instructions:   3.75 mg every Fri; 2.5 mg all other days   Weekly warfarin total:   18.75 mg   No change documented:   Monica Souza RN   Plan last modified:   Hope Baumann RN (2020)   Next INR check:   2020   Priority:   Maintenance   Target end date:   Indefinite    Indications    Long-term (current) use of anticoagulants [Z79.01] [Z79.01]  Atrial fibrillation  (H) [I48.91]  Paroxysmal atrial fibrillation (H) [I48.0]  Long term current use of anticoagulants with INR goal of 2.0-3.0 [Z79.01]             Anticoagulation Episode Summary     INR check location:       Preferred lab:       Send INR reminders to:   [facility] [place]    Comments:           Anticoagulation Care Providers     Provider Role Specialty Phone number    Joselito Armas MD Referring Internal Medicine [phone]            See the Encounter Report to view Anticoagulation Flowsheet and Dosing Calendar (Go to Encounters tab in chart review, and find the Anticoagulation Therapy Visit)    Dosage adjustment made based on physician directed care plan.    Monica Souza RN

## 2020-09-02 NOTE — NURSING NOTE
"/80   Pulse 66   Temp 99.2  F (37.3  C) (Oral)   Resp 14   Ht 1.803 m (5' 11\")   Wt 79.8 kg (176 lb)   SpO2 96%   BMI 24.55 kg/m      "

## 2020-09-02 NOTE — PROGRESS NOTES
Subjective     Louis Keller Jr. is a 88 year old male who presents to clinic today for the following health issues:    HPI     Patient is seen for a follow up visit.  No acute complaints, no medication change or new medical conditions.    Diabetes Follow-up  Has H/O DM. On diet , exercise and oral treatment . Blood sugars are controlled. No parestesias. No hypoglycemias.      How often are you checking your blood sugar? 30 day average- 102    What concerns do you have today about your diabetes? None     Do you have any of these symptoms? (Select all that apply)  No numbness or tingling in feet.  No redness, sores or blisters on feet.  No complaints of excessive thirst.  No reports of blurry vision.  No significant changes to weight.    Have you had a diabetic eye exam in the last 12 months? No        BP Readings from Last 2 Encounters:   03/04/20 98/56   02/04/20 130/76     Hemoglobin A1C (%)   Date Value   11/11/2019 6.7 (H)   06/17/2019 6.9 (H)     LDL Cholesterol Calculated (mg/dL)   Date Value   11/11/2019 44   12/19/2018 52           How many servings of fruits and vegetables do you eat daily?  3-4    On average, how many sweetened beverages do you drink each day (Examples: soda, juice, sweet tea, etc.  Do NOT count diet or artificially sweetened beverages)?   0    How many days per week do you exercise enough to make your heart beat faster? 6    How many minutes a day do you exercise enough to make your heart beat faster? 30 - 60    How many days per week do you miss taking your medication? 0    Has H/O hyperlipidemia. On medical treatment and diet. No side effects. No muscle weakness, myalgias or upset stomach.   Has history of atrial fibrillation. On anticoagulation with Coumadin and rate control medications. Asymptonatic - no chest pains , palpitations,  no side effects from medications.      Review of Systems   Constitutional, HEENT, cardiovascular, pulmonary, gi and gu systems are negative, except as  otherwise noted.      Objective    There were no vitals taken for this visit.  There is no height or weight on file to calculate BMI.  Physical Exam   GENERAL: elderly ,healthy, alert and no distress  NECK: no adenopathy, no asymmetry, masses, or scars and thyroid normal to palpation  RESP: lungs clear to auscultation - no rales, rhonchi or wheezes  CV: irregular rate and rhythm, normal S1 S2, no S3 or S4, no murmur, click or rub, no peripheral edema and peripheral pulses strong  ABDOMEN: soft, nontender, no hepatosplenomegaly, no masses and bowel sounds normal  MS: no gross musculoskeletal defects noted, no edema    No results found for this or any previous visit (from the past 24 hour(s)).  Results for orders placed or performed in visit on 09/02/20   CBC with platelets     Status: Abnormal   Result Value Ref Range    WBC 6.2 4.0 - 11.0 10e9/L    RBC Count 3.78 (L) 4.4 - 5.9 10e12/L    Hemoglobin 12.1 (L) 13.3 - 17.7 g/dL    Hematocrit 37.2 (L) 40.0 - 53.0 %    MCV 98 78 - 100 fl    MCH 32.0 26.5 - 33.0 pg    MCHC 32.5 31.5 - 36.5 g/dL    RDW 14.9 10.0 - 15.0 %    Platelet Count 164 150 - 450 10e9/L   Comprehensive metabolic panel     Status: Abnormal   Result Value Ref Range    Sodium 136 133 - 144 mmol/L    Potassium 4.6 3.4 - 5.3 mmol/L    Chloride 105 94 - 109 mmol/L    Carbon Dioxide 25 20 - 32 mmol/L    Anion Gap 6 3 - 14 mmol/L    Glucose 160 (H) 70 - 99 mg/dL    Urea Nitrogen 19 7 - 30 mg/dL    Creatinine 1.05 0.66 - 1.25 mg/dL    GFR Estimate 63 >60 mL/min/[1.73_m2]    GFR Estimate If Black 73 >60 mL/min/[1.73_m2]    Calcium 9.2 8.5 - 10.1 mg/dL    Bilirubin Total 1.5 (H) 0.2 - 1.3 mg/dL    Albumin 3.8 3.4 - 5.0 g/dL    Protein Total 7.1 6.8 - 8.8 g/dL    Alkaline Phosphatase 60 40 - 150 U/L    ALT 33 0 - 70 U/L    AST 39 0 - 45 U/L   Hemoglobin A1c     Status: Abnormal   Result Value Ref Range    Hemoglobin A1C 6.4 (H) 0 - 5.6 %   INR     Status: Abnormal   Result Value Ref Range    INR 2.50 (H) 0.86 -  1.14           Assessment & Plan   Problem List Items Addressed This Visit     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    Relevant Orders    CBC with platelets (Completed)    Comprehensive metabolic panel (Completed)    Hemoglobin A1c (Completed)    Hyperlipidemia LDL goal <100    Relevant Orders    CBC with platelets (Completed)    Comprehensive metabolic panel (Completed)    Hemoglobin A1c (Completed)    Long-term (current) use of anticoagulants [Z79.01]    Paroxysmal atrial fibrillation (H)      Other Visit Diagnoses     Need for prophylactic vaccination and inoculation against influenza        Relevant Orders    FLUZONE HIGH DOSE 65+  [23891] (Completed)    ADMIN INFLUENZA (For MEDICARE Patients ONLY) [] (Completed)                 See Patient Instructions  No follow-ups on file.    Joselito Armas MD  [facility]

## 2020-09-03 LAB
ALBUMIN SERPL-MCNC: 3.8 G/DL (ref 3.4–5)
ALP SERPL-CCNC: 60 U/L (ref 40–150)
ALT SERPL W P-5'-P-CCNC: 33 U/L (ref 0–70)
ANION GAP SERPL CALCULATED.3IONS-SCNC: 6 MMOL/L (ref 3–14)
AST SERPL W P-5'-P-CCNC: 39 U/L (ref 0–45)
BILIRUB SERPL-MCNC: 1.5 MG/DL (ref 0.2–1.3)
BUN SERPL-MCNC: 19 MG/DL (ref 7–30)
CALCIUM SERPL-MCNC: 9.2 MG/DL (ref 8.5–10.1)
CHLORIDE SERPL-SCNC: 105 MMOL/L (ref 94–109)
CO2 SERPL-SCNC: 25 MMOL/L (ref 20–32)
CREAT SERPL-MCNC: 1.05 MG/DL (ref 0.66–1.25)
GFR SERPL CREATININE-BSD FRML MDRD: 63 ML/MIN/{1.73_M2}
GLUCOSE SERPL-MCNC: 160 MG/DL (ref 70–99)
POTASSIUM SERPL-SCNC: 4.6 MMOL/L (ref 3.4–5.3)
PROT SERPL-MCNC: 7.1 G/DL (ref 6.8–8.8)
SODIUM SERPL-SCNC: 136 MMOL/L (ref 133–144)

## 2020-09-04 LAB
MDC_IDC_EPISODE_DTM: NORMAL
MDC_IDC_EPISODE_DURATION: 1800 S
MDC_IDC_EPISODE_ID: 410
MDC_IDC_EPISODE_TYPE: NORMAL
MDC_IDC_MSMT_BATTERY_STATUS: NORMAL
MDC_IDC_PG_IMPLANT_DTM: NORMAL
MDC_IDC_PG_MFG: NORMAL
MDC_IDC_PG_MODEL: NORMAL
MDC_IDC_PG_SERIAL: NORMAL
MDC_IDC_PG_TYPE: NORMAL
MDC_IDC_SESS_CLINIC_NAME: NORMAL
MDC_IDC_SESS_DTM: NORMAL
MDC_IDC_SESS_TYPE: NORMAL
MDC_IDC_SET_ZONE_DETECTION_INTERVAL: 2000 MS
MDC_IDC_SET_ZONE_DETECTION_INTERVAL: 3000 MS
MDC_IDC_SET_ZONE_DETECTION_INTERVAL: 420 MS
MDC_IDC_SET_ZONE_TYPE: NORMAL
MDC_IDC_STAT_AT_BURDEN_PERCENT: 21.8 %
MDC_IDC_STAT_AT_DTM_END: NORMAL
MDC_IDC_STAT_AT_DTM_START: NORMAL
MDC_IDC_STAT_EPISODE_RECENT_COUNT: 0
MDC_IDC_STAT_EPISODE_RECENT_COUNT: 16
MDC_IDC_STAT_EPISODE_RECENT_COUNT_DTM_END: NORMAL
MDC_IDC_STAT_EPISODE_RECENT_COUNT_DTM_START: NORMAL
MDC_IDC_STAT_EPISODE_TOTAL_COUNT: 0
MDC_IDC_STAT_EPISODE_TOTAL_COUNT: 1
MDC_IDC_STAT_EPISODE_TOTAL_COUNT: 355
MDC_IDC_STAT_EPISODE_TOTAL_COUNT: 54
MDC_IDC_STAT_EPISODE_TOTAL_COUNT_DTM_END: NORMAL
MDC_IDC_STAT_EPISODE_TOTAL_COUNT_DTM_START: NORMAL
MDC_IDC_STAT_EPISODE_TYPE: NORMAL

## 2020-09-11 DIAGNOSIS — I48.0 PAROXYSMAL ATRIAL FIBRILLATION (H): ICD-10-CM

## 2020-09-11 LAB
ALBUMIN SERPL-MCNC: 3.7 G/DL (ref 3.4–5)
ALP SERPL-CCNC: 69 U/L (ref 40–150)
ALT SERPL W P-5'-P-CCNC: 31 U/L (ref 0–70)
ANION GAP SERPL CALCULATED.3IONS-SCNC: 4 MMOL/L (ref 3–14)
AST SERPL W P-5'-P-CCNC: 35 U/L (ref 0–45)
BILIRUB DIRECT SERPL-MCNC: 0.3 MG/DL (ref 0–0.2)
BILIRUB SERPL-MCNC: 1.2 MG/DL (ref 0.2–1.3)
BUN SERPL-MCNC: 15 MG/DL (ref 7–30)
CALCIUM SERPL-MCNC: 8.9 MG/DL (ref 8.5–10.1)
CHLORIDE SERPL-SCNC: 102 MMOL/L (ref 94–109)
CO2 SERPL-SCNC: 29 MMOL/L (ref 20–32)
CREAT SERPL-MCNC: 1.03 MG/DL (ref 0.66–1.25)
GFR SERPL CREATININE-BSD FRML MDRD: 64 ML/MIN/{1.73_M2}
GLUCOSE SERPL-MCNC: 220 MG/DL (ref 70–99)
POTASSIUM SERPL-SCNC: 4.3 MMOL/L (ref 3.4–5.3)
PROT SERPL-MCNC: 7.1 G/DL (ref 6.8–8.8)
SODIUM SERPL-SCNC: 135 MMOL/L (ref 133–144)
T4 FREE SERPL-MCNC: 1.06 NG/DL (ref 0.76–1.46)
TSH SERPL DL<=0.005 MIU/L-ACNC: 6.13 MU/L (ref 0.4–4)

## 2020-09-11 PROCEDURE — 84439 ASSAY OF FREE THYROXINE: CPT | Performed by: NURSE PRACTITIONER

## 2020-09-11 PROCEDURE — 80048 BASIC METABOLIC PNL TOTAL CA: CPT | Performed by: NURSE PRACTITIONER

## 2020-09-11 PROCEDURE — 84443 ASSAY THYROID STIM HORMONE: CPT | Performed by: NURSE PRACTITIONER

## 2020-09-11 PROCEDURE — 36415 COLL VENOUS BLD VENIPUNCTURE: CPT | Performed by: NURSE PRACTITIONER

## 2020-09-11 PROCEDURE — 80076 HEPATIC FUNCTION PANEL: CPT | Performed by: NURSE PRACTITIONER

## 2020-09-12 DIAGNOSIS — E11.22 TYPE 2 DIABETES MELLITUS WITH STAGE 3 CHRONIC KIDNEY DISEASE, WITHOUT LONG-TERM CURRENT USE OF INSULIN (H): ICD-10-CM

## 2020-09-12 DIAGNOSIS — N18.30 TYPE 2 DIABETES MELLITUS WITH STAGE 3 CHRONIC KIDNEY DISEASE, WITHOUT LONG-TERM CURRENT USE OF INSULIN (H): ICD-10-CM

## 2020-09-14 DIAGNOSIS — I48.20 CHRONIC A-FIB (H): ICD-10-CM

## 2020-09-14 DIAGNOSIS — R55 NEAR SYNCOPE: ICD-10-CM

## 2020-09-14 DIAGNOSIS — I87.2 VENOUS (PERIPHERAL) INSUFFICIENCY: ICD-10-CM

## 2020-09-14 RX ORDER — METOPROLOL TARTRATE 25 MG/1
25 TABLET, FILM COATED ORAL 2 TIMES DAILY
Qty: 180 TABLET | Refills: 0 | Status: SHIPPED | OUTPATIENT
Start: 2020-09-14 | End: 2021-02-08

## 2020-09-14 RX ORDER — AMIODARONE HYDROCHLORIDE 200 MG/1
200 TABLET ORAL DAILY
Qty: 90 TABLET | Refills: 0 | Status: SHIPPED | OUTPATIENT
Start: 2020-09-14 | End: 2021-01-25

## 2020-09-15 NOTE — TELEPHONE ENCOUNTER
Pending Prescriptions:                       Disp   Refills    metFORMIN (GLUCOPHAGE) 500 MG tablet [Pha*180 ta*1            Sig: TAKE 1 TABLET BY MOUTH TWICE A DAY WITH MEALS    Prescription approved per [facility] [place] [place] Refill Protocol.

## 2020-09-16 ENCOUNTER — OFFICE VISIT (OUTPATIENT)
Dept: CARDIOLOGY | Facility: CLINIC | Age: 85
End: 2020-09-16
Payer: COMMERCIAL

## 2020-09-16 VITALS
HEART RATE: 70 BPM | HEIGHT: 71 IN | BODY MASS INDEX: 24.92 KG/M2 | DIASTOLIC BLOOD PRESSURE: 78 MMHG | WEIGHT: 178 LBS | SYSTOLIC BLOOD PRESSURE: 136 MMHG

## 2020-09-16 DIAGNOSIS — I47.29 NSVT (NONSUSTAINED VENTRICULAR TACHYCARDIA) (H): ICD-10-CM

## 2020-09-16 DIAGNOSIS — I48.0 PAROXYSMAL ATRIAL FIBRILLATION (H): Primary | ICD-10-CM

## 2020-09-16 PROCEDURE — 93000 ELECTROCARDIOGRAM COMPLETE: CPT | Performed by: INTERNAL MEDICINE

## 2020-09-16 PROCEDURE — 99214 OFFICE O/P EST MOD 30 MIN: CPT | Performed by: INTERNAL MEDICINE

## 2020-09-16 ASSESSMENT — MIFFLIN-ST. JEOR: SCORE: 1499.27

## 2020-09-16 NOTE — LETTER
9/16/2020    Joselito Armas MD  [address] [facility] [address]    RE: Louis Keller Jr.       Dear Colleague,    I had the pleasure of seeing Louis Keller Jr. in the [facility].    HPI and Plan:   See dictation [id number]    Orders Placed This Encounter   Procedures     EKG 12-lead complete w/read - Clinics (performed today)       No orders of the defined types were placed in this encounter.      There are no discontinued medications.      Encounter Diagnosis   Name Primary?     Paroxysmal atrial fibrillation (H)        CURRENT MEDICATIONS:  Current Outpatient Medications   Medication Sig Dispense Refill     Acetaminophen (TYLENOL PO) Take 1,000 mg by mouth 3 times daily       amiodarone (PACERONE) 200 MG tablet Take 1 tablet (200 mg) by mouth daily 90 tablet 0     cholestyramine (QUESTRAN) 4 g packet TAKE 1 PACKET (4 G) BY MOUTH 2 TIMES DAILY (WITH MEALS) 180 packet 2     ferrous gluconate (FERGON) 324 (38 Fe) MG tablet TAKE 1 TABLET (324 MG) BY MOUTH DAILY (WITH BREAKFAST) 100 tablet 1     furosemide (LASIX) 20 MG tablet Take 1 tablet (20 mg) by mouth every 3 days 90 tablet 0     glipiZIDE (GLUCOTROL XL) 10 MG 24 hr tablet TAKE 1 TABLET (10 MG) BY MOUTH 2 TIMES DAILY 180 tablet 1     latanoprost (XALATAN) 0.005 % ophthalmic solution Place 1 drop Into the left eye daily       loperamide (IMODIUM A-D) 2 MG tablet daily  30 tablet 0     metFORMIN (GLUCOPHAGE) 500 MG tablet TAKE 1 TABLET BY MOUTH TWICE A DAY WITH MEALS 180 tablet 1     metoprolol tartrate (LOPRESSOR) 25 MG tablet Take 1 tablet (25 mg) by mouth 2 times daily 180 tablet 0     multivitamin, therapeutic (THERA-VIT) TABS tablet Take 0.5 tablets by mouth 2 times daily       ONETOUCH ULTRA test strip USE TO TEST DAILY 50 strip 11     pioglitazone (ACTOS) 45 MG tablet TAKE 1 TABLET BY MOUTH EVERY DAY 90 tablet 1     warfarin ANTICOAGULANT (COUMADIN) 2.5 MG tablet TAKE 1 TABLET DAILY EXCEPT 1.5 TABLETS ON FRIDAY  OR AS DIRECTED BY INR CLINIC 104 tablet 0     ACE NOT PRESCRIBED, INTENTIONAL, 1 each daily ACE Inhibitor not prescribed due to Risk for drug interaction (Patient not taking: Reported on 9/16/2020) 0 each 0     ASPIRIN NOT PRESCRIBED, INTENTIONAL, by Other route continuous prn Reported on 3/7/2017       STATIN NOT PRESCRIBED, INTENTIONAL, 1 each At Bedtime Statin not prescribed intentionally due to Risk for drug interaction (Patient not taking: Reported on 9/16/2020) 0 each 0       ALLERGIES     Allergies   Allergen Reactions     No Known Drug Allergies        PAST MEDICAL HISTORY:  Past Medical History:   Diagnosis Date     Antiplatelet or antithrombotic long-term use      Atrial fibrillation (H)      Diarrhea      Diastolic murmur      QUESADA (dyspnea on exertion)      Gout      Hemorrhage of gastrointestinal tract, unspecified 63,65,and 1971    hospitalized     History of blood transfusion      Hyperlipidemia LDL goal <100 4/18/2012     Long-term (current) use of anticoagulants [Z79.01] 3/31/2016     Mumps      Palpitations      Physical deconditioning 8/9/2013     Scarlet fever      Spider veins      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     Unspecified disease of pancreas 1990    pancreatitis       PAST SURGICAL HISTORY:  Past Surgical History:   Procedure Laterality Date     ARTHROPLASTY KNEE  8/5/2013    Procedure: ARTHROPLASTY KNEE;  Left Total Knee Arthroplasty       C NONSPECIFIC PROCEDURE  Child    T&A     C NONSPECIFIC PROCEDURE  ; also 11/03    Colonoscopy     C NONSPECIFIC PROCEDURE      Basal cell cancer of the nose     C NONSPECIFIC PROCEDURE  1990    Cholecystectomy     CARDIOVERSION  9/6/11    failed     EP LOOP RECORDER IMPLANT N/A 8/5/2019    Procedure: EP Loop Recorder Implant;  Surgeon: Darling Pacheco MD;  Location:  HE[facility] CATH LAB       FAMILY HISTORY:  Family History   Problem Relation Age of Onset     Alzheimer Disease Maternal Grandmother       Arthritis Maternal Grandmother      Cancer Mother         breast/ 1983/colon     Eye Disorder Mother         glaucoma and cataracts     Heart Disease Mother         angina/CABG     Hypertension Mother      Cancer Father         colon     Diabetes Maternal Aunt         AoDM     No Known Problems Daughter        SOCIAL HISTORY:  Social History     Socioeconomic History     Marital status:      Spouse name: None     Number of children: None     Years of education: None     Highest education level: None   Occupational History     Occupation: Lab tech     Comment: Semi-retired   Social Needs     Financial resource strain: None     Food insecurity     Worry: None     Inability: None     Transportation needs     Medical: None     Non-medical: None   Tobacco Use     Smoking status: Never Smoker     Smokeless tobacco: Never Used   Substance and Sexual Activity     Alcohol use: Yes     Alcohol/week: 0.0 standard drinks     Comment: wine - currently rare     Drug use: No     Sexual activity: Never     Partners: Female   Lifestyle     Physical activity     Days per week: None     Minutes per session: None     Stress: None   Relationships     Social connections     Talks on phone: None     Gets together: None     Attends Rastafarian service: None     Active member of club or organization: None     Attends meetings of clubs or organizations: None     Relationship status: None     Intimate partner violence     Fear of current or ex partner: None     Emotionally abused: None     Physically abused: None     Forced sexual activity: None   Other Topics Concern      Service Not Asked     Blood Transfusions Not Asked     Caffeine Concern No     Comment: 14 oz per day     Occupational Exposure Not Asked     Hobby Hazards Not Asked     Sleep Concern No     Comment: sometimes - nocturia 4 times per night     Stress Concern Not Asked     Weight Concern Not Asked     Special Diet No     Back Care Not Asked     Exercise  Yes     Comment: treadmill/walking 15-20 minutes 6 days per week     Bike Helmet Not Asked     Seat Belt Not Asked     Self-Exams Not Asked     Parent/sibling w/ CABG, MI or angioplasty before 65F 55M? Not Asked   Social History Narrative     None       Review of Systems:  Skin:  Positive for bruising basal cell; sore.redness on right ankle   Eyes:  Positive for glasses;tearing    ENT:  Positive for hearing loss runny nose  Respiratory:  Positive for dyspnea on exertion;shortness of breath     Cardiovascular:    fatigue;Positive for    Gastroenterology: Negative diarrhea off and on  Genitourinary:  Positive for nocturia;urinary frequency 3x per night  Musculoskeletal:  Positive for arthritis;joint pain;foot pain of the right knee occ; left knee replacement 2013; hammer toes in left foot - no pain; pain in right foot; balance issues  Neurologic:  Negative numbness or tingling of feet occas  Psychiatric:  Positive for sleep disturbances nocturia - trouble falling back to sleep afterwards  Heme/Lymph/Imm:  Positive for easy bruising hx of anemic  Endocrine:  Positive for diabetes          CC  MARTHA Greenfield CNP  [address]  [place] [address]                Thank you for allowing me to participate in the care of your patient.      Sincerely,     Darling Pacheco MD     [facility]    cc:   MARTHA Greenfield CNP  [address]  [place] [address]

## 2020-09-16 NOTE — LETTER
"9/16/2020      Joselito Armas MD  [address] [facility] [address]      RE: Louis PACHECO Arianna Willis       Dear Colleague,    I had the pleasure of seeing Louis Keller Jr. in the [facility].    Service Date: 09/16/2020      HISTORY OF PRESENT ILLNESS:    I had the pleasure of seeing Mr. Louis Keller in follow-up of atrial arrhythmia and near-syncope.  He is a delightful 88-year-old gentleman with the following medical issues:   1.  Atrial fibrillation.  This used to be persistent, but more recently became paroxysmal after initiation of amiodarone, see details below.  [id number] of 3.  On warfarin.   2.  Near-syncope while sitting in 04/2019.  Arrhythmic etiology suspected.  ILR implanted in 08/2019.   3.  Diabetes mellitus type 2.   4.  Dyslipidemia.    5.  Mild aortic root dilatation.      I last saw Mr. Keller in 08/2019 when we implanted a loop recorder because of a concerning syncopal event that he had in 04/2019.      In the fall of 2019, it was decided to start amiodarone because of periods of AF with rapid ventricular response.  Today, the patient does not remember being symptomatic from his AF, although prior notes suggest he may have had mild symptoms.  The initial dose was 100 mg daily and it was later increased to 200 mg daily.      According to his loop recorder, after starting amiodarone the patient has had periods of sinus rhythm.  In fact, he was recently more in sinus rhythm than atrial fibrillation (AF burden 21% between May and August 2020).      In coming in today, Louis confirms that he has not had syncope or near-syncope since 04/2019.  He seems unaware of the atrial fibrillation.      I was asked to comment about \"breakthrough\" atrial fibrillation despite amiodarone 200 mg daily.      The patient has no chest pain, unusual dyspnea, orthopnea, PND or other cardiac symptoms.        PHYSICAL EXAMINATION:   VITAL SIGNS:  Blood pressure initially " "160/80 with subsequent measurement of 136/78, pulse 70 and irregular, weight 81 kg, height 180 cm.   GENERAL:  He is a very pleasant gentleman who is alert, oriented and mentally sharp.   HEENT:  Normocephalic.   NECK:  Supple without carotid bruits or jugular venous distention.   LUNGS:  Mildly decreased breath sounds but clear throughout without crackles or wheezes.   CARDIOVASCULAR:  Irregular rhythm without gallop, murmur or rub.   ABDOMEN:  Soft, nontender.   EXTREMITIES:  No edema.      DIAGNOSTIC STUDIES:  His 12-lead ECG today showed atrial fibrillation with controlled ventricular rate.        IMPRESSION:   1.  Near-syncope in 2019.  The patient has not had a recurrence.  With regard to the etiology of this event we have identified potential \"smoking guns\" which include episodes of NSVT (these have resolved on amiodarone) as well as intermittent bradycardia during atrial fibrillation.  Nothing conclusive, however.  We will continue to monitor his loop recorder.   2.  Atrial fibrillation and use of amiodarone.   It does not appear that the patient has symptoms related to atrial fibrillation.  LVEF has been normal, i.e. no evidence of tachycardia-induced cardiomyopathy from rapid AF.  Of note, the medication may have converted his previously persistent atrial fibrillation to paroxysmal.      RECOMMENDATIONS:   1.  Since Dr. Gleason started the amiodarone, I will defer to him the decision whether to continue or not.   2.  Routine Device clinic visits for ILR interrogations.      I appreciate the opportunity to be part of his care.         LOLLY BARRETO MD, FACC           cc:      Joselito Armas MD   [facility]   [address]   [place] [address]       BRAYAN Rice    [address], [address] [address]          D: 2020   T: 2020   MT: MODE      Name:     KATHY LOPEZ   MRN:      [id number]        Account:      [id number]   :  "     06/26/1932           Service Date: 09/16/2020      Document: [id number]           Outpatient Encounter Medications as of 9/16/2020   Medication Sig Dispense Refill     Acetaminophen (TYLENOL PO) Take 1,000 mg by mouth 3 times daily       amiodarone (PACERONE) 200 MG tablet Take 1 tablet (200 mg) by mouth daily 90 tablet 0     cholestyramine (QUESTRAN) 4 g packet TAKE 1 PACKET (4 G) BY MOUTH 2 TIMES DAILY (WITH MEALS) 180 packet 2     ferrous gluconate (FERGON) 324 (38 Fe) MG tablet TAKE 1 TABLET (324 MG) BY MOUTH DAILY (WITH BREAKFAST) 100 tablet 1     furosemide (LASIX) 20 MG tablet Take 1 tablet (20 mg) by mouth every 3 days 90 tablet 0     glipiZIDE (GLUCOTROL XL) 10 MG 24 hr tablet TAKE 1 TABLET (10 MG) BY MOUTH 2 TIMES DAILY 180 tablet 1     latanoprost (XALATAN) 0.005 % ophthalmic solution Place 1 drop Into the left eye daily       loperamide (IMODIUM A-D) 2 MG tablet daily  30 tablet 0     metFORMIN (GLUCOPHAGE) 500 MG tablet TAKE 1 TABLET BY MOUTH TWICE A DAY WITH MEALS 180 tablet 1     metoprolol tartrate (LOPRESSOR) 25 MG tablet Take 1 tablet (25 mg) by mouth 2 times daily 180 tablet 0     multivitamin, therapeutic (THERA-VIT) TABS tablet Take 0.5 tablets by mouth 2 times daily       ONETOUCH ULTRA test strip USE TO TEST DAILY 50 strip 11     pioglitazone (ACTOS) 45 MG tablet TAKE 1 TABLET BY MOUTH EVERY DAY 90 tablet 1     warfarin ANTICOAGULANT (COUMADIN) 2.5 MG tablet TAKE 1 TABLET DAILY EXCEPT 1.5 TABLETS ON FRIDAY OR AS DIRECTED BY INR CLINIC 104 tablet 0     ACE NOT PRESCRIBED, INTENTIONAL, 1 each daily ACE Inhibitor not prescribed due to Risk for drug interaction (Patient not taking: Reported on 9/16/2020) 0 each 0     ASPIRIN NOT PRESCRIBED, INTENTIONAL, by Other route continuous prn Reported on 3/7/2017       STATIN NOT PRESCRIBED, INTENTIONAL, 1 each At Bedtime Statin not prescribed intentionally due to Risk for drug interaction (Patient not taking: Reported on 9/16/2020) 0 each 0     No  facility-administered encounter medications on file as of 9/16/2020.        Again, thank you for allowing me to participate in the care of your patient.      Sincerely,    Darling Pacheco MD     [facility]

## 2020-09-16 NOTE — PROGRESS NOTES
"Service Date: 09/16/2020      HISTORY OF PRESENT ILLNESS:    I had the pleasure of seeing Mr. Louis Keller in follow-up of atrial arrhythmia and near-syncope.  He is a delightful 88-year-old gentleman with the following medical issues:   1.  Atrial fibrillation.  This used to be persistent, but more recently became paroxysmal after initiation of amiodarone, see details below.  [id number] of 3.  On warfarin.   2.  Near-syncope while sitting in 04/2019.  Arrhythmic etiology suspected.  ILR implanted in 08/2019.   3.  Diabetes mellitus type 2.   4.  Dyslipidemia.    5.  Mild aortic root dilatation.      I last saw Mr. Keller in 08/2019 when we implanted a loop recorder because of a concerning syncopal event that he had in 04/2019.      In the fall of 2019, it was decided to start amiodarone because of periods of AF with rapid ventricular response.  Today, the patient does not remember being symptomatic from his AF, although prior notes suggest he may have had mild symptoms.  The initial dose was 100 mg daily and it was later increased to 200 mg daily.      According to his loop recorder, after starting amiodarone the patient has had periods of sinus rhythm.  In fact, he was recently more in sinus rhythm than atrial fibrillation (AF burden 21% between May and August 2020).      In coming in today, Louis confirms that he has not had syncope or near-syncope since 04/2019.  He seems unaware of the atrial fibrillation.      I was asked to comment about \"breakthrough\" atrial fibrillation despite amiodarone 200 mg daily.      The patient has no chest pain, unusual dyspnea, orthopnea, PND or other cardiac symptoms.        PHYSICAL EXAMINATION:   VITAL SIGNS:  Blood pressure initially 160/80 with subsequent measurement of 136/78, pulse 70 and irregular, weight 81 kg, height 180 cm.   GENERAL:  He is a very pleasant gentleman who is alert, oriented and mentally sharp.   HEENT:  Normocephalic.   NECK:  Supple without " "carotid bruits or jugular venous distention.   LUNGS:  Mildly decreased breath sounds but clear throughout without crackles or wheezes.   CARDIOVASCULAR:  Irregular rhythm without gallop, murmur or rub.   ABDOMEN:  Soft, nontender.   EXTREMITIES:  No edema.      DIAGNOSTIC STUDIES:  His 12-lead ECG today showed atrial fibrillation with controlled ventricular rate.        IMPRESSION:   1.  Near-syncope in 2019.  The patient has not had a recurrence.  With regard to the etiology of this event we have identified potential \"smoking guns\" which include episodes of NSVT (these have resolved on amiodarone) as well as intermittent bradycardia during atrial fibrillation.  Nothing conclusive, however.  We will continue to monitor his loop recorder.   2.  Atrial fibrillation and use of amiodarone.   It does not appear that the patient has symptoms related to atrial fibrillation.  LVEF has been normal, i.e. no evidence of tachycardia-induced cardiomyopathy from rapid AF.  Of note, the medication may have converted his previously persistent atrial fibrillation to paroxysmal.      RECOMMENDATIONS:   1.  Since Dr. Gleason started the amiodarone, I will defer to him the decision whether to continue or not.   2.  Routine Device clinic visits for ILR interrogations.      I appreciate the opportunity to be part of his care.         LOLLY BARRETO MD, [facility]           cc:      Joselito Armas MD   [facility]   [address]   [place] [address]       BRAYAN Rice    [address], [address] [address]          D: 2020   T: 2020   MT: MODE      Name:     KATHY LOPEZ   MRN:      [id number]        Account:      [id number]   :      1932           Service Date: 2020      Document: [id number]      "

## 2020-09-16 NOTE — PROGRESS NOTES
HPI and Plan:   See dictation [id number]    Orders Placed This Encounter   Procedures     EKG 12-lead complete w/read - Clinics (performed today)       No orders of the defined types were placed in this encounter.      There are no discontinued medications.      Encounter Diagnosis   Name Primary?     Paroxysmal atrial fibrillation (H)        CURRENT MEDICATIONS:  Current Outpatient Medications   Medication Sig Dispense Refill     Acetaminophen (TYLENOL PO) Take 1,000 mg by mouth 3 times daily       amiodarone (PACERONE) 200 MG tablet Take 1 tablet (200 mg) by mouth daily 90 tablet 0     cholestyramine (QUESTRAN) 4 g packet TAKE 1 PACKET (4 G) BY MOUTH 2 TIMES DAILY (WITH MEALS) 180 packet 2     ferrous gluconate (FERGON) 324 (38 Fe) MG tablet TAKE 1 TABLET (324 MG) BY MOUTH DAILY (WITH BREAKFAST) 100 tablet 1     furosemide (LASIX) 20 MG tablet Take 1 tablet (20 mg) by mouth every 3 days 90 tablet 0     glipiZIDE (GLUCOTROL XL) 10 MG 24 hr tablet TAKE 1 TABLET (10 MG) BY MOUTH 2 TIMES DAILY 180 tablet 1     latanoprost (XALATAN) 0.005 % ophthalmic solution Place 1 drop Into the left eye daily       loperamide (IMODIUM A-D) 2 MG tablet daily  30 tablet 0     metFORMIN (GLUCOPHAGE) 500 MG tablet TAKE 1 TABLET BY MOUTH TWICE A DAY WITH MEALS 180 tablet 1     metoprolol tartrate (LOPRESSOR) 25 MG tablet Take 1 tablet (25 mg) by mouth 2 times daily 180 tablet 0     multivitamin, therapeutic (THERA-VIT) TABS tablet Take 0.5 tablets by mouth 2 times daily       ONETOUCH ULTRA test strip USE TO TEST DAILY 50 strip 11     pioglitazone (ACTOS) 45 MG tablet TAKE 1 TABLET BY MOUTH EVERY DAY 90 tablet 1     warfarin ANTICOAGULANT (COUMADIN) 2.5 MG tablet TAKE 1 TABLET DAILY EXCEPT 1.5 TABLETS ON FRIDAY OR AS DIRECTED BY INR CLINIC 104 tablet 0     ACE NOT PRESCRIBED, INTENTIONAL, 1 each daily ACE Inhibitor not prescribed due to Risk for drug interaction (Patient not taking: Reported on 9/16/2020) 0 each 0     ASPIRIN NOT  PRESCRIBED, INTENTIONAL, by Other route continuous prn Reported on 3/7/2017       STATIN NOT PRESCRIBED, INTENTIONAL, 1 each At Bedtime Statin not prescribed intentionally due to Risk for drug interaction (Patient not taking: Reported on 2020) 0 each 0       ALLERGIES     Allergies   Allergen Reactions     No Known Drug Allergies        PAST MEDICAL HISTORY:  Past Medical History:   Diagnosis Date     Antiplatelet or antithrombotic long-term use      Atrial fibrillation (H)      Diarrhea      Diastolic murmur      QUESADA (dyspnea on exertion)      Gout      Hemorrhage of gastrointestinal tract, unspecified 63,65,and 1971    hospitalized     History of blood transfusion      Hyperlipidemia LDL goal <100 2012     Long-term (current) use of anticoagulants [Z79.01] 3/31/2016     Mumps      Palpitations      Physical deconditioning 2013     Scarlet fever      Spider veins      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     Unspecified disease of pancreas     pancreatitis       PAST SURGICAL HISTORY:  Past Surgical History:   Procedure Laterality Date     ARTHROPLASTY KNEE  2013    Procedure: ARTHROPLASTY KNEE;  Left Total Knee Arthroplasty       C NONSPECIFIC PROCEDURE  Child    T&A     C NONSPECIFIC PROCEDURE  ; also     Colonoscopy     C NONSPECIFIC PROCEDURE      Basal cell cancer of the nose     C NONSPECIFIC PROCEDURE      Cholecystectomy     CARDIOVERSION  11    failed     EP LOOP RECORDER IMPLANT N/A 2019    Procedure: EP Loop Recorder Implant;  Surgeon: Darling Pacheco MD;  Location:  [facility]       FAMILY HISTORY:  Family History   Problem Relation Age of Onset     Alzheimer Disease Maternal Grandmother      Arthritis Maternal Grandmother      Cancer Mother         breast/ 1983/colon     Eye Disorder Mother         glaucoma and cataracts     Heart Disease Mother         angina/CABG     Hypertension Mother      Cancer  Father         colon     Diabetes Maternal Aunt         AoDM     No Known Problems Daughter        SOCIAL HISTORY:  Social History     Socioeconomic History     Marital status:      Spouse name: None     Number of children: None     Years of education: None     Highest education level: None   Occupational History     Occupation: Lab tech     Comment: Semi-retired   Social Needs     Financial resource strain: None     Food insecurity     Worry: None     Inability: None     Transportation needs     Medical: None     Non-medical: None   Tobacco Use     Smoking status: Never Smoker     Smokeless tobacco: Never Used   Substance and Sexual Activity     Alcohol use: Yes     Alcohol/week: 0.0 standard drinks     Comment: wine - currently rare     Drug use: No     Sexual activity: Never     Partners: Female   Lifestyle     Physical activity     Days per week: None     Minutes per session: None     Stress: None   Relationships     Social connections     Talks on phone: None     Gets together: None     Attends Buddhism service: None     Active member of club or organization: None     Attends meetings of clubs or organizations: None     Relationship status: None     Intimate partner violence     Fear of current or ex partner: None     Emotionally abused: None     Physically abused: None     Forced sexual activity: None   Other Topics Concern      Service Not Asked     Blood Transfusions Not Asked     Caffeine Concern No     Comment: 14 oz per day     Occupational Exposure Not Asked     Hobby Hazards Not Asked     Sleep Concern No     Comment: sometimes - nocturia 4 times per night     Stress Concern Not Asked     Weight Concern Not Asked     Special Diet No     Back Care Not Asked     Exercise Yes     Comment: treadmill/walking 15-20 minutes 6 days per week     Bike Helmet Not Asked     Seat Belt Not Asked     Self-Exams Not Asked     Parent/sibling w/ CABG, MI or angioplasty before 65F 55M? Not Asked   Social  History Narrative     None       Review of Systems:  Skin:  Positive for bruising basal cell; sore.redness on right ankle   Eyes:  Positive for glasses;tearing    ENT:  Positive for hearing loss runny nose  Respiratory:  Positive for dyspnea on exertion;shortness of breath     Cardiovascular:    fatigue;Positive for    Gastroenterology: Negative diarrhea off and on  Genitourinary:  Positive for nocturia;urinary frequency 3x per night  Musculoskeletal:  Positive for arthritis;joint pain;foot pain of the right knee occ; left knee replacement 2013; hammer toes in left foot - no pain; pain in right foot; balance issues  Neurologic:  Negative numbness or tingling of feet occas  Psychiatric:  Positive for sleep disturbances nocturia - trouble falling back to sleep afterwards  Heme/Lymph/Imm:  Positive for easy bruising hx of anemic  Endocrine:  Positive for diabetes          CC  Marsha Cedeno, APRN CNP  [address]  [place] [id number]

## 2020-09-17 ENCOUNTER — TELEPHONE (OUTPATIENT)
Dept: CARDIOLOGY | Facility: CLINIC | Age: 85
End: 2020-09-17

## 2020-09-17 NOTE — TELEPHONE ENCOUNTER
"VM received from patient, stating that he saw Dr. Barreto yesterday and was instructed that he could stop taking his amiodarone. Patient states, however, that since Dr. Gleason was the first one who prescribed the medication, he was instructed to get Dr. Gleason's input on discontinuing the medication.     Last OV 9/16/20 with Dr. Barreto:    IMPRESSION:   1.  Near-syncope in 04/2019.  The patient has not had a recurrence.  With regard to the etiology of this event we have identified potential \"smoking guns\" which include episodes of NSVT (these have resolved on amiodarone) as well as intermittent bradycardia during atrial fibrillation.  Nothing conclusive, however.  We will continue to monitor his loop recorder.   2.  Atrial fibrillation and use of amiodarone.   It does not appear that the patient has symptoms related to atrial fibrillation.  LVEF has been normal, i.e. no evidence of tachycardia-induced cardiomyopathy from rapid AF.  Of note, the medication may have converted his previously persistent atrial fibrillation to paroxysmal.      RECOMMENDATIONS:   1.  Since Dr. Gleason started the amiodarone, I will defer to him the decision whether to continue or not.   2.  Routine Device clinic visits for ILR interrogations.      I appreciate the opportunity to be part of his care.         LOLLY BARRETO MD, [facility]    Will route to Dr. Gleason for review.   "

## 2020-09-28 ENCOUNTER — DOCUMENTATION ONLY (OUTPATIENT)
Dept: CARDIOLOGY | Facility: CLINIC | Age: 85
End: 2020-09-28

## 2020-09-28 NOTE — PROGRESS NOTES
Loop remote alert received for tachy episode.  EGM shows irregular VS rhythm for AFib with RVR lasting 41 seconds with an average v-rate of 154.  Total AT/AF burden is 26.1%.  Pt has history of AF and is on Amiodarone, Metoprolol, and Warfarin.  Overall v-rates controlled.  Will continue to monitor.      CAMILLE Jack

## 2020-09-30 ENCOUNTER — ANTICOAGULATION THERAPY VISIT (OUTPATIENT)
Dept: ANTICOAGULATION | Facility: CLINIC | Age: 85
End: 2020-09-30

## 2020-09-30 DIAGNOSIS — Z79.01 LONG TERM CURRENT USE OF ANTICOAGULANT THERAPY: ICD-10-CM

## 2020-09-30 DIAGNOSIS — I48.0 PAROXYSMAL ATRIAL FIBRILLATION (H): ICD-10-CM

## 2020-09-30 DIAGNOSIS — I48.91 ATRIAL FIBRILLATION (H): ICD-10-CM

## 2020-09-30 DIAGNOSIS — Z79.01 LONG TERM CURRENT USE OF ANTICOAGULANTS WITH INR GOAL OF 2.0-3.0: ICD-10-CM

## 2020-09-30 LAB
CAPILLARY BLOOD COLLECTION: NORMAL
INR PPP: 3.5 (ref 0.86–1.14)

## 2020-09-30 PROCEDURE — 99207 ZZC NO CHARGE NURSE ONLY: CPT | Performed by: INTERNAL MEDICINE

## 2020-09-30 PROCEDURE — 36416 COLLJ CAPILLARY BLOOD SPEC: CPT | Performed by: INTERNAL MEDICINE

## 2020-09-30 PROCEDURE — 85610 PROTHROMBIN TIME: CPT | Performed by: INTERNAL MEDICINE

## 2020-09-30 NOTE — PROGRESS NOTES
ANTICOAGULATION FOLLOW-UP CLINIC VISIT    Patient Name:  Louis Keller Jr.  Date:  9/30/2020  Contact Type:  Telephone/ discussed with patient    SUBJECTIVE:  Patient Findings     Positives:   Change in diet/appetite (Patient's wife has been in and out of the hospital for the past 2 weeks.  He has not been eating as many green veggies since she normally cooks the meals.  His wife is hospitalized now and he is not sure when he will be able to resume his usual diet. )    Comments:   Patient preferred to try to lower maintenance dose to help lower INR since he is not sure when he will resume eating more greens.  Lowered maintenance dose by 6.7%.  The patient was assessed for medication, and activity level changes, missed or extra doses, bruising or bleeding, with no problem findings.          Clinical Outcomes     Negatives:   Major bleeding event, Thromboembolic event, Anticoagulation-related hospital admission, Anticoagulation-related ED visit, Anticoagulation-related fatality    Comments:   Patient preferred to try to lower maintenance dose to help lower INR since he is not sure when he will resume eating more greens.  Lowered maintenance dose by 6.7%.  The patient was assessed for medication, and activity level changes, missed or extra doses, bruising or bleeding, with no problem findings.             OBJECTIVE    Recent labs: (last 7 days)     09/30/20  1058   INR 3.50*       ASSESSMENT / PLAN  INR assessment SUPRA    Recheck INR In: 2 WEEKS    INR Location Clinic      Anticoagulation Summary  As of 9/30/2020    INR goal:   2.0-3.0   TTR:   67.3 % (1 y)   INR used for dosing:   3.50! (9/30/2020)   Warfarin maintenance plan:   2.5 mg (2.5 mg x 1) every day   Full warfarin instructions:   9/30: Hold; Otherwise 2.5 mg every day   Weekly warfarin total:   17.5 mg   Plan last modified:   Tata Alexis RN (9/30/2020)   Next INR check:   10/14/2020   Priority:   Maintenance   Target end date:   Indefinite     Indications    Long-term (current) use of anticoagulants [Z79.01] [Z79.01]  Atrial fibrillation (H) [I48.91]  Paroxysmal atrial fibrillation (H) [I48.0]  Long term current use of anticoagulants with INR goal of 2.0-3.0 [Z79.01]             Anticoagulation Episode Summary     INR check location:       Preferred lab:       Send INR reminders to:   [facility]    Comments:           Anticoagulation Care Providers     Provider Role Specialty Phone number    Joselito Armas MD Referring Internal Medicine [phone]            See the Encounter Report to view Anticoagulation Flowsheet and Dosing Calendar (Go to Encounters tab in chart review, and find the Anticoagulation Therapy Visit)    Dosage adjustment made based on physician directed care plan.    Tata Alexis RN

## 2020-10-14 ENCOUNTER — ANTICOAGULATION THERAPY VISIT (OUTPATIENT)
Dept: ANTICOAGULATION | Facility: CLINIC | Age: 85
End: 2020-10-14

## 2020-10-14 DIAGNOSIS — I48.91 ATRIAL FIBRILLATION (H): ICD-10-CM

## 2020-10-14 DIAGNOSIS — Z79.01 LONG TERM CURRENT USE OF ANTICOAGULANT THERAPY: ICD-10-CM

## 2020-10-14 DIAGNOSIS — I48.0 PAROXYSMAL ATRIAL FIBRILLATION (H): ICD-10-CM

## 2020-10-14 DIAGNOSIS — Z79.01 LONG TERM CURRENT USE OF ANTICOAGULANTS WITH INR GOAL OF 2.0-3.0: ICD-10-CM

## 2020-10-14 LAB
CAPILLARY BLOOD COLLECTION: NORMAL
INR PPP: 2.7 (ref 0.86–1.14)

## 2020-10-14 PROCEDURE — 36416 COLLJ CAPILLARY BLOOD SPEC: CPT | Performed by: INTERNAL MEDICINE

## 2020-10-14 PROCEDURE — 85610 PROTHROMBIN TIME: CPT | Performed by: INTERNAL MEDICINE

## 2020-10-14 PROCEDURE — 99207 PR NO CHARGE NURSE ONLY: CPT

## 2020-10-14 NOTE — PROGRESS NOTES
ANTICOAGULATION FOLLOW-UP CLINIC VISIT    Patient Name:  Louis Keller Jr.  Date:  10/14/2020  Contact Type:  Telephone/ discussed with patient    SUBJECTIVE:  Patient Findings     Comments:  The patient was assessed for diet, medication, and activity level changes, missed or extra doses, bruising or bleeding, with no problem findings.          Clinical Outcomes     Negatives:  Major bleeding event, Thromboembolic event, Anticoagulation-related hospital admission, Anticoagulation-related ED visit, Anticoagulation-related fatality    Comments:  The patient was assessed for diet, medication, and activity level changes, missed or extra doses, bruising or bleeding, with no problem findings.             OBJECTIVE    Recent labs: (last 7 days)     10/14/20  1112   INR 2.70*       ASSESSMENT / PLAN  INR assessment THER    Recheck INR In: 3 WEEKS    INR Location Clinic      Anticoagulation Summary  As of 10/14/2020    INR goal:  2.0-3.0   TTR:  67.5 % (1 y)   INR used for dosin.70 (10/14/2020)   Warfarin maintenance plan:  2.5 mg (2.5 mg x 1) every day   Full warfarin instructions:  2.5 mg every day   Weekly warfarin total:  17.5 mg   Plan last modified:  Tata Alexis, RN (2020)   Next INR check:  2020   Priority:  Maintenance   Target end date:  Indefinite    Indications    Long-term (current) use of anticoagulants [Z79.01] [Z79.01]  Atrial fibrillation (H) [I48.91]  Paroxysmal atrial fibrillation (H) [I48.0]  Long term current use of anticoagulants with INR goal of 2.0-3.0 [Z79.01]             Anticoagulation Episode Summary     INR check location:      Preferred lab:      Send INR reminders to:  [facility]    Comments:          Anticoagulation Care Providers     Provider Role Specialty Phone number    Joselito Armas MD Referring Internal Medicine [phone]            See the Encounter Report to view Anticoagulation Flowsheet and Dosing Calendar (Go to Encounters tab in chart review,  and find the Anticoagulation Therapy Visit)    Dosage adjustment made based on physician directed care plan.    aTta Alexis RN

## 2020-10-19 ENCOUNTER — DOCUMENTATION ONLY (OUTPATIENT)
Dept: CARDIOLOGY | Facility: CLINIC | Age: 85
End: 2020-10-19

## 2020-10-19 NOTE — PROGRESS NOTES
Alert received for tachy episode. EGM is suggestive of Afib with RVR and a short moment of oversensing noise. Episode logged for 4m41 seconds with average V rates in the 150's. Pt has history of AF and is on Amiodarone, Metoprolol, and Warfarin.  Overall v-rates controlled.

## 2020-11-04 ENCOUNTER — ANTICOAGULATION THERAPY VISIT (OUTPATIENT)
Dept: ANTICOAGULATION | Facility: CLINIC | Age: 85
End: 2020-11-04

## 2020-11-04 DIAGNOSIS — I48.0 PAROXYSMAL ATRIAL FIBRILLATION (H): ICD-10-CM

## 2020-11-04 DIAGNOSIS — Z98.890 HISTORY OF LOOP RECORDER: ICD-10-CM

## 2020-11-04 DIAGNOSIS — Z79.01 LONG TERM CURRENT USE OF ANTICOAGULANT THERAPY: ICD-10-CM

## 2020-11-04 DIAGNOSIS — Z79.01 LONG TERM CURRENT USE OF ANTICOAGULANTS WITH INR GOAL OF 2.0-3.0: ICD-10-CM

## 2020-11-04 LAB
CAPILLARY BLOOD COLLECTION: NORMAL
INR PPP: 2.7 (ref 0.86–1.14)

## 2020-11-04 PROCEDURE — 99207 PR NO CHARGE NURSE ONLY: CPT | Performed by: INTERNAL MEDICINE

## 2020-11-04 PROCEDURE — 85610 PROTHROMBIN TIME: CPT | Performed by: INTERNAL MEDICINE

## 2020-11-04 PROCEDURE — 36416 COLLJ CAPILLARY BLOOD SPEC: CPT | Performed by: INTERNAL MEDICINE

## 2020-11-04 NOTE — PROGRESS NOTES
ANTICOAGULATION FOLLOW-UP CLINIC VISIT    Patient Name:  Louis Keller Jr.  Date:  2020  Contact Type:  Telephone/ Called patient, he denies any changes. Orders discussed with the patient, he agrees with the plan. Lab INR appointment scheduled on 20.    SUBJECTIVE:  Patient Findings     Positives:  Bruising (Patient reports bruising left arm from a fall, bruise is healing. )    Comments:  The patient was assessed for diet, medication, and activity level changes, missed or extra doses, bruising or bleeding, with no problem findings. Maintenance warfarin dosing was reviewed with patient and will remain on the same dose until next INR check. Patient did not have any questions or concerns.           Clinical Outcomes     Comments:  The patient was assessed for diet, medication, and activity level changes, missed or extra doses, bruising or bleeding, with no problem findings. Maintenance warfarin dosing was reviewed with patient and will remain on the same dose until next INR check. Patient did not have any questions or concerns.              OBJECTIVE    Recent labs: (last 7 days)     20  1148   INR 2.70*       ASSESSMENT / PLAN  INR assessment THER    Recheck INR In: 4 WEEKS    INR Location Outside lab      Anticoagulation Summary  As of 2020    INR goal:  2.0-3.0   TTR:  71.6 % (1 y)   INR used for dosin.70 (2020)   Warfarin maintenance plan:  2.5 mg (2.5 mg x 1) every day   Full warfarin instructions:  2.5 mg every day   Weekly warfarin total:  17.5 mg   No change documented:  Monica Souza RN   Plan last modified:  Tata Alexis RN (2020)   Next INR check:  2020   Priority:  Maintenance   Target end date:  Indefinite    Indications    Long-term (current) use of anticoagulants [Z79.01] [Z79.01]  Atrial fibrillation (H) [I48.91]  Paroxysmal atrial fibrillation (H) [I48.0]  Long term current use of anticoagulants with INR goal of 2.0-3.0 [Z79.01]              Anticoagulation Episode Summary     INR check location:      Preferred lab:      Send INR reminders to:  [facility]    Comments:          Anticoagulation Care Providers     Provider Role Specialty Phone number    Joselito Armas MD Referring Internal Medicine [phone]            See the Encounter Report to view Anticoagulation Flowsheet and Dosing Calendar (Go to Encounters tab in chart review, and find the Anticoagulation Therapy Visit)    Dosage adjustment made based on physician directed care plan.    Monica Souza RN

## 2020-11-12 ENCOUNTER — TRANSFERRED RECORDS (OUTPATIENT)
Dept: HEALTH INFORMATION MANAGEMENT | Facility: CLINIC | Age: 85
End: 2020-11-12

## 2020-11-16 DIAGNOSIS — I48.0 PAROXYSMAL ATRIAL FIBRILLATION (H): ICD-10-CM

## 2020-11-16 DIAGNOSIS — Z79.01 LONG TERM CURRENT USE OF ANTICOAGULANTS WITH INR GOAL OF 2.0-3.0: ICD-10-CM

## 2020-11-17 ENCOUNTER — ANCILLARY PROCEDURE (OUTPATIENT)
Dept: CARDIOLOGY | Facility: CLINIC | Age: 85
End: 2020-11-17
Attending: INTERNAL MEDICINE
Payer: COMMERCIAL

## 2020-11-17 DIAGNOSIS — R55 NEAR SYNCOPE: Primary | ICD-10-CM

## 2020-11-17 DIAGNOSIS — Z45.09 ENCOUNTER FOR LOOP RECORDER CHECK: ICD-10-CM

## 2020-11-17 PROCEDURE — G2066 INTER DEVC REMOTE 30D: HCPCS | Performed by: INTERNAL MEDICINE

## 2020-11-17 PROCEDURE — 93297 REM INTERROG DEV EVAL ICPMS: CPT | Performed by: INTERNAL MEDICINE

## 2020-11-17 NOTE — TELEPHONE ENCOUNTER
Pending Prescriptions:                       Disp   Refills    warfarin ANTICOAGULANT (COUMADIN) 2.5 MG t*90 tab*1        Sig: TAKE 1 TABLET DAILY EXCEPT 1.5 TABLETS ON FRIDAY OR           AS DIRECTED BY INR CLINIC

## 2020-11-18 LAB
MDC_IDC_MSMT_BATTERY_STATUS: NORMAL
MDC_IDC_PG_IMPLANT_DTM: NORMAL
MDC_IDC_PG_MFG: NORMAL
MDC_IDC_PG_MODEL: NORMAL
MDC_IDC_PG_SERIAL: NORMAL
MDC_IDC_PG_TYPE: NORMAL
MDC_IDC_SESS_CLINIC_NAME: NORMAL
MDC_IDC_SESS_DTM: NORMAL
MDC_IDC_SESS_TYPE: NORMAL
MDC_IDC_SET_ZONE_DETECTION_INTERVAL: 2000 MS
MDC_IDC_SET_ZONE_DETECTION_INTERVAL: 3000 MS
MDC_IDC_SET_ZONE_DETECTION_INTERVAL: 420 MS
MDC_IDC_SET_ZONE_TYPE: NORMAL
MDC_IDC_STAT_AT_BURDEN_PERCENT: 24.7 %
MDC_IDC_STAT_AT_DTM_END: NORMAL
MDC_IDC_STAT_AT_DTM_START: NORMAL
MDC_IDC_STAT_EPISODE_RECENT_COUNT: 0
MDC_IDC_STAT_EPISODE_RECENT_COUNT: 3
MDC_IDC_STAT_EPISODE_RECENT_COUNT: 5
MDC_IDC_STAT_EPISODE_RECENT_COUNT_DTM_END: NORMAL
MDC_IDC_STAT_EPISODE_RECENT_COUNT_DTM_START: NORMAL
MDC_IDC_STAT_EPISODE_TOTAL_COUNT: 0
MDC_IDC_STAT_EPISODE_TOTAL_COUNT: 1
MDC_IDC_STAT_EPISODE_TOTAL_COUNT: 358
MDC_IDC_STAT_EPISODE_TOTAL_COUNT: 59
MDC_IDC_STAT_EPISODE_TOTAL_COUNT_DTM_END: NORMAL
MDC_IDC_STAT_EPISODE_TOTAL_COUNT_DTM_START: NORMAL
MDC_IDC_STAT_EPISODE_TYPE: NORMAL

## 2020-11-18 RX ORDER — WARFARIN SODIUM 2.5 MG/1
TABLET ORAL
Qty: 90 TABLET | Refills: 1 | Status: SHIPPED | OUTPATIENT
Start: 2020-11-18 | End: 2021-05-28

## 2020-11-18 NOTE — TELEPHONE ENCOUNTER
"Requested Prescriptions   Pending Prescriptions Disp Refills     warfarin ANTICOAGULANT (COUMADIN) 2.5 MG tablet [Pharmacy Med Name: WARFARIN SODIUM 2.5 MG TABLET] 90 tablet 1     Sig: TAKE 1 TABLET DAILY EXCEPT 1.5 TABLETS ON FRIDAY OR AS DIRECTED BY INR CLINIC       Vitamin K Antagonists Failed - 11/16/2020 12:16 PM        Failed - INR is within goal in the past 6 weeks     Confirm INR is within goal in the past 6 weeks.     Recent Labs   Lab Test 11/04/20  1148   INR 2.70*                       Passed - Recent (12 mo) or future (30 days) visit within the authorizing provider's specialty     Patient has had an office visit with the authorizing provider or a provider within the authorizing providers department within the previous 12 mos or has a future within next 30 days. See \"Patient Info\" tab in inbasket, or \"Choose Columns\" in Meds & Orders section of the refill encounter.              Passed - Medication is active on med list        Passed - Patient is 18 years of age or older           Last office visit 9/2/20.  Warfarin dosing is managed by INR Clinic.  Prescription approved per [facility] Refill Protocol.  Tata Alexis RN    "

## 2020-12-02 ENCOUNTER — ANTICOAGULATION THERAPY VISIT (OUTPATIENT)
Dept: ANTICOAGULATION | Facility: CLINIC | Age: 85
End: 2020-12-02

## 2020-12-02 DIAGNOSIS — Z79.01 LONG TERM CURRENT USE OF ANTICOAGULANT THERAPY: ICD-10-CM

## 2020-12-02 DIAGNOSIS — I48.0 PAROXYSMAL ATRIAL FIBRILLATION (H): ICD-10-CM

## 2020-12-02 DIAGNOSIS — Z79.01 LONG TERM CURRENT USE OF ANTICOAGULANTS WITH INR GOAL OF 2.0-3.0: ICD-10-CM

## 2020-12-02 LAB
CAPILLARY BLOOD COLLECTION: NORMAL
INR PPP: 2.4 (ref 0.86–1.14)

## 2020-12-02 PROCEDURE — 99207 PR NO CHARGE NURSE ONLY: CPT

## 2020-12-02 PROCEDURE — 85610 PROTHROMBIN TIME: CPT | Performed by: INTERNAL MEDICINE

## 2020-12-02 PROCEDURE — 36416 COLLJ CAPILLARY BLOOD SPEC: CPT | Performed by: INTERNAL MEDICINE

## 2020-12-02 NOTE — PROGRESS NOTES
ANTICOAGULATION FOLLOW-UP CLINIC VISIT    Patient Name:  Louis Keller Jr.  Date:  2020  Contact Type:  Telephone/ Called patient, he denies any changes. Orders discussed with the patient, he agrees with the plan. Lab INR appointment scheduled on 20.    SUBJECTIVE:  Patient Findings     Comments:  The patient was assessed for diet, medication, and activity level changes, missed or extra doses, bruising or bleeding, with no problem findings. Maintenance warfarin dosing was reviewed with patient and will remain on the same dose until next INR check. Patient did not have any questions or concerns.           Clinical Outcomes     Negatives:  Major bleeding event, Thromboembolic event, Anticoagulation-related hospital admission, Anticoagulation-related ED visit, Anticoagulation-related fatality    Comments:  The patient was assessed for diet, medication, and activity level changes, missed or extra doses, bruising or bleeding, with no problem findings. Maintenance warfarin dosing was reviewed with patient and will remain on the same dose until next INR check. Patient did not have any questions or concerns.              OBJECTIVE    Recent labs: (last 7 days)     20  1152   INR 2.40*       ASSESSMENT / PLAN  INR assessment THER    Recheck INR In: 4 WEEKS    INR Location Outside lab      Anticoagulation Summary  As of 2020    INR goal:  2.0-3.0   TTR:  72.1 % (1 y)   INR used for dosin.40 (2020)   Warfarin maintenance plan:  2.5 mg (2.5 mg x 1) every day   Full warfarin instructions:  2.5 mg every day   Weekly warfarin total:  17.5 mg   No change documented:  Monica Souza RN   Plan last modified:  Tata Alexis RN (2020)   Next INR check:  2020   Priority:  Maintenance   Target end date:  Indefinite    Indications    Long-term (current) use of anticoagulants [Z79.01] [Z79.01]  Atrial fibrillation (H) [I48.91]  Paroxysmal atrial fibrillation (H) [I48.0]  Long term current  use of anticoagulants with INR goal of 2.0-3.0 [Z79.01]             Anticoagulation Episode Summary     INR check location:      Preferred lab:      Send INR reminders to:  [facility]    Comments:          Anticoagulation Care Providers     Provider Role Specialty Phone number    Joselito Armas MD Referring Internal Medicine [phone]            See the Encounter Report to view Anticoagulation Flowsheet and Dosing Calendar (Go to Encounters tab in chart review, and find the Anticoagulation Therapy Visit)    Dosage adjustment made based on physician directed care plan.    Monica Souza RN

## 2020-12-30 ENCOUNTER — ANTICOAGULATION THERAPY VISIT (OUTPATIENT)
Dept: ANTICOAGULATION | Facility: CLINIC | Age: 85
End: 2020-12-30

## 2020-12-30 DIAGNOSIS — Z79.01 LONG TERM CURRENT USE OF ANTICOAGULANT THERAPY: ICD-10-CM

## 2020-12-30 DIAGNOSIS — Z79.01 LONG TERM CURRENT USE OF ANTICOAGULANTS WITH INR GOAL OF 2.0-3.0: ICD-10-CM

## 2020-12-30 DIAGNOSIS — I48.0 PAROXYSMAL ATRIAL FIBRILLATION (H): ICD-10-CM

## 2020-12-30 DIAGNOSIS — I48.91 ATRIAL FIBRILLATION (H): ICD-10-CM

## 2020-12-30 LAB
CAPILLARY BLOOD COLLECTION: NORMAL
INR PPP: 2.3 (ref 0.86–1.14)

## 2020-12-30 PROCEDURE — 99207 PR NO CHARGE NURSE ONLY: CPT | Performed by: INTERNAL MEDICINE

## 2020-12-30 PROCEDURE — 85610 PROTHROMBIN TIME: CPT | Performed by: INTERNAL MEDICINE

## 2020-12-30 PROCEDURE — 36416 COLLJ CAPILLARY BLOOD SPEC: CPT | Performed by: INTERNAL MEDICINE

## 2020-12-30 NOTE — PROGRESS NOTES
ANTICOAGULATION FOLLOW-UP CLINIC VISIT    Patient Name:  Louis Keller Jr.  Date:  2020  Contact Type:  Telephone/ discussed with patient    SUBJECTIVE:  Patient Findings     Comments:  The patient was assessed for diet, medication, and activity level changes, missed or extra doses, bruising or bleeding, with no problem findings.          Clinical Outcomes     Negatives:  Major bleeding event, Thromboembolic event, Anticoagulation-related hospital admission, Anticoagulation-related ED visit, Anticoagulation-related fatality    Comments:  The patient was assessed for diet, medication, and activity level changes, missed or extra doses, bruising or bleeding, with no problem findings.             OBJECTIVE    Recent labs: (last 7 days)     20  1116   INR 2.30*       ASSESSMENT / PLAN  INR assessment THER    Recheck INR In: 6 WEEKS    INR Location Clinic      Anticoagulation Summary  As of 2020    INR goal:  2.0-3.0   TTR:  72.1 % (1 y)   INR used for dosin.30 (2020)   Warfarin maintenance plan:  2.5 mg (2.5 mg x 1) every day   Full warfarin instructions:  2.5 mg every day   Weekly warfarin total:  17.5 mg   No change documented:  Tata Alexis RN   Plan last modified:  Tata Alexis RN (2020)   Next INR check:  2/10/2021   Priority:  Maintenance   Target end date:  Indefinite    Indications    Long-term (current) use of anticoagulants [Z79.01] [Z79.01]  Atrial fibrillation (H) [I48.91]  Paroxysmal atrial fibrillation (H) [I48.0]  Long term current use of anticoagulants with INR goal of 2.0-3.0 [Z79.01]             Anticoagulation Episode Summary     INR check location:      Preferred lab:      Send INR reminders to:  [employer] [facility]    Comments:          Anticoagulation Care Providers     Provider Role Specialty Phone number    Joselito Armas MD Referring Internal Medicine [phone]            See the Encounter Report to view Anticoagulation Flowsheet and Dosing  Calendar (Go to Encounters tab in chart review, and find the Anticoagulation Therapy Visit)    Dosage adjustment made based on physician directed care plan.    Tata Alexis RN

## 2021-01-09 ENCOUNTER — HEALTH MAINTENANCE LETTER (OUTPATIENT)
Age: 86
End: 2021-01-09

## 2021-01-23 DIAGNOSIS — L57.0 ACTINIC KERATOSIS: ICD-10-CM

## 2021-01-23 DIAGNOSIS — I48.20 CHRONIC ATRIAL FIBRILLATION (H): ICD-10-CM

## 2021-01-24 DIAGNOSIS — D62 ANEMIA DUE TO BLOOD LOSS, ACUTE: ICD-10-CM

## 2021-01-25 ENCOUNTER — TELEPHONE (OUTPATIENT)
Dept: CARDIOLOGY | Facility: CLINIC | Age: 86
End: 2021-01-25

## 2021-01-25 DIAGNOSIS — R55 NEAR SYNCOPE: ICD-10-CM

## 2021-01-25 DIAGNOSIS — I48.20 CHRONIC A-FIB (H): ICD-10-CM

## 2021-01-25 DIAGNOSIS — I87.2 VENOUS (PERIPHERAL) INSUFFICIENCY: ICD-10-CM

## 2021-01-25 RX ORDER — AMIODARONE HYDROCHLORIDE 200 MG/1
200 TABLET ORAL DAILY
Qty: 90 TABLET | Refills: 2 | Status: SHIPPED | OUTPATIENT
Start: 2021-01-25 | End: 2021-10-18

## 2021-01-25 RX ORDER — GLIPIZIDE 10 MG/1
TABLET, FILM COATED, EXTENDED RELEASE ORAL
Qty: 180 TABLET | Refills: 0 | OUTPATIENT
Start: 2021-01-25

## 2021-01-25 RX ORDER — FERROUS GLUCONATE 324(38)MG
TABLET ORAL
Qty: 100 TABLET | Refills: 1 | Status: SHIPPED | OUTPATIENT
Start: 2021-01-25 | End: 2021-08-18

## 2021-01-25 NOTE — TELEPHONE ENCOUNTER
"Message received:    Sera Velazquez LPN  [facility] Heart Team 4             Pharmacy calling to refill amiodarone, per last visit note from DR Pacheco, he \"deferred\" the decision to stay on or off it to Dr Gleason-can you take a look at this for me-Pharmacy # [phone]   Thanks yasmine thomas      Team 4 spoke to patient in September about amiodarone in September. Patient opted to stay on it and Dr. Gleason was ok with that (see encounter 9/17/20). Rx refilled for amiodarone.   "

## 2021-01-25 NOTE — TELEPHONE ENCOUNTER
Pending Prescriptions:                       Disp   Refills    ferrous gluconate (FERGON) 324 (38 Fe) MG*100 ta*1            Sig: TAKE 1 TABLET (324 MG) BY MOUTH DAILY (WITH           BREAKFAST)    Prescription approved per [facility] Refill Protocol.

## 2021-01-25 NOTE — TELEPHONE ENCOUNTER
Requested too soon. See refill from 12/22/20   What Type Of Note Output Would You Prefer (Optional)?: Standard Output How Severe Is Your Acne?: mild Is This A New Presentation, Or A Follow-Up?: Follow Up Acne Additional Comments (Use Complete Sentences): Pt parent states the clindamycin/BPO combo states do not use if you have UC. Pt has UC and mom is worried if it is making it worse.

## 2021-01-31 ENCOUNTER — IMMUNIZATION (OUTPATIENT)
Dept: NURSING | Facility: CLINIC | Age: 86
End: 2021-01-31
Payer: COMMERCIAL

## 2021-01-31 PROCEDURE — 0001A PR COVID VAC PFIZER DIL RECON 30 MCG/0.3 ML IM: CPT

## 2021-01-31 PROCEDURE — 91300 PR COVID VAC PFIZER DIL RECON 30 MCG/0.3 ML IM: CPT

## 2021-02-05 DIAGNOSIS — I48.20 CHRONIC ATRIAL FIBRILLATION (H): ICD-10-CM

## 2021-02-05 DIAGNOSIS — L57.0 ACTINIC KERATOSIS: ICD-10-CM

## 2021-02-05 RX ORDER — GLIPIZIDE 10 MG/1
TABLET, FILM COATED, EXTENDED RELEASE ORAL
Qty: 180 TABLET | Refills: 0 | Status: SHIPPED | OUTPATIENT
Start: 2021-02-05 | End: 2021-05-27

## 2021-02-05 NOTE — TELEPHONE ENCOUNTER
"Requested Prescriptions   Pending Prescriptions Disp Refills     glipiZIDE (GLUCOTROL XL) 10 MG 24 hr tablet [Pharmacy Med Name: GLIPIZIDE ER 10 MG TABLET] 180 tablet 0     Sig: TAKE 1 TABLET BY MOUTH TWICE A DAY       Sulfonylurea Agents Passed - 2/5/2021 10:17 AM        Passed - Patient has documented A1c within the specified period of time.     If HgbA1C is 8 or greater, it needs to be on file within the past 3 months.  If less than 8, must be on file within the past 6 months.     Recent Labs   Lab Test 09/02/20  1504   A1C 6.4*             Passed - Medication is active on med list        Passed - Patient is age 18 or older        Passed - Patient has a recent creatinine (normal) within the past 12 mos.     Recent Labs   Lab Test 09/11/20  1007   CR 1.03       Ok to refill medication if creatinine is low          Passed - Recent (6 mo) or future (30 days) visit within the authorizing provider's specialty     Patient had office visit in the last 6 months or has a visit in the next 30 days with authorizing provider or within the authorizing provider's specialty.  See \"Patient Info\" tab in inbasket, or \"Choose Columns\" in Meds & Orders section of the refill encounter.                 "

## 2021-02-05 NOTE — TELEPHONE ENCOUNTER
Pending Prescriptions:                       Disp   Refills    glipiZIDE (GLUCOTROL XL) 10 MG 24 hr tabl*180 ta*0            Sig: TAKE 1 TABLET BY MOUTH TWICE A DAY    Medication is being filled for 1 time refill only due to:  patient has a future appointment    Next 5 appointments (look out 90 days)    Mar 08, 2021  1:40 PM  SHORT with Joselito Armas MD  [facility] ([facility] ) [address]  [facility] [id number] [phone]

## 2021-02-08 DIAGNOSIS — I48.20 CHRONIC A-FIB (H): Primary | ICD-10-CM

## 2021-02-08 DIAGNOSIS — I51.89 DIASTOLIC DYSFUNCTION: ICD-10-CM

## 2021-02-08 DIAGNOSIS — R60.0 BILATERAL LEG EDEMA: ICD-10-CM

## 2021-02-08 RX ORDER — METOPROLOL TARTRATE 25 MG/1
25 TABLET, FILM COATED ORAL 2 TIMES DAILY
Qty: 180 TABLET | Refills: 0 | Status: SHIPPED | OUTPATIENT
Start: 2021-02-08 | End: 2021-05-07

## 2021-02-08 NOTE — LETTER
February 8, 2021       TO: Louis Keller Jr.  [address] [address] [facility] [facility] [address]       Dear Louis Keller,    You have requested a medication refill and our records indicate that you are overdue for your annual office visit with Dr. Gleason.  We will refill your medication for 3 months, which will allow you enough time to be seen by one of our providers.    Please call our clinic at [phone] to schedule your appointment as soon as possible.    Thank you,    [facility]

## 2021-02-08 NOTE — TELEPHONE ENCOUNTER
Medication Refilled: Metoprolol   Last office visit: 9/16/20  Last Labs/EKG: n/a  Next office visit: Overdue for general cardiology follow-up. Was due to see Susanna in 6/2020 and Dr. Gleason in 9/2020. Entered order for follow-up w/ . Sent 90 day refill and sent letter to pt to schedule follow-up appt.   Pharmacy sent to: MARIJA Thomas RN

## 2021-02-10 ENCOUNTER — ANTICOAGULATION THERAPY VISIT (OUTPATIENT)
Dept: ANTICOAGULATION | Facility: CLINIC | Age: 86
End: 2021-02-10

## 2021-02-10 DIAGNOSIS — Z79.01 LONG TERM CURRENT USE OF ANTICOAGULANTS WITH INR GOAL OF 2.0-3.0: ICD-10-CM

## 2021-02-10 DIAGNOSIS — I48.0 PAROXYSMAL ATRIAL FIBRILLATION (H): ICD-10-CM

## 2021-02-10 DIAGNOSIS — Z79.01 LONG TERM CURRENT USE OF ANTICOAGULANT THERAPY: ICD-10-CM

## 2021-02-10 LAB
CAPILLARY BLOOD COLLECTION: NORMAL
INR PPP: 2.4 (ref 0.86–1.14)

## 2021-02-10 PROCEDURE — 99207 PR NO CHARGE NURSE ONLY: CPT

## 2021-02-10 PROCEDURE — 36416 COLLJ CAPILLARY BLOOD SPEC: CPT | Performed by: INTERNAL MEDICINE

## 2021-02-10 PROCEDURE — 85610 PROTHROMBIN TIME: CPT | Performed by: INTERNAL MEDICINE

## 2021-02-10 NOTE — PROGRESS NOTES
ANTICOAGULATION FOLLOW-UP CLINIC VISIT    Patient Name:  Louis Keller Jr.  Date:  2/10/2021  Contact Type:  Telephone/ Called patient, he denies any changes. Orders discussed with the patient, he agrees with the plan. Lab INR appointment scheduled on 3/24/21.    SUBJECTIVE:  Patient Findings     Comments:  The patient was assessed for diet, medication, and activity level changes, missed or extra doses, bruising or bleeding, with no problem findings. Maintenance warfarin dosing was reviewed with patient and will remain on the same dose until next INR check. Patient did not have any questions or concerns.           Clinical Outcomes     Negatives:  Major bleeding event, Thromboembolic event, Anticoagulation-related hospital admission, Anticoagulation-related ED visit, Anticoagulation-related fatality    Comments:  The patient was assessed for diet, medication, and activity level changes, missed or extra doses, bruising or bleeding, with no problem findings. Maintenance warfarin dosing was reviewed with patient and will remain on the same dose until next INR check. Patient did not have any questions or concerns.              OBJECTIVE    Recent labs: (last 7 days)     02/10/21  1118   INR 2.40*       ASSESSMENT / PLAN  INR assessment THER    Recheck INR In: 6 WEEKS    INR Location Outside lab      Anticoagulation Summary  As of 2/10/2021    INR goal:  2.0-3.0   TTR:  72.1 % (1 y)   INR used for dosin.40 (2/10/2021)   Warfarin maintenance plan:  2.5 mg (2.5 mg x 1) every day   Full warfarin instructions:  2.5 mg every day   Weekly warfarin total:  17.5 mg   No change documented:  Monica Souza RN   Plan last modified:  Tata Alexis RN (2020)   Next INR check:  3/24/2021   Priority:  Maintenance   Target end date:  Indefinite    Indications    Long-term (current) use of anticoagulants [Z79.01] [Z79.01]  Atrial fibrillation (H) [I48.91]  Paroxysmal atrial fibrillation (H) [I48.0]  Long term current use  of anticoagulants with INR goal of 2.0-3.0 [Z79.01]             Anticoagulation Episode Summary     INR check location:      Preferred lab:      Send INR reminders to:  [facility]    Comments:          Anticoagulation Care Providers     Provider Role Specialty Phone number    Joselito Armas MD Referring Internal Medicine [phone]            See the Encounter Report to view Anticoagulation Flowsheet and Dosing Calendar (Go to Encounters tab in chart review, and find the Anticoagulation Therapy Visit)    Dosage adjustment made based on physician directed care plan.    Monica Souza RN

## 2021-02-21 ENCOUNTER — IMMUNIZATION (OUTPATIENT)
Dept: NURSING | Facility: CLINIC | Age: 86
End: 2021-02-21
Attending: INTERNAL MEDICINE
Payer: COMMERCIAL

## 2021-02-21 PROCEDURE — 91300 PR COVID VAC PFIZER DIL RECON 30 MCG/0.3 ML IM: CPT

## 2021-02-21 PROCEDURE — 0002A PR COVID VAC PFIZER DIL RECON 30 MCG/0.3 ML IM: CPT

## 2021-03-08 ENCOUNTER — OFFICE VISIT (OUTPATIENT)
Dept: INTERNAL MEDICINE | Facility: CLINIC | Age: 86
End: 2021-03-08
Payer: COMMERCIAL

## 2021-03-08 ENCOUNTER — ANCILLARY PROCEDURE (OUTPATIENT)
Dept: CARDIOLOGY | Facility: CLINIC | Age: 86
End: 2021-03-08
Attending: INTERNAL MEDICINE
Payer: COMMERCIAL

## 2021-03-08 VITALS
BODY MASS INDEX: 24.78 KG/M2 | TEMPERATURE: 98.7 F | HEIGHT: 71 IN | DIASTOLIC BLOOD PRESSURE: 70 MMHG | SYSTOLIC BLOOD PRESSURE: 138 MMHG | WEIGHT: 177 LBS | OXYGEN SATURATION: 97 % | HEART RATE: 76 BPM

## 2021-03-08 DIAGNOSIS — Z45.09 ENCOUNTER FOR LOOP RECORDER CHECK: ICD-10-CM

## 2021-03-08 DIAGNOSIS — E11.22 TYPE 2 DIABETES MELLITUS WITH STAGE 3 CHRONIC KIDNEY DISEASE, WITHOUT LONG-TERM CURRENT USE OF INSULIN, UNSPECIFIED WHETHER STAGE 3A OR 3B CKD (H): Primary | ICD-10-CM

## 2021-03-08 DIAGNOSIS — R55 NEAR SYNCOPE: ICD-10-CM

## 2021-03-08 DIAGNOSIS — N18.30 TYPE 2 DIABETES MELLITUS WITH STAGE 3 CHRONIC KIDNEY DISEASE, WITHOUT LONG-TERM CURRENT USE OF INSULIN, UNSPECIFIED WHETHER STAGE 3A OR 3B CKD (H): Primary | ICD-10-CM

## 2021-03-08 DIAGNOSIS — I48.0 PAROXYSMAL ATRIAL FIBRILLATION (H): ICD-10-CM

## 2021-03-08 DIAGNOSIS — B35.1 ONYCHOMYCOSIS: ICD-10-CM

## 2021-03-08 LAB
ERYTHROCYTE [DISTWIDTH] IN BLOOD BY AUTOMATED COUNT: 15.6 % (ref 10–15)
HBA1C MFR BLD: 6.6 % (ref 0–5.6)
HCT VFR BLD AUTO: 37.9 % (ref 40–53)
HGB BLD-MCNC: 12.3 G/DL (ref 13.3–17.7)
MCH RBC QN AUTO: 33 PG (ref 26.5–33)
MCHC RBC AUTO-ENTMCNC: 32.5 G/DL (ref 31.5–36.5)
MCV RBC AUTO: 102 FL (ref 78–100)
PLATELET # BLD AUTO: 175 10E9/L (ref 150–450)
RBC # BLD AUTO: 3.73 10E12/L (ref 4.4–5.9)
WBC # BLD AUTO: 8.1 10E9/L (ref 4–11)

## 2021-03-08 PROCEDURE — G2066 INTER DEVC REMOTE 30D: HCPCS | Performed by: INTERNAL MEDICINE

## 2021-03-08 PROCEDURE — 85027 COMPLETE CBC AUTOMATED: CPT | Performed by: INTERNAL MEDICINE

## 2021-03-08 PROCEDURE — 80048 BASIC METABOLIC PNL TOTAL CA: CPT | Performed by: INTERNAL MEDICINE

## 2021-03-08 PROCEDURE — 99214 OFFICE O/P EST MOD 30 MIN: CPT | Performed by: INTERNAL MEDICINE

## 2021-03-08 PROCEDURE — 93297 REM INTERROG DEV EVAL ICPMS: CPT | Performed by: INTERNAL MEDICINE

## 2021-03-08 PROCEDURE — 36415 COLL VENOUS BLD VENIPUNCTURE: CPT | Performed by: INTERNAL MEDICINE

## 2021-03-08 PROCEDURE — 83036 HEMOGLOBIN GLYCOSYLATED A1C: CPT | Performed by: INTERNAL MEDICINE

## 2021-03-08 ASSESSMENT — MIFFLIN-ST. JEOR: SCORE: 1495

## 2021-03-08 NOTE — PROGRESS NOTES
Assessment & Plan     Type 2 diabetes mellitus with stage 3 chronic kidney disease, without long-term current use of insulin, unspecified whether stage 3a or 3b CKD (H)  Assess lab work   - CBC with platelets  - Basic metabolic panel  - Hemoglobin A1c    Paroxysmal atrial fibrillation (H)  Controlled , on AC     Onychomycosis  Follow up with podiatry                See Patient Instructions    Return in about 6 months (around 9/8/2021) for Routine Visit, Physical Exam.    Joselito Armas MD  [facility] CHINEDU Myers is a 88 year old who presents for the following health issues     HPI       Diabetes Follow-up  Has H/O DM. On diet , exercise and oral treatment. Blood sugars are controlled. No parestesias. No hypoglycemias.    How often are you checking your blood sugar? One time daily  What time of day are you checking your blood sugars (select all that apply)?  Before meals  Have you had any blood sugars above 200?  No  Have you had any blood sugars below 70?  No    What symptoms do you notice when your blood sugar is low?  None    What concerns do you have today about your diabetes? None     Do you have any of these symptoms? (Select all that apply)  Numbness in feet and Redness, sores, or blisters on feet,ankle swelling    Have you had a diabetic eye exam in the last 12 months? Yes, last November        BP Readings from Last 2 Encounters:   03/08/21 138/70   09/16/20 136/78     Hemoglobin A1C (%)   Date Value   09/02/2020 6.4 (H)   11/11/2019 6.7 (H)     LDL Cholesterol Calculated (mg/dL)   Date Value   11/11/2019 44   12/19/2018 52           Has history of atrial fibrillation. On anticoagulation with Coumadin and rate control medications. Asymptonatic - no chest pains , palpitations,  no side effects from medications.      Review of Systems   Constitutional, HEENT, cardiovascular, pulmonary, gi and gu systems are negative, except as otherwise noted.      Objective    BP  "138/70 (BP Location: Left arm, Patient Position: Sitting, Cuff Size: Adult Regular)   Pulse 76   Temp 98.7  F (37.1  C) (Oral)   Ht 1.803 m (5' 11\")   Wt 80.3 kg (177 lb)   SpO2 97%   BMI 24.69 kg/m    Body mass index is 24.69 kg/m .  Physical Exam   GENERAL: elderly, frail, alert and no distress  NECK: no adenopathy, no asymmetry, masses, or scars and thyroid normal to palpation  RESP: lungs clear to auscultation - no rales, rhonchi or wheezes  CV: irregular rate and rhythm, normal S1 S2, no S3 or S4, no murmur, click or rub, no peripheral edema and peripheral pulses strong  ABDOMEN: soft, nontender, no hepatosplenomegaly, no masses and bowel sounds normal  MS: no gross musculoskeletal defects noted,1+ LE edema  SKIN: no suspicious lesions or rashes,right great toe onyhomycisis     Ancillary Procedure on 03/08/2021   Component Date Value Ref Range Status     Date Time Interrogation Session 03/08/2021 [id number]   Final     Implantable Pulse Generator Manufa* 03/08/2021 Medtronic   Final     Implantable Pulse Generator Model 03/08/2021 [id number] Reveal LINQ   Final     Implantable Pulse Generator Serial* 03/08/2021 [id number]   Final     Type Interrogation Session 03/08/2021 Remote    Final     Clinic Name 03/08/2021 [place]   Final     Implantable Pulse Generator Type 03/08/2021 Implantable Diagnostic Monitor   Final     Implantable Pulse Generator Implan* 03/08/2021 [id number]   Final     Zone Setting Type Category 03/08/2021 VF   Final     Zone Setting Type Category 03/08/2021 VT   Final     Zone Setting Type Category 03/08/2021 VT   Final     Zone Setting Detection Interval 03/08/2021 420  ms Final     Zone Setting Type Category 03/08/2021 VT   Final     Zone Setting Type Category 03/08/2021 ATRIAL_FIBRILLATION   Final     Zone Setting Type Category 03/08/2021 AT/AF   Final     Zone Setting Type Category 03/08/2021 ASYSTOLE   Final     Zone Setting Detection Interval 03/08/2021 3,000  ms Final     Zone " Setting Type Category 03/08/2021 BRADYCARDIA   Final     Zone Setting Detection Interval 03/08/2021 2,000  ms Final     Battery Status 03/08/2021 OK   Final     Atrial Tachy Statistic Date Time S* 03/08/2021 20201117094717   Final     Atrial Tachy Statistic Date Time E* 03/08/2021 20210308094219   Final     Atrial Tachy Statistic AT/AF Burde* 03/08/2021 19  % Final     Episode Statistic Recent Count 03/08/2021 11   Final     Episode Statistic Type Category 03/08/2021 AT/AF   Final     Episode Statistic Recent Count 03/08/2021 0   Final     Episode Statistic Type Category 03/08/2021 ASYSTOLE   Final     Episode Statistic Recent Count 03/08/2021 0   Final     Episode Statistic Type Category 03/08/2021 AT   Final     Episode Statistic Recent Count 03/08/2021 0   Final     Episode Statistic Type Category 03/08/2021 MARCO   Final     Episode Statistic Recent Count 03/08/2021 0   Final     Episode Statistic Type Category 03/08/2021 Patient Activated   Final     Episode Statistic Recent Count 03/08/2021 7   Final     Episode Statistic Type Category 03/08/2021 VT   Final     Episode Statistic Recent Date Time* 03/08/2021 20201117094717   Final     Episode Statistic Recent Date Time* 03/08/2021 87610232524096   Final     Episode Statistic Recent Date Time* 03/08/2021 20201117094717   Final     Episode Statistic Recent Date Time* 03/08/2021 14274651372301   Final     Episode Statistic Recent Date Time* 03/08/2021 20201117094717   Final     Episode Statistic Recent Date Time* 03/08/2021 99354514075457   Final     Episode Statistic Recent Date Time* 03/08/2021 20201117094717   Final     Episode Statistic Recent Date Time* 03/08/2021 44388095853276   Final     Episode Statistic Recent Date Time* 03/08/2021 20201117094717   Final     Episode Statistic Recent Date Time* 03/08/2021 59379551040027   Final     Episode Statistic Recent Date Time* 03/08/2021 20201117094717   Final     Episode Statistic Recent Date Time* 03/08/2021  85639176407830   Final     Episode Statistic Total Count 03/08/2021 70   Final     Episode Statistic Type Category 03/08/2021 AT/AF   Final     Episode Statistic Total Count 03/08/2021 0   Final     Episode Statistic Type Category 03/08/2021 ASYSTOLE   Final     Episode Statistic Total Count 03/08/2021 0   Final     Episode Statistic Type Category 03/08/2021 AT   Final     Episode Statistic Total Count 03/08/2021 0   Final     Episode Statistic Type Category 03/08/2021 MARCO   Final     Episode Statistic Total Count 03/08/2021 1   Final     Episode Statistic Type Category 03/08/2021 Patient Activated   Final     Episode Statistic Total Count 03/08/2021 365   Final     Episode Statistic Type Category 03/08/2021 VT   Final     Episode Statistic Total Date Time * 03/08/2021 20190805153304   Final     Episode Statistic Total Date Time * 03/08/2021 20210308094219   Final     Episode Statistic Total Date Time * 03/08/2021 60608793067070   Final     Episode Statistic Total Date Time * 03/08/2021 20210308094219   Final     Episode Statistic Total Date Time * 03/08/2021 20190805153304   Final     Episode Statistic Total Date Time * 03/08/2021 06864005169086   Final     Episode Statistic Total Date Time * 03/08/2021 20190805153304   Final     Episode Statistic Total Date Time * 03/08/2021 20210308094219   Final     Episode Statistic Total Date Time * 03/08/2021 20190805153304   Final     Episode Statistic Total Date Time * 03/08/2021 20210308094219   Final     Episode Statistic Total Date Time * 03/08/2021 70308770335772   Final     Episode Statistic Total Date Time * 03/08/2021 31843436047540   Final

## 2021-03-08 NOTE — LETTER
March 9, 2021      Louis Keller   [address] [address]        Dear ,    We are writing to inform you of your test results.    Mild anemia, stable,   Controlled diabetes.   Follow up in 6 months.     Resulted Orders   CBC with platelets   Result Value Ref Range    WBC 8.1 4.0 - 11.0 10e9/L    RBC Count 3.73 (L) 4.4 - 5.9 10e12/L    Hemoglobin 12.3 (L) 13.3 - 17.7 g/dL    Hematocrit 37.9 (L) 40.0 - 53.0 %     (H) 78 - 100 fl    MCH 33.0 26.5 - 33.0 pg    MCHC 32.5 31.5 - 36.5 g/dL    RDW 15.6 (H) 10.0 - 15.0 %    Platelet Count 175 150 - 450 10e9/L   Basic metabolic panel   Result Value Ref Range    Sodium 135 133 - 144 mmol/L    Potassium 4.5 3.4 - 5.3 mmol/L    Chloride 103 94 - 109 mmol/L    Carbon Dioxide 28 20 - 32 mmol/L    Anion Gap 4 3 - 14 mmol/L    Glucose 137 (H) 70 - 99 mg/dL      Comment:      Non Fasting    Urea Nitrogen 18 7 - 30 mg/dL    Creatinine 1.00 0.66 - 1.25 mg/dL    GFR Estimate 67 >60 mL/min/[1.73_m2]      Comment:      Non  GFR Calc  Starting 12/18/2018, serum creatinine based estimated GFR (eGFR) will be   calculated using the Chronic Kidney Disease Epidemiology Collaboration   (CKD-EPI) equation.      GFR Estimate If Black 77 >60 mL/min/[1.73_m2]      Comment:       GFR Calc  Starting 12/18/2018, serum creatinine based estimated GFR (eGFR) will be   calculated using the Chronic Kidney Disease Epidemiology Collaboration   (CKD-EPI) equation.      Calcium 9.4 8.5 - 10.1 mg/dL   Hemoglobin A1c   Result Value Ref Range    Hemoglobin A1C 6.6 (H) 0 - 5.6 %      Comment:      Normal <5.7% Prediabetes 5.7-6.4%  Diabetes 6.5% or higher - adopted from ADA   consensus guidelines.         If you have any questions or concerns, please call the clinic at the number listed above.       Sincerely,      Joselito Armas MD

## 2021-03-09 LAB
ANION GAP SERPL CALCULATED.3IONS-SCNC: 4 MMOL/L (ref 3–14)
BUN SERPL-MCNC: 18 MG/DL (ref 7–30)
CALCIUM SERPL-MCNC: 9.4 MG/DL (ref 8.5–10.1)
CHLORIDE SERPL-SCNC: 103 MMOL/L (ref 94–109)
CO2 SERPL-SCNC: 28 MMOL/L (ref 20–32)
CREAT SERPL-MCNC: 1 MG/DL (ref 0.66–1.25)
GFR SERPL CREATININE-BSD FRML MDRD: 67 ML/MIN/{1.73_M2}
GLUCOSE SERPL-MCNC: 137 MG/DL (ref 70–99)
MDC_IDC_MSMT_BATTERY_STATUS: NORMAL
MDC_IDC_PG_IMPLANT_DTM: NORMAL
MDC_IDC_PG_MFG: NORMAL
MDC_IDC_PG_MODEL: NORMAL
MDC_IDC_PG_SERIAL: NORMAL
MDC_IDC_PG_TYPE: NORMAL
MDC_IDC_SESS_CLINIC_NAME: NORMAL
MDC_IDC_SESS_DTM: NORMAL
MDC_IDC_SESS_TYPE: NORMAL
MDC_IDC_SET_ZONE_DETECTION_INTERVAL: 2000 MS
MDC_IDC_SET_ZONE_DETECTION_INTERVAL: 3000 MS
MDC_IDC_SET_ZONE_DETECTION_INTERVAL: 420 MS
MDC_IDC_SET_ZONE_TYPE: NORMAL
MDC_IDC_STAT_AT_BURDEN_PERCENT: 19 %
MDC_IDC_STAT_AT_DTM_END: NORMAL
MDC_IDC_STAT_AT_DTM_START: NORMAL
MDC_IDC_STAT_EPISODE_RECENT_COUNT: 0
MDC_IDC_STAT_EPISODE_RECENT_COUNT: 11
MDC_IDC_STAT_EPISODE_RECENT_COUNT: 7
MDC_IDC_STAT_EPISODE_RECENT_COUNT_DTM_END: NORMAL
MDC_IDC_STAT_EPISODE_RECENT_COUNT_DTM_START: NORMAL
MDC_IDC_STAT_EPISODE_TOTAL_COUNT: 0
MDC_IDC_STAT_EPISODE_TOTAL_COUNT: 1
MDC_IDC_STAT_EPISODE_TOTAL_COUNT: 365
MDC_IDC_STAT_EPISODE_TOTAL_COUNT: 70
MDC_IDC_STAT_EPISODE_TOTAL_COUNT_DTM_END: NORMAL
MDC_IDC_STAT_EPISODE_TOTAL_COUNT_DTM_START: NORMAL
MDC_IDC_STAT_EPISODE_TYPE: NORMAL
POTASSIUM SERPL-SCNC: 4.5 MMOL/L (ref 3.4–5.3)
SODIUM SERPL-SCNC: 135 MMOL/L (ref 133–144)

## 2021-03-20 DIAGNOSIS — N18.30 TYPE 2 DIABETES MELLITUS WITH STAGE 3 CHRONIC KIDNEY DISEASE, WITHOUT LONG-TERM CURRENT USE OF INSULIN (H): ICD-10-CM

## 2021-03-20 DIAGNOSIS — E11.22 TYPE 2 DIABETES MELLITUS WITH STAGE 3 CHRONIC KIDNEY DISEASE, WITHOUT LONG-TERM CURRENT USE OF INSULIN (H): ICD-10-CM

## 2021-03-22 RX ORDER — PIOGLITAZONEHYDROCHLORIDE 45 MG/1
TABLET ORAL
Qty: 90 TABLET | Refills: 1 | Status: SHIPPED | OUTPATIENT
Start: 2021-03-22 | End: 2021-09-22

## 2021-03-22 NOTE — TELEPHONE ENCOUNTER
Pending Prescriptions:                       Disp   Refills    pioglitazone (ACTOS) 45 MG tablet [Pharma*90 tab*1            Sig: TAKE 1 TABLET BY MOUTH EVERY DAY    Prescription approved per [facility] Refill Protocol.

## 2021-03-24 ENCOUNTER — ANTICOAGULATION THERAPY VISIT (OUTPATIENT)
Dept: ANTICOAGULATION | Facility: CLINIC | Age: 86
End: 2021-03-24

## 2021-03-24 DIAGNOSIS — I48.0 PAROXYSMAL ATRIAL FIBRILLATION (H): ICD-10-CM

## 2021-03-24 DIAGNOSIS — Z79.01 LONG TERM CURRENT USE OF ANTICOAGULANT THERAPY: ICD-10-CM

## 2021-03-24 DIAGNOSIS — Z79.01 LONG TERM CURRENT USE OF ANTICOAGULANTS WITH INR GOAL OF 2.0-3.0: Primary | ICD-10-CM

## 2021-03-24 LAB
CAPILLARY BLOOD COLLECTION: NORMAL
INR PPP: 3.3 (ref 0.86–1.14)

## 2021-03-24 PROCEDURE — 85610 PROTHROMBIN TIME: CPT | Performed by: INTERNAL MEDICINE

## 2021-03-24 PROCEDURE — 36416 COLLJ CAPILLARY BLOOD SPEC: CPT | Performed by: INTERNAL MEDICINE

## 2021-03-24 PROCEDURE — 99207 PR NO CHARGE NURSE ONLY: CPT

## 2021-03-24 NOTE — PROGRESS NOTES
ANTICOAGULATION FOLLOW-UP CLINIC VISIT    Patient Name:  Louis Keller Jr.  Date:  3/24/2021  Contact Type:  Telephone/ Called patient, he denies any changes. Orders discussed with the patient, he agrees with the plan. Lab INR appointment scheduled on 4/7/21.    SUBJECTIVE:  Patient Findings     Comments:  The patient was assessed for diet, medication, and activity level changes, missed or extra doses, bruising or bleeding, with no problem findings. Patient denies any identifiable changes that caused the supratherapeutic INR.           Clinical Outcomes     Negatives:  Major bleeding event, Thromboembolic event, Anticoagulation-related hospital admission, Anticoagulation-related ED visit, Anticoagulation-related fatality    Comments:  The patient was assessed for diet, medication, and activity level changes, missed or extra doses, bruising or bleeding, with no problem findings. Patient denies any identifiable changes that caused the supratherapeutic INR.              OBJECTIVE    Recent labs: (last 7 days)     03/24/21  1116   INR 3.30*       ASSESSMENT / PLAN  INR assessment SUPRA    Recheck INR In: 2 WEEKS    INR Location Outside lab      Anticoagulation Summary  As of 3/24/2021    INR goal:  2.0-3.0   TTR:  75.4 % (1 y)   INR used for dosing:  3.30 (3/24/2021)   Warfarin maintenance plan:  2.5 mg (2.5 mg x 1) every day   Full warfarin instructions:  3/24: 1.25 mg; Otherwise 2.5 mg every day   Weekly warfarin total:  17.5 mg   Plan last modified:  Tata Alexis RN (9/30/2020)   Next INR check:  4/7/2021   Priority:  Maintenance   Target end date:  Indefinite    Indications    Long-term (current) use of anticoagulants [Z79.01] [Z79.01]  Atrial fibrillation (H) [I48.91]  Paroxysmal atrial fibrillation (H) [I48.0]  Long term current use of anticoagulants with INR goal of 2.0-3.0 [Z79.01]             Anticoagulation Episode Summary     INR check location:      Preferred lab:      Send INR reminders to:  [facility]    Comments:          Anticoagulation Care Providers     Provider Role Specialty Phone number    Joselito Armas MD Referring Internal Medicine [phone]            See the Encounter Report to view Anticoagulation Flowsheet and Dosing Calendar (Go to Encounters tab in chart review, and find the Anticoagulation Therapy Visit)    Dosage adjustment made based on physician directed care plan.    Monica Souza RN

## 2021-04-01 DIAGNOSIS — N18.30 TYPE 2 DIABETES MELLITUS WITH STAGE 3 CHRONIC KIDNEY DISEASE, WITHOUT LONG-TERM CURRENT USE OF INSULIN (H): ICD-10-CM

## 2021-04-01 DIAGNOSIS — E11.22 TYPE 2 DIABETES MELLITUS WITH STAGE 3 CHRONIC KIDNEY DISEASE, WITHOUT LONG-TERM CURRENT USE OF INSULIN (H): ICD-10-CM

## 2021-04-01 NOTE — TELEPHONE ENCOUNTER
Pending Prescriptions:                       Disp   Refills    metFORMIN (GLUCOPHAGE) 500 MG tablet [Pha*180 ta*1            Sig: TAKE 1 TABLET BY MOUTH TWICE A DAY WITH MEALS    Prescription approved per [facility] Refill Protocol.

## 2021-04-07 ENCOUNTER — ANTICOAGULATION THERAPY VISIT (OUTPATIENT)
Dept: INTERNAL MEDICINE | Facility: CLINIC | Age: 86
End: 2021-04-07

## 2021-04-07 DIAGNOSIS — I48.0 PAROXYSMAL ATRIAL FIBRILLATION (H): ICD-10-CM

## 2021-04-07 DIAGNOSIS — I48.91 ATRIAL FIBRILLATION (H): ICD-10-CM

## 2021-04-07 DIAGNOSIS — Z79.01 LONG TERM CURRENT USE OF ANTICOAGULANTS WITH INR GOAL OF 2.0-3.0: ICD-10-CM

## 2021-04-07 DIAGNOSIS — Z79.01 LONG TERM CURRENT USE OF ANTICOAGULANT THERAPY: ICD-10-CM

## 2021-04-07 LAB
CAPILLARY BLOOD COLLECTION: NORMAL
INR PPP: 2.1 (ref 0.86–1.14)

## 2021-04-07 PROCEDURE — 85610 PROTHROMBIN TIME: CPT | Performed by: INTERNAL MEDICINE

## 2021-04-07 PROCEDURE — 36416 COLLJ CAPILLARY BLOOD SPEC: CPT | Performed by: INTERNAL MEDICINE

## 2021-04-07 NOTE — PROGRESS NOTES
ANTICOAGULATION MANAGEMENT     Patient Name:  Louis Keller Jr.  Date:  2021    ASSESSMENT /SUBJECTIVE:    Today's INR result of 2.1 is therapeutic. Goal INR of 2.0-3.0      Warfarin dose taken: Less warfarin taken than advised, patient continued 1.25 mg last Wednesday as well.    Diet: No new diet changes affecting INR    Medication changes/ interactions: No new medications/supplements affecting INR    Previous INR: Supratherapeutic     S/S of bleeding or thromboembolism: Yes: patient reported a small red spot above his eyebrow, possibly a little bruise or a scratch. No further s/s of bleeding since.    New injury or illness: No     Upcoming surgery, procedure or cardioversion: No    Additional findings: None      PLAN:    Telephone call with Louis regarding INR result and instructed:     Warfarin Dosing Instructions: Resume your current warfarin dose 2.5 mg daily.    Instructed patient to follow up no later than: 3 weeks  Lab visit scheduled    Education provided: Please call back if any changes to your diet, medications or how you've been taking warfarin, Monitoring for bleeding signs and symptoms, Monitoring for clotting signs and symptoms and Importance of notifying clinic for changes in medications; a sooner lab recheck maybe needed.      Patient verbalizes understanding and agrees to warfarin dosing plan.    Instructed to call the Anticoagulation Clinic for any changes, questions or concerns. (#[phone])        Donna Sanchez RN      OBJECTIVE:  Recent labs: (last 7 days)     21  1118   INR 2.10*         INR assessment THER    Recheck INR In: 4 WEEKS    INR Location Clinic      Anticoagulation Summary  As of 2021    INR goal:  2.0-3.0   TTR:  76.4 % (1 y)   INR used for dosin.10 (2021)   Warfarin maintenance plan:  2.5 mg (2.5 mg x 1) every day   Full warfarin instructions:  2.5 mg every day   Weekly warfarin total:  17.5 mg   No change documented:  Donna Sanchez RN    Plan last modified:  Tata Alexis RN (9/30/2020)   Next INR check:  4/28/2021   Priority:  Maintenance   Target end date:  Indefinite    Indications    Long-term (current) use of anticoagulants [Z79.01] [Z79.01]  Atrial fibrillation (H) [I48.91]  Paroxysmal atrial fibrillation (H) [I48.0]  Long term current use of anticoagulants with INR goal of 2.0-3.0 [Z79.01]             Anticoagulation Episode Summary     INR check location:      Preferred lab:      Send INR reminders to:  [facility]    Comments:          Anticoagulation Care Providers     Provider Role Specialty Phone number    Joselito Armas MD Referring Internal Medicine [phone]

## 2021-04-23 DIAGNOSIS — E78.5 HYPERLIPIDEMIA LDL GOAL <100: ICD-10-CM

## 2021-04-23 DIAGNOSIS — K91.89 POSTCHOLECYSTECTOMY DIARRHEA: ICD-10-CM

## 2021-04-23 DIAGNOSIS — R19.7 POSTCHOLECYSTECTOMY DIARRHEA: ICD-10-CM

## 2021-04-23 RX ORDER — CHOLESTYRAMINE 4 G/9G
POWDER, FOR SUSPENSION ORAL
Qty: 180 PACKET | Refills: 2 | Status: SHIPPED | OUTPATIENT
Start: 2021-04-23 | End: 2022-01-01

## 2021-04-23 NOTE — TELEPHONE ENCOUNTER
Pending Prescriptions:                       Disp   Refills    cholestyramine (QUESTRAN) 4 g packet       180 pa*2          Routing refill request to provider for review/approval because:  LDL Cholesterol Calculated   Date Value Ref Range Status   11/11/2019 44 <100 mg/dL Final     Comment:     Desirable:       <100 mg/dl

## 2021-04-28 ENCOUNTER — ANTICOAGULATION THERAPY VISIT (OUTPATIENT)
Dept: ANTICOAGULATION | Facility: CLINIC | Age: 86
End: 2021-04-28

## 2021-04-28 DIAGNOSIS — Z79.01 LONG TERM CURRENT USE OF ANTICOAGULANTS WITH INR GOAL OF 2.0-3.0: ICD-10-CM

## 2021-04-28 DIAGNOSIS — I48.0 PAROXYSMAL ATRIAL FIBRILLATION (H): ICD-10-CM

## 2021-04-28 DIAGNOSIS — Z79.01 LONG TERM CURRENT USE OF ANTICOAGULANT THERAPY: ICD-10-CM

## 2021-04-28 DIAGNOSIS — I48.91 ATRIAL FIBRILLATION (H): ICD-10-CM

## 2021-04-28 LAB
CAPILLARY BLOOD COLLECTION: NORMAL
INR PPP: 2.9 (ref 0.86–1.14)

## 2021-04-28 PROCEDURE — 36416 COLLJ CAPILLARY BLOOD SPEC: CPT | Performed by: INTERNAL MEDICINE

## 2021-04-28 PROCEDURE — 85610 PROTHROMBIN TIME: CPT | Performed by: INTERNAL MEDICINE

## 2021-04-28 PROCEDURE — 99207 PR NO CHARGE NURSE ONLY: CPT

## 2021-04-28 NOTE — PROGRESS NOTES
Left message to call [phone]. Please transfer call to Monica at [phone].  Monica Souza RN, BSN  Anticoagulation Clinic

## 2021-04-28 NOTE — PROGRESS NOTES
Anticoagulation Management    Unable to reach Louis today.    Today's INR result of 2.9 is therapeutic (goal INR of 2.0-3.0).  Result received from: Clinic Lab    Follow up required to confirm warfarin dose taken and assess for changes    Left message to continue current dose of warfarin 2.5 mg tonight.      Anticoagulation clinic to follow up    Monica Souza RN

## 2021-04-28 NOTE — PROGRESS NOTES
ANTICOAGULATION FOLLOW-UP CLINIC VISIT    Patient Name:  Louis Keller Jr.  Date:  2021  Contact Type:  Telephone/ discussed with patient    SUBJECTIVE:  Patient Findings     Comments:  The patient was assessed for diet, medication, and activity level changes, missed or extra doses, bruising or bleeding, with no problem findings.          Clinical Outcomes     Negatives:  Major bleeding event, Thromboembolic event, Anticoagulation-related hospital admission, Anticoagulation-related ED visit, Anticoagulation-related fatality    Comments:  The patient was assessed for diet, medication, and activity level changes, missed or extra doses, bruising or bleeding, with no problem findings.             OBJECTIVE    Recent labs: (last 7 days)     21  1117   INR 2.90*       ASSESSMENT / PLAN  INR assessment THER    Recheck INR In: 6 WEEKS    INR Location Clinic      Anticoagulation Summary  As of 2021    INR goal:  2.0-3.0   TTR:  77.9 % (1 y)   INR used for dosin.90 (2021)   Warfarin maintenance plan:  2.5 mg (2.5 mg x 1) every day   Full warfarin instructions:  2.5 mg every day   Weekly warfarin total:  17.5 mg   No change documented:  Tata Alexis RN   Plan last modified:  Tata Alexis RN (2020)   Next INR check:  2021   Priority:  Maintenance   Target end date:  Indefinite    Indications    Long-term (current) use of anticoagulants [Z79.01] [Z79.01]  Atrial fibrillation (H) [I48.91]  Paroxysmal atrial fibrillation (H) [I48.0]  Long term current use of anticoagulants with INR goal of 2.0-3.0 [Z79.01]             Anticoagulation Episode Summary     INR check location:      Preferred lab:      Send INR reminders to:  [employer] [facility] [place]    Comments:          Anticoagulation Care Providers     Provider Role Specialty Phone number    Joselito Armas MD Referring Internal Medicine [phone]            See the Encounter Report to view Anticoagulation Flowsheet and Dosing Calendar  (Go to Encounters tab in chart review, and find the Anticoagulation Therapy Visit)    Dosage adjustment made based on physician directed care plan.    Tata Alexis RN

## 2021-05-07 DIAGNOSIS — I48.20 CHRONIC A-FIB (H): ICD-10-CM

## 2021-05-07 RX ORDER — METOPROLOL TARTRATE 25 MG/1
25 TABLET, FILM COATED ORAL 2 TIMES DAILY
Qty: 180 TABLET | Refills: 0 | Status: SHIPPED | OUTPATIENT
Start: 2021-05-07 | End: 2021-08-10

## 2021-05-07 NOTE — TELEPHONE ENCOUNTER
Received refill request for:  Metoprolol Tartrate  Last OV was: 9/16/2020 with Dr. Pacheco  Labs/EKG: n/a  F/U scheduled: 5/10/2021 with Dr. Gleason  New script sent to: [facility]

## 2021-05-10 ENCOUNTER — OFFICE VISIT (OUTPATIENT)
Dept: CARDIOLOGY | Facility: CLINIC | Age: 86
End: 2021-05-10
Payer: COMMERCIAL

## 2021-05-10 VITALS
WEIGHT: 181 LBS | BODY MASS INDEX: 25.34 KG/M2 | HEART RATE: 76 BPM | HEIGHT: 71 IN | DIASTOLIC BLOOD PRESSURE: 86 MMHG | SYSTOLIC BLOOD PRESSURE: 171 MMHG

## 2021-05-10 DIAGNOSIS — R06.09 DOE (DYSPNEA ON EXERTION): ICD-10-CM

## 2021-05-10 DIAGNOSIS — I48.20 CHRONIC A-FIB (H): ICD-10-CM

## 2021-05-10 DIAGNOSIS — I51.89 DIASTOLIC DYSFUNCTION: ICD-10-CM

## 2021-05-10 DIAGNOSIS — R60.0 BILATERAL LEG EDEMA: ICD-10-CM

## 2021-05-10 PROCEDURE — 99214 OFFICE O/P EST MOD 30 MIN: CPT | Performed by: INTERNAL MEDICINE

## 2021-05-10 RX ORDER — FUROSEMIDE 20 MG
10 TABLET ORAL EVERY OTHER DAY
Qty: 45 TABLET | Refills: 3 | Status: SHIPPED | OUTPATIENT
Start: 2021-05-10 | End: 2021-08-02

## 2021-05-10 ASSESSMENT — MIFFLIN-ST. JEOR: SCORE: 1513.14

## 2021-05-10 NOTE — PROGRESS NOTES
Cardiology Progress Note          Assessment and Plan:       1. 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  He is concerned about it and the skin health appears to be in jeopardy.  It has not responded to conservative therapy.    We will add back furosemide and plan venous competency testing.  May consider distal saphenous vein ablation on the left in the future.      2. Paroxysmal atrial fibrillation    Continue warfarin and amiodarone    Follow-up in 6 months      30 minutes was spent with the patient, precharting and reviewing tests as well as post visit charting all done today..    This note was transcribed using electronic voice recognition software and there may be typographical errors present.                    Interval History:     The patient is a very pleasant 88 year old whom I have been following for paroxysmal atrial fibrillation as well as venous reflux disease.  We did right lower extremity venous ablation due to ulcers.  These have healed nicely.    The patient has developed worsening the left lower extremity edema greater than right.  He had distal severe saphenous vein reflux on previous venous competency testing 2018.    He is also now currently off his diuretic.    This was reduced/discontinued due to his hypotension and lightheadedness previously.  He is now hypertensive.                     Review of Systems:     Review of Systems:  Skin:  Negative     Eyes:  Negative    ENT:  Negative    Respiratory:  Negative    Cardiovascular:    edema;Positive for;fatigue  Gastroenterology: Positive for diarrhea;constipation  Genitourinary:  not assessed    Musculoskeletal:  Positive for joint pain  Neurologic:  Positive for numbness or tingling of feet  Psychiatric:  Negative    Heme/Lymph/Imm:  Positive for allergies  Endocrine:  Positive for diabetes              Physical Exam:     Vitals: BP  "(!) 171/86   Pulse 76   Ht 1.803 m (5' 11\")   Wt 82.1 kg (181 lb)   BMI 25.24 kg/m    Constitutional:  cooperative, alert and oriented, well developed, well nourished, in no acute distress thin;frail uses a walker    Skin:  warm and dry to the touch, no apparent skin lesions or masses noted bruises present;venous stasis changes      Head:  normocephalic        Eyes:  pupils equal and round        Chest:  clear to auscultation        Cardiac:               good rate control    Extremities and Back:  no deformities, clubbing, cyanosis, erythema observed stasis pigmentation right medial malleoli venous ulcer is healed    Neurological:  no gross motor deficits                 Medications:     Current Outpatient Medications   Medication Sig Dispense Refill     Acetaminophen (TYLENOL PO) Take 1,000 mg by mouth 3 times daily       amiodarone (PACERONE) 200 MG tablet Take 1 tablet (200 mg) by mouth daily 90 tablet 2     cholestyramine (QUESTRAN) 4 g packet TAKE 1 PACKET (4 G) BY MOUTH 2 TIMES DAILY (WITH MEALS) 180 packet 2     ferrous gluconate (FERGON) 324 (38 Fe) MG tablet TAKE 1 TABLET (324 MG) BY MOUTH DAILY (WITH BREAKFAST) 100 tablet 1     furosemide (LASIX) 20 MG tablet Take 0.5 tablets (10 mg) by mouth every other day 45 tablet 3     glipiZIDE (GLUCOTROL XL) 10 MG 24 hr tablet TAKE 1 TABLET BY MOUTH TWICE A  tablet 0     latanoprost (XALATAN) 0.005 % ophthalmic solution Place 1 drop Into the left eye daily       loperamide (IMODIUM A-D) 2 MG tablet daily  30 tablet 0     metFORMIN (GLUCOPHAGE) 500 MG tablet TAKE 1 TABLET BY MOUTH TWICE A DAY WITH MEALS 180 tablet 1     metoprolol tartrate (LOPRESSOR) 25 MG tablet Take 1 tablet (25 mg) by mouth 2 times daily 180 tablet 0     multivitamin, therapeutic (THERA-VIT) TABS tablet Take 0.5 tablets by mouth 2 times daily       ONETOUCH ULTRA test strip USE TO TEST DAILY 50 strip 4     pioglitazone (ACTOS) 45 MG tablet TAKE 1 TABLET BY MOUTH EVERY DAY 90 tablet 1 "     warfarin ANTICOAGULANT (COUMADIN) 2.5 MG tablet TAKE 1 TABLET DAILY OR AS DIRECTED BY INR CLINIC 90 tablet 1     ACE NOT PRESCRIBED, INTENTIONAL, 1 each daily ACE Inhibitor not prescribed due to Risk for drug interaction (Patient not taking: Reported on 5/10/2021) 0 each 0     ASPIRIN NOT PRESCRIBED, INTENTIONAL, by Other route continuous prn Reported on 3/7/2017       STATIN NOT PRESCRIBED, INTENTIONAL, 1 each At Bedtime Statin not prescribed intentionally due to Risk for drug interaction (Patient not taking: Reported on 5/10/2021) 0 each 0                Data:   All laboratory data reviewed  No results found for this or any previous visit (from the past 24 hour(s)).    All laboratory data reviewed  Lab Results   Component Value Date    CHOL 120 11/11/2019     Lab Results   Component Value Date    HDL 55 11/11/2019     Lab Results   Component Value Date    LDL 44 11/11/2019     Lab Results   Component Value Date    TRIG 104 11/11/2019     Lab Results   Component Value Date    CHOLHDLRATIO 2.0 03/20/2015     TSH   Date Value Ref Range Status   09/11/2020 6.13 (H) 0.40 - 4.00 mU/L Final     Last Basic Metabolic Panel:  Lab Results   Component Value Date     03/08/2021      Lab Results   Component Value Date    POTASSIUM 4.5 03/08/2021     Lab Results   Component Value Date    CHLORIDE 103 03/08/2021     Lab Results   Component Value Date    MICHAEL 9.4 03/08/2021     Lab Results   Component Value Date    CO2 28 03/08/2021     Lab Results   Component Value Date    BUN 18 03/08/2021     Lab Results   Component Value Date    CR 1.00 03/08/2021     Lab Results   Component Value Date     03/08/2021     Lab Results   Component Value Date    WBC 8.1 03/08/2021     Lab Results   Component Value Date    RBC 3.73 03/08/2021     Lab Results   Component Value Date    HGB 12.3 03/08/2021     Lab Results   Component Value Date    HCT 37.9 03/08/2021     Lab Results   Component Value Date     03/08/2021     Lab  Results   Component Value Date    MCH 33.0 03/08/2021     Lab Results   Component Value Date    MCHC 32.5 03/08/2021     Lab Results   Component Value Date    RDW 15.6 03/08/2021     Lab Results   Component Value Date     03/08/2021

## 2021-05-10 NOTE — LETTER
5/10/2021    Joselito Armas MD  [address] [facility] [place] [address]    RE: Louis Keller Jr.       Dear Colleague,    I had the pleasure of seeing Louis Keller Jr. in the [facility].    Cardiology Progress Note          Assessment and Plan:       1. 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  He is concerned about it and the skin health appears to be in jeopardy.  It has not responded to conservative therapy.    We will add back furosemide and plan venous competency testing.  May consider distal saphenous vein ablation on the left in the future.      2. Paroxysmal atrial fibrillation    Continue warfarin and amiodarone    Follow-up in 6 months      30 minutes was spent with the patient, precharting and reviewing tests as well as post visit charting all done today..    This note was transcribed using electronic voice recognition software and there may be typographical errors present.                    Interval History:     The patient is a very pleasant 88 year old whom I have been following for paroxysmal atrial fibrillation as well as venous reflux disease.  We did right lower extremity venous ablation due to ulcers.  These have healed nicely.    The patient has developed worsening the left lower extremity edema greater than right.  He had distal severe saphenous vein reflux on previous venous competency testing 2018.    He is also now currently off his diuretic.    This was reduced/discontinued due to his hypotension and lightheadedness previously.  He is now hypertensive.                     Review of Systems:     Review of Systems:  Skin:  Negative     Eyes:  Negative    ENT:  Negative    Respiratory:  Negative    Cardiovascular:    edema;Positive for;fatigue  Gastroenterology: Positive for diarrhea;constipation  Genitourinary:  " not assessed    Musculoskeletal:  Positive for joint pain  Neurologic:  Positive for numbness or tingling of feet  Psychiatric:  Negative    Heme/Lymph/Imm:  Positive for allergies  Endocrine:  Positive for diabetes              Physical Exam:     Vitals: BP (!) 171/86   Pulse 76   Ht 1.803 m (5' 11\")   Wt 82.1 kg (181 lb)   BMI 25.24 kg/m    Constitutional:  cooperative, alert and oriented, well developed, well nourished, in no acute distress thin;frail uses a walker    Skin:  warm and dry to the touch, no apparent skin lesions or masses noted bruises present;venous stasis changes      Head:  normocephalic        Eyes:  pupils equal and round        Chest:  clear to auscultation        Cardiac:               good rate control    Extremities and Back:  no deformities, clubbing, cyanosis, erythema observed stasis pigmentation right medial malleoli venous ulcer is healed    Neurological:  no gross motor deficits                 Medications:     Current Outpatient Medications   Medication Sig Dispense Refill     Acetaminophen (TYLENOL PO) Take 1,000 mg by mouth 3 times daily       amiodarone (PACERONE) 200 MG tablet Take 1 tablet (200 mg) by mouth daily 90 tablet 2     cholestyramine (QUESTRAN) 4 g packet TAKE 1 PACKET (4 G) BY MOUTH 2 TIMES DAILY (WITH MEALS) 180 packet 2     ferrous gluconate (FERGON) 324 (38 Fe) MG tablet TAKE 1 TABLET (324 MG) BY MOUTH DAILY (WITH BREAKFAST) 100 tablet 1     furosemide (LASIX) 20 MG tablet Take 0.5 tablets (10 mg) by mouth every other day 45 tablet 3     glipiZIDE (GLUCOTROL XL) 10 MG 24 hr tablet TAKE 1 TABLET BY MOUTH TWICE A  tablet 0     latanoprost (XALATAN) 0.005 % ophthalmic solution Place 1 drop Into the left eye daily       loperamide (IMODIUM A-D) 2 MG tablet daily  30 tablet 0     metFORMIN (GLUCOPHAGE) 500 MG tablet TAKE 1 TABLET BY MOUTH TWICE A DAY WITH MEALS 180 tablet 1     metoprolol tartrate (LOPRESSOR) 25 MG tablet Take 1 tablet (25 mg) by mouth 2 " times daily 180 tablet 0     multivitamin, therapeutic (THERA-VIT) TABS tablet Take 0.5 tablets by mouth 2 times daily       ONETOUCH ULTRA test strip USE TO TEST DAILY 50 strip 4     pioglitazone (ACTOS) 45 MG tablet TAKE 1 TABLET BY MOUTH EVERY DAY 90 tablet 1     warfarin ANTICOAGULANT (COUMADIN) 2.5 MG tablet TAKE 1 TABLET DAILY OR AS DIRECTED BY INR CLINIC 90 tablet 1     ACE NOT PRESCRIBED, INTENTIONAL, 1 each daily ACE Inhibitor not prescribed due to Risk for drug interaction (Patient not taking: Reported on 5/10/2021) 0 each 0     ASPIRIN NOT PRESCRIBED, INTENTIONAL, by Other route continuous prn Reported on 3/7/2017       STATIN NOT PRESCRIBED, INTENTIONAL, 1 each At Bedtime Statin not prescribed intentionally due to Risk for drug interaction (Patient not taking: Reported on 5/10/2021) 0 each 0                Data:   All laboratory data reviewed  No results found for this or any previous visit (from the past 24 hour(s)).    All laboratory data reviewed  Lab Results   Component Value Date    CHOL 120 11/11/2019     Lab Results   Component Value Date    HDL 55 11/11/2019     Lab Results   Component Value Date    LDL 44 11/11/2019     Lab Results   Component Value Date    TRIG 104 11/11/2019     Lab Results   Component Value Date    CHOLHDLRATIO 2.0 03/20/2015     TSH   Date Value Ref Range Status   09/11/2020 6.13 (H) 0.40 - 4.00 mU/L Final     Last Basic Metabolic Panel:  Lab Results   Component Value Date     03/08/2021      Lab Results   Component Value Date    POTASSIUM 4.5 03/08/2021     Lab Results   Component Value Date    CHLORIDE 103 03/08/2021     Lab Results   Component Value Date    MICHAEL 9.4 03/08/2021     Lab Results   Component Value Date    CO2 28 03/08/2021     Lab Results   Component Value Date    BUN 18 03/08/2021     Lab Results   Component Value Date    CR 1.00 03/08/2021     Lab Results   Component Value Date     03/08/2021     Lab Results   Component Value Date    WBC 8.1  03/08/2021     Lab Results   Component Value Date    RBC 3.73 03/08/2021     Lab Results   Component Value Date    HGB 12.3 03/08/2021     Lab Results   Component Value Date    HCT 37.9 03/08/2021     Lab Results   Component Value Date     03/08/2021     Lab Results   Component Value Date    MCH 33.0 03/08/2021     Lab Results   Component Value Date    MCHC 32.5 03/08/2021     Lab Results   Component Value Date    RDW 15.6 03/08/2021     Lab Results   Component Value Date     03/08/2021     Thank you for allowing me to participate in the care of your patient.      Sincerely,     Herbert Gleason MD     [facility]    cc:   No referring provider defined for this encounter.

## 2021-05-10 NOTE — PATIENT INSTRUCTIONS
I think the left leg swelling could be from the leaky leg veins we saw a couple years ago. Let's check them out with another ultrasound to see if they're worse.    If the swelling is bothering you, we can do a vein procedure on that side as well.    In the meantime, let's restart the LASIX diuretic at 1/2 pill every other day and go from there.

## 2021-05-21 ENCOUNTER — TRANSFERRED RECORDS (OUTPATIENT)
Dept: HEALTH INFORMATION MANAGEMENT | Facility: CLINIC | Age: 86
End: 2021-05-21

## 2021-05-27 DIAGNOSIS — I48.0 PAROXYSMAL ATRIAL FIBRILLATION (H): ICD-10-CM

## 2021-05-27 DIAGNOSIS — I48.20 CHRONIC ATRIAL FIBRILLATION (H): ICD-10-CM

## 2021-05-27 DIAGNOSIS — Z79.01 LONG TERM CURRENT USE OF ANTICOAGULANTS WITH INR GOAL OF 2.0-3.0: ICD-10-CM

## 2021-05-27 DIAGNOSIS — L57.0 ACTINIC KERATOSIS: ICD-10-CM

## 2021-05-27 RX ORDER — GLIPIZIDE 10 MG/1
TABLET, EXTENDED RELEASE ORAL
Qty: 180 TABLET | Refills: 0 | Status: SHIPPED | OUTPATIENT
Start: 2021-05-27 | End: 2021-09-22

## 2021-05-27 NOTE — TELEPHONE ENCOUNTER
Pending Prescriptions:                       Disp   Refills    GLIPIZIDE XL 10 MG 24 hr tablet [Pharmacy*180 ta*0            Sig: TAKE 1 TABLET BY MOUTH TWICE A DAY    Prescription approved per [facility] Refill Protocol.

## 2021-05-28 RX ORDER — WARFARIN SODIUM 2.5 MG/1
TABLET ORAL
Qty: 90 TABLET | Refills: 1 | Status: SHIPPED | OUTPATIENT
Start: 2021-05-28 | End: 2021-11-22

## 2021-05-28 NOTE — TELEPHONE ENCOUNTER
Last INR: 04/28/21=2.9  Next INR: 06/09/21  INR referral and OV up-to-date: yes      Prescription approved per [facility] Refill Protocol.    Hope Baumann RN

## 2021-05-28 NOTE — TELEPHONE ENCOUNTER
Pending Prescriptions:                       Disp   Refills    warfarin ANTICOAGULANT (COUMADIN) 2.5 MG t*90 tab*1        Sig: TAKE 1 TABLET DAILY OR AS DIRECTED BY INR CLINIC

## 2021-06-01 ENCOUNTER — HOSPITAL ENCOUNTER (OUTPATIENT)
Dept: CARDIOLOGY | Facility: CLINIC | Age: 86
Discharge: HOME | End: 2021-06-01
Attending: INTERNAL MEDICINE | Admitting: INTERNAL MEDICINE
Payer: COMMERCIAL

## 2021-06-01 DIAGNOSIS — R60.0 BILATERAL LEG EDEMA: ICD-10-CM

## 2021-06-01 PROCEDURE — 93970 EXTREMITY STUDY: CPT | Mod: 26 | Performed by: INTERNAL MEDICINE

## 2021-06-01 PROCEDURE — 93970 EXTREMITY STUDY: CPT

## 2021-06-07 ENCOUNTER — TELEPHONE (OUTPATIENT)
Dept: CARDIOLOGY | Facility: CLINIC | Age: 86
End: 2021-06-07

## 2021-06-07 DIAGNOSIS — I87.2 VENOUS (PERIPHERAL) INSUFFICIENCY: Primary | ICD-10-CM

## 2021-06-07 DIAGNOSIS — I83.813 VARICOSE VEINS OF BILATERAL LOWER EXTREMITIES WITH PAIN: ICD-10-CM

## 2021-06-07 DIAGNOSIS — I83.893 VARICOSE VEINS OF BILATERAL LOWER EXTREMITIES WITH OTHER COMPLICATIONS: ICD-10-CM

## 2021-06-07 NOTE — TELEPHONE ENCOUNTER
----- Message from Herbert Gleason MD sent at 6/7/2021 12:46 PM CDT -----  Please call patient and let him know that the left lower extremity does have reflux and we could do a GSV VenaSeal ablation to help with his edema. If the right leg is bothering him too, we could finish the right GSV VenaSeal ablation at the same time. Thanks!    Spoke to patient regarding the vein comp results above. Pt verbalized understanding. Pt would like to go ahead with Venaseal on both legs. Pt stated that his right leg feels much better after the first procedure but he still feels some discomfort so is open to Dr. Gleason finishing the GSV on the right side. Pt stated the lasix that Dr. Gleason prescribed him for his left leg swelling has not made much of a difference. GSV Venaseal ordered with follow up US and OV. Message sent to scheduling to get it set up.

## 2021-06-08 ENCOUNTER — ANCILLARY PROCEDURE (OUTPATIENT)
Dept: CARDIOLOGY | Facility: CLINIC | Age: 86
End: 2021-06-08
Attending: INTERNAL MEDICINE
Payer: COMMERCIAL

## 2021-06-08 DIAGNOSIS — R55 SYNCOPE: ICD-10-CM

## 2021-06-08 DIAGNOSIS — Z45.09 ENCOUNTER FOR LOOP RECORDER CHECK: ICD-10-CM

## 2021-06-08 LAB
MDC_IDC_EPISODE_DTM: NORMAL
MDC_IDC_EPISODE_DURATION: NORMAL S
MDC_IDC_EPISODE_ID: 449
MDC_IDC_EPISODE_TYPE: NORMAL
MDC_IDC_MSMT_BATTERY_STATUS: NORMAL
MDC_IDC_PG_IMPLANT_DTM: NORMAL
MDC_IDC_PG_MFG: NORMAL
MDC_IDC_PG_MODEL: NORMAL
MDC_IDC_PG_SERIAL: NORMAL
MDC_IDC_PG_TYPE: NORMAL
MDC_IDC_SESS_CLINIC_NAME: NORMAL
MDC_IDC_SESS_DTM: NORMAL
MDC_IDC_SESS_TYPE: NORMAL
MDC_IDC_SET_ZONE_DETECTION_INTERVAL: 2000 MS
MDC_IDC_SET_ZONE_DETECTION_INTERVAL: 3000 MS
MDC_IDC_SET_ZONE_DETECTION_INTERVAL: 420 MS
MDC_IDC_SET_ZONE_TYPE: NORMAL
MDC_IDC_STAT_AT_BURDEN_PERCENT: 28.5 %
MDC_IDC_STAT_AT_DTM_END: NORMAL
MDC_IDC_STAT_AT_DTM_START: NORMAL
MDC_IDC_STAT_EPISODE_RECENT_COUNT: 0
MDC_IDC_STAT_EPISODE_RECENT_COUNT: 5
MDC_IDC_STAT_EPISODE_RECENT_COUNT_DTM_END: NORMAL
MDC_IDC_STAT_EPISODE_RECENT_COUNT_DTM_START: NORMAL
MDC_IDC_STAT_EPISODE_TOTAL_COUNT: 0
MDC_IDC_STAT_EPISODE_TOTAL_COUNT: 1
MDC_IDC_STAT_EPISODE_TOTAL_COUNT: 365
MDC_IDC_STAT_EPISODE_TOTAL_COUNT: 83
MDC_IDC_STAT_EPISODE_TOTAL_COUNT_DTM_END: NORMAL
MDC_IDC_STAT_EPISODE_TOTAL_COUNT_DTM_START: NORMAL
MDC_IDC_STAT_EPISODE_TYPE: NORMAL

## 2021-06-08 PROCEDURE — 93297 REM INTERROG DEV EVAL ICPMS: CPT | Performed by: INTERNAL MEDICINE

## 2021-06-08 PROCEDURE — G2066 INTER DEVC REMOTE 30D: HCPCS | Performed by: INTERNAL MEDICINE

## 2021-06-09 ENCOUNTER — DOCUMENTATION ONLY (OUTPATIENT)
Dept: ANTICOAGULATION | Facility: CLINIC | Age: 86
End: 2021-06-09

## 2021-06-09 ENCOUNTER — ANTICOAGULATION THERAPY VISIT (OUTPATIENT)
Dept: ANTICOAGULATION | Facility: CLINIC | Age: 86
End: 2021-06-09

## 2021-06-09 DIAGNOSIS — Z79.01 LONG TERM CURRENT USE OF ANTICOAGULANT THERAPY: ICD-10-CM

## 2021-06-09 DIAGNOSIS — I48.0 PAROXYSMAL ATRIAL FIBRILLATION (H): ICD-10-CM

## 2021-06-09 DIAGNOSIS — Z79.01 LONG TERM CURRENT USE OF ANTICOAGULANTS WITH INR GOAL OF 2.0-3.0: ICD-10-CM

## 2021-06-09 DIAGNOSIS — Z79.01 LONG TERM CURRENT USE OF ANTICOAGULANTS WITH INR GOAL OF 2.0-3.0: Primary | ICD-10-CM

## 2021-06-09 LAB
CAPILLARY BLOOD COLLECTION: NORMAL
INR PPP: 2.7 (ref 0.86–1.14)

## 2021-06-09 PROCEDURE — 36416 COLLJ CAPILLARY BLOOD SPEC: CPT | Performed by: INTERNAL MEDICINE

## 2021-06-09 PROCEDURE — 85610 PROTHROMBIN TIME: CPT | Performed by: INTERNAL MEDICINE

## 2021-06-09 PROCEDURE — 99207 PR NO CHARGE NURSE ONLY: CPT

## 2021-06-09 NOTE — PROGRESS NOTES
ANTICOAGULATION MANAGEMENT      Louis Keller Jr. due for annual renewal of referral to anticoagulation monitoring. Order pended for your review and signature.      ANTICOAGULATION SUMMARY      Warfarin indication(s)     Atrial fibrillation    Heart valve present?  NO       Current goal range   INR: 2.0-3.0     Goal appropriate for indication? Yes, INR 2-3 appropriate for hx of DVT, PE, hypercoagulable state, Afib, LVAD, or bileaflet AVR without risk factors     Current duration of therapy Indefinite/long term therapy   Time in Therapeutic Range (TTR)  (Goal > 60%) 86.0 %       Office visit with referring provider's group within last year yes on 3/24/21       Monica Souza RN

## 2021-06-09 NOTE — PROGRESS NOTES
ANTICOAGULATION FOLLOW-UP CLINIC VISIT    Patient Name:  Louis Keller Jr.  Date:  2021  Contact Type:  Telephone/ Called patient, he denies any changes. Orders discussed with the patient, he agrees with the plan.Lab INR appointment scheduled on 21.    SUBJECTIVE:  Patient Findings     Comments:  The patient was assessed for diet, medication, and activity level changes, missed or extra doses, bruising or bleeding, with no problem findings. Maintenance warfarin dosing was reviewed with patient and will remain on the same dose until next INR check. Patient did not have any questions or concerns.           Clinical Outcomes     Negatives:  Major bleeding event, Thromboembolic event, Anticoagulation-related hospital admission, Anticoagulation-related ED visit, Anticoagulation-related fatality    Comments:  The patient was assessed for diet, medication, and activity level changes, missed or extra doses, bruising or bleeding, with no problem findings. Maintenance warfarin dosing was reviewed with patient and will remain on the same dose until next INR check. Patient did not have any questions or concerns.              OBJECTIVE    Recent labs: (last 7 days)     21  1113   INR 2.70*       ASSESSMENT / PLAN  INR assessment THER    Recheck INR In: 6 WEEKS    INR Location Outside lab      Anticoagulation Summary  As of 2021    INR goal:  2.0-3.0   TTR:  86.0 % (1 y)   INR used for dosin.70 (2021)   Warfarin maintenance plan:  2.5 mg (2.5 mg x 1) every day   Full warfarin instructions:  2.5 mg every day   Weekly warfarin total:  17.5 mg   No change documented:  Monica Souza RN   Plan last modified:  Tata Alexis RN (2020)   Next INR check:  2021   Priority:  Maintenance   Target end date:  Indefinite    Indications    Long-term (current) use of anticoagulants [Z79.01] [Z79.01]  Atrial fibrillation (H) [I48.91]  Paroxysmal atrial fibrillation (H) [I48.0]  Long term current use of  anticoagulants with INR goal of 2.0-3.0 [Z79.01]             Anticoagulation Episode Summary     INR check location:      Preferred lab:      Send INR reminders to:  [facility]    Comments:          Anticoagulation Care Providers     Provider Role Specialty Phone number    Joselito Armas MD Referring Internal Medicine [phone]            See the Encounter Report to view Anticoagulation Flowsheet and Dosing Calendar (Go to Encounters tab in chart review, and find the Anticoagulation Therapy Visit)    Dosage adjustment made based on physician directed care plan.    Monica Souza RN

## 2021-06-10 DIAGNOSIS — I87.2 VENOUS (PERIPHERAL) INSUFFICIENCY: Primary | ICD-10-CM

## 2021-07-21 ENCOUNTER — LAB (OUTPATIENT)
Dept: LAB | Facility: CLINIC | Age: 86
End: 2021-07-21
Payer: COMMERCIAL

## 2021-07-21 ENCOUNTER — ANTICOAGULATION THERAPY VISIT (OUTPATIENT)
Dept: ANTICOAGULATION | Facility: CLINIC | Age: 86
End: 2021-07-21

## 2021-07-21 DIAGNOSIS — Z79.01 LONG TERM CURRENT USE OF ANTICOAGULANTS WITH INR GOAL OF 2.0-3.0: ICD-10-CM

## 2021-07-21 DIAGNOSIS — I48.0 PAROXYSMAL ATRIAL FIBRILLATION (H): ICD-10-CM

## 2021-07-21 DIAGNOSIS — Z79.01 LONG TERM CURRENT USE OF ANTICOAGULANT THERAPY: ICD-10-CM

## 2021-07-21 DIAGNOSIS — Z79.01 LONG TERM CURRENT USE OF ANTICOAGULANT THERAPY: Primary | ICD-10-CM

## 2021-07-21 LAB — INR BLD: 2.2 (ref 0.9–1.1)

## 2021-07-21 PROCEDURE — 36415 COLL VENOUS BLD VENIPUNCTURE: CPT

## 2021-07-21 PROCEDURE — 85610 PROTHROMBIN TIME: CPT

## 2021-07-21 NOTE — PROGRESS NOTES
ANTICOAGULATION MANAGEMENT     Louis Jarrellayush . 89 year old male is on warfarin with therapeutic INR result. (Goal INR 2.0-3.0)    Recent labs: (last 7 days)     07/21/21  1117   INR 2.2*       ASSESSMENT     Source(s): Patient/Caregiver Call       Warfarin doses taken: Warfarin taken as instructed    Diet: No new diet changes identified    New illness, injury, or hospitalization: Yes: patient reports has had several wounds from thin skin and rubbing against a doorway    Medication/supplement changes: None noted    Signs or symptoms of bleeding or clotting: No    Previous INR: Therapeutic last 2(+) visits    Additional findings: Upcoming surgery/procedure GSV VenaSeal ablation both legs,to help with his edema 9/27/21.  Patient will call Cardio to see if he needs to hold his warfarin and will let the [facility] know.      PLAN     Recommended plan for no diet, medication or health factor changes affecting INR     Dosing Instructions: Continue your current warfarin dose with next INR in 6 weeks       Summary  As of 7/21/2021    Full warfarin instructions:  2.5 mg every day   Next INR check:  9/1/2021             Telephone call with Louis who verbalizes understanding and agrees to plan    Lab visit scheduled    Education provided: Please call back if any changes to your diet, medications or how you've been taking warfarin and Contact [phone]  with any changes, questions or concerns.     Plan made per [facility] anticoagulation protocol    Monica Souza RN  Anticoagulation Clinic  7/21/2021    _______________________________________________________________________     Anticoagulation Episode Summary     Current INR goal:  2.0-3.0   TTR:  89.0 % (1 y)   Target end date:  Indefinite   Send INR reminders to:  [facility]    Indications    Long-term (current) use of anticoagulants [Z79.01] [Z79.01]  Atrial fibrillation (H) [I48.91]  Paroxysmal atrial fibrillation (H) [I48.0]  Long term current use of anticoagulants  with INR goal of 2.0-3.0 [Z79.01]           Comments:             Anticoagulation Care Providers     Provider Role Specialty Phone number    Joselito Armas MD Referring Internal Medicine [phone]

## 2021-07-30 ENCOUNTER — CARE COORDINATION (OUTPATIENT)
Dept: CARDIOLOGY | Facility: CLINIC | Age: 86
End: 2021-07-30

## 2021-07-30 DIAGNOSIS — R06.09 DOE (DYSPNEA ON EXERTION): ICD-10-CM

## 2021-07-30 DIAGNOSIS — R60.0 BILATERAL LEG EDEMA: Primary | ICD-10-CM

## 2021-07-30 DIAGNOSIS — I51.89 DIASTOLIC DYSFUNCTION: ICD-10-CM

## 2021-07-30 NOTE — PROGRESS NOTES
Call from patient's wife regarding patient's BP's and LE swelling. Per patient's wife patient's BP's have been ranging from 130-170's/70's. HR's averaging 65-76. Patient's wife states that some BP's were taken about a half hour after taking medications. Patient had a BP reading yesterday evening of 171//107 at around 1930. Patient took PM dose of medications at around 1900. Patient's wife rechecked his BP at around 2100 and was 157/91. Patient denies having any symptoms with these BP's, no complaints of dizziness, headaches, or any CP/discomfort. Patient does report QUESADA at times. Patient woke this morning and his BP was 135/73. Patient's wife is also concerned regarding his edema in LE. Patient has some pitting edema, worse in the L leg than R leg that had the ablation done. Patient's wife states that he hasn't had swelling anywhere else, no weight gain. Will route to Dr. Gleason for review.       Last OV 5/10/21 w/ Dr. Gleason:  Assessment and Plan:         1. 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  He is concerned about it and the skin health appears to be in jeopardy.  It has not responded to conservative therapy.    We will add back furosemide and plan venous competency testing.  May consider distal saphenous vein ablation on the left in the future.        2. Paroxysmal atrial fibrillation    Continue warfarin and amiodarone     Follow-up in 6 months       CAMILLE Gonzalez July 30, 2021 3:28 PM

## 2021-08-02 RX ORDER — FUROSEMIDE 20 MG
10 TABLET ORAL DAILY
Qty: 45 TABLET | Refills: 3 | Status: SHIPPED | OUTPATIENT
Start: 2021-08-02 | End: 2021-09-09

## 2021-08-02 NOTE — PROGRESS NOTES
He could increase his furosemide, I have it listed as 10 mg every other day currently.  He may go 10 mg daily until his swelling and blood pressures come down.  I reviewed his last venous competency test from June and we could do a bilateral greater saphenous vein Vena seal ablation if he likes.     He can see how the swelling goes with a few days of the increased Lasix and if still an issue, let us have him get on the books for the ablation later.  He will probably need an KONSTANTIN visit 1 month before the ablation to confirm ongoing symptoms for insurance purposes. -Dr. Gleason        Called patient to discuss Dr. Gleason's recommendations. Patient confirmed understanding of increasing the Lasix to 10mg daily. Rx sent to patient's pharmacy. Patient also will need an KONSTANTIN visit prior to his vein ablation on 9/27/21. Message sent to scheduling to get this OV scheduled for insurance coverage. Order placed for follow up.         CAMILLE Gonzalez August 2, 2021 2:29 PM

## 2021-08-04 ENCOUNTER — TELEPHONE (OUTPATIENT)
Dept: CARDIOLOGY | Facility: CLINIC | Age: 86
End: 2021-08-04

## 2021-08-04 NOTE — TELEPHONE ENCOUNTER
Pt calling and asking why light on remote loop recorder monitor comes on every night between 1 and 4 am. Called and informed him that is when the carelink monitor is checking the loop recorder to see if he had any fast, slow or irregular heart beats.

## 2021-08-06 NOTE — PROGRESS NOTES
I think let's keep going the way we are -Dr. Gleason        Called patient to let him know Dr. Gleason's recommendations of continuing to take Lasix 10mg daily. Patient will check blood pressures and let us know next week how symptoms are.           CAMILLE Gonzalez August 6, 2021 3:11 PM

## 2021-08-06 NOTE — PROGRESS NOTES
MATT received from patient, calling to provide an update since increasing his lasix dose. Spoke with patient. Patient states that since increasing his furosemide to 10 mg daily instead of every other day, his swelling has gone down a little bit, but no by much. Patient states that his BP has varied quite a bit. Here are his readings (taken at least 2 hours after meds):     8/3- 152/92   8/4 - 137/85  8/5 - 129/76   8/6 - 162/100     Patient denies any other symptoms, just some higher BP readings and swelling still present. Will route to Dr. Gleason for review.

## 2021-08-09 NOTE — PROGRESS NOTES
VM received from patient, providing update with how his swelling is and BP is. Spoke with patient, and he has the following BPs to report:    8/6 - 148/89  8/8 - 137/76   8/9 - 134/81     Patient states that he does not think that the lasix is helping his swelling, as he hasn't noticed a difference since increasing the lasix. Patient states that it is worse in his left leg than right. Reviewed with patient that hopefully his vein ablation at the end of September will help more with relieving his swelling. Patient is wondering if Dr. Gleason has any further recommendations prior to OV on 8/17 with Michael. Will route to Dr. Gleason for review.

## 2021-08-10 DIAGNOSIS — I48.20 CHRONIC A-FIB (H): ICD-10-CM

## 2021-08-10 RX ORDER — METOPROLOL TARTRATE 25 MG/1
25 TABLET, FILM COATED ORAL 2 TIMES DAILY
Qty: 180 TABLET | Refills: 3 | Status: SHIPPED | OUTPATIENT
Start: 2021-08-10 | End: 2021-09-21

## 2021-08-10 NOTE — TELEPHONE ENCOUNTER
Received refill request for:  Metoprolol Tartrate  Last OV was: 5/10/2021 with Dr. Gleason  Labs/EKG: n/a  F/U scheduled: 8/17/2021 with APatnoe, CNP  New script sent to: [facility]

## 2021-08-17 ENCOUNTER — OFFICE VISIT (OUTPATIENT)
Dept: CARDIOLOGY | Facility: CLINIC | Age: 86
End: 2021-08-17
Payer: COMMERCIAL

## 2021-08-17 VITALS
SYSTOLIC BLOOD PRESSURE: 154 MMHG | DIASTOLIC BLOOD PRESSURE: 83 MMHG | HEIGHT: 71 IN | BODY MASS INDEX: 25.34 KG/M2 | HEART RATE: 66 BPM | WEIGHT: 181 LBS

## 2021-08-17 DIAGNOSIS — L97.319 VENOUS ULCER OF ANKLE, RIGHT (H): ICD-10-CM

## 2021-08-17 DIAGNOSIS — D62 ANEMIA DUE TO BLOOD LOSS, ACUTE: ICD-10-CM

## 2021-08-17 DIAGNOSIS — I83.813 VARICOSE VEINS OF BILATERAL LOWER EXTREMITIES WITH PAIN: ICD-10-CM

## 2021-08-17 DIAGNOSIS — I83.013 VENOUS ULCER OF ANKLE, RIGHT (H): ICD-10-CM

## 2021-08-17 DIAGNOSIS — R60.0 BILATERAL LEG EDEMA: ICD-10-CM

## 2021-08-17 DIAGNOSIS — I87.2 VENOUS (PERIPHERAL) INSUFFICIENCY: Primary | ICD-10-CM

## 2021-08-17 DIAGNOSIS — I10 ESSENTIAL HYPERTENSION: ICD-10-CM

## 2021-08-17 PROCEDURE — 93000 ELECTROCARDIOGRAM COMPLETE: CPT | Performed by: NURSE PRACTITIONER

## 2021-08-17 PROCEDURE — 99214 OFFICE O/P EST MOD 30 MIN: CPT | Performed by: NURSE PRACTITIONER

## 2021-08-17 RX ORDER — LISINOPRIL 10 MG/1
10 TABLET ORAL DAILY
Qty: 90 TABLET | Refills: 3 | Status: SHIPPED | OUTPATIENT
Start: 2021-08-17 | End: 2022-01-01

## 2021-08-17 ASSESSMENT — MIFFLIN-ST. JEOR: SCORE: 1508.14

## 2021-08-17 NOTE — PATIENT INSTRUCTIONS
Thank you for your visit with the [facility] today.    Today's plan:   1. Start taking lisinopril 10 mg once daily for better blood pressure control.  2. Check labs in about 1 week to recheck your electrolytes and potassium level.  3. Eliquis, Pradaxa, and Xarelto are the names of the newer blood thinner options. Check in with your insurance company regarding coverage for these medications. If one if affordable, we could switch to this in place of warfarin.  4. If you continue on warfarin, you should hold this for 4 days prior to the vein ablation procedure.  5. Follow up with me in 1-2 weeks after the ablation procedure.    If you have questions or concerns, please do not hesitate to call my nurse, Deb, at [phone].     Scheduling phone number: [phone]    It was a pleasure seeing you today!     MARTHA Cardozo, CNP  Nurse Practitioner  [facility]  August 17, 2021  ________________________________________________________

## 2021-08-17 NOTE — PROGRESS NOTES
Cardiology Clinic Progress Note    Service Date: August 17, 2021    Primary Cardiology team: Dr. Gleason, Dr. Pacheco (), Susanna Haque PA-C      Reason for Visit: Follow up to discuss Vein ablation     HPI:   I had the pleasure of meeting Mr. Louis Keller Jr. in the clinic today. He is a very nice 89 year old gentleman with a past medical history notable for paroxsymal atrial fibrillation on amiodarone and warfarin, near-syncope in 04/2019, status post loop recorder implant in 08/2019, diabetes mellitus type, dyslipidemia, mild aortic root dilatation, and venous insufficiency with chronic lower extremity edema.    Both  and Mrs. Keller follow with Dr. Gleason and Susanna Haque PA-C for their cardiology care, and I have actually met Mrs. Keller at a previous visit last fall.     Louis has venous reflux disease. He previously underwent right lower extremity venous ablation due to ulcers which subsequently healed nicely. He has since developed worsening left lower extremity edema greater than right. He had distal severe saphenous vein reflux on previous venous competency testing in 2018. He was last seen in clinic by Dr. Gleason in May. He was restarted on a low dose of furosemide at 10 mg daily to help with edema management. Repeat venous competency testing was completed in June 2021 showing left lower extremity reflux which was felt amenable to GSV VenaSeal ablation to help with his edema.      Today, Louis presents to the clinic accompanied by his wife, Laura, in follow up of his recent venous competency testing and to discuss proceeding with venous ablation. He tells me that he has been feeling generally well. He has not had issues with chest pain, shortness of breath,  palpitations, dizziness, presyncope, or syncope. He stays active walking on the treadmill for around 10 minutes several times a week. He notes fatigue and like his activity tolerance has declined slightly over the past year to several months  getting winded a bit more easily when walking for the same amount of time. He's unsure if this could just be related to his age which I think is certainly a possibility as he is nearing 89. His most recent ischemic evaluation was with a Lexiscan nuclear stress test in 05/2019 showing normal LV function and no evidence of ischemia or infarction. We reviewed that if he continues to have concerns for activity intolerance/fatigue that a repeat stress test could be considered in follow up.    He notes his left leg edema has remained about the same despite addition of low dose lasix. He has mild associated discomfort/heaviness. He has tried compression stockings for several months in the past without significant improvement. He now also has a difficult time getting the compression stockings on and off due to osteoarthritis and limited mobility.     Risks and benefits of venous ablation were discussed with the patient today, including bruising along the site of ablation, pain along the site of ablation, the development of a blood clot in the veins in the treated leg, irritation of the nerves that run along with the treated veins, and possible allergic reaction to the VenaSeal adhesive if this is used. Patient expresses understanding and wishes to proceed. Recommend he hold his warfarin for 4 days before the procedure, but will review with Dr. Gleason and confirm with patient beforehand as he does not think that he needed to hold warfarin prior to his previous right venous ablation.     ASSESSMENT AND PLAN:  1. Left lower extremity greater than right edema with known saphenous vein reflux in the left lower extremity  - Plan for left GSV VenaSeal venous ablation to help with his edema as well as possible completion of right GSV VenaSeal ablation at the same time if the right leg continues to bother him too.  - Will continue with low dose furosemide 10 mg daily for now.    2. Paroxsymal atrial fibrillation  - Anticoagulated on  warfarin and on amiodarone 20 mg daily.   - EKG in clinic today personally reviewed showing A.Fib with rate of 69 bpm. Most recent loop recorded interrogation in June 2021 showing that he is in A.Fib around 25.3% of the time.    3. Hypertension  - Blood pressure is elevated in clinic today at 154/83 and has been high in his recent checks at home consistently around 130-150 systolic.   - Recommend he start on lisinopril 10 mg once daily with plan for a repeat BMP in about 1 week. Reviewed target of more consistently under 130/85. Common side effects of lisinopril including dry cough were reviewed.    Thank you for the opportunity to participate in this pleasant patient's care. I will plan to see him back in follow up in around 1-2 weeks following the vein ablation. We would be happy to see him sooner if needed for any concerns in the meantime.     35 total minutes was spent today including chart review, precharting, history and exam, post visit documentation, and reviewing studies as outlined above.     MARTHA Cardozo, CNP   Nurse Practitioner  [facility]  Pager: [phone]  Text Page  (8am - 5pm, M-F)    Orders this Visit:  Orders Placed This Encounter   Procedures     EKG 12-lead complete w/read - Clinics (performed today)     No orders of the defined types were placed in this encounter.    There are no discontinued medications.  Encounter Diagnoses   Name Primary?     Venous (peripheral) insufficiency Yes     Bilateral leg edema      Varicose veins of bilateral lower extremities with pain       Venous ulcer of ankle, right (H)      CURRENT MEDICATIONS:  Current Outpatient Medications   Medication Sig Dispense Refill     Acetaminophen (TYLENOL PO) Take 1,000 mg by mouth 3 times daily       amiodarone (PACERONE) 200 MG tablet Take 1 tablet (200 mg) by mouth daily 90 tablet 2     cholestyramine (QUESTRAN) 4 g packet TAKE 1 PACKET (4 G) BY MOUTH 2 TIMES DAILY (WITH MEALS) 180 packet 2      ferrous gluconate (FERGON) 324 (38 Fe) MG tablet TAKE 1 TABLET (324 MG) BY MOUTH DAILY (WITH BREAKFAST) 100 tablet 1     furosemide (LASIX) 20 MG tablet Take 0.5 tablets (10 mg) by mouth daily 45 tablet 3     GLIPIZIDE XL 10 MG 24 hr tablet TAKE 1 TABLET BY MOUTH TWICE A  tablet 0     latanoprost (XALATAN) 0.005 % ophthalmic solution Place 1 drop Into the left eye daily       loperamide (IMODIUM A-D) 2 MG tablet daily  30 tablet 0     metFORMIN (GLUCOPHAGE) 500 MG tablet TAKE 1 TABLET BY MOUTH TWICE A DAY WITH MEALS 180 tablet 1     metoprolol tartrate (LOPRESSOR) 25 MG tablet Take 1 tablet (25 mg) by mouth 2 times daily 180 tablet 3     multivitamin, therapeutic (THERA-VIT) TABS tablet Take 0.5 tablets by mouth 2 times daily       ONETOUCH ULTRA test strip USE TO TEST DAILY 50 strip 4     pioglitazone (ACTOS) 45 MG tablet TAKE 1 TABLET BY MOUTH EVERY DAY 90 tablet 1     warfarin ANTICOAGULANT (COUMADIN) 2.5 MG tablet TAKE 1 TABLET DAILY OR AS DIRECTED BY INR CLINIC 90 tablet 1     ACE NOT PRESCRIBED, INTENTIONAL, 1 each daily ACE Inhibitor not prescribed due to Risk for drug interaction (Patient not taking: Reported on 5/10/2021) 0 each 0     ASPIRIN NOT PRESCRIBED, INTENTIONAL, by Other route continuous prn Reported on 3/7/2017 (Patient not taking: Reported on 8/17/2021)       STATIN NOT PRESCRIBED, INTENTIONAL, 1 each At Bedtime Statin not prescribed intentionally due to Risk for drug interaction (Patient not taking: Reported on 5/10/2021) 0 each 0     ALLERGIES  Allergies   Allergen Reactions     No Known Drug Allergies      PAST MEDICAL, SURGICAL, FAMILY HISTORY:  History was reviewed and updated as needed, see medical record.    SOCIAL HISTORY:  Social History     Socioeconomic History     Marital status:      Spouse name: Not on file     Number of children: Not on file     Years of education: Not on file     Highest education level: Not on file   Occupational History     Occupation: Lab tech      Comment: Semi-retired   Tobacco Use     Smoking status: Never Smoker     Smokeless tobacco: Never Used   Substance and Sexual Activity     Alcohol use: Yes     Alcohol/week: 0.0 standard drinks     Comment: wine - currently rare     Drug use: No     Sexual activity: Never     Partners: Female   Other Topics Concern      Service Not Asked     Blood Transfusions Not Asked     Caffeine Concern No     Comment: 14 oz per day     Occupational Exposure Not Asked     Hobby Hazards Not Asked     Sleep Concern No     Comment: sometimes - nocturia 4 times per night     Stress Concern Not Asked     Weight Concern Not Asked     Special Diet No     Back Care Not Asked     Exercise Yes     Comment: treadmill/walking 15-20 minutes 6 days per week     Bike Helmet Not Asked     Seat Belt Not Asked     Self-Exams Not Asked     Parent/sibling w/ CABG, MI or angioplasty before 65F 55M? Not Asked   Social History Narrative     Not on file     Social Determinants of Health     Financial Resource Strain:      Difficulty of Paying Living Expenses:    Food Insecurity:      Worried About Running Out of Food in the Last Year:      Ran Out of Food in the Last Year:    Transportation Needs:      Lack of Transportation (Medical):      Lack of Transportation (Non-Medical):    Physical Activity:      Days of Exercise per Week:      Minutes of Exercise per Session:    Stress:      Feeling of Stress :    Social Connections:      Frequency of Communication with Friends and Family:      Frequency of Social Gatherings with Friends and Family:      Attends [employer] Services:      Active Member of Clubs or Organizations:      Attends Club or Organization Meetings:      Marital Status:    Intimate Partner Violence:      Fear of Current or Ex-Partner:      Emotionally Abused:      Physically Abused:      Sexually Abused:      Review of Systems:  Skin:  Negative     Eyes:  Positive for glasses  ENT:  Positive for hearing loss  Respiratory:  Positive  "for dyspnea on exertion  Cardiovascular:    edema;fatigue;Positive for  Gastroenterology: Negative    Genitourinary:  Positive for nocturia  Musculoskeletal:  Positive for joint pain;back pain  Neurologic:  Positive for numbness or tingling of feet  Psychiatric:  Positive for sleep disturbances  Heme/Lymph/Imm:  Negative    Endocrine:  Positive for diabetes     Physical Exam:  Vitals: BP (!) 154/83   Pulse 66   Ht 1.803 m (5' 11\")   Wt 82.1 kg (181 lb)   BMI 25.24 kg/m     Wt Readings from Last 4 Encounters:   08/17/21 82.1 kg (181 lb)   05/10/21 82.1 kg (181 lb)   03/08/21 80.3 kg (177 lb)   09/16/20 80.7 kg (178 lb)     CONSTITUTIONAL: Pleasant elderly gentleman, well nourished, and in no acute distress.  HEENT: Pupils equal, round. Sclerae nonicteric.    NECK: Supple, no masses or JVD appreciated.  C/V:  Irregularly irregular rhythm, controlled rate, normal S1 and S2, no S3 or S4, no murmur, rub or gallop.   RESP: Respirations are unlabored. Lungs are clear to auscultation bilaterally without wheezing, rales, or rhonchi.  EXTREM: 1+ LE edema, L>R with venous stasis changes and scattered varicosities bilaterally. No clubbing or cyanosis.   NEURO: Alert and oriented, cooperative. Gait steady. No gross focal deficits.   PSYCH: Affect appropriate. Mentation normal. Responds to questions appropriately.  SKIN: Warm and dry.     Recent Lab Results reviewed today:  LIPID RESULTS:  Lab Results   Component Value Date    CHOL 120 11/11/2019    HDL 55 11/11/2019    LDL 44 11/11/2019    TRIG 104 11/11/2019    CHOLHDLRATIO 2.0 03/20/2015     LIVER ENZYME RESULTS:  Lab Results   Component Value Date    AST 35 09/11/2020    ALT 31 09/11/2020     CBC RESULTS:  Lab Results   Component Value Date    WBC 8.1 03/08/2021    RBC 3.73 (L) 03/08/2021    HGB 12.3 (L) 03/08/2021    HCT 37.9 (L) 03/08/2021     (H) 03/08/2021    MCH 33.0 03/08/2021    MCHC 32.5 03/08/2021    RDW 15.6 (H) 03/08/2021     03/08/2021     BMP " RESULTS:  Lab Results   Component Value Date     03/08/2021    POTASSIUM 4.5 03/08/2021    CHLORIDE 103 03/08/2021    CO2 28 03/08/2021    ANIONGAP 4 03/08/2021     (H) 03/08/2021    BUN 18 03/08/2021    CR 1.00 03/08/2021    GFRESTIMATED 67 03/08/2021    GFRESTBLACK 77 03/08/2021    MICHAEL 9.4 03/08/2021      A1C RESULTS:  Lab Results   Component Value Date    A1C 6.6 (H) 03/08/2021     INR RESULTS:  Lab Results   Component Value Date    INR 2.2 (H) 07/21/2021    INR 2.70 (H) 06/09/2021    INR 2.90 (H) 04/28/2021     CC  Herbert Gleason MD  [address] [place] [address]     This note was completed in part using Dragon voice recognition software. Although reviewed after completion, some word and grammatical errors may occur.

## 2021-08-17 NOTE — LETTER
8/17/2021    Joselito Armas MD  [address] [facility] [address]    RE: Louis Keller JrJana       Dear Colleague,    I had the pleasure of seeing Louis Keller Jr. in the [facility].      Cardiology Clinic Progress Note    Service Date: August 17, 2021    Primary Cardiology team: Dr. Gleason, Dr. Pacheco (), Susanna Haqeu PA-C      Reason for Visit: Follow up to discuss Vein ablation     HPI:   I had the pleasure of meeting Mr. Louis Keller Jr. in the clinic today. He is a very nice 89 year old gentleman with a past medical history notable for paroxsymal atrial fibrillation on amiodarone and warfarin, near-syncope in 04/2019, status post loop recorder implant in 08/2019, diabetes mellitus type, dyslipidemia, mild aortic root dilatation, and venous insufficiency with chronic lower extremity edema.    Both  and Mrs. Keller follow with Dr. Gleason and Susanna Haque PA-C for their cardiology care, and I have actually met Mrs. Keller at a previous visit last fall.     Louis has venous reflux disease. He previously underwent right lower extremity venous ablation due to ulcers which subsequently healed nicely. He has since developed worsening left lower extremity edema greater than right. He had distal severe saphenous vein reflux on previous venous competency testing in 2018. He was last seen in clinic by Dr. Gleason in May. He was restarted on a low dose of furosemide at 10 mg daily to help with edema management. Repeat venous competency testing was completed in June 2021 showing left lower extremity reflux which was felt amenable to GSV VenaSeal ablation to help with his edema.      Today, Louis presents to the clinic accompanied by his wife, Laura, in follow up of his recent venous competency testing and to discuss proceeding with venous ablation. He tells me that he has been feeling generally well. He has not had issues with chest pain,  shortness of breath,  palpitations, dizziness, presyncope, or syncope. He stays active walking on the treadmill for around 10 minutes several times a week. He notes fatigue and like his activity tolerance has declined slightly over the past year to several months getting winded a bit more easily when walking for the same amount of time. He's unsure if this could just be related to his age which I think is certainly a possibility as he is nearing 89. His most recent ischemic evaluation was with a Lexiscan nuclear stress test in 05/2019 showing normal LV function and no evidence of ischemia or infarction. We reviewed that if he continues to have concerns for activity intolerance/fatigue that a repeat stress test could be considered in follow up.    He notes his left leg edema has remained about the same despite addition of low dose lasix. He has mild associated discomfort/heaviness. He has tried compression stockings for several months in the past without significant improvement. He now also has a difficult time getting the compression stockings on and off due to osteoarthritis and limited mobility.     Risks and benefits of venous ablation were discussed with the patient today, including bruising along the site of ablation, pain along the site of ablation, the development of a blood clot in the veins in the treated leg, irritation of the nerves that run along with the treated veins, and possible allergic reaction to the VenaSeal adhesive if this is used. Patient expresses understanding and wishes to proceed. Recommend he hold his warfarin for 4 days before the procedure, but will review with Dr. Gleason and confirm with patient beforehand as he does not think that he needed to hold warfarin prior to his previous right venous ablation.     ASSESSMENT AND PLAN:  1. Left lower extremity greater than right edema with known saphenous vein reflux in the left lower extremity  - Plan for left GSV VenaSeal venous ablation to help  with his edema as well as possible completion of right GSV VenaSeal ablation at the same time if the right leg continues to bother him too.  - Will continue with low dose furosemide 10 mg daily for now.    2. Paroxsymal atrial fibrillation  - Anticoagulated on warfarin and on amiodarone 20 mg daily.   - EKG in clinic today personally reviewed showing A.Fib with rate of 69 bpm. Most recent loop recorded interrogation in June 2021 showing that he is in A.Fib around 25.3% of the time.    3. Hypertension  - Blood pressure is elevated in clinic today at 154/83 and has been high in his recent checks at home consistently around 130-150 systolic.   - Recommend he start on lisinopril 10 mg once daily with plan for a repeat BMP in about 1 week. Reviewed target of more consistently under 130/85. Common side effects of lisinopril including dry cough were reviewed.    Thank you for the opportunity to participate in this pleasant patient's care. I will plan to see him back in follow up in around 1-2 weeks following the vein ablation. We would be happy to see him sooner if needed for any concerns in the meantime.     35 total minutes was spent today including chart review, precharting, history and exam, post visit documentation, and reviewing studies as outlined above.     MARTHA Cardozo, CNP   Nurse Practitioner  [facility]  Pager: [phone]  Text Page  (8am - 5pm, M-F)    Orders this Visit:  Orders Placed This Encounter   Procedures     EKG 12-lead complete w/read - Clinics (performed today)     No orders of the defined types were placed in this encounter.    There are no discontinued medications.  Encounter Diagnoses   Name Primary?     Venous (peripheral) insufficiency Yes     Bilateral leg edema      Varicose veins of bilateral lower extremities with pain       Venous ulcer of ankle, right (H)      CURRENT MEDICATIONS:  Current Outpatient Medications   Medication Sig Dispense Refill     Acetaminophen  (TYLENOL PO) Take 1,000 mg by mouth 3 times daily       amiodarone (PACERONE) 200 MG tablet Take 1 tablet (200 mg) by mouth daily 90 tablet 2     cholestyramine (QUESTRAN) 4 g packet TAKE 1 PACKET (4 G) BY MOUTH 2 TIMES DAILY (WITH MEALS) 180 packet 2     ferrous gluconate (FERGON) 324 (38 Fe) MG tablet TAKE 1 TABLET (324 MG) BY MOUTH DAILY (WITH BREAKFAST) 100 tablet 1     furosemide (LASIX) 20 MG tablet Take 0.5 tablets (10 mg) by mouth daily 45 tablet 3     GLIPIZIDE XL 10 MG 24 hr tablet TAKE 1 TABLET BY MOUTH TWICE A  tablet 0     latanoprost (XALATAN) 0.005 % ophthalmic solution Place 1 drop Into the left eye daily       loperamide (IMODIUM A-D) 2 MG tablet daily  30 tablet 0     metFORMIN (GLUCOPHAGE) 500 MG tablet TAKE 1 TABLET BY MOUTH TWICE A DAY WITH MEALS 180 tablet 1     metoprolol tartrate (LOPRESSOR) 25 MG tablet Take 1 tablet (25 mg) by mouth 2 times daily 180 tablet 3     multivitamin, therapeutic (THERA-VIT) TABS tablet Take 0.5 tablets by mouth 2 times daily       ONETOUCH ULTRA test strip USE TO TEST DAILY 50 strip 4     pioglitazone (ACTOS) 45 MG tablet TAKE 1 TABLET BY MOUTH EVERY DAY 90 tablet 1     warfarin ANTICOAGULANT (COUMADIN) 2.5 MG tablet TAKE 1 TABLET DAILY OR AS DIRECTED BY INR CLINIC 90 tablet 1     ACE NOT PRESCRIBED, INTENTIONAL, 1 each daily ACE Inhibitor not prescribed due to Risk for drug interaction (Patient not taking: Reported on 5/10/2021) 0 each 0     ASPIRIN NOT PRESCRIBED, INTENTIONAL, by Other route continuous prn Reported on 3/7/2017 (Patient not taking: Reported on 8/17/2021)       STATIN NOT PRESCRIBED, INTENTIONAL, 1 each At Bedtime Statin not prescribed intentionally due to Risk for drug interaction (Patient not taking: Reported on 5/10/2021) 0 each 0     ALLERGIES  Allergies   Allergen Reactions     No Known Drug Allergies      PAST MEDICAL, SURGICAL, FAMILY HISTORY:  History was reviewed and updated as needed, see medical record.    SOCIAL HISTORY:  Social  History     Socioeconomic History     Marital status:      Spouse name: Not on file     Number of children: Not on file     Years of education: Not on file     Highest education level: Not on file   Occupational History     Occupation:      Comment: Semi-retired   Tobacco Use     Smoking status: Never Smoker     Smokeless tobacco: Never Used   Substance and Sexual Activity     Alcohol use: Yes     Alcohol/week: 0.0 standard drinks     Comment: wine - currently rare     Drug use: No     Sexual activity: Never     Partners: Female   Other Topics Concern      Service Not Asked     Blood Transfusions Not Asked     Caffeine Concern No     Comment: 14 oz per day     Occupational Exposure Not Asked     Hobby Hazards Not Asked     Sleep Concern No     Comment: sometimes - nocturia 4 times per night     Stress Concern Not Asked     Weight Concern Not Asked     Special Diet No     Back Care Not Asked     Exercise Yes     Comment: treadmill/walking 15-20 minutes 6 days per week     Bike Helmet Not Asked     Seat Belt Not Asked     Self-Exams Not Asked     Parent/sibling w/ CABG, MI or angioplasty before 65F 55M? Not Asked   Social History Narrative     Not on file     Social Determinants of Health     Financial Resource Strain:      Difficulty of Paying Living Expenses:    Food Insecurity:      Worried About Running Out of Food in the Last Year:      Ran Out of Food in the Last Year:    Transportation Needs:      Lack of Transportation (Medical):      Lack of Transportation (Non-Medical):    Physical Activity:      Days of Exercise per Week:      Minutes of Exercise per Session:    Stress:      Feeling of Stress :    Social Connections:      Frequency of Communication with Friends and Family:      Frequency of Social Gatherings with Friends and Family:      Attends [employer] Services:      Active Member of Clubs or Organizations:      Attends Club or Organization Meetings:      Marital Status:   "  Intimate Partner Violence:      Fear of Current or Ex-Partner:      Emotionally Abused:      Physically Abused:      Sexually Abused:      Review of Systems:  Skin:  Negative     Eyes:  Positive for glasses  ENT:  Positive for hearing loss  Respiratory:  Positive for dyspnea on exertion  Cardiovascular:    edema;fatigue;Positive for  Gastroenterology: Negative    Genitourinary:  Positive for nocturia  Musculoskeletal:  Positive for joint pain;back pain  Neurologic:  Positive for numbness or tingling of feet  Psychiatric:  Positive for sleep disturbances  Heme/Lymph/Imm:  Negative    Endocrine:  Positive for diabetes     Physical Exam:  Vitals: BP (!) 154/83   Pulse 66   Ht 1.803 m (5' 11\")   Wt 82.1 kg (181 lb)   BMI 25.24 kg/m     Wt Readings from Last 4 Encounters:   08/17/21 82.1 kg (181 lb)   05/10/21 82.1 kg (181 lb)   03/08/21 80.3 kg (177 lb)   09/16/20 80.7 kg (178 lb)     CONSTITUTIONAL: Pleasant elderly gentleman, well nourished, and in no acute distress.  HEENT: Pupils equal, round. Sclerae nonicteric.    NECK: Supple, no masses or JVD appreciated.  C/V:  Irregularly irregular rhythm, controlled rate, normal S1 and S2, no S3 or S4, no murmur, rub or gallop.   RESP: Respirations are unlabored. Lungs are clear to auscultation bilaterally without wheezing, rales, or rhonchi.  EXTREM: 1+ LE edema, L>R with venous stasis changes and scattered varicosities bilaterally. No clubbing or cyanosis.   NEURO: Alert and oriented, cooperative. Gait steady. No gross focal deficits.   PSYCH: Affect appropriate. Mentation normal. Responds to questions appropriately.  SKIN: Warm and dry.     Recent Lab Results reviewed today:  LIPID RESULTS:  Lab Results   Component Value Date    CHOL 120 11/11/2019    HDL 55 11/11/2019    LDL 44 11/11/2019    TRIG 104 11/11/2019    CHOLHDLRATIO 2.0 03/20/2015     LIVER ENZYME RESULTS:  Lab Results   Component Value Date    AST 35 09/11/2020    ALT 31 09/11/2020     CBC RESULTS:  Lab " Results   Component Value Date    WBC 8.1 03/08/2021    RBC 3.73 (L) 03/08/2021    HGB 12.3 (L) 03/08/2021    HCT 37.9 (L) 03/08/2021     (H) 03/08/2021    MCH 33.0 03/08/2021    MCHC 32.5 03/08/2021    RDW 15.6 (H) 03/08/2021     03/08/2021     BMP RESULTS:  Lab Results   Component Value Date     03/08/2021    POTASSIUM 4.5 03/08/2021    CHLORIDE 103 03/08/2021    CO2 28 03/08/2021    ANIONGAP 4 03/08/2021     (H) 03/08/2021    BUN 18 03/08/2021    CR 1.00 03/08/2021    GFRESTIMATED 67 03/08/2021    GFRESTBLACK 77 03/08/2021    MICHAEL 9.4 03/08/2021      A1C RESULTS:  Lab Results   Component Value Date    A1C 6.6 (H) 03/08/2021     INR RESULTS:  Lab Results   Component Value Date    INR 2.2 (H) 07/21/2021    INR 2.70 (H) 06/09/2021    INR 2.90 (H) 04/28/2021     CC  Herbert Gleason MD  [address] [place] [address]     This note was completed in part using Dragon voice recognition software. Although reviewed after completion, some word and grammatical errors may occur.      Thank you for allowing me to participate in the care of your patient.      Sincerely,     Abdelrahman Cortes NP     [facility]  cc:   No referring provider defined for this encounter.

## 2021-08-18 ENCOUNTER — TELEPHONE (OUTPATIENT)
Dept: CARDIOLOGY | Facility: CLINIC | Age: 86
End: 2021-08-18

## 2021-08-18 RX ORDER — FERROUS GLUCONATE 324(38)MG
TABLET ORAL
Qty: 100 TABLET | Refills: 1 | Status: SHIPPED | OUTPATIENT
Start: 2021-08-18 | End: 2022-03-01

## 2021-08-18 NOTE — TELEPHONE ENCOUNTER
Call received from pt regarding his medicaiton lisinopril prescribed per Michael byrnes CNP. Pt reports that he does not have the ability to get the medication until this weekend when his daughter will get it for him. Pt would like to reschedule his lab appoitnemtn for furhter out. Pt has previously scheduled lab appointment on 9/1/2021 to get his INR completed at the [facility]. Will reach ou to the clinic to see if they are OK with drawing his BMP at that time to reduce number of visits/ venipunctures needed.   ISRA De Souza RN, BSN.

## 2021-09-01 ENCOUNTER — LAB (OUTPATIENT)
Dept: LAB | Facility: CLINIC | Age: 86
End: 2021-09-01
Payer: COMMERCIAL

## 2021-09-01 ENCOUNTER — ANTICOAGULATION THERAPY VISIT (OUTPATIENT)
Dept: ANTICOAGULATION | Facility: CLINIC | Age: 86
End: 2021-09-01

## 2021-09-01 DIAGNOSIS — L97.319 VENOUS ULCER OF ANKLE, RIGHT (H): ICD-10-CM

## 2021-09-01 DIAGNOSIS — I48.0 PAROXYSMAL ATRIAL FIBRILLATION (H): ICD-10-CM

## 2021-09-01 DIAGNOSIS — I83.813 VARICOSE VEINS OF BILATERAL LOWER EXTREMITIES WITH PAIN: ICD-10-CM

## 2021-09-01 DIAGNOSIS — I10 ESSENTIAL HYPERTENSION: ICD-10-CM

## 2021-09-01 DIAGNOSIS — Z79.01 LONG TERM CURRENT USE OF ANTICOAGULANT THERAPY: Primary | ICD-10-CM

## 2021-09-01 DIAGNOSIS — I87.2 VENOUS (PERIPHERAL) INSUFFICIENCY: ICD-10-CM

## 2021-09-01 DIAGNOSIS — R60.0 BILATERAL LEG EDEMA: ICD-10-CM

## 2021-09-01 DIAGNOSIS — Z79.01 LONG TERM CURRENT USE OF ANTICOAGULANTS WITH INR GOAL OF 2.0-3.0: ICD-10-CM

## 2021-09-01 DIAGNOSIS — I83.013 VENOUS ULCER OF ANKLE, RIGHT (H): ICD-10-CM

## 2021-09-01 DIAGNOSIS — Z79.01 LONG TERM CURRENT USE OF ANTICOAGULANT THERAPY: ICD-10-CM

## 2021-09-01 LAB — INR BLD: 2 (ref 0.9–1.1)

## 2021-09-01 PROCEDURE — 85610 PROTHROMBIN TIME: CPT

## 2021-09-01 PROCEDURE — 80048 BASIC METABOLIC PNL TOTAL CA: CPT

## 2021-09-01 PROCEDURE — 36415 COLL VENOUS BLD VENIPUNCTURE: CPT

## 2021-09-01 NOTE — PROGRESS NOTES
ANTICOAGULATION MANAGEMENT     Louis Keller Jr. 89 year old male is on warfarin with therapeutic INR result. (Goal INR 2.0-3.0)    Recent labs: (last 7 days)     09/01/21  1113   INR 2.0*       ASSESSMENT     Source(s): Chart Review and Patient/Caregiver Call       Warfarin doses taken: Warfarin taken as instructed . Patient denies any missed warfarin doses.    Diet: No new diet changes identified    New illness, injury, or hospitalization: No    Medication/supplement changes: Lisinopril started on 8/17/21 No interaction anticipated Patient states he is thinking of changing to Eliquis    Signs or symptoms of bleeding or clotting: No    Previous INR: Therapeutic last 2(+) visits    Additional findings: Upcoming surgery/procedure Patient reports he is having venous ablation on his left leg 9/26/21, had right leg done previously.     PLAN     Recommended plan for no diet, medication or health factor changes affecting INR     Dosing Instructions: Continue your current warfarin dose with next INR in 2 weeks       Summary  As of 9/1/2021    Full warfarin instructions:  2.5 mg every day   Next INR check:  9/15/2021             Telephone call with Louis who verbalizes understanding and agrees to plan    Lab visit scheduled    Education provided: Please call back if any changes to your diet, medications or how you've been taking warfarin and Contact [phone]  with any changes, questions or concerns.     Plan made per [facility] anticoagulation protocol    Monica Souza RN  Anticoagulation Clinic  9/1/2021    _______________________________________________________________________     Anticoagulation Episode Summary     Current INR goal:  2.0-3.0   TTR:  89.0 % (1 y)   Target end date:  Indefinite   Send INR reminders to:  [facility]    Indications    Long-term (current) use of anticoagulants [Z79.01] [Z79.01]  Atrial fibrillation (H) [I48.91]  Paroxysmal atrial fibrillation (H) [I48.0]  Long term current use of  anticoagulants with INR goal of 2.0-3.0 [Z79.01]           Comments:             Anticoagulation Care Providers     Provider Role Specialty Phone number    Joselito Armas MD Referring Internal Medicine [phone]

## 2021-09-02 LAB
ANION GAP SERPL CALCULATED.3IONS-SCNC: 8 MMOL/L (ref 3–14)
BUN SERPL-MCNC: 14 MG/DL (ref 7–30)
CALCIUM SERPL-MCNC: 9 MG/DL (ref 8.5–10.1)
CHLORIDE BLD-SCNC: 98 MMOL/L (ref 94–109)
CO2 SERPL-SCNC: 23 MMOL/L (ref 20–32)
CREAT SERPL-MCNC: 0.87 MG/DL (ref 0.66–1.25)
GFR SERPL CREATININE-BSD FRML MDRD: 77 ML/MIN/1.73M2
GLUCOSE BLD-MCNC: 236 MG/DL (ref 70–99)
POTASSIUM BLD-SCNC: 4.4 MMOL/L (ref 3.4–5.3)
SODIUM SERPL-SCNC: 129 MMOL/L (ref 133–144)

## 2021-09-08 ENCOUNTER — OFFICE VISIT (OUTPATIENT)
Dept: INTERNAL MEDICINE | Facility: CLINIC | Age: 86
End: 2021-09-08
Payer: COMMERCIAL

## 2021-09-08 VITALS
TEMPERATURE: 97.9 F | HEART RATE: 78 BPM | SYSTOLIC BLOOD PRESSURE: 122 MMHG | BODY MASS INDEX: 24.92 KG/M2 | OXYGEN SATURATION: 96 % | HEIGHT: 71 IN | WEIGHT: 178 LBS | RESPIRATION RATE: 12 BRPM | DIASTOLIC BLOOD PRESSURE: 70 MMHG

## 2021-09-08 DIAGNOSIS — I48.91 ATRIAL FIBRILLATION, UNSPECIFIED TYPE (H): ICD-10-CM

## 2021-09-08 DIAGNOSIS — R60.0 BILATERAL LEG EDEMA: ICD-10-CM

## 2021-09-08 DIAGNOSIS — N18.30 TYPE 2 DIABETES MELLITUS WITH STAGE 3 CHRONIC KIDNEY DISEASE, WITHOUT LONG-TERM CURRENT USE OF INSULIN, UNSPECIFIED WHETHER STAGE 3A OR 3B CKD (H): Primary | ICD-10-CM

## 2021-09-08 DIAGNOSIS — E11.22 TYPE 2 DIABETES MELLITUS WITH STAGE 3 CHRONIC KIDNEY DISEASE, WITHOUT LONG-TERM CURRENT USE OF INSULIN, UNSPECIFIED WHETHER STAGE 3A OR 3B CKD (H): Primary | ICD-10-CM

## 2021-09-08 DIAGNOSIS — E87.1 HYPONATREMIA: ICD-10-CM

## 2021-09-08 DIAGNOSIS — I51.89 DIASTOLIC DYSFUNCTION: ICD-10-CM

## 2021-09-08 DIAGNOSIS — R06.09 DOE (DYSPNEA ON EXERTION): ICD-10-CM

## 2021-09-08 LAB
ERYTHROCYTE [DISTWIDTH] IN BLOOD BY AUTOMATED COUNT: 14.6 % (ref 10–15)
HBA1C MFR BLD: 6.8 % (ref 0–5.6)
HCT VFR BLD AUTO: 38.7 % (ref 40–53)
HGB BLD-MCNC: 12.5 G/DL (ref 13.3–17.7)
MCH RBC QN AUTO: 30.7 PG (ref 26.5–33)
MCHC RBC AUTO-ENTMCNC: 32.3 G/DL (ref 31.5–36.5)
MCV RBC AUTO: 95 FL (ref 78–100)
NT-PROBNP SERPL-MCNC: 967 PG/ML (ref 0–450)
PLATELET # BLD AUTO: 224 10E3/UL (ref 150–450)
RBC # BLD AUTO: 4.07 10E6/UL (ref 4.4–5.9)
WBC # BLD AUTO: 7 10E3/UL (ref 4–11)

## 2021-09-08 PROCEDURE — 83880 ASSAY OF NATRIURETIC PEPTIDE: CPT | Performed by: INTERNAL MEDICINE

## 2021-09-08 PROCEDURE — 36415 COLL VENOUS BLD VENIPUNCTURE: CPT | Performed by: INTERNAL MEDICINE

## 2021-09-08 PROCEDURE — 85027 COMPLETE CBC AUTOMATED: CPT | Performed by: INTERNAL MEDICINE

## 2021-09-08 PROCEDURE — 99214 OFFICE O/P EST MOD 30 MIN: CPT | Performed by: INTERNAL MEDICINE

## 2021-09-08 PROCEDURE — 80048 BASIC METABOLIC PNL TOTAL CA: CPT | Performed by: INTERNAL MEDICINE

## 2021-09-08 PROCEDURE — 83036 HEMOGLOBIN GLYCOSYLATED A1C: CPT | Performed by: INTERNAL MEDICINE

## 2021-09-08 RX ORDER — BRIMONIDINE TARTRATE 2 MG/ML
1 SOLUTION/ DROPS OPHTHALMIC 2 TIMES DAILY
COMMUNITY
Start: 2021-08-31

## 2021-09-08 ASSESSMENT — MIFFLIN-ST. JEOR: SCORE: 1494.53

## 2021-09-08 NOTE — PROGRESS NOTES
Assessment & Plan     Type 2 diabetes mellitus with stage 3 chronic kidney disease, without long-term current use of insulin, unspecified whether stage 3a or 3b CKD (H)  Assess lab work  - Hemoglobin A1c    Atrial fibrillation, unspecified type (H)  Controlled rate   - CBC with platelets  - Basic metabolic panel  (Ca, Cl, CO2, Creat, Gluc, K, Na, BUN)  - BNP-N terminal pro    QUESADA (dyspnea on exertion)  Assess lab  Consider increasing diuretic dose, stopping Actos   - BNP-N terminal pro             See Patient Instructions    Return in about 3 months (around 12/8/2021) for Routine Visit.    Joselito Armas MD  [facility] CHINEDU Myers is a 89 year old who presents for the following health issues     HPI     Diabetes Follow-up  Has H/O DM. On diet , exercise and PO treatment. Blood sugars are controlled. No parestesias. No hypoglycemias.    How often are you checking your blood sugar? One time daily  What time of day are you checking your blood sugars (select all that apply)?  Before meals  Have you had any blood sugars above 200?  No  Have you had any blood sugars below 70?  No    What symptoms do you notice when your blood sugar is low?  None    What concerns do you have today about your diabetes? None     Do you have any of these symptoms? (Select all that apply)  No numbness or tingling in feet.  No redness, sores or blisters on feet.  No complaints of excessive thirst.  No reports of blurry vision.  No significant changes to weight.    Have you had a diabetic eye exam in the last 12 months?           Hyperlipidemia Follow-Up  Has H/O hyperlipidemia. On medical treatment and diet. No side effects. No muscle weakness, myalgias or upset stomach.       Are you regularly taking any medication or supplement to lower your cholesterol?  NO    Are you having muscle aches or other side effects that you think could be caused by your cholesterol lowering medication?   "No    Hypertension Follow-up  Has h/o HTN. on medical treatment. BP has been controlled. No side effects from medications. No CP, HA, dizziness. good compliance with medications and low salt diet.      Do you check your blood pressure regularly outside of the clinic? Yes     Are you following a low salt diet? Yes    Are your blood pressures ever more than 140 on the top number (systolic) OR more   than 90 on the bottom number (diastolic), for example 140/90? Yes    BP Readings from Last 2 Encounters:   08/17/21 (!) 154/83   05/10/21 (!) 171/86     Hemoglobin A1C (%)   Date Value   03/08/2021 6.6 (H)   09/02/2020 6.4 (H)     LDL Cholesterol Calculated (mg/dL)   Date Value   11/11/2019 44   12/19/2018 52         How many servings of fruits and vegetables do you eat daily?  2-3    On average, how many sweetened beverages do you drink each day (Examples: soda, juice, sweet tea, etc.  Do NOT count diet or artificially sweetened beverages)?   0    How many days per week do you exercise enough to make your heart beat faster? 4    How many minutes a day do you exercise enough to make your heart beat faster? 10 - 19    How many days per week do you miss taking your medication? 0    Has history of atrial fibrillation. On anticoagulation with Coumadin and rate control medications. Asymptonatic - no chest pains , palpitations,  no side effects from medications.  Reports SOB on exertion, LE edema. On Lasix low dose.     Review of Systems   Constitutional, HEENT, cardiovascular, pulmonary, gi and gu systems are negative, except as otherwise noted.      Objective    Pulse 78   Temp 97.9  F (36.6  C) (Oral)   Resp 12   Ht 1.803 m (5' 11\")   Wt 80.7 kg (178 lb)   SpO2 96%   BMI 24.83 kg/m    Body mass index is 24.83 kg/m .  Physical Exam   GENERAL:elderly, frail, alert and no distress  EYES: Eyes grossly normal to inspection, PERRL and conjunctivae and sclerae normal  HENT: ear canals and TM's normal, nose and mouth without " ulcers or lesions  NECK: no adenopathy, no asymmetry, masses, or scars and thyroid normal to palpation  RESP: lungs clear to auscultation - no rales, rhonchi or wheezes,mild base crackles   CV: regular rate and rhythm, normal S1 S2, no S3 or S4, no murmur, click or rub,  and peripheral pulses strong  ABDOMEN: soft, nontender, no hepatosplenomegaly, no masses and bowel sounds normal  MS: no gross musculoskeletal defects noted, 2+ LE edema    Lab on 09/01/2021   Component Date Value Ref Range Status     INR 09/01/2021 2.0* 0.9 - 1.1 Final     Sodium 09/01/2021 129* 133 - 144 mmol/L Final     Potassium 09/01/2021 4.4  3.4 - 5.3 mmol/L Final     Chloride 09/01/2021 98  94 - 109 mmol/L Final     Carbon Dioxide (CO2) 09/01/2021 23  20 - 32 mmol/L Final     Anion Gap 09/01/2021 8  3 - 14 mmol/L Final     Urea Nitrogen 09/01/2021 14  7 - 30 mg/dL Final     Creatinine 09/01/2021 0.87  0.66 - 1.25 mg/dL Final     Calcium 09/01/2021 9.0  8.5 - 10.1 mg/dL Final     Glucose 09/01/2021 236* 70 - 99 mg/dL Final     GFR Estimate 09/01/2021 77  >60 mL/min/1.73m2 Final    As of July 11, 2021, eGFR is calculated by the CKD-EPI creatinine equation, without race adjustment. eGFR can be influenced by muscle mass, exercise, and diet. The reported eGFR is an estimation only and is only applicable if the renal function is stable.

## 2021-09-09 ENCOUNTER — ANCILLARY PROCEDURE (OUTPATIENT)
Dept: CARDIOLOGY | Facility: CLINIC | Age: 86
End: 2021-09-09
Attending: INTERNAL MEDICINE
Payer: COMMERCIAL

## 2021-09-09 DIAGNOSIS — R55 SYNCOPE: ICD-10-CM

## 2021-09-09 DIAGNOSIS — Z45.09 ENCOUNTER FOR LOOP RECORDER CHECK: ICD-10-CM

## 2021-09-09 LAB
ANION GAP SERPL CALCULATED.3IONS-SCNC: 4 MMOL/L (ref 3–14)
BUN SERPL-MCNC: 16 MG/DL (ref 7–30)
CALCIUM SERPL-MCNC: 8.8 MG/DL (ref 8.5–10.1)
CHLORIDE BLD-SCNC: 98 MMOL/L (ref 94–109)
CO2 SERPL-SCNC: 28 MMOL/L (ref 20–32)
CREAT SERPL-MCNC: 0.92 MG/DL (ref 0.66–1.25)
GFR SERPL CREATININE-BSD FRML MDRD: 73 ML/MIN/1.73M2
GLUCOSE BLD-MCNC: 161 MG/DL (ref 70–99)
POTASSIUM BLD-SCNC: 4.3 MMOL/L (ref 3.4–5.3)
SODIUM SERPL-SCNC: 130 MMOL/L (ref 133–144)

## 2021-09-09 PROCEDURE — G2066 INTER DEVC REMOTE 30D: HCPCS | Performed by: INTERNAL MEDICINE

## 2021-09-09 PROCEDURE — 93297 REM INTERROG DEV EVAL ICPMS: CPT | Performed by: INTERNAL MEDICINE

## 2021-09-09 RX ORDER — FUROSEMIDE 20 MG
20 TABLET ORAL DAILY
Qty: 45 TABLET | Refills: 3 | Status: SHIPPED | OUTPATIENT
Start: 2021-09-09 | End: 2022-02-02

## 2021-09-14 LAB
MDC_IDC_EPISODE_DTM: NORMAL
MDC_IDC_EPISODE_DURATION: NORMAL S
MDC_IDC_EPISODE_ID: 462
MDC_IDC_EPISODE_TYPE: NORMAL
MDC_IDC_MSMT_BATTERY_STATUS: NORMAL
MDC_IDC_PG_IMPLANT_DTM: NORMAL
MDC_IDC_PG_MFG: NORMAL
MDC_IDC_PG_MODEL: NORMAL
MDC_IDC_PG_SERIAL: NORMAL
MDC_IDC_PG_TYPE: NORMAL
MDC_IDC_SESS_CLINIC_NAME: NORMAL
MDC_IDC_SESS_DTM: NORMAL
MDC_IDC_SESS_TYPE: NORMAL
MDC_IDC_SET_ZONE_DETECTION_INTERVAL: 2000 MS
MDC_IDC_SET_ZONE_DETECTION_INTERVAL: 3000 MS
MDC_IDC_SET_ZONE_DETECTION_INTERVAL: 420 MS
MDC_IDC_SET_ZONE_TYPE: NORMAL
MDC_IDC_STAT_AT_BURDEN_PERCENT: 26 %
MDC_IDC_STAT_AT_DTM_END: NORMAL
MDC_IDC_STAT_AT_DTM_START: NORMAL
MDC_IDC_STAT_EPISODE_RECENT_COUNT: 0
MDC_IDC_STAT_EPISODE_RECENT_COUNT: 13
MDC_IDC_STAT_EPISODE_RECENT_COUNT_DTM_END: NORMAL
MDC_IDC_STAT_EPISODE_RECENT_COUNT_DTM_START: NORMAL
MDC_IDC_STAT_EPISODE_TOTAL_COUNT: 0
MDC_IDC_STAT_EPISODE_TOTAL_COUNT: 1
MDC_IDC_STAT_EPISODE_TOTAL_COUNT: 365
MDC_IDC_STAT_EPISODE_TOTAL_COUNT: 96
MDC_IDC_STAT_EPISODE_TOTAL_COUNT_DTM_END: NORMAL
MDC_IDC_STAT_EPISODE_TOTAL_COUNT_DTM_START: NORMAL
MDC_IDC_STAT_EPISODE_TYPE: NORMAL

## 2021-09-15 ENCOUNTER — ANTICOAGULATION THERAPY VISIT (OUTPATIENT)
Dept: ANTICOAGULATION | Facility: CLINIC | Age: 86
End: 2021-09-15

## 2021-09-15 ENCOUNTER — LAB (OUTPATIENT)
Dept: LAB | Facility: CLINIC | Age: 86
End: 2021-09-15
Payer: COMMERCIAL

## 2021-09-15 DIAGNOSIS — I48.0 PAROXYSMAL ATRIAL FIBRILLATION (H): ICD-10-CM

## 2021-09-15 DIAGNOSIS — Z79.01 LONG TERM CURRENT USE OF ANTICOAGULANT THERAPY: ICD-10-CM

## 2021-09-15 DIAGNOSIS — Z79.01 LONG TERM CURRENT USE OF ANTICOAGULANT THERAPY: Primary | ICD-10-CM

## 2021-09-15 DIAGNOSIS — Z79.01 LONG TERM CURRENT USE OF ANTICOAGULANTS WITH INR GOAL OF 2.0-3.0: ICD-10-CM

## 2021-09-15 LAB — INR BLD: 1.9 (ref 0.9–1.1)

## 2021-09-15 PROCEDURE — 36415 COLL VENOUS BLD VENIPUNCTURE: CPT

## 2021-09-15 PROCEDURE — 85610 PROTHROMBIN TIME: CPT

## 2021-09-15 NOTE — PROGRESS NOTES
ANTICOAGULATION MANAGEMENT     Louis Keller Jr. 89 year old male is on warfarin with therapeutic INR result. (Goal INR 2.0-3.0)    Recent labs: (last 7 days)     09/15/21  1117   INR 1.9*       ASSESSMENT     Source(s): Chart Review and Patient/Caregiver Call       Warfarin doses taken: Warfarin taken as instructed    Diet: No new diet changes identified    New illness, injury, or hospitalization: No    Medication/supplement changes: None noted    Signs or symptoms of bleeding or clotting: No    Previous INR: Therapeutic last 2(+) visits    Additional findings: Upcoming surgery/procedure Venous ablation on 9/27/21.     PLAN     Recommended plan for no diet, medication or health factor changes affecting INR     Dosing Instructions:  Increase your warfarin dose (7.1% change) with next INR in 2 weeks   INR has been trending down and today subtherapeutic. Increased weekly maintenance dose 1.25 mg    Summary  As of 9/15/2021    Full warfarin instructions:  3.75 mg every Wed; 2.5 mg all other days   Next INR check:  9/27/2021             Telephone call with Louis who verbalizes understanding and agrees to plan    Lab visit scheduled    Education provided: Please call back if any changes to your diet, medications or how you've been taking warfarin and Contact [phone]  with any changes, questions or concerns.     Plan made per [facility] anticoagulation protocol    Monica Souza RN  Anticoagulation Clinic  9/15/2021    _______________________________________________________________________     Anticoagulation Episode Summary     Current INR goal:  2.0-3.0   TTR:  85.2 % (1 y)   Target end date:  Indefinite   Send INR reminders to:  [facility]    Indications    Long-term (current) use of anticoagulants [Z79.01] [Z79.01]  Atrial fibrillation (H) [I48.91]  Paroxysmal atrial fibrillation (H) [I48.0]  Long term current use of anticoagulants with INR goal of 2.0-3.0 [Z79.01]           Comments:              Anticoagulation Care Providers     Provider Role Specialty Phone number    Joselito Armas MD Referring Internal Medicine [phone]

## 2021-09-20 NOTE — PROGRESS NOTES
Cardiology Clinic Progress Note    Service Date: September 21, 2021    Primary Cardiology team: Dr. Gleason, Dr. Pacheco (), Susanna Haque PA-C      Reason for Visit: Follow up of medication changes    HPI:   I had the pleasure of meeting Mr. Louis Keller Jr. in the clinic today. He is a very nice 89 year old gentleman with a past medical history notable for paroxsymal atrial fibrillation on amiodarone and warfarin, near-syncope in 04/2019, status post loop recorder implant in 08/2019, diabetes mellitus type, dyslipidemia, mild aortic root dilatation, and venous insufficiency with chronic lower extremity edema.    Both  and Mrs. Keller follow with Dr. Gleason and Susanna Haque PA-C for their cardiology care, and I have actually met Mrs. Keller at a previous visit last fall.     Louis has venous reflux disease. He previously underwent right lower extremity venous ablation due to ulcers which subsequently healed nicely. He has since developed worsening left lower extremity edema greater than right. He had distal severe saphenous vein reflux on previous venous competency testing in 2018. He was last seen in clinic by Dr. Gleason in May. He was restarted on a low dose of furosemide at 10 mg daily to help with edema management. Repeat venous competency testing was completed in June 2021 showing left lower extremity reflux which was felt amenable to GSV VenaSeal ablation to help with his edema.     I saw Louis in follow up of his venous competency testing about 1 month ago and discussed proceeding with venous ablation. He wants to go ahead with this and is scheduled for next week. His blood pressure has been mildly elevated recently so I had him start on lisinopril 10 mg once daily for better blood pressure control. He also has complained of some mild exertional dyspnea over the past few months. He was seen by his primary care team with Dr. Armas regarding this. An NT pro BNP was mildly elevated so he was instructed  to increase the dose of his lasix from 10 mg to 20 mg once daily. A repeat BMP was completed in follow up showing stable electrolytes and renal function.     Today, Louis presents to the clinic in follow up primarily for reassessment of his blood pressure.   He tells me that he has been feeling generally well. He feels that his exertional dyspnea has improved some since starting on the higher dose of lasix. He feels he has been tolerating the addition of lisinopril without concerns. blood pressure remains mildly elevate fairly regularly in the 130s to 140s systolic. He denies symptoms of chest pain, palpitations, dizziness, presyncope, or syncope. He stays active walking on the treadmill for around 10 minutes several times a week.    ASSESSMENT AND PLAN:  1. Left lower extremity greater than right edema with known saphenous vein reflux in the left lower extremity  - Plan for left GSV VenaSeal venous ablation to help with his edema as well as possible completion of right GSV VenaSeal ablation at the same time if the right leg continues to bother him too.  - Will review with Dr. Gleason regarding if warfarin should be held prior to the procedure and confirm with patient beforehand. He does not think that he needed to hold warfarin prior to his previous right venous ablation.   - Will continue with low dose furosemide 20 mg daily for now, I instructed him to hold this on the day of the procedure.    2. Paroxsymal atrial fibrillation  - Anticoagulated on warfarin. Rate controlled on metoprolol tartrate 25 mg BID.  - Most recent loop recorded interrogation earlier this month showing that he is in A.Fib around 26% of the time. He has been on amiodarone 200 mg daily to help with rhythm control. We can continue to monitor A.Fib burden and could consider stopping amio in follow up if the burden continues to rise.    3. Hypertension  - Blood pressure remains mildly elevated in clinic and in his recent checks at home consistently  around 130-150 systolic despite the recent addition of lisinopril 10 mg daily.  - Recommend he increase the dose of metoprolol to 1.5 tablets (37.5 mg) twice daily for better blood pressure control.    Thank you for the opportunity to participate in this pleasant patient's care. I will plan to see him back in follow up in around 1-2 weeks following the vein ablation. We would be happy to see him sooner if needed for any concerns in the meantime.     35 total minutes was spent today including chart review, precharting, history and exam, post visit documentation, and reviewing studies as outlined above.     MARTHA Cardozo, CNP   Nurse Practitioner  [facility]  Pager: [phone]  Text Page  (8am - 5pm, M-F)    Orders this Visit:  No orders of the defined types were placed in this encounter.    Orders Placed This Encounter   Medications     metoprolol tartrate (LOPRESSOR) 25 MG tablet     Sig: Take 1.5 tablets (37.5 mg) by mouth 2 times daily     Dispense:  180 tablet     Refill:  3     Medications Discontinued During This Encounter   Medication Reason     metoprolol tartrate (LOPRESSOR) 25 MG tablet      Encounter Diagnoses   Name Primary?     Venous (peripheral) insufficiency      Bilateral leg edema      Varicose veins of bilateral lower extremities with pain       Venous ulcer of ankle, right (H)      Essential hypertension      Chronic a-fib (H)      CURRENT MEDICATIONS:  Current Outpatient Medications   Medication Sig Dispense Refill     Acetaminophen (TYLENOL PO) Take 1,000 mg by mouth 3 times daily       amiodarone (PACERONE) 200 MG tablet Take 1 tablet (200 mg) by mouth daily 90 tablet 2     brimonidine (ALPHAGAN) 0.2 % ophthalmic solution INSTILL 1 DROP INTO LEFT EYE TWICE A DAY       cholestyramine (QUESTRAN) 4 g packet TAKE 1 PACKET (4 G) BY MOUTH 2 TIMES DAILY (WITH MEALS) 180 packet 2     ferrous gluconate (FERGON) 324 (38 Fe) MG tablet TAKE 1 TABLET (324 MG) BY MOUTH DAILY (WITH  BREAKFAST) 100 tablet 1     furosemide (LASIX) 20 MG tablet Take 1 tablet (20 mg) by mouth daily 45 tablet 3     latanoprost (XALATAN) 0.005 % ophthalmic solution Place 1 drop Into the left eye daily       lisinopril (ZESTRIL) 10 MG tablet Take 1 tablet (10 mg) by mouth daily 90 tablet 3     loperamide (IMODIUM A-D) 2 MG tablet daily as needed  30 tablet 0     metFORMIN (GLUCOPHAGE) 500 MG tablet TAKE 1 TABLET BY MOUTH TWICE A DAY WITH MEALS 180 tablet 1     metoprolol tartrate (LOPRESSOR) 25 MG tablet Take 1.5 tablets (37.5 mg) by mouth 2 times daily 180 tablet 3     multivitamin, therapeutic (THERA-VIT) TABS tablet Take 0.5 tablets by mouth 2 times daily       ONETOUCH ULTRA test strip USE TO TEST DAILY 50 strip 4     warfarin ANTICOAGULANT (COUMADIN) 2.5 MG tablet TAKE 1 TABLET DAILY OR AS DIRECTED BY INR CLINIC 90 tablet 1     ACE NOT PRESCRIBED, INTENTIONAL, 1 each daily ACE Inhibitor not prescribed due to Risk for drug interaction (Patient not taking: Reported on 9/21/2021) 0 each 0     ASPIRIN NOT PRESCRIBED, INTENTIONAL, by Other route continuous prn Reported on 3/7/2017 (Patient not taking: Reported on 9/21/2021)       glipiZIDE (GLUCOTROL XL) 10 MG 24 hr tablet TAKE 1 TABLET BY MOUTH TWICE A  tablet 0     pioglitazone (ACTOS) 45 MG tablet TAKE 1 TABLET BY MOUTH EVERY DAY 90 tablet 1     STATIN NOT PRESCRIBED, INTENTIONAL, 1 each At Bedtime Statin not prescribed intentionally due to Risk for drug interaction (Patient not taking: Reported on 9/21/2021) 0 each 0     ALLERGIES  Allergies   Allergen Reactions     No Known Drug Allergies      PAST MEDICAL, SURGICAL, FAMILY HISTORY:  History was reviewed and updated as needed, see medical record.    SOCIAL HISTORY:  Social History     Socioeconomic History     Marital status:      Spouse name: Not on file     Number of children: Not on file     Years of education: Not on file     Highest education level: Not on file   Occupational History      Occupation: Lab tech     Comment: Semi-retired   Tobacco Use     Smoking status: Never Smoker     Smokeless tobacco: Never Used   Substance and Sexual Activity     Alcohol use: Not Currently     Drug use: No     Sexual activity: Never     Partners: Female   Other Topics Concern      Service Not Asked     Blood Transfusions Not Asked     Caffeine Concern No     Comment: 14 oz per day     Occupational Exposure Not Asked     Hobby Hazards Not Asked     Sleep Concern No     Comment: sometimes - nocturia 4 times per night     Stress Concern Not Asked     Weight Concern Not Asked     Special Diet No     Back Care Not Asked     Exercise Yes     Comment: treadmill/walking 15-20 minutes 6 days per week     Bike Helmet Not Asked     Seat Belt Not Asked     Self-Exams Not Asked     Parent/sibling w/ CABG, MI or angioplasty before 65F 55M? Not Asked   Social History Narrative     Not on file     Social Determinants of Health     Financial Resource Strain:      Difficulty of Paying Living Expenses:    Food Insecurity:      Worried About Running Out of Food in the Last Year:      Ran Out of Food in the Last Year:    Transportation Needs:      Lack of Transportation (Medical):      Lack of Transportation (Non-Medical):    Physical Activity:      Days of Exercise per Week:      Minutes of Exercise per Session:    Stress:      Feeling of Stress :    Social Connections:      Frequency of Communication with Friends and Family:      Frequency of Social Gatherings with Friends and Family:      Attends Temple Services:      Active Member of Clubs or Organizations:      Attends Club or Organization Meetings:      Marital Status:    Intimate Partner Violence:      Fear of Current or Ex-Partner:      Emotionally Abused:      Physically Abused:      Sexually Abused:      Review of Systems:  Skin:  Positive for bruising;nail changes   Eyes:  Positive for glasses  ENT:  Positive for hearing loss  Respiratory:  Positive for dyspnea  "on exertion  Cardiovascular:    Positive for;edema;fatigue  Gastroenterology: Positive for abdominal pain  Genitourinary:  Positive for nocturia  Musculoskeletal:  Positive for joint pain;back pain;muscular weakness  Neurologic:  Positive for numbness or tingling of feet  Psychiatric:  Positive for sleep disturbances  Heme/Lymph/Imm:  Positive for allergies  Endocrine:  Positive for diabetes     Physical Exam:  Vitals: /70   Pulse 72   Ht 1.803 m (5' 11\")   Wt 80.9 kg (178 lb 4.8 oz)   BMI 24.87 kg/m     Wt Readings from Last 4 Encounters:   09/21/21 80.9 kg (178 lb 4.8 oz)   09/08/21 80.7 kg (178 lb)   08/17/21 82.1 kg (181 lb)   05/10/21 82.1 kg (181 lb)     CONSTITUTIONAL: Pleasant elderly gentleman, well nourished, and in no acute distress.  HEENT: Pupils equal, round. Sclerae nonicteric.    NECK: Supple, no masses or JVD appreciated.  C/V:  Irregularly irregular rhythm, controlled rate, normal S1 and S2, no S3 or S4, no murmur, rub or gallop.   RESP: Respirations are unlabored. Lungs are clear to auscultation bilaterally without wheezing, rales, or rhonchi.  EXTREM: 1+ LE edema, L>R with venous stasis changes and scattered varicosities bilaterally. No clubbing or cyanosis.   NEURO: Alert and oriented, cooperative. Gait steady. No gross focal deficits.   PSYCH: Affect appropriate. Mentation normal. Responds to questions appropriately.  SKIN: Warm and dry.     Recent Lab Results reviewed today:  LIPID RESULTS:  Lab Results   Component Value Date    CHOL 120 11/11/2019    HDL 55 11/11/2019    LDL 44 11/11/2019    TRIG 104 11/11/2019    CHOLHDLRATIO 2.0 03/20/2015     LIVER ENZYME RESULTS:  Lab Results   Component Value Date    AST 35 09/11/2020    ALT 31 09/11/2020     CBC RESULTS:  Lab Results   Component Value Date    WBC 7.0 09/08/2021    WBC 8.1 03/08/2021    RBC 4.07 (L) 09/08/2021    RBC 3.73 (L) 03/08/2021    HGB 12.5 (L) 09/08/2021    HGB 12.3 (L) 03/08/2021    HCT 38.7 (L) 09/08/2021    HCT 37.9 " (L) 03/08/2021    MCV 95 09/08/2021     (H) 03/08/2021    MCH 30.7 09/08/2021    MCH 33.0 03/08/2021    MCHC 32.3 09/08/2021    MCHC 32.5 03/08/2021    RDW 14.6 09/08/2021    RDW 15.6 (H) 03/08/2021     09/08/2021     03/08/2021     BMP RESULTS:  Lab Results   Component Value Date     (L) 09/08/2021     03/08/2021    POTASSIUM 4.3 09/08/2021    POTASSIUM 4.5 03/08/2021    CHLORIDE 98 09/08/2021    CHLORIDE 103 03/08/2021    CO2 28 09/08/2021    CO2 28 03/08/2021    ANIONGAP 4 09/08/2021    ANIONGAP 4 03/08/2021     (H) 09/08/2021     (H) 03/08/2021    BUN 16 09/08/2021    BUN 18 03/08/2021    CR 0.92 09/08/2021    CR 1.00 03/08/2021    GFRESTIMATED 73 09/08/2021    GFRESTIMATED 67 03/08/2021    GFRESTBLACK 77 03/08/2021    MICHAEL 8.8 09/08/2021    MICHAEL 9.4 03/08/2021      A1C RESULTS:  Lab Results   Component Value Date    A1C 6.8 (H) 09/08/2021    A1C 6.6 (H) 03/08/2021     INR RESULTS:  Lab Results   Component Value Date    INR 1.9 (H) 09/15/2021    INR 2.0 (H) 09/01/2021    INR 2.70 (H) 06/09/2021    INR 2.90 (H) 04/28/2021     CC  Herbert Gleason MD  [id number]     This note was completed in part using Dragon voice recognition software. Although reviewed after completion, some word and grammatical errors may occur.

## 2021-09-21 ENCOUNTER — OFFICE VISIT (OUTPATIENT)
Dept: CARDIOLOGY | Facility: CLINIC | Age: 86
End: 2021-09-21
Attending: NURSE PRACTITIONER
Payer: COMMERCIAL

## 2021-09-21 ENCOUNTER — DOCUMENTATION ONLY (OUTPATIENT)
Dept: LAB | Facility: CLINIC | Age: 86
End: 2021-09-21

## 2021-09-21 VITALS
DIASTOLIC BLOOD PRESSURE: 70 MMHG | WEIGHT: 178.3 LBS | HEART RATE: 72 BPM | HEIGHT: 71 IN | SYSTOLIC BLOOD PRESSURE: 132 MMHG | BODY MASS INDEX: 24.96 KG/M2

## 2021-09-21 DIAGNOSIS — I83.013 VENOUS ULCER OF ANKLE, RIGHT (H): ICD-10-CM

## 2021-09-21 DIAGNOSIS — I87.2 VENOUS (PERIPHERAL) INSUFFICIENCY: ICD-10-CM

## 2021-09-21 DIAGNOSIS — I10 ESSENTIAL HYPERTENSION: ICD-10-CM

## 2021-09-21 DIAGNOSIS — I48.20 CHRONIC A-FIB (H): ICD-10-CM

## 2021-09-21 DIAGNOSIS — L97.319 VENOUS ULCER OF ANKLE, RIGHT (H): ICD-10-CM

## 2021-09-21 DIAGNOSIS — R60.0 BILATERAL LEG EDEMA: ICD-10-CM

## 2021-09-21 DIAGNOSIS — I83.813 VARICOSE VEINS OF BILATERAL LOWER EXTREMITIES WITH PAIN: ICD-10-CM

## 2021-09-21 PROCEDURE — 99214 OFFICE O/P EST MOD 30 MIN: CPT | Performed by: NURSE PRACTITIONER

## 2021-09-21 RX ORDER — METOPROLOL TARTRATE 25 MG/1
37.5 TABLET, FILM COATED ORAL 2 TIMES DAILY
Qty: 180 TABLET | Refills: 3 | Status: SHIPPED | OUTPATIENT
Start: 2021-09-21 | End: 2021-10-21

## 2021-09-21 ASSESSMENT — MIFFLIN-ST. JEOR: SCORE: 1495.89

## 2021-09-21 NOTE — LETTER
9/21/2021    Joselito Armas MD  [address] [facility] [address]    RE: Louis Keller JrJana       Dear Colleague,    I had the pleasure of seeing Louis Keller Jr. in the [facility].      Cardiology Clinic Progress Note    Service Date: September 21, 2021    Primary Cardiology team: Dr. Gleason, Dr. Pacheco (), Susanna Haque PA-C      Reason for Visit: Follow up of medication changes    HPI:   I had the pleasure of meeting Mr. Louis Keller Jr. in the clinic today. He is a very nice 89 year old gentleman with a past medical history notable for paroxsymal atrial fibrillation on amiodarone and warfarin, near-syncope in 04/2019, status post loop recorder implant in 08/2019, diabetes mellitus type, dyslipidemia, mild aortic root dilatation, and venous insufficiency with chronic lower extremity edema.    Both  and Mrs. Keller follow with Dr. Gleason and Susanna Haque PA-C for their cardiology care, and I have actually met Mrs. Keller at a previous visit last fall.     Louis has venous reflux disease. He previously underwent right lower extremity venous ablation due to ulcers which subsequently healed nicely. He has since developed worsening left lower extremity edema greater than right. He had distal severe saphenous vein reflux on previous venous competency testing in 2018. He was last seen in clinic by Dr. Gleason in May. He was restarted on a low dose of furosemide at 10 mg daily to help with edema management. Repeat venous competency testing was completed in June 2021 showing left lower extremity reflux which was felt amenable to GSV VenaSeal ablation to help with his edema.     I saw Louis in follow up of his venous competency testing about 1 month ago and discussed proceeding with venous ablation. He wants to go ahead with this and is scheduled for next week. His blood pressure has been mildly elevated recently so I had him start on lisinopril  10 mg once daily for better blood pressure control. He also has complained of some mild exertional dyspnea over the past few months. He was seen by his primary care team with Dr. Armas regarding this. An NT pro BNP was mildly elevated so he was instructed to increase the dose of his lasix from 10 mg to 20 mg once daily. A repeat BMP was completed in follow up showing stable electrolytes and renal function.     Today, Louis presents to the clinic in follow up primarily for reassessment of his blood pressure.   He tells me that he has been feeling generally well. He feels that his exertional dyspnea has improved some since starting on the higher dose of lasix. He feels he has been tolerating the addition of lisinopril without concerns. blood pressure remains mildly elevate fairly regularly in the 130s to 140s systolic. He denies symptoms of chest pain, palpitations, dizziness, presyncope, or syncope. He stays active walking on the treadmill for around 10 minutes several times a week.    ASSESSMENT AND PLAN:  1. Left lower extremity greater than right edema with known saphenous vein reflux in the left lower extremity  - Plan for left GSV VenaSeal venous ablation to help with his edema as well as possible completion of right GSV VenaSeal ablation at the same time if the right leg continues to bother him too.  - Will review with Dr. Gleason regarding if warfarin should be held prior to the procedure and confirm with patient beforehand. He does not think that he needed to hold warfarin prior to his previous right venous ablation.   - Will continue with low dose furosemide 20 mg daily for now, I instructed him to hold this on the day of the procedure.    2. Paroxsymal atrial fibrillation  - Anticoagulated on warfarin. Rate controlled on metoprolol tartrate 25 mg BID.  - Most recent loop recorded interrogation earlier this month showing that he is in A.Fib around 26% of the time. He has been on amiodarone 200 mg daily to help  with rhythm control. We can continue to monitor A.Fib burden and could consider stopping amio in follow up if the burden continues to rise.    3. Hypertension  - Blood pressure remains mildly elevated in clinic and in his recent checks at home consistently around 130-150 systolic despite the recent addition of lisinopril 10 mg daily.  - Recommend he increase the dose of metoprolol to 1.5 tablets (37.5 mg) twice daily for better blood pressure control.    Thank you for the opportunity to participate in this pleasant patient's care. I will plan to see him back in follow up in around 1-2 weeks following the vein ablation. We would be happy to see him sooner if needed for any concerns in the meantime.     35 total minutes was spent today including chart review, precharting, history and exam, post visit documentation, and reviewing studies as outlined above.     MRATHA Cardozo, CNP   Nurse Practitioner  [facility]  Pager: [phone]  Text Page  (8am - 5pm, M-F)    Orders this Visit:  No orders of the defined types were placed in this encounter.    Orders Placed This Encounter   Medications     metoprolol tartrate (LOPRESSOR) 25 MG tablet     Sig: Take 1.5 tablets (37.5 mg) by mouth 2 times daily     Dispense:  180 tablet     Refill:  3     Medications Discontinued During This Encounter   Medication Reason     metoprolol tartrate (LOPRESSOR) 25 MG tablet      Encounter Diagnoses   Name Primary?     Venous (peripheral) insufficiency      Bilateral leg edema      Varicose veins of bilateral lower extremities with pain       Venous ulcer of ankle, right (H)      Essential hypertension      Chronic a-fib (H)      CURRENT MEDICATIONS:  Current Outpatient Medications   Medication Sig Dispense Refill     Acetaminophen (TYLENOL PO) Take 1,000 mg by mouth 3 times daily       amiodarone (PACERONE) 200 MG tablet Take 1 tablet (200 mg) by mouth daily 90 tablet 2     brimonidine (ALPHAGAN) 0.2 % ophthalmic  solution INSTILL 1 DROP INTO LEFT EYE TWICE A DAY       cholestyramine (QUESTRAN) 4 g packet TAKE 1 PACKET (4 G) BY MOUTH 2 TIMES DAILY (WITH MEALS) 180 packet 2     ferrous gluconate (FERGON) 324 (38 Fe) MG tablet TAKE 1 TABLET (324 MG) BY MOUTH DAILY (WITH BREAKFAST) 100 tablet 1     furosemide (LASIX) 20 MG tablet Take 1 tablet (20 mg) by mouth daily 45 tablet 3     latanoprost (XALATAN) 0.005 % ophthalmic solution Place 1 drop Into the left eye daily       lisinopril (ZESTRIL) 10 MG tablet Take 1 tablet (10 mg) by mouth daily 90 tablet 3     loperamide (IMODIUM A-D) 2 MG tablet daily as needed  30 tablet 0     metFORMIN (GLUCOPHAGE) 500 MG tablet TAKE 1 TABLET BY MOUTH TWICE A DAY WITH MEALS 180 tablet 1     metoprolol tartrate (LOPRESSOR) 25 MG tablet Take 1.5 tablets (37.5 mg) by mouth 2 times daily 180 tablet 3     multivitamin, therapeutic (THERA-VIT) TABS tablet Take 0.5 tablets by mouth 2 times daily       ONETOUCH ULTRA test strip USE TO TEST DAILY 50 strip 4     warfarin ANTICOAGULANT (COUMADIN) 2.5 MG tablet TAKE 1 TABLET DAILY OR AS DIRECTED BY INR CLINIC 90 tablet 1     ACE NOT PRESCRIBED, INTENTIONAL, 1 each daily ACE Inhibitor not prescribed due to Risk for drug interaction (Patient not taking: Reported on 9/21/2021) 0 each 0     ASPIRIN NOT PRESCRIBED, INTENTIONAL, by Other route continuous prn Reported on 3/7/2017 (Patient not taking: Reported on 9/21/2021)       glipiZIDE (GLUCOTROL XL) 10 MG 24 hr tablet TAKE 1 TABLET BY MOUTH TWICE A  tablet 0     pioglitazone (ACTOS) 45 MG tablet TAKE 1 TABLET BY MOUTH EVERY DAY 90 tablet 1     STATIN NOT PRESCRIBED, INTENTIONAL, 1 each At Bedtime Statin not prescribed intentionally due to Risk for drug interaction (Patient not taking: Reported on 9/21/2021) 0 each 0     ALLERGIES  Allergies   Allergen Reactions     No Known Drug Allergies      PAST MEDICAL, SURGICAL, FAMILY HISTORY:  History was reviewed and updated as needed, see medical  record.    SOCIAL HISTORY:  Social History     Socioeconomic History     Marital status:      Spouse name: Not on file     Number of children: Not on file     Years of education: Not on file     Highest education level: Not on file   Occupational History     Occupation:      Comment: Semi-retired   Tobacco Use     Smoking status: Never Smoker     Smokeless tobacco: Never Used   Substance and Sexual Activity     Alcohol use: Not Currently     Drug use: No     Sexual activity: Never     Partners: Female   Other Topics Concern      Service Not Asked     Blood Transfusions Not Asked     Caffeine Concern No     Comment: 14 oz per day     Occupational Exposure Not Asked     Hobby Hazards Not Asked     Sleep Concern No     Comment: sometimes - nocturia 4 times per night     Stress Concern Not Asked     Weight Concern Not Asked     Special Diet No     Back Care Not Asked     Exercise Yes     Comment: treadmill/walking 15-20 minutes 6 days per week     Bike Helmet Not Asked     Seat Belt Not Asked     Self-Exams Not Asked     Parent/sibling w/ CABG, MI or angioplasty before 65F 55M? Not Asked   Social History Narrative     Not on file     Social Determinants of Health     Financial Resource Strain:      Difficulty of Paying Living Expenses:    Food Insecurity:      Worried About Running Out of Food in the Last Year:      Ran Out of Food in the Last Year:    Transportation Needs:      Lack of Transportation (Medical):      Lack of Transportation (Non-Medical):    Physical Activity:      Days of Exercise per Week:      Minutes of Exercise per Session:    Stress:      Feeling of Stress :    Social Connections:      Frequency of Communication with Friends and Family:      Frequency of Social Gatherings with Friends and Family:      Attends Yazidi Services:      Active Member of Clubs or Organizations:      Attends Club or Organization Meetings:      Marital Status:    Intimate Partner Violence:       "Fear of Current or Ex-Partner:      Emotionally Abused:      Physically Abused:      Sexually Abused:      Review of Systems:  Skin:  Positive for bruising;nail changes   Eyes:  Positive for glasses  ENT:  Positive for hearing loss  Respiratory:  Positive for dyspnea on exertion  Cardiovascular:    Positive for;edema;fatigue  Gastroenterology: Positive for abdominal pain  Genitourinary:  Positive for nocturia  Musculoskeletal:  Positive for joint pain;back pain;muscular weakness  Neurologic:  Positive for numbness or tingling of feet  Psychiatric:  Positive for sleep disturbances  Heme/Lymph/Imm:  Positive for allergies  Endocrine:  Positive for diabetes     Physical Exam:  Vitals: /70   Pulse 72   Ht 1.803 m (5' 11\")   Wt 80.9 kg (178 lb 4.8 oz)   BMI 24.87 kg/m     Wt Readings from Last 4 Encounters:   09/21/21 80.9 kg (178 lb 4.8 oz)   09/08/21 80.7 kg (178 lb)   08/17/21 82.1 kg (181 lb)   05/10/21 82.1 kg (181 lb)     CONSTITUTIONAL: Pleasant elderly gentleman, well nourished, and in no acute distress.  HEENT: Pupils equal, round. Sclerae nonicteric.    NECK: Supple, no masses or JVD appreciated.  C/V:  Irregularly irregular rhythm, controlled rate, normal S1 and S2, no S3 or S4, no murmur, rub or gallop.   RESP: Respirations are unlabored. Lungs are clear to auscultation bilaterally without wheezing, rales, or rhonchi.  EXTREM: 1+ LE edema, L>R with venous stasis changes and scattered varicosities bilaterally. No clubbing or cyanosis.   NEURO: Alert and oriented, cooperative. Gait steady. No gross focal deficits.   PSYCH: Affect appropriate. Mentation normal. Responds to questions appropriately.  SKIN: Warm and dry.     Recent Lab Results reviewed today:  LIPID RESULTS:  Lab Results   Component Value Date    CHOL 120 11/11/2019    HDL 55 11/11/2019    LDL 44 11/11/2019    TRIG 104 11/11/2019    CHOLHDLRATIO 2.0 03/20/2015     LIVER ENZYME RESULTS:  Lab Results   Component Value Date    AST 35 " 09/11/2020    ALT 31 09/11/2020     CBC RESULTS:  Lab Results   Component Value Date    WBC 7.0 09/08/2021    WBC 8.1 03/08/2021    RBC 4.07 (L) 09/08/2021    RBC 3.73 (L) 03/08/2021    HGB 12.5 (L) 09/08/2021    HGB 12.3 (L) 03/08/2021    HCT 38.7 (L) 09/08/2021    HCT 37.9 (L) 03/08/2021    MCV 95 09/08/2021     (H) 03/08/2021    MCH 30.7 09/08/2021    MCH 33.0 03/08/2021    MCHC 32.3 09/08/2021    MCHC 32.5 03/08/2021    RDW 14.6 09/08/2021    RDW 15.6 (H) 03/08/2021     09/08/2021     03/08/2021     BMP RESULTS:  Lab Results   Component Value Date     (L) 09/08/2021     03/08/2021    POTASSIUM 4.3 09/08/2021    POTASSIUM 4.5 03/08/2021    CHLORIDE 98 09/08/2021    CHLORIDE 103 03/08/2021    CO2 28 09/08/2021    CO2 28 03/08/2021    ANIONGAP 4 09/08/2021    ANIONGAP 4 03/08/2021     (H) 09/08/2021     (H) 03/08/2021    BUN 16 09/08/2021    BUN 18 03/08/2021    CR 0.92 09/08/2021    CR 1.00 03/08/2021    GFRESTIMATED 73 09/08/2021    GFRESTIMATED 67 03/08/2021    GFRESTBLACK 77 03/08/2021    MICHAEL 8.8 09/08/2021    MICHAEL 9.4 03/08/2021      A1C RESULTS:  Lab Results   Component Value Date    A1C 6.8 (H) 09/08/2021    A1C 6.6 (H) 03/08/2021     INR RESULTS:  Lab Results   Component Value Date    INR 1.9 (H) 09/15/2021    INR 2.0 (H) 09/01/2021    INR 2.70 (H) 06/09/2021    INR 2.90 (H) 04/28/2021     CC  Hebrert Gleason MD  [id number] [id number]     This note was completed in part using Dragon voice recognition software. Although reviewed after completion, some word and grammatical errors may occur.        Thank you for allowing me to participate in the care of your patient.      Sincerely,     Abdelrahman Cortes NP     [facility]  cc:   Abdelrahman Cortes NP  [id number] [id number]

## 2021-09-21 NOTE — PROGRESS NOTES
Louis Keller Jr. has an upcoming lab appointment:    Future Appointments   Date Time Provider Department Center   9/21/2021  2:00 PM Abdelrahman Cortes NP RUHT [facility]   9/24/2021 10:30 AM RH COVID LAB [facility]   9/27/2021  8:00 AM SUVUS1 SUUMVA [facility] CL[facility]   9/30/2021 11:45 AM RI LAB RILABR [facility]   10/1/2021 11:00 AM RHECHVUS [facility]   12/8/2021  1:40 PM Joselito Armas MD RIIM [facility]   12/9/2021 12:00 AM [place] DCR2 [facility]     Patient is scheduled for the following lab(s): HM LABS    Patient either has no future order, or has Health Maintenance labs due. Please review and place either future orders or HMPO (Review of Health Maintenance Protocol Orders), as appropriate.    Health Maintenance Due   Topic     ANNUAL REVIEW OF HM ORDERS      LIPID      MICROALBUMIN      Shyann Cary MLT

## 2021-09-24 ENCOUNTER — LAB (OUTPATIENT)
Dept: LAB | Facility: CLINIC | Age: 86
End: 2021-09-24
Attending: INTERNAL MEDICINE
Payer: COMMERCIAL

## 2021-09-24 ENCOUNTER — DOCUMENTATION ONLY (OUTPATIENT)
Dept: CARDIOLOGY | Facility: CLINIC | Age: 86
End: 2021-09-24

## 2021-09-24 DIAGNOSIS — I87.2 VENOUS (PERIPHERAL) INSUFFICIENCY: ICD-10-CM

## 2021-09-24 PROCEDURE — U0003 INFECTIOUS AGENT DETECTION BY NUCLEIC ACID (DNA OR RNA); SEVERE ACUTE RESPIRATORY SYNDROME CORONAVIRUS 2 (SARS-COV-2) (CORONAVIRUS DISEASE [COVID-19]), AMPLIFIED PROBE TECHNIQUE, MAKING USE OF HIGH THROUGHPUT TECHNOLOGIES AS DESCRIBED BY CMS-2020-01-R: HCPCS

## 2021-09-24 RX ORDER — DIAZEPAM 5 MG
TABLET ORAL
COMMUNITY
Start: 2021-09-24 | End: 2021-10-14

## 2021-09-24 NOTE — PROGRESS NOTES
Called patient to review Pre- Ablation orders:     Patient will increase fluid the day before the procedure.  Patient will hold Lasix until after the ablation.  Patient will not wear compression stockings the day before or the day of the ablation.  Valium was explained to patient and ordered to pharmacy of choice.  Patient aware to bring Valium to clinic.  Patient will take Allegra 180mg twice daily for two weeks post Venaseal ablation.   Patient will have a  to bring them home.  Follow-up ultrasound for Wednesday scheduled.  Follow-up OV for 1 month scheduled.   Covid test pending.  Patient has no questions.       Future Appointments   Date Time Provider Department Center   9/24/2021 10:30 AM RH COVID LAB [facility]   9/27/2021  8:00 AM SUVUS1 SUUMVA [facility] PSA CLIN   9/30/2021 11:45 AM RI LAB RILABR [facility]   10/1/2021 11:00 AM RHECHVUS [facility]   12/8/2021  1:40 PM Joselito Armas MD RIIM [facility]   12/9/2021 12:[facility] [facility]            CAMILLE Gonzalez September 24, 2021 10:21 AM

## 2021-09-25 LAB — SARS-COV-2 RNA RESP QL NAA+PROBE: NEGATIVE

## 2021-09-27 ENCOUNTER — ANCILLARY PROCEDURE (OUTPATIENT)
Dept: VASCULAR ULTRASOUND | Facility: CLINIC | Age: 86
End: 2021-09-27
Attending: INTERNAL MEDICINE
Payer: COMMERCIAL

## 2021-09-27 VITALS
OXYGEN SATURATION: 96 % | DIASTOLIC BLOOD PRESSURE: 53 MMHG | RESPIRATION RATE: 16 BRPM | HEART RATE: 57 BPM | SYSTOLIC BLOOD PRESSURE: 115 MMHG

## 2021-09-27 DIAGNOSIS — I87.2 VENOUS (PERIPHERAL) INSUFFICIENCY: ICD-10-CM

## 2021-09-27 DIAGNOSIS — I83.813 VARICOSE VEINS OF BILATERAL LOWER EXTREMITIES WITH PAIN: ICD-10-CM

## 2021-09-27 DIAGNOSIS — I83.893 VARICOSE VEINS OF BILATERAL LOWER EXTREMITIES WITH OTHER COMPLICATIONS: ICD-10-CM

## 2021-09-27 PROCEDURE — 76937 US GUIDE VASCULAR ACCESS: CPT | Mod: 59 | Performed by: INTERNAL MEDICINE

## 2021-09-27 PROCEDURE — 36482 ENDOVEN THER CHEM ADHES 1ST: CPT | Mod: 50 | Performed by: INTERNAL MEDICINE

## 2021-09-27 NOTE — PROGRESS NOTES
Successful bilateral GSV VenaSeal completed. 5 mg valium given to patient per Dr. Gleason. Patient tolerated procedure well.

## 2021-09-29 ENCOUNTER — TRANSFERRED RECORDS (OUTPATIENT)
Dept: HEALTH INFORMATION MANAGEMENT | Facility: CLINIC | Age: 86
End: 2021-09-29

## 2021-09-30 ENCOUNTER — ANTICOAGULATION THERAPY VISIT (OUTPATIENT)
Dept: ANTICOAGULATION | Facility: CLINIC | Age: 86
End: 2021-09-30

## 2021-09-30 ENCOUNTER — LAB (OUTPATIENT)
Dept: LAB | Facility: CLINIC | Age: 86
End: 2021-09-30
Payer: COMMERCIAL

## 2021-09-30 DIAGNOSIS — Z79.01 LONG TERM CURRENT USE OF ANTICOAGULANT THERAPY: ICD-10-CM

## 2021-09-30 DIAGNOSIS — E11.9 DIABETES MELLITUS, TYPE 2 (H): ICD-10-CM

## 2021-09-30 DIAGNOSIS — Z79.01 LONG TERM CURRENT USE OF ANTICOAGULANT THERAPY: Primary | ICD-10-CM

## 2021-09-30 DIAGNOSIS — I48.0 PAROXYSMAL ATRIAL FIBRILLATION (H): ICD-10-CM

## 2021-09-30 DIAGNOSIS — I48.91 ATRIAL FIBRILLATION (H): ICD-10-CM

## 2021-09-30 DIAGNOSIS — Z79.01 LONG TERM CURRENT USE OF ANTICOAGULANTS WITH INR GOAL OF 2.0-3.0: ICD-10-CM

## 2021-09-30 LAB — INR BLD: 2.8 (ref 0.9–1.1)

## 2021-09-30 PROCEDURE — 82043 UR ALBUMIN QUANTITATIVE: CPT

## 2021-09-30 PROCEDURE — 36416 COLLJ CAPILLARY BLOOD SPEC: CPT

## 2021-09-30 PROCEDURE — 80061 LIPID PANEL: CPT

## 2021-09-30 PROCEDURE — 36415 COLL VENOUS BLD VENIPUNCTURE: CPT

## 2021-09-30 PROCEDURE — 85610 PROTHROMBIN TIME: CPT

## 2021-09-30 NOTE — PROGRESS NOTES
ANTICOAGULATION MANAGEMENT     Louis Keller Jr. 89 year old male is on warfarin with therapeutic INR result. (Goal INR 2.0-3.0)    Recent labs: (last 7 days)     09/30/21  1155   INR 2.8*       ASSESSMENT     Source(s): Patient/Caregiver Call       Warfarin doses taken: Warfarin taken as instructed    Diet: No new diet changes identified    New illness, injury, or hospitalization: Yes: Had vein ablation to L leg on 9/27/21, will have U/S on 10/1/21 to evaluate success of procedure.    Medication/supplement changes: Took 5mg of Valium prior to ablation on 9/27/21. Lasix recently increased from 10mg to 20mg every day.    Patient also states he is taking Allegra BID x 2 weeks then will discontinue.    Signs or symptoms of bleeding or clotting: Yes: small amount of bruising on L leg from ablation.    Previous INR: Subtherapeutic    Additional findings: Still some swelling in L leg but pt understands this may take some time to improve     PLAN     Recommended plan for temporary change(s) affecting INR     Dosing Instructions: Continue your current warfarin dose with next INR in 2 weeks       Summary  As of 9/30/2021    Full warfarin instructions:  3.75 mg every Wed; 2.5 mg all other days   Next INR check:               Telephone call with Louis who verbalizes understanding and agrees to plan    Lab visit scheduled    Education provided: Monitoring for bleeding signs and symptoms    Plan made per [facility] anticoagulation protocol    Hope Baumann, RN  Anticoagulation Clinic  9/30/2021    _______________________________________________________________________     Anticoagulation Episode Summary     Current INR goal:  2.0-3.0   TTR:  88.5 % (1 y)   Target end date:  Indefinite   Send INR reminders to:  [facility]    Indications    Long-term (current) use of anticoagulants [Z79.01] [Z79.01]  Atrial fibrillation (H) [I48.91]  Paroxysmal atrial fibrillation (H) [I48.0]  Long term current use of anticoagulants with INR  goal of 2.0-3.0 [Z79.01]           Comments:             Anticoagulation Care Providers     Provider Role Specialty Phone number    Joselito Armas MD Referring Internal Medicine [phone]

## 2021-10-01 ENCOUNTER — HOSPITAL ENCOUNTER (OUTPATIENT)
Dept: CARDIOLOGY | Facility: CLINIC | Age: 86
Discharge: HOME | End: 2021-10-01
Attending: INTERNAL MEDICINE | Admitting: INTERNAL MEDICINE
Payer: COMMERCIAL

## 2021-10-01 DIAGNOSIS — I87.2 VENOUS (PERIPHERAL) INSUFFICIENCY: ICD-10-CM

## 2021-10-01 DIAGNOSIS — I83.813 VARICOSE VEINS OF BILATERAL LOWER EXTREMITIES WITH PAIN: ICD-10-CM

## 2021-10-01 LAB
CHOLEST SERPL-MCNC: 122 MG/DL
CREAT UR-MCNC: 69 MG/DL
FASTING STATUS PATIENT QL REPORTED: YES
HDLC SERPL-MCNC: 54 MG/DL
LDLC SERPL CALC-MCNC: 55 MG/DL
MICROALBUMIN UR-MCNC: 44 MG/L
MICROALBUMIN/CREAT UR: 63.77 MG/G CR (ref 0–17)
NONHDLC SERPL-MCNC: 68 MG/DL
TRIGL SERPL-MCNC: 65 MG/DL

## 2021-10-01 PROCEDURE — 93970 EXTREMITY STUDY: CPT

## 2021-10-01 PROCEDURE — 93970 EXTREMITY STUDY: CPT | Mod: 26 | Performed by: INTERNAL MEDICINE

## 2021-10-14 ENCOUNTER — LAB (OUTPATIENT)
Dept: LAB | Facility: CLINIC | Age: 86
End: 2021-10-14
Payer: COMMERCIAL

## 2021-10-14 ENCOUNTER — TRANSFERRED RECORDS (OUTPATIENT)
Dept: HEALTH INFORMATION MANAGEMENT | Facility: CLINIC | Age: 86
End: 2021-10-14

## 2021-10-14 ENCOUNTER — ANTICOAGULATION THERAPY VISIT (OUTPATIENT)
Dept: ANTICOAGULATION | Facility: CLINIC | Age: 86
End: 2021-10-14

## 2021-10-14 DIAGNOSIS — Z79.01 LONG TERM CURRENT USE OF ANTICOAGULANT THERAPY: ICD-10-CM

## 2021-10-14 DIAGNOSIS — I48.0 PAROXYSMAL ATRIAL FIBRILLATION (H): ICD-10-CM

## 2021-10-14 DIAGNOSIS — Z79.01 LONG TERM CURRENT USE OF ANTICOAGULANT THERAPY: Primary | ICD-10-CM

## 2021-10-14 DIAGNOSIS — Z79.01 LONG TERM CURRENT USE OF ANTICOAGULANTS WITH INR GOAL OF 2.0-3.0: ICD-10-CM

## 2021-10-14 DIAGNOSIS — I48.91 ATRIAL FIBRILLATION (H): ICD-10-CM

## 2021-10-14 LAB — INR BLD: 2.8 (ref 0.9–1.1)

## 2021-10-14 PROCEDURE — 85610 PROTHROMBIN TIME: CPT

## 2021-10-14 PROCEDURE — 36416 COLLJ CAPILLARY BLOOD SPEC: CPT

## 2021-10-14 NOTE — PROGRESS NOTES
ANTICOAGULATION MANAGEMENT     Louis Keller Jr. 89 year old male is on warfarin with therapeutic INR result. (Goal INR 2.0-3.0)    Recent labs: (last 7 days)     10/14/21  1119   INR 2.8*       ASSESSMENT     Source(s): Patient/Caregiver Call       Warfarin doses taken: Warfarin taken as instructed    Diet: No new diet changes identified    New illness, injury, or hospitalization: Yes: R great toenail removed on 10/8/21, had follow up today, dressing removed and bandage applied.    Medication/supplement changes: Valium was taken x 2 weeks, he is no longer taking so I removed from medication list.    Signs or symptoms of bleeding or clotting: Yes:  bleeding on R great toe after nail was removed on 10/8 but bleeding subsided the following day.    Previous INR: Therapeutic last visit; previously outside of goal range    Additional findings: patient scheduled for cortisone injection to R knee on 10/18/21.     PLAN     Recommended plan for temporary change(s) affecting INR     Dosing Instructions: Continue your current warfarin dose with next INR in 3 weeks       Summary  As of 10/14/2021    Full warfarin instructions:  3.75 mg every Wed; 2.5 mg all other days   Next INR check:  11/4/2021             Telephone call with Louis who verbalizes understanding and agrees to plan    Lab visit scheduled    Education provided: Contact [phone]  with any changes, questions or concerns.     Plan made per [facility] anticoagulation protocol    Hope Baumann RN  Anticoagulation Clinic  10/14/2021    _______________________________________________________________________     Anticoagulation Episode Summary     Current INR goal:  2.0-3.0   TTR:  90.9 % (1 y)   Target end date:  Indefinite   Send INR reminders to:  [facility]    Indications    Long-term (current) use of anticoagulants [Z79.01] [Z79.01]  Atrial fibrillation (H) [I48.91]  Paroxysmal atrial fibrillation (H) [I48.0]  Long term current use of anticoagulants with  INR goal of 2.0-3.0 [Z79.01]           Comments:             Anticoagulation Care Providers     Provider Role Specialty Phone number    Joselito Armas MD Referring Internal Medicine [phone]

## 2021-10-18 ENCOUNTER — TRANSFERRED RECORDS (OUTPATIENT)
Dept: HEALTH INFORMATION MANAGEMENT | Facility: CLINIC | Age: 86
End: 2021-10-18
Payer: COMMERCIAL

## 2021-10-18 DIAGNOSIS — I48.20 CHRONIC A-FIB (H): ICD-10-CM

## 2021-10-18 DIAGNOSIS — I87.2 VENOUS (PERIPHERAL) INSUFFICIENCY: ICD-10-CM

## 2021-10-18 DIAGNOSIS — R55 NEAR SYNCOPE: ICD-10-CM

## 2021-10-18 RX ORDER — AMIODARONE HYDROCHLORIDE 200 MG/1
200 TABLET ORAL DAILY
Qty: 90 TABLET | Refills: 0 | Status: SHIPPED | OUTPATIENT
Start: 2021-10-18 | End: 2022-01-12

## 2021-10-20 NOTE — PATIENT INSTRUCTIONS
Thank you for your visit with the [facility] today.    Today's plan:   1. Continue with your current medications.  2. Continue to try to stay active with your exercise on the treadmill.  3. Follow up with Dr. Gleason in about 6-7 months for an annual visit with him.      If you have questions or concerns, please do not hesitate to call my nurse, Deb, at [phone].     Scheduling phone number: [phone]    It was a pleasure seeing you today!     MARTHA Cardozo, CNP  Nurse Practitioner  [facility]  October 21, 2021  ________________________________________________________     49.42

## 2021-10-20 NOTE — PROGRESS NOTES
Cardiology Clinic Progress Note    Service Date: October 21, 2021    Primary Cardiology team: Dr. Gleason, Dr. Pacheco (), Susanna Haque PA-C      Reason for Visit: Follow up post vein ablation    HPI:   I had the pleasure of seeing Mr. Louis Keller Jr. in the clinic today. He is a very nice 89 year old gentleman with a past medical history notable for paroxsymal atrial fibrillation on amiodarone and warfarin, near-syncope in 04/2019, status post loop recorder implant in 08/2019, diabetes mellitus type, dyslipidemia, mild aortic root dilatation, and venous insufficiency with chronic lower extremity edema.    Both  and Mrs. Keller follow with Dr. Gleason and Susanna Haque PA-C for their cardiology care, and I have actually met Mrs. Keller at a previous visit last fall.     Louis has venous reflux disease. He previously underwent right lower extremity venous ablation due to ulcers which subsequently healed nicely. He has since developed worsening left lower extremity edema greater than right. He had distal severe saphenous vein reflux on previous venous competency testing in 2018. He was last seen in clinic by Dr. Gleason in May. He was restarted on a low dose of furosemide at 10 mg daily to help with edema management. Repeat venous competency testing was completed in June 2021 showing left lower extremity reflux which was felt amenable to GSV VenaSeal ablation to help with his edema.     I saw Louis in follow up of his venous competency testing about 2 months ago and discussed proceeding with venous ablation.  This was ultimately completed on 9/27/2021 with right GSV VenaSeal ablation from distal thigh to mid-calf from reverse distal thigh access to complete previous GSV closure. Left proximal GSV VenaSeal ablation was also completed from upper thigh to mid-calf from reverse upper thigh access, could not advance further due to tortuosity.  Venous seal ablation was also completed to the left distal GSV from mid calf  to ankle from  reverse mid-calf access.  Lower extremity ultrasound was completed and follow-up on 10/1/2021 showing no DVT bilaterally and the right GSV occluded from the knee to mid calf. Calf tributary occluded proximal calf, patent with reflux distal (4.2 seconds). The left GSV appeared occluded from the distal thigh to ankle.    Blood pressure has been mildly elevated recently so he has been started on lisinopril 10 mg once daily and metoprolol has been increased from 25 mg to 37.5 mg twice daily for better blood pressure control. He also has complained of some mild exertional dyspnea over the past few months. He was seen by his primary care team with Dr. Armas regarding this. An NT pro BNP was mildly elevated so he was instructed to increase the dose of his lasix from 10 mg to 20 mg once daily. A repeat BMP was completed in follow up showing stable electrolytes and renal function.     Today, Louis presents to the clinic in follow up after his recent venous ablation procedure for reassessment of his blood pressure. He tells me that he has been feeling generally well.  He states that he initially had some mild discomfort in his left calf for a week or 2 following the procedure, but this is subsequently resolved.  He feels his lower extremity edema has been improved following the procedure. He brings in a detailed log of his daily blood pressure readings which are improved after starting on the increased dose of metoprolol, primarily around 110-120 systolic. He otherwise denies symptoms of chest pain, palpitations, dizziness, presyncope, or syncope. He stays active walking on the treadmill for around 10 minutes several times a week and has reported been tolerating this well.    ASSESSMENT AND PLAN:  1. Left lower extremity greater than right edema with known saphenous vein reflux in the left lower extremity  - Status post recent left and right  GSV VenaSeal venous ablation last month as outlined above with good  results with reported improvement in his edema.    2. Paroxsymal atrial fibrillation  - Anticoagulated on warfarin. Rate controlled on metoprolol tartrate 37.5 mg BID.  - Most recent loop recorded interrogation earlier this month showing that he is in A.Fib around 26% of the time. He has been on amiodarone 200 mg daily to help with rhythm control. We can continue to monitor A.Fib burden and could consider stopping amio in follow up if the burden continues to rise.    3. Hypertension  - Blood pressure is mildly elevated in clinic today but has been nicely controlled in his recent checks at home consistently around 110-120 systolic following the recent addition of lisinopril 10 mg daily and after increasing metoprolol tartrate from 25 to 37.5 mg twice daily.  Recommend he continue with his current regimen without changes.    Thank you for the opportunity to participate in this pleasant patient's care. I will plan to have him see Dr. Gleason for a routine annual follow-up visit with him in around 5-6 months. We would be happy to see him sooner if needed for any concerns in the meantime.     20 total minutes was spent today including chart review, precharting, history and exam, post visit documentation, and reviewing studies as outlined above.     MARTHA Cardozo, CNP   Nurse Practitioner  [facility]  Pager: [phone]  Text Page  (8am - 5pm, M-F)    Orders this Visit:  Orders Placed This Encounter   Procedures     Follow-Up with Cardiologist     Orders Placed This Encounter   Medications     metoprolol tartrate 37.5 MG TABS     Sig: Take 37.5 mg by mouth 2 times daily     Dispense:  90 tablet     Refill:  3     Medications Discontinued During This Encounter   Medication Reason     metoprolol tartrate (LOPRESSOR) 25 MG tablet      Encounter Diagnoses   Name Primary?     Chronic a-fib (H)      Diastolic dysfunction      Bilateral leg edema      QUESADA (dyspnea on exertion)      CURRENT  MEDICATIONS:  Current Outpatient Medications   Medication Sig Dispense Refill     Acetaminophen (TYLENOL PO) Take 1,000 mg by mouth 3 times daily       amiodarone (PACERONE) 200 MG tablet Take 1 tablet (200 mg) by mouth daily 90 tablet 0     brimonidine (ALPHAGAN) 0.2 % ophthalmic solution INSTILL 1 DROP INTO LEFT EYE TWICE A DAY       cholestyramine (QUESTRAN) 4 g packet TAKE 1 PACKET (4 G) BY MOUTH 2 TIMES DAILY (WITH MEALS) 180 packet 2     ferrous gluconate (FERGON) 324 (38 Fe) MG tablet TAKE 1 TABLET (324 MG) BY MOUTH DAILY (WITH BREAKFAST) 100 tablet 1     furosemide (LASIX) 20 MG tablet Take 1 tablet (20 mg) by mouth daily 45 tablet 3     glipiZIDE (GLUCOTROL XL) 10 MG 24 hr tablet TAKE 1 TABLET BY MOUTH TWICE A  tablet 0     latanoprost (XALATAN) 0.005 % ophthalmic solution Place 1 drop Into the left eye daily       lisinopril (ZESTRIL) 10 MG tablet Take 1 tablet (10 mg) by mouth daily 90 tablet 3     loperamide (IMODIUM A-D) 2 MG tablet daily as needed  30 tablet 0     metFORMIN (GLUCOPHAGE) 500 MG tablet TAKE 1 TABLET BY MOUTH TWICE A DAY WITH MEALS 180 tablet 1     metoprolol tartrate 37.5 MG TABS Take 37.5 mg by mouth 2 times daily 90 tablet 3     multivitamin, therapeutic (THERA-VIT) TABS tablet Take 0.5 tablets by mouth 2 times daily       ONETOUCH ULTRA test strip USE TO TEST DAILY 50 strip 4     pioglitazone (ACTOS) 45 MG tablet TAKE 1 TABLET BY MOUTH EVERY DAY 90 tablet 1     warfarin ANTICOAGULANT (COUMADIN) 2.5 MG tablet TAKE 1 TABLET DAILY OR AS DIRECTED BY INR CLINIC 90 tablet 1     ACE NOT PRESCRIBED, INTENTIONAL, 1 each daily ACE Inhibitor not prescribed due to Risk for drug interaction (Patient not taking: Reported on 9/21/2021) 0 each 0     ASPIRIN NOT PRESCRIBED, INTENTIONAL, by Other route continuous prn Reported on 3/7/2017 (Patient not taking: Reported on 9/21/2021)       STATIN NOT PRESCRIBED, INTENTIONAL, 1 each At Bedtime Statin not prescribed intentionally due to Risk for drug  interaction (Patient not taking: Reported on 9/21/2021) 0 each 0     ALLERGIES  Allergies   Allergen Reactions     No Known Drug Allergies      PAST MEDICAL, SURGICAL, FAMILY HISTORY:  History was reviewed and updated as needed, see medical record.    SOCIAL HISTORY:  Social History     Socioeconomic History     Marital status:      Spouse name: Not on file     Number of children: Not on file     Years of education: Not on file     Highest education level: Not on file   Occupational History     Occupation:      Comment: Semi-retired   Tobacco Use     Smoking status: Never Smoker     Smokeless tobacco: Never Used   Substance and Sexual Activity     Alcohol use: Not Currently     Drug use: No     Sexual activity: Never     Partners: Female   Other Topics Concern      Service Not Asked     Blood Transfusions Not Asked     Caffeine Concern No     Comment: 14 oz per day     Occupational Exposure Not Asked     Hobby Hazards Not Asked     Sleep Concern No     Comment: sometimes - nocturia 4 times per night     Stress Concern Not Asked     Weight Concern Not Asked     Special Diet No     Back Care Not Asked     Exercise Yes     Comment: treadmill/walking 15-20 minutes 6 days per week     Bike Helmet Not Asked     Seat Belt Not Asked     Self-Exams Not Asked     Parent/sibling w/ CABG, MI or angioplasty before 65F 55M? Not Asked   Social History Narrative     Not on file     Social Determinants of Health     Financial Resource Strain:      Difficulty of Paying Living Expenses:    Food Insecurity:      Worried About Running Out of Food in the Last Year:      Ran Out of Food in the Last Year:    Transportation Needs:      Lack of Transportation (Medical):      Lack of Transportation (Non-Medical):    Physical Activity:      Days of Exercise per Week:      Minutes of Exercise per Session:    Stress:      Feeling of Stress :    Social Connections:      Frequency of Communication with Friends and Family:   "    Frequency of Social Gatherings with Friends and Family:      Attends Jainism Services:      Active Member of Clubs or Organizations:      Attends Club or Organization Meetings:      Marital Status:    Intimate Partner Violence:      Fear of Current or Ex-Partner:      Emotionally Abused:      Physically Abused:      Sexually Abused:      Review of Systems:  Skin:  Positive for bruising   Eyes:  Positive for glasses  ENT:    hearing loss  Respiratory:  Positive for dyspnea on exertion  Cardiovascular:    Positive for;lightheadedness;edema;fatigue  Gastroenterology: Positive for abdominal pain  Genitourinary:  Positive for nocturia;hesitancy  Musculoskeletal:  Positive for joint pain;back pain;muscular weakness  Neurologic:  Positive for numbness or tingling of feet  Psychiatric:  Positive for sleep disturbances  Heme/Lymph/Imm:  Positive for allergies  Endocrine:  Positive for diabetes     Physical Exam:  Vitals: BP (!) 140/68   Pulse 76   Ht 1.803 m (5' 11\")   Wt 81.4 kg (179 lb 6.4 oz)   BMI 25.02 kg/m     Wt Readings from Last 4 Encounters:   10/21/21 81.4 kg (179 lb 6.4 oz)   09/21/21 80.9 kg (178 lb 4.8 oz)   09/08/21 80.7 kg (178 lb)   08/17/21 82.1 kg (181 lb)     CONSTITUTIONAL: Pleasant elderly gentleman, well nourished, and in no acute distress.  HEENT: Pupils equal, round. Sclerae nonicteric.    NECK: Supple, no masses or JVD appreciated.  C/V:  Irregularly irregular rhythm, controlled rate, normal S1 and S2, no S3 or S4, no murmur, rub or gallop.   RESP: Respirations are unlabored. Lungs are clear to auscultation bilaterally without wheezing, rales, or rhonchi.  EXTREM: Trace to 1+ LE edema, L>R with venous stasis changes and scattered varicosities bilaterally. No clubbing or cyanosis.   NEURO: Alert and oriented, cooperative. Gait steady. No gross focal deficits.   PSYCH: Affect appropriate. Mentation normal. Responds to questions appropriately.  SKIN: Warm and dry.     Recent Lab Results " reviewed today:  LIPID RESULTS:  Lab Results   Component Value Date    CHOL 122 09/30/2021    CHOL 120 11/11/2019    HDL 54 09/30/2021    HDL 55 11/11/2019    LDL 55 09/30/2021    LDL 44 11/11/2019    TRIG 65 09/30/2021    TRIG 104 11/11/2019    CHOLHDLRATIO 2.0 03/20/2015     LIVER ENZYME RESULTS:  Lab Results   Component Value Date    AST 35 09/11/2020    ALT 31 09/11/2020     CBC RESULTS:  Lab Results   Component Value Date    WBC 7.0 09/08/2021    WBC 8.1 03/08/2021    RBC 4.07 (L) 09/08/2021    RBC 3.73 (L) 03/08/2021    HGB 12.5 (L) 09/08/2021    HGB 12.3 (L) 03/08/2021    HCT 38.7 (L) 09/08/2021    HCT 37.9 (L) 03/08/2021    MCV 95 09/08/2021     (H) 03/08/2021    MCH 30.7 09/08/2021    MCH 33.0 03/08/2021    MCHC 32.3 09/08/2021    MCHC 32.5 03/08/2021    RDW 14.6 09/08/2021    RDW 15.6 (H) 03/08/2021     09/08/2021     03/08/2021     BMP RESULTS:  Lab Results   Component Value Date     (L) 09/08/2021     03/08/2021    POTASSIUM 4.3 09/08/2021    POTASSIUM 4.5 03/08/2021    CHLORIDE 98 09/08/2021    CHLORIDE 103 03/08/2021    CO2 28 09/08/2021    CO2 28 03/08/2021    ANIONGAP 4 09/08/2021    ANIONGAP 4 03/08/2021     (H) 09/08/2021     (H) 03/08/2021    BUN 16 09/08/2021    BUN 18 03/08/2021    CR 0.92 09/08/2021    CR 1.00 03/08/2021    GFRESTIMATED 73 09/08/2021    GFRESTIMATED 67 03/08/2021    GFRESTBLACK 77 03/08/2021    MICHAEL 8.8 09/08/2021    MICHAEL 9.4 03/08/2021      A1C RESULTS:  Lab Results   Component Value Date    A1C 6.8 (H) 09/08/2021    A1C 6.6 (H) 03/08/2021     INR RESULTS:  Lab Results   Component Value Date    INR 2.8 (H) 10/14/2021    INR 2.8 (H) 09/30/2021    INR 2.70 (H) 06/09/2021    INR 2.90 (H) 04/28/2021     CC  Herbert Gleason MD  [id number] [facility] [place] [address]     This note was completed in part using Dragon voice recognition software. Although reviewed after completion, some word and grammatical errors may occur.

## 2021-10-21 ENCOUNTER — OFFICE VISIT (OUTPATIENT)
Dept: CARDIOLOGY | Facility: CLINIC | Age: 86
End: 2021-10-21
Payer: COMMERCIAL

## 2021-10-21 VITALS
HEART RATE: 76 BPM | SYSTOLIC BLOOD PRESSURE: 140 MMHG | HEIGHT: 71 IN | BODY MASS INDEX: 25.11 KG/M2 | DIASTOLIC BLOOD PRESSURE: 68 MMHG | WEIGHT: 179.4 LBS

## 2021-10-21 DIAGNOSIS — I48.20 CHRONIC A-FIB (H): ICD-10-CM

## 2021-10-21 DIAGNOSIS — R60.0 BILATERAL LEG EDEMA: ICD-10-CM

## 2021-10-21 DIAGNOSIS — R06.09 DOE (DYSPNEA ON EXERTION): ICD-10-CM

## 2021-10-21 DIAGNOSIS — I51.89 DIASTOLIC DYSFUNCTION: ICD-10-CM

## 2021-10-21 PROCEDURE — 99213 OFFICE O/P EST LOW 20 MIN: CPT | Performed by: NURSE PRACTITIONER

## 2021-10-21 RX ORDER — METOPROLOL TARTRATE 37.5 MG/1
37.5 TABLET, FILM COATED ORAL 2 TIMES DAILY
Qty: 90 TABLET | Refills: 3 | Status: SHIPPED | OUTPATIENT
Start: 2021-10-21 | End: 2021-12-06

## 2021-10-21 ASSESSMENT — MIFFLIN-ST. JEOR: SCORE: 1500.88

## 2021-10-21 NOTE — LETTER
10/21/2021    Joselito Armas MD  [address] [facility] [address]    RE: Louis Keller JrJana       Dear Colleague,    I had the pleasure of seeing Louis Keller Jr. in the [facility].      Cardiology Clinic Progress Note    Service Date: October 21, 2021    Primary Cardiology team: Dr. Gleason, Dr. Pacheco (), Susanna Haque PA-C      Reason for Visit: Follow up post vein ablation    HPI:   I had the pleasure of seeing Mr. Louis Keller Jr. in the clinic today. He is a very nice 89 year old gentleman with a past medical history notable for paroxsymal atrial fibrillation on amiodarone and warfarin, near-syncope in 04/2019, status post loop recorder implant in 08/2019, diabetes mellitus type, dyslipidemia, mild aortic root dilatation, and venous insufficiency with chronic lower extremity edema.    Both  and Mrs. Keller follow with Dr. Gleason and Susanna Haque PA-C for their cardiology care, and I have actually met Mrs. Keller at a previous visit last fall.     Louis has venous reflux disease. He previously underwent right lower extremity venous ablation due to ulcers which subsequently healed nicely. He has since developed worsening left lower extremity edema greater than right. He had distal severe saphenous vein reflux on previous venous competency testing in 2018. He was last seen in clinic by Dr. Gleason in May. He was restarted on a low dose of furosemide at 10 mg daily to help with edema management. Repeat venous competency testing was completed in June 2021 showing left lower extremity reflux which was felt amenable to GSV VenaSeal ablation to help with his edema.     I saw Louis in follow up of his venous competency testing about 2 months ago and discussed proceeding with venous ablation.  This was ultimately completed on 9/27/2021 with right GSV VenaSeal ablation from distal thigh to mid-calf from reverse distal thigh access to complete  previous GSV closure. Left proximal GSV VenaSeal ablation was also completed from upper thigh to mid-calf from reverse upper thigh access, could not advance further due to tortuosity.  Venous seal ablation was also completed to the left distal GSV from mid calf to ankle from  reverse mid-calf access.  Lower extremity ultrasound was completed and follow-up on 10/1/2021 showing no DVT bilaterally and the right GSV occluded from the knee to mid calf. Calf tributary occluded proximal calf, patent with reflux distal (4.2 seconds). The left GSV appeared occluded from the distal thigh to ankle.    Blood pressure has been mildly elevated recently so he has been started on lisinopril 10 mg once daily and metoprolol has been increased from 25 mg to 37.5 mg twice daily for better blood pressure control. He also has complained of some mild exertional dyspnea over the past few months. He was seen by his primary care team with Dr. Armas regarding this. An NT pro BNP was mildly elevated so he was instructed to increase the dose of his lasix from 10 mg to 20 mg once daily. A repeat BMP was completed in follow up showing stable electrolytes and renal function.     Today, Louis presents to the clinic in follow up after his recent venous ablation procedure for reassessment of his blood pressure. He tells me that he has been feeling generally well.  He states that he initially had some mild discomfort in his left calf for a week or 2 following the procedure, but this is subsequently resolved.  He feels his lower extremity edema has been improved following the procedure. He brings in a detailed log of his daily blood pressure readings which are improved after starting on the increased dose of metoprolol, primarily around 110-120 systolic. He otherwise denies symptoms of chest pain, palpitations, dizziness, presyncope, or syncope. He stays active walking on the treadmill for around 10 minutes several times a week and has reported been  tolerating this well.    ASSESSMENT AND PLAN:  1. Left lower extremity greater than right edema with known saphenous vein reflux in the left lower extremity  - Status post recent left and right  GSV VenaSeal venous ablation last month as outlined above with good results with reported improvement in his edema.    2. Paroxsymal atrial fibrillation  - Anticoagulated on warfarin. Rate controlled on metoprolol tartrate 37.5 mg BID.  - Most recent loop recorded interrogation earlier this month showing that he is in A.Fib around 26% of the time. He has been on amiodarone 200 mg daily to help with rhythm control. We can continue to monitor A.Fib burden and could consider stopping amio in follow up if the burden continues to rise.    3. Hypertension  - Blood pressure is mildly elevated in clinic today but has been nicely controlled in his recent checks at home consistently around 110-120 systolic following the recent addition of lisinopril 10 mg daily and after increasing metoprolol tartrate from 25 to 37.5 mg twice daily.  Recommend he continue with his current regimen without changes.    Thank you for the opportunity to participate in this pleasant patient's care. I will plan to have him see Dr. Gleason for a routine annual follow-up visit with him in around 5-6 months. We would be happy to see him sooner if needed for any concerns in the meantime.     20 total minutes was spent today including chart review, precharting, history and exam, post visit documentation, and reviewing studies as outlined above.     MARTHA Cardozo, CNP   Nurse Practitioner  [facility]  Pager: [phone]  Text Page  (8am - 5pm, M-F)    Orders this Visit:  Orders Placed This Encounter   Procedures     Follow-Up with Cardiologist     Orders Placed This Encounter   Medications     metoprolol tartrate 37.5 MG TABS     Sig: Take 37.5 mg by mouth 2 times daily     Dispense:  90 tablet     Refill:  3     Medications Discontinued  During This Encounter   Medication Reason     metoprolol tartrate (LOPRESSOR) 25 MG tablet      Encounter Diagnoses   Name Primary?     Chronic a-fib (H)      Diastolic dysfunction      Bilateral leg edema      QUESADA (dyspnea on exertion)      CURRENT MEDICATIONS:  Current Outpatient Medications   Medication Sig Dispense Refill     Acetaminophen (TYLENOL PO) Take 1,000 mg by mouth 3 times daily       amiodarone (PACERONE) 200 MG tablet Take 1 tablet (200 mg) by mouth daily 90 tablet 0     brimonidine (ALPHAGAN) 0.2 % ophthalmic solution INSTILL 1 DROP INTO LEFT EYE TWICE A DAY       cholestyramine (QUESTRAN) 4 g packet TAKE 1 PACKET (4 G) BY MOUTH 2 TIMES DAILY (WITH MEALS) 180 packet 2     ferrous gluconate (FERGON) 324 (38 Fe) MG tablet TAKE 1 TABLET (324 MG) BY MOUTH DAILY (WITH BREAKFAST) 100 tablet 1     furosemide (LASIX) 20 MG tablet Take 1 tablet (20 mg) by mouth daily 45 tablet 3     glipiZIDE (GLUCOTROL XL) 10 MG 24 hr tablet TAKE 1 TABLET BY MOUTH TWICE A  tablet 0     latanoprost (XALATAN) 0.005 % ophthalmic solution Place 1 drop Into the left eye daily       lisinopril (ZESTRIL) 10 MG tablet Take 1 tablet (10 mg) by mouth daily 90 tablet 3     loperamide (IMODIUM A-D) 2 MG tablet daily as needed  30 tablet 0     metFORMIN (GLUCOPHAGE) 500 MG tablet TAKE 1 TABLET BY MOUTH TWICE A DAY WITH MEALS 180 tablet 1     metoprolol tartrate 37.5 MG TABS Take 37.5 mg by mouth 2 times daily 90 tablet 3     multivitamin, therapeutic (THERA-VIT) TABS tablet Take 0.5 tablets by mouth 2 times daily       ONETOUCH ULTRA test strip USE TO TEST DAILY 50 strip 4     pioglitazone (ACTOS) 45 MG tablet TAKE 1 TABLET BY MOUTH EVERY DAY 90 tablet 1     warfarin ANTICOAGULANT (COUMADIN) 2.5 MG tablet TAKE 1 TABLET DAILY OR AS DIRECTED BY INR CLINIC 90 tablet 1     ACE NOT PRESCRIBED, INTENTIONAL, 1 each daily ACE Inhibitor not prescribed due to Risk for drug interaction (Patient not taking: Reported on 9/21/2021) 0 each 0      ASPIRIN NOT PRESCRIBED, INTENTIONAL, by Other route continuous prn Reported on 3/7/2017 (Patient not taking: Reported on 9/21/2021)       STATIN NOT PRESCRIBED, INTENTIONAL, 1 each At Bedtime Statin not prescribed intentionally due to Risk for drug interaction (Patient not taking: Reported on 9/21/2021) 0 each 0     ALLERGIES  Allergies   Allergen Reactions     No Known Drug Allergies      PAST MEDICAL, SURGICAL, FAMILY HISTORY:  History was reviewed and updated as needed, see medical record.    SOCIAL HISTORY:  Social History     Socioeconomic History     Marital status:      Spouse name: Not on file     Number of children: Not on file     Years of education: Not on file     Highest education level: Not on file   Occupational History     Occupation: Lab tech     Comment: Semi-retired   Tobacco Use     Smoking status: Never Smoker     Smokeless tobacco: Never Used   Substance and Sexual Activity     Alcohol use: Not Currently     Drug use: No     Sexual activity: Never     Partners: Female   Other Topics Concern      Service Not Asked     Blood Transfusions Not Asked     Caffeine Concern No     Comment: 14 oz per day     Occupational Exposure Not Asked     Hobby Hazards Not Asked     Sleep Concern No     Comment: sometimes - nocturia 4 times per night     Stress Concern Not Asked     Weight Concern Not Asked     Special Diet No     Back Care Not Asked     Exercise Yes     Comment: treadmill/walking 15-20 minutes 6 days per week     Bike Helmet Not Asked     Seat Belt Not Asked     Self-Exams Not Asked     Parent/sibling w/ CABG, MI or angioplasty before 65F 55M? Not Asked   Social History Narrative     Not on file     Social Determinants of Health     Financial Resource Strain:      Difficulty of Paying Living Expenses:    Food Insecurity:      Worried About Running Out of Food in the Last Year:      Ran Out of Food in the Last Year:    Transportation Needs:      Lack of Transportation (Medical):   "    Lack of Transportation (Non-Medical):    Physical Activity:      Days of Exercise per Week:      Minutes of Exercise per Session:    Stress:      Feeling of Stress :    Social Connections:      Frequency of Communication with Friends and Family:      Frequency of Social Gatherings with Friends and Family:      Attends Jewish Services:      Active Member of Clubs or Organizations:      Attends Club or Organization Meetings:      Marital Status:    Intimate Partner Violence:      Fear of Current or Ex-Partner:      Emotionally Abused:      Physically Abused:      Sexually Abused:      Review of Systems:  Skin:  Positive for bruising   Eyes:  Positive for glasses  ENT:    hearing loss  Respiratory:  Positive for dyspnea on exertion  Cardiovascular:    Positive for;lightheadedness;edema;fatigue  Gastroenterology: Positive for abdominal pain  Genitourinary:  Positive for nocturia;hesitancy  Musculoskeletal:  Positive for joint pain;back pain;muscular weakness  Neurologic:  Positive for numbness or tingling of feet  Psychiatric:  Positive for sleep disturbances  Heme/Lymph/Imm:  Positive for allergies  Endocrine:  Positive for diabetes     Physical Exam:  Vitals: BP (!) 140/68   Pulse 76   Ht 1.803 m (5' 11\")   Wt 81.4 kg (179 lb 6.4 oz)   BMI 25.02 kg/m     Wt Readings from Last 4 Encounters:   10/21/21 81.4 kg (179 lb 6.4 oz)   09/21/21 80.9 kg (178 lb 4.8 oz)   09/08/21 80.7 kg (178 lb)   08/17/21 82.1 kg (181 lb)     CONSTITUTIONAL: Pleasant elderly gentleman, well nourished, and in no acute distress.  HEENT: Pupils equal, round. Sclerae nonicteric.    NECK: Supple, no masses or JVD appreciated.  C/V:  Irregularly irregular rhythm, controlled rate, normal S1 and S2, no S3 or S4, no murmur, rub or gallop.   RESP: Respirations are unlabored. Lungs are clear to auscultation bilaterally without wheezing, rales, or rhonchi.  EXTREM: Trace to 1+ LE edema, L>R with venous stasis changes and scattered varicosities " bilaterally. No clubbing or cyanosis.   NEURO: Alert and oriented, cooperative. Gait steady. No gross focal deficits.   PSYCH: Affect appropriate. Mentation normal. Responds to questions appropriately.  SKIN: Warm and dry.     Recent Lab Results reviewed today:  LIPID RESULTS:  Lab Results   Component Value Date    CHOL 122 09/30/2021    CHOL 120 11/11/2019    HDL 54 09/30/2021    HDL 55 11/11/2019    LDL 55 09/30/2021    LDL 44 11/11/2019    TRIG 65 09/30/2021    TRIG 104 11/11/2019    CHOLHDLRATIO 2.0 03/20/2015     LIVER ENZYME RESULTS:  Lab Results   Component Value Date    AST 35 09/11/2020    ALT 31 09/11/2020     CBC RESULTS:  Lab Results   Component Value Date    WBC 7.0 09/08/2021    WBC 8.1 03/08/2021    RBC 4.07 (L) 09/08/2021    RBC 3.73 (L) 03/08/2021    HGB 12.5 (L) 09/08/2021    HGB 12.3 (L) 03/08/2021    HCT 38.7 (L) 09/08/2021    HCT 37.9 (L) 03/08/2021    MCV 95 09/08/2021     (H) 03/08/2021    MCH 30.7 09/08/2021    MCH 33.0 03/08/2021    MCHC 32.3 09/08/2021    MCHC 32.5 03/08/2021    RDW 14.6 09/08/2021    RDW 15.6 (H) 03/08/2021     09/08/2021     03/08/2021     BMP RESULTS:  Lab Results   Component Value Date     (L) 09/08/2021     03/08/2021    POTASSIUM 4.3 09/08/2021    POTASSIUM 4.5 03/08/2021    CHLORIDE 98 09/08/2021    CHLORIDE 103 03/08/2021    CO2 28 09/08/2021    CO2 28 03/08/2021    ANIONGAP 4 09/08/2021    ANIONGAP 4 03/08/2021     (H) 09/08/2021     (H) 03/08/2021    BUN 16 09/08/2021    BUN 18 03/08/2021    CR 0.92 09/08/2021    CR 1.00 03/08/2021    GFRESTIMATED 73 09/08/2021    GFRESTIMATED 67 03/08/2021    GFRESTBLACK 77 03/08/2021    MICHAEL 8.8 09/08/2021    MICHAEL 9.4 03/08/2021      A1C RESULTS:  Lab Results   Component Value Date    A1C 6.8 (H) 09/08/2021    A1C 6.6 (H) 03/08/2021     INR RESULTS:  Lab Results   Component Value Date    INR 2.8 (H) 10/14/2021    INR 2.8 (H) 09/30/2021    INR 2.70 (H) 06/09/2021    INR 2.90 (H)  04/28/2021     CC  Herbert Gleason MD  [address] [address]     This note was completed in part using Dragon voice recognition software. Although reviewed after completion, some word and grammatical errors may occur.        Thank you for allowing me to participate in the care of your patient.      Sincerely,     Abdelrahman Cortes NP     [facility]  cc:   Herbert Gleason MD  [address]  [place] [address]

## 2021-11-04 ENCOUNTER — ANTICOAGULATION THERAPY VISIT (OUTPATIENT)
Dept: ANTICOAGULATION | Facility: CLINIC | Age: 86
End: 2021-11-04

## 2021-11-04 ENCOUNTER — LAB (OUTPATIENT)
Dept: LAB | Facility: CLINIC | Age: 86
End: 2021-11-04
Payer: COMMERCIAL

## 2021-11-04 DIAGNOSIS — Z79.01 LONG TERM CURRENT USE OF ANTICOAGULANTS WITH INR GOAL OF 2.0-3.0: ICD-10-CM

## 2021-11-04 DIAGNOSIS — I48.91 ATRIAL FIBRILLATION (H): ICD-10-CM

## 2021-11-04 DIAGNOSIS — I48.0 PAROXYSMAL ATRIAL FIBRILLATION (H): ICD-10-CM

## 2021-11-04 DIAGNOSIS — Z79.01 LONG TERM CURRENT USE OF ANTICOAGULANT THERAPY: Primary | ICD-10-CM

## 2021-11-04 DIAGNOSIS — Z79.01 LONG TERM CURRENT USE OF ANTICOAGULANT THERAPY: ICD-10-CM

## 2021-11-04 LAB — INR BLD: 3.1 (ref 0.9–1.1)

## 2021-11-04 PROCEDURE — 36415 COLL VENOUS BLD VENIPUNCTURE: CPT

## 2021-11-04 PROCEDURE — 85610 PROTHROMBIN TIME: CPT

## 2021-11-04 NOTE — PROGRESS NOTES
"ANTICOAGULATION MANAGEMENT     Louis Keller Jr. 89 year old male is on warfarin with supratherapeutic INR result. (Goal INR 2.0-3.0)    Recent labs: (last 7 days)     11/04/21  1115   INR 3.1*       ASSESSMENT     Source(s): Chart Review and Patient/Caregiver Call       Warfarin doses taken: Warfarin taken as instructed    Diet: No new diet changes identified--he is uncertain if he had less greens but agrees to eat greens today and resume his usual regimen.    New illness, injury, or hospitalization: No    Medication/supplement changes: Metoprolol recently increased by cardiology    Signs or symptoms of bleeding or clotting: Yes: Patient states he gets \"red spots\" often but this is common for him.    Previous INR: Therapeutic last 2(+) visits    Additional findings: 10/21/21 cardiology visit states they may consider discontinuing Amiodarone in the future, will discuss with pt in more detail when he follows up with them in 5-6 months.     Patient also states he has noticed an improvement in L leg swelling since venous ablation in 09/2021.    Patient had a steroid injection to R knee about 2 weeks ago.     PLAN     Recommended plan for temporary change(s) affecting INR     Dosing Instructions: Continue your current warfarin dose with next INR in 2 weeks       Summary  As of 11/4/2021    Full warfarin instructions:  3.75 mg every Wed; 2.5 mg all other days   Next INR check:  11/18/2021             Telephone call with Louis who verbalizes understanding and agrees to plan    Lab visit scheduled    Education provided: Monitoring for bleeding signs and symptoms and Contact [phone]  with any changes, questions or concerns.     Plan made per [facility] anticoagulation protocol    Hope Baumann RN  Anticoagulation Clinic  11/4/2021    _______________________________________________________________________     Anticoagulation Episode Summary     Current INR goal:  2.0-3.0   TTR:  89.0 % (1 y)   Target end date:  Indefinite "   Send INR reminders to:  [facility]    Indications    Long-term (current) use of anticoagulants [Z79.01] [Z79.01]  Atrial fibrillation (H) [I48.91]  Paroxysmal atrial fibrillation (H) [I48.0]  Long term current use of anticoagulants with INR goal of 2.0-3.0 [Z79.01]           Comments:             Anticoagulation Care Providers     Provider Role Specialty Phone number    Joselito Armas MD Referring Internal Medicine [phone]

## 2021-11-18 ENCOUNTER — LAB (OUTPATIENT)
Dept: LAB | Facility: CLINIC | Age: 86
End: 2021-11-18
Payer: COMMERCIAL

## 2021-11-18 ENCOUNTER — ANTICOAGULATION THERAPY VISIT (OUTPATIENT)
Dept: ANTICOAGULATION | Facility: CLINIC | Age: 86
End: 2021-11-18

## 2021-11-18 DIAGNOSIS — Z79.01 LONG TERM CURRENT USE OF ANTICOAGULANTS WITH INR GOAL OF 2.0-3.0: ICD-10-CM

## 2021-11-18 DIAGNOSIS — I48.0 PAROXYSMAL ATRIAL FIBRILLATION (H): ICD-10-CM

## 2021-11-18 DIAGNOSIS — Z79.01 LONG TERM CURRENT USE OF ANTICOAGULANT THERAPY: ICD-10-CM

## 2021-11-18 DIAGNOSIS — Z79.01 LONG TERM CURRENT USE OF ANTICOAGULANT THERAPY: Primary | ICD-10-CM

## 2021-11-18 DIAGNOSIS — I48.91 ATRIAL FIBRILLATION (H): ICD-10-CM

## 2021-11-18 LAB — INR BLD: 2.4 (ref 0.9–1.1)

## 2021-11-18 PROCEDURE — 85610 PROTHROMBIN TIME: CPT

## 2021-11-18 PROCEDURE — 36416 COLLJ CAPILLARY BLOOD SPEC: CPT

## 2021-11-18 NOTE — PROGRESS NOTES
"ANTICOAGULATION MANAGEMENT     Louis Keller Jr. 89 year old male is on warfarin with therapeutic INR result. (Goal INR 2.0-3.0)    Recent labs: (last 7 days)     11/18/21  1111   INR 2.4*       ASSESSMENT     Source(s): Chart Review and Patient/Caregiver Call       Warfarin doses taken: Warfarin taken as instructed    Diet: No new diet changes identified    New illness, injury, or hospitalization: No    Medication/supplement changes: None noted    Signs or symptoms of bleeding or clotting: Yes: Patient states he gets \"little red marks\" every once in a while    Previous INR: Supratherapeutic    Additional findings: None     PLAN     Recommended plan for no diet, medication or health factor changes affecting INR     Dosing Instructions: Continue your current warfarin dose with next INR in 3 weeks       Summary  As of 11/18/2021    Full warfarin instructions:  3.75 mg every Wed; 2.5 mg all other days   Next INR check:  12/8/2021             Telephone call with Louis who agrees to plan and repeated back plan correctly    Check at provider office visit    Education provided: Contact [phone]  with any changes, questions or concerns.     Plan made per [facility] anticoagulation protocol    Hope Baumann RN  Anticoagulation Clinic  11/18/2021    _______________________________________________________________________     Anticoagulation Episode Summary     Current INR goal:  2.0-3.0   TTR:  88.5 % (1 y)   Target end date:  Indefinite   Send INR reminders to:  [facility]    Indications    Long-term (current) use of anticoagulants [Z79.01] [Z79.01]  Atrial fibrillation (H) [I48.91]  Paroxysmal atrial fibrillation (H) [I48.0]  Long term current use of anticoagulants with INR goal of 2.0-3.0 [Z79.01]           Comments:             Anticoagulation Care Providers     Provider Role Specialty Phone number    Joselito Armas MD Referring Internal Medicine [phone]            "

## 2021-11-22 DIAGNOSIS — I48.0 PAROXYSMAL ATRIAL FIBRILLATION (H): ICD-10-CM

## 2021-11-22 DIAGNOSIS — Z79.01 LONG TERM CURRENT USE OF ANTICOAGULANTS WITH INR GOAL OF 2.0-3.0: ICD-10-CM

## 2021-11-24 RX ORDER — WARFARIN SODIUM 2.5 MG/1
TABLET ORAL
Qty: 90 TABLET | Refills: 1 | Status: SHIPPED | OUTPATIENT
Start: 2021-11-24 | End: 2022-01-01

## 2021-11-24 NOTE — TELEPHONE ENCOUNTER
Routing refill request to provider for review/approval because:  Labs out of range:    Lab Results   Component Value Date    A1C 6.8 09/08/2021    A1C 6.6 03/08/2021    A1C 6.4 09/02/2020    A1C 6.7 11/11/2019    A1C 6.9 06/17/2019    A1C 7.3 12/19/2018   Needs A1C   Carissa BANEGAS RN

## 2021-11-29 ENCOUNTER — TRANSFERRED RECORDS (OUTPATIENT)
Dept: HEALTH INFORMATION MANAGEMENT | Facility: CLINIC | Age: 86
End: 2021-11-29
Payer: COMMERCIAL

## 2021-11-29 LAB — RETINOPATHY: NEGATIVE

## 2021-12-08 ENCOUNTER — OFFICE VISIT (OUTPATIENT)
Dept: INTERNAL MEDICINE | Facility: CLINIC | Age: 86
End: 2021-12-08
Payer: COMMERCIAL

## 2021-12-08 ENCOUNTER — ANTICOAGULATION THERAPY VISIT (OUTPATIENT)
Dept: ANTICOAGULATION | Facility: CLINIC | Age: 86
End: 2021-12-08

## 2021-12-08 VITALS
TEMPERATURE: 96.6 F | SYSTOLIC BLOOD PRESSURE: 130 MMHG | DIASTOLIC BLOOD PRESSURE: 78 MMHG | HEART RATE: 83 BPM | WEIGHT: 173 LBS | BODY MASS INDEX: 24.22 KG/M2 | HEIGHT: 71 IN | OXYGEN SATURATION: 99 %

## 2021-12-08 DIAGNOSIS — I48.91 ATRIAL FIBRILLATION (H): ICD-10-CM

## 2021-12-08 DIAGNOSIS — I48.20 CHRONIC ATRIAL FIBRILLATION (H): ICD-10-CM

## 2021-12-08 DIAGNOSIS — Z79.01 LONG TERM CURRENT USE OF ANTICOAGULANT THERAPY: Primary | ICD-10-CM

## 2021-12-08 DIAGNOSIS — I48.0 PAROXYSMAL ATRIAL FIBRILLATION (H): ICD-10-CM

## 2021-12-08 DIAGNOSIS — L57.0 ACTINIC KERATOSIS: ICD-10-CM

## 2021-12-08 DIAGNOSIS — I48.21 PERMANENT ATRIAL FIBRILLATION (H): Primary | ICD-10-CM

## 2021-12-08 DIAGNOSIS — Z79.01 LONG TERM CURRENT USE OF ANTICOAGULANTS WITH INR GOAL OF 2.0-3.0: ICD-10-CM

## 2021-12-08 DIAGNOSIS — I10 PRIMARY HYPERTENSION: ICD-10-CM

## 2021-12-08 DIAGNOSIS — N18.30 TYPE 2 DIABETES MELLITUS WITH STAGE 3 CHRONIC KIDNEY DISEASE, WITHOUT LONG-TERM CURRENT USE OF INSULIN, UNSPECIFIED WHETHER STAGE 3A OR 3B CKD (H): ICD-10-CM

## 2021-12-08 DIAGNOSIS — E11.22 TYPE 2 DIABETES MELLITUS WITH STAGE 3 CHRONIC KIDNEY DISEASE, WITHOUT LONG-TERM CURRENT USE OF INSULIN, UNSPECIFIED WHETHER STAGE 3A OR 3B CKD (H): ICD-10-CM

## 2021-12-08 LAB — INR BLD: 2.7 (ref 0.9–1.1)

## 2021-12-08 PROCEDURE — 80048 BASIC METABOLIC PNL TOTAL CA: CPT | Performed by: INTERNAL MEDICINE

## 2021-12-08 PROCEDURE — 85610 PROTHROMBIN TIME: CPT

## 2021-12-08 PROCEDURE — 36415 COLL VENOUS BLD VENIPUNCTURE: CPT

## 2021-12-08 PROCEDURE — 99214 OFFICE O/P EST MOD 30 MIN: CPT | Performed by: INTERNAL MEDICINE

## 2021-12-08 RX ORDER — GLIPIZIDE 10 MG/1
TABLET, FILM COATED, EXTENDED RELEASE ORAL
Qty: 180 TABLET | Refills: 1 | Status: SHIPPED | OUTPATIENT
Start: 2021-12-08 | End: 2022-01-01

## 2021-12-08 ASSESSMENT — MIFFLIN-ST. JEOR: SCORE: 1471.85

## 2021-12-08 NOTE — PROGRESS NOTES
ANTICOAGULATION MANAGEMENT     Louis JUNIOR Arianna Jr. 89 year old male is on warfarin with therapeutic INR result. (Goal INR 2.0-3.0)    Recent labs: (last 7 days)     12/08/21  1040   INR 2.7*       ASSESSMENT     Source(s): Chart Review and Patient/Caregiver Call       Warfarin doses taken: Warfarin taken as instructed    Diet: No new diet changes identified    New illness, injury, or hospitalization: No    Medication/supplement changes: None noted - mentioned Lasix dose increase (20 mg BID), which started in Sept.    Signs or symptoms of bleeding or clotting: Yes, minor bruising on arms and skin tear occurred last week - bled quite a bit, but dressing daily & monitoring.      Previous INR: Therapeutic last visit; previously outside of goal range    Additional findings: Discussed DOAC options today during ov with PCP and ACRN. Patient looked into cost - roughly $500/year - still contemplating his decision. Patient knows to keep [facility] team updated on his plans.     PLAN     Recommended plan for temporary change(s) and ongoing change(s) affecting INR     Dosing Instructions: Continue your current warfarin dose with next INR in 3 weeks       Summary  As of 12/8/2021    Full warfarin instructions:  3.75 mg every Wed; 2.5 mg all other days   Next INR check:  12/28/2021             Telephone call with Louis who verbalizes understanding and agrees to plan    Lab visit scheduled    Education provided: Please call back if any changes to your diet, medications or how you've been taking warfarin, Monitoring for bleeding signs and symptoms, Monitoring for clotting signs and symptoms and Importance of notifying clinic for changes in medications; a sooner lab recheck maybe needed.    Plan made per [facility] anticoagulation protocol    Donna Sanchez RN  Anticoagulation Clinic  12/8/2021    _______________________________________________________________________     Anticoagulation Episode Summary     Current INR goal:  2.0-3.0   TTR:   88.5 % (1 y)   Target end date:  Indefinite   Send INR reminders to:  [facility]    Indications    Long-term (current) use of anticoagulants [Z79.01] [Z79.01]  Atrial fibrillation (H) [I48.91]  Paroxysmal atrial fibrillation (H) [I48.0]  Long term current use of anticoagulants with INR goal of 2.0-3.0 [Z79.01]           Comments:             Anticoagulation Care Providers     Provider Role Specialty Phone number    Joselito Armas MD Referring Internal Medicine [phone]

## 2021-12-08 NOTE — LETTER
December 10, 2021      Louis Keller .  [address]  [facility] [address]        Dear Louis,    Slightly low sodium, improved.   High sugar. Keep diabetic diet, monitor blood sugars daily.         Sincerely,      Joselito Armas MD      Results for orders placed or performed in visit on 12/08/21   Basic metabolic panel  (Ca, Cl, CO2, Creat, Gluc, K, Na, BUN)     Status: Abnormal   Result Value Ref Range    Sodium 132 (L) 133 - 144 mmol/L    Potassium 4.6 3.4 - 5.3 mmol/L    Chloride 99 94 - 109 mmol/L    Carbon Dioxide (CO2) 26 20 - 32 mmol/L    Anion Gap 7 3 - 14 mmol/L    Urea Nitrogen 21 7 - 30 mg/dL    Creatinine 0.92 0.66 - 1.25 mg/dL    Calcium 9.3 8.5 - 10.1 mg/dL    Glucose 262 (H) 70 - 99 mg/dL    GFR Estimate 73 >60 mL/min/1.73m2

## 2021-12-08 NOTE — PROGRESS NOTES
"  Assessment & Plan     Permanent atrial fibrillation (H)  Continue AC, rate control medications, wants to try Eliquis, advised to check insurance first.     Type 2 diabetes mellitus with stage 3 chronic kidney disease, without long-term current use of insulin, unspecified whether stage 3a or 3b CKD (H)  Well controlled. Continue treatment     Primary hypertension  Controlled, recheck BMP with h/o hyponatremia   - Basic metabolic panel  (Ca, Cl, CO2, Creat, Gluc, K, Na, BUN)             See Patient Instructions    Return in about 6 months (around 6/8/2022) for Routine Visit.    Joselito Armas MD  [facility] is a 89 year [facility] who presents for the following health issues     HPI   Chief Complaint   Patient presents with     RECHECK     3 month F/U          Patient is seen for a follow up visit.  No acute complaints, no medication change or new medical conditions.  Has h/o HTN. on medical treatment. BP has been controlled. No side effects from medications. No CP, HA, dizziness. good compliance with medications and low salt diet.  Has history of atrial fibrillation. On anticoagulation with Coumadin and rate control medications. Asymptonatic - no chest pains , palpitations,  no side effects from medications.  Has H/O DM. On diet , exercise and oral treatment. Blood sugars are controlled. No parestesias. No hypoglycemias.    Concern for feeling weak, SOB on exertion, poor balance, needs a walker.   No falls.         Review of Systems   Constitutional, HEENT, cardiovascular, pulmonary, gi and gu systems are negative, except as otherwise noted.      Objective    /78 (BP Location: Left arm, Patient Position: Sitting, Cuff Size: Adult Regular)   Pulse 83   Temp (!) 96.6  F (35.9  C) (Tympanic)   Ht 1.803 m (5' 11\")   Wt 78.5 kg (173 lb)   SpO2 99%   BMI 24.13 kg/m    Body mass index is 24.13 kg/m .  Physical Exam   GENERAL: elderly, frail, alert and no " distress  EYES: Eyes grossly normal to inspection, PERRL and conjunctivae and sclerae normal  HENT: ear canals and TM's normal, nose and mouth without ulcers or lesions  NECK: no adenopathy, no asymmetry, masses, or scars and thyroid normal to palpation  RESP: lungs clear to auscultation - no rales, rhonchi or wheezes  CV: regular rate and rhythm, normal S1 S2, no S3 or S4, no murmur, click or rub, peripheral pulses strong  ABDOMEN: soft, nontender, no hepatosplenomegaly, no masses and bowel sounds normal  MS: no gross musculoskeletal defects noted, trace LE edema  SKIN: no suspicious lesions or rashes  NEURO: Normal strength and tone, mentation intact and speech normal LE weakness, uses a walker, impaired balance     Lab on 11/18/2021   Component Date Value Ref Range Status     INR 11/18/2021 2.4* 0.9 - 1.1 Final

## 2021-12-09 ENCOUNTER — ANCILLARY PROCEDURE (OUTPATIENT)
Dept: CARDIOLOGY | Facility: CLINIC | Age: 86
End: 2021-12-09
Attending: INTERNAL MEDICINE
Payer: COMMERCIAL

## 2021-12-09 DIAGNOSIS — R55 SYNCOPE: ICD-10-CM

## 2021-12-09 DIAGNOSIS — Z45.09 ENCOUNTER FOR LOOP RECORDER CHECK: ICD-10-CM

## 2021-12-09 LAB
ANION GAP SERPL CALCULATED.3IONS-SCNC: 7 MMOL/L (ref 3–14)
BUN SERPL-MCNC: 21 MG/DL (ref 7–30)
CALCIUM SERPL-MCNC: 9.3 MG/DL (ref 8.5–10.1)
CHLORIDE BLD-SCNC: 99 MMOL/L (ref 94–109)
CO2 SERPL-SCNC: 26 MMOL/L (ref 20–32)
CREAT SERPL-MCNC: 0.92 MG/DL (ref 0.66–1.25)
GFR SERPL CREATININE-BSD FRML MDRD: 73 ML/MIN/1.73M2
GLUCOSE BLD-MCNC: 262 MG/DL (ref 70–99)
POTASSIUM BLD-SCNC: 4.6 MMOL/L (ref 3.4–5.3)
SODIUM SERPL-SCNC: 132 MMOL/L (ref 133–144)

## 2021-12-09 PROCEDURE — G2066 INTER DEVC REMOTE 30D: HCPCS | Performed by: INTERNAL MEDICINE

## 2021-12-09 PROCEDURE — 93297 REM INTERROG DEV EVAL ICPMS: CPT | Performed by: INTERNAL MEDICINE

## 2021-12-15 LAB
MDC_IDC_EPISODE_DTM: NORMAL
MDC_IDC_EPISODE_DURATION: 62 S
MDC_IDC_EPISODE_ID: 476
MDC_IDC_EPISODE_TYPE: NORMAL
MDC_IDC_MSMT_BATTERY_STATUS: NORMAL
MDC_IDC_PG_IMPLANT_DTM: NORMAL
MDC_IDC_PG_MFG: NORMAL
MDC_IDC_PG_MODEL: NORMAL
MDC_IDC_PG_SERIAL: NORMAL
MDC_IDC_PG_TYPE: NORMAL
MDC_IDC_SESS_CLINIC_NAME: NORMAL
MDC_IDC_SESS_DTM: NORMAL
MDC_IDC_SESS_TYPE: NORMAL
MDC_IDC_SET_ZONE_DETECTION_INTERVAL: 2000 MS
MDC_IDC_SET_ZONE_DETECTION_INTERVAL: 3000 MS
MDC_IDC_SET_ZONE_DETECTION_INTERVAL: 420 MS
MDC_IDC_SET_ZONE_TYPE: NORMAL
MDC_IDC_STAT_AT_BURDEN_PERCENT: 22 %
MDC_IDC_STAT_AT_DTM_END: NORMAL
MDC_IDC_STAT_AT_DTM_START: NORMAL
MDC_IDC_STAT_EPISODE_RECENT_COUNT: 0
MDC_IDC_STAT_EPISODE_RECENT_COUNT: 12
MDC_IDC_STAT_EPISODE_RECENT_COUNT: 2
MDC_IDC_STAT_EPISODE_RECENT_COUNT_DTM_END: NORMAL
MDC_IDC_STAT_EPISODE_RECENT_COUNT_DTM_START: NORMAL
MDC_IDC_STAT_EPISODE_TOTAL_COUNT: 0
MDC_IDC_STAT_EPISODE_TOTAL_COUNT: 1
MDC_IDC_STAT_EPISODE_TOTAL_COUNT: 108
MDC_IDC_STAT_EPISODE_TOTAL_COUNT: 367
MDC_IDC_STAT_EPISODE_TOTAL_COUNT_DTM_END: NORMAL
MDC_IDC_STAT_EPISODE_TOTAL_COUNT_DTM_START: NORMAL
MDC_IDC_STAT_EPISODE_TYPE: NORMAL

## 2021-12-31 ENCOUNTER — LAB (OUTPATIENT)
Dept: LAB | Facility: CLINIC | Age: 86
End: 2021-12-31
Payer: COMMERCIAL

## 2021-12-31 ENCOUNTER — ANTICOAGULATION THERAPY VISIT (OUTPATIENT)
Dept: ANTICOAGULATION | Facility: CLINIC | Age: 86
End: 2021-12-31

## 2021-12-31 DIAGNOSIS — Z79.01 LONG TERM CURRENT USE OF ANTICOAGULANT THERAPY: ICD-10-CM

## 2021-12-31 DIAGNOSIS — Z79.01 LONG TERM CURRENT USE OF ANTICOAGULANT THERAPY: Primary | ICD-10-CM

## 2021-12-31 DIAGNOSIS — I48.91 ATRIAL FIBRILLATION (H): ICD-10-CM

## 2021-12-31 DIAGNOSIS — I48.0 PAROXYSMAL ATRIAL FIBRILLATION (H): ICD-10-CM

## 2021-12-31 DIAGNOSIS — Z79.01 LONG TERM CURRENT USE OF ANTICOAGULANTS WITH INR GOAL OF 2.0-3.0: ICD-10-CM

## 2021-12-31 LAB — INR BLD: 2 (ref 0.9–1.1)

## 2021-12-31 PROCEDURE — 85610 PROTHROMBIN TIME: CPT

## 2021-12-31 PROCEDURE — 36416 COLLJ CAPILLARY BLOOD SPEC: CPT

## 2021-12-31 NOTE — PROGRESS NOTES
ANTICOAGULATION MANAGEMENT     Louis Keller Jr. 89 year old male is on warfarin with therapeutic INR result. (Goal INR 2.0-3.0)    Recent labs: (last 7 days)     12/31/21  1136   INR 2.0*       ASSESSMENT     Source(s): Chart Review and Patient/Caregiver Call       Warfarin doses taken: Warfarin taken as instructed    Diet: No new diet changes identified    New illness, injury, or hospitalization: No    Medication/supplement changes: None noted    Signs or symptoms of bleeding or clotting: No    Previous INR: Therapeutic last 2(+) visits    Additional findings: None     PLAN     Recommended plan for no diet, medication or health factor changes affecting INR     Dosing Instructions: Continue your current warfarin dose with next INR in 3 weeks       Summary  As of 12/31/2021    Full warfarin instructions:  3.75 mg every Wed; 2.5 mg all other days   Next INR check:  1/21/2022             Telephone call with Louis who verbalizes understanding and agrees to plan    Lab visit scheduled    Education provided: Please call back if any changes to your diet, medications or how you've been taking warfarin    Plan made per [facility] anticoagulation protocol    Tata Alexis RN  Anticoagulation Clinic  12/31/2021    _______________________________________________________________________     Anticoagulation Episode Summary     Current INR goal:  2.0-3.0   TTR:  88.5 % (1 y)   Target end date:  Indefinite   Send INR reminders to:  [facility]    Indications    Long-term (current) use of anticoagulants [Z79.01] [Z79.01]  Atrial fibrillation (H) [I48.91]  Paroxysmal atrial fibrillation (H) [I48.0]  Long term current use of anticoagulants with INR goal of 2.0-3.0 [Z79.01]           Comments:             Anticoagulation Care Providers     Provider Role Specialty Phone number    Joselito Armas MD Referring Internal Medicine [phone]

## 2022-01-01 ENCOUNTER — OFFICE VISIT (OUTPATIENT)
Dept: CARDIOLOGY | Facility: CLINIC | Age: 87
End: 2022-01-01
Payer: COMMERCIAL

## 2022-01-01 ENCOUNTER — TRANSFERRED RECORDS (OUTPATIENT)
Dept: INTERNAL MEDICINE | Facility: CLINIC | Age: 87
End: 2022-01-01

## 2022-01-01 ENCOUNTER — ANTICOAGULATION THERAPY VISIT (OUTPATIENT)
Dept: ANTICOAGULATION | Facility: CLINIC | Age: 87
End: 2022-01-01

## 2022-01-01 ENCOUNTER — TELEPHONE (OUTPATIENT)
Dept: INTERNAL MEDICINE | Facility: CLINIC | Age: 87
End: 2022-01-01
Payer: COMMERCIAL

## 2022-01-01 ENCOUNTER — TELEPHONE (OUTPATIENT)
Dept: ANTICOAGULATION | Facility: CLINIC | Age: 87
End: 2022-01-01

## 2022-01-01 ENCOUNTER — HOSPITAL ENCOUNTER (OUTPATIENT)
Dept: MRI IMAGING | Facility: CLINIC | Age: 87
Discharge: HOME | End: 2022-07-06
Attending: PSYCHIATRY & NEUROLOGY
Payer: COMMERCIAL

## 2022-01-01 ENCOUNTER — TELEPHONE (OUTPATIENT)
Dept: INTERNAL MEDICINE | Facility: CLINIC | Age: 87
End: 2022-01-01

## 2022-01-01 ENCOUNTER — TELEPHONE (OUTPATIENT)
Dept: ANTICOAGULATION | Facility: CLINIC | Age: 87
End: 2022-01-01
Payer: COMMERCIAL

## 2022-01-01 ENCOUNTER — LAB (OUTPATIENT)
Dept: LAB | Facility: CLINIC | Age: 87
End: 2022-01-01
Payer: COMMERCIAL

## 2022-01-01 ENCOUNTER — TRANSFERRED RECORDS (OUTPATIENT)
Dept: HEALTH INFORMATION MANAGEMENT | Facility: CLINIC | Age: 87
End: 2022-01-01

## 2022-01-01 ENCOUNTER — ANCILLARY PROCEDURE (OUTPATIENT)
Dept: CARDIOLOGY | Facility: CLINIC | Age: 87
End: 2022-01-01
Attending: INTERNAL MEDICINE
Payer: COMMERCIAL

## 2022-01-01 ENCOUNTER — DOCUMENTATION ONLY (OUTPATIENT)
Dept: ANTICOAGULATION | Facility: CLINIC | Age: 87
End: 2022-01-01

## 2022-01-01 ENCOUNTER — OFFICE VISIT (OUTPATIENT)
Dept: INTERNAL MEDICINE | Facility: CLINIC | Age: 87
End: 2022-01-01
Payer: COMMERCIAL

## 2022-01-01 ENCOUNTER — HEALTH MAINTENANCE LETTER (OUTPATIENT)
Age: 87
End: 2022-01-01

## 2022-01-01 ENCOUNTER — PATIENT OUTREACH (OUTPATIENT)
Dept: CARE COORDINATION | Facility: CLINIC | Age: 87
End: 2022-01-01
Payer: COMMERCIAL

## 2022-01-01 ENCOUNTER — PATIENT OUTREACH (OUTPATIENT)
Dept: NURSING | Facility: CLINIC | Age: 87
End: 2022-01-01
Payer: COMMERCIAL

## 2022-01-01 ENCOUNTER — PATIENT OUTREACH (OUTPATIENT)
Dept: NURSING | Facility: CLINIC | Age: 87
End: 2022-01-01
Attending: INTERNAL MEDICINE
Payer: COMMERCIAL

## 2022-01-01 ENCOUNTER — TELEPHONE (OUTPATIENT)
Dept: CARDIOLOGY | Facility: CLINIC | Age: 87
End: 2022-01-01

## 2022-01-01 ENCOUNTER — VIRTUAL VISIT (OUTPATIENT)
Dept: PHARMACY | Facility: CLINIC | Age: 87
End: 2022-01-01
Payer: COMMERCIAL

## 2022-01-01 VITALS
RESPIRATION RATE: 20 BRPM | BODY MASS INDEX: 24.75 KG/M2 | TEMPERATURE: 97.5 F | DIASTOLIC BLOOD PRESSURE: 66 MMHG | OXYGEN SATURATION: 97 % | HEIGHT: 71 IN | WEIGHT: 176.8 LBS | SYSTOLIC BLOOD PRESSURE: 135 MMHG | HEART RATE: 113 BPM

## 2022-01-01 VITALS
DIASTOLIC BLOOD PRESSURE: 79 MMHG | BODY MASS INDEX: 24.5 KG/M2 | WEIGHT: 175 LBS | OXYGEN SATURATION: 98 % | SYSTOLIC BLOOD PRESSURE: 159 MMHG | TEMPERATURE: 97.7 F | HEIGHT: 71 IN | HEART RATE: 76 BPM

## 2022-01-01 VITALS
DIASTOLIC BLOOD PRESSURE: 64 MMHG | BODY MASS INDEX: 24.08 KG/M2 | HEART RATE: 72 BPM | HEIGHT: 71 IN | WEIGHT: 172 LBS | SYSTOLIC BLOOD PRESSURE: 120 MMHG

## 2022-01-01 DIAGNOSIS — Z79.01 LONG TERM CURRENT USE OF ANTICOAGULANT THERAPY: Primary | ICD-10-CM

## 2022-01-01 DIAGNOSIS — E11.22 TYPE 2 DIABETES MELLITUS WITH STAGE 3 CHRONIC KIDNEY DISEASE, WITHOUT LONG-TERM CURRENT USE OF INSULIN (H): ICD-10-CM

## 2022-01-01 DIAGNOSIS — E11.22 TYPE 2 DIABETES MELLITUS WITH STAGE 3 CHRONIC KIDNEY DISEASE (H): ICD-10-CM

## 2022-01-01 DIAGNOSIS — R19.7 POSTCHOLECYSTECTOMY DIARRHEA: ICD-10-CM

## 2022-01-01 DIAGNOSIS — E87.1 HYPONATREMIA: ICD-10-CM

## 2022-01-01 DIAGNOSIS — Z45.09 ENCOUNTER FOR LOOP RECORDER CHECK: ICD-10-CM

## 2022-01-01 DIAGNOSIS — D62 ANEMIA DUE TO BLOOD LOSS, ACUTE: Primary | ICD-10-CM

## 2022-01-01 DIAGNOSIS — R53.81 PHYSICAL DECONDITIONING: ICD-10-CM

## 2022-01-01 DIAGNOSIS — I48.91 ATRIAL FIBRILLATION (H): ICD-10-CM

## 2022-01-01 DIAGNOSIS — N18.30 TYPE 2 DIABETES MELLITUS WITH STAGE 3 CHRONIC KIDNEY DISEASE, WITHOUT LONG-TERM CURRENT USE OF INSULIN, UNSPECIFIED WHETHER STAGE 3A OR 3B CKD (H): ICD-10-CM

## 2022-01-01 DIAGNOSIS — Z79.01 LONG TERM CURRENT USE OF ANTICOAGULANTS WITH INR GOAL OF 2.0-3.0: ICD-10-CM

## 2022-01-01 DIAGNOSIS — H40.002 GLAUCOMA SUSPECT, LEFT: ICD-10-CM

## 2022-01-01 DIAGNOSIS — N18.30 TYPE 2 DIABETES MELLITUS WITH STAGE 3 CHRONIC KIDNEY DISEASE (H): ICD-10-CM

## 2022-01-01 DIAGNOSIS — I48.0 PAROXYSMAL ATRIAL FIBRILLATION (H): ICD-10-CM

## 2022-01-01 DIAGNOSIS — E78.5 HYPERLIPIDEMIA LDL GOAL <100: ICD-10-CM

## 2022-01-01 DIAGNOSIS — E11.22 TYPE 2 DIABETES MELLITUS WITH STAGE 3 CHRONIC KIDNEY DISEASE, WITHOUT LONG-TERM CURRENT USE OF INSULIN, UNSPECIFIED WHETHER STAGE 3A OR 3B CKD (H): ICD-10-CM

## 2022-01-01 DIAGNOSIS — I48.20 CHRONIC ATRIAL FIBRILLATION (H): Primary | ICD-10-CM

## 2022-01-01 DIAGNOSIS — I10 ESSENTIAL HYPERTENSION: ICD-10-CM

## 2022-01-01 DIAGNOSIS — R20.2 PARESTHESIA: ICD-10-CM

## 2022-01-01 DIAGNOSIS — R55 SYNCOPE: ICD-10-CM

## 2022-01-01 DIAGNOSIS — K91.89 POSTCHOLECYSTECTOMY DIARRHEA: ICD-10-CM

## 2022-01-01 DIAGNOSIS — Z23 COVID-19 VACCINE ADMINISTERED: ICD-10-CM

## 2022-01-01 DIAGNOSIS — D62 ANEMIA DUE TO BLOOD LOSS, ACUTE: ICD-10-CM

## 2022-01-01 DIAGNOSIS — E11.9 TYPE 2 DIABETES MELLITUS WITHOUT COMPLICATION, WITHOUT LONG-TERM CURRENT USE OF INSULIN (H): ICD-10-CM

## 2022-01-01 DIAGNOSIS — I48.20 CHRONIC ATRIAL FIBRILLATION (H): ICD-10-CM

## 2022-01-01 DIAGNOSIS — I87.2 VENOUS (PERIPHERAL) INSUFFICIENCY: ICD-10-CM

## 2022-01-01 DIAGNOSIS — N18.30 TYPE 2 DIABETES MELLITUS WITH STAGE 3 CHRONIC KIDNEY DISEASE, WITHOUT LONG-TERM CURRENT USE OF INSULIN, UNSPECIFIED WHETHER STAGE 3A OR 3B CKD (H): Primary | ICD-10-CM

## 2022-01-01 DIAGNOSIS — I15.9 SECONDARY HYPERTENSION: ICD-10-CM

## 2022-01-01 DIAGNOSIS — R55 NEAR SYNCOPE: ICD-10-CM

## 2022-01-01 DIAGNOSIS — L57.0 ACTINIC KERATOSIS: ICD-10-CM

## 2022-01-01 DIAGNOSIS — R06.09 DOE (DYSPNEA ON EXERTION): ICD-10-CM

## 2022-01-01 DIAGNOSIS — E11.22 TYPE 2 DIABETES MELLITUS WITH STAGE 3 CHRONIC KIDNEY DISEASE, WITHOUT LONG-TERM CURRENT USE OF INSULIN, UNSPECIFIED WHETHER STAGE 3A OR 3B CKD (H): Primary | ICD-10-CM

## 2022-01-01 DIAGNOSIS — I48.20 CHRONIC A-FIB (H): ICD-10-CM

## 2022-01-01 DIAGNOSIS — M54.41 RIGHT-SIDED LOW BACK PAIN WITH RIGHT-SIDED SCIATICA, UNSPECIFIED CHRONICITY: Primary | ICD-10-CM

## 2022-01-01 DIAGNOSIS — E55.9 VITAMIN D DEFICIENCY: ICD-10-CM

## 2022-01-01 DIAGNOSIS — Z23 HIGH PRIORITY FOR 2019-NCOV VACCINE: ICD-10-CM

## 2022-01-01 DIAGNOSIS — M54.16 LUMBAR RADICULOPATHY: ICD-10-CM

## 2022-01-01 DIAGNOSIS — M79.651 PAIN OF RIGHT THIGH: ICD-10-CM

## 2022-01-01 DIAGNOSIS — M54.41 RIGHT-SIDED LOW BACK PAIN WITH RIGHT-SIDED SCIATICA, UNSPECIFIED CHRONICITY: ICD-10-CM

## 2022-01-01 DIAGNOSIS — D64.9 ANEMIA, UNSPECIFIED TYPE: ICD-10-CM

## 2022-01-01 DIAGNOSIS — N18.30 TYPE 2 DIABETES MELLITUS WITH STAGE 3 CHRONIC KIDNEY DISEASE, WITHOUT LONG-TERM CURRENT USE OF INSULIN (H): ICD-10-CM

## 2022-01-01 DIAGNOSIS — I48.21 PERMANENT ATRIAL FIBRILLATION (H): ICD-10-CM

## 2022-01-01 DIAGNOSIS — R29.6 FALLS FREQUENTLY: ICD-10-CM

## 2022-01-01 DIAGNOSIS — E11.9 DIABETES MELLITUS, TYPE 2 (H): ICD-10-CM

## 2022-01-01 DIAGNOSIS — I51.89 DIASTOLIC DYSFUNCTION: ICD-10-CM

## 2022-01-01 DIAGNOSIS — R29.6 FALLS FREQUENTLY: Primary | ICD-10-CM

## 2022-01-01 DIAGNOSIS — Z53.9 DIAGNOSIS NOT YET DEFINED: Primary | ICD-10-CM

## 2022-01-01 DIAGNOSIS — G95.9 CERVICAL MYELOPATHY (H): ICD-10-CM

## 2022-01-01 DIAGNOSIS — R60.0 BILATERAL LEG EDEMA: ICD-10-CM

## 2022-01-01 DIAGNOSIS — G47.00 INSOMNIA, UNSPECIFIED TYPE: ICD-10-CM

## 2022-01-01 LAB
ALBUMIN SERPL-MCNC: 4.1 G/DL (ref 3.4–5)
ALP SERPL-CCNC: 82 U/L (ref 40–150)
ALT SERPL W P-5'-P-CCNC: 35 U/L (ref 0–70)
ANION GAP SERPL CALCULATED.3IONS-SCNC: 6 MMOL/L (ref 3–14)
ANION GAP SERPL CALCULATED.3IONS-SCNC: 8 MMOL/L (ref 3–14)
ANION GAP SERPL CALCULATED.3IONS-SCNC: 9 MMOL/L (ref 3–14)
AST SERPL W P-5'-P-CCNC: 40 U/L (ref 0–45)
BILIRUB SERPL-MCNC: 1.5 MG/DL (ref 0.2–1.3)
BUN SERPL-MCNC: 20 MG/DL (ref 7–30)
BUN SERPL-MCNC: 22 MG/DL (ref 7–30)
BUN SERPL-MCNC: 25 MG/DL (ref 7–30)
CALCIUM SERPL-MCNC: 9.2 MG/DL (ref 8.5–10.1)
CALCIUM SERPL-MCNC: 9.3 MG/DL (ref 8.5–10.1)
CALCIUM SERPL-MCNC: 9.5 MG/DL (ref 8.5–10.1)
CHLORIDE BLD-SCNC: 96 MMOL/L (ref 94–109)
CHLORIDE BLD-SCNC: 97 MMOL/L (ref 94–109)
CHLORIDE BLD-SCNC: 99 MMOL/L (ref 94–109)
CHOLEST SERPL-MCNC: 135 MG/DL
CK SERPL-CCNC: 199 U/L (ref 30–300)
CO2 SERPL-SCNC: 23 MMOL/L (ref 20–32)
CO2 SERPL-SCNC: 25 MMOL/L (ref 20–32)
CO2 SERPL-SCNC: 26 MMOL/L (ref 20–32)
CREAT SERPL-MCNC: 0.79 MG/DL (ref 0.66–1.25)
CREAT SERPL-MCNC: 0.83 MG/DL (ref 0.66–1.25)
CREAT SERPL-MCNC: 0.84 MG/DL (ref 0.66–1.25)
CREAT UR-MCNC: 62 MG/DL
DEPRECATED CALCIDIOL+CALCIFEROL SERPL-MC: 18 UG/L (ref 20–75)
DEPRECATED CALCIDIOL+CALCIFEROL SERPL-MC: 23 UG/L (ref 20–75)
ERYTHROCYTE [DISTWIDTH] IN BLOOD BY AUTOMATED COUNT: 14.4 % (ref 10–15)
FASTING STATUS PATIENT QL REPORTED: NO
GFR SERPL CREATININE-BSD FRML MDRD: 83 ML/MIN/1.73M2
GFR SERPL CREATININE-BSD FRML MDRD: 84 ML/MIN/1.73M2
GFR SERPL CREATININE-BSD FRML MDRD: 84 ML/MIN/1.73M2
GLUCOSE BLD-MCNC: 181 MG/DL (ref 70–99)
GLUCOSE BLD-MCNC: 213 MG/DL (ref 70–99)
GLUCOSE BLD-MCNC: 231 MG/DL (ref 70–99)
HBA1C MFR BLD: 6.8 % (ref 0–5.6)
HBA1C MFR BLD: 7.2 % (ref 0–5.6)
HCT VFR BLD AUTO: 38.9 % (ref 40–53)
HDLC SERPL-MCNC: 56 MG/DL
HGB BLD-MCNC: 12.8 G/DL (ref 13.3–17.7)
HGB BLD-MCNC: 12.9 G/DL (ref 13.3–17.7)
INR BLD: 2.1 (ref 0.9–1.1)
INR BLD: 2.6 (ref 0.9–1.1)
INR BLD: 3.5 (ref 0.9–1.1)
INR BLD: 3.6 (ref 0.9–1.1)
LDLC SERPL CALC-MCNC: 60 MG/DL
MCH RBC QN AUTO: 30.1 PG (ref 26.5–33)
MCHC RBC AUTO-ENTMCNC: 32.9 G/DL (ref 31.5–36.5)
MCV RBC AUTO: 92 FL (ref 78–100)
MDC_IDC_EPISODE_DTM: NORMAL
MDC_IDC_EPISODE_DTM: NORMAL
MDC_IDC_EPISODE_DURATION: 7800 S
MDC_IDC_EPISODE_DURATION: 8280 S
MDC_IDC_EPISODE_ID: 487
MDC_IDC_EPISODE_ID: 493
MDC_IDC_EPISODE_TYPE: NORMAL
MDC_IDC_EPISODE_TYPE: NORMAL
MDC_IDC_MSMT_BATTERY_STATUS: NORMAL
MDC_IDC_PG_IMPLANT_DTM: NORMAL
MDC_IDC_PG_MFG: NORMAL
MDC_IDC_PG_MODEL: NORMAL
MDC_IDC_PG_SERIAL: NORMAL
MDC_IDC_PG_TYPE: NORMAL
MDC_IDC_SESS_CLINIC_NAME: NORMAL
MDC_IDC_SESS_DTM: NORMAL
MDC_IDC_SESS_TYPE: NORMAL
MDC_IDC_SET_ZONE_DETECTION_INTERVAL: 2000 MS
MDC_IDC_SET_ZONE_DETECTION_INTERVAL: 3000 MS
MDC_IDC_SET_ZONE_DETECTION_INTERVAL: 420 MS
MDC_IDC_SET_ZONE_TYPE: NORMAL
MDC_IDC_STAT_AT_BURDEN_PERCENT: 17.5 %
MDC_IDC_STAT_AT_BURDEN_PERCENT: 20.8 %
MDC_IDC_STAT_AT_BURDEN_PERCENT: 21.8 %
MDC_IDC_STAT_AT_DTM_END: NORMAL
MDC_IDC_STAT_AT_DTM_START: NORMAL
MDC_IDC_STAT_EPISODE_RECENT_COUNT: 0
MDC_IDC_STAT_EPISODE_RECENT_COUNT: 1
MDC_IDC_STAT_EPISODE_RECENT_COUNT: 11
MDC_IDC_STAT_EPISODE_RECENT_COUNT: 6
MDC_IDC_STAT_EPISODE_RECENT_COUNT: 9
MDC_IDC_STAT_EPISODE_RECENT_COUNT_DTM_END: NORMAL
MDC_IDC_STAT_EPISODE_RECENT_COUNT_DTM_START: NORMAL
MDC_IDC_STAT_EPISODE_TOTAL_COUNT: 0
MDC_IDC_STAT_EPISODE_TOTAL_COUNT: 1
MDC_IDC_STAT_EPISODE_TOTAL_COUNT: 119
MDC_IDC_STAT_EPISODE_TOTAL_COUNT: 125
MDC_IDC_STAT_EPISODE_TOTAL_COUNT: 138
MDC_IDC_STAT_EPISODE_TOTAL_COUNT: 367
MDC_IDC_STAT_EPISODE_TOTAL_COUNT: 367
MDC_IDC_STAT_EPISODE_TOTAL_COUNT: 369
MDC_IDC_STAT_EPISODE_TOTAL_COUNT_DTM_END: NORMAL
MDC_IDC_STAT_EPISODE_TOTAL_COUNT_DTM_START: NORMAL
MDC_IDC_STAT_EPISODE_TYPE: NORMAL
MICROALBUMIN UR-MCNC: 29 MG/L
MICROALBUMIN/CREAT UR: 46.77 MG/G CR (ref 0–17)
NONHDLC SERPL-MCNC: 79 MG/DL
PLATELET # BLD AUTO: 181 10E3/UL (ref 150–450)
POTASSIUM BLD-SCNC: 4.5 MMOL/L (ref 3.4–5.3)
POTASSIUM BLD-SCNC: 4.7 MMOL/L (ref 3.4–5.3)
POTASSIUM BLD-SCNC: 4.7 MMOL/L (ref 3.4–5.3)
PROT SERPL-MCNC: 7.5 G/DL (ref 6.8–8.8)
RBC # BLD AUTO: 4.25 10E6/UL (ref 4.4–5.9)
SODIUM SERPL-SCNC: 129 MMOL/L (ref 133–144)
SODIUM SERPL-SCNC: 129 MMOL/L (ref 133–144)
SODIUM SERPL-SCNC: 131 MMOL/L (ref 133–144)
T4 FREE SERPL-MCNC: 0.99 NG/DL (ref 0.76–1.46)
TRIGL SERPL-MCNC: 93 MG/DL
TSH SERPL DL<=0.005 MIU/L-ACNC: 7.08 MU/L (ref 0.4–4)
VIT B12 SERPL-MCNC: 308 PG/ML (ref 193–986)
VIT B12 SERPL-MCNC: 324 PG/ML (ref 232–1245)
WBC # BLD AUTO: 6.5 10E3/UL (ref 4–11)

## 2022-01-01 PROCEDURE — 82306 VITAMIN D 25 HYDROXY: CPT

## 2022-01-01 PROCEDURE — 85018 HEMOGLOBIN: CPT

## 2022-01-01 PROCEDURE — G2066 INTER DEVC REMOTE 30D: HCPCS | Performed by: INTERNAL MEDICINE

## 2022-01-01 PROCEDURE — 91305 COVID-19,PF,PFIZER (12+ YRS): CPT | Performed by: INTERNAL MEDICINE

## 2022-01-01 PROCEDURE — 80053 COMPREHEN METABOLIC PANEL: CPT | Performed by: INTERNAL MEDICINE

## 2022-01-01 PROCEDURE — 36415 COLL VENOUS BLD VENIPUNCTURE: CPT

## 2022-01-01 PROCEDURE — 99215 OFFICE O/P EST HI 40 MIN: CPT | Performed by: INTERNAL MEDICINE

## 2022-01-01 PROCEDURE — 85610 PROTHROMBIN TIME: CPT | Performed by: INTERNAL MEDICINE

## 2022-01-01 PROCEDURE — 0054A COVID-19,PF,PFIZER (12+ YRS): CPT | Performed by: INTERNAL MEDICINE

## 2022-01-01 PROCEDURE — 85610 PROTHROMBIN TIME: CPT

## 2022-01-01 PROCEDURE — 80048 BASIC METABOLIC PNL TOTAL CA: CPT

## 2022-01-01 PROCEDURE — 93297 REM INTERROG DEV EVAL ICPMS: CPT | Performed by: INTERNAL MEDICINE

## 2022-01-01 PROCEDURE — 36415 COLL VENOUS BLD VENIPUNCTURE: CPT | Performed by: INTERNAL MEDICINE

## 2022-01-01 PROCEDURE — 82306 VITAMIN D 25 HYDROXY: CPT | Performed by: INTERNAL MEDICINE

## 2022-01-01 PROCEDURE — 0124A COVID-19,PF,PFIZER BOOSTER BIVALENT: CPT | Performed by: INTERNAL MEDICINE

## 2022-01-01 PROCEDURE — 85027 COMPLETE CBC AUTOMATED: CPT | Performed by: INTERNAL MEDICINE

## 2022-01-01 PROCEDURE — 91312 COVID-19,PF,PFIZER BOOSTER BIVALENT: CPT | Performed by: INTERNAL MEDICINE

## 2022-01-01 PROCEDURE — 83036 HEMOGLOBIN GLYCOSYLATED A1C: CPT | Performed by: INTERNAL MEDICINE

## 2022-01-01 PROCEDURE — 99605 MTMS BY PHARM NP 15 MIN: CPT | Performed by: PHARMACIST

## 2022-01-01 PROCEDURE — 72141 MRI NECK SPINE W/O DYE: CPT

## 2022-01-01 PROCEDURE — 99607 MTMS BY PHARM ADDL 15 MIN: CPT | Performed by: PHARMACIST

## 2022-01-01 PROCEDURE — G0180 MD CERTIFICATION HHA PATIENT: HCPCS | Performed by: INTERNAL MEDICINE

## 2022-01-01 PROCEDURE — 72148 MRI LUMBAR SPINE W/O DYE: CPT

## 2022-01-01 PROCEDURE — 82043 UR ALBUMIN QUANTITATIVE: CPT

## 2022-01-01 PROCEDURE — 80061 LIPID PANEL: CPT

## 2022-01-01 PROCEDURE — 84439 ASSAY OF FREE THYROXINE: CPT | Performed by: INTERNAL MEDICINE

## 2022-01-01 PROCEDURE — 82607 VITAMIN B-12: CPT | Performed by: INTERNAL MEDICINE

## 2022-01-01 PROCEDURE — 99207 PR FOOT EXAM NO CHARGE: CPT | Performed by: INTERNAL MEDICINE

## 2022-01-01 PROCEDURE — 82550 ASSAY OF CK (CPK): CPT | Performed by: INTERNAL MEDICINE

## 2022-01-01 PROCEDURE — 36416 COLLJ CAPILLARY BLOOD SPEC: CPT | Performed by: INTERNAL MEDICINE

## 2022-01-01 PROCEDURE — 84443 ASSAY THYROID STIM HORMONE: CPT | Performed by: INTERNAL MEDICINE

## 2022-01-01 PROCEDURE — 99215 OFFICE O/P EST HI 40 MIN: CPT | Mod: 25 | Performed by: INTERNAL MEDICINE

## 2022-01-01 RX ORDER — TRAMADOL HYDROCHLORIDE 50 MG/1
50 TABLET ORAL AT BEDTIME
COMMUNITY
Start: 2022-01-01 | End: 2022-01-01

## 2022-01-01 RX ORDER — GLIPIZIDE 5 MG/1
TABLET, FILM COATED, EXTENDED RELEASE ORAL
Qty: 90 TABLET | Refills: 1 | Status: SHIPPED | OUTPATIENT
Start: 2022-01-01 | End: 2022-01-01

## 2022-01-01 RX ORDER — AMIODARONE HYDROCHLORIDE 200 MG/1
200 TABLET ORAL DAILY
Qty: 90 TABLET | Refills: 3 | Status: SHIPPED | OUTPATIENT
Start: 2022-01-01

## 2022-01-01 RX ORDER — GLIPIZIDE 5 MG/1
5 TABLET, FILM COATED, EXTENDED RELEASE ORAL 2 TIMES DAILY
Qty: 180 TABLET | Refills: 3 | Status: SHIPPED | OUTPATIENT
Start: 2022-01-01

## 2022-01-01 RX ORDER — TRAMADOL HYDROCHLORIDE 50 MG/1
50 TABLET ORAL AT BEDTIME
Qty: 10 TABLET | Refills: 0 | Status: SHIPPED | OUTPATIENT
Start: 2022-01-01 | End: 2023-01-01

## 2022-01-01 RX ORDER — PIOGLITAZONEHYDROCHLORIDE 45 MG/1
TABLET ORAL
Qty: 90 TABLET | Refills: 0 | Status: SHIPPED | OUTPATIENT
Start: 2022-01-01 | End: 2023-01-01

## 2022-01-01 RX ORDER — FERROUS GLUCONATE 324(38)MG
TABLET ORAL
Qty: 100 TABLET | Refills: 2 | Status: SHIPPED | OUTPATIENT
Start: 2022-01-01

## 2022-01-01 RX ORDER — CHOLESTYRAMINE 4 G/9G
POWDER, FOR SUSPENSION ORAL
Qty: 180 PACKET | Refills: 3 | Status: SHIPPED | OUTPATIENT
Start: 2022-01-01 | End: 2022-01-01

## 2022-01-01 RX ORDER — GABAPENTIN 100 MG/1
CAPSULE ORAL
Qty: 312 CAPSULE | Refills: 0 | Status: SHIPPED | OUTPATIENT
Start: 2022-01-01 | End: 2023-01-01

## 2022-01-01 RX ORDER — BLOOD SUGAR DIAGNOSTIC
STRIP MISCELLANEOUS
Qty: 50 STRIP | Refills: 1 | Status: SHIPPED | OUTPATIENT
Start: 2022-01-01 | End: 2022-01-01

## 2022-01-01 RX ORDER — FUROSEMIDE 20 MG
30 TABLET ORAL DAILY
Qty: 135 TABLET | Refills: 3 | Status: SHIPPED | OUTPATIENT
Start: 2022-01-01 | End: 2023-01-01

## 2022-01-01 RX ORDER — GABAPENTIN 100 MG/1
100 CAPSULE ORAL 3 TIMES DAILY
Qty: 90 CAPSULE | Refills: 0 | Status: SHIPPED | OUTPATIENT
Start: 2022-01-01 | End: 2022-01-01

## 2022-01-01 RX ORDER — CHOLESTYRAMINE 4 G/9G
1 POWDER, FOR SUSPENSION ORAL 2 TIMES DAILY WITH MEALS
Qty: 180 PACKET | Refills: 3 | Status: SHIPPED | OUTPATIENT
Start: 2022-01-01

## 2022-01-01 RX ORDER — GLIPIZIDE 5 MG/1
5 TABLET, FILM COATED, EXTENDED RELEASE ORAL DAILY
Qty: 90 TABLET | Refills: 3 | Status: SHIPPED | OUTPATIENT
Start: 2022-01-01 | End: 2022-01-01

## 2022-01-01 RX ORDER — AMIODARONE HYDROCHLORIDE 200 MG/1
200 TABLET ORAL DAILY
Qty: 90 TABLET | Refills: 0 | Status: SHIPPED | OUTPATIENT
Start: 2022-01-01 | End: 2022-01-01

## 2022-01-01 RX ORDER — METOPROLOL TARTRATE 37.5 MG/1
37.5 TABLET, FILM COATED ORAL 2 TIMES DAILY
Qty: 180 TABLET | Refills: 1 | Status: SHIPPED | OUTPATIENT
Start: 2022-01-01

## 2022-01-01 RX ORDER — TRAMADOL HYDROCHLORIDE 50 MG/1
50 TABLET ORAL AT BEDTIME
Qty: 10 TABLET | Refills: 0 | Status: SHIPPED | OUTPATIENT
Start: 2022-01-01 | End: 2022-01-01

## 2022-01-01 RX ORDER — CHOLESTYRAMINE 4 G/9G
1 POWDER, FOR SUSPENSION ORAL DAILY
Qty: 180 PACKET | Refills: 3 | COMMUNITY
Start: 2022-01-01 | End: 2022-01-01

## 2022-01-01 RX ORDER — LISINOPRIL 10 MG/1
10 TABLET ORAL DAILY
Qty: 90 TABLET | Refills: 3 | Status: SHIPPED | OUTPATIENT
Start: 2022-01-01

## 2022-01-01 RX ORDER — GABAPENTIN 100 MG/1
CAPSULE ORAL
Qty: 312 CAPSULE | Refills: 0 | Status: SHIPPED | OUTPATIENT
Start: 2022-01-01 | End: 2022-01-01

## 2022-01-01 ASSESSMENT — ACTIVITIES OF DAILY LIVING (ADL)
DEPENDENT_IADLS:: TRANSPORTATION

## 2022-01-01 ASSESSMENT — ENCOUNTER SYMPTOMS: FATIGUE: 1

## 2022-01-01 ASSESSMENT — PAIN SCALES - GENERAL: PAINLEVEL: SEVERE PAIN (6)

## 2022-01-12 DIAGNOSIS — R55 NEAR SYNCOPE: ICD-10-CM

## 2022-01-12 DIAGNOSIS — I48.20 CHRONIC A-FIB (H): ICD-10-CM

## 2022-01-12 DIAGNOSIS — I87.2 VENOUS (PERIPHERAL) INSUFFICIENCY: ICD-10-CM

## 2022-01-12 RX ORDER — AMIODARONE HYDROCHLORIDE 200 MG/1
200 TABLET ORAL DAILY
Qty: 90 TABLET | Refills: 0 | Status: SHIPPED | OUTPATIENT
Start: 2022-01-12 | End: 2022-01-01

## 2022-01-16 DIAGNOSIS — E11.22 TYPE 2 DIABETES MELLITUS WITH STAGE 3 CHRONIC KIDNEY DISEASE, WITHOUT LONG-TERM CURRENT USE OF INSULIN (H): ICD-10-CM

## 2022-01-16 DIAGNOSIS — N18.30 TYPE 2 DIABETES MELLITUS WITH STAGE 3 CHRONIC KIDNEY DISEASE, WITHOUT LONG-TERM CURRENT USE OF INSULIN (H): ICD-10-CM

## 2022-01-20 ENCOUNTER — ANTICOAGULATION THERAPY VISIT (OUTPATIENT)
Dept: ANTICOAGULATION | Facility: CLINIC | Age: 87
End: 2022-01-20

## 2022-01-20 ENCOUNTER — DOCUMENTATION ONLY (OUTPATIENT)
Dept: ANTICOAGULATION | Facility: CLINIC | Age: 87
End: 2022-01-20

## 2022-01-20 ENCOUNTER — LAB (OUTPATIENT)
Dept: LAB | Facility: CLINIC | Age: 87
End: 2022-01-20
Payer: COMMERCIAL

## 2022-01-20 DIAGNOSIS — I48.0 PAROXYSMAL ATRIAL FIBRILLATION (H): ICD-10-CM

## 2022-01-20 DIAGNOSIS — I48.91 ATRIAL FIBRILLATION (H): Primary | ICD-10-CM

## 2022-01-20 DIAGNOSIS — Z79.01 LONG TERM CURRENT USE OF ANTICOAGULANT THERAPY: ICD-10-CM

## 2022-01-20 DIAGNOSIS — Z79.01 LONG TERM CURRENT USE OF ANTICOAGULANTS WITH INR GOAL OF 2.0-3.0: ICD-10-CM

## 2022-01-20 DIAGNOSIS — Z79.01 LONG TERM CURRENT USE OF ANTICOAGULANT THERAPY: Primary | ICD-10-CM

## 2022-01-20 DIAGNOSIS — I48.91 ATRIAL FIBRILLATION (H): ICD-10-CM

## 2022-01-20 LAB — INR BLD: 2.8 (ref 0.9–1.1)

## 2022-01-20 PROCEDURE — 85610 PROTHROMBIN TIME: CPT

## 2022-01-20 PROCEDURE — 36416 COLLJ CAPILLARY BLOOD SPEC: CPT

## 2022-01-20 NOTE — PROGRESS NOTES
Anticoagulation Management    Unable to reach Louis today.    Today's INR result of 2.8 is therapeutic (goal INR of 2.0-3.0).  Result received from: Clinic Lab    Follow up required to confirm warfarin dose taken and assess for changes    Left VM to continue SAME DOSE and to call [phone] with transfer to [facility]:[phone] OR transfer to:[facility]        Anticoagulation clinic to follow up    Hope Baumann RN

## 2022-01-20 NOTE — PROGRESS NOTES
"ANTICOAGULATION MANAGEMENT     Louis Keller Jr. 89 year old male is on warfarin with therapeutic INR result. (Goal INR 2.0-3.0)    Recent labs: (last 7 days)     01/20/22  1126   INR 2.8*       ASSESSMENT     Source(s): Chart Review and Patient/Caregiver Call       Warfarin doses taken: Warfarin taken as instructed    Diet: No new diet changes identified    New illness, injury, or hospitalization: No    Medication/supplement changes: None noted    Signs or symptoms of bleeding or clotting: Yes: Patient had about a 2\" bruise on his L hand, it is still visible but has faded and patient states he got the bruise \"a while ago\".    Previous INR: Therapeutic last 2(+) visits    Additional findings: None     PLAN     Recommended plan for no diet, medication or health factor changes affecting INR     Dosing Instructions: Continue your current warfarin dose with next INR in 4 weeks       Summary  As of 1/20/2022    Full warfarin instructions:  3.75 mg every Wed; 2.5 mg all other days   Next INR check:  2/21/2022             Telephone call with Louis who verbalizes understanding and agrees to plan    Lab visit scheduled    Education provided: Contact [phone]  with any changes, questions or concerns.     Plan made per [facility] anticoagulation protocol    Hope Baumann RN  Anticoagulation Clinic  1/20/2022    _______________________________________________________________________     Anticoagulation Episode Summary     Current INR goal:  2.0-3.0   TTR:  88.5 % (1 y)   Target end date:  Indefinite   Send INR reminders to:  [facility]    Indications    Long-term (current) use of anticoagulants [Z79.01] [Z79.01]  Atrial fibrillation (H) [I48.91]  Paroxysmal atrial fibrillation (H) [I48.0]  Long term current use of anticoagulants with INR goal of 2.0-3.0 [Z79.01]           Comments:             Anticoagulation Care Providers     Provider Role Specialty Phone number    Joselito Armas MD Referring Internal " Medicine [phone]

## 2022-01-20 NOTE — PROGRESS NOTES
ANTICOAGULATION MANAGEMENT      Louis Keller Jr. due for annual renewal of referral to anticoagulation monitoring. Order pended for your review and signature.      ANTICOAGULATION SUMMARY      Warfarin indication(s)     Atrial fibrillation    Heart valve present?  NO       Current goal range   INR: 2.0-3.0     Goal appropriate for indication? Yes, INR 2-3 appropriate for hx of DVT, PE, hypercoagulable state, Afib, LVAD, or bileaflet AVR without risk factors     Current duration of therapy Indefinite/long term therapy   Time in Therapeutic Range (TTR)  (Goal > 60%) 89%       Office visit with referring provider's group within last year yes on 12/08/2021       Hope Baumann RN

## 2022-02-01 DIAGNOSIS — I51.89 DIASTOLIC DYSFUNCTION: ICD-10-CM

## 2022-02-01 DIAGNOSIS — R06.09 DOE (DYSPNEA ON EXERTION): ICD-10-CM

## 2022-02-01 DIAGNOSIS — R60.0 BILATERAL LEG EDEMA: ICD-10-CM

## 2022-02-02 RX ORDER — FUROSEMIDE 20 MG
TABLET ORAL
Qty: 90 TABLET | Refills: 1 | Status: SHIPPED | OUTPATIENT
Start: 2022-02-02 | End: 2022-01-01

## 2022-02-02 NOTE — TELEPHONE ENCOUNTER
Provider approval needed.     Sodium   Date Value Ref Range Status   12/08/2021 132 (L) 133 - 144 mmol/L Final   03/08/2021 135 133 - 144 mmol/L Final

## 2022-02-12 ENCOUNTER — HEALTH MAINTENANCE LETTER (OUTPATIENT)
Age: 87
End: 2022-02-12

## 2022-02-16 ENCOUNTER — TRANSFERRED RECORDS (OUTPATIENT)
Dept: HEALTH INFORMATION MANAGEMENT | Facility: CLINIC | Age: 87
End: 2022-02-16
Payer: COMMERCIAL

## 2022-02-21 ENCOUNTER — ANTICOAGULATION THERAPY VISIT (OUTPATIENT)
Dept: ANTICOAGULATION | Facility: CLINIC | Age: 87
End: 2022-02-21

## 2022-02-21 ENCOUNTER — LAB (OUTPATIENT)
Dept: LAB | Facility: CLINIC | Age: 87
End: 2022-02-21
Payer: COMMERCIAL

## 2022-02-21 DIAGNOSIS — I48.0 PAROXYSMAL ATRIAL FIBRILLATION (H): ICD-10-CM

## 2022-02-21 DIAGNOSIS — Z79.01 LONG TERM CURRENT USE OF ANTICOAGULANTS WITH INR GOAL OF 2.0-3.0: ICD-10-CM

## 2022-02-21 DIAGNOSIS — Z79.01 LONG TERM CURRENT USE OF ANTICOAGULANT THERAPY: Primary | ICD-10-CM

## 2022-02-21 DIAGNOSIS — I48.91 ATRIAL FIBRILLATION (H): ICD-10-CM

## 2022-02-21 LAB — INR BLD: 3.3 (ref 0.9–1.1)

## 2022-02-21 PROCEDURE — 85610 PROTHROMBIN TIME: CPT

## 2022-02-21 PROCEDURE — 36415 COLL VENOUS BLD VENIPUNCTURE: CPT

## 2022-02-21 NOTE — PROGRESS NOTES
ANTICOAGULATION MANAGEMENT     Louis Keller Jr. 89 year old male is on warfarin with supratherapeutic INR result. (Goal INR 2.0-3.0)    Recent labs: (last 7 days)     02/21/22  1133   INR 3.3*       ASSESSMENT     Source(s): Chart Review and Patient/Caregiver Call       Warfarin doses taken: Warfarin taken as instructed    Diet: No new diet changes identified    New illness, injury, or hospitalization: Yes: Patient states he was seen 1 week ago at an  for mechanical fall at home.  Patient states he fell against the wall and slid down, hitting only his buttocks.  Patient denies any bruising, bleeding and stated the x-rays were normal.  Patient reports some pain/aching in R leg that is intermittent and was told that a nerve may have been traumatized from the fall.    Medication/supplement changes: Taking 2 Tylenol after breakfast, 2 tablets at bedtime and 1 tablet about 3 in the morning since his fall    Signs or symptoms of bleeding or clotting: No    Previous INR: Therapeutic last 2(+) visits    Additional findings: None     PLAN     Recommended plan for temporary change(s) affecting INR     Dosing Instructions: Partial hold then continue your current warfarin dose with next INR in 2 weeks       Summary  As of 2/21/2022    Full warfarin instructions:  2/21: 1.25 mg; Otherwise 3.75 mg every Wed; 2.5 mg all other days   Next INR check:  3/7/2022             Telephone call with Louis who verbalizes understanding and agrees to plan    Lab visit scheduled    Education provided: Contact [phone]  with any changes, questions or concerns.     Plan made per [facility] anticoagulation protocol    Hope Baumann, RN  Anticoagulation Clinic  2/21/2022    _______________________________________________________________________     Anticoagulation Episode Summary     Current INR goal:  2.0-3.0   TTR:  83.2 % (1 y)   Target end date:  Indefinite   Send INR reminders to:  [facility]    Indications    Long-term  (current) use of anticoagulants [Z79.01] [Z79.01]  Atrial fibrillation (H) [I48.91]  Paroxysmal atrial fibrillation (H) [I48.0]  Long term current use of anticoagulants with INR goal of 2.0-3.0 [Z79.01]           Comments:             Anticoagulation Care Providers     Provider Role Specialty Phone number    Joselito Armas MD Referring Internal Medicine [phone]

## 2022-03-01 DIAGNOSIS — D62 ANEMIA DUE TO BLOOD LOSS, ACUTE: ICD-10-CM

## 2022-03-01 RX ORDER — FERROUS GLUCONATE 324(38)MG
TABLET ORAL
Qty: 100 TABLET | Refills: 0 | Status: SHIPPED | OUTPATIENT
Start: 2022-03-01 | End: 2022-01-01

## 2022-03-01 NOTE — TELEPHONE ENCOUNTER
Pending Prescriptions:                       Disp   Refills    ferrous gluconate (FERGON) 324 (38 Fe) MG*100 ta*0            Sig: TAKE 1 TABLET (324 MG) BY MOUTH DAILY (WITH           BREAKFAST)      Prescription approved per [facility] Refill Protocol.        Anjali SON RN   [facility]

## 2022-03-07 ENCOUNTER — ANTICOAGULATION THERAPY VISIT (OUTPATIENT)
Dept: ANTICOAGULATION | Facility: CLINIC | Age: 87
End: 2022-03-07

## 2022-03-07 ENCOUNTER — LAB (OUTPATIENT)
Dept: LAB | Facility: CLINIC | Age: 87
End: 2022-03-07
Payer: COMMERCIAL

## 2022-03-07 DIAGNOSIS — I48.0 PAROXYSMAL ATRIAL FIBRILLATION (H): ICD-10-CM

## 2022-03-07 DIAGNOSIS — Z79.01 LONG TERM CURRENT USE OF ANTICOAGULANTS WITH INR GOAL OF 2.0-3.0: ICD-10-CM

## 2022-03-07 DIAGNOSIS — Z79.01 LONG TERM CURRENT USE OF ANTICOAGULANT THERAPY: Primary | ICD-10-CM

## 2022-03-07 LAB — INR BLD: 2.6 (ref 0.9–1.1)

## 2022-03-07 PROCEDURE — 85610 PROTHROMBIN TIME: CPT

## 2022-03-07 PROCEDURE — 36416 COLLJ CAPILLARY BLOOD SPEC: CPT

## 2022-03-07 NOTE — PROGRESS NOTES
ANTICOAGULATION MANAGEMENT     Louis Keller Jr. 89 year old male is on warfarin with therapeutic INR result. (Goal INR 2.0-3.0)    Recent labs: (last 7 days)     03/07/22  1133   INR 2.6*       ASSESSMENT       Source(s): Chart Review and Patient/Caregiver Call       Warfarin doses taken: Warfarin taken as instructed    Diet: No new diet changes identified    New illness, injury, or hospitalization: No - Patient reports leg pain is improving from his fall    Medication/supplement changes: None noted    Signs or symptoms of bleeding or clotting: No    Previous INR: Supratherapeutic    Additional findings: None       PLAN     Recommended plan for no diet, medication or health factor changes affecting INR     Dosing Instructions: Continue your current warfarin dose with next INR in 4 weeks       Summary  As of 3/7/2022    Full warfarin instructions:  3.75 mg every Wed; 2.5 mg all other days   Next INR check:  4/4/2022             Telephone call with Louis who verbalizes understanding and agrees to plan    Lab visit scheduled    Education provided: Please call back if any changes to your diet, medications or how you've been taking warfarin and Contact [phone]  with any changes, questions or concerns.     Plan made per [facility] anticoagulation protocol    Monica Souza RN  Anticoagulation Clinic  3/7/2022    _______________________________________________________________________     Anticoagulation Episode Summary     Current INR goal:  2.0-3.0   TTR:  81.6 % (1 y)   Target end date:  Indefinite   Send INR reminders to:  [facility]    Indications    Long-term (current) use of anticoagulants [Z79.01] [Z79.01]  Atrial fibrillation (H) [I48.91]  Paroxysmal atrial fibrillation (H) [I48.0]  Long term current use of anticoagulants with INR goal of 2.0-3.0 [Z79.01]           Comments:             Anticoagulation Care Providers     Provider Role Specialty Phone number    Joselito Armas MD Referring  Internal Medicine [phone]

## 2022-03-23 NOTE — TELEPHONE ENCOUNTER
Prescription approved per [facility] Refill Protocol.  Nicholas SON RN, BSN

## 2022-04-04 NOTE — PROGRESS NOTES
ANTICOAGULATION MANAGEMENT     Louis Keller Jr. 89 year old male is on warfarin with supratherapeutic INR result. (Goal INR 2.0-3.0)    Recent labs: (last 7 days)     04/04/22  1129   INR 3.5*       ASSESSMENT       Source(s): Chart Review and Patient/Caregiver Call       Warfarin doses taken: Warfarin taken as instructed    Diet: No new diet changes identified    New illness, injury, or hospitalization: Yes: recent fall resulting in leg pain. Patient states he still has some pain but it has improved.    Medication/supplement changes: Pt states he takes 2 Tylenol before bed and 1 tablet if he wakes up in the middle of the night     Patient also reports taking 5mg of Melatonin for about 2 months-I added to his med list.    Signs or symptoms of bleeding or clotting: Yes: scant blood on R hand from skin tear, patient bandaged it and no issues with bleeding since.    Previous INR: Therapeutic last visit; previously outside of goal range    Additional findings: None and Recent heart valve replacement on Pt states he is interested in switching to Eliquis, he has discussed with his PCP in the past and  it would be a tier 3 drug for him.  I will send TE to PCP       PLAN     Recommended plan for ongoing change(s) affecting INR     Dosing Instructions: hold dose then decrease your warfarin dose (6% change) with next INR in 2 weeks       Summary  As of 4/4/2022    Full warfarin instructions:  4/4: Hold; Otherwise 2.5 mg every day   Next INR check:  4/18/2022             Telephone call with Louis who agrees to plan and repeated back plan correctly and patient wrote down new dosing and [facility] phone #.    Lab visit scheduled    Education provided: Monitoring for bleeding signs and symptoms and Contact [phone]  with any changes, questions or concerns.     Plan made per [facility] anticoagulation protocol    Hope Baumann, RN  Anticoagulation Clinic  4/4/2022    _______________________________________________________________________      Anticoagulation Episode Summary     Current INR goal:  2.0-3.0   TTR:  82.1 % (1 y)   Target end date:  Indefinite   Send INR reminders to:  [facility]    Indications    Long-term (current) use of anticoagulants [Z79.01] [Z79.01]  Atrial fibrillation (H) [I48.91]  Paroxysmal atrial fibrillation (H) [I48.0]  Long term current use of anticoagulants with INR goal of 2.0-3.0 [Z79.01]           Comments:             Anticoagulation Care Providers     Provider Role Specialty Phone number    Joselito Armas MD Referring Internal Medicine [phone]

## 2022-04-04 NOTE — TELEPHONE ENCOUNTER
Alfa Armas,    Patient is interested in switching from Warfarin to Eliquis and states he has discussed this with you in the past.  Patient informed me that it would be a tier 3 drug for him.    Patient has appt with you on 4/26/22, perhaps you can discuss in more detail with Louis at that time.    Please keep [facility] informed if he switches anticoagulants.    Thank you,  Hope Baumann RN  Anticoagulation Clinic

## 2022-04-18 NOTE — PROGRESS NOTES
ANTICOAGULATION MANAGEMENT     Louis Keller Jr. 89 year old male is on warfarin with supratherapeutic INR result. (Goal INR 2.0-3.0)    Recent labs: (last 7 days)     04/18/22  1120   INR 3.6*       ASSESSMENT       Source(s): Chart Review and Patient/Caregiver Call       Warfarin doses taken: Warfarin taken as instructed    Diet: No new diet changes identified    New illness, injury, or hospitalization: No    Medication/supplement changes: None noted    Signs or symptoms of bleeding or clotting: No    Previous INR: Supratherapeutic    Additional findings: Patient is going to discuss changing to Eliquis with PCP at 4/26/22 appointment       PLAN     Recommended plan for no diet, medication or health factor changes affecting INR     Dosing Instructions: hold dose then decrease your warfarin dose (7.1% change) with next INR in 8 days       Summary  As of 4/18/2022    Full warfarin instructions:  4/18: Hold; Otherwise 1.25 mg every Mon; 2.5 mg all other days   Next INR check:  4/26/2022             Telephone call with Louis who agrees to plan and repeated back plan correctly    Check at provider office visit    Education provided: Please call back if any changes to your diet, medications or how you've been taking warfarin and Contact [phone]  with any changes, questions or concerns.     Plan made per [facility] anticoagulation protocol    Monica Souza RN  Anticoagulation Clinic  4/18/2022    _______________________________________________________________________     Anticoagulation Episode Summary     Current INR goal:  2.0-3.0   TTR:  78.3 % (1 y)   Target end date:  Indefinite   [facility]    Indications    Long-term (current) use of anticoagulants [Z79.01] [Z79.01]  Atrial fibrillation (H) [I48.91]  Paroxysmal atrial fibrillation (H) [I48.0]  Long term current use of anticoagulants with INR goal of 2.0-3.0 [Z79.01]           Comments:             Anticoagulation Care  Providers     Provider Role Specialty Phone number    Joselito Armas MD Referring Internal Medicine [phone]

## 2022-04-26 NOTE — LETTER
April 28, 2022      Louis Keller   [address] [address] [facility] [id number]        Dear ,    We are writing to inform you of your test results.    Low vit D. Recommend to start daily vit D 2000 units.   Mild anemia, improved. Stable, recheck in 3 months.   Low sodium. Keep fluid restriction to 1500 ml daily. Recheck in 1 week.   Rest of the results are normal.       Resulted Orders   HEMOGLOBIN A1C   Result Value Ref Range    Hemoglobin A1C 6.8 (H) 0.0 - 5.6 %      Comment:      Normal <5.7%   Prediabetes 5.7-6.4%    Diabetes 6.5% or higher     Note: Adopted from ADA consensus guidelines.   CBC with platelets   Result Value Ref Range    WBC Count 6.5 4.0 - 11.0 10e3/uL    RBC Count 4.25 (L) 4.40 - 5.90 10e6/uL    Hemoglobin 12.8 (L) 13.3 - 17.7 g/dL    Hematocrit 38.9 (L) 40.0 - 53.0 %    MCV 92 78 - 100 fL    MCH 30.1 26.5 - 33.0 pg    MCHC 32.9 31.5 - 36.5 g/dL    RDW 14.4 10.0 - 15.0 %    Platelet Count 181 150 - 450 10e3/uL   Comprehensive metabolic panel (BMP + Alb, Alk Phos, ALT, AST, Total. Bili, TP)   Result Value Ref Range    Sodium 129 (L) 133 - 144 mmol/L    Potassium 4.5 3.4 - 5.3 mmol/L    Chloride 96 94 - 109 mmol/L    Carbon Dioxide (CO2) 25 20 - 32 mmol/L      Comment:      This result was previously suppressed from the chart.    Anion Gap 8 3 - 14 mmol/L      Comment:      This result was previously suppressed from the chart.    Urea Nitrogen 20 7 - 30 mg/dL      Comment:      This result was previously suppressed from the chart.    Creatinine 0.83 0.66 - 1.25 mg/dL      Comment:      This result was previously suppressed from the chart.    Calcium 9.3 8.5 - 10.1 mg/dL      Comment:      This result was previously suppressed from the chart.    Glucose 213 (H) 70 - 99 mg/dL      Comment:      This result was previously suppressed from the chart.    Alkaline Phosphatase 82 40 - 150 U/L      Comment:      This result was previously suppressed from the chart.    AST 40 0 - 45 U/L       Comment:      This result was previously suppressed from the chart.    ALT 35 0 - 70 U/L      Comment:      This result was previously suppressed from the chart.    Protein Total 7.5 6.8 - 8.8 g/dL      Comment:      This result was previously suppressed from the chart.    Albumin 4.1 3.4 - 5.0 g/dL      Comment:      This result was previously suppressed from the chart.    Bilirubin Total 1.5 (H) 0.2 - 1.3 mg/dL      Comment:      This result was previously suppressed from the chart.    GFR Estimate 84 >60 mL/min/1.73m2      Comment:      Effective December 21, 2021 eGFRcr in adults is calculated using the 2021 CKD-EPI creatinine equation which includes age and gender (Emma et al., NEJM, DOI: 10.1056/PCXJlp2790251)   This result was previously suppressed from the chart.   TSH with free T4 reflex   Result Value Ref Range    TSH 7.08 (H) 0.40 - 4.00 mU/L   Vitamin B12   Result Value Ref Range    Vitamin B12 308 193 - 986 pg/mL   Vitamin D Deficiency   Result Value Ref Range    Vitamin D, Total (25-Hydroxy) 18 (L) 20 - 75 ug/L    Narrative    Season, race, dietary intake, and treatment affect the concentration of 25-hydroxy-Vitamin D. Values may decrease during winter months and increase during summer months. Values 20-29 ug/L may indicate Vitamin D insufficiency and values <20 ug/L may indicate Vitamin D deficiency.    Vitamin D determination is routinely performed by an immunoassay specific for 25 hydroxyvitamin D3.  If an individual is on vitamin D2(ergocalciferol) supplementation, please specify 25 OH vitamin D2 and D3 level determination by LCMSMS test VITD23.     CK total   Result Value Ref Range     30 - 300 U/L   T4 free   Result Value Ref Range    Free T4 0.99 0.76 - 1.46 ng/dL       If you have any questions or concerns, please call the clinic at the number listed above.       Sincerely,      Joselito Armas MD

## 2022-04-26 NOTE — PROGRESS NOTES
ANTICOAGULATION MANAGEMENT     Louis Keller Jr. 89 year old male is on warfarin with therapeutic INR result. (Goal INR 2.0-3.0)    Recent labs: (last 7 days)     04/26/22  1705   INR 2.6*       ASSESSMENT       Source(s): Chart Review and Patient/Caregiver Call       Warfarin doses taken: Less warfarin taken than planned which may be affecting INR - patient forgot to hold last Monday's dose, but did on Tuesday instead    Diet: No new diet changes identified    New illness, injury, or hospitalization: Yes: patient reports three falls in the last two months: 2/14/22, 3/17/22 and 3/18/22 - injured back and hip, denies hitting head on any occasion    Medication/supplement changes: Eliquis to be started once arrives from mail-order pharmacy to replace warfarin therapy    Signs or symptoms of bleeding or clotting: Yes, minor cut on hand today     Previous INR: Supratherapeutic    Additional findings: Per patient, Dr. Armas advised holding warfarin 3 days before starting Eliquis       PLAN     Recommended plan for temporary change(s) and ongoing change(s) affecting INR     Dosing Instructions: continue your current warfarin dose and call [facility] as soon as Eliquis arrives with next INR in 9 days (tentative appt - may be changed)    Summary  As of 4/26/2022    Full warfarin instructions:  1.25 mg every Mon; 2.5 mg all other days   Next INR check:  5/5/2022             Telephone call with Louis who verbalizes understanding and agrees to plan    Lab visit scheduled    Education provided: Please call back if any changes to your diet, medications or how you've been taking warfarin, Monitoring for bleeding signs and symptoms, Monitoring for clotting signs and symptoms and Importance of notifying clinic for changes in medications; a sooner lab recheck maybe needed.    Plan made with [facility] Pharmacist Helen Sanchez RN  Anticoagulation  Clinic  4/26/2022    _______________________________________________________________________     Anticoagulation Episode Summary     Current INR goal:  2.0-3.0   TTR:  76.9 % (1 y)   Target end date:  Indefinite   Send INR reminders to:  [facility]    Indications    Long-term (current) use of anticoagulants [Z79.01] [Z79.01]  Atrial fibrillation (H) [I48.91]  Paroxysmal atrial fibrillation (H) [I48.0]  Long term current use of anticoagulants with INR goal of 2.0-3.0 [Z79.01]           Comments:             Anticoagulation Care Providers     Provider Role Specialty Phone number    Joselito Armas MD Referring Internal Medicine [phone]

## 2022-04-26 NOTE — PROGRESS NOTES
Assessment & Plan     Type 2 diabetes mellitus with stage 3 chronic kidney disease, without long-term current use of insulin, unspecified whether stage 3a or 3b CKD (H)  Assess DM control.   Decrease dose of Glipizide and may need to stop to avoid hypoglycemia, await HgbA1C result   - HEMOGLOBIN A1C  - CBC with platelets  - Comprehensive metabolic panel (BMP + Alb, Alk Phos, ALT, AST, Total. Bili, TP)  - TSH with free T4 reflex  - Vitamin B12  - Vitamin D Deficiency    Chronic atrial fibrillation (H)  Change from Coumdain to Eliquis , discussed side effects   - apixaban ANTICOAGULANT (ELIQUIS) 5 MG tablet; Take 1 tablet (5 mg) by mouth 2 times daily    Paresthesia  Refer to nerurology, assess lab   - Adult Neurology  Referral; Future  - Vitamin D Deficiency    Hyperlipidemia LDL goal <100  Cont treatment     Falls frequently  Refer to PT , assess lab   - Vitamin D Deficiency  - Physical Therapy Referral; Future  - CK total    COVID-19 vaccine administered    - COVID-19,PF,PFIZER (12+ Yrs GRAY LABEL)    Paroxysmal atrial fibrillation (H)    - INR point of care    Long term current use of anticoagulants with INR goal of 2.0-3.0    - INR point of care    40 min face to face time spent with patient to assess condition , coordinate care          See Patient Instructions    Return in about 6 months (around 10/26/2022) for Routine Visit.    Joselito Armas MD  [facility] CHINEDU Myers is a 89 year old who presents for the following health issues     Fall  Associated symptoms include fatigue.   Fatigue  Associated symptoms include fatigue.   History of Present Illness       Reason for visit:  Checkup    He eats 2-3 servings of fruits and vegetables daily.He consumes 0 sweetened beverage(s) daily.He exercises with enough effort to increase his heart rate 9 or less minutes per day.  He exercises with enough effort to increase his heart rate 3 or less days per week.   He is  "taking medications regularly.       Here with his daughter.     Concern for falls.   Related to weakness of the legs, poor balance.   Knees go weak and falls. Has fallen 3 times. No head injuries.   He is on AC with Coumadin for history of a fib. No bleeding noted.   Has history of atrial fibrillation. On anticoagulation with Coumadin and rate control medications. Asymptonatic - no chest pains , palpitations,  no side effects from medications.  Interested in changing to Eliquis AC for convenience.   Has h/o peripheral neuropathy with paresthesias , numbness in the toes and feet. Affects balance. Uses a walker.   Has H/O DM. On diet , exercise and oral treatment. Blood sugars are controlled. Has LE parestesias. Reports occasional hypoglycemias.average glucose is 70 past week   Has H/O hyperlipidemia. On medical treatment and diet. No side effects. No muscle weakness, myalgias or upset stomach.         Review of Systems   Constitutional: Positive for fatigue.      Constitutional, HEENT, cardiovascular, pulmonary, GI, , musculoskeletal, neuro, skin, endocrine and psych systems are negative, except as otherwise noted.      Objective    BP (!) 159/79 (BP Location: Left arm, Patient Position: Sitting, Cuff Size: Adult Regular)   Pulse 76   Temp 97.7  F (36.5  C) (Tympanic)   Ht 1.803 m (5' 11\")   Wt 79.4 kg (175 lb)   SpO2 98%   BMI 24.41 kg/m    Body mass index is 24.41 kg/m .  Physical Exam   GENERAL: frail , elderly, alert and no distress  EYES: Eyes grossly normal to inspection, PERRL and conjunctivae and sclerae normal  HENT: ear canals and TM's normal, nose and mouth without ulcers or lesions  NECK: no adenopathy, no asymmetry, masses, or scars and thyroid normal to palpation  RESP: lungs clear to auscultation - no rales, rhonchi or wheezes, bilateral scattered crackles   CV: regular rate and rhythm, normal S1 S2, no S3 or S4, no murmur, click or rub, no peripheral edema and peripheral pulses " strong  ABDOMEN: soft, nontender, no hepatosplenomegaly, no masses and bowel sounds normal  MS: no gross musculoskeletal defects noted, no edema  SKIN: no suspicious lesions or rashes  NEURO:generalized muscle weakness, needs a walker to ambulate, unstable, mentation intact and speech normal  Diabetic foot exam: normal DP and PT pulses, no trophic changes or ulcerative lesions and impaired light touch perception on the toes and bottom of the feet     Lab on 04/18/2022   Component Date Value Ref Range Status     INR 04/18/2022 3.6 (A) 0.9 - 1.1 Final

## 2022-04-27 NOTE — PROGRESS NOTES
[facility] notes reviewed. Patient informed to call [facility] once Eliquis is received in the mail and for further instructions on transitioning medications. DEBRA FarmerN, RN

## 2022-04-27 NOTE — TELEPHONE ENCOUNTER
Call to Louis and advised. He agrees.   Also, he had COVID booster yesterday at [facility], but his card was not updated. He would like to stop by and update his card. He will stop by next week.

## 2022-04-27 NOTE — TELEPHONE ENCOUNTER
Call to Maame. Per the notes from [facility] yesterday Dr Armas advised to decrease the Glipizide but not sure of dosing.     Please advise how much to decrease?       Type 2 diabetes mellitus with stage 3 chronic kidney disease, without long-term current use of insulin, unspecified whether stage 3a or 3b CKD (H)  Assess DM control.   Decrease dose of Glipizide and may need to stop to avoid hypoglycemia, await HgbA1C result   - HEMOGLOBIN A1C    Will call pt back, Maame is going to work.

## 2022-04-27 NOTE — PROGRESS NOTES
ANTICOAGULATION  MANAGEMENT     Recommend that patient call [facility] when Eliquis has arrived to establish hold/transition plan. Proactively planning that the drug will arrive from the mail-order pharmacy isn't the safest way to approach the transition.     Interacting Medication Review    Interacting medication(s): Amiodarone with Eliquis    Duration: Long-term    New medication?: Yes, interaction may increase risk of bleeding    When combining apixaban with a P-gp inhibitor (amiodarone), the patient should be counseled about the need to promptly report any signs or symptoms of possible bleeding. No dose adjustment needed when not combined with a strong CYP 3A4 inhibitor.      PLAN     No adjustment to anticoagulation plan needed; ongoing interaction anticipated with stable long term medications    Helen Church PharmD, BCPS

## 2022-04-27 NOTE — TELEPHONE ENCOUNTER
Maame patients daughter calling  Patient thinks the provider told him to cut back on his glipiZIDE (GLUCOTROL XL) 10 MG 24 hr tablet. Please confirm. Ok to call and kim [phone]

## 2022-05-02 NOTE — TELEPHONE ENCOUNTER
A Rx for a dose greater than 5 mg per day of Eliquis was ordered for your patient that is 80 years of age or older.  Kidney function declines with age, and patients that are 80 years of age or older may benefit from a dose of 2.5 mg twice daily dose of Eliquis.    [facility]

## 2022-05-04 NOTE — TELEPHONE ENCOUNTER
See message below. Should we check with family and they could contact other agencies? Or Route to Care Coordination for assistance?

## 2022-05-04 NOTE — TELEPHONE ENCOUNTER
[facility] calling to say they have to decline  The referral due to capacity.    Luis [phone]

## 2022-05-05 NOTE — TELEPHONE ENCOUNTER
"From pharmacy:     \"A PRESRIPTION FOR A DOSE GREATER THAN 5MG PER DAY OF apixaban ANTICOAGULANT (ELIQUIS) 5 MG tablet WAS ORDERED FOR YOUR PATIENT THAT IS 80 YEARS OF AGE OR OLDER.  KIDNEY FUNCTION DECLINES WITH AGE, AND PATIENTS THAT ARE 80 YEARS OF AGE OR OLDER MAY BENEFIT FROM A DOSE OF 2.5MG TWICE DAILY OF ELIQUIS\"      Current order: apixaban ANTICOAGULANT (ELIQUIS) 5 MG tablet. Take 1 tablet (5 mg) by mouth 2 times daily - Oral      Routing to provider and covering providers to please advise if dose needs to be changed.     Thank you!    Anjali SON RN   [facility]    "

## 2022-05-05 NOTE — TELEPHONE ENCOUNTER
His blood creatinine was normal and his calculated GFR based on his test results was 84 so he does not need to change the dose, fill the prescription as ordered.

## 2022-05-05 NOTE — PROGRESS NOTES
Clinic Care Coordination Contact    Clinic Care Coordination Contact  OUTREACH    Referral Information:  Referral Source: Care Team  Primary Diagnosis: Psychosocial    Chief Complaint   Patient presents with     Clinic Care Coordination - Initial     Home Care      [place] Utilization: Reviewed on this date. No noted concerns.   Utilization    Hospital Admissions  0             ED Visits  0             No Show Count (past year)  1                Current as of: 5/5/2022  3:41 PM            Clinical Concerns:  Current Medical Concerns:    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     Education Provided to patient: Role of  CC and reason for outreach.       Health Maintenance Reviewed: Due/Overdue   Clinical Pathway: None    Functional Status:  Dependent IADLs:: Transportation    Living Situation:  Current living arrangement:: I live in a private home with spouse    Lifestyle & Psychosocial Needs:    Social Determinants of Health     Tobacco Use: Low Risk       Smoking Tobacco Use: Never Smoker     Smokeless Tobacco Use: Never Used   Alcohol Use: Not on file   Financial Resource Strain: Not on file   Food Insecurity: Not on file   Transportation Needs: Not on file   Physical Activity: Not on file   Stress: Not on file   Social Connections: Not on file   Intimate Partner Violence: Not on file   Depression: Not at risk     PHQ-2 Score: 0   Housing Stability: Not on file     Informal Support system:: Children, Spouse        Call placed to Patient to update that [facility] is unable to accept the referral for home care due to being at capacity. [facility] CC inquired if Pt would like to pursue alternate home care agencies for PT. Pt is in agreement with this plan. Confirmed that Pt meets homebound criteria.     Call placed to [facility]. They report that they are able to accept the home care order for Physical Therapy and that they accept Pt's health insurance. ALISIA CC will ask that the clinic fax the [place] [facility] [place] [phone].     Resources and Interventions:  Current Resources:      Employment Status: retired  Referrals Placed: Home Care         Goals:    Goals        General     1. Functional (pt-stated)      Notes - Note created  5/5/2022  3:50 PM by Olinda Harrison [facility]     Goal Statement: I want to start home care for physical therapy within one month.   Date Goal set: 5/5/2022  Barriers: Availability of home care agencies.  Strengths: Recognition of need, strong support system.   Date to Achieve By: 6/5/2022  Patient expressed understanding of goal: Pt reports understanding and denies any additional questions or concerns at this times. ALISIA CC engaged in AIDET communication during encounter.    Action steps to achieve this goal:  1. I will coordinate with CC.  2. I will be referred to an accepting agency.  3. I will start services with a home care agency.                Patient/Caregiver understanding: Pt reports understanding and denies any  additional questions or concerns at this times. SW CC engaged in AIDET communication during encounter.      Outreach Frequency: weekly  Future Appointments              Tomorrow CR LAB [facility], CR    Tomorrow CR LAB [facility], CR    In 1 month [place] [facility] PSA CLIN    In 1 month Herbert Gleason MD [facility] PSA CLIN    In 5 months Joselito Armas MD [place], [place]          Plan: Care team to fax home care order for Physical therapy to [facility]. RN CC to follow-up to ensure order in-process.     GATO Ramirez  Clinic Care Coordinator  Ph. [phone]  [email or web address]

## 2022-05-05 NOTE — TELEPHONE ENCOUNTER
Call to [facility] at [phone]. Spoke with Ozzie who then transferred this RN to pharmacist, Rosina. Rosina informed of Dr. Rahman's below response. Rosina verbalized understanding.     Anjali SON RN   [facility]

## 2022-05-06 NOTE — PROGRESS NOTES
ANTICOAGULATION MANAGEMENT     Louis Keller Jr. 89 year old male is on warfarin with therapeutic INR result. (Goal INR 2.0-3.0)    Recent labs: (last 7 days)     05/06/22  1452   INR 2.1*       ASSESSMENT       Source(s): Chart Review and Patient/Caregiver Call       Warfarin doses taken: Warfarin taken as instructed    Diet: No new diet changes identified    New illness, injury, or hospitalization: No    Medication/supplement changes: None noted    Signs or symptoms of bleeding or clotting: No    Previous INR: Therapeutic last visit; previously outside of goal range    Additional findings: Pt still planning to start Eliquis. He reports it should be arriving in the mail in in the next few days. Pt will call back when it arrives to discuss hold/transition plan       PLAN     Recommended plan for no diet, medication or health factor changes affecting INR     Dosing Instructions: continue your current warfarin dose. Notify [facility] when you receive Eliquis prescription.     Summary  As of 5/6/2022    Full warfarin instructions:  1.25 mg every Mon; 2.5 mg all other days   Next INR check:  5/20/2022             Telephone call with Louis who agrees to plan and repeated back plan correctly    Patient offered & declined to schedule next visit    Education provided: Goal range and significance of current result and Importance of notifying clinic for changes in medications; a sooner lab recheck maybe needed.    Plan made per [facility] anticoagulation protocol    Naya Henley RN  Anticoagulation Clinic  5/6/2022    _______________________________________________________________________     Anticoagulation Episode Summary     Current INR goal:  2.0-3.0   TTR:  76.9 % (1 y)   Target end date:  Indefinite   Send INR reminders to:  [facility]    Indications    Long-term (current) use of anticoagulants [Z79.01] [Z79.01]  Atrial fibrillation (H) [I48.91]  Paroxysmal atrial fibrillation (H) [I48.0]  Long term current use  of anticoagulants with INR goal of 2.0-3.0 [Z79.01]           Comments:             Anticoagulation Care Providers     Provider Role Specialty Phone number    Joselito Armas MD Referring Internal Medicine [phone]

## 2022-05-06 NOTE — TELEPHONE ENCOUNTER
Please place home care orders for [facility]. Then will need to fax. Pt needs Physical Therapy HC. He cannot do outpatient.      [facility] unable to accept due to capacity. Was able to find an agency that is able to accept for home care Physical Therapy. [facility] at: F. [phone].   Thank you!     GATO Ramirez

## 2022-05-10 NOTE — TELEPHONE ENCOUNTER
Renita with [facility] calls to say they can not accept this referral for RN, PHYSICAL THERAPY and Speech Language Therapy

## 2022-05-10 NOTE — TELEPHONE ENCOUNTER
Call to [facility]. They need new orders with Only PT. Speech is OK but they do not have enough nursing staff. If he needs HC Nurse, they will not be able to see him.     Will have Dr Armas addend order and fax #[phone].   Faxed.

## 2022-05-10 NOTE — TELEPHONE ENCOUNTER
Initial plan was to transfer outpatient PT to home care PT. [facility] agreed to accept order for only Physical Therapy, as they do not have capacity to accept for other home care services at this time. Are other home care services (RN & SLP) required at this time? Thank you.    GATO Ramirez  Clinic Care Coordinator  Ph. [phone]  [email or web address]

## 2022-05-10 NOTE — PROGRESS NOTES
Clinic Care Coordination Contact    Follow Up Progress Note      Assessment: Voicemail received from Pt's dtr, Maame, requesting a call back. Verbal consent to speak with Maame previously obtained from Patient on date of initial outreach.    Call placed to Maame to review status of home care order.    Care Gaps:    Health Maintenance Due   Topic Date Due     ZOSTER IMMUNIZATION (1 of 2) Never done     MEDICARE ANNUAL WELLNESS VISIT  11/11/2020       Not addressed due to immediate care navigation needs.     Goals addressed this encounter:    Goals Addressed                    This Visit's Progress       Patient Stated       1. Functional (pt-stated)   50%      Goal Statement: I want to start home care for physical therapy within one month.   Date Goal set: 5/5/2022  Barriers: Availability of home care agencies.  Strengths: Recognition of need, strong support system.   Date to Achieve By: 6/5/2022  Patient expressed understanding of goal: Pt reports understanding and denies any additional questions or concerns at this times. AL[facility]IA CC engaged in AIDET communication during encounter.    Action steps to achieve this goal:  1. I will coordinate with CC.  2. I will be referred to an accepting agency.  3. I will start services with a home care agency.                Intervention/Education provided during outreach: Update provided on status of home care orders. [facility] should be providing home PT.     Outreach Frequency: weekly      Plan:   ALISIA CC will continue to follow to ensure home care order/[facility] in progress.     GATO Ramirez  Clinic Care Coordinator  Ph. [phone]  [email or web address]

## 2022-05-11 NOTE — TELEPHONE ENCOUNTER
Patient calling to report to [facility] team that he received his Eliquis.   Per 4/26 anticoag encounter patient was advised to hold warfarin for 3 days prior to starting Eliquis.   This ACC RN advised to hold warfarin 05/11/22 5/12/22 5/13/22 and start Eliquis on Saturday 5/14.    Routing to patient ACC team to complete ACC discharge process or follow up with patients with any questions.     Fidelina Marin, RN, BSN, PHN  Anticoagulation Nurse  [phone]

## 2022-05-11 NOTE — TELEPHONE ENCOUNTER
David Mckenna     Delay to star of care for Physical Therapy until 5-    Best number to call back [phone]

## 2022-05-11 NOTE — TELEPHONE ENCOUNTER
ANTICOAGULATION  MANAGEMENT    Louis Keller Jr. is being discharged from the [facility] Anticoagulation Management Program ([facility]).    Reason for discharge: warfarin replaced by alternate therapy, Eliquis    Anticoagulation episode resolved, ACC referral closed and INR Standing order discontinued    If patient needs warfarin management in the future, please send a new referral    Monica Souza RN

## 2022-05-13 NOTE — TELEPHONE ENCOUNTER
Fax received from [email or web address] Phys[facility] Orders 05/12/22 for review and signature.  Put in Dr. Armas's in basket.

## 2022-05-13 NOTE — TELEPHONE ENCOUNTER
Faiza with [facility] PHYSICAL THERAPY     2x4wk  1x4wk  Strengthening, gait and balance     Occupational Therapy angela    angela    Best number to call back [phone]

## 2022-05-16 NOTE — TELEPHONE ENCOUNTER
Call to Kimberly and jozef.     Verbal order given to approve visits until certification received for MD signature.

## 2022-05-16 NOTE — TELEPHONE ENCOUNTER
Kimberly from [employer] ([phone]) call for verbal orders. OT 1x 1wk, 2x 2wk to work on ADL's. Falls Prevention, and upper extremity strengthening.  OK to ;eave a detailed message.

## 2022-06-01 NOTE — PROGRESS NOTES
Clinic Care Coordination Contact    Situation: Patient chart reviewed by care coordinator.    Background: [facility] CC following Patient with the goal of establishing home care services.     Assessment: Per chart review, Patient is being followed by [facility] for PT/OT. Goal completed.     Plan/Recommendations: As primary goal was to find a home care agency with availability and goal has been completed, [facility] CC will Graduate Patient from CC at this time. No further planned interventions at this time.     GATO Ramirez  Clinic Care Coordinator  Ph. [phone]  [email or web address]

## 2022-06-07 NOTE — TELEPHONE ENCOUNTER
Pending Prescriptions:                       Disp   Refills    ferrous gluconate (FERGON) 324 (38 Fe) MG*100 ta*2            Sig: TAKE 1 TABLET (324 MG) BY MOUTH DAILY (WITH           BREAKFAST)    Prescription approved per [facility] Refill Protocol.

## 2022-06-21 NOTE — LETTER
"6/21/2022    Joselito Armas MD  [address] [facility] [address]    RE: Louis Keller Jr.       Dear Colleague,     I had the pleasure of seeing Louis Keller Jr. in the [facility].  Cardiology Progress Note          Assessment and Plan:       1. Paroxysmal atrial fibrillation, loop monitor 17% burden    On amiodarone and Eliquis.    Okay to reduce Eliquis to 2.5 mg twice daily secondary to bruising and age.      2. Lower extremity edema    Increase furosemide to 30 mg daily to help with lower extremity edema.  Patient states that the venous ablations helped his edema and ulcer heal.    Follow-up with Michael in 6 months      40 minutes was spent with the patient, precharting and reviewing tests as well as post visit charting all done today..    This note was transcribed using electronic voice recognition software and there may be typographical errors present.                    Interval History:     The patient is a very pleasant 89 year old that I have followed for a number of years.  He has almost reached his 89th birthday.  His daughter is with him today.  He feels overall okay.  He denies any worsening dyspnea on exertion or chest discomfort.  Lower extremity edema has worsened slightly but improved overall from his venous ablation.  His right malleolus ulcers have healed.                     Review of Systems:     Review of Systems:  Skin:  Positive for bruising   Eyes:  Positive for glasses;visual blurring  ENT:  Positive for hearing loss  Respiratory:  Positive for dyspnea on exertion  Cardiovascular:    edema;Positive for;fatigue  Gastroenterology: Negative    Genitourinary:  not assessed    Musculoskeletal:  Positive for back pain;neck pain;joint pain  Neurologic:  Negative    Psychiatric:  Positive for sleep disturbances  Heme/Lymph/Imm:  Negative    Endocrine:  not assessed                Physical Exam:     Vitals: /64   Pulse 72   Ht 1.803 m (5' 11\")   Wt 78 " kg (172 lb)   BMI 23.99 kg/m    Constitutional:  cooperative, alert and oriented, well developed, well nourished, in no acute distress thin;frail uses a walker    Skin:  warm and dry to the touch, no apparent skin lesions or masses noted bruises present;venous stasis changes      Head:  normocephalic        Eyes:  pupils equal and round        Chest:  clear to auscultation        Cardiac: regular rhythm           early diastolic murmur good rate control    Extremities and Back:    stasis pigmentation      Neurological:  no gross motor deficits                 Medications:     Current Outpatient Medications   Medication Sig Dispense Refill     Acetaminophen (TYLENOL PO) Take 1,000 mg by mouth 3 times daily       amiodarone (PACERONE) 200 MG tablet Take 1 tablet (200 mg) by mouth daily 90 tablet 3     apixaban ANTICOAGULANT (ELIQUIS) 2.5 MG tablet Take 1 tablet (2.5 mg) by mouth 2 times daily 180 tablet 3     ASPIRIN NOT PRESCRIBED, INTENTIONAL, by Other route continuous prn Reported on 3/7/2017       brimonidine (ALPHAGAN) 0.2 % ophthalmic solution INSTILL 1 DROP INTO LEFT EYE TWICE A DAY       cholestyramine (QUESTRAN) 4 g packet TAKE 1 PACKET (4 G) BY MOUTH 2 TIMES DAILY (WITH MEALS) 180 packet 3     ferrous gluconate (FERGON) 324 (38 Fe) MG tablet TAKE 1 TABLET (324 MG) BY MOUTH DAILY (WITH BREAKFAST) 100 tablet 2     furosemide (LASIX) 20 MG tablet Take 1.5 tablets (30 mg) by mouth daily 135 tablet 3     glipiZIDE (GLUCOTROL XL) 5 MG 24 hr tablet Take 1 tablet (5 mg) by mouth 2 times daily 180 tablet 3     latanoprost (XALATAN) 0.005 % ophthalmic solution Place 1 drop Into the left eye daily       lisinopril (ZESTRIL) 10 MG tablet Take 1 tablet (10 mg) by mouth daily 90 tablet 3     loperamide (IMODIUM A-D) 2 MG tablet daily as needed  30 tablet 0     melatonin 5 MG CAPS Take 1 capsule by mouth       metFORMIN (GLUCOPHAGE) 500 MG tablet TAKE 1 TABLET BY MOUTH TWICE A DAY WITH MEALS 180 tablet 1     Metoprolol  Tartrate 37.5 MG TABS TAKE 1 TABLET BY MOUTH TWICE A  tablet 2     multivitamin, therapeutic (THERA-VIT) TABS tablet Take 0.5 tablets by mouth 2 times daily       ONETOUCH ULTRA test strip USE TO TEST DAILY 50 strip 1     pioglitazone (ACTOS) 45 MG tablet TAKE 1 TABLET BY MOUTH EVERY DAY 90 tablet 0                Data:   All laboratory data reviewed  No results found for this or any previous visit (from the past 24 hour(s)).    All laboratory data reviewed  Lab Results   Component Value Date    CHOL 122 09/30/2021    CHOL 120 11/11/2019     Lab Results   Component Value Date    HDL 54 09/30/2021    HDL 55 11/11/2019     Lab Results   Component Value Date    LDL 55 09/30/2021    LDL 44 11/11/2019     Lab Results   Component Value Date    TRIG 65 09/30/2021    TRIG 104 11/11/2019     Lab Results   Component Value Date    CHOLHDLRATIO 2.0 03/20/2015     TSH   Date Value Ref Range Status   04/26/2022 7.08 (H) 0.40 - 4.00 mU/L Final   09/11/2020 6.13 (H) 0.40 - 4.00 mU/L Final     Last Basic Metabolic Panel:  Lab Results   Component Value Date     05/06/2022     03/08/2021      Lab Results   Component Value Date    POTASSIUM 4.7 05/06/2022    POTASSIUM 4.5 03/08/2021     Lab Results   Component Value Date    CHLORIDE 99 05/06/2022    CHLORIDE 103 03/08/2021     Lab Results   Component Value Date    MICHAEL 9.2 05/06/2022    MICHAEL 9.4 03/08/2021     Lab Results   Component Value Date    CO2 26 05/06/2022    CO2 28 03/08/2021     Lab Results   Component Value Date    BUN 25 05/06/2022    BUN 18 03/08/2021     Lab Results   Component Value Date    CR 0.84 05/06/2022    CR 1.00 03/08/2021     Lab Results   Component Value Date     05/06/2022     03/08/2021     Lab Results   Component Value Date    WBC 6.5 04/26/2022    WBC 8.1 03/08/2021     Lab Results   Component Value Date    RBC 4.25 04/26/2022    RBC 3.73 03/08/2021     Lab Results   Component Value Date    HGB 12.8 04/26/2022    HGB 12.3  03/08/2021     Lab Results   Component Value Date    HCT 38.9 04/26/2022    HCT 37.9 03/08/2021     Lab Results   Component Value Date    MCV 92 04/26/2022     03/08/2021     Lab Results   Component Value Date    MCH 30.1 04/26/2022    MCH 33.0 03/08/2021     Lab Results   Component Value Date    MCHC 32.9 04/26/2022    MCHC 32.5 03/08/2021     Lab Results   Component Value Date    RDW 14.4 04/26/2022    RDW 15.6 03/08/2021     Lab Results   Component Value Date     04/26/2022     03/08/2021       Thank you for allowing me to participate in the care of your patient.      Sincerely,     Herbert Gleason MD     [facility]  cc:   Abdelrahman Cortes, ALLYN  [id number] [place] [id number]

## 2022-06-21 NOTE — PROGRESS NOTES
"Cardiology Progress Note          Assessment and Plan:       1. Paroxysmal atrial fibrillation, loop monitor 17% burden    On amiodarone and Eliquis.    Okay to reduce Eliquis to 2.5 mg twice daily secondary to bruising and age.      2. Lower extremity edema    Increase furosemide to 30 mg daily to help with lower extremity edema.  Patient states that the venous ablations helped his edema and ulcer heal.    Follow-up with Michael in 6 months      40 minutes was spent with the patient, precharting and reviewing tests as well as post visit charting all done today..    This note was transcribed using electronic voice recognition software and there may be typographical errors present.                    Interval History:     The patient is a very pleasant 89 year old that I have followed for a number of years.  He has almost reached his 89th birthday.  His daughter is with him today.  He feels overall okay.  He denies any worsening dyspnea on exertion or chest discomfort.  Lower extremity edema has worsened slightly but improved overall from his venous ablation.  His right malleolus ulcers have healed.                     Review of Systems:     Review of Systems:  Skin:  Positive for bruising   Eyes:  Positive for glasses;visual blurring  ENT:  Positive for hearing loss  Respiratory:  Positive for dyspnea on exertion  Cardiovascular:    edema;Positive for;fatigue  Gastroenterology: Negative    Genitourinary:  not assessed    Musculoskeletal:  Positive for back pain;neck pain;joint pain  Neurologic:  Negative    Psychiatric:  Positive for sleep disturbances  Heme/Lymph/Imm:  Negative    Endocrine:  not assessed                Physical Exam:     Vitals: /64   Pulse 72   Ht 1.803 m (5' 11\")   Wt 78 kg (172 lb)   BMI 23.99 kg/m    Constitutional:  cooperative, alert and oriented, well developed, well nourished, in no acute distress thin;frail uses a walker    Skin:  warm and dry to the touch, no apparent skin lesions " or masses noted bruises present;venous stasis changes      Head:  normocephalic        Eyes:  pupils equal and round        Chest:  clear to auscultation        Cardiac: regular rhythm           early diastolic murmur good rate control    Extremities and Back:    stasis pigmentation      Neurological:  no gross motor deficits                 Medications:     Current Outpatient Medications   Medication Sig Dispense Refill     Acetaminophen (TYLENOL PO) Take 1,000 mg by mouth 3 times daily       amiodarone (PACERONE) 200 MG tablet Take 1 tablet (200 mg) by mouth daily 90 tablet 3     apixaban ANTICOAGULANT (ELIQUIS) 2.5 MG tablet Take 1 tablet (2.5 mg) by mouth 2 times daily 180 tablet 3     ASPIRIN NOT PRESCRIBED, INTENTIONAL, by Other route continuous prn Reported on 3/7/2017       brimonidine (ALPHAGAN) 0.2 % ophthalmic solution INSTILL 1 DROP INTO LEFT EYE TWICE A DAY       cholestyramine (QUESTRAN) 4 g packet TAKE 1 PACKET (4 G) BY MOUTH 2 TIMES DAILY (WITH MEALS) 180 packet 3     ferrous gluconate (FERGON) 324 (38 Fe) MG tablet TAKE 1 TABLET (324 MG) BY MOUTH DAILY (WITH BREAKFAST) 100 tablet 2     furosemide (LASIX) 20 MG tablet Take 1.5 tablets (30 mg) by mouth daily 135 tablet 3     glipiZIDE (GLUCOTROL XL) 5 MG 24 hr tablet Take 1 tablet (5 mg) by mouth 2 times daily 180 tablet 3     latanoprost (XALATAN) 0.005 % ophthalmic solution Place 1 drop Into the left eye daily       lisinopril (ZESTRIL) 10 MG tablet Take 1 tablet (10 mg) by mouth daily 90 tablet 3     loperamide (IMODIUM A-D) 2 MG tablet daily as needed  30 tablet 0     melatonin 5 MG CAPS Take 1 capsule by mouth       metFORMIN (GLUCOPHAGE) 500 MG tablet TAKE 1 TABLET BY MOUTH TWICE A DAY WITH MEALS 180 tablet 1     Metoprolol Tartrate 37.5 MG TABS TAKE 1 TABLET BY MOUTH TWICE A  tablet 2     multivitamin, therapeutic (THERA-VIT) TABS tablet Take 0.5 tablets by mouth 2 times daily       ONETOUCH ULTRA test strip USE TO TEST DAILY 50 strip 1      pioglitazone (ACTOS) 45 MG tablet TAKE 1 TABLET BY MOUTH EVERY DAY 90 tablet 0                Data:   All laboratory data reviewed  No results found for this or any previous visit (from the past 24 hour(s)).    All laboratory data reviewed  Lab Results   Component Value Date    CHOL 122 09/30/2021    CHOL 120 11/11/2019     Lab Results   Component Value Date    HDL 54 09/30/2021    HDL 55 11/11/2019     Lab Results   Component Value Date    LDL 55 09/30/2021    LDL 44 11/11/2019     Lab Results   Component Value Date    TRIG 65 09/30/2021    TRIG 104 11/11/2019     Lab Results   Component Value Date    CHOLHDLRATIO 2.0 03/20/2015     TSH   Date Value Ref Range Status   04/26/2022 7.08 (H) 0.40 - 4.00 mU/L Final   09/11/2020 6.13 (H) 0.40 - 4.00 mU/L Final     Last Basic Metabolic Panel:  Lab Results   Component Value Date     05/06/2022     03/08/2021      Lab Results   Component Value Date    POTASSIUM 4.7 05/06/2022    POTASSIUM 4.5 03/08/2021     Lab Results   Component Value Date    CHLORIDE 99 05/06/2022    CHLORIDE 103 03/08/2021     Lab Results   Component Value Date    MICHAEL 9.2 05/06/2022    MICHAEL 9.4 03/08/2021     Lab Results   Component Value Date    CO2 26 05/06/2022    CO2 28 03/08/2021     Lab Results   Component Value Date    BUN 25 05/06/2022    BUN 18 03/08/2021     Lab Results   Component Value Date    CR 0.84 05/06/2022    CR 1.00 03/08/2021     Lab Results   Component Value Date     05/06/2022     03/08/2021     Lab Results   Component Value Date    WBC 6.5 04/26/2022    WBC 8.1 03/08/2021     Lab Results   Component Value Date    RBC 4.25 04/26/2022    RBC 3.73 03/08/2021     Lab Results   Component Value Date    HGB 12.8 04/26/2022    HGB 12.3 03/08/2021     Lab Results   Component Value Date    HCT 38.9 04/26/2022    HCT 37.9 03/08/2021     Lab Results   Component Value Date    MCV 92 04/26/2022     03/08/2021     Lab Results   Component Value Date    MCH 30.1  04/26/2022    MCH 33.0 03/08/2021     Lab Results   Component Value Date    MCHC 32.9 04/26/2022    MCHC 32.5 03/08/2021     Lab Results   Component Value Date    RDW 14.4 04/26/2022    RDW 15.6 03/08/2021     Lab Results   Component Value Date     04/26/2022     03/08/2021

## 2022-08-09 NOTE — TELEPHONE ENCOUNTER
Please place lab orders.     Joselito Armas MD   5/8/2022  8:50 PM CDT         Slightly low sodium, improved. Keep fluid restriction 1200 ml / day, recheck BMP in 3 months.          Joselito Armas MD   4/28/2022  9:30 AM CDT         Low vit D. Recommend to start daily vit D 2000 units.   Mild anemia, improved. Stable, recheck in 3 months.   Low sodium. Keep fluid restriction to 1500 ml daily. Recheck in 1 week.   Rest of the results are normal.

## 2022-08-09 NOTE — TELEPHONE ENCOUNTER
Patient calling. He has appt for lab this Friday 8/12/2022  Please place orders  Hemoglobin and Vit D  Ok to call and kim [phone]

## 2022-09-15 PROBLEM — I48.20 CHRONIC ATRIAL FIBRILLATION (H): Status: ACTIVE | Noted: 2022-01-01

## 2022-09-15 NOTE — TELEPHONE ENCOUNTER
Will have [facility] review for dosing and transition from Eliquis. Last warfarin maintenance dose was 1.25mg Mon / 2.5mg rest of week. Last INR was on 5/6/22.  We will also need to talk to patient about home testing and get application sent to Willard Baumann RN  Anticoagulation Clinic

## 2022-09-15 NOTE — TELEPHONE ENCOUNTER
Reason for Call:  Other prescription    Detailed comments: Patient is currently on Eliquis and has a 90 day supply and it is costing him way to much out of pocket. He would like to go back on Warfarin because it is a lot cheaper. He also needs a test kit to check the INR levels and call in results. He is requesting this only because he doesn't drive and finds  it hard to get in for the checks.    Phone Number Patient can be reached at: Home number on file [phone] (home)    Best Time: any    Can we leave a detailed message on this number? YES    Call taken on 9/15/2022 at 11:16 AM by Inna Lucio

## 2022-09-15 NOTE — TELEPHONE ENCOUNTER
OK to change to Coumadin.   Has he done the self testing in the past for his INR?  Recommend to start with INR clinic referral and further monitoring by them. Will place referral.

## 2022-09-16 NOTE — TELEPHONE ENCOUNTER
Chart reviewed. Notable drug interactions - Amiodarone and Questran.     No overlap with apixaban and warfarin. Okay for previous maintenance dose of 1.25 mg one day per week; 2.5 mg ROW.     Helen Chruch, Lindsey, BCPS

## 2022-09-19 NOTE — TELEPHONE ENCOUNTER
I spoke to patient, he states he just received a 90 day supply of the Eliquis so he would like to use that up before starting warfarin.   Patient also declines home monitor testing application at this time as he isn't sure he wants to test his INR every one to 2 weeks.    I gave patient [facility]agulation Clinic phone #, he will call when he gets close tor running out of Eliquis so I will close out this encounter for now. When patient calls back in about 90 days, we will then fax in warfarin 2.5mg tablets (1.25mg Mon / 2.5mg rest of week).      Hope Baumann RN  Anticoagulation Clinic

## 2022-09-19 NOTE — TELEPHONE ENCOUNTER
Reason for Call:  Other call back    Detailed comments: Patient is calling back after talking to [facility] and has decided that he would like to get the home monitoring INR kit.     Please call to discuss.     Phone Number Patient can be reached at: Home number on file [phone] (home)    Best Time: any    Can we leave a detailed message on this number? YES    Call taken on 9/19/2022 at 2:37 PM by Jill Fuentes

## 2022-09-19 NOTE — TELEPHONE ENCOUNTER
I spoke to patient, will send this to Vaishali Tubbs:    Anticoagulation Management    Discussed INR home monitoring program with Louis Keller Jr. reviewing:      [employer] requirements: >= 3 months of anticoagulation therapy, indication for chronic anticoagulation and order from provider    Required testing frequency (q1-2 weeks)    Home meters, testing supplies, meter training, and reporting of INR results done through an outside company. Patient would be contacted by home monitoring company to review insurance coverage with home monitoring company prior to enrolling.    [employer] would continue to receive and manage INR results.    Home monitoring application may take several weeks and must continue to follow up with recommended INR monitoring in clinic until receives monitor and training completed.     Home monitoring terms reviewed with patient      Patient agrees to frequency of testing as directed by referring provider ( weekly or biweekly) Yes    Testing to be performed during business hours of [facility] Yes    Patient agrees they have the skill (or a designated caregiver) necessary to perform the self test Yes, patient states he has some shakiness but doesn't feel it would prevent him from testing.    Patient agrees to report all INR results to INR home monitoring company Yes    Patient agrees to have additional INR test in clinic if a home result is critical Yes    Patient agrees to use a [employer] approved service provider and device for home monitoring Yes    [facility]    Referring provider: Dr. Joselito Armas    Referring providers Clinic Fax number [phone]    Louis Keller Jr. is interested home INR monitoring and requests order be submitted.

## 2022-10-05 NOTE — TELEPHONE ENCOUNTER
MATT received from patient, stating that he is confused about his glipizide dose. Contacted patient to review further. Patient states that the most recent prescription that he received from the pharmacy says to take once daily, and it says it's from Dr. Gleason. Reviewed with patient that the rx he should be taking is 5 mg BID. Patient states that he will call his pharmacy to clarify. No further questions.

## 2022-10-13 NOTE — PROGRESS NOTES
Medication Therapy Management (MTM) Encounter    ASSESSMENT:                            Medication Adherence/Access: No issues identified    Afib: patient has three months of Eliquis at home.  He will finish supply and discuss option to change back to warfarin with primary care provider at that time; potentially his cost of Eliquis in the new year would be less since he will not be in the coverage gap.    Hypertension:  stable    Type 2 Diabetes:  A1c at goal <8%.  No concerns at this time from the patient.  Continue to assess for possible side effects from the medication, metformin (diarrhea) and pioglitazone (swelling).     Insomnia: stable    Pain:  Could try topic diclofenac gel to help with leg/hip pain at night.    Diarrhea: stable on cholestyramine, reviewed timing recommendations with powder and his other medications. He is taking the cholestyramine alone at night which is perfect.    Glaucoma: stable.    Anemia:  stable    PLAN:                            1.  Follow-up with Dr. Armas for referral back to anticoagulation clinic to restart warfarin or use up current supply of Eliquis (potentially enough through end of the year) and see if affordable again in the new year.  2.  I will call [facility] and ask them to not automatically refill the Eliquis at this time.    3.  Could try Diclofenac Gel on hip/leg before bed    Follow-up: Return in about 6 months (around 4/13/2023) for Medication Therapy Management.    SUBJECTIVE/OBJECTIVE:                          Louis Keller is a 89 year old male called for an initial visit. He was referred to me from [facility]/Dr. Armas.    Patient did meet with [facility] Pharmacist recently as well.     Reason for visit: medication review.    Allergies/ADRs: None  Tobacco: He reports that he has never smoked. He has never used smokeless tobacco.  Alcohol: not currently using    Medication Adherence/Access: currently in the Medicare Coverage Gap - cost of Eliquis ($300 for  90-day) is a concern.  Doesn't want to get fills through [facility].    Afib: Taking Eliquis 2.5 mg twice daily (dose recently reduced; has a 90-day supply), amiodarone 200 mg daily (QAM) and metoprolol tartrate 37.5 mg twice daily.  Cost of Eliquis is a concern now that he is in coverage gap, interested in going back on warfarin.  Wants to do home monitoring since he is dependent on others for rides.     Skin is very thin and cuts easily if not careful.  No other signs of bleeding.  He did have fall about 1.5 weeks ago, landed on walker.  Reports several wounds on arm- healed pretty well. Was trying to step on scale and lost his balance.       BP Readings from Last 3 Encounters:   06/21/22 120/64   04/26/22 (!) 159/79   12/08/21 130/78       Hypertension: Current medications include lisinopril 10 mg daily and furosemide 30 mg daily.  The increased dose of furosemide did help with foot swelling.  Patient does not self-monitor blood pressure.  Patient reports no current medication side effects.    BP Readings from Last 3 Encounters:   06/21/22 120/64   04/26/22 (!) 159/79   12/08/21 130/78       Potassium   Date Value Ref Range Status   08/12/2022 4.7 3.4 - 5.3 mmol/L Final   03/08/2021 4.5 3.4 - 5.3 mmol/L Final       Type 2 Diabetes:  Currently taking metformin 500 mg twice daily, glipizide XL 5 mg twice daily and pioglitazone 45 mg daily. Patient is not experiencing side effects.  When he first started metformin he had diarrhea but doesn't seem to be a problem now (however he is on cholestyramine now)  Blood sugar monitoring: one time(s) daily. Ranges (patient reported): 140s  Symptoms of low blood sugar? none  Symptoms of high blood sugar? none  Eye exam: up to date  Foot exam: up to date  Aspirin: No  Statin: No   ACEi/ARB: Yes: lisinopril.   Urine Albumin:   Lab Results   Component Value Date    UMALCR 46.77 (H) 09/01/2022      Lab Results   Component Value Date    A1C 6.8 04/26/2022    A1C 6.8 09/08/2021     A1C 6.6 03/08/2021    A1C 6.4 09/02/2020    A1C 6.7 11/11/2019    A1C 6.9 06/17/2019    A1C 7.3 12/19/2018        Insomnia: Current medications include: melatonin 5 mg bedtime as needed.  Has not been taking for some time and sleeping well without it. Patient reports no issues at this time.     Pain:  Taking Tylenol 1000 mg three times daily.  Having some pain in right hip that goes up his back and down his leg following fall; pain worse at night.  Had xrays at  Orthopedics - no fractures seen.  Has tried SalonPas but that didn't seem to help.   Call out to Orthopedic office to discuss other options.    Diarrhea: Taking cholestyramine 4 g daily at night and loperamide as needed.    Taking cholestyramine more than once daily bounds him up and if he misses a doses he'll have diarrhea.  Also eats greek yogurt most days.    Glaucoma: Using latanoprost eye drops in left eye and Alphagan twice daily.  No concerns at this time.    Anemia:  Taking ferrous gluconate 324 mg daily.  No concerns at this time.  Hemoglobin   Date Value Ref Range Status   08/12/2022 12.9 (L) 13.3 - 17.7 g/dL Final   03/08/2021 12.3 (L) 13.3 - 17.7 g/dL Final         Today's Vitals: There were no vitals taken for this visit.  ----------------      I spent 60 minutes with this patient today. All changes were made via collaborative practice agreement with Joselito Armas MD. A copy of the visit note was provided to the patient's provider(s).    The patient was sent via [employer] a summary of these recommendations.     Ayse Pérez , Pharm D  [phone] (phone)  Medication Therapy Management Pharmacist     Telemedicine Visit Details  Type of service:  Telephone visit  Start Time: 300pm  End Time: 400pm  Originating Location (patient location): Home  Distant Location (provider location):  [facility]     Medication Therapy Recommendations  Atrial fibrillation (H)    Current Medication: apixaban ANTICOAGULANT (ELIQUIS)  2.5 MG tablet   Rationale: More cost-effective medication available - Cost - Adherence   Recommendation: Referral to Service    Status: Patient Agreed - Adherence/Education         Pain of right thigh    Rationale: Synergistic therapy - Needs additional medication therapy - Indication   Recommendation: Start Medication - diclofenac 1 % topical gel   Status: Accepted - no CPA Needed

## 2022-10-13 NOTE — PATIENT INSTRUCTIONS
"Recommendations from today's MTM visit:                                                    MTM (medication therapy management) is a service provided by a clinical pharmacist designed to help you get the most of out of your medicines.   Today we reviewed what your medicines are for, how to know if they are working, that your medicines are safe and how to make your medicine regimen as easy as possible.      1.  Follow-up with Dr. Armas for referral back to anticoagulation clinic to restart warfarin or use up current supply of Eliquis (potentially enough through end of the year) and see if affordable again in the new year.    2.  I called [facility] and confirmed that the prescription for the Eliquis will not be automatically renewed.    3.  You could try Voltaren (Diclofenac Gel) on hip/leg before bed to see if that helps the pain. You can buy this over-the-counter.    Follow-up: Return in about 6 months (around 4/13/2023) for Medication Therapy Management.    It was great speaking with you today.  I value your experience and would be very thankful for your time in providing feedback in our clinic survey. In the next few days, you may receive an email or text message from [employer] with a link to a survey related to your  clinical pharmacist.\"     To schedule another MTM appointment, please call the clinic directly or you may call the MTM scheduling line at [phone] or toll-free at [phone].     My Clinical Pharmacist's contact information:                                                      Please feel free to contact me with any questions or concerns you have.      Ayse Pérez , Pharm D  [phone] (phone)  Medication Therapy Management Pharmacist   "

## 2022-10-13 NOTE — LETTER
_  Medication List        Prepared on: Oct 13, 2022     Bring your Medication List when you go to the doctor, hospital, or   emergency room. And, share it with your family or caregivers.     Note any changes to how you take your medications.  Cross out medications when you no longer use them.    Medication How I take it Why I use it Prescriber   Acetaminophen (TYLENOL PO) Take 2 tablets (1,000 mg) by mouth 3 times daily Pain Patient Reported   amiodarone (PACERONE) 200 MG tablet Take 1 tablet (200 mg) by mouth daily Chronic A-Fib (H); Near syncope; Venous (Peripheral) Insufficiency Herbert Gleason MD   apixaban ANTICOAGULANT (ELIQUIS) 2.5 MG tablet Take 1 tablet (2.5 mg) by mouth 2 times daily Chronic Atrial Fibrillation (H) Herbert Gleason MD   brimonidine (ALPHAGAN) 0.2 % ophthalmic solution INSTILL 1 DROP INTO LEFT EYE TWICE A DAY Glaucoma Dr. Armas   cholestyramine (QUESTRAN) 4 g packet Take 1 packet (4 g) by mouth daily Postcholecystectomy Diarrhea; Hyperlipidemia LDL Goal <100 Joselito Armas MD   ferrous gluconate (FERGON) 324 (38 Fe) MG tablet TAKE 1 TABLET (324 MG) BY MOUTH DAILY (WITH BREAKFAST) Anemia due to blood loss, acute Joselito Armas MD   furosemide (LASIX) 20 MG tablet Take 1.5 tablets (30 mg) by mouth daily Chronic A-Fib (H); Diastolic Dysfunction; Bilateral Leg Edema; QUESADA (Dyspnea on Exertion) Herbert Gleason MD   glipiZIDE (GLUCOTROL XL) 5 MG 24 hr tablet Take 1 tablet (5 mg) by mouth 2 times daily Chronic Atrial Fibrillation (H); Actinic Keratosis Herbert Gleason MD   latanoprost (XALATAN) 0.005 % ophthalmic solution Place 1 drop Into the left eye daily Glaucoma Joselito Armas MD   lisinopril (ZESTRIL) 10 MG tablet Take 1 tablet (10 mg) by mouth daily Essential Hypertension Herbert Gleason MD   loperamide (IMODIUM A-D) 2 MG tablet Take 1 tablet (2 mg) by mouth daily as needed  Diarrhea Joselito Armas MD   melatonin 5 MG CAPS Take 1 capsule (5 mg) by mouth  nightly as needed Sleep Joselito Armas MD   metFORMIN (GLUCOPHAGE) 500 MG tablet TAKE 1 TABLET (500 mg) BY MOUTH TWICE A DAY WITH MEALS Type 2 diabetes mellitus with stage 3 chronic kidney disease, without long-term current use of insulin (H) Joselito Armas MD   Metoprolol Tartrate 37.5 MG TABS Take 1 tablet (37.5 mg) by mouth 2 times daily Chronic A-Fib (H) Herbert Gleason MD   multivitamin, therapeutic (THERA-VIT) TABS tablet Take 1 tablet by mouth daily Diet Supplement Patient Reported   pioglitazone (ACTOS) 45 MG tablet TAKE 1 TABLET (45 mg) BY MOUTH EVERY DAY Type 2 diabetes mellitus with stage 3 chronic kidney disease, without long-term current use of insulin, unspecified whether stage 3a or 3b CKD (H) Joselito Armas MD         Add new medications, over-the-counter drugs, herbals, vitamins, or  minerals in the blank rows below.    Medication How I take it Why I use it Prescriber                          Allergies:      no known drug allergies        Side effects I have had:               Other Information:              My notes and questions:

## 2022-10-13 NOTE — LETTER
"Recommended To-Do List      Prepared on: Oct 13, 2022       You can get the best results from your medications by completing the items on this \"To-Do List.\"      Bring your To-Do List when you go to your doctor. And, share it with your family or caregivers.    My To-Do List:  What we talked about: What I should do:   The cost of your medication(s)    Discuss changing back to warfarin with Dr. Armas.  The cost of Eliquis may be more affordable in the new year as well.          What we talked about: What I should do:   A new medication that may be of benefit to you    Start taking diclofenac (VOLTAREN) Gel for leg/hip pain.            What we talked about: What I should do:                       "

## 2022-10-25 NOTE — PROGRESS NOTES
Assessment & Plan     Type 2 diabetes mellitus without complication, without long-term current use of insulin (H)  Assess DM control   Continue treatment   - HEMOGLOBIN A1C    Paresthesia  Assess B12 level  Keep good glycemic control   - Vitamin B12    Right-sided low back pain with right-sided sciatica, unspecified chronicity  Start on Gabapentin  Short term Tramadol , advised for side effects  Pending MRI assessment   - gabapentin (NEURONTIN) 100 MG capsule; Take 1 capsule (100 mg) by mouth 3 times daily  - traMADol (ULTRAM) 50 MG tablet; Take 1 tablet (50 mg) by mouth At Bedtime    High priority for 2019-nCoV vaccine  Immunized   - COVID-19,PF,PFIZER BOOSTER BIVALENT 12+Yrs    Hyperlipidemia LDL goal <100  Continue statin     Permanent atrial fibrillation (H)  Rate controlled  Wants to change to Coumadin AC   - Anticoagulation Clinic Referral    Right forearm skin abrasion : wound was cleaned and dressed. Advised for care at home.          See Patient Instructions    Return in about 3 months (around 1/25/2023) for Routine Visit.    Joselito Armas MD  [facility] CHINEDU Myers is a 89 year old accompanied by his daughter, presenting for the following health issues:  RECHECK, Diabetes, and Musculoskeletal Problem (Pt c/o pain in right hip and thigh; pt fell in the bathroom on 9/28, saw UC; was found to have compression fractures at appointment on 10/6 w/Dr. Jamil was told to take tylenol; on 10/19 saw PA at Dr. Jamil' office and was given tramadol to take at night but still has pain during the day as well, also had cortisol injection on that date for bursitis which didn't do any thing; has MRI scheduled for 10/27)      HPI     Patient is seen for a follow up visit.    Concern for LBP with radiation to the right thigh and knee. Started after a fall, tripped.   Seen ortho. Has had X rays, possible vertebral fracture. Scheduled for MRI.   Has been having pain with ambulation,  transferring, longer stay in one position.   No leg weakness, giving out, numbness.   Has chronic paresthesias in the LE , feet.   Started on Tylenol tid and Tramadol at bedtime. Has improved symptoms at night.   No side effects from medications.   Feels weak, poor energy. Has had falls in the past. Uses a walker.     Diabetes Follow-up  Has H/O DM. On diet , exercise and oral treatment. Blood sugars are controlled. No parestesias. No hypoglycemias.    How often are you checking your blood sugar? One time daily  What time of day are you checking your blood sugars (select all that apply)?  Before meals  Have you had any blood sugars above 200?  No  Have you had any blood sugars below 70?  No    What symptoms do you notice when your blood sugar is low?  None    What concerns do you have today about your diabetes? None     Do you have any of these symptoms? (Select all that apply)  No numbness or tingling in feet.  No redness, sores or blisters on feet.  No complaints of excessive thirst.  No reports of blurry vision.  No significant changes to weight.      BP Readings from Last 2 Encounters:   10/25/22 135/66   06/21/22 120/64     Hemoglobin A1C (%)   Date Value   04/26/2022 6.8 (H)   09/08/2021 6.8 (H)   03/08/2021 6.6 (H)   09/02/2020 6.4 (H)     LDL Cholesterol Calculated (mg/dL)   Date Value   09/01/2022 60   09/30/2021 55   11/11/2019 44   12/19/2018 52           How many servings of fruits and vegetables do you eat daily?  2-3    On average, how many sweetened beverages do you drink each day (Examples: soda, juice, sweet tea, etc.  Do NOT count diet or artificially sweetened beverages)?   0    How many days per week do you exercise enough to make your heart beat faster? 3 or less    How many minutes a day do you exercise enough to make your heart beat faster? 9 or less    How many days per week do you miss taking your medication? 0    Has history of atrial fibrillation. On anticoagulation with Pradaxa and rate  "control medications. Asymptonatic - no chest pains , palpitations,  no side effects from medications.  No bleeding. Wants to transition to Coumadin and self monitor at home. Has done it in the past.     From his recent fall has injured his left forearm, has skin abrasion. Has mild pain, no bleeding.       Review of Systems   Constitutional, HEENT, cardiovascular, pulmonary, GI, , musculoskeletal, neuro, skin, endocrine and psych systems are negative, except as otherwise noted.      Objective    /66 (BP Location: Left arm, Cuff Size: Adult Regular)   Pulse 113   Temp 97.5  F (36.4  C) (Tympanic)   Resp 20   Ht 1.803 m (5' 11\")   Wt 80.2 kg (176 lb 12.8 oz)   SpO2 97%   BMI 24.66 kg/m    Body mass index is 24.66 kg/m .  Physical Exam   GENERAL: elderly, frail, alert and no distress  EYES: Eyes grossly normal to inspection, PERRL and conjunctivae and sclerae normal  HENT: ear canals and TM's normal, nose and mouth without ulcers or lesions  NECK: no adenopathy, no asymmetry, masses, or scars and thyroid normal to palpation  RESP: lungs clear to auscultation - no rales, rhonchi or wheezes  CV: irregular rate and rhythm, normal S1 S2, no S3 or S4, 3/6 systolic murmur,no click or rub, 1+  peripheral edema and peripheral pulses strong  ABDOMEN: soft, nontender, no hepatosplenomegaly, no masses and bowel sounds normal  MS: no gross musculoskeletal defects noted  Pronounces thoracic kyphosis, pain in the right lower back LS paravertebral, radiation to the right lateral leg , thigh   SKIN: no suspicious lesions or rashes, skin bruises on the arms  Left forearm lateral skin abrasion 2 by 5 cm , healing   NEURO: generalized weakness, unstable gait, mentation intact and speech normal    Ancillary Procedure on 09/22/2022   Component Date Value Ref Range Status     Date Time Interrogation Session 09/22/2022 20220922092022   Final     Implantable Pulse Generator Hiren* 09/22/2022 [employer]   Final     Implantable " Pulse Generator Model 09/22/2022 [id number] Reveal LINQ   Final     Implantable Pulse Generator Serial* 09/22/2022 [id number]   Final     Type Interrogation Session 09/22/2022 Remote   Final     Clinic Name 09/22/2022 [place]   Final     Implantable Pulse Generator Type 09/22/2022 Implantable Diagnostic Monitor   Final     Implantable Pulse Generator Implan* 09/22/2022 [id number]   Final     Zone Setting Type Category 09/22/2022 VF   Final     Zone Setting Type Category 09/22/2022 VT   Final     Zone Setting Type Category 09/22/2022 VT   Final     Zone Setting Detection Interval 09/22/2022 420  ms Final     Zone Setting Type Category 09/22/2022 VT   Final     Zone Setting Type Category 09/22/2022 ATRIAL_FIBRILLATION   Final     Zone Setting Type Category 09/22/2022 AT/AF   Final     Zone Setting Type Category 09/22/2022 ASYSTOLE   Final     Zone Setting Detection Interval 09/22/2022 3,000  ms Final     Zone Setting Type Category 09/22/2022 BRADYCARDIA   Final     Zone Setting Detection Interval 09/22/2022 2,000  ms Final     Battery Status 09/22/2022 OK   Final     Atrial Tachy Statistic Date Time S* 09/22/2022 20220706170610   Final     Atrial Tachy Statistic Date Time E* 09/22/2022 20220922092022   Final     Atrial Tachy Statistic AT/AF Burde* 09/22/2022 21.8  % Final     Episode Statistic Recent Count 09/22/2022 9   Final     Episode Statistic Type Category 09/22/2022 AT/AF   Final     Episode Statistic Recent Count 09/22/2022 0   Final     Episode Statistic Type Category 09/22/2022 ASYSTOLE   Final     Episode Statistic Recent Count 09/22/2022 0   Final     Episode Statistic Type Category 09/22/2022 AT   Final     Episode Statistic Recent Count 09/22/2022 0   Final     Episode Statistic Type Category 09/22/2022 MARCO   Final     Episode Statistic Recent Count 09/22/2022 0   Final     Episode Statistic Type Category 09/22/2022 Patient Activated   Final     Episode Statistic Recent Count 09/22/2022 1   Final      Episode Statistic Type Category 09/22/2022 VT   Final     Episode Statistic Recent Date Time* 09/22/2022 20220706170610   Final     Episode Statistic Recent Date Time* 09/22/2022 20220922092022   Final     Episode Statistic Recent Date Time* 09/22/2022 20220706170610   Final     Episode Statistic Recent Date Time* 09/22/2022 20220922092022   Final     Episode Statistic Recent Date Time* 09/22/2022 [id number]   Final     Episode Statistic Recent Date Time* 09/22/2022 20220922092022   Final     Episode Statistic Recent Date Time* 09/22/2022 [id number]   Final     Episode Statistic Recent Date Time* 09/22/2022 20220922092022   Final     Episode Statistic Recent Date Time* 09/22/2022 20220706170610   Final     Episode Statistic Recent Date Time* 09/22/2022 20220922092022   Final     Episode Statistic Recent Date Time* 09/22/2022 20220706170610   Final     Episode Statistic Recent Date Time* 09/22/2022 20220922092022   Final     Episode Statistic Total Count 09/22/2022 138   Final     Episode Statistic Type Category 09/22/2022 AT/AF   Final     Episode Statistic Total Count 09/22/2022 0   Final     Episode Statistic Type Category 09/22/2022 ASYSTOLE   Final     Episode Statistic Total Count 09/22/2022 0   Final     Episode Statistic Type Category 09/22/2022 AT   Final     Episode Statistic Total Count 09/22/2022 0   Final     Episode Statistic Type Category 09/22/2022 MARCO   Final     Episode Statistic Total Count 09/22/2022 1   Final     Episode Statistic Type Category 09/22/2022 Patient Activated   Final     Episode Statistic Total Count 09/22/2022 369   Final     Episode Statistic Type Category 09/22/2022 VT   Final     Episode Statistic Total Date Time * 09/22/2022 20190805153304   Final     Episode Statistic Total Date Time * 09/22/2022 20220922092022   Final     Episode Statistic Total Date Time * 09/22/2022 [id number]   Final     Episode Statistic Total Date Time * 09/22/2022 20220922092022   Final      Episode Statistic Total Date Time * 09/22/2022 [id number]   Final     Episode Statistic Total Date Time * 09/22/2022 20220922092022   Final     Episode Statistic Total Date Time * 09/22/2022 [id number]   Final     Episode Statistic Total Date Time * 09/22/2022 20220922092022   Final     Episode Statistic Total Date Time * 09/22/2022 [id number]   Final     Episode Statistic Total Date Time * 09/22/2022 20220922092022   Final     Episode Statistic Total Date Time * 09/22/2022 [id number]   Final     Episode Statistic Total Date Time * 09/22/2022 20220922092022   Final       40 min spent in direct patient care and coordination of services

## 2022-10-25 NOTE — LETTER
October 26, 2022      Louis Keller .  [address]   [facility] [address]        Dear ,    We are writing to inform you of your test results. Your B12 was normal.  Slightly worsened diabetic control, but still acceptable. Recheck in 6 months.       Resulted Orders   HEMOGLOBIN A1C   Result Value Ref Range    Hemoglobin A1C 7.2 (H) 0.0 - 5.6 %      Comment:      Normal <5.7%   Prediabetes 5.7-6.4%    Diabetes 6.5% or higher     Note: Adopted from ADA consensus guidelines.   Vitamin B12   Result Value Ref Range    Vitamin B12 324 232 - 1,245 pg/mL       If you have any questions or concerns, please call the clinic at the number listed above.       Sincerely,      Joselito Armas MD        alan

## 2022-10-26 PROBLEM — I48.21 PERMANENT ATRIAL FIBRILLATION (H): Status: ACTIVE | Noted: 2022-01-01

## 2022-10-27 NOTE — TELEPHONE ENCOUNTER
I called patient since he is showing up on our reminder list.  Patient still has about 1 month of Eliquis left and then will resume warfarin.    Also, patient has heard that the home INR meter will be covered (rented to him) and that his insurance will cover 50 test strips per year. Patient saw PCP on 10/25/22, PCP placed another order for home monitor, I deleted this order since another order was routed to him on 10/20/22.    Patient will call [facility] if he does not receive home INR monitor before he runs out of Eliquis. Patient has warfarin on hand since he has only been on Eliquis for about 2 months.    I postponed INR recheck date on [facility] calendar.    Hope Baumann RN  Anticoagulation Clinic

## 2022-11-04 NOTE — TELEPHONE ENCOUNTER
Patient is calling for refill.  He has been using the tramadol at night before bed and it helps for about six hours.  He wants to know if there is anything he can take during the day for his pain in his right hip and goes down his leg to his knee.

## 2022-11-07 NOTE — TELEPHONE ENCOUNTER
Spoke with patient.  States he is currently taking Gabapentin 100mg TID regularly.    Is there a different medication patient can take?  Or increase gabapentin?    Please advise, thanks.   [facility]

## 2022-11-07 NOTE — TELEPHONE ENCOUNTER
Patient calls and [facility] advised him that they never received the refill for cholestyramine (QUESTRAN) 4 g packet.  Epic shows historical Print only.  Please send to [facility] by the end of the day today if possible.

## 2022-11-07 NOTE — TELEPHONE ENCOUNTER
Patient calling.  States he takes medication BID (not daily as per directions).    Routing refill request to provider for review/approval because:  Medication is reported/historical    Please advise, thanks.

## 2022-11-28 NOTE — TELEPHONE ENCOUNTER
I spoke to Louis, he states his Eliquis dose was decreased to 2.5mg BID (has 5mg tablets) so he thinks he still has 3-4 weeks left before resuming warfarin.    Patient states he was told by the home monitor company that since he is still taking Eliquis, they would discontinue the home monitor request and patient would have to reaaply?!  I will forward to Vaishali Tubbs on the ACC team to investigate.    A home monitor request was submitted on 10/10/22 and then PCP ordered again on 10/25/22 which I discontinued/cancelled since duplcate.    Hope Baumann RN  Anticoagulation Clinic

## 2022-11-28 NOTE — TELEPHONE ENCOUNTER
I spoke to patient, he is scheduled for epidural injection on 12/13/22 and he believes he was told to hold eliquis x 4 days.    Injection is scheduled with Dr. Jesus at [facility]

## 2022-12-06 NOTE — TELEPHONE ENCOUNTER
Prescription approved per [facility] Refill Protocol.  Nicholas SON RN, BSN

## 2022-12-07 NOTE — TELEPHONE ENCOUNTER
In order to get a INR home meter the patient must be on warfarin.  As he is currently not on warfarin he does not qualify to receive one.     A new order can be sent after he starts warfarin and is on a stable dose.  Please send a new home monitor request encounter once he is stable on warfarin.

## 2022-12-09 NOTE — TELEPHONE ENCOUNTER
Patient Quality Outreach      Summary:    Patient has the following on his problem list/HM:   Diabetes    Last A1C:  Lab Results   Component Value Date    A1C 7.2 10/25/2022    A1C 6.8 04/26/2022    A1C 6.6 03/08/2021    A1C 6.4 09/02/2020       Last LDL:    Lab Results   Component Value Date    LDL 60 09/01/2022    LDL 44 11/11/2019       Is the patient on a Statin? No          Is the patient on Aspirin? No    Medications     Salicylates     ASPIRIN NOT PRESCRIBED, INTENTIONAL,             Last three blood pressure readings:  BP Readings from Last 3 Encounters:   10/25/22 135/66   06/21/22 120/64   04/26/22 (!) 159/79          Tobacco Use      Smoking status: Never      Smokeless tobacco: Never          Patient is due/failing the following:   Diabetes -  Eye Exam    Type of outreach:    Sent letter.    Questions for provider review:    None                                                                                                                                     Morena Brown MA       Chart routed to .

## 2022-12-14 NOTE — TELEPHONE ENCOUNTER
Called and spoke with patient and he has decided to stay on the Eliquis instead of switching to warfarin.      Anticoagulation referral discontinued.  If patient decides to switch to warfarin in the future, please place another referral.    Message sent to PCP as an FYI.

## 2022-12-19 NOTE — PROGRESS NOTES
Patient still showing up on ACC reminder list so I discharged patient.    ANTICOAGULATION  MANAGEMENT    Louis Keller Jr. is being discharged from the [facility] ([facility]).    Reason for discharge: warfarin replaced by alternate therapy, eliquis    Anticoagulation episode resolved, ACC referral closed and Standing order discontinued    If patient needs warfarin management in the future, please send a new referral    Hope Baumann RN

## 2022-12-28 NOTE — TELEPHONE ENCOUNTER
Pending Prescriptions:                       Disp   Refills    gabapentin (NEURONTIN) 100 MG capsule [Pha*312 ca*0        Sig: PLEASE SEE ATTACHED FOR DETAILED DIRECTIONS      Routing refill request to provider for review/approval because:  Drug not on the [facility] refill protocol

## 2022-12-30 NOTE — TELEPHONE ENCOUNTER
Can we call patient and see what the detailed instruction are?   I do not know what to write in prescription - pharmacy asking for clarification

## 2023-01-01 ENCOUNTER — APPOINTMENT (OUTPATIENT)
Dept: GENERAL RADIOLOGY | Facility: CLINIC | Age: 88
DRG: 208 | End: 2023-01-01
Attending: EMERGENCY MEDICINE
Payer: COMMERCIAL

## 2023-01-01 ENCOUNTER — APPOINTMENT (OUTPATIENT)
Dept: OCCUPATIONAL THERAPY | Facility: CLINIC | Age: 88
DRG: 291 | End: 2023-01-01
Payer: COMMERCIAL

## 2023-01-01 ENCOUNTER — TRANSFERRED RECORDS (OUTPATIENT)
Dept: HEALTH INFORMATION MANAGEMENT | Facility: CLINIC | Age: 88
End: 2023-01-01

## 2023-01-01 ENCOUNTER — APPOINTMENT (OUTPATIENT)
Dept: ULTRASOUND IMAGING | Facility: CLINIC | Age: 88
DRG: 291 | End: 2023-01-01
Attending: INTERNAL MEDICINE
Payer: COMMERCIAL

## 2023-01-01 ENCOUNTER — APPOINTMENT (OUTPATIENT)
Dept: GENERAL RADIOLOGY | Facility: CLINIC | Age: 88
DRG: 291 | End: 2023-01-01
Attending: INTERNAL MEDICINE
Payer: COMMERCIAL

## 2023-01-01 ENCOUNTER — TELEPHONE (OUTPATIENT)
Dept: INTERNAL MEDICINE | Facility: CLINIC | Age: 88
End: 2023-01-01

## 2023-01-01 ENCOUNTER — PATIENT OUTREACH (OUTPATIENT)
Dept: CARE COORDINATION | Facility: CLINIC | Age: 88
End: 2023-01-01

## 2023-01-01 ENCOUNTER — APPOINTMENT (OUTPATIENT)
Dept: GENERAL RADIOLOGY | Facility: CLINIC | Age: 88
DRG: 208 | End: 2023-01-01
Payer: COMMERCIAL

## 2023-01-01 ENCOUNTER — TELEPHONE (OUTPATIENT)
Dept: GERIATRICS | Facility: CLINIC | Age: 88
End: 2023-01-01

## 2023-01-01 ENCOUNTER — TRANSITIONAL CARE UNIT VISIT (OUTPATIENT)
Dept: GERIATRICS | Facility: CLINIC | Age: 88
End: 2023-01-01
Payer: COMMERCIAL

## 2023-01-01 ENCOUNTER — TELEPHONE (OUTPATIENT)
Dept: PHARMACY | Facility: CLINIC | Age: 88
End: 2023-01-01
Payer: COMMERCIAL

## 2023-01-01 ENCOUNTER — OFFICE VISIT (OUTPATIENT)
Dept: CARDIOLOGY | Facility: CLINIC | Age: 88
End: 2023-01-01
Payer: COMMERCIAL

## 2023-01-01 ENCOUNTER — TELEPHONE (OUTPATIENT)
Dept: CARDIOLOGY | Facility: CLINIC | Age: 88
End: 2023-01-01
Payer: COMMERCIAL

## 2023-01-01 ENCOUNTER — ANCILLARY PROCEDURE (OUTPATIENT)
Dept: GENERAL RADIOLOGY | Facility: CLINIC | Age: 88
End: 2023-01-01
Attending: INTERNAL MEDICINE
Payer: COMMERCIAL

## 2023-01-01 ENCOUNTER — APPOINTMENT (OUTPATIENT)
Dept: OCCUPATIONAL THERAPY | Facility: CLINIC | Age: 88
DRG: 291 | End: 2023-01-01
Attending: STUDENT IN AN ORGANIZED HEALTH CARE EDUCATION/TRAINING PROGRAM
Payer: COMMERCIAL

## 2023-01-01 ENCOUNTER — APPOINTMENT (OUTPATIENT)
Dept: CARDIOLOGY | Facility: CLINIC | Age: 88
DRG: 291 | End: 2023-01-01
Attending: INTERNAL MEDICINE
Payer: COMMERCIAL

## 2023-01-01 ENCOUNTER — DOCUMENTATION ONLY (OUTPATIENT)
Dept: GERIATRICS | Facility: CLINIC | Age: 88
End: 2023-01-01

## 2023-01-01 ENCOUNTER — HOSPITAL ENCOUNTER (INPATIENT)
Facility: CLINIC | Age: 88
LOS: 17 days | Discharge: NURSING, REHAB OR LONG-TERM CARE | DRG: 291 | End: 2023-02-26
Attending: EMERGENCY MEDICINE | Admitting: INTERNAL MEDICINE
Payer: COMMERCIAL

## 2023-01-01 ENCOUNTER — APPOINTMENT (OUTPATIENT)
Dept: MRI IMAGING | Facility: CLINIC | Age: 88
DRG: 291 | End: 2023-01-01
Attending: NURSE PRACTITIONER
Payer: COMMERCIAL

## 2023-01-01 ENCOUNTER — APPOINTMENT (OUTPATIENT)
Dept: PHYSICAL THERAPY | Facility: CLINIC | Age: 88
DRG: 291 | End: 2023-01-01
Attending: HOSPITALIST
Payer: COMMERCIAL

## 2023-01-01 ENCOUNTER — APPOINTMENT (OUTPATIENT)
Dept: GENERAL RADIOLOGY | Facility: CLINIC | Age: 88
DRG: 291 | End: 2023-01-01
Attending: EMERGENCY MEDICINE
Payer: COMMERCIAL

## 2023-01-01 ENCOUNTER — HOSPITAL ENCOUNTER (INPATIENT)
Facility: CLINIC | Age: 88
LOS: 3 days | DRG: 208 | End: 2023-03-13
Attending: EMERGENCY MEDICINE | Admitting: STUDENT IN AN ORGANIZED HEALTH CARE EDUCATION/TRAINING PROGRAM
Payer: COMMERCIAL

## 2023-01-01 ENCOUNTER — APPOINTMENT (OUTPATIENT)
Dept: CARDIOLOGY | Facility: CLINIC | Age: 88
DRG: 208 | End: 2023-01-01
Attending: EMERGENCY MEDICINE
Payer: COMMERCIAL

## 2023-01-01 ENCOUNTER — HOSPITAL ENCOUNTER (OUTPATIENT)
Dept: ULTRASOUND IMAGING | Facility: CLINIC | Age: 88
Discharge: HOME | End: 2023-01-30
Attending: INTERNAL MEDICINE | Admitting: INTERNAL MEDICINE
Payer: COMMERCIAL

## 2023-01-01 ENCOUNTER — APPOINTMENT (OUTPATIENT)
Dept: CT IMAGING | Facility: CLINIC | Age: 88
DRG: 291 | End: 2023-01-01
Attending: HOSPITALIST
Payer: COMMERCIAL

## 2023-01-01 ENCOUNTER — OFFICE VISIT (OUTPATIENT)
Dept: INTERNAL MEDICINE | Facility: CLINIC | Age: 88
End: 2023-01-01
Payer: COMMERCIAL

## 2023-01-01 VITALS
DIASTOLIC BLOOD PRESSURE: 71 MMHG | HEART RATE: 77 BPM | WEIGHT: 187.9 LBS | OXYGEN SATURATION: 100 % | BODY MASS INDEX: 26.96 KG/M2 | SYSTOLIC BLOOD PRESSURE: 128 MMHG

## 2023-01-01 VITALS
DIASTOLIC BLOOD PRESSURE: 52 MMHG | HEIGHT: 70 IN | OXYGEN SATURATION: 92 % | RESPIRATION RATE: 18 BRPM | TEMPERATURE: 97.3 F | WEIGHT: 168.8 LBS | BODY MASS INDEX: 24.17 KG/M2 | HEART RATE: 75 BPM | SYSTOLIC BLOOD PRESSURE: 104 MMHG

## 2023-01-01 VITALS
OXYGEN SATURATION: 91 % | WEIGHT: 160.5 LBS | TEMPERATURE: 98.2 F | DIASTOLIC BLOOD PRESSURE: 62 MMHG | SYSTOLIC BLOOD PRESSURE: 101 MMHG | HEART RATE: 77 BPM | BODY MASS INDEX: 22.98 KG/M2 | RESPIRATION RATE: 16 BRPM | HEIGHT: 70 IN

## 2023-01-01 VITALS
RESPIRATION RATE: 22 BRPM | DIASTOLIC BLOOD PRESSURE: 70 MMHG | TEMPERATURE: 98.2 F | SYSTOLIC BLOOD PRESSURE: 137 MMHG | WEIGHT: 157.19 LBS | BODY MASS INDEX: 22.55 KG/M2 | HEART RATE: 112 BPM | OXYGEN SATURATION: 98 %

## 2023-01-01 VITALS
HEIGHT: 70 IN | WEIGHT: 178 LBS | DIASTOLIC BLOOD PRESSURE: 66 MMHG | OXYGEN SATURATION: 94 % | SYSTOLIC BLOOD PRESSURE: 116 MMHG | BODY MASS INDEX: 25.48 KG/M2 | HEART RATE: 105 BPM

## 2023-01-01 VITALS
RESPIRATION RATE: 18 BRPM | WEIGHT: 153.1 LBS | HEIGHT: 70 IN | TEMPERATURE: 98.5 F | OXYGEN SATURATION: 95 % | SYSTOLIC BLOOD PRESSURE: 145 MMHG | DIASTOLIC BLOOD PRESSURE: 86 MMHG | HEART RATE: 69 BPM | BODY MASS INDEX: 21.92 KG/M2

## 2023-01-01 VITALS
HEIGHT: 70 IN | OXYGEN SATURATION: 93 % | BODY MASS INDEX: 21.9 KG/M2 | SYSTOLIC BLOOD PRESSURE: 113 MMHG | RESPIRATION RATE: 16 BRPM | TEMPERATURE: 97.5 F | TEMPERATURE: 98.3 F | RESPIRATION RATE: 18 BRPM | WEIGHT: 153 LBS | DIASTOLIC BLOOD PRESSURE: 70 MMHG | BODY MASS INDEX: 23.09 KG/M2 | OXYGEN SATURATION: 90 % | WEIGHT: 161.3 LBS | HEIGHT: 70 IN | HEART RATE: 82 BPM | SYSTOLIC BLOOD PRESSURE: 111 MMHG | DIASTOLIC BLOOD PRESSURE: 63 MMHG | HEART RATE: 73 BPM

## 2023-01-01 VITALS
OXYGEN SATURATION: 96 % | HEIGHT: 70 IN | DIASTOLIC BLOOD PRESSURE: 62 MMHG | HEART RATE: 96 BPM | TEMPERATURE: 96.5 F | BODY MASS INDEX: 25.6 KG/M2 | WEIGHT: 178.8 LBS | RESPIRATION RATE: 20 BRPM | SYSTOLIC BLOOD PRESSURE: 122 MMHG

## 2023-01-01 DIAGNOSIS — E87.1 HYPONATREMIA: ICD-10-CM

## 2023-01-01 DIAGNOSIS — R41.89 COGNITIVE IMPAIRMENT: ICD-10-CM

## 2023-01-01 DIAGNOSIS — I50.9 ACUTE ON CHRONIC CONGESTIVE HEART FAILURE, UNSPECIFIED HEART FAILURE TYPE (H): ICD-10-CM

## 2023-01-01 DIAGNOSIS — G93.40 ENCEPHALOPATHY: ICD-10-CM

## 2023-01-01 DIAGNOSIS — I50.33 ACUTE ON CHRONIC HEART FAILURE WITH PRESERVED EJECTION FRACTION (HFPEF) (H): ICD-10-CM

## 2023-01-01 DIAGNOSIS — R60.0 BILATERAL LEG EDEMA: ICD-10-CM

## 2023-01-01 DIAGNOSIS — I50.33 ACUTE ON CHRONIC DIASTOLIC CONGESTIVE HEART FAILURE (H): Primary | ICD-10-CM

## 2023-01-01 DIAGNOSIS — M51.369 DDD (DEGENERATIVE DISC DISEASE), LUMBAR: ICD-10-CM

## 2023-01-01 DIAGNOSIS — J18.9 COMMUNITY ACQUIRED PNEUMONIA OF RIGHT UPPER LOBE OF LUNG: Primary | ICD-10-CM

## 2023-01-01 DIAGNOSIS — J44.1 COPD EXACERBATION (H): ICD-10-CM

## 2023-01-01 DIAGNOSIS — R09.02 HYPOXIA: ICD-10-CM

## 2023-01-01 DIAGNOSIS — R60.1 ANASARCA: Primary | ICD-10-CM

## 2023-01-01 DIAGNOSIS — I87.2 VENOUS (PERIPHERAL) INSUFFICIENCY: ICD-10-CM

## 2023-01-01 DIAGNOSIS — D62 ANEMIA DUE TO BLOOD LOSS, ACUTE: ICD-10-CM

## 2023-01-01 DIAGNOSIS — N18.30 TYPE 2 DIABETES MELLITUS WITH STAGE 3 CHRONIC KIDNEY DISEASE, WITHOUT LONG-TERM CURRENT USE OF INSULIN, UNSPECIFIED WHETHER STAGE 3A OR 3B CKD (H): ICD-10-CM

## 2023-01-01 DIAGNOSIS — I48.20 CHRONIC ATRIAL FIBRILLATION (H): ICD-10-CM

## 2023-01-01 DIAGNOSIS — R55 NEAR SYNCOPE: ICD-10-CM

## 2023-01-01 DIAGNOSIS — R60.0 LEG EDEMA, LEFT: ICD-10-CM

## 2023-01-01 DIAGNOSIS — I51.89 DIASTOLIC DYSFUNCTION: ICD-10-CM

## 2023-01-01 DIAGNOSIS — R53.81 PHYSICAL DECONDITIONING: ICD-10-CM

## 2023-01-01 DIAGNOSIS — S32.010D COMPRESSION FRACTURE OF L1 VERTEBRA WITH ROUTINE HEALING, SUBSEQUENT ENCOUNTER: ICD-10-CM

## 2023-01-01 DIAGNOSIS — I48.21 PERMANENT ATRIAL FIBRILLATION (H): ICD-10-CM

## 2023-01-01 DIAGNOSIS — I48.91 ATRIAL FIBRILLATION WITH RVR (H): ICD-10-CM

## 2023-01-01 DIAGNOSIS — E11.22 TYPE 2 DIABETES MELLITUS WITH STAGE 3 CHRONIC KIDNEY DISEASE, WITHOUT LONG-TERM CURRENT USE OF INSULIN, UNSPECIFIED WHETHER STAGE 3A OR 3B CKD (H): ICD-10-CM

## 2023-01-01 DIAGNOSIS — J96.01 ACUTE RESPIRATORY FAILURE WITH HYPOXIA (H): ICD-10-CM

## 2023-01-01 DIAGNOSIS — Z79.01 LONG TERM CURRENT USE OF ANTICOAGULANTS WITH INR GOAL OF 2.0-3.0: ICD-10-CM

## 2023-01-01 DIAGNOSIS — I48.20 CHRONIC A-FIB (H): ICD-10-CM

## 2023-01-01 DIAGNOSIS — I48.0 PAROXYSMAL ATRIAL FIBRILLATION (H): ICD-10-CM

## 2023-01-01 DIAGNOSIS — I50.33 ACUTE ON CHRONIC CONGESTIVE HEART FAILURE WITH LEFT VENTRICULAR DIASTOLIC DYSFUNCTION (H): ICD-10-CM

## 2023-01-01 DIAGNOSIS — E11.9 TYPE 2 DIABETES MELLITUS WITHOUT COMPLICATION, WITHOUT LONG-TERM CURRENT USE OF INSULIN (H): Primary | ICD-10-CM

## 2023-01-01 DIAGNOSIS — R06.09 DOE (DYSPNEA ON EXERTION): ICD-10-CM

## 2023-01-01 DIAGNOSIS — M25.551 HIP PAIN, RIGHT: ICD-10-CM

## 2023-01-01 DIAGNOSIS — M54.41 RIGHT-SIDED LOW BACK PAIN WITH RIGHT-SIDED SCIATICA, UNSPECIFIED CHRONICITY: ICD-10-CM

## 2023-01-01 DIAGNOSIS — E11.59 TYPE 2 DIABETES MELLITUS WITH OTHER CIRCULATORY COMPLICATIONS (H): ICD-10-CM

## 2023-01-01 DIAGNOSIS — L57.0 ACTINIC KERATOSIS: ICD-10-CM

## 2023-01-01 LAB
ALBUMIN SERPL BCG-MCNC: 2.7 G/DL (ref 3.5–5.2)
ALBUMIN SERPL BCG-MCNC: 2.8 G/DL (ref 3.5–5.2)
ALBUMIN SERPL BCG-MCNC: 2.9 G/DL (ref 3.5–5.2)
ALBUMIN SERPL BCG-MCNC: 3.6 G/DL (ref 3.5–5.2)
ALLEN'S TEST: YES
ALP SERPL-CCNC: 102 U/L (ref 40–129)
ALP SERPL-CCNC: 104 U/L (ref 40–129)
ALP SERPL-CCNC: 107 U/L (ref 40–129)
ALP SERPL-CCNC: 90 U/L (ref 40–129)
ALT SERPL W P-5'-P-CCNC: 17 U/L (ref 10–50)
ALT SERPL W P-5'-P-CCNC: 17 U/L (ref 10–50)
ALT SERPL W P-5'-P-CCNC: 18 U/L (ref 10–50)
ALT SERPL W P-5'-P-CCNC: 31 U/L (ref 10–50)
ANION GAP SERPL CALCULATED.3IONS-SCNC: 10 MMOL/L (ref 7–15)
ANION GAP SERPL CALCULATED.3IONS-SCNC: 11 MMOL/L (ref 7–15)
ANION GAP SERPL CALCULATED.3IONS-SCNC: 13 MMOL/L (ref 7–15)
ANION GAP SERPL CALCULATED.3IONS-SCNC: 13 MMOL/L (ref 7–15)
ANION GAP SERPL CALCULATED.3IONS-SCNC: 16 MMOL/L (ref 7–15)
ANION GAP SERPL CALCULATED.3IONS-SCNC: 4 MMOL/L (ref 7–15)
ANION GAP SERPL CALCULATED.3IONS-SCNC: 7 MMOL/L (ref 7–15)
ANION GAP SERPL CALCULATED.3IONS-SCNC: 7 MMOL/L (ref 7–15)
ANION GAP SERPL CALCULATED.3IONS-SCNC: 8 MMOL/L (ref 7–15)
ANION GAP SERPL CALCULATED.3IONS-SCNC: 9 MMOL/L (ref 7–15)
AST SERPL W P-5'-P-CCNC: 23 U/L (ref 10–50)
AST SERPL W P-5'-P-CCNC: 29 U/L (ref 10–50)
AST SERPL W P-5'-P-CCNC: 29 U/L (ref 10–50)
AST SERPL W P-5'-P-CCNC: 45 U/L (ref 10–50)
BACTERIA BLD CULT: NO GROWTH
BACTERIA BLD CULT: NO GROWTH
BASE EXCESS BLDA CALC-SCNC: 6.9 MMOL/L (ref -9–1.8)
BASE EXCESS BLDV CALC-SCNC: 4.5 MMOL/L (ref -7.7–1.9)
BASE EXCESS BLDV CALC-SCNC: 5.5 MMOL/L (ref -7.7–1.9)
BASOPHILS # BLD AUTO: 0 10E3/UL (ref 0–0.2)
BASOPHILS # BLD AUTO: 0.1 10E3/UL (ref 0–0.2)
BASOPHILS NFR BLD AUTO: 0 %
BASOPHILS NFR BLD AUTO: 0 %
BILIRUB DIRECT SERPL-MCNC: 0.31 MG/DL (ref 0–0.3)
BILIRUB SERPL-MCNC: 0.7 MG/DL
BILIRUB SERPL-MCNC: 0.8 MG/DL
BILIRUB SERPL-MCNC: 1.2 MG/DL
BILIRUB SERPL-MCNC: 1.3 MG/DL
BUN SERPL-MCNC: 14.7 MG/DL (ref 8–23)
BUN SERPL-MCNC: 15.2 MG/DL (ref 8–23)
BUN SERPL-MCNC: 15.6 MG/DL (ref 8–23)
BUN SERPL-MCNC: 16.5 MG/DL (ref 8–23)
BUN SERPL-MCNC: 16.6 MG/DL (ref 8–23)
BUN SERPL-MCNC: 17.2 MG/DL (ref 8–23)
BUN SERPL-MCNC: 19.5 MG/DL (ref 8–23)
BUN SERPL-MCNC: 19.8 MG/DL (ref 8–23)
BUN SERPL-MCNC: 21.2 MG/DL (ref 8–23)
BUN SERPL-MCNC: 22 MG/DL (ref 8–23)
BUN SERPL-MCNC: 22 MG/DL (ref 8–23)
BUN SERPL-MCNC: 22.3 MG/DL (ref 8–23)
BUN SERPL-MCNC: 22.5 MG/DL (ref 8–23)
BUN SERPL-MCNC: 23.3 MG/DL (ref 8–23)
BUN SERPL-MCNC: 25.6 MG/DL (ref 8–23)
BUN SERPL-MCNC: 26.2 MG/DL (ref 8–23)
BUN SERPL-MCNC: 27.6 MG/DL (ref 8–23)
BUN SERPL-MCNC: 28.8 MG/DL (ref 8–23)
BUN SERPL-MCNC: 28.8 MG/DL (ref 8–23)
BUN SERPL-MCNC: 29 MG/DL (ref 8–23)
BUN SERPL-MCNC: 29.2 MG/DL (ref 8–23)
BUN SERPL-MCNC: 31.6 MG/DL (ref 8–23)
C PNEUM DNA SPEC QL NAA+PROBE: NOT DETECTED
CALCIUM SERPL-MCNC: 8.1 MG/DL (ref 8.2–9.6)
CALCIUM SERPL-MCNC: 8.2 MG/DL (ref 8.2–9.6)
CALCIUM SERPL-MCNC: 8.2 MG/DL (ref 8.2–9.6)
CALCIUM SERPL-MCNC: 8.4 MG/DL (ref 8.2–9.6)
CALCIUM SERPL-MCNC: 8.5 MG/DL (ref 8.2–9.6)
CALCIUM SERPL-MCNC: 8.5 MG/DL (ref 8.2–9.6)
CALCIUM SERPL-MCNC: 8.6 MG/DL (ref 8.2–9.6)
CALCIUM SERPL-MCNC: 8.7 MG/DL (ref 8.2–9.6)
CALCIUM SERPL-MCNC: 8.8 MG/DL (ref 8.2–9.6)
CALCIUM SERPL-MCNC: 8.8 MG/DL (ref 8.2–9.6)
CALCIUM SERPL-MCNC: 8.9 MG/DL (ref 8.2–9.6)
CALCIUM SERPL-MCNC: 9 MG/DL (ref 8.2–9.6)
CALCIUM SERPL-MCNC: 9.1 MG/DL (ref 8.2–9.6)
CALCIUM SERPL-MCNC: 9.1 MG/DL (ref 8.2–9.6)
CALCIUM SERPL-MCNC: 9.4 MG/DL (ref 8.2–9.6)
CHLORIDE SERPL-SCNC: 87 MMOL/L (ref 98–107)
CHLORIDE SERPL-SCNC: 88 MMOL/L (ref 98–107)
CHLORIDE SERPL-SCNC: 89 MMOL/L (ref 98–107)
CHLORIDE SERPL-SCNC: 90 MMOL/L (ref 98–107)
CHLORIDE SERPL-SCNC: 90 MMOL/L (ref 98–107)
CHLORIDE SERPL-SCNC: 91 MMOL/L (ref 98–107)
CHLORIDE SERPL-SCNC: 92 MMOL/L (ref 98–107)
CHLORIDE SERPL-SCNC: 92 MMOL/L (ref 98–107)
CHLORIDE SERPL-SCNC: 93 MMOL/L (ref 98–107)
CHLORIDE SERPL-SCNC: 94 MMOL/L (ref 98–107)
CHLORIDE SERPL-SCNC: 94 MMOL/L (ref 98–107)
CHLORIDE SERPL-SCNC: 95 MMOL/L (ref 98–107)
CHLORIDE SERPL-SCNC: 95 MMOL/L (ref 98–107)
CHLORIDE SERPL-SCNC: 96 MMOL/L (ref 98–107)
CREAT SERPL-MCNC: 0.57 MG/DL (ref 0.67–1.17)
CREAT SERPL-MCNC: 0.58 MG/DL (ref 0.67–1.17)
CREAT SERPL-MCNC: 0.6 MG/DL (ref 0.67–1.17)
CREAT SERPL-MCNC: 0.62 MG/DL (ref 0.67–1.17)
CREAT SERPL-MCNC: 0.62 MG/DL (ref 0.67–1.17)
CREAT SERPL-MCNC: 0.63 MG/DL (ref 0.67–1.17)
CREAT SERPL-MCNC: 0.64 MG/DL (ref 0.67–1.17)
CREAT SERPL-MCNC: 0.65 MG/DL (ref 0.67–1.17)
CREAT SERPL-MCNC: 0.65 MG/DL (ref 0.67–1.17)
CREAT SERPL-MCNC: 0.66 MG/DL (ref 0.67–1.17)
CREAT SERPL-MCNC: 0.68 MG/DL (ref 0.67–1.17)
CREAT SERPL-MCNC: 0.69 MG/DL (ref 0.67–1.17)
CREAT SERPL-MCNC: 0.7 MG/DL (ref 0.67–1.17)
CREAT SERPL-MCNC: 0.72 MG/DL (ref 0.67–1.17)
CREAT SERPL-MCNC: 0.77 MG/DL (ref 0.67–1.17)
CREAT SERPL-MCNC: 0.78 MG/DL (ref 0.67–1.17)
CREAT SERPL-MCNC: 0.83 MG/DL (ref 0.67–1.17)
CREAT SERPL-MCNC: 0.85 MG/DL (ref 0.67–1.17)
CRP SERPL-MCNC: 161.65 MG/L
CRP SERPL-MCNC: 176.67 MG/L
CRP SERPL-MCNC: 85.94 MG/L
D DIMER PPP FEU-MCNC: 2.71 UG/ML FEU (ref 0–0.5)
DEPRECATED HCO3 PLAS-SCNC: 23 MMOL/L (ref 22–29)
DEPRECATED HCO3 PLAS-SCNC: 25 MMOL/L (ref 22–29)
DEPRECATED HCO3 PLAS-SCNC: 25 MMOL/L (ref 22–29)
DEPRECATED HCO3 PLAS-SCNC: 26 MMOL/L (ref 22–29)
DEPRECATED HCO3 PLAS-SCNC: 27 MMOL/L (ref 22–29)
DEPRECATED HCO3 PLAS-SCNC: 27 MMOL/L (ref 22–29)
DEPRECATED HCO3 PLAS-SCNC: 28 MMOL/L (ref 22–29)
DEPRECATED HCO3 PLAS-SCNC: 29 MMOL/L (ref 22–29)
DEPRECATED HCO3 PLAS-SCNC: 29 MMOL/L (ref 22–29)
DEPRECATED HCO3 PLAS-SCNC: 30 MMOL/L (ref 22–29)
DEPRECATED HCO3 PLAS-SCNC: 31 MMOL/L (ref 22–29)
DEPRECATED HCO3 PLAS-SCNC: 32 MMOL/L (ref 22–29)
DEPRECATED HCO3 PLAS-SCNC: 32 MMOL/L (ref 22–29)
DEPRECATED HCO3 PLAS-SCNC: 33 MMOL/L (ref 22–29)
DEPRECATED HCO3 PLAS-SCNC: 34 MMOL/L (ref 22–29)
DEPRECATED HCO3 PLAS-SCNC: 36 MMOL/L (ref 22–29)
EOSINOPHIL # BLD AUTO: 0 10E3/UL (ref 0–0.7)
EOSINOPHIL # BLD AUTO: 0 10E3/UL (ref 0–0.7)
EOSINOPHIL NFR BLD AUTO: 0 %
EOSINOPHIL NFR BLD AUTO: 0 %
ERYTHROCYTE [DISTWIDTH] IN BLOOD BY AUTOMATED COUNT: 13.6 % (ref 10–15)
ERYTHROCYTE [DISTWIDTH] IN BLOOD BY AUTOMATED COUNT: 13.7 % (ref 10–15)
ERYTHROCYTE [DISTWIDTH] IN BLOOD BY AUTOMATED COUNT: 13.8 % (ref 10–15)
ERYTHROCYTE [DISTWIDTH] IN BLOOD BY AUTOMATED COUNT: 13.9 % (ref 10–15)
ERYTHROCYTE [DISTWIDTH] IN BLOOD BY AUTOMATED COUNT: 14.4 % (ref 10–15)
ERYTHROCYTE [DISTWIDTH] IN BLOOD BY AUTOMATED COUNT: 14.5 % (ref 10–15)
ERYTHROCYTE [DISTWIDTH] IN BLOOD BY AUTOMATED COUNT: 14.5 % (ref 10–15)
ERYTHROCYTE [DISTWIDTH] IN BLOOD BY AUTOMATED COUNT: 14.6 % (ref 10–15)
ERYTHROCYTE [DISTWIDTH] IN BLOOD BY AUTOMATED COUNT: 14.7 % (ref 10–15)
ERYTHROCYTE [DISTWIDTH] IN BLOOD BY AUTOMATED COUNT: 14.7 % (ref 10–15)
ERYTHROCYTE [DISTWIDTH] IN BLOOD BY AUTOMATED COUNT: 14.8 % (ref 10–15)
ERYTHROCYTE [DISTWIDTH] IN BLOOD BY AUTOMATED COUNT: 15.6 % (ref 10–15)
ERYTHROCYTE [DISTWIDTH] IN BLOOD BY AUTOMATED COUNT: 15.8 % (ref 10–15)
ERYTHROCYTE [DISTWIDTH] IN BLOOD BY AUTOMATED COUNT: 15.9 % (ref 10–15)
ERYTHROCYTE [DISTWIDTH] IN BLOOD BY AUTOMATED COUNT: 15.9 % (ref 10–15)
FERRITIN SERPL-MCNC: 293 NG/ML (ref 31–409)
FLUAV H1 2009 PAND RNA SPEC QL NAA+PROBE: NOT DETECTED
FLUAV H1 RNA SPEC QL NAA+PROBE: NOT DETECTED
FLUAV H3 RNA SPEC QL NAA+PROBE: NOT DETECTED
FLUAV RNA SPEC QL NAA+PROBE: NOT DETECTED
FLUBV RNA SPEC QL NAA+PROBE: NOT DETECTED
FOLATE SERPL-MCNC: 15.4 NG/ML (ref 4.6–34.8)
GFR SERPL CREATININE-BSD FRML MDRD: 83 ML/MIN/1.73M2
GFR SERPL CREATININE-BSD FRML MDRD: 83 ML/MIN/1.73M2
GFR SERPL CREATININE-BSD FRML MDRD: 85 ML/MIN/1.73M2
GFR SERPL CREATININE-BSD FRML MDRD: 85 ML/MIN/1.73M2
GFR SERPL CREATININE-BSD FRML MDRD: 87 ML/MIN/1.73M2
GFR SERPL CREATININE-BSD FRML MDRD: 88 ML/MIN/1.73M2
GFR SERPL CREATININE-BSD FRML MDRD: 89 ML/MIN/1.73M2
GFR SERPL CREATININE-BSD FRML MDRD: 90 ML/MIN/1.73M2
GFR SERPL CREATININE-BSD FRML MDRD: >90 ML/MIN/1.73M2
GLUCOSE BLDC GLUCOMTR-MCNC: 103 MG/DL (ref 70–99)
GLUCOSE BLDC GLUCOMTR-MCNC: 107 MG/DL (ref 70–99)
GLUCOSE BLDC GLUCOMTR-MCNC: 107 MG/DL (ref 70–99)
GLUCOSE BLDC GLUCOMTR-MCNC: 114 MG/DL (ref 70–99)
GLUCOSE BLDC GLUCOMTR-MCNC: 119 MG/DL (ref 70–99)
GLUCOSE BLDC GLUCOMTR-MCNC: 121 MG/DL (ref 70–99)
GLUCOSE BLDC GLUCOMTR-MCNC: 122 MG/DL (ref 70–99)
GLUCOSE BLDC GLUCOMTR-MCNC: 123 MG/DL (ref 70–99)
GLUCOSE BLDC GLUCOMTR-MCNC: 123 MG/DL (ref 70–99)
GLUCOSE BLDC GLUCOMTR-MCNC: 125 MG/DL (ref 70–99)
GLUCOSE BLDC GLUCOMTR-MCNC: 126 MG/DL (ref 70–99)
GLUCOSE BLDC GLUCOMTR-MCNC: 126 MG/DL (ref 70–99)
GLUCOSE BLDC GLUCOMTR-MCNC: 128 MG/DL (ref 70–99)
GLUCOSE BLDC GLUCOMTR-MCNC: 130 MG/DL (ref 70–99)
GLUCOSE BLDC GLUCOMTR-MCNC: 132 MG/DL (ref 70–99)
GLUCOSE BLDC GLUCOMTR-MCNC: 134 MG/DL (ref 70–99)
GLUCOSE BLDC GLUCOMTR-MCNC: 136 MG/DL (ref 70–99)
GLUCOSE BLDC GLUCOMTR-MCNC: 137 MG/DL (ref 70–99)
GLUCOSE BLDC GLUCOMTR-MCNC: 138 MG/DL (ref 70–99)
GLUCOSE BLDC GLUCOMTR-MCNC: 139 MG/DL (ref 70–99)
GLUCOSE BLDC GLUCOMTR-MCNC: 142 MG/DL (ref 70–99)
GLUCOSE BLDC GLUCOMTR-MCNC: 143 MG/DL (ref 70–99)
GLUCOSE BLDC GLUCOMTR-MCNC: 144 MG/DL (ref 70–99)
GLUCOSE BLDC GLUCOMTR-MCNC: 151 MG/DL (ref 70–99)
GLUCOSE BLDC GLUCOMTR-MCNC: 151 MG/DL (ref 70–99)
GLUCOSE BLDC GLUCOMTR-MCNC: 152 MG/DL (ref 70–99)
GLUCOSE BLDC GLUCOMTR-MCNC: 154 MG/DL (ref 70–99)
GLUCOSE BLDC GLUCOMTR-MCNC: 161 MG/DL (ref 70–99)
GLUCOSE BLDC GLUCOMTR-MCNC: 171 MG/DL (ref 70–99)
GLUCOSE BLDC GLUCOMTR-MCNC: 174 MG/DL (ref 70–99)
GLUCOSE BLDC GLUCOMTR-MCNC: 175 MG/DL (ref 70–99)
GLUCOSE BLDC GLUCOMTR-MCNC: 177 MG/DL (ref 70–99)
GLUCOSE BLDC GLUCOMTR-MCNC: 177 MG/DL (ref 70–99)
GLUCOSE BLDC GLUCOMTR-MCNC: 178 MG/DL (ref 70–99)
GLUCOSE BLDC GLUCOMTR-MCNC: 186 MG/DL (ref 70–99)
GLUCOSE BLDC GLUCOMTR-MCNC: 187 MG/DL (ref 70–99)
GLUCOSE BLDC GLUCOMTR-MCNC: 191 MG/DL (ref 70–99)
GLUCOSE BLDC GLUCOMTR-MCNC: 192 MG/DL (ref 70–99)
GLUCOSE BLDC GLUCOMTR-MCNC: 193 MG/DL (ref 70–99)
GLUCOSE BLDC GLUCOMTR-MCNC: 197 MG/DL (ref 70–99)
GLUCOSE BLDC GLUCOMTR-MCNC: 197 MG/DL (ref 70–99)
GLUCOSE BLDC GLUCOMTR-MCNC: 201 MG/DL (ref 70–99)
GLUCOSE BLDC GLUCOMTR-MCNC: 201 MG/DL (ref 70–99)
GLUCOSE BLDC GLUCOMTR-MCNC: 203 MG/DL (ref 70–99)
GLUCOSE BLDC GLUCOMTR-MCNC: 204 MG/DL (ref 70–99)
GLUCOSE BLDC GLUCOMTR-MCNC: 206 MG/DL (ref 70–99)
GLUCOSE BLDC GLUCOMTR-MCNC: 206 MG/DL (ref 70–99)
GLUCOSE BLDC GLUCOMTR-MCNC: 207 MG/DL (ref 70–99)
GLUCOSE BLDC GLUCOMTR-MCNC: 208 MG/DL (ref 70–99)
GLUCOSE BLDC GLUCOMTR-MCNC: 210 MG/DL (ref 70–99)
GLUCOSE BLDC GLUCOMTR-MCNC: 215 MG/DL (ref 70–99)
GLUCOSE BLDC GLUCOMTR-MCNC: 215 MG/DL (ref 70–99)
GLUCOSE BLDC GLUCOMTR-MCNC: 220 MG/DL (ref 70–99)
GLUCOSE BLDC GLUCOMTR-MCNC: 220 MG/DL (ref 70–99)
GLUCOSE BLDC GLUCOMTR-MCNC: 221 MG/DL (ref 70–99)
GLUCOSE BLDC GLUCOMTR-MCNC: 222 MG/DL (ref 70–99)
GLUCOSE BLDC GLUCOMTR-MCNC: 222 MG/DL (ref 70–99)
GLUCOSE BLDC GLUCOMTR-MCNC: 224 MG/DL (ref 70–99)
GLUCOSE BLDC GLUCOMTR-MCNC: 226 MG/DL (ref 70–99)
GLUCOSE BLDC GLUCOMTR-MCNC: 227 MG/DL (ref 70–99)
GLUCOSE BLDC GLUCOMTR-MCNC: 227 MG/DL (ref 70–99)
GLUCOSE BLDC GLUCOMTR-MCNC: 231 MG/DL (ref 70–99)
GLUCOSE BLDC GLUCOMTR-MCNC: 238 MG/DL (ref 70–99)
GLUCOSE BLDC GLUCOMTR-MCNC: 238 MG/DL (ref 70–99)
GLUCOSE BLDC GLUCOMTR-MCNC: 240 MG/DL (ref 70–99)
GLUCOSE BLDC GLUCOMTR-MCNC: 241 MG/DL (ref 70–99)
GLUCOSE BLDC GLUCOMTR-MCNC: 244 MG/DL (ref 70–99)
GLUCOSE BLDC GLUCOMTR-MCNC: 245 MG/DL (ref 70–99)
GLUCOSE BLDC GLUCOMTR-MCNC: 249 MG/DL (ref 70–99)
GLUCOSE BLDC GLUCOMTR-MCNC: 255 MG/DL (ref 70–99)
GLUCOSE BLDC GLUCOMTR-MCNC: 257 MG/DL (ref 70–99)
GLUCOSE BLDC GLUCOMTR-MCNC: 259 MG/DL (ref 70–99)
GLUCOSE BLDC GLUCOMTR-MCNC: 264 MG/DL (ref 70–99)
GLUCOSE BLDC GLUCOMTR-MCNC: 264 MG/DL (ref 70–99)
GLUCOSE BLDC GLUCOMTR-MCNC: 268 MG/DL (ref 70–99)
GLUCOSE BLDC GLUCOMTR-MCNC: 268 MG/DL (ref 70–99)
GLUCOSE BLDC GLUCOMTR-MCNC: 278 MG/DL (ref 70–99)
GLUCOSE BLDC GLUCOMTR-MCNC: 282 MG/DL (ref 70–99)
GLUCOSE BLDC GLUCOMTR-MCNC: 286 MG/DL (ref 70–99)
GLUCOSE BLDC GLUCOMTR-MCNC: 289 MG/DL (ref 70–99)
GLUCOSE BLDC GLUCOMTR-MCNC: 292 MG/DL (ref 70–99)
GLUCOSE BLDC GLUCOMTR-MCNC: 293 MG/DL (ref 70–99)
GLUCOSE BLDC GLUCOMTR-MCNC: 294 MG/DL (ref 70–99)
GLUCOSE BLDC GLUCOMTR-MCNC: 299 MG/DL (ref 70–99)
GLUCOSE BLDC GLUCOMTR-MCNC: 308 MG/DL (ref 70–99)
GLUCOSE BLDC GLUCOMTR-MCNC: 318 MG/DL (ref 70–99)
GLUCOSE BLDC GLUCOMTR-MCNC: 320 MG/DL (ref 70–99)
GLUCOSE BLDC GLUCOMTR-MCNC: 321 MG/DL (ref 70–99)
GLUCOSE BLDC GLUCOMTR-MCNC: 323 MG/DL (ref 70–99)
GLUCOSE BLDC GLUCOMTR-MCNC: 329 MG/DL (ref 70–99)
GLUCOSE BLDC GLUCOMTR-MCNC: 330 MG/DL (ref 70–99)
GLUCOSE BLDC GLUCOMTR-MCNC: 353 MG/DL (ref 70–99)
GLUCOSE BLDC GLUCOMTR-MCNC: 368 MG/DL (ref 70–99)
GLUCOSE BLDC GLUCOMTR-MCNC: 393 MG/DL (ref 70–99)
GLUCOSE BLDC GLUCOMTR-MCNC: 410 MG/DL (ref 70–99)
GLUCOSE BLDC GLUCOMTR-MCNC: 411 MG/DL (ref 70–99)
GLUCOSE BLDC GLUCOMTR-MCNC: 424 MG/DL (ref 70–99)
GLUCOSE BLDC GLUCOMTR-MCNC: 426 MG/DL (ref 70–99)
GLUCOSE BLDC GLUCOMTR-MCNC: 428 MG/DL (ref 70–99)
GLUCOSE BLDC GLUCOMTR-MCNC: 446 MG/DL (ref 70–99)
GLUCOSE BLDC GLUCOMTR-MCNC: 50 MG/DL (ref 70–99)
GLUCOSE BLDC GLUCOMTR-MCNC: 85 MG/DL (ref 70–99)
GLUCOSE SERPL-MCNC: 116 MG/DL (ref 70–99)
GLUCOSE SERPL-MCNC: 135 MG/DL (ref 70–99)
GLUCOSE SERPL-MCNC: 141 MG/DL (ref 70–99)
GLUCOSE SERPL-MCNC: 147 MG/DL (ref 70–99)
GLUCOSE SERPL-MCNC: 164 MG/DL (ref 70–99)
GLUCOSE SERPL-MCNC: 165 MG/DL (ref 70–99)
GLUCOSE SERPL-MCNC: 171 MG/DL (ref 70–99)
GLUCOSE SERPL-MCNC: 176 MG/DL (ref 70–99)
GLUCOSE SERPL-MCNC: 183 MG/DL (ref 70–99)
GLUCOSE SERPL-MCNC: 184 MG/DL (ref 70–99)
GLUCOSE SERPL-MCNC: 186 MG/DL (ref 70–99)
GLUCOSE SERPL-MCNC: 199 MG/DL (ref 70–99)
GLUCOSE SERPL-MCNC: 210 MG/DL (ref 70–99)
GLUCOSE SERPL-MCNC: 215 MG/DL (ref 70–99)
GLUCOSE SERPL-MCNC: 217 MG/DL (ref 70–99)
GLUCOSE SERPL-MCNC: 217 MG/DL (ref 70–99)
GLUCOSE SERPL-MCNC: 261 MG/DL (ref 70–99)
GLUCOSE SERPL-MCNC: 309 MG/DL (ref 70–99)
GLUCOSE SERPL-MCNC: 330 MG/DL (ref 70–99)
GLUCOSE SERPL-MCNC: 334 MG/DL (ref 70–99)
GLUCOSE SERPL-MCNC: 342 MG/DL (ref 70–99)
GLUCOSE SERPL-MCNC: 74 MG/DL (ref 70–99)
HADV DNA SPEC QL NAA+PROBE: NOT DETECTED
HBA1C MFR BLD: 8.9 %
HCO3 BLD-SCNC: 31 MMOL/L (ref 21–28)
HCO3 BLDV-SCNC: 30 MMOL/L (ref 21–28)
HCO3 BLDV-SCNC: 30 MMOL/L (ref 21–28)
HCO3 BLDV-SCNC: 31 MMOL/L (ref 21–28)
HCO3 BLDV-SCNC: 32 MMOL/L (ref 21–28)
HCOV PNL SPEC NAA+PROBE: NOT DETECTED
HCT VFR BLD AUTO: 22.1 % (ref 40–53)
HCT VFR BLD AUTO: 25.7 % (ref 40–53)
HCT VFR BLD AUTO: 26.5 % (ref 40–53)
HCT VFR BLD AUTO: 26.6 % (ref 40–53)
HCT VFR BLD AUTO: 27.4 % (ref 40–53)
HCT VFR BLD AUTO: 28 % (ref 40–53)
HCT VFR BLD AUTO: 28.2 % (ref 40–53)
HCT VFR BLD AUTO: 28.5 % (ref 40–53)
HCT VFR BLD AUTO: 28.6 % (ref 40–53)
HCT VFR BLD AUTO: 29.3 % (ref 40–53)
HCT VFR BLD AUTO: 29.4 % (ref 40–53)
HCT VFR BLD AUTO: 29.5 % (ref 40–53)
HCT VFR BLD AUTO: 29.8 % (ref 40–53)
HCT VFR BLD AUTO: 30 % (ref 40–53)
HCT VFR BLD AUTO: 32.8 % (ref 40–53)
HCT VFR BLD AUTO: 33.6 % (ref 40–53)
HCT VFR BLD AUTO: 33.6 % (ref 40–53)
HCT VFR BLD AUTO: 38.4 % (ref 40–53)
HCT VFR BLD AUTO: 38.7 % (ref 40–53)
HCT VFR BLD AUTO: 38.8 % (ref 40–53)
HGB BLD-MCNC: 10.1 G/DL (ref 13.3–17.7)
HGB BLD-MCNC: 10.6 G/DL (ref 13.3–17.7)
HGB BLD-MCNC: 10.7 G/DL (ref 13.3–17.7)
HGB BLD-MCNC: 10.8 G/DL (ref 13.3–17.7)
HGB BLD-MCNC: 12.1 G/DL (ref 13.3–17.7)
HGB BLD-MCNC: 12.1 G/DL (ref 13.3–17.7)
HGB BLD-MCNC: 12.2 G/DL (ref 13.3–17.7)
HGB BLD-MCNC: 7.2 G/DL (ref 13.3–17.7)
HGB BLD-MCNC: 8.4 G/DL (ref 13.3–17.7)
HGB BLD-MCNC: 8.7 G/DL (ref 13.3–17.7)
HGB BLD-MCNC: 8.7 G/DL (ref 13.3–17.7)
HGB BLD-MCNC: 9.1 G/DL (ref 13.3–17.7)
HGB BLD-MCNC: 9.1 G/DL (ref 13.3–17.7)
HGB BLD-MCNC: 9.2 G/DL (ref 13.3–17.7)
HGB BLD-MCNC: 9.3 G/DL (ref 13.3–17.7)
HGB BLD-MCNC: 9.4 G/DL (ref 13.3–17.7)
HGB BLD-MCNC: 9.4 G/DL (ref 13.3–17.7)
HGB BLD-MCNC: 9.6 G/DL (ref 13.3–17.7)
HGB BLD-MCNC: 9.6 G/DL (ref 13.3–17.7)
HGB BLD-MCNC: 9.9 G/DL (ref 13.3–17.7)
HMPV RNA SPEC QL NAA+PROBE: NOT DETECTED
HOLD SPECIMEN: NORMAL
HPIV1 RNA SPEC QL NAA+PROBE: NOT DETECTED
HPIV2 RNA SPEC QL NAA+PROBE: NOT DETECTED
HPIV3 RNA SPEC QL NAA+PROBE: NOT DETECTED
HPIV4 RNA SPEC QL NAA+PROBE: NOT DETECTED
IMM GRANULOCYTES # BLD: 0.1 10E3/UL
IMM GRANULOCYTES # BLD: 0.1 10E3/UL
IMM GRANULOCYTES NFR BLD: 1 %
IMM GRANULOCYTES NFR BLD: 1 %
IRON BINDING CAPACITY (ROCHE): 349 UG/DL (ref 240–430)
IRON SATN MFR SERPL: 7 % (ref 15–46)
IRON SERPL-MCNC: 26 UG/DL (ref 61–157)
LACTATE BLD-SCNC: 2.1 MMOL/L
LACTATE BLD-SCNC: 2.1 MMOL/L
LACTATE SERPL-SCNC: 0.8 MMOL/L (ref 0.7–2)
LACTATE SERPL-SCNC: 1 MMOL/L (ref 0.7–2)
LACTATE SERPL-SCNC: 1.4 MMOL/L (ref 0.7–2)
LACTATE SERPL-SCNC: 1.6 MMOL/L (ref 0.7–2)
LACTATE SERPL-SCNC: 2.4 MMOL/L (ref 0.7–2)
LVEF ECHO: NORMAL
LVEF ECHO: NORMAL
LYMPHOCYTES # BLD AUTO: 0.6 10E3/UL (ref 0.8–5.3)
LYMPHOCYTES # BLD AUTO: 0.6 10E3/UL (ref 0.8–5.3)
LYMPHOCYTES NFR BLD AUTO: 4 %
LYMPHOCYTES NFR BLD AUTO: 5 %
M PNEUMO DNA SPEC QL NAA+PROBE: NOT DETECTED
MAGNESIUM SERPL-MCNC: 1.2 MG/DL (ref 1.7–2.3)
MAGNESIUM SERPL-MCNC: 1.3 MG/DL (ref 1.7–2.3)
MAGNESIUM SERPL-MCNC: 1.5 MG/DL (ref 1.7–2.3)
MAGNESIUM SERPL-MCNC: 1.6 MG/DL (ref 1.7–2.3)
MAGNESIUM SERPL-MCNC: 1.6 MG/DL (ref 1.7–2.3)
MAGNESIUM SERPL-MCNC: 1.7 MG/DL (ref 1.7–2.3)
MAGNESIUM SERPL-MCNC: 1.8 MG/DL (ref 1.7–2.3)
MAGNESIUM SERPL-MCNC: 1.9 MG/DL (ref 1.7–2.3)
MAGNESIUM SERPL-MCNC: 2 MG/DL (ref 1.7–2.3)
MAGNESIUM SERPL-MCNC: 2.3 MG/DL (ref 1.7–2.3)
MAGNESIUM SERPL-MCNC: 2.3 MG/DL (ref 1.7–2.3)
MCH RBC QN AUTO: 28.6 PG (ref 26.5–33)
MCH RBC QN AUTO: 28.7 PG (ref 26.5–33)
MCH RBC QN AUTO: 28.8 PG (ref 26.5–33)
MCH RBC QN AUTO: 28.9 PG (ref 26.5–33)
MCH RBC QN AUTO: 28.9 PG (ref 26.5–33)
MCH RBC QN AUTO: 29 PG (ref 26.5–33)
MCH RBC QN AUTO: 29.1 PG (ref 26.5–33)
MCH RBC QN AUTO: 29.2 PG (ref 26.5–33)
MCH RBC QN AUTO: 29.3 PG (ref 26.5–33)
MCH RBC QN AUTO: 29.4 PG (ref 26.5–33)
MCH RBC QN AUTO: 29.6 PG (ref 26.5–33)
MCH RBC QN AUTO: 30.2 PG (ref 26.5–33)
MCHC RBC AUTO-ENTMCNC: 30.8 G/DL (ref 31.5–36.5)
MCHC RBC AUTO-ENTMCNC: 31.2 G/DL (ref 31.5–36.5)
MCHC RBC AUTO-ENTMCNC: 31.3 G/DL (ref 31.5–36.5)
MCHC RBC AUTO-ENTMCNC: 31.5 G/DL (ref 31.5–36.5)
MCHC RBC AUTO-ENTMCNC: 31.7 G/DL (ref 31.5–36.5)
MCHC RBC AUTO-ENTMCNC: 31.8 G/DL (ref 31.5–36.5)
MCHC RBC AUTO-ENTMCNC: 32 G/DL (ref 31.5–36.5)
MCHC RBC AUTO-ENTMCNC: 32.1 G/DL (ref 31.5–36.5)
MCHC RBC AUTO-ENTMCNC: 32.1 G/DL (ref 31.5–36.5)
MCHC RBC AUTO-ENTMCNC: 32.2 G/DL (ref 31.5–36.5)
MCHC RBC AUTO-ENTMCNC: 32.3 G/DL (ref 31.5–36.5)
MCHC RBC AUTO-ENTMCNC: 32.6 G/DL (ref 31.5–36.5)
MCHC RBC AUTO-ENTMCNC: 32.7 G/DL (ref 31.5–36.5)
MCHC RBC AUTO-ENTMCNC: 32.7 G/DL (ref 31.5–36.5)
MCHC RBC AUTO-ENTMCNC: 33.2 G/DL (ref 31.5–36.5)
MCHC RBC AUTO-ENTMCNC: 33.9 G/DL (ref 31.5–36.5)
MCV RBC AUTO: 88 FL (ref 78–100)
MCV RBC AUTO: 89 FL (ref 78–100)
MCV RBC AUTO: 90 FL (ref 78–100)
MCV RBC AUTO: 91 FL (ref 78–100)
MCV RBC AUTO: 92 FL (ref 78–100)
MCV RBC AUTO: 93 FL (ref 78–100)
MCV RBC AUTO: 93 FL (ref 78–100)
MCV RBC AUTO: 94 FL (ref 78–100)
MDC_IDC_EPISODE_DTM: NORMAL
MDC_IDC_EPISODE_DURATION: NORMAL S
MDC_IDC_EPISODE_ID: 517
MDC_IDC_EPISODE_TYPE: NORMAL
MDC_IDC_MSMT_BATTERY_STATUS: NORMAL
MDC_IDC_PG_IMPLANT_DTM: NORMAL
MDC_IDC_PG_MFG: NORMAL
MDC_IDC_PG_MODEL: NORMAL
MDC_IDC_PG_SERIAL: NORMAL
MDC_IDC_PG_TYPE: NORMAL
MDC_IDC_SESS_CLINIC_NAME: NORMAL
MDC_IDC_SESS_DTM: NORMAL
MDC_IDC_SESS_TYPE: NORMAL
MDC_IDC_SET_ZONE_DETECTION_INTERVAL: 2000 MS
MDC_IDC_SET_ZONE_DETECTION_INTERVAL: 3000 MS
MDC_IDC_SET_ZONE_DETECTION_INTERVAL: 420 MS
MDC_IDC_SET_ZONE_TYPE: NORMAL
MDC_IDC_STAT_AT_BURDEN_PERCENT: 30.8 %
MDC_IDC_STAT_AT_DTM_END: NORMAL
MDC_IDC_STAT_AT_DTM_START: NORMAL
MDC_IDC_STAT_EPISODE_RECENT_COUNT: 0
MDC_IDC_STAT_EPISODE_RECENT_COUNT: 9
MDC_IDC_STAT_EPISODE_RECENT_COUNT_DTM_END: NORMAL
MDC_IDC_STAT_EPISODE_RECENT_COUNT_DTM_START: NORMAL
MDC_IDC_STAT_EPISODE_TOTAL_COUNT: 0
MDC_IDC_STAT_EPISODE_TOTAL_COUNT: 1
MDC_IDC_STAT_EPISODE_TOTAL_COUNT: 147
MDC_IDC_STAT_EPISODE_TOTAL_COUNT: 369
MDC_IDC_STAT_EPISODE_TOTAL_COUNT_DTM_END: NORMAL
MDC_IDC_STAT_EPISODE_TOTAL_COUNT_DTM_START: NORMAL
MDC_IDC_STAT_EPISODE_TYPE: NORMAL
MONOCYTES # BLD AUTO: 1.2 10E3/UL (ref 0–1.3)
MONOCYTES # BLD AUTO: 1.4 10E3/UL (ref 0–1.3)
MONOCYTES NFR BLD AUTO: 11 %
MONOCYTES NFR BLD AUTO: 7 %
MRSA DNA SPEC QL NAA+PROBE: NEGATIVE
NEUTROPHILS # BLD AUTO: 10.1 10E3/UL (ref 1.6–8.3)
NEUTROPHILS # BLD AUTO: 15.9 10E3/UL (ref 1.6–8.3)
NEUTROPHILS NFR BLD AUTO: 83 %
NEUTROPHILS NFR BLD AUTO: 88 %
NRBC # BLD AUTO: 0 10E3/UL
NRBC # BLD AUTO: 0 10E3/UL
NRBC BLD AUTO-RTO: 0 /100
NRBC BLD AUTO-RTO: 0 /100
NT-PROBNP SERPL-MCNC: 1521 PG/ML (ref 0–1800)
NT-PROBNP SERPL-MCNC: 2843 PG/ML (ref 0–1800)
NT-PROBNP SERPL-MCNC: 3102 PG/ML (ref 0–1800)
NT-PROBNP SERPL-MCNC: 3632 PG/ML (ref 0–1800)
NT-PROBNP SERPL-MCNC: 4758 PG/ML (ref 0–1800)
NT-PROBNP SERPL-MCNC: 5146 PG/ML (ref 0–1800)
NT-PROBNP SERPL-MCNC: 9281 PG/ML (ref 0–1800)
O2/TOTAL GAS SETTING VFR VENT: 40 %
O2/TOTAL GAS SETTING VFR VENT: 45 %
O2/TOTAL GAS SETTING VFR VENT: 50 %
OSMOLALITY UR: 433 MMOL/KG (ref 100–1200)
PCO2 BLD: 40 MM HG (ref 35–45)
PCO2 BLDV: 49 MM HG (ref 40–50)
PCO2 BLDV: 50 MM HG (ref 40–50)
PCO2 BLDV: 51 MM HG (ref 40–50)
PCO2 BLDV: 60 MM HG (ref 40–50)
PH BLD: 7.49 [PH] (ref 7.35–7.45)
PH BLDV: 7.3 [PH] (ref 7.32–7.43)
PH BLDV: 7.39 [PH] (ref 7.32–7.43)
PH BLDV: 7.41 [PH] (ref 7.32–7.43)
PH BLDV: 7.42 [PH] (ref 7.32–7.43)
PHOSPHATE SERPL-MCNC: 3 MG/DL (ref 2.5–4.5)
PHOSPHATE SERPL-MCNC: 3.2 MG/DL (ref 2.5–4.5)
PHOSPHATE SERPL-MCNC: 3.3 MG/DL (ref 2.5–4.5)
PHOSPHATE SERPL-MCNC: 3.4 MG/DL (ref 2.5–4.5)
PLATELET # BLD AUTO: 150 10E3/UL (ref 150–450)
PLATELET # BLD AUTO: 156 10E3/UL (ref 150–450)
PLATELET # BLD AUTO: 164 10E3/UL (ref 150–450)
PLATELET # BLD AUTO: 219 10E3/UL (ref 150–450)
PLATELET # BLD AUTO: 322 10E3/UL (ref 150–450)
PLATELET # BLD AUTO: 323 10E3/UL (ref 150–450)
PLATELET # BLD AUTO: 336 10E3/UL (ref 150–450)
PLATELET # BLD AUTO: 353 10E3/UL (ref 150–450)
PLATELET # BLD AUTO: 361 10E3/UL (ref 150–450)
PLATELET # BLD AUTO: 378 10E3/UL (ref 150–450)
PLATELET # BLD AUTO: 391 10E3/UL (ref 150–450)
PLATELET # BLD AUTO: 404 10E3/UL (ref 150–450)
PLATELET # BLD AUTO: 429 10E3/UL (ref 150–450)
PLATELET # BLD AUTO: 430 10E3/UL (ref 150–450)
PLATELET # BLD AUTO: 432 10E3/UL (ref 150–450)
PLATELET # BLD AUTO: 460 10E3/UL (ref 150–450)
PLATELET # BLD AUTO: 466 10E3/UL (ref 150–450)
PLATELET # BLD AUTO: 468 10E3/UL (ref 150–450)
PLATELET # BLD AUTO: 477 10E3/UL (ref 150–450)
PLATELET # BLD AUTO: 482 10E3/UL (ref 150–450)
PO2 BLD: 66 MM HG (ref 80–105)
PO2 BLDV: 16 MM HG (ref 25–47)
PO2 BLDV: 33 MM HG (ref 25–47)
PO2 BLDV: 53 MM HG (ref 25–47)
PO2 BLDV: 61 MM HG (ref 25–47)
POTASSIUM SERPL-SCNC: 3.2 MMOL/L (ref 3.4–5.3)
POTASSIUM SERPL-SCNC: 3.4 MMOL/L (ref 3.4–5.3)
POTASSIUM SERPL-SCNC: 3.4 MMOL/L (ref 3.4–5.3)
POTASSIUM SERPL-SCNC: 3.5 MMOL/L (ref 3.4–5.3)
POTASSIUM SERPL-SCNC: 3.5 MMOL/L (ref 3.4–5.3)
POTASSIUM SERPL-SCNC: 3.6 MMOL/L (ref 3.4–5.3)
POTASSIUM SERPL-SCNC: 3.6 MMOL/L (ref 3.4–5.3)
POTASSIUM SERPL-SCNC: 3.8 MMOL/L (ref 3.4–5.3)
POTASSIUM SERPL-SCNC: 3.9 MMOL/L (ref 3.4–5.3)
POTASSIUM SERPL-SCNC: 4 MMOL/L (ref 3.4–5.3)
POTASSIUM SERPL-SCNC: 4 MMOL/L (ref 3.4–5.3)
POTASSIUM SERPL-SCNC: 4.1 MMOL/L (ref 3.4–5.3)
POTASSIUM SERPL-SCNC: 4.2 MMOL/L (ref 3.4–5.3)
POTASSIUM SERPL-SCNC: 4.2 MMOL/L (ref 3.4–5.3)
POTASSIUM SERPL-SCNC: 4.3 MMOL/L (ref 3.4–5.3)
POTASSIUM SERPL-SCNC: 4.4 MMOL/L (ref 3.4–5.3)
POTASSIUM SERPL-SCNC: 4.5 MMOL/L (ref 3.4–5.3)
POTASSIUM SERPL-SCNC: 4.6 MMOL/L (ref 3.4–5.3)
POTASSIUM SERPL-SCNC: 4.7 MMOL/L (ref 3.4–5.3)
POTASSIUM SERPL-SCNC: 4.7 MMOL/L (ref 3.4–5.3)
POTASSIUM SERPL-SCNC: 4.8 MMOL/L (ref 3.4–5.3)
POTASSIUM SERPL-SCNC: 4.9 MMOL/L (ref 3.4–5.3)
PROCALCITONIN SERPL IA-MCNC: 0.34 NG/ML
PROCALCITONIN SERPL IA-MCNC: 0.42 NG/ML
PROT SERPL-MCNC: 5.4 G/DL (ref 6.4–8.3)
PROT SERPL-MCNC: 5.7 G/DL (ref 6.4–8.3)
PROT SERPL-MCNC: 5.9 G/DL (ref 6.4–8.3)
PROT SERPL-MCNC: 6.2 G/DL (ref 6.4–8.3)
PROT SERPL-MCNC: 6.4 G/DL (ref 6.4–8.3)
RBC # BLD AUTO: 2.47 10E6/UL (ref 4.4–5.9)
RBC # BLD AUTO: 2.88 10E6/UL (ref 4.4–5.9)
RBC # BLD AUTO: 2.94 10E6/UL (ref 4.4–5.9)
RBC # BLD AUTO: 2.97 10E6/UL (ref 4.4–5.9)
RBC # BLD AUTO: 3.09 10E6/UL (ref 4.4–5.9)
RBC # BLD AUTO: 3.11 10E6/UL (ref 4.4–5.9)
RBC # BLD AUTO: 3.14 10E6/UL (ref 4.4–5.9)
RBC # BLD AUTO: 3.15 10E6/UL (ref 4.4–5.9)
RBC # BLD AUTO: 3.18 10E6/UL (ref 4.4–5.9)
RBC # BLD AUTO: 3.18 10E6/UL (ref 4.4–5.9)
RBC # BLD AUTO: 3.23 10E6/UL (ref 4.4–5.9)
RBC # BLD AUTO: 3.31 10E6/UL (ref 4.4–5.9)
RBC # BLD AUTO: 3.34 10E6/UL (ref 4.4–5.9)
RBC # BLD AUTO: 3.4 10E6/UL (ref 4.4–5.9)
RBC # BLD AUTO: 3.68 10E6/UL (ref 4.4–5.9)
RBC # BLD AUTO: 3.7 10E6/UL (ref 4.4–5.9)
RBC # BLD AUTO: 3.73 10E6/UL (ref 4.4–5.9)
RBC # BLD AUTO: 4.16 10E6/UL (ref 4.4–5.9)
RBC # BLD AUTO: 4.17 10E6/UL (ref 4.4–5.9)
RBC # BLD AUTO: 4.18 10E6/UL (ref 4.4–5.9)
RSV RNA SPEC QL NAA+PROBE: NOT DETECTED
RSV RNA SPEC QL NAA+PROBE: NOT DETECTED
RV+EV RNA SPEC QL NAA+PROBE: NOT DETECTED
SA TARGET DNA: NEGATIVE
SAO2 % BLDV: 21 % (ref 94–100)
SAO2 % BLDV: 55 % (ref 94–100)
SARS-COV-2 RNA RESP QL NAA+PROBE: NEGATIVE
SODIUM SERPL-SCNC: 121 MMOL/L (ref 136–145)
SODIUM SERPL-SCNC: 122 MMOL/L (ref 136–145)
SODIUM SERPL-SCNC: 125 MMOL/L (ref 136–145)
SODIUM SERPL-SCNC: 126 MMOL/L (ref 136–145)
SODIUM SERPL-SCNC: 126 MMOL/L (ref 136–145)
SODIUM SERPL-SCNC: 127 MMOL/L (ref 136–145)
SODIUM SERPL-SCNC: 128 MMOL/L (ref 136–145)
SODIUM SERPL-SCNC: 128 MMOL/L (ref 136–145)
SODIUM SERPL-SCNC: 129 MMOL/L (ref 136–145)
SODIUM SERPL-SCNC: 130 MMOL/L (ref 136–145)
SODIUM SERPL-SCNC: 131 MMOL/L (ref 136–145)
SODIUM SERPL-SCNC: 132 MMOL/L (ref 136–145)
SODIUM SERPL-SCNC: 132 MMOL/L (ref 136–145)
SODIUM SERPL-SCNC: 133 MMOL/L (ref 136–145)
SODIUM SERPL-SCNC: 134 MMOL/L (ref 136–145)
SODIUM SERPL-SCNC: 135 MMOL/L (ref 136–145)
SODIUM SERPL-SCNC: 135 MMOL/L (ref 136–145)
SODIUM SERPL-SCNC: 136 MMOL/L (ref 136–145)
SODIUM SERPL-SCNC: 136 MMOL/L (ref 136–145)
SODIUM SERPL-SCNC: 138 MMOL/L (ref 136–145)
SODIUM SERPL-SCNC: 138 MMOL/L (ref 136–145)
SODIUM SERPL-SCNC: 139 MMOL/L (ref 136–145)
SODIUM UR-SCNC: 58 MMOL/L
T4 FREE SERPL-MCNC: 1.06 NG/DL (ref 0.9–1.7)
TROPONIN T SERPL HS-MCNC: 44 NG/L
TROPONIN T SERPL HS-MCNC: 46 NG/L
TROPONIN T SERPL HS-MCNC: 50 NG/L
TROPONIN T SERPL HS-MCNC: 50 NG/L
TSH SERPL DL<=0.005 MIU/L-ACNC: 6.66 UIU/ML (ref 0.3–4.2)
VIT B12 SERPL-MCNC: <150 PG/ML (ref 232–1245)
WBC # BLD AUTO: 10.1 10E3/UL (ref 4–11)
WBC # BLD AUTO: 11.5 10E3/UL (ref 4–11)
WBC # BLD AUTO: 12.3 10E3/UL (ref 4–11)
WBC # BLD AUTO: 12.3 10E3/UL (ref 4–11)
WBC # BLD AUTO: 12.4 10E3/UL (ref 4–11)
WBC # BLD AUTO: 12.6 10E3/UL (ref 4–11)
WBC # BLD AUTO: 13.6 10E3/UL (ref 4–11)
WBC # BLD AUTO: 14.1 10E3/UL (ref 4–11)
WBC # BLD AUTO: 14.8 10E3/UL (ref 4–11)
WBC # BLD AUTO: 15.7 10E3/UL (ref 4–11)
WBC # BLD AUTO: 16 10E3/UL (ref 4–11)
WBC # BLD AUTO: 17 10E3/UL (ref 4–11)
WBC # BLD AUTO: 17.9 10E3/UL (ref 4–11)
WBC # BLD AUTO: 18.7 10E3/UL (ref 4–11)
WBC # BLD AUTO: 19.9 10E3/UL (ref 4–11)
WBC # BLD AUTO: 7.4 10E3/UL (ref 4–11)
WBC # BLD AUTO: 7.7 10E3/UL (ref 4–11)
WBC # BLD AUTO: 9.5 10E3/UL (ref 4–11)
WBC # BLD AUTO: 9.6 10E3/UL (ref 4–11)
WBC # BLD AUTO: 9.9 10E3/UL (ref 4–11)

## 2023-01-01 PROCEDURE — 99232 SBSQ HOSP IP/OBS MODERATE 35: CPT | Performed by: STUDENT IN AN ORGANIZED HEALTH CARE EDUCATION/TRAINING PROGRAM

## 2023-01-01 PROCEDURE — 250N000013 HC RX MED GY IP 250 OP 250 PS 637: Performed by: INTERNAL MEDICINE

## 2023-01-01 PROCEDURE — 250N000011 HC RX IP 250 OP 636: Performed by: INTERNAL MEDICINE

## 2023-01-01 PROCEDURE — 36415 COLL VENOUS BLD VENIPUNCTURE: CPT | Performed by: INTERNAL MEDICINE

## 2023-01-01 PROCEDURE — 83735 ASSAY OF MAGNESIUM: CPT | Performed by: INTERNAL MEDICINE

## 2023-01-01 PROCEDURE — 250N000013 HC RX MED GY IP 250 OP 250 PS 637: Performed by: STUDENT IN AN ORGANIZED HEALTH CARE EDUCATION/TRAINING PROGRAM

## 2023-01-01 PROCEDURE — P9045 ALBUMIN (HUMAN), 5%, 250 ML: HCPCS | Performed by: ANESTHESIOLOGY

## 2023-01-01 PROCEDURE — 93000 ELECTROCARDIOGRAM COMPLETE: CPT | Performed by: INTERNAL MEDICINE

## 2023-01-01 PROCEDURE — 85014 HEMATOCRIT: CPT | Performed by: STUDENT IN AN ORGANIZED HEALTH CARE EDUCATION/TRAINING PROGRAM

## 2023-01-01 PROCEDURE — 96365 THER/PROPH/DIAG IV INF INIT: CPT | Mod: 59

## 2023-01-01 PROCEDURE — 250N000013 HC RX MED GY IP 250 OP 250 PS 637: Performed by: HOSPITALIST

## 2023-01-01 PROCEDURE — 210N000002 HC R&B HEART CARE

## 2023-01-01 PROCEDURE — 94668 MNPJ CHEST WALL SBSQ: CPT

## 2023-01-01 PROCEDURE — 250N000009 HC RX 250: Performed by: HOSPITALIST

## 2023-01-01 PROCEDURE — 84100 ASSAY OF PHOSPHORUS: CPT | Performed by: STUDENT IN AN ORGANIZED HEALTH CARE EDUCATION/TRAINING PROGRAM

## 2023-01-01 PROCEDURE — 999N000157 HC STATISTIC RCP TIME EA 10 MIN

## 2023-01-01 PROCEDURE — 250N000011 HC RX IP 250 OP 636: Performed by: STUDENT IN AN ORGANIZED HEALTH CARE EDUCATION/TRAINING PROGRAM

## 2023-01-01 PROCEDURE — 83735 ASSAY OF MAGNESIUM: CPT | Performed by: HOSPITALIST

## 2023-01-01 PROCEDURE — 99291 CRITICAL CARE FIRST HOUR: CPT | Performed by: INTERNAL MEDICINE

## 2023-01-01 PROCEDURE — 94640 AIRWAY INHALATION TREATMENT: CPT | Mod: 76

## 2023-01-01 PROCEDURE — 87040 BLOOD CULTURE FOR BACTERIA: CPT | Performed by: EMERGENCY MEDICINE

## 2023-01-01 PROCEDURE — 999N000253 HC STATISTIC WEANING TRIALS

## 2023-01-01 PROCEDURE — 94640 AIRWAY INHALATION TREATMENT: CPT

## 2023-01-01 PROCEDURE — 250N000009 HC RX 250

## 2023-01-01 PROCEDURE — 99310 SBSQ NF CARE HIGH MDM 45: CPT | Performed by: NURSE PRACTITIONER

## 2023-01-01 PROCEDURE — 250N000009 HC RX 250: Performed by: STUDENT IN AN ORGANIZED HEALTH CARE EDUCATION/TRAINING PROGRAM

## 2023-01-01 PROCEDURE — 82803 BLOOD GASES ANY COMBINATION: CPT | Performed by: STUDENT IN AN ORGANIZED HEALTH CARE EDUCATION/TRAINING PROGRAM

## 2023-01-01 PROCEDURE — 83880 ASSAY OF NATRIURETIC PEPTIDE: CPT | Performed by: HOSPITALIST

## 2023-01-01 PROCEDURE — 5A1945Z RESPIRATORY VENTILATION, 24-96 CONSECUTIVE HOURS: ICD-10-PCS | Performed by: STUDENT IN AN ORGANIZED HEALTH CARE EDUCATION/TRAINING PROGRAM

## 2023-01-01 PROCEDURE — 83935 ASSAY OF URINE OSMOLALITY: CPT | Performed by: INTERNAL MEDICINE

## 2023-01-01 PROCEDURE — 250N000012 HC RX MED GY IP 250 OP 636 PS 637: Performed by: INTERNAL MEDICINE

## 2023-01-01 PROCEDURE — 80048 BASIC METABOLIC PNL TOTAL CA: CPT | Performed by: INTERNAL MEDICINE

## 2023-01-01 PROCEDURE — 84484 ASSAY OF TROPONIN QUANT: CPT | Performed by: EMERGENCY MEDICINE

## 2023-01-01 PROCEDURE — 83880 ASSAY OF NATRIURETIC PEPTIDE: CPT | Performed by: STUDENT IN AN ORGANIZED HEALTH CARE EDUCATION/TRAINING PROGRAM

## 2023-01-01 PROCEDURE — 82374 ASSAY BLOOD CARBON DIOXIDE: CPT | Performed by: INTERNAL MEDICINE

## 2023-01-01 PROCEDURE — 99232 SBSQ HOSP IP/OBS MODERATE 35: CPT | Performed by: INTERNAL MEDICINE

## 2023-01-01 PROCEDURE — 84132 ASSAY OF SERUM POTASSIUM: CPT | Performed by: HOSPITALIST

## 2023-01-01 PROCEDURE — U0003 INFECTIOUS AGENT DETECTION BY NUCLEIC ACID (DNA OR RNA); SEVERE ACUTE RESPIRATORY SYNDROME CORONAVIRUS 2 (SARS-COV-2) (CORONAVIRUS DISEASE [COVID-19]), AMPLIFIED PROBE TECHNIQUE, MAKING USE OF HIGH THROUGHPUT TECHNOLOGIES AS DESCRIBED BY CMS-2020-01-R: HCPCS | Performed by: INTERNAL MEDICINE

## 2023-01-01 PROCEDURE — 83605 ASSAY OF LACTIC ACID: CPT

## 2023-01-01 PROCEDURE — 87040 BLOOD CULTURE FOR BACTERIA: CPT | Performed by: INTERNAL MEDICINE

## 2023-01-01 PROCEDURE — G0463 HOSPITAL OUTPT CLINIC VISIT: HCPCS

## 2023-01-01 PROCEDURE — 999N000065 XR CHEST PORT 1 VIEW

## 2023-01-01 PROCEDURE — 85027 COMPLETE CBC AUTOMATED: CPT | Performed by: STUDENT IN AN ORGANIZED HEALTH CARE EDUCATION/TRAINING PROGRAM

## 2023-01-01 PROCEDURE — 250N000012 HC RX MED GY IP 250 OP 636 PS 637: Performed by: STUDENT IN AN ORGANIZED HEALTH CARE EDUCATION/TRAINING PROGRAM

## 2023-01-01 PROCEDURE — 250N000011 HC RX IP 250 OP 636: Performed by: ANESTHESIOLOGY

## 2023-01-01 PROCEDURE — 84132 ASSAY OF SERUM POTASSIUM: CPT | Performed by: INTERNAL MEDICINE

## 2023-01-01 PROCEDURE — 94799 UNLISTED PULMONARY SVC/PX: CPT

## 2023-01-01 PROCEDURE — 96375 TX/PRO/DX INJ NEW DRUG ADDON: CPT

## 2023-01-01 PROCEDURE — 36415 COLL VENOUS BLD VENIPUNCTURE: CPT | Performed by: STUDENT IN AN ORGANIZED HEALTH CARE EDUCATION/TRAINING PROGRAM

## 2023-01-01 PROCEDURE — 97530 THERAPEUTIC ACTIVITIES: CPT | Mod: GO | Performed by: OCCUPATIONAL THERAPIST

## 2023-01-01 PROCEDURE — 80053 COMPREHEN METABOLIC PANEL: CPT | Performed by: EMERGENCY MEDICINE

## 2023-01-01 PROCEDURE — 99232 SBSQ HOSP IP/OBS MODERATE 35: CPT | Performed by: HOSPITALIST

## 2023-01-01 PROCEDURE — 83735 ASSAY OF MAGNESIUM: CPT | Performed by: SURGERY

## 2023-01-01 PROCEDURE — 258N000003 HC RX IP 258 OP 636: Performed by: INTERNAL MEDICINE

## 2023-01-01 PROCEDURE — 85018 HEMOGLOBIN: CPT | Performed by: STUDENT IN AN ORGANIZED HEALTH CARE EDUCATION/TRAINING PROGRAM

## 2023-01-01 PROCEDURE — 99233 SBSQ HOSP IP/OBS HIGH 50: CPT | Performed by: HOSPITALIST

## 2023-01-01 PROCEDURE — 83735 ASSAY OF MAGNESIUM: CPT | Performed by: STUDENT IN AN ORGANIZED HEALTH CARE EDUCATION/TRAINING PROGRAM

## 2023-01-01 PROCEDURE — 84132 ASSAY OF SERUM POTASSIUM: CPT | Performed by: STUDENT IN AN ORGANIZED HEALTH CARE EDUCATION/TRAINING PROGRAM

## 2023-01-01 PROCEDURE — 258N000003 HC RX IP 258 OP 636: Performed by: ANESTHESIOLOGY

## 2023-01-01 PROCEDURE — 97535 SELF CARE MNGMENT TRAINING: CPT | Mod: GO

## 2023-01-01 PROCEDURE — 83550 IRON BINDING TEST: CPT | Performed by: INTERNAL MEDICINE

## 2023-01-01 PROCEDURE — 82746 ASSAY OF FOLIC ACID SERUM: CPT | Performed by: INTERNAL MEDICINE

## 2023-01-01 PROCEDURE — 99291 CRITICAL CARE FIRST HOUR: CPT | Mod: 25

## 2023-01-01 PROCEDURE — 83605 ASSAY OF LACTIC ACID: CPT | Performed by: STUDENT IN AN ORGANIZED HEALTH CARE EDUCATION/TRAINING PROGRAM

## 2023-01-01 PROCEDURE — 93321 DOPPLER ECHO F-UP/LMTD STD: CPT | Mod: 26 | Performed by: INTERNAL MEDICINE

## 2023-01-01 PROCEDURE — 93325 DOPPLER ECHO COLOR FLOW MAPG: CPT

## 2023-01-01 PROCEDURE — 71046 X-RAY EXAM CHEST 2 VIEWS: CPT

## 2023-01-01 PROCEDURE — 85027 COMPLETE CBC AUTOMATED: CPT | Performed by: INTERNAL MEDICINE

## 2023-01-01 PROCEDURE — 250N000009 HC RX 250: Performed by: INTERNAL MEDICINE

## 2023-01-01 PROCEDURE — C9803 HOPD COVID-19 SPEC COLLECT: HCPCS

## 2023-01-01 PROCEDURE — 83880 ASSAY OF NATRIURETIC PEPTIDE: CPT | Performed by: EMERGENCY MEDICINE

## 2023-01-01 PROCEDURE — 82310 ASSAY OF CALCIUM: CPT | Performed by: INTERNAL MEDICINE

## 2023-01-01 PROCEDURE — 31500 INSERT EMERGENCY AIRWAY: CPT

## 2023-01-01 PROCEDURE — 250N000012 HC RX MED GY IP 250 OP 636 PS 637: Performed by: HOSPITALIST

## 2023-01-01 PROCEDURE — 86140 C-REACTIVE PROTEIN: CPT | Performed by: HOSPITALIST

## 2023-01-01 PROCEDURE — 96374 THER/PROPH/DIAG INJ IV PUSH: CPT

## 2023-01-01 PROCEDURE — 999N000065 XR ABDOMEN PORT 1 VIEW

## 2023-01-01 PROCEDURE — 999N000259 HC STATISTIC EXTUBATION

## 2023-01-01 PROCEDURE — 72148 MRI LUMBAR SPINE W/O DYE: CPT

## 2023-01-01 PROCEDURE — 80053 COMPREHEN METABOLIC PANEL: CPT | Performed by: HOSPITALIST

## 2023-01-01 PROCEDURE — 99215 OFFICE O/P EST HI 40 MIN: CPT | Performed by: PHYSICIAN ASSISTANT

## 2023-01-01 PROCEDURE — 93306 TTE W/DOPPLER COMPLETE: CPT | Mod: 26 | Performed by: INTERNAL MEDICINE

## 2023-01-01 PROCEDURE — 86140 C-REACTIVE PROTEIN: CPT | Performed by: INTERNAL MEDICINE

## 2023-01-01 PROCEDURE — 94003 VENT MGMT INPAT SUBQ DAY: CPT

## 2023-01-01 PROCEDURE — 82248 BILIRUBIN DIRECT: CPT | Performed by: HOSPITALIST

## 2023-01-01 PROCEDURE — 36415 COLL VENOUS BLD VENIPUNCTURE: CPT | Performed by: EMERGENCY MEDICINE

## 2023-01-01 PROCEDURE — 97162 PT EVAL MOD COMPLEX 30 MIN: CPT | Mod: GP

## 2023-01-01 PROCEDURE — 999N000009 HC STATISTIC AIRWAY CARE

## 2023-01-01 PROCEDURE — 36415 COLL VENOUS BLD VENIPUNCTURE: CPT

## 2023-01-01 PROCEDURE — 258N000003 HC RX IP 258 OP 636: Performed by: EMERGENCY MEDICINE

## 2023-01-01 PROCEDURE — 250N000011 HC RX IP 250 OP 636

## 2023-01-01 PROCEDURE — 93308 TTE F-UP OR LMTD: CPT | Mod: 26 | Performed by: INTERNAL MEDICINE

## 2023-01-01 PROCEDURE — 255N000002 HC RX 255 OP 636: Performed by: INTERNAL MEDICINE

## 2023-01-01 PROCEDURE — 93005 ELECTROCARDIOGRAM TRACING: CPT

## 2023-01-01 PROCEDURE — 99291 CRITICAL CARE FIRST HOUR: CPT | Mod: GC | Performed by: STUDENT IN AN ORGANIZED HEALTH CARE EDUCATION/TRAINING PROGRAM

## 2023-01-01 PROCEDURE — 93325 DOPPLER ECHO COLOR FLOW MAPG: CPT | Mod: 26 | Performed by: INTERNAL MEDICINE

## 2023-01-01 PROCEDURE — 258N000001 HC RX 258: Performed by: INTERNAL MEDICINE

## 2023-01-01 PROCEDURE — 250N000013 HC RX MED GY IP 250 OP 250 PS 637

## 2023-01-01 PROCEDURE — 43753 TX GASTRO INTUB W/ASP: CPT

## 2023-01-01 PROCEDURE — 99306 1ST NF CARE HIGH MDM 50: CPT | Performed by: INTERNAL MEDICINE

## 2023-01-01 PROCEDURE — 97535 SELF CARE MNGMENT TRAINING: CPT | Mod: GO | Performed by: OCCUPATIONAL THERAPIST

## 2023-01-01 PROCEDURE — 71045 X-RAY EXAM CHEST 1 VIEW: CPT

## 2023-01-01 PROCEDURE — 3E043XZ INTRODUCTION OF VASOPRESSOR INTO CENTRAL VEIN, PERCUTANEOUS APPROACH: ICD-10-PCS | Performed by: STUDENT IN AN ORGANIZED HEALTH CARE EDUCATION/TRAINING PROGRAM

## 2023-01-01 PROCEDURE — 272N000064 HC CIRCUIT HUMIDITY W/CPAP BIPAP

## 2023-01-01 PROCEDURE — 250N000011 HC RX IP 250 OP 636: Performed by: EMERGENCY MEDICINE

## 2023-01-01 PROCEDURE — 97165 OT EVAL LOW COMPLEX 30 MIN: CPT | Mod: GO

## 2023-01-01 PROCEDURE — 87486 CHLMYD PNEUM DNA AMP PROBE: CPT | Performed by: HOSPITALIST

## 2023-01-01 PROCEDURE — 99214 OFFICE O/P EST MOD 30 MIN: CPT | Performed by: INTERNAL MEDICINE

## 2023-01-01 PROCEDURE — 99221 1ST HOSP IP/OBS SF/LOW 40: CPT | Performed by: NURSE PRACTITIONER

## 2023-01-01 PROCEDURE — 99223 1ST HOSP IP/OBS HIGH 75: CPT | Mod: AI | Performed by: INTERNAL MEDICINE

## 2023-01-01 PROCEDURE — 250N000011 HC RX IP 250 OP 636: Performed by: HOSPITALIST

## 2023-01-01 PROCEDURE — 258N000003 HC RX IP 258 OP 636: Performed by: HOSPITALIST

## 2023-01-01 PROCEDURE — 84145 PROCALCITONIN (PCT): CPT | Performed by: STUDENT IN AN ORGANIZED HEALTH CARE EDUCATION/TRAINING PROGRAM

## 2023-01-01 PROCEDURE — 99223 1ST HOSP IP/OBS HIGH 75: CPT | Mod: 25 | Performed by: INTERNAL MEDICINE

## 2023-01-01 PROCEDURE — 36415 COLL VENOUS BLD VENIPUNCTURE: CPT | Performed by: HOSPITALIST

## 2023-01-01 PROCEDURE — 272N000063 HC CIRCUIT HUMID FACE/TRACH MSK

## 2023-01-01 PROCEDURE — 97530 THERAPEUTIC ACTIVITIES: CPT | Mod: GO

## 2023-01-01 PROCEDURE — 83036 HEMOGLOBIN GLYCOSYLATED A1C: CPT | Performed by: HOSPITALIST

## 2023-01-01 PROCEDURE — 84295 ASSAY OF SERUM SODIUM: CPT | Performed by: INTERNAL MEDICINE

## 2023-01-01 PROCEDURE — 82607 VITAMIN B-12: CPT | Performed by: INTERNAL MEDICINE

## 2023-01-01 PROCEDURE — 85018 HEMOGLOBIN: CPT | Performed by: HOSPITALIST

## 2023-01-01 PROCEDURE — 84443 ASSAY THYROID STIM HORMONE: CPT | Performed by: HOSPITALIST

## 2023-01-01 PROCEDURE — 85004 AUTOMATED DIFF WBC COUNT: CPT | Performed by: EMERGENCY MEDICINE

## 2023-01-01 PROCEDURE — 99233 SBSQ HOSP IP/OBS HIGH 50: CPT | Performed by: INTERNAL MEDICINE

## 2023-01-01 PROCEDURE — 84155 ASSAY OF PROTEIN SERUM: CPT | Performed by: HOSPITALIST

## 2023-01-01 PROCEDURE — 86140 C-REACTIVE PROTEIN: CPT | Performed by: STUDENT IN AN ORGANIZED HEALTH CARE EDUCATION/TRAINING PROGRAM

## 2023-01-01 PROCEDURE — 85025 COMPLETE CBC W/AUTO DIFF WBC: CPT | Performed by: EMERGENCY MEDICINE

## 2023-01-01 PROCEDURE — 93010 ELECTROCARDIOGRAM REPORT: CPT | Performed by: INTERNAL MEDICINE

## 2023-01-01 PROCEDURE — 200N000001 HC R&B ICU

## 2023-01-01 PROCEDURE — 84439 ASSAY OF FREE THYROXINE: CPT | Performed by: HOSPITALIST

## 2023-01-01 PROCEDURE — 94660 CPAP INITIATION&MGMT: CPT

## 2023-01-01 PROCEDURE — 80048 BASIC METABOLIC PNL TOTAL CA: CPT | Performed by: HOSPITALIST

## 2023-01-01 PROCEDURE — 80048 BASIC METABOLIC PNL TOTAL CA: CPT | Performed by: STUDENT IN AN ORGANIZED HEALTH CARE EDUCATION/TRAINING PROGRAM

## 2023-01-01 PROCEDURE — 84300 ASSAY OF URINE SODIUM: CPT | Performed by: INTERNAL MEDICINE

## 2023-01-01 PROCEDURE — 82565 ASSAY OF CREATININE: CPT | Performed by: HOSPITALIST

## 2023-01-01 PROCEDURE — 73502 X-RAY EXAM HIP UNI 2-3 VIEWS: CPT | Mod: TC | Performed by: RADIOLOGY

## 2023-01-01 PROCEDURE — 87633 RESP VIRUS 12-25 TARGETS: CPT | Performed by: HOSPITALIST

## 2023-01-01 PROCEDURE — 93321 DOPPLER ECHO F-UP/LMTD STD: CPT

## 2023-01-01 PROCEDURE — 76604 US EXAM CHEST: CPT

## 2023-01-01 PROCEDURE — 84295 ASSAY OF SERUM SODIUM: CPT | Performed by: STUDENT IN AN ORGANIZED HEALTH CARE EDUCATION/TRAINING PROGRAM

## 2023-01-01 PROCEDURE — 999N000208 ECHOCARDIOGRAM COMPLETE

## 2023-01-01 PROCEDURE — 82310 ASSAY OF CALCIUM: CPT | Performed by: STUDENT IN AN ORGANIZED HEALTH CARE EDUCATION/TRAINING PROGRAM

## 2023-01-01 PROCEDURE — 94667 MNPJ CHEST WALL 1ST: CPT

## 2023-01-01 PROCEDURE — 84295 ASSAY OF SERUM SODIUM: CPT | Performed by: HOSPITALIST

## 2023-01-01 PROCEDURE — 87641 MR-STAPH DNA AMP PROBE: CPT | Performed by: STUDENT IN AN ORGANIZED HEALTH CARE EDUCATION/TRAINING PROGRAM

## 2023-01-01 PROCEDURE — 99239 HOSP IP/OBS DSCHRG MGMT >30: CPT | Performed by: INTERNAL MEDICINE

## 2023-01-01 PROCEDURE — 97530 THERAPEUTIC ACTIVITIES: CPT | Mod: GP

## 2023-01-01 PROCEDURE — 83880 ASSAY OF NATRIURETIC PEPTIDE: CPT | Performed by: INTERNAL MEDICINE

## 2023-01-01 PROCEDURE — 93971 EXTREMITY STUDY: CPT | Mod: LT

## 2023-01-01 PROCEDURE — 94002 VENT MGMT INPAT INIT DAY: CPT

## 2023-01-01 PROCEDURE — 74177 CT ABD & PELVIS W/CONTRAST: CPT

## 2023-01-01 PROCEDURE — 36415 COLL VENOUS BLD VENIPUNCTURE: CPT | Performed by: SURGERY

## 2023-01-01 PROCEDURE — 85379 FIBRIN DEGRADATION QUANT: CPT | Performed by: STUDENT IN AN ORGANIZED HEALTH CARE EDUCATION/TRAINING PROGRAM

## 2023-01-01 PROCEDURE — 82728 ASSAY OF FERRITIN: CPT | Performed by: INTERNAL MEDICINE

## 2023-01-01 PROCEDURE — 36600 WITHDRAWAL OF ARTERIAL BLOOD: CPT

## 2023-01-01 PROCEDURE — 82803 BLOOD GASES ANY COMBINATION: CPT | Performed by: INTERNAL MEDICINE

## 2023-01-01 PROCEDURE — 99285 EMERGENCY DEPT VISIT HI MDM: CPT | Mod: 25,CS

## 2023-01-01 PROCEDURE — 250N000011 HC RX IP 250 OP 636: Performed by: SURGERY

## 2023-01-01 RX ORDER — FUROSEMIDE 20 MG
20 TABLET ORAL DAILY
Status: DISCONTINUED | OUTPATIENT
Start: 2023-01-01 | End: 2023-01-01

## 2023-01-01 RX ORDER — CEFTRIAXONE 2 G/1
2 INJECTION, POWDER, FOR SOLUTION INTRAMUSCULAR; INTRAVENOUS EVERY 24 HOURS
Status: DISCONTINUED | OUTPATIENT
Start: 2023-01-01 | End: 2023-01-01

## 2023-01-01 RX ORDER — LORAZEPAM 2 MG/ML
1 INJECTION INTRAMUSCULAR EVERY 8 HOURS
Status: DISCONTINUED | OUTPATIENT
Start: 2023-01-01 | End: 2023-01-01

## 2023-01-01 RX ORDER — MIDAZOLAM HCL IN 0.9 % NACL/PF 1 MG/ML
1-4 PLASTIC BAG, INJECTION (ML) INTRAVENOUS CONTINUOUS
Status: DISCONTINUED | OUTPATIENT
Start: 2023-01-01 | End: 2023-01-01

## 2023-01-01 RX ORDER — GABAPENTIN 300 MG/1
300 CAPSULE ORAL 3 TIMES DAILY
Qty: 270 CAPSULE | Refills: 3 | Status: ON HOLD | OUTPATIENT
Start: 2023-01-01 | End: 2023-01-01

## 2023-01-01 RX ORDER — POTASSIUM CHLORIDE 1500 MG/1
40 TABLET, EXTENDED RELEASE ORAL ONCE
Status: COMPLETED | OUTPATIENT
Start: 2023-01-01 | End: 2023-01-01

## 2023-01-01 RX ORDER — ONDANSETRON 4 MG/1
4 TABLET, ORALLY DISINTEGRATING ORAL EVERY 6 HOURS PRN
Status: DISCONTINUED | OUTPATIENT
Start: 2023-01-01 | End: 2023-01-01 | Stop reason: HOSPADM

## 2023-01-01 RX ORDER — NICOTINE POLACRILEX 4 MG
15-30 LOZENGE BUCCAL
Status: DISCONTINUED | OUTPATIENT
Start: 2023-01-01 | End: 2023-01-01 | Stop reason: HOSPADM

## 2023-01-01 RX ORDER — FUROSEMIDE 10 MG/ML
60 INJECTION INTRAMUSCULAR; INTRAVENOUS ONCE
Status: COMPLETED | OUTPATIENT
Start: 2023-01-01 | End: 2023-01-01

## 2023-01-01 RX ORDER — FUROSEMIDE 40 MG
40 TABLET ORAL DAILY
Qty: 30 TABLET | Refills: 0 | DISCHARGE
Start: 2023-01-01

## 2023-01-01 RX ORDER — PREDNISONE 10 MG/1
10 TABLET ORAL DAILY
Status: DISCONTINUED | OUTPATIENT
Start: 2023-01-01 | End: 2023-01-01 | Stop reason: HOSPADM

## 2023-01-01 RX ORDER — NALOXONE HYDROCHLORIDE 0.4 MG/ML
0.2 INJECTION, SOLUTION INTRAMUSCULAR; INTRAVENOUS; SUBCUTANEOUS
Status: DISCONTINUED | OUTPATIENT
Start: 2023-01-01 | End: 2023-01-01 | Stop reason: HOSPADM

## 2023-01-01 RX ORDER — LIDOCAINE 4 G/G
1 PATCH TOPICAL
Status: DISCONTINUED | OUTPATIENT
Start: 2023-01-01 | End: 2023-01-01 | Stop reason: HOSPADM

## 2023-01-01 RX ORDER — PREDNISONE 20 MG/1
20 TABLET ORAL DAILY
Qty: 10 TABLET | Refills: 0 | Status: ON HOLD | DISCHARGE
Start: 2023-01-01 | End: 2023-01-01

## 2023-01-01 RX ORDER — ACETYLCYSTEINE 200 MG/ML
2 SOLUTION ORAL; RESPIRATORY (INHALATION) 2 TIMES DAILY
Status: DISCONTINUED | OUTPATIENT
Start: 2023-01-01 | End: 2023-01-01

## 2023-01-01 RX ORDER — FUROSEMIDE 10 MG/ML
10 INJECTION INTRAMUSCULAR; INTRAVENOUS ONCE
Status: COMPLETED | OUTPATIENT
Start: 2023-01-01 | End: 2023-01-01

## 2023-01-01 RX ORDER — GUAIFENESIN 600 MG/1
600 TABLET, EXTENDED RELEASE ORAL 2 TIMES DAILY
Status: DISCONTINUED | OUTPATIENT
Start: 2023-01-01 | End: 2023-01-01 | Stop reason: HOSPADM

## 2023-01-01 RX ORDER — CHOLESTYRAMINE 4 G/9G
1 POWDER, FOR SUSPENSION ORAL 2 TIMES DAILY WITH MEALS
Status: DISCONTINUED | OUTPATIENT
Start: 2023-01-01 | End: 2023-01-01 | Stop reason: HOSPADM

## 2023-01-01 RX ORDER — PREDNISONE 20 MG/1
20 TABLET ORAL DAILY
Status: DISCONTINUED | OUTPATIENT
Start: 2023-01-01 | End: 2023-01-01 | Stop reason: HOSPADM

## 2023-01-01 RX ORDER — METHYLPREDNISOLONE SODIUM SUCCINATE 125 MG/2ML
125 INJECTION, POWDER, LYOPHILIZED, FOR SOLUTION INTRAMUSCULAR; INTRAVENOUS ONCE
Status: COMPLETED | OUTPATIENT
Start: 2023-01-01 | End: 2023-01-01

## 2023-01-01 RX ORDER — CHLORHEXIDINE GLUCONATE ORAL RINSE 1.2 MG/ML
15 SOLUTION DENTAL EVERY 12 HOURS
Status: DISCONTINUED | OUTPATIENT
Start: 2023-01-01 | End: 2023-01-01

## 2023-01-01 RX ORDER — FUROSEMIDE 10 MG/ML
40 INJECTION INTRAMUSCULAR; INTRAVENOUS ONCE
Status: COMPLETED | OUTPATIENT
Start: 2023-01-01 | End: 2023-01-01

## 2023-01-01 RX ORDER — POTASSIUM CHLORIDE 20MEQ/15ML
40 LIQUID (ML) ORAL ONCE
Status: COMPLETED | OUTPATIENT
Start: 2023-01-01 | End: 2023-01-01

## 2023-01-01 RX ORDER — NALOXONE HYDROCHLORIDE 0.4 MG/ML
0.4 INJECTION, SOLUTION INTRAMUSCULAR; INTRAVENOUS; SUBCUTANEOUS
Status: DISCONTINUED | OUTPATIENT
Start: 2023-01-01 | End: 2023-01-01 | Stop reason: HOSPADM

## 2023-01-01 RX ORDER — CHOLESTYRAMINE 4 G/9G
1 POWDER, FOR SUSPENSION ORAL DAILY
Status: DISCONTINUED | OUTPATIENT
Start: 2023-01-01 | End: 2023-01-01

## 2023-01-01 RX ORDER — LORAZEPAM 1 MG/1
1 TABLET ORAL
Status: DISCONTINUED | OUTPATIENT
Start: 2023-01-01 | End: 2023-01-01

## 2023-01-01 RX ORDER — AMOXICILLIN 250 MG
2 CAPSULE ORAL
Status: DISCONTINUED | OUTPATIENT
Start: 2023-01-01 | End: 2023-01-01 | Stop reason: HOSPADM

## 2023-01-01 RX ORDER — PIPERACILLIN SODIUM, TAZOBACTAM SODIUM 3; .375 G/15ML; G/15ML
3.38 INJECTION, POWDER, LYOPHILIZED, FOR SOLUTION INTRAVENOUS EVERY 6 HOURS
Status: DISCONTINUED | OUTPATIENT
Start: 2023-01-01 | End: 2023-01-01

## 2023-01-01 RX ORDER — POLYETHYLENE GLYCOL 3350 17 G/17G
17 POWDER, FOR SOLUTION ORAL DAILY PRN
Status: DISCONTINUED | OUTPATIENT
Start: 2023-01-01 | End: 2023-01-01 | Stop reason: HOSPADM

## 2023-01-01 RX ORDER — LORAZEPAM 2 MG/ML
1 INJECTION INTRAMUSCULAR
Status: DISCONTINUED | OUTPATIENT
Start: 2023-01-01 | End: 2023-01-01

## 2023-01-01 RX ORDER — LISINOPRIL 5 MG/1
5 TABLET ORAL DAILY
Status: DISCONTINUED | OUTPATIENT
Start: 2023-01-01 | End: 2023-01-01

## 2023-01-01 RX ORDER — FUROSEMIDE 20 MG
30 TABLET ORAL DAILY
Qty: 45 TABLET | Refills: 4 | COMMUNITY
Start: 2023-01-01 | End: 2023-01-01

## 2023-01-01 RX ORDER — BRIMONIDINE TARTRATE 2 MG/ML
1 SOLUTION/ DROPS OPHTHALMIC 2 TIMES DAILY
Status: DISCONTINUED | OUTPATIENT
Start: 2023-01-01 | End: 2023-01-01 | Stop reason: HOSPADM

## 2023-01-01 RX ORDER — IPRATROPIUM BROMIDE AND ALBUTEROL SULFATE 2.5; .5 MG/3ML; MG/3ML
SOLUTION RESPIRATORY (INHALATION)
Status: COMPLETED
Start: 2023-01-01 | End: 2023-01-01

## 2023-01-01 RX ORDER — GUAIFENESIN 600 MG/1
15 TABLET, EXTENDED RELEASE ORAL DAILY
Status: DISCONTINUED | OUTPATIENT
Start: 2023-01-01 | End: 2023-01-01

## 2023-01-01 RX ORDER — IOPAMIDOL 755 MG/ML
84 INJECTION, SOLUTION INTRAVASCULAR ONCE
Status: COMPLETED | OUTPATIENT
Start: 2023-01-01 | End: 2023-01-01

## 2023-01-01 RX ORDER — FUROSEMIDE 10 MG/ML
40 INJECTION INTRAMUSCULAR; INTRAVENOUS EVERY 12 HOURS
Status: DISCONTINUED | OUTPATIENT
Start: 2023-01-01 | End: 2023-01-01

## 2023-01-01 RX ORDER — DEXMEDETOMIDINE HYDROCHLORIDE 4 UG/ML
.1-1.2 INJECTION, SOLUTION INTRAVENOUS CONTINUOUS
Status: DISCONTINUED | OUTPATIENT
Start: 2023-01-01 | End: 2023-01-01

## 2023-01-01 RX ORDER — GLIPIZIDE 5 MG/1
5 TABLET ORAL
Status: DISCONTINUED | OUTPATIENT
Start: 2023-01-01 | End: 2023-01-01 | Stop reason: HOSPADM

## 2023-01-01 RX ORDER — LATANOPROST 50 UG/ML
1 SOLUTION/ DROPS OPHTHALMIC EVERY EVENING
Status: DISCONTINUED | OUTPATIENT
Start: 2023-01-01 | End: 2023-01-01 | Stop reason: HOSPADM

## 2023-01-01 RX ORDER — GABAPENTIN 100 MG/1
200 CAPSULE ORAL 3 TIMES DAILY
Qty: 540 CAPSULE | Refills: 1 | Status: SHIPPED | OUTPATIENT
Start: 2023-01-01

## 2023-01-01 RX ORDER — FUROSEMIDE 20 MG
20 TABLET ORAL
Status: DISCONTINUED | OUTPATIENT
Start: 2023-01-01 | End: 2023-01-01

## 2023-01-01 RX ORDER — ARGININE/GLUTAMINE/CALCIUM BMB 7G-7G-1.5G
1 POWDER IN PACKET (EA) ORAL DAILY
COMMUNITY

## 2023-01-01 RX ORDER — LIDOCAINE HYDROCHLORIDE 10 MG/ML
10 INJECTION, SOLUTION EPIDURAL; INFILTRATION; INTRACAUDAL; PERINEURAL ONCE
Status: DISCONTINUED | OUTPATIENT
Start: 2023-01-01 | End: 2023-01-01 | Stop reason: HOSPADM

## 2023-01-01 RX ORDER — ACETAMINOPHEN 500 MG
1000 TABLET ORAL 3 TIMES DAILY PRN
Status: DISCONTINUED | OUTPATIENT
Start: 2023-01-01 | End: 2023-01-01

## 2023-01-01 RX ORDER — ALBUTEROL SULFATE 0.83 MG/ML
2.5 SOLUTION RESPIRATORY (INHALATION)
Status: DISCONTINUED | OUTPATIENT
Start: 2023-01-01 | End: 2023-01-01 | Stop reason: HOSPADM

## 2023-01-01 RX ORDER — FUROSEMIDE 10 MG/ML
20 INJECTION INTRAMUSCULAR; INTRAVENOUS ONCE
Status: COMPLETED | OUTPATIENT
Start: 2023-01-01 | End: 2023-01-01

## 2023-01-01 RX ORDER — ACETAMINOPHEN 650 MG/1
650 SUPPOSITORY RECTAL EVERY 6 HOURS PRN
Status: DISCONTINUED | OUTPATIENT
Start: 2023-01-01 | End: 2023-01-01 | Stop reason: HOSPADM

## 2023-01-01 RX ORDER — DEXTROSE MONOHYDRATE 25 G/50ML
25-50 INJECTION, SOLUTION INTRAVENOUS
Status: DISCONTINUED | OUTPATIENT
Start: 2023-01-01 | End: 2023-01-01 | Stop reason: HOSPADM

## 2023-01-01 RX ORDER — DORZOLAMIDE HCL 20 MG/ML
1 SOLUTION/ DROPS OPHTHALMIC 2 TIMES DAILY
COMMUNITY

## 2023-01-01 RX ORDER — NITROGLYCERIN 0.4 MG/1
TABLET SUBLINGUAL
Status: COMPLETED
Start: 2023-01-01 | End: 2023-01-01

## 2023-01-01 RX ORDER — LORAZEPAM 2 MG/ML
1 INJECTION INTRAMUSCULAR EVERY 6 HOURS PRN
Status: DISCONTINUED | OUTPATIENT
Start: 2023-01-01 | End: 2023-01-01 | Stop reason: ALTCHOICE

## 2023-01-01 RX ORDER — SACCHAROMYCES BOULARDII 250 MG
250 CAPSULE ORAL 2 TIMES DAILY
Qty: 20 CAPSULE | Refills: 0 | DISCHARGE
Start: 2023-01-01

## 2023-01-01 RX ORDER — LORAZEPAM 2 MG/ML
INJECTION INTRAMUSCULAR
Status: COMPLETED
Start: 2023-01-01 | End: 2023-01-01

## 2023-01-01 RX ORDER — DORZOLAMIDE HCL 20 MG/ML
1 SOLUTION/ DROPS OPHTHALMIC 2 TIMES DAILY
Status: DISCONTINUED | OUTPATIENT
Start: 2023-01-01 | End: 2023-01-01 | Stop reason: HOSPADM

## 2023-01-01 RX ORDER — AMIODARONE HYDROCHLORIDE 200 MG/1
200 TABLET ORAL DAILY
Status: DISCONTINUED | OUTPATIENT
Start: 2023-01-01 | End: 2023-01-01

## 2023-01-01 RX ORDER — BISACODYL 10 MG
10 SUPPOSITORY, RECTAL RECTAL DAILY PRN
Status: DISCONTINUED | OUTPATIENT
Start: 2023-01-01 | End: 2023-01-01 | Stop reason: HOSPADM

## 2023-01-01 RX ORDER — OXYCODONE HYDROCHLORIDE 5 MG/1
5 TABLET ORAL EVERY 4 HOURS PRN
Status: DISCONTINUED | OUTPATIENT
Start: 2023-01-01 | End: 2023-01-01 | Stop reason: HOSPADM

## 2023-01-01 RX ORDER — PIPERACILLIN SODIUM, TAZOBACTAM SODIUM 4; .5 G/20ML; G/20ML
4.5 INJECTION, POWDER, LYOPHILIZED, FOR SOLUTION INTRAVENOUS ONCE
Status: COMPLETED | OUTPATIENT
Start: 2023-01-01 | End: 2023-01-01

## 2023-01-01 RX ORDER — LANOLIN ALCOHOL/MO/W.PET/CERES
1000 CREAM (GRAM) TOPICAL DAILY
Status: DISCONTINUED | OUTPATIENT
Start: 2023-01-01 | End: 2023-01-01

## 2023-01-01 RX ORDER — FENTANYL CITRATE 50 UG/ML
INJECTION, SOLUTION INTRAMUSCULAR; INTRAVENOUS
Status: COMPLETED
Start: 2023-01-01 | End: 2023-01-01

## 2023-01-01 RX ORDER — LISINOPRIL 10 MG/1
10 TABLET ORAL DAILY
Status: DISCONTINUED | OUTPATIENT
Start: 2023-01-01 | End: 2023-01-01 | Stop reason: HOSPADM

## 2023-01-01 RX ORDER — FERROUS GLUCONATE 324(38)MG
324 TABLET ORAL
Status: DISCONTINUED | OUTPATIENT
Start: 2023-01-01 | End: 2023-01-01

## 2023-01-01 RX ORDER — SACCHAROMYCES BOULARDII 250 MG
250 CAPSULE ORAL 2 TIMES DAILY
Status: DISCONTINUED | OUTPATIENT
Start: 2023-01-01 | End: 2023-01-01 | Stop reason: HOSPADM

## 2023-01-01 RX ORDER — FUROSEMIDE 10 MG/ML
40 INJECTION INTRAMUSCULAR; INTRAVENOUS DAILY
Status: DISCONTINUED | OUTPATIENT
Start: 2023-01-01 | End: 2023-01-01

## 2023-01-01 RX ORDER — IPRATROPIUM BROMIDE AND ALBUTEROL SULFATE 2.5; .5 MG/3ML; MG/3ML
3 SOLUTION RESPIRATORY (INHALATION) EVERY 4 HOURS PRN
Status: DISCONTINUED | OUTPATIENT
Start: 2023-01-01 | End: 2023-01-01 | Stop reason: HOSPADM

## 2023-01-01 RX ORDER — MIDAZOLAM HCL IN 0.9 % NACL/PF 1 MG/ML
1-8 PLASTIC BAG, INJECTION (ML) INTRAVENOUS CONTINUOUS
Status: DISCONTINUED | OUTPATIENT
Start: 2023-01-01 | End: 2023-01-01

## 2023-01-01 RX ORDER — OLANZAPINE 5 MG/1
5 TABLET, ORALLY DISINTEGRATING ORAL EVERY 6 HOURS PRN
Status: DISCONTINUED | OUTPATIENT
Start: 2023-01-01 | End: 2023-01-01 | Stop reason: HOSPADM

## 2023-01-01 RX ORDER — GABAPENTIN 100 MG/1
200 CAPSULE ORAL 3 TIMES DAILY
Status: DISCONTINUED | OUTPATIENT
Start: 2023-01-01 | End: 2023-01-01 | Stop reason: HOSPADM

## 2023-01-01 RX ORDER — FUROSEMIDE 10 MG/ML
INJECTION INTRAMUSCULAR; INTRAVENOUS
Status: COMPLETED
Start: 2023-01-01 | End: 2023-01-01

## 2023-01-01 RX ORDER — ONDANSETRON 2 MG/ML
4 INJECTION INTRAMUSCULAR; INTRAVENOUS EVERY 6 HOURS PRN
Status: DISCONTINUED | OUTPATIENT
Start: 2023-01-01 | End: 2023-01-01 | Stop reason: HOSPADM

## 2023-01-01 RX ORDER — GUAIFENESIN/DEXTROMETHORPHAN 100-10MG/5
10 SYRUP ORAL EVERY 4 HOURS PRN
Status: DISCONTINUED | OUTPATIENT
Start: 2023-01-01 | End: 2023-01-01

## 2023-01-01 RX ORDER — LORAZEPAM 2 MG/ML
0.5 INJECTION INTRAMUSCULAR ONCE
Status: COMPLETED | OUTPATIENT
Start: 2023-01-01 | End: 2023-01-01

## 2023-01-01 RX ORDER — FUROSEMIDE 40 MG
40 TABLET ORAL DAILY
Status: DISCONTINUED | OUTPATIENT
Start: 2023-01-01 | End: 2023-01-01

## 2023-01-01 RX ORDER — GABAPENTIN 300 MG/1
300 CAPSULE ORAL 3 TIMES DAILY
Status: DISCONTINUED | OUTPATIENT
Start: 2023-01-01 | End: 2023-01-01

## 2023-01-01 RX ORDER — AMOXICILLIN 250 MG
2 CAPSULE ORAL
Qty: 20 TABLET | Refills: 0 | DISCHARGE
Start: 2023-01-01

## 2023-01-01 RX ORDER — LIDOCAINE 40 MG/G
CREAM TOPICAL
Status: DISCONTINUED | OUTPATIENT
Start: 2023-01-01 | End: 2023-01-01 | Stop reason: HOSPADM

## 2023-01-01 RX ORDER — LORAZEPAM 1 MG/1
1 TABLET ORAL
Status: DISCONTINUED | OUTPATIENT
Start: 2023-01-01 | End: 2023-01-01 | Stop reason: HOSPADM

## 2023-01-01 RX ORDER — ROPIVACAINE IN 0.9% SOD CHL/PF 0.1 %
.03-.125 PLASTIC BAG, INJECTION (ML) EPIDURAL CONTINUOUS
Status: DISCONTINUED | OUTPATIENT
Start: 2023-01-01 | End: 2023-01-01

## 2023-01-01 RX ORDER — AZITHROMYCIN 250 MG/1
250 TABLET, FILM COATED ORAL DAILY
Status: COMPLETED | OUTPATIENT
Start: 2023-01-01 | End: 2023-01-01

## 2023-01-01 RX ORDER — MORPHINE SULFATE 4 MG/ML
4 INJECTION, SOLUTION INTRAMUSCULAR; INTRAVENOUS
Status: DISCONTINUED | OUTPATIENT
Start: 2023-01-01 | End: 2023-01-01 | Stop reason: HOSPADM

## 2023-01-01 RX ORDER — IPRATROPIUM BROMIDE AND ALBUTEROL SULFATE 2.5; .5 MG/3ML; MG/3ML
3 SOLUTION RESPIRATORY (INHALATION)
Status: DISCONTINUED | OUTPATIENT
Start: 2023-01-01 | End: 2023-01-01

## 2023-01-01 RX ORDER — CYANOCOBALAMIN (VITAMIN B-12) 1000 MCG
1000 TABLET, EXTENDED RELEASE ORAL DAILY
COMMUNITY

## 2023-01-01 RX ORDER — METOPROLOL TARTRATE 37.5 MG/1
37.5 TABLET, FILM COATED ORAL 2 TIMES DAILY
Status: DISCONTINUED | OUTPATIENT
Start: 2023-01-01 | End: 2023-01-01

## 2023-01-01 RX ORDER — MORPHINE SULFATE 2 MG/ML
2 INJECTION, SOLUTION INTRAMUSCULAR; INTRAVENOUS
Status: DISCONTINUED | OUTPATIENT
Start: 2023-01-01 | End: 2023-01-01 | Stop reason: HOSPADM

## 2023-01-01 RX ORDER — ENOXAPARIN SODIUM 100 MG/ML
40 INJECTION SUBCUTANEOUS EVERY 24 HOURS
Status: DISCONTINUED | OUTPATIENT
Start: 2023-01-01 | End: 2023-01-01

## 2023-01-01 RX ORDER — FUROSEMIDE 20 MG
10 TABLET ORAL DAILY
Qty: 90 TABLET | Refills: 3 | Status: ON HOLD | COMMUNITY
Start: 2023-01-01 | End: 2023-01-01

## 2023-01-01 RX ORDER — AMIODARONE HYDROCHLORIDE 200 MG/1
200 TABLET ORAL DAILY
Status: DISCONTINUED | OUTPATIENT
Start: 2023-01-01 | End: 2023-01-01 | Stop reason: HOSPADM

## 2023-01-01 RX ORDER — AMOXICILLIN 250 MG
2 CAPSULE ORAL 2 TIMES DAILY
Status: DISCONTINUED | OUTPATIENT
Start: 2023-01-01 | End: 2023-01-01

## 2023-01-01 RX ORDER — PIPERACILLIN SODIUM, TAZOBACTAM SODIUM 4; .5 G/20ML; G/20ML
4.5 INJECTION, POWDER, LYOPHILIZED, FOR SOLUTION INTRAVENOUS EVERY 6 HOURS
Status: DISCONTINUED | OUTPATIENT
Start: 2023-01-01 | End: 2023-01-01

## 2023-01-01 RX ORDER — MORPHINE SULFATE 2 MG/ML
1 INJECTION, SOLUTION INTRAMUSCULAR; INTRAVENOUS
Status: DISCONTINUED | OUTPATIENT
Start: 2023-01-01 | End: 2023-01-01

## 2023-01-01 RX ORDER — NITROGLYCERIN 20 MG/100ML
10-200 INJECTION INTRAVENOUS CONTINUOUS
Status: DISCONTINUED | OUTPATIENT
Start: 2023-01-01 | End: 2023-01-01

## 2023-01-01 RX ORDER — NOREPINEPHRINE BITARTRATE 0.02 MG/ML
INJECTION, SOLUTION INTRAVENOUS
Status: COMPLETED
Start: 2023-01-01 | End: 2023-01-01

## 2023-01-01 RX ORDER — MAGNESIUM SULFATE HEPTAHYDRATE 40 MG/ML
4 INJECTION, SOLUTION INTRAVENOUS ONCE
Status: COMPLETED | OUTPATIENT
Start: 2023-01-01 | End: 2023-01-01

## 2023-01-01 RX ORDER — NALOXONE HYDROCHLORIDE 0.4 MG/ML
0.1 INJECTION, SOLUTION INTRAMUSCULAR; INTRAVENOUS; SUBCUTANEOUS
Status: DISCONTINUED | OUTPATIENT
Start: 2023-01-01 | End: 2023-01-01 | Stop reason: HOSPADM

## 2023-01-01 RX ORDER — GLYCOPYRROLATE 0.2 MG/ML
0.2 INJECTION, SOLUTION INTRAMUSCULAR; INTRAVENOUS EVERY 4 HOURS PRN
Status: DISCONTINUED | OUTPATIENT
Start: 2023-01-01 | End: 2023-01-01 | Stop reason: HOSPADM

## 2023-01-01 RX ORDER — FUROSEMIDE 10 MG/ML
40 INJECTION INTRAMUSCULAR; INTRAVENOUS
Status: DISCONTINUED | OUTPATIENT
Start: 2023-01-01 | End: 2023-01-01

## 2023-01-01 RX ORDER — PREDNISONE 20 MG/1
40 TABLET ORAL DAILY
Status: DISCONTINUED | OUTPATIENT
Start: 2023-01-01 | End: 2023-01-01

## 2023-01-01 RX ORDER — LIDOCAINE 4 G/G
1 PATCH TOPICAL EVERY 24 HOURS
Qty: 30 PATCH | Refills: 0 | DISCHARGE
Start: 2023-01-01

## 2023-01-01 RX ORDER — DIPHENHYDRAMINE HYDROCHLORIDE AND LIDOCAINE HYDROCHLORIDE AND ALUMINUM HYDROXIDE AND MAGNESIUM HYDRO
10 KIT EVERY 6 HOURS PRN
Status: DISCONTINUED | OUTPATIENT
Start: 2023-01-01 | End: 2023-01-01 | Stop reason: HOSPADM

## 2023-01-01 RX ORDER — PIOGLITAZONEHYDROCHLORIDE 45 MG/1
45 TABLET ORAL DAILY
Qty: 90 TABLET | Refills: 3 | Status: SHIPPED | OUTPATIENT
Start: 2023-01-01 | End: 2023-01-01

## 2023-01-01 RX ORDER — LORAZEPAM 2 MG/ML
1 INJECTION INTRAMUSCULAR
Status: DISCONTINUED | OUTPATIENT
Start: 2023-01-01 | End: 2023-01-01 | Stop reason: HOSPADM

## 2023-01-01 RX ORDER — LANOLIN ALCOHOL/MO/W.PET/CERES
1000 CREAM (GRAM) TOPICAL DAILY
Status: DISCONTINUED | OUTPATIENT
Start: 2023-01-01 | End: 2023-01-01 | Stop reason: HOSPADM

## 2023-01-01 RX ORDER — ACETAMINOPHEN 500 MG
1000 TABLET ORAL 3 TIMES DAILY PRN
COMMUNITY

## 2023-01-01 RX ORDER — POTASSIUM CHLORIDE 1.5 G/1.58G
40 POWDER, FOR SOLUTION ORAL ONCE
Status: COMPLETED | OUTPATIENT
Start: 2023-01-01 | End: 2023-01-01

## 2023-01-01 RX ORDER — ACETAMINOPHEN 325 MG/1
650 TABLET ORAL EVERY 6 HOURS PRN
Status: DISCONTINUED | OUTPATIENT
Start: 2023-01-01 | End: 2023-01-01 | Stop reason: HOSPADM

## 2023-01-01 RX ORDER — LISINOPRIL 10 MG/1
10 TABLET ORAL DAILY
Status: DISCONTINUED | OUTPATIENT
Start: 2023-01-01 | End: 2023-01-01

## 2023-01-01 RX ORDER — FUROSEMIDE 40 MG
40 TABLET ORAL DAILY
Status: DISCONTINUED | OUTPATIENT
Start: 2023-01-01 | End: 2023-01-01 | Stop reason: HOSPADM

## 2023-01-01 RX ORDER — METHYLPREDNISOLONE SODIUM SUCCINATE 125 MG/2ML
62.5 INJECTION, POWDER, LYOPHILIZED, FOR SOLUTION INTRAMUSCULAR; INTRAVENOUS EVERY 12 HOURS
Status: DISCONTINUED | OUTPATIENT
Start: 2023-01-01 | End: 2023-01-01

## 2023-01-01 RX ORDER — PREDNISONE 20 MG/1
40 TABLET ORAL DAILY
Status: COMPLETED | OUTPATIENT
Start: 2023-01-01 | End: 2023-01-01

## 2023-01-01 RX ORDER — FAMOTIDINE 20 MG/1
20 TABLET, FILM COATED ORAL 2 TIMES DAILY
Status: DISCONTINUED | OUTPATIENT
Start: 2023-01-01 | End: 2023-01-01

## 2023-01-01 RX ORDER — ACETAMINOPHEN 500 MG
1000 TABLET ORAL 3 TIMES DAILY PRN
Status: DISCONTINUED | OUTPATIENT
Start: 2023-01-01 | End: 2023-01-01 | Stop reason: HOSPADM

## 2023-01-01 RX ORDER — PIOGLITAZONEHYDROCHLORIDE 45 MG/1
45 TABLET ORAL DAILY
Status: DISCONTINUED | OUTPATIENT
Start: 2023-01-01 | End: 2023-01-01 | Stop reason: HOSPADM

## 2023-01-01 RX ORDER — GABAPENTIN 100 MG/1
200 CAPSULE ORAL 3 TIMES DAILY
Status: DISCONTINUED | OUTPATIENT
Start: 2023-01-01 | End: 2023-01-01

## 2023-01-01 RX ORDER — LIDOCAINE 50 MG/G
1 PATCH TOPICAL EVERY 24 HOURS
Qty: 30 PATCH | Refills: 3 | Status: ON HOLD | OUTPATIENT
Start: 2023-01-01 | End: 2023-01-01

## 2023-01-01 RX ADMIN — AMOXICILLIN AND CLAVULANATE POTASSIUM 1 TABLET: 875; 125 TABLET ORAL at 20:57

## 2023-01-01 RX ADMIN — DORZOLAMIDE HYDROCHLORIDE 1 DROP: 20 SOLUTION/ DROPS OPHTHALMIC at 21:42

## 2023-01-01 RX ADMIN — IPRATROPIUM BROMIDE AND ALBUTEROL SULFATE 3 ML: 2.5; .5 SOLUTION RESPIRATORY (INHALATION) at 11:05

## 2023-01-01 RX ADMIN — APIXABAN 2.5 MG: 2.5 TABLET, FILM COATED ORAL at 11:00

## 2023-01-01 RX ADMIN — Medication 0.03 MCG/KG/MIN: at 00:10

## 2023-01-01 RX ADMIN — METFORMIN HYDROCHLORIDE 500 MG: 500 TABLET ORAL at 17:41

## 2023-01-01 RX ADMIN — BRIMONIDINE TARTRATE 1 DROP: 2 SOLUTION OPHTHALMIC at 21:57

## 2023-01-01 RX ADMIN — METHYLPREDNISOLONE SODIUM SUCCINATE 62.5 MG: 125 INJECTION, POWDER, FOR SOLUTION INTRAMUSCULAR; INTRAVENOUS at 09:05

## 2023-01-01 RX ADMIN — LIDOCAINE 1 PATCH: 560 PATCH PERCUTANEOUS; TOPICAL; TRANSDERMAL at 22:18

## 2023-01-01 RX ADMIN — METOPROLOL TARTRATE 37.5 MG: 25 TABLET, FILM COATED ORAL at 20:57

## 2023-01-01 RX ADMIN — PREDNISONE 30 MG: 20 TABLET ORAL at 09:31

## 2023-01-01 RX ADMIN — AMIODARONE HYDROCHLORIDE 200 MG: 200 TABLET ORAL at 09:35

## 2023-01-01 RX ADMIN — GLIPIZIDE 5 MG: 5 TABLET ORAL at 09:30

## 2023-01-01 RX ADMIN — DORZOLAMIDE HYDROCHLORIDE 1 DROP: 20 SOLUTION/ DROPS OPHTHALMIC at 22:18

## 2023-01-01 RX ADMIN — METFORMIN HYDROCHLORIDE 500 MG: 500 TABLET ORAL at 08:17

## 2023-01-01 RX ADMIN — FUROSEMIDE 40 MG: 10 INJECTION, SOLUTION INTRAMUSCULAR; INTRAVENOUS at 08:58

## 2023-01-01 RX ADMIN — APIXABAN 2.5 MG: 2.5 TABLET, FILM COATED ORAL at 08:53

## 2023-01-01 RX ADMIN — CHOLESTYRAMINE 4 G: 4 POWDER, FOR SUSPENSION ORAL at 17:13

## 2023-01-01 RX ADMIN — INSULIN ASPART 2 UNITS: 100 INJECTION, SOLUTION INTRAVENOUS; SUBCUTANEOUS at 17:54

## 2023-01-01 RX ADMIN — BRIMONIDINE TARTRATE 1 DROP: 2 SOLUTION OPHTHALMIC at 20:12

## 2023-01-01 RX ADMIN — CHOLESTYRAMINE 4 G: 4 POWDER, FOR SUSPENSION ORAL at 19:54

## 2023-01-01 RX ADMIN — INSULIN ASPART 4 UNITS: 100 INJECTION, SOLUTION INTRAVENOUS; SUBCUTANEOUS at 13:51

## 2023-01-01 RX ADMIN — INSULIN ASPART 4 UNITS: 100 INJECTION, SOLUTION INTRAVENOUS; SUBCUTANEOUS at 18:05

## 2023-01-01 RX ADMIN — GUAIFENESIN 600 MG: 600 TABLET, EXTENDED RELEASE ORAL at 21:49

## 2023-01-01 RX ADMIN — INSULIN ASPART 6 UNITS: 100 INJECTION, SOLUTION INTRAVENOUS; SUBCUTANEOUS at 17:52

## 2023-01-01 RX ADMIN — DORZOLAMIDE HYDROCHLORIDE 1 DROP: 20 SOLUTION/ DROPS OPHTHALMIC at 20:41

## 2023-01-01 RX ADMIN — GABAPENTIN 200 MG: 100 CAPSULE ORAL at 17:27

## 2023-01-01 RX ADMIN — APIXABAN 2.5 MG: 2.5 TABLET, FILM COATED ORAL at 22:06

## 2023-01-01 RX ADMIN — GLIPIZIDE 5 MG: 5 TABLET ORAL at 09:28

## 2023-01-01 RX ADMIN — GUAIFENESIN 600 MG: 600 TABLET, EXTENDED RELEASE ORAL at 11:47

## 2023-01-01 RX ADMIN — DORZOLAMIDE HYDROCHLORIDE 1 DROP: 20 SOLUTION/ DROPS OPHTHALMIC at 20:25

## 2023-01-01 RX ADMIN — LATANOPROST 1 DROP: 50 SOLUTION OPHTHALMIC at 21:43

## 2023-01-01 RX ADMIN — INSULIN GLARGINE 5 UNITS: 100 INJECTION, SOLUTION SUBCUTANEOUS at 21:48

## 2023-01-01 RX ADMIN — PREDNISONE 40 MG: 20 TABLET ORAL at 09:05

## 2023-01-01 RX ADMIN — CYANOCOBALAMIN TAB 1000 MCG 1000 MCG: 1000 TAB at 08:55

## 2023-01-01 RX ADMIN — LIDOCAINE 1 PATCH: 560 PATCH PERCUTANEOUS; TOPICAL; TRANSDERMAL at 20:53

## 2023-01-01 RX ADMIN — FUROSEMIDE 40 MG: 10 INJECTION, SOLUTION INTRAMUSCULAR; INTRAVENOUS at 17:28

## 2023-01-01 RX ADMIN — GABAPENTIN 200 MG: 100 CAPSULE ORAL at 20:48

## 2023-01-01 RX ADMIN — METOPROLOL TARTRATE 37.5 MG: 25 TABLET, FILM COATED ORAL at 21:57

## 2023-01-01 RX ADMIN — GABAPENTIN 200 MG: 100 CAPSULE ORAL at 08:54

## 2023-01-01 RX ADMIN — INSULIN GLARGINE 15 UNITS: 100 INJECTION, SOLUTION SUBCUTANEOUS at 11:03

## 2023-01-01 RX ADMIN — SENNOSIDES AND DOCUSATE SODIUM 2 TABLET: 50; 8.6 TABLET ORAL at 09:32

## 2023-01-01 RX ADMIN — GUAIFENESIN 600 MG: 600 TABLET, EXTENDED RELEASE ORAL at 09:36

## 2023-01-01 RX ADMIN — INSULIN ASPART 2 UNITS: 100 INJECTION, SOLUTION INTRAVENOUS; SUBCUTANEOUS at 19:52

## 2023-01-01 RX ADMIN — BRIMONIDINE TARTRATE 1 DROP: 2 SOLUTION OPHTHALMIC at 22:01

## 2023-01-01 RX ADMIN — AMOXICILLIN AND CLAVULANATE POTASSIUM 1 TABLET: 875; 125 TABLET ORAL at 09:05

## 2023-01-01 RX ADMIN — METOPROLOL TARTRATE 37.5 MG: 25 TABLET, FILM COATED ORAL at 22:15

## 2023-01-01 RX ADMIN — GABAPENTIN 200 MG: 100 CAPSULE ORAL at 08:04

## 2023-01-01 RX ADMIN — GUAIFENESIN 600 MG: 600 TABLET, EXTENDED RELEASE ORAL at 09:05

## 2023-01-01 RX ADMIN — LIDOCAINE 1 PATCH: 560 PATCH PERCUTANEOUS; TOPICAL; TRANSDERMAL at 21:54

## 2023-01-01 RX ADMIN — CHOLESTYRAMINE 4 G: 4 POWDER, FOR SUSPENSION ORAL at 09:36

## 2023-01-01 RX ADMIN — INSULIN ASPART 1 UNITS: 100 INJECTION, SOLUTION INTRAVENOUS; SUBCUTANEOUS at 02:09

## 2023-01-01 RX ADMIN — GABAPENTIN 200 MG: 100 CAPSULE ORAL at 16:55

## 2023-01-01 RX ADMIN — DIPHENHYDRAMINE HYDROCHLORIDE AND LIDOCAINE HYDROCHLORIDE AND ALUMINUM HYDROXIDE AND MAGNESIUM HYDRO 10 ML: KIT at 17:51

## 2023-01-01 RX ADMIN — AMOXICILLIN AND CLAVULANATE POTASSIUM 1 TABLET: 875; 125 TABLET ORAL at 09:37

## 2023-01-01 RX ADMIN — CHLORHEXIDINE GLUCONATE 15 ML: 1.2 SOLUTION ORAL at 08:20

## 2023-01-01 RX ADMIN — GABAPENTIN 200 MG: 100 CAPSULE ORAL at 16:51

## 2023-01-01 RX ADMIN — PIPERACILLIN AND TAZOBACTAM 4.5 G: 4; .5 INJECTION, POWDER, FOR SOLUTION INTRAVENOUS at 20:11

## 2023-01-01 RX ADMIN — INSULIN GLARGINE 30 UNITS: 100 INJECTION, SOLUTION SUBCUTANEOUS at 09:33

## 2023-01-01 RX ADMIN — BRIMONIDINE TARTRATE 1 DROP: 2 SOLUTION OPHTHALMIC at 21:01

## 2023-01-01 RX ADMIN — GUAIFENESIN 600 MG: 600 TABLET, EXTENDED RELEASE ORAL at 08:53

## 2023-01-01 RX ADMIN — MICONAZOLE NITRATE: 2 POWDER TOPICAL at 10:38

## 2023-01-01 RX ADMIN — METFORMIN HYDROCHLORIDE 500 MG: 500 TABLET ORAL at 09:31

## 2023-01-01 RX ADMIN — GABAPENTIN 200 MG: 100 CAPSULE ORAL at 09:30

## 2023-01-01 RX ADMIN — LATANOPROST 1 DROP: 50 SOLUTION OPHTHALMIC at 22:00

## 2023-01-01 RX ADMIN — GUAIFENESIN 600 MG: 600 TABLET, EXTENDED RELEASE ORAL at 08:17

## 2023-01-01 RX ADMIN — CEFTRIAXONE SODIUM 2 G: 2 INJECTION, POWDER, FOR SOLUTION INTRAMUSCULAR; INTRAVENOUS at 05:57

## 2023-01-01 RX ADMIN — DEXMEDETOMIDINE HYDROCHLORIDE 0.2 MCG/KG/HR: 400 INJECTION INTRAVENOUS at 11:18

## 2023-01-01 RX ADMIN — CHOLESTYRAMINE 4 G: 4 POWDER, FOR SUSPENSION ORAL at 18:11

## 2023-01-01 RX ADMIN — GABAPENTIN 200 MG: 100 CAPSULE ORAL at 21:20

## 2023-01-01 RX ADMIN — FUROSEMIDE 40 MG: 40 TABLET ORAL at 09:31

## 2023-01-01 RX ADMIN — NITROGLYCERIN: 0.4 TABLET SUBLINGUAL at 06:57

## 2023-01-01 RX ADMIN — IPRATROPIUM BROMIDE AND ALBUTEROL SULFATE 3 ML: 2.5; .5 SOLUTION RESPIRATORY (INHALATION) at 07:16

## 2023-01-01 RX ADMIN — INSULIN ASPART 2 UNITS: 100 INJECTION, SOLUTION INTRAVENOUS; SUBCUTANEOUS at 18:20

## 2023-01-01 RX ADMIN — PIPERACILLIN AND TAZOBACTAM 4.5 G: 4; .5 INJECTION, POWDER, FOR SOLUTION INTRAVENOUS at 02:02

## 2023-01-01 RX ADMIN — PIPERACILLIN AND TAZOBACTAM 4.5 G: 4; .5 INJECTION, POWDER, FOR SOLUTION INTRAVENOUS at 19:35

## 2023-01-01 RX ADMIN — LATANOPROST 1 DROP: 50 SOLUTION OPHTHALMIC at 21:38

## 2023-01-01 RX ADMIN — GUAIFENESIN 600 MG: 600 TABLET, EXTENDED RELEASE ORAL at 20:58

## 2023-01-01 RX ADMIN — GUAIFENESIN 600 MG: 600 TABLET, EXTENDED RELEASE ORAL at 09:17

## 2023-01-01 RX ADMIN — FUROSEMIDE 40 MG: 10 INJECTION, SOLUTION INTRAMUSCULAR; INTRAVENOUS at 08:46

## 2023-01-01 RX ADMIN — GABAPENTIN 200 MG: 100 CAPSULE ORAL at 17:40

## 2023-01-01 RX ADMIN — GUAIFENESIN AND DEXTROMETHORPHAN 10 ML: 100; 10 SYRUP ORAL at 05:51

## 2023-01-01 RX ADMIN — IPRATROPIUM BROMIDE AND ALBUTEROL SULFATE 3 ML: 2.5; .5 SOLUTION RESPIRATORY (INHALATION) at 11:38

## 2023-01-01 RX ADMIN — ENOXAPARIN SODIUM 40 MG: 40 INJECTION SUBCUTANEOUS at 16:55

## 2023-01-01 RX ADMIN — GABAPENTIN 200 MG: 100 CAPSULE ORAL at 21:56

## 2023-01-01 RX ADMIN — PIPERACILLIN AND TAZOBACTAM 4.5 G: 4; .5 INJECTION, POWDER, FOR SOLUTION INTRAVENOUS at 09:16

## 2023-01-01 RX ADMIN — INSULIN ASPART 2 UNITS: 100 INJECTION, SOLUTION INTRAVENOUS; SUBCUTANEOUS at 08:42

## 2023-01-01 RX ADMIN — BRIMONIDINE TARTRATE 1 DROP: 2 SOLUTION OPHTHALMIC at 21:08

## 2023-01-01 RX ADMIN — LORAZEPAM 1 MG: 2 INJECTION INTRAMUSCULAR at 16:41

## 2023-01-01 RX ADMIN — FUROSEMIDE 20 MG: 20 TABLET ORAL at 09:18

## 2023-01-01 RX ADMIN — METFORMIN HYDROCHLORIDE 500 MG: 500 TABLET ORAL at 18:04

## 2023-01-01 RX ADMIN — Medication 250 MG: at 09:31

## 2023-01-01 RX ADMIN — INSULIN ASPART 2 UNITS: 100 INJECTION, SOLUTION INTRAVENOUS; SUBCUTANEOUS at 08:22

## 2023-01-01 RX ADMIN — PIPERACILLIN AND TAZOBACTAM 3.38 G: 3; .375 INJECTION, POWDER, FOR SOLUTION INTRAVENOUS at 09:51

## 2023-01-01 RX ADMIN — GUAIFENESIN 600 MG: 600 TABLET, EXTENDED RELEASE ORAL at 08:32

## 2023-01-01 RX ADMIN — CHLORHEXIDINE GLUCONATE 15 ML: 1.2 SOLUTION ORAL at 20:11

## 2023-01-01 RX ADMIN — FUROSEMIDE 20 MG: 20 TABLET ORAL at 09:58

## 2023-01-01 RX ADMIN — GABAPENTIN 200 MG: 100 CAPSULE ORAL at 09:31

## 2023-01-01 RX ADMIN — CEFTRIAXONE SODIUM 2 G: 2 INJECTION, POWDER, FOR SOLUTION INTRAMUSCULAR; INTRAVENOUS at 05:08

## 2023-01-01 RX ADMIN — METOPROLOL TARTRATE 37.5 MG: 25 TABLET, FILM COATED ORAL at 08:54

## 2023-01-01 RX ADMIN — PIPERACILLIN AND TAZOBACTAM 3.38 G: 3; .375 INJECTION, POWDER, FOR SOLUTION INTRAVENOUS at 00:59

## 2023-01-01 RX ADMIN — INSULIN GLARGINE 15 UNITS: 100 INJECTION, SOLUTION SUBCUTANEOUS at 09:33

## 2023-01-01 RX ADMIN — GLIPIZIDE 5 MG: 5 TABLET ORAL at 09:06

## 2023-01-01 RX ADMIN — Medication 250 MG: at 21:34

## 2023-01-01 RX ADMIN — INSULIN ASPART 3 UNITS: 100 INJECTION, SOLUTION INTRAVENOUS; SUBCUTANEOUS at 09:33

## 2023-01-01 RX ADMIN — BRIMONIDINE TARTRATE 1 DROP: 2 SOLUTION OPHTHALMIC at 20:37

## 2023-01-01 RX ADMIN — AMIODARONE HYDROCHLORIDE 200 MG: 200 TABLET ORAL at 08:43

## 2023-01-01 RX ADMIN — GABAPENTIN 200 MG: 100 CAPSULE ORAL at 21:46

## 2023-01-01 RX ADMIN — FUROSEMIDE 40 MG: 10 INJECTION, SOLUTION INTRAMUSCULAR; INTRAVENOUS at 16:57

## 2023-01-01 RX ADMIN — CHOLESTYRAMINE 4 G: 4 POWDER, FOR SUSPENSION ORAL at 21:40

## 2023-01-01 RX ADMIN — SODIUM CHLORIDE 90 ML: 900 INJECTION INTRAVENOUS at 14:13

## 2023-01-01 RX ADMIN — DORZOLAMIDE HYDROCHLORIDE 1 DROP: 20 SOLUTION/ DROPS OPHTHALMIC at 20:42

## 2023-01-01 RX ADMIN — GABAPENTIN 200 MG: 100 CAPSULE ORAL at 15:40

## 2023-01-01 RX ADMIN — INSULIN ASPART 2 UNITS: 100 INJECTION, SOLUTION INTRAVENOUS; SUBCUTANEOUS at 13:49

## 2023-01-01 RX ADMIN — GUAIFENESIN 600 MG: 600 TABLET, EXTENDED RELEASE ORAL at 20:11

## 2023-01-01 RX ADMIN — DEXMEDETOMIDINE HYDROCHLORIDE 0.4 MCG/KG/HR: 400 INJECTION INTRAVENOUS at 07:52

## 2023-01-01 RX ADMIN — PIOGLITAZONE 45 MG: 45 TABLET ORAL at 12:39

## 2023-01-01 RX ADMIN — AMIODARONE HYDROCHLORIDE 200 MG: 200 TABLET ORAL at 08:04

## 2023-01-01 RX ADMIN — BRIMONIDINE TARTRATE 1 DROP: 2 SOLUTION OPHTHALMIC at 22:24

## 2023-01-01 RX ADMIN — FUROSEMIDE 40 MG: 10 INJECTION, SOLUTION INTRAMUSCULAR; INTRAVENOUS at 17:46

## 2023-01-01 RX ADMIN — GABAPENTIN 200 MG: 100 CAPSULE ORAL at 09:35

## 2023-01-01 RX ADMIN — FUROSEMIDE 40 MG: 10 INJECTION, SOLUTION INTRAMUSCULAR; INTRAVENOUS at 09:46

## 2023-01-01 RX ADMIN — ACETAMINOPHEN 1000 MG: 500 TABLET ORAL at 21:46

## 2023-01-01 RX ADMIN — INSULIN ASPART 4 UNITS: 100 INJECTION, SOLUTION INTRAVENOUS; SUBCUTANEOUS at 12:16

## 2023-01-01 RX ADMIN — GLYCOPYRROLATE 0.2 MG: 0.2 INJECTION, SOLUTION INTRAMUSCULAR; INTRAVENOUS at 15:46

## 2023-01-01 RX ADMIN — ACETAMINOPHEN 650 MG: 325 TABLET, FILM COATED ORAL at 02:50

## 2023-01-01 RX ADMIN — BRIMONIDINE TARTRATE 1 DROP: 2 SOLUTION OPHTHALMIC at 08:41

## 2023-01-01 RX ADMIN — INSULIN ASPART 2 UNITS: 100 INJECTION, SOLUTION INTRAVENOUS; SUBCUTANEOUS at 17:46

## 2023-01-01 RX ADMIN — LORAZEPAM 1 MG: 2 INJECTION INTRAMUSCULAR; INTRAVENOUS at 16:48

## 2023-01-01 RX ADMIN — AZITHROMYCIN MONOHYDRATE 250 MG: 250 TABLET ORAL at 09:57

## 2023-01-01 RX ADMIN — PIPERACILLIN AND TAZOBACTAM 4.5 G: 4; .5 INJECTION, POWDER, FOR SOLUTION INTRAVENOUS at 03:02

## 2023-01-01 RX ADMIN — FUROSEMIDE 40 MG: 10 INJECTION, SOLUTION INTRAMUSCULAR; INTRAVENOUS at 17:52

## 2023-01-01 RX ADMIN — LATANOPROST 1 DROP: 50 SOLUTION OPHTHALMIC at 22:20

## 2023-01-01 RX ADMIN — FUROSEMIDE 20 MG: 20 TABLET ORAL at 16:44

## 2023-01-01 RX ADMIN — INSULIN ASPART 5 UNITS: 100 INJECTION, SOLUTION INTRAVENOUS; SUBCUTANEOUS at 14:04

## 2023-01-01 RX ADMIN — DORZOLAMIDE HYDROCHLORIDE 1 DROP: 20 SOLUTION/ DROPS OPHTHALMIC at 10:16

## 2023-01-01 RX ADMIN — BRIMONIDINE TARTRATE 1 DROP: 2 SOLUTION OPHTHALMIC at 08:49

## 2023-01-01 RX ADMIN — Medication 250 MG: at 20:11

## 2023-01-01 RX ADMIN — PIPERACILLIN AND TAZOBACTAM 3.38 G: 3; .375 INJECTION, POWDER, FOR SOLUTION INTRAVENOUS at 06:31

## 2023-01-01 RX ADMIN — CYANOCOBALAMIN TAB 1000 MCG 1000 MCG: 1000 TAB at 08:33

## 2023-01-01 RX ADMIN — BRIMONIDINE TARTRATE 1 DROP: 2 SOLUTION OPHTHALMIC at 21:55

## 2023-01-01 RX ADMIN — GABAPENTIN 200 MG: 100 CAPSULE ORAL at 21:54

## 2023-01-01 RX ADMIN — BRIMONIDINE TARTRATE 1 DROP: 2 SOLUTION OPHTHALMIC at 09:33

## 2023-01-01 RX ADMIN — APIXABAN 2.5 MG: 2.5 TABLET, FILM COATED ORAL at 08:32

## 2023-01-01 RX ADMIN — INSULIN ASPART 4 UNITS: 100 INJECTION, SOLUTION INTRAVENOUS; SUBCUTANEOUS at 19:27

## 2023-01-01 RX ADMIN — POLYETHYLENE GLYCOL 3350 17 G: 17 POWDER, FOR SOLUTION ORAL at 13:31

## 2023-01-01 RX ADMIN — ALBUMIN HUMAN 12.5 G: 0.05 INJECTION, SOLUTION INTRAVENOUS at 03:03

## 2023-01-01 RX ADMIN — CYANOCOBALAMIN TAB 1000 MCG 1000 MCG: 1000 TAB at 17:58

## 2023-01-01 RX ADMIN — METOPROLOL TARTRATE 37.5 MG: 25 TABLET, FILM COATED ORAL at 09:49

## 2023-01-01 RX ADMIN — FUROSEMIDE 40 MG: 10 INJECTION, SOLUTION INTRAMUSCULAR; INTRAVENOUS at 09:15

## 2023-01-01 RX ADMIN — FUROSEMIDE 40 MG: 10 INJECTION, SOLUTION INTRAMUSCULAR; INTRAVENOUS at 17:25

## 2023-01-01 RX ADMIN — CHOLESTYRAMINE 4 G: 4 POWDER, FOR SUSPENSION ORAL at 09:27

## 2023-01-01 RX ADMIN — GABAPENTIN 200 MG: 100 CAPSULE ORAL at 09:58

## 2023-01-01 RX ADMIN — APIXABAN 2.5 MG: 2.5 TABLET, FILM COATED ORAL at 09:09

## 2023-01-01 RX ADMIN — APIXABAN 2.5 MG: 2.5 TABLET, FILM COATED ORAL at 21:50

## 2023-01-01 RX ADMIN — CHOLESTYRAMINE 4 G: 4 POWDER, FOR SUSPENSION ORAL at 10:29

## 2023-01-01 RX ADMIN — METOPROLOL TARTRATE 37.5 MG: 25 TABLET, FILM COATED ORAL at 09:35

## 2023-01-01 RX ADMIN — ACETAMINOPHEN 1000 MG: 500 TABLET ORAL at 08:52

## 2023-01-01 RX ADMIN — DORZOLAMIDE HYDROCHLORIDE 1 DROP: 20 SOLUTION/ DROPS OPHTHALMIC at 21:02

## 2023-01-01 RX ADMIN — INSULIN ASPART 1 UNITS: 100 INJECTION, SOLUTION INTRAVENOUS; SUBCUTANEOUS at 10:06

## 2023-01-01 RX ADMIN — FAMOTIDINE 20 MG: 20 TABLET ORAL at 08:04

## 2023-01-01 RX ADMIN — FUROSEMIDE 60 MG: 10 INJECTION, SOLUTION INTRAMUSCULAR; INTRAVENOUS at 12:50

## 2023-01-01 RX ADMIN — BRIMONIDINE TARTRATE 1 DROP: 2 SOLUTION OPHTHALMIC at 21:43

## 2023-01-01 RX ADMIN — APIXABAN 2.5 MG: 2.5 TABLET, FILM COATED ORAL at 20:36

## 2023-01-01 RX ADMIN — FUROSEMIDE 20 MG: 20 TABLET ORAL at 09:32

## 2023-01-01 RX ADMIN — APIXABAN 2.5 MG: 2.5 TABLET, FILM COATED ORAL at 22:11

## 2023-01-01 RX ADMIN — METOPROLOL TARTRATE 37.5 MG: 25 TABLET, FILM COATED ORAL at 09:31

## 2023-01-01 RX ADMIN — INSULIN ASPART 1 UNITS: 100 INJECTION, SOLUTION INTRAVENOUS; SUBCUTANEOUS at 12:23

## 2023-01-01 RX ADMIN — GUAIFENESIN 600 MG: 600 TABLET, EXTENDED RELEASE ORAL at 21:36

## 2023-01-01 RX ADMIN — CYANOCOBALAMIN TAB 1000 MCG 1000 MCG: 1000 TAB at 09:30

## 2023-01-01 RX ADMIN — BRIMONIDINE TARTRATE 1 DROP: 2 SOLUTION OPHTHALMIC at 08:53

## 2023-01-01 RX ADMIN — BRIMONIDINE TARTRATE 1 DROP: 2 SOLUTION OPHTHALMIC at 21:21

## 2023-01-01 RX ADMIN — FUROSEMIDE 20 MG: 20 TABLET ORAL at 08:20

## 2023-01-01 RX ADMIN — CHOLESTYRAMINE 4 G: 4 POWDER, FOR SUSPENSION ORAL at 09:17

## 2023-01-01 RX ADMIN — AMIODARONE HYDROCHLORIDE 200 MG: 200 TABLET ORAL at 09:31

## 2023-01-01 RX ADMIN — GLIPIZIDE 5 MG: 5 TABLET ORAL at 09:09

## 2023-01-01 RX ADMIN — FUROSEMIDE 40 MG: 10 INJECTION, SOLUTION INTRAMUSCULAR; INTRAVENOUS at 17:26

## 2023-01-01 RX ADMIN — DORZOLAMIDE HYDROCHLORIDE 1 DROP: 20 SOLUTION/ DROPS OPHTHALMIC at 21:20

## 2023-01-01 RX ADMIN — GABAPENTIN 200 MG: 100 CAPSULE ORAL at 20:39

## 2023-01-01 RX ADMIN — MORPHINE SULFATE 2 MG: 2 INJECTION, SOLUTION INTRAMUSCULAR; INTRAVENOUS at 16:13

## 2023-01-01 RX ADMIN — PIPERACILLIN AND TAZOBACTAM 3.38 G: 3; .375 INJECTION, POWDER, FOR SOLUTION INTRAVENOUS at 12:38

## 2023-01-01 RX ADMIN — CEFTRIAXONE SODIUM 2 G: 2 INJECTION, POWDER, FOR SOLUTION INTRAMUSCULAR; INTRAVENOUS at 05:01

## 2023-01-01 RX ADMIN — CYANOCOBALAMIN TAB 1000 MCG 1000 MCG: 1000 TAB at 09:27

## 2023-01-01 RX ADMIN — IPRATROPIUM BROMIDE AND ALBUTEROL SULFATE 3 ML: 2.5; .5 SOLUTION RESPIRATORY (INHALATION) at 16:29

## 2023-01-01 RX ADMIN — MULTIVITAMIN 15 ML: LIQUID ORAL at 08:04

## 2023-01-01 RX ADMIN — LIDOCAINE 1 PATCH: 560 PATCH PERCUTANEOUS; TOPICAL; TRANSDERMAL at 20:12

## 2023-01-01 RX ADMIN — GUAIFENESIN 600 MG: 600 TABLET, EXTENDED RELEASE ORAL at 21:17

## 2023-01-01 RX ADMIN — FUROSEMIDE 20 MG: 20 TABLET ORAL at 11:01

## 2023-01-01 RX ADMIN — CYANOCOBALAMIN TAB 1000 MCG 1000 MCG: 1000 TAB at 09:35

## 2023-01-01 RX ADMIN — METOPROLOL TARTRATE 37.5 MG: 25 TABLET, FILM COATED ORAL at 20:38

## 2023-01-01 RX ADMIN — LIDOCAINE 1 PATCH: 560 PATCH PERCUTANEOUS; TOPICAL; TRANSDERMAL at 22:14

## 2023-01-01 RX ADMIN — ACETYLCYSTEINE 2 ML: 200 SOLUTION ORAL; RESPIRATORY (INHALATION) at 19:06

## 2023-01-01 RX ADMIN — METHYLPREDNISOLONE SODIUM SUCCINATE 62.5 MG: 125 INJECTION, POWDER, FOR SOLUTION INTRAMUSCULAR; INTRAVENOUS at 09:47

## 2023-01-01 RX ADMIN — BRIMONIDINE TARTRATE 1 DROP: 2 SOLUTION OPHTHALMIC at 09:52

## 2023-01-01 RX ADMIN — HUMAN ALBUMIN MICROSPHERES AND PERFLUTREN 9 ML: 10; .22 INJECTION, SOLUTION INTRAVENOUS at 11:44

## 2023-01-01 RX ADMIN — MORPHINE SULFATE 2 MG: 2 INJECTION, SOLUTION INTRAMUSCULAR; INTRAVENOUS at 16:29

## 2023-01-01 RX ADMIN — PIPERACILLIN AND TAZOBACTAM 3.38 G: 3; .375 INJECTION, POWDER, FOR SOLUTION INTRAVENOUS at 14:02

## 2023-01-01 RX ADMIN — APIXABAN 2.5 MG: 2.5 TABLET, FILM COATED ORAL at 08:43

## 2023-01-01 RX ADMIN — AMIODARONE HYDROCHLORIDE 200 MG: 200 TABLET ORAL at 08:53

## 2023-01-01 RX ADMIN — AMIODARONE HYDROCHLORIDE 200 MG: 200 TABLET ORAL at 09:38

## 2023-01-01 RX ADMIN — PIPERACILLIN AND TAZOBACTAM 4.5 G: 4; .5 INJECTION, POWDER, FOR SOLUTION INTRAVENOUS at 08:20

## 2023-01-01 RX ADMIN — DORZOLAMIDE HYDROCHLORIDE 1 DROP: 20 SOLUTION/ DROPS OPHTHALMIC at 09:37

## 2023-01-01 RX ADMIN — LORAZEPAM 1 MG: 2 INJECTION INTRAMUSCULAR; INTRAVENOUS at 15:38

## 2023-01-01 RX ADMIN — FENTANYL CITRATE 100 MCG: 50 INJECTION, SOLUTION INTRAMUSCULAR; INTRAVENOUS at 09:15

## 2023-01-01 RX ADMIN — CHLORHEXIDINE GLUCONATE 15 ML: 1.2 SOLUTION ORAL at 07:52

## 2023-01-01 RX ADMIN — DORZOLAMIDE HYDROCHLORIDE 1 DROP: 20 SOLUTION/ DROPS OPHTHALMIC at 22:08

## 2023-01-01 RX ADMIN — MULTIVITAMIN 15 ML: LIQUID ORAL at 08:20

## 2023-01-01 RX ADMIN — AMOXICILLIN AND CLAVULANATE POTASSIUM 1 TABLET: 875; 125 TABLET ORAL at 08:53

## 2023-01-01 RX ADMIN — DORZOLAMIDE HYDROCHLORIDE 1 DROP: 20 SOLUTION/ DROPS OPHTHALMIC at 10:22

## 2023-01-01 RX ADMIN — INSULIN ASPART 4 UNITS: 100 INJECTION, SOLUTION INTRAVENOUS; SUBCUTANEOUS at 09:12

## 2023-01-01 RX ADMIN — PIPERACILLIN AND TAZOBACTAM 4.5 G: 4; .5 INJECTION, POWDER, FOR SOLUTION INTRAVENOUS at 13:06

## 2023-01-01 RX ADMIN — GLIPIZIDE 5 MG: 5 TABLET ORAL at 08:51

## 2023-01-01 RX ADMIN — INSULIN ASPART 3 UNITS: 100 INJECTION, SOLUTION INTRAVENOUS; SUBCUTANEOUS at 19:41

## 2023-01-01 RX ADMIN — INSULIN ASPART 1 UNITS: 100 INJECTION, SOLUTION INTRAVENOUS; SUBCUTANEOUS at 22:28

## 2023-01-01 RX ADMIN — CHLORHEXIDINE GLUCONATE 15 ML: 1.2 SOLUTION ORAL at 19:35

## 2023-01-01 RX ADMIN — GUAIFENESIN 600 MG: 600 TABLET, EXTENDED RELEASE ORAL at 21:57

## 2023-01-01 RX ADMIN — LISINOPRIL 5 MG: 5 TABLET ORAL at 09:38

## 2023-01-01 RX ADMIN — AMIODARONE HYDROCHLORIDE 200 MG: 200 TABLET ORAL at 08:16

## 2023-01-01 RX ADMIN — IPRATROPIUM BROMIDE AND ALBUTEROL SULFATE 3 ML: 2.5; .5 SOLUTION RESPIRATORY (INHALATION) at 08:23

## 2023-01-01 RX ADMIN — INSULIN ASPART 1 UNITS: 100 INJECTION, SOLUTION INTRAVENOUS; SUBCUTANEOUS at 07:45

## 2023-01-01 RX ADMIN — MICONAZOLE NITRATE: 2 POWDER TOPICAL at 09:58

## 2023-01-01 RX ADMIN — LATANOPROST 1 DROP: 50 SOLUTION OPHTHALMIC at 21:52

## 2023-01-01 RX ADMIN — FUROSEMIDE 40 MG: 40 TABLET ORAL at 08:33

## 2023-01-01 RX ADMIN — GABAPENTIN 200 MG: 100 CAPSULE ORAL at 08:44

## 2023-01-01 RX ADMIN — CHOLESTYRAMINE 4 G: 4 POWDER, FOR SUSPENSION ORAL at 21:48

## 2023-01-01 RX ADMIN — BRIMONIDINE TARTRATE 1 DROP: 2 SOLUTION OPHTHALMIC at 10:16

## 2023-01-01 RX ADMIN — ACETYLCYSTEINE 2 ML: 200 SOLUTION ORAL; RESPIRATORY (INHALATION) at 07:16

## 2023-01-01 RX ADMIN — METOPROLOL TARTRATE 37.5 MG: 25 TABLET, FILM COATED ORAL at 20:11

## 2023-01-01 RX ADMIN — PREDNISONE 40 MG: 20 TABLET ORAL at 09:38

## 2023-01-01 RX ADMIN — DEXMEDETOMIDINE HYDROCHLORIDE 0.5 MCG/KG/HR: 400 INJECTION INTRAVENOUS at 19:35

## 2023-01-01 RX ADMIN — METHYLPREDNISOLONE SODIUM SUCCINATE 62.5 MG: 125 INJECTION, POWDER, FOR SOLUTION INTRAMUSCULAR; INTRAVENOUS at 21:38

## 2023-01-01 RX ADMIN — MULTIVITAMIN 15 ML: LIQUID ORAL at 09:30

## 2023-01-01 RX ADMIN — FAMOTIDINE 20 MG: 20 TABLET ORAL at 20:11

## 2023-01-01 RX ADMIN — LIDOCAINE 1 PATCH: 560 PATCH PERCUTANEOUS; TOPICAL; TRANSDERMAL at 21:20

## 2023-01-01 RX ADMIN — DORZOLAMIDE HYDROCHLORIDE 1 DROP: 20 SOLUTION/ DROPS OPHTHALMIC at 09:49

## 2023-01-01 RX ADMIN — INSULIN GLARGINE 30 UNITS: 100 INJECTION, SOLUTION SUBCUTANEOUS at 11:34

## 2023-01-01 RX ADMIN — MAGNESIUM SULFATE HEPTAHYDRATE 4 G: 40 INJECTION, SOLUTION INTRAVENOUS at 09:23

## 2023-01-01 RX ADMIN — DORZOLAMIDE HYDROCHLORIDE 1 DROP: 20 SOLUTION/ DROPS OPHTHALMIC at 20:12

## 2023-01-01 RX ADMIN — CHOLESTYRAMINE 4 G: 4 POWDER, FOR SUSPENSION ORAL at 09:49

## 2023-01-01 RX ADMIN — MICONAZOLE NITRATE: 2 POWDER TOPICAL at 21:37

## 2023-01-01 RX ADMIN — INSULIN ASPART 1 UNITS: 100 INJECTION, SOLUTION INTRAVENOUS; SUBCUTANEOUS at 14:51

## 2023-01-01 RX ADMIN — CHOLESTYRAMINE 4 G: 4 POWDER, FOR SUSPENSION ORAL at 17:58

## 2023-01-01 RX ADMIN — MULTIVITAMIN 15 ML: LIQUID ORAL at 16:55

## 2023-01-01 RX ADMIN — IPRATROPIUM BROMIDE AND ALBUTEROL SULFATE 3 ML: 2.5; .5 SOLUTION RESPIRATORY (INHALATION) at 19:06

## 2023-01-01 RX ADMIN — APIXABAN 2.5 MG: 2.5 TABLET, FILM COATED ORAL at 21:34

## 2023-01-01 RX ADMIN — LATANOPROST 1 DROP: 50 SOLUTION OPHTHALMIC at 22:46

## 2023-01-01 RX ADMIN — DORZOLAMIDE HYDROCHLORIDE 1 DROP: 20 SOLUTION/ DROPS OPHTHALMIC at 09:12

## 2023-01-01 RX ADMIN — DORZOLAMIDE HYDROCHLORIDE 1 DROP: 20 SOLUTION/ DROPS OPHTHALMIC at 09:33

## 2023-01-01 RX ADMIN — METOPROLOL TARTRATE 37.5 MG: 25 TABLET, FILM COATED ORAL at 08:43

## 2023-01-01 RX ADMIN — LIDOCAINE 1 PATCH: 560 PATCH PERCUTANEOUS; TOPICAL; TRANSDERMAL at 20:19

## 2023-01-01 RX ADMIN — CHOLESTYRAMINE 4 G: 4 POWDER, FOR SUSPENSION ORAL at 09:58

## 2023-01-01 RX ADMIN — DORZOLAMIDE HYDROCHLORIDE 1 DROP: 20 SOLUTION/ DROPS OPHTHALMIC at 21:59

## 2023-01-01 RX ADMIN — GLIPIZIDE 5 MG: 5 TABLET ORAL at 08:55

## 2023-01-01 RX ADMIN — DORZOLAMIDE HYDROCHLORIDE 1 DROP: 20 SOLUTION/ DROPS OPHTHALMIC at 08:45

## 2023-01-01 RX ADMIN — PREDNISONE 30 MG: 20 TABLET ORAL at 08:17

## 2023-01-01 RX ADMIN — BRIMONIDINE TARTRATE 1 DROP: 2 SOLUTION OPHTHALMIC at 20:21

## 2023-01-01 RX ADMIN — FUROSEMIDE 40 MG: 10 INJECTION, SOLUTION INTRAMUSCULAR; INTRAVENOUS at 15:48

## 2023-01-01 RX ADMIN — AMIODARONE HYDROCHLORIDE 200 MG: 200 TABLET ORAL at 09:30

## 2023-01-01 RX ADMIN — IPRATROPIUM BROMIDE AND ALBUTEROL SULFATE 3 ML: 2.5; .5 SOLUTION RESPIRATORY (INHALATION) at 20:12

## 2023-01-01 RX ADMIN — IPRATROPIUM BROMIDE AND ALBUTEROL SULFATE 3 ML: 2.5; .5 SOLUTION RESPIRATORY (INHALATION) at 11:11

## 2023-01-01 RX ADMIN — ACETAMINOPHEN 650 MG: 325 TABLET, FILM COATED ORAL at 16:51

## 2023-01-01 RX ADMIN — APIXABAN 2.5 MG: 2.5 TABLET, FILM COATED ORAL at 09:17

## 2023-01-01 RX ADMIN — GLIPIZIDE 5 MG: 5 TABLET ORAL at 08:16

## 2023-01-01 RX ADMIN — GABAPENTIN 200 MG: 100 CAPSULE ORAL at 16:21

## 2023-01-01 RX ADMIN — ACETAMINOPHEN 650 MG: 325 TABLET, FILM COATED ORAL at 00:25

## 2023-01-01 RX ADMIN — MORPHINE SULFATE 4 MG: 4 INJECTION, SOLUTION INTRAMUSCULAR; INTRAVENOUS at 17:07

## 2023-01-01 RX ADMIN — PIPERACILLIN AND TAZOBACTAM 3.38 G: 3; .375 INJECTION, POWDER, FOR SOLUTION INTRAVENOUS at 19:54

## 2023-01-01 RX ADMIN — METOPROLOL TARTRATE 37.5 MG: 25 TABLET, FILM COATED ORAL at 08:16

## 2023-01-01 RX ADMIN — GABAPENTIN 200 MG: 100 CAPSULE ORAL at 16:57

## 2023-01-01 RX ADMIN — INSULIN GLARGINE 30 UNITS: 100 INJECTION, SOLUTION SUBCUTANEOUS at 21:59

## 2023-01-01 RX ADMIN — CHOLESTYRAMINE 4 G: 4 POWDER, FOR SUSPENSION ORAL at 17:41

## 2023-01-01 RX ADMIN — FUROSEMIDE 20 MG: 10 INJECTION, SOLUTION INTRAMUSCULAR; INTRAVENOUS at 02:50

## 2023-01-01 RX ADMIN — GABAPENTIN 200 MG: 100 CAPSULE ORAL at 09:18

## 2023-01-01 RX ADMIN — ACETAMINOPHEN 650 MG: 325 TABLET, FILM COATED ORAL at 08:32

## 2023-01-01 RX ADMIN — GABAPENTIN 200 MG: 100 CAPSULE ORAL at 21:49

## 2023-01-01 RX ADMIN — IPRATROPIUM BROMIDE AND ALBUTEROL SULFATE 3 ML: 2.5; .5 SOLUTION RESPIRATORY (INHALATION) at 15:28

## 2023-01-01 RX ADMIN — APIXABAN 2.5 MG: 2.5 TABLET, FILM COATED ORAL at 09:38

## 2023-01-01 RX ADMIN — METOPROLOL TARTRATE 37.5 MG: 25 TABLET, FILM COATED ORAL at 09:28

## 2023-01-01 RX ADMIN — LORAZEPAM 1 MG: 2 INJECTION INTRAMUSCULAR; INTRAVENOUS at 16:41

## 2023-01-01 RX ADMIN — GABAPENTIN 200 MG: 100 CAPSULE ORAL at 22:15

## 2023-01-01 RX ADMIN — ACETYLCYSTEINE 2 ML: 200 SOLUTION ORAL; RESPIRATORY (INHALATION) at 07:11

## 2023-01-01 RX ADMIN — AZITHROMYCIN MONOHYDRATE 250 MG: 250 TABLET ORAL at 09:31

## 2023-01-01 RX ADMIN — INSULIN ASPART 2 UNITS: 100 INJECTION, SOLUTION INTRAVENOUS; SUBCUTANEOUS at 18:24

## 2023-01-01 RX ADMIN — CHLORHEXIDINE GLUCONATE 15 ML: 1.2 SOLUTION ORAL at 20:46

## 2023-01-01 RX ADMIN — METHYLPREDNISOLONE SODIUM SUCCINATE 62.5 MG: 125 INJECTION, POWDER, FOR SOLUTION INTRAMUSCULAR; INTRAVENOUS at 22:02

## 2023-01-01 RX ADMIN — INSULIN ASPART 2 UNITS: 100 INJECTION, SOLUTION INTRAVENOUS; SUBCUTANEOUS at 14:02

## 2023-01-01 RX ADMIN — CYANOCOBALAMIN TAB 1000 MCG 1000 MCG: 1000 TAB at 09:09

## 2023-01-01 RX ADMIN — GABAPENTIN 200 MG: 100 CAPSULE ORAL at 21:23

## 2023-01-01 RX ADMIN — INSULIN ASPART 4 UNITS: 100 INJECTION, SOLUTION INTRAVENOUS; SUBCUTANEOUS at 18:34

## 2023-01-01 RX ADMIN — BRIMONIDINE TARTRATE 1 DROP: 2 SOLUTION OPHTHALMIC at 22:13

## 2023-01-01 RX ADMIN — FUROSEMIDE 40 MG: 10 INJECTION, SOLUTION INTRAMUSCULAR; INTRAVENOUS at 09:40

## 2023-01-01 RX ADMIN — MICONAZOLE NITRATE: 2 POWDER TOPICAL at 22:07

## 2023-01-01 RX ADMIN — PIPERACILLIN AND TAZOBACTAM 3.38 G: 3; .375 INJECTION, POWDER, FOR SOLUTION INTRAVENOUS at 01:29

## 2023-01-01 RX ADMIN — FUROSEMIDE 20 MG: 10 INJECTION INTRAMUSCULAR; INTRAVENOUS at 02:50

## 2023-01-01 RX ADMIN — METOPROLOL TARTRATE 37.5 MG: 25 TABLET, FILM COATED ORAL at 09:09

## 2023-01-01 RX ADMIN — METOPROLOL TARTRATE 37.5 MG: 25 TABLET, FILM COATED ORAL at 09:04

## 2023-01-01 RX ADMIN — FAMOTIDINE 20 MG: 20 TABLET ORAL at 09:30

## 2023-01-01 RX ADMIN — DORZOLAMIDE HYDROCHLORIDE 1 DROP: 20 SOLUTION/ DROPS OPHTHALMIC at 08:32

## 2023-01-01 RX ADMIN — METFORMIN HYDROCHLORIDE 500 MG: 500 TABLET ORAL at 18:19

## 2023-01-01 RX ADMIN — LATANOPROST 1 DROP: 50 SOLUTION OPHTHALMIC at 20:28

## 2023-01-01 RX ADMIN — FUROSEMIDE 40 MG: 10 INJECTION, SOLUTION INTRAMUSCULAR; INTRAVENOUS at 08:43

## 2023-01-01 RX ADMIN — GUAIFENESIN 600 MG: 600 TABLET, EXTENDED RELEASE ORAL at 21:33

## 2023-01-01 RX ADMIN — APIXABAN 2.5 MG: 2.5 TABLET, FILM COATED ORAL at 22:15

## 2023-01-01 RX ADMIN — AZITHROMYCIN MONOHYDRATE 500 MG: 500 INJECTION, POWDER, LYOPHILIZED, FOR SOLUTION INTRAVENOUS at 06:14

## 2023-01-01 RX ADMIN — METOPROLOL TARTRATE 37.5 MG: 25 TABLET, FILM COATED ORAL at 20:37

## 2023-01-01 RX ADMIN — ACETAMINOPHEN 650 MG: 325 TABLET, FILM COATED ORAL at 21:23

## 2023-01-01 RX ADMIN — METOPROLOL TARTRATE 37.5 MG: 25 TABLET, FILM COATED ORAL at 22:10

## 2023-01-01 RX ADMIN — AMIODARONE HYDROCHLORIDE 200 MG: 200 TABLET ORAL at 09:17

## 2023-01-01 RX ADMIN — METFORMIN HYDROCHLORIDE 500 MG: 500 TABLET ORAL at 09:17

## 2023-01-01 RX ADMIN — IPRATROPIUM BROMIDE AND ALBUTEROL SULFATE 3 ML: 2.5; .5 SOLUTION RESPIRATORY (INHALATION) at 15:12

## 2023-01-01 RX ADMIN — METFORMIN HYDROCHLORIDE 500 MG: 500 TABLET ORAL at 18:07

## 2023-01-01 RX ADMIN — APIXABAN 2.5 MG: 2.5 TABLET, FILM COATED ORAL at 21:56

## 2023-01-01 RX ADMIN — INSULIN ASPART 3 UNITS: 100 INJECTION, SOLUTION INTRAVENOUS; SUBCUTANEOUS at 09:31

## 2023-01-01 RX ADMIN — DORZOLAMIDE HYDROCHLORIDE 1 DROP: 20 SOLUTION/ DROPS OPHTHALMIC at 09:52

## 2023-01-01 RX ADMIN — AMIODARONE HYDROCHLORIDE 200 MG: 200 TABLET ORAL at 08:56

## 2023-01-01 RX ADMIN — GUAIFENESIN 600 MG: 600 TABLET, EXTENDED RELEASE ORAL at 09:28

## 2023-01-01 RX ADMIN — FAMOTIDINE 20 MG: 20 TABLET ORAL at 08:20

## 2023-01-01 RX ADMIN — DORZOLAMIDE HYDROCHLORIDE 1 DROP: 20 SOLUTION/ DROPS OPHTHALMIC at 21:25

## 2023-01-01 RX ADMIN — PIOGLITAZONE 45 MG: 45 TABLET ORAL at 09:31

## 2023-01-01 RX ADMIN — CEFTRIAXONE SODIUM 2 G: 2 INJECTION, POWDER, FOR SOLUTION INTRAMUSCULAR; INTRAVENOUS at 04:25

## 2023-01-01 RX ADMIN — PREDNISONE 30 MG: 20 TABLET ORAL at 08:32

## 2023-01-01 RX ADMIN — BRIMONIDINE TARTRATE 1 DROP: 2 SOLUTION OPHTHALMIC at 08:33

## 2023-01-01 RX ADMIN — INSULIN ASPART 1 UNITS: 100 INJECTION, SOLUTION INTRAVENOUS; SUBCUTANEOUS at 09:38

## 2023-01-01 RX ADMIN — METHYLPREDNISOLONE SODIUM SUCCINATE 62.5 MG: 125 INJECTION, POWDER, FOR SOLUTION INTRAMUSCULAR; INTRAVENOUS at 09:28

## 2023-01-01 RX ADMIN — BRIMONIDINE TARTRATE 1 DROP: 2 SOLUTION OPHTHALMIC at 09:37

## 2023-01-01 RX ADMIN — METOPROLOL TARTRATE 37.5 MG: 25 TABLET, FILM COATED ORAL at 09:17

## 2023-01-01 RX ADMIN — BRIMONIDINE TARTRATE 1 DROP: 2 SOLUTION OPHTHALMIC at 09:11

## 2023-01-01 RX ADMIN — METOPROLOL TARTRATE 37.5 MG: 25 TABLET, FILM COATED ORAL at 09:58

## 2023-01-01 RX ADMIN — FUROSEMIDE 40 MG: 10 INJECTION, SOLUTION INTRAMUSCULAR; INTRAVENOUS at 11:49

## 2023-01-01 RX ADMIN — LISINOPRIL 10 MG: 10 TABLET ORAL at 09:31

## 2023-01-01 RX ADMIN — APIXABAN 2.5 MG: 2.5 TABLET, FILM COATED ORAL at 21:52

## 2023-01-01 RX ADMIN — GLIPIZIDE 5 MG: 5 TABLET ORAL at 08:33

## 2023-01-01 RX ADMIN — INSULIN ASPART 1 UNITS: 100 INJECTION, SOLUTION INTRAVENOUS; SUBCUTANEOUS at 12:38

## 2023-01-01 RX ADMIN — METOPROLOL TARTRATE 37.5 MG: 25 TABLET, FILM COATED ORAL at 08:53

## 2023-01-01 RX ADMIN — FUROSEMIDE 40 MG: 10 INJECTION, SOLUTION INTRAMUSCULAR; INTRAVENOUS at 09:26

## 2023-01-01 RX ADMIN — POTASSIUM CHLORIDE 40 MEQ: 20 SOLUTION ORAL at 11:20

## 2023-01-01 RX ADMIN — AMIODARONE HYDROCHLORIDE 200 MG: 200 TABLET ORAL at 09:57

## 2023-01-01 RX ADMIN — ACETAMINOPHEN 650 MG: 325 TABLET, FILM COATED ORAL at 21:20

## 2023-01-01 RX ADMIN — LIDOCAINE 1 PATCH: 560 PATCH PERCUTANEOUS; TOPICAL; TRANSDERMAL at 21:50

## 2023-01-01 RX ADMIN — GABAPENTIN 200 MG: 100 CAPSULE ORAL at 09:17

## 2023-01-01 RX ADMIN — AMIODARONE HYDROCHLORIDE 200 MG: 200 TABLET ORAL at 09:09

## 2023-01-01 RX ADMIN — ACETYLCYSTEINE 2 ML: 200 SOLUTION ORAL; RESPIRATORY (INHALATION) at 20:12

## 2023-01-01 RX ADMIN — FUROSEMIDE 60 MG: 10 INJECTION, SOLUTION INTRAMUSCULAR; INTRAVENOUS at 07:04

## 2023-01-01 RX ADMIN — LATANOPROST 1 DROP: 50 SOLUTION OPHTHALMIC at 21:48

## 2023-01-01 RX ADMIN — MIDAZOLAM HYDROCHLORIDE 2 MG/HR: 1 INJECTION, SOLUTION INTRAVENOUS at 09:45

## 2023-01-01 RX ADMIN — DORZOLAMIDE HYDROCHLORIDE 1 DROP: 20 SOLUTION/ DROPS OPHTHALMIC at 21:48

## 2023-01-01 RX ADMIN — IOPAMIDOL 84 ML: 755 INJECTION, SOLUTION INTRAVENOUS at 14:13

## 2023-01-01 RX ADMIN — INSULIN ASPART 4 UNITS: 100 INJECTION, SOLUTION INTRAVENOUS; SUBCUTANEOUS at 09:22

## 2023-01-01 RX ADMIN — GABAPENTIN 200 MG: 100 CAPSULE ORAL at 16:44

## 2023-01-01 RX ADMIN — CYANOCOBALAMIN TAB 1000 MCG 1000 MCG: 1000 TAB at 08:20

## 2023-01-01 RX ADMIN — METFORMIN HYDROCHLORIDE 500 MG: 500 TABLET ORAL at 17:51

## 2023-01-01 RX ADMIN — CHOLESTYRAMINE 4 G: 4 POWDER, FOR SUSPENSION ORAL at 09:45

## 2023-01-01 RX ADMIN — GABAPENTIN 200 MG: 100 CAPSULE ORAL at 22:11

## 2023-01-01 RX ADMIN — IPRATROPIUM BROMIDE AND ALBUTEROL SULFATE 3 ML: 2.5; .5 SOLUTION RESPIRATORY (INHALATION) at 12:11

## 2023-01-01 RX ADMIN — MAGNESIUM SULFATE HEPTAHYDRATE 4 G: 40 INJECTION, SOLUTION INTRAVENOUS at 12:46

## 2023-01-01 RX ADMIN — APIXABAN 2.5 MG: 2.5 TABLET, FILM COATED ORAL at 20:11

## 2023-01-01 RX ADMIN — INSULIN ASPART 4 UNITS: 100 INJECTION, SOLUTION INTRAVENOUS; SUBCUTANEOUS at 07:53

## 2023-01-01 RX ADMIN — GABAPENTIN 200 MG: 100 CAPSULE ORAL at 21:17

## 2023-01-01 RX ADMIN — CHLORHEXIDINE GLUCONATE 15 ML: 1.2 SOLUTION ORAL at 09:30

## 2023-01-01 RX ADMIN — FAMOTIDINE 20 MG: 10 INJECTION INTRAVENOUS at 21:52

## 2023-01-01 RX ADMIN — CHOLESTYRAMINE 4 G: 4 POWDER, FOR SUSPENSION ORAL at 17:51

## 2023-01-01 RX ADMIN — LIDOCAINE 1 PATCH: 560 PATCH PERCUTANEOUS; TOPICAL; TRANSDERMAL at 20:36

## 2023-01-01 RX ADMIN — MORPHINE SULFATE 2 MG: 2 INJECTION, SOLUTION INTRAMUSCULAR; INTRAVENOUS at 15:57

## 2023-01-01 RX ADMIN — CHOLESTYRAMINE 4 G: 4 POWDER, FOR SUSPENSION ORAL at 21:19

## 2023-01-01 RX ADMIN — LORAZEPAM 0.5 MG: 2 INJECTION INTRAMUSCULAR; INTRAVENOUS at 07:59

## 2023-01-01 RX ADMIN — GUAIFENESIN 600 MG: 600 TABLET, EXTENDED RELEASE ORAL at 20:37

## 2023-01-01 RX ADMIN — LATANOPROST 1 DROP: 50 SOLUTION OPHTHALMIC at 20:49

## 2023-01-01 RX ADMIN — GABAPENTIN 200 MG: 100 CAPSULE ORAL at 16:12

## 2023-01-01 RX ADMIN — METFORMIN HYDROCHLORIDE 500 MG: 500 TABLET ORAL at 08:51

## 2023-01-01 RX ADMIN — PIPERACILLIN AND TAZOBACTAM 4.5 G: 4; .5 INJECTION, POWDER, FOR SOLUTION INTRAVENOUS at 02:04

## 2023-01-01 RX ADMIN — MIDAZOLAM 2 MG: 1 INJECTION INTRAMUSCULAR; INTRAVENOUS at 18:52

## 2023-01-01 RX ADMIN — ACETAMINOPHEN 650 MG: 325 TABLET, FILM COATED ORAL at 09:28

## 2023-01-01 RX ADMIN — DEXMEDETOMIDINE HYDROCHLORIDE 0.7 MCG/KG/HR: 400 INJECTION INTRAVENOUS at 03:02

## 2023-01-01 RX ADMIN — AMIODARONE HYDROCHLORIDE 200 MG: 200 TABLET ORAL at 09:32

## 2023-01-01 RX ADMIN — APIXABAN 2.5 MG: 2.5 TABLET, FILM COATED ORAL at 09:06

## 2023-01-01 RX ADMIN — LIDOCAINE 1 PATCH: 560 PATCH PERCUTANEOUS; TOPICAL; TRANSDERMAL at 22:44

## 2023-01-01 RX ADMIN — PIPERACILLIN AND TAZOBACTAM 3.38 G: 3; .375 INJECTION, POWDER, FOR SOLUTION INTRAVENOUS at 13:44

## 2023-01-01 RX ADMIN — GUAIFENESIN 600 MG: 600 TABLET, EXTENDED RELEASE ORAL at 22:15

## 2023-01-01 RX ADMIN — POTASSIUM CHLORIDE 40 MEQ: 1.5 POWDER, FOR SOLUTION ORAL at 13:08

## 2023-01-01 RX ADMIN — AMOXICILLIN AND CLAVULANATE POTASSIUM 1 TABLET: 875; 125 TABLET ORAL at 20:37

## 2023-01-01 RX ADMIN — METFORMIN HYDROCHLORIDE 500 MG: 500 TABLET ORAL at 09:28

## 2023-01-01 RX ADMIN — GABAPENTIN 200 MG: 100 CAPSULE ORAL at 17:23

## 2023-01-01 RX ADMIN — LATANOPROST 1 DROP: 50 SOLUTION OPHTHALMIC at 20:12

## 2023-01-01 RX ADMIN — AMOXICILLIN AND CLAVULANATE POTASSIUM 1 TABLET: 875; 125 TABLET ORAL at 21:17

## 2023-01-01 RX ADMIN — DORZOLAMIDE HYDROCHLORIDE 1 DROP: 20 SOLUTION/ DROPS OPHTHALMIC at 08:54

## 2023-01-01 RX ADMIN — APIXABAN 2.5 MG: 2.5 TABLET, FILM COATED ORAL at 08:17

## 2023-01-01 RX ADMIN — FUROSEMIDE 40 MG: 10 INJECTION, SOLUTION INTRAMUSCULAR; INTRAVENOUS at 10:30

## 2023-01-01 RX ADMIN — INSULIN ASPART 3 UNITS: 100 INJECTION, SOLUTION INTRAVENOUS; SUBCUTANEOUS at 16:49

## 2023-01-01 RX ADMIN — CEFTRIAXONE SODIUM 2 G: 2 INJECTION, POWDER, FOR SOLUTION INTRAMUSCULAR; INTRAVENOUS at 05:43

## 2023-01-01 RX ADMIN — METOPROLOL TARTRATE 37.5 MG: 25 TABLET, FILM COATED ORAL at 09:32

## 2023-01-01 RX ADMIN — FAMOTIDINE 20 MG: 10 INJECTION INTRAVENOUS at 20:46

## 2023-01-01 RX ADMIN — GABAPENTIN 200 MG: 100 CAPSULE ORAL at 17:24

## 2023-01-01 RX ADMIN — METFORMIN HYDROCHLORIDE 500 MG: 500 TABLET ORAL at 17:24

## 2023-01-01 RX ADMIN — METOPROLOL TARTRATE 37.5 MG: 25 TABLET, FILM COATED ORAL at 20:48

## 2023-01-01 RX ADMIN — DORZOLAMIDE HYDROCHLORIDE 1 DROP: 20 SOLUTION/ DROPS OPHTHALMIC at 09:21

## 2023-01-01 RX ADMIN — AMIODARONE HYDROCHLORIDE 200 MG: 200 TABLET ORAL at 09:27

## 2023-01-01 RX ADMIN — APIXABAN 2.5 MG: 2.5 TABLET, FILM COATED ORAL at 20:27

## 2023-01-01 RX ADMIN — PIPERACILLIN AND TAZOBACTAM 4.5 G: 4; .5 INJECTION, POWDER, FOR SOLUTION INTRAVENOUS at 20:46

## 2023-01-01 RX ADMIN — BRIMONIDINE TARTRATE 1 DROP: 2 SOLUTION OPHTHALMIC at 21:48

## 2023-01-01 RX ADMIN — CYANOCOBALAMIN TAB 1000 MCG 1000 MCG: 1000 TAB at 09:37

## 2023-01-01 RX ADMIN — AMIODARONE HYDROCHLORIDE 200 MG: 200 TABLET ORAL at 09:50

## 2023-01-01 RX ADMIN — CEFTRIAXONE SODIUM 2 G: 2 INJECTION, POWDER, FOR SOLUTION INTRAMUSCULAR; INTRAVENOUS at 05:26

## 2023-01-01 RX ADMIN — LATANOPROST 1 DROP: 50 SOLUTION OPHTHALMIC at 20:56

## 2023-01-01 RX ADMIN — APIXABAN 2.5 MG: 2.5 TABLET, FILM COATED ORAL at 09:30

## 2023-01-01 RX ADMIN — DORZOLAMIDE HYDROCHLORIDE 1 DROP: 20 SOLUTION/ DROPS OPHTHALMIC at 20:27

## 2023-01-01 RX ADMIN — LIDOCAINE 1 PATCH: 560 PATCH PERCUTANEOUS; TOPICAL; TRANSDERMAL at 21:38

## 2023-01-01 RX ADMIN — SODIUM CHLORIDE 500 ML: 9 INJECTION, SOLUTION INTRAVENOUS at 10:30

## 2023-01-01 RX ADMIN — GABAPENTIN 200 MG: 100 CAPSULE ORAL at 21:52

## 2023-01-01 RX ADMIN — DORZOLAMIDE HYDROCHLORIDE 1 DROP: 20 SOLUTION/ DROPS OPHTHALMIC at 08:42

## 2023-01-01 RX ADMIN — DEXTROSE MONOHYDRATE 25 ML: 25 INJECTION, SOLUTION INTRAVENOUS at 09:01

## 2023-01-01 RX ADMIN — MORPHINE SULFATE 2 MG: 2 INJECTION, SOLUTION INTRAMUSCULAR; INTRAVENOUS at 15:27

## 2023-01-01 RX ADMIN — DORZOLAMIDE HYDROCHLORIDE 1 DROP: 20 SOLUTION/ DROPS OPHTHALMIC at 22:04

## 2023-01-01 RX ADMIN — ACETAMINOPHEN 650 MG: 325 TABLET, FILM COATED ORAL at 04:08

## 2023-01-01 RX ADMIN — INSULIN GLARGINE 30 UNITS: 100 INJECTION, SOLUTION SUBCUTANEOUS at 21:54

## 2023-01-01 RX ADMIN — Medication 250 MG: at 08:33

## 2023-01-01 RX ADMIN — MIDAZOLAM HYDROCHLORIDE 1 MG/HR: 1 INJECTION, SOLUTION INTRAVENOUS at 14:05

## 2023-01-01 RX ADMIN — FUROSEMIDE 40 MG: 10 INJECTION, SOLUTION INTRAMUSCULAR; INTRAVENOUS at 09:21

## 2023-01-01 RX ADMIN — SENNOSIDES AND DOCUSATE SODIUM 2 TABLET: 50; 8.6 TABLET ORAL at 09:28

## 2023-01-01 RX ADMIN — PIPERACILLIN AND TAZOBACTAM 3.38 G: 3; .375 INJECTION, POWDER, FOR SOLUTION INTRAVENOUS at 19:49

## 2023-01-01 RX ADMIN — GABAPENTIN 200 MG: 100 CAPSULE ORAL at 09:50

## 2023-01-01 RX ADMIN — PREDNISONE 40 MG: 20 TABLET ORAL at 08:53

## 2023-01-01 RX ADMIN — BRIMONIDINE TARTRATE 1 DROP: 2 SOLUTION OPHTHALMIC at 22:03

## 2023-01-01 RX ADMIN — IPRATROPIUM BROMIDE AND ALBUTEROL SULFATE 3 ML: 2.5; .5 SOLUTION RESPIRATORY (INHALATION) at 16:09

## 2023-01-01 RX ADMIN — AMIODARONE HYDROCHLORIDE 200 MG: 200 TABLET ORAL at 09:18

## 2023-01-01 RX ADMIN — SENNOSIDES AND DOCUSATE SODIUM 2 TABLET: 50; 8.6 TABLET ORAL at 13:31

## 2023-01-01 RX ADMIN — GABAPENTIN 200 MG: 100 CAPSULE ORAL at 15:48

## 2023-01-01 RX ADMIN — GABAPENTIN 200 MG: 100 CAPSULE ORAL at 18:07

## 2023-01-01 RX ADMIN — CHOLESTYRAMINE 4 G: 4 POWDER, FOR SUSPENSION ORAL at 21:47

## 2023-01-01 RX ADMIN — GABAPENTIN 200 MG: 100 CAPSULE ORAL at 17:04

## 2023-01-01 RX ADMIN — CYANOCOBALAMIN TAB 1000 MCG 1000 MCG: 1000 TAB at 09:58

## 2023-01-01 RX ADMIN — CHOLESTYRAMINE 4 G: 4 POWDER, FOR SUSPENSION ORAL at 08:51

## 2023-01-01 RX ADMIN — APIXABAN 2.5 MG: 2.5 TABLET, FILM COATED ORAL at 08:20

## 2023-01-01 RX ADMIN — GABAPENTIN 200 MG: 100 CAPSULE ORAL at 21:35

## 2023-01-01 RX ADMIN — IPRATROPIUM BROMIDE AND ALBUTEROL SULFATE 3 ML: 2.5; .5 SOLUTION RESPIRATORY (INHALATION) at 06:57

## 2023-01-01 RX ADMIN — AZITHROMYCIN MONOHYDRATE 250 MG: 250 TABLET ORAL at 09:35

## 2023-01-01 RX ADMIN — LIDOCAINE 1 PATCH: 560 PATCH PERCUTANEOUS; TOPICAL; TRANSDERMAL at 22:11

## 2023-01-01 RX ADMIN — INSULIN GLARGINE 15 UNITS: 100 INJECTION, SOLUTION SUBCUTANEOUS at 08:42

## 2023-01-01 RX ADMIN — DEXMEDETOMIDINE HYDROCHLORIDE 0.1 MCG/KG/HR: 400 INJECTION INTRAVENOUS at 17:51

## 2023-01-01 RX ADMIN — AMIODARONE HYDROCHLORIDE 200 MG: 200 TABLET ORAL at 08:20

## 2023-01-01 RX ADMIN — LIDOCAINE 1 PATCH: 560 PATCH PERCUTANEOUS; TOPICAL; TRANSDERMAL at 20:56

## 2023-01-01 RX ADMIN — Medication 1 SPRAY: at 02:50

## 2023-01-01 RX ADMIN — BRIMONIDINE TARTRATE 1 DROP: 2 SOLUTION OPHTHALMIC at 09:23

## 2023-01-01 RX ADMIN — DORZOLAMIDE HYDROCHLORIDE 1 DROP: 20 SOLUTION/ DROPS OPHTHALMIC at 21:53

## 2023-01-01 RX ADMIN — CHOLESTYRAMINE 4 G: 4 POWDER, FOR SUSPENSION ORAL at 18:30

## 2023-01-01 RX ADMIN — APIXABAN 2.5 MG: 2.5 TABLET, FILM COATED ORAL at 09:32

## 2023-01-01 RX ADMIN — IPRATROPIUM BROMIDE AND ALBUTEROL SULFATE 3 ML: 2.5; .5 SOLUTION RESPIRATORY (INHALATION) at 07:11

## 2023-01-01 RX ADMIN — PIPERACILLIN AND TAZOBACTAM 3.38 G: 3; .375 INJECTION, POWDER, FOR SOLUTION INTRAVENOUS at 06:36

## 2023-01-01 RX ADMIN — BRIMONIDINE TARTRATE 1 DROP: 2 SOLUTION OPHTHALMIC at 20:27

## 2023-01-01 RX ADMIN — DORZOLAMIDE HYDROCHLORIDE 1 DROP: 20 SOLUTION/ DROPS OPHTHALMIC at 09:30

## 2023-01-01 RX ADMIN — DEXMEDETOMIDINE HYDROCHLORIDE 0.5 MCG/KG/HR: 400 INJECTION INTRAVENOUS at 11:01

## 2023-01-01 RX ADMIN — METOPROLOL TARTRATE 37.5 MG: 25 TABLET, FILM COATED ORAL at 20:27

## 2023-01-01 RX ADMIN — GABAPENTIN 200 MG: 100 CAPSULE ORAL at 08:32

## 2023-01-01 RX ADMIN — MICONAZOLE NITRATE: 2 POWDER TOPICAL at 09:30

## 2023-01-01 RX ADMIN — CHOLESTYRAMINE 4 G: 4 POWDER, FOR SUSPENSION ORAL at 18:36

## 2023-01-01 RX ADMIN — CEFTRIAXONE SODIUM 2 G: 2 INJECTION, POWDER, FOR SOLUTION INTRAMUSCULAR; INTRAVENOUS at 04:51

## 2023-01-01 RX ADMIN — CYANOCOBALAMIN TAB 1000 MCG 1000 MCG: 1000 TAB at 09:31

## 2023-01-01 RX ADMIN — LISINOPRIL 10 MG: 10 TABLET ORAL at 08:43

## 2023-01-01 RX ADMIN — INSULIN ASPART 3 UNITS: 100 INJECTION, SOLUTION INTRAVENOUS; SUBCUTANEOUS at 12:58

## 2023-01-01 RX ADMIN — GABAPENTIN 200 MG: 100 CAPSULE ORAL at 18:03

## 2023-01-01 RX ADMIN — CHOLESTYRAMINE 4 G: 4 POWDER, FOR SUSPENSION ORAL at 21:50

## 2023-01-01 RX ADMIN — INSULIN ASPART 2 UNITS: 100 INJECTION, SOLUTION INTRAVENOUS; SUBCUTANEOUS at 12:34

## 2023-01-01 RX ADMIN — MICONAZOLE NITRATE: 2 POWDER TOPICAL at 21:54

## 2023-01-01 RX ADMIN — ACETYLCYSTEINE 2 ML: 200 SOLUTION ORAL; RESPIRATORY (INHALATION) at 19:34

## 2023-01-01 RX ADMIN — LATANOPROST 1 DROP: 50 SOLUTION OPHTHALMIC at 20:55

## 2023-01-01 RX ADMIN — CHOLESTYRAMINE 4 G: 4 POWDER, FOR SUSPENSION ORAL at 22:12

## 2023-01-01 RX ADMIN — PIPERACILLIN AND TAZOBACTAM 3.38 G: 3; .375 INJECTION, POWDER, FOR SOLUTION INTRAVENOUS at 18:36

## 2023-01-01 RX ADMIN — IPRATROPIUM BROMIDE AND ALBUTEROL SULFATE 3 ML: 2.5; .5 SOLUTION RESPIRATORY (INHALATION) at 11:03

## 2023-01-01 RX ADMIN — CYANOCOBALAMIN TAB 1000 MCG 1000 MCG: 1000 TAB at 08:17

## 2023-01-01 RX ADMIN — METOPROLOL TARTRATE 37.5 MG: 25 TABLET, FILM COATED ORAL at 21:45

## 2023-01-01 RX ADMIN — METOPROLOL TARTRATE 37.5 MG: 25 TABLET, FILM COATED ORAL at 21:59

## 2023-01-01 RX ADMIN — GABAPENTIN 200 MG: 100 CAPSULE ORAL at 21:02

## 2023-01-01 RX ADMIN — PIPERACILLIN AND TAZOBACTAM 4.5 G: 4; .5 INJECTION, POWDER, FOR SOLUTION INTRAVENOUS at 08:17

## 2023-01-01 RX ADMIN — LATANOPROST 1 DROP: 50 SOLUTION OPHTHALMIC at 21:57

## 2023-01-01 RX ADMIN — AMIODARONE HYDROCHLORIDE 200 MG: 200 TABLET ORAL at 09:06

## 2023-01-01 RX ADMIN — METOPROLOL TARTRATE 37.5 MG: 25 TABLET, FILM COATED ORAL at 21:54

## 2023-01-01 RX ADMIN — IPRATROPIUM BROMIDE AND ALBUTEROL SULFATE 3 ML: 2.5; .5 SOLUTION RESPIRATORY (INHALATION) at 19:47

## 2023-01-01 RX ADMIN — METFORMIN HYDROCHLORIDE 500 MG: 500 TABLET ORAL at 09:05

## 2023-01-01 RX ADMIN — GABAPENTIN 200 MG: 100 CAPSULE ORAL at 22:19

## 2023-01-01 RX ADMIN — GABAPENTIN 200 MG: 100 CAPSULE ORAL at 09:36

## 2023-01-01 RX ADMIN — APIXABAN 2.5 MG: 2.5 TABLET, FILM COATED ORAL at 21:36

## 2023-01-01 RX ADMIN — METFORMIN HYDROCHLORIDE 500 MG: 500 TABLET ORAL at 17:40

## 2023-01-01 RX ADMIN — AMIODARONE HYDROCHLORIDE 200 MG: 200 TABLET ORAL at 16:55

## 2023-01-01 RX ADMIN — SODIUM CHLORIDE 250 ML: 9 INJECTION, SOLUTION INTRAVENOUS at 23:35

## 2023-01-01 RX ADMIN — METOPROLOL TARTRATE 37.5 MG: 25 TABLET, FILM COATED ORAL at 09:27

## 2023-01-01 RX ADMIN — IRON SUCROSE 300 MG: 20 INJECTION, SOLUTION INTRAVENOUS at 19:53

## 2023-01-01 RX ADMIN — ACETAMINOPHEN 1000 MG: 500 TABLET ORAL at 19:35

## 2023-01-01 RX ADMIN — METOPROLOL TARTRATE 37.5 MG: 25 TABLET, FILM COATED ORAL at 09:37

## 2023-01-01 RX ADMIN — LATANOPROST 1 DROP: 50 SOLUTION OPHTHALMIC at 21:20

## 2023-01-01 RX ADMIN — METFORMIN HYDROCHLORIDE 500 MG: 500 TABLET ORAL at 09:30

## 2023-01-01 RX ADMIN — PIPERACILLIN AND TAZOBACTAM 4.5 G: 4; .5 INJECTION, POWDER, FOR SOLUTION INTRAVENOUS at 15:31

## 2023-01-01 RX ADMIN — IRON SUCROSE 300 MG: 20 INJECTION, SOLUTION INTRAVENOUS at 10:03

## 2023-01-01 RX ADMIN — METFORMIN HYDROCHLORIDE 500 MG: 500 TABLET ORAL at 18:27

## 2023-01-01 RX ADMIN — GABAPENTIN 200 MG: 100 CAPSULE ORAL at 17:13

## 2023-01-01 RX ADMIN — CYANOCOBALAMIN TAB 1000 MCG 1000 MCG: 1000 TAB at 09:17

## 2023-01-01 RX ADMIN — FUROSEMIDE 10 MG: 10 INJECTION, SOLUTION INTRAMUSCULAR; INTRAVENOUS at 01:59

## 2023-01-01 RX ADMIN — METHYLPREDNISOLONE SODIUM SUCCINATE 62.5 MG: 125 INJECTION, POWDER, FOR SOLUTION INTRAMUSCULAR; INTRAVENOUS at 09:21

## 2023-01-01 RX ADMIN — CHOLESTYRAMINE 4 G: 4 POWDER, FOR SUSPENSION ORAL at 09:30

## 2023-01-01 RX ADMIN — METHYLPREDNISOLONE SODIUM SUCCINATE 125 MG: 125 INJECTION, POWDER, FOR SOLUTION INTRAMUSCULAR; INTRAVENOUS at 10:30

## 2023-01-01 RX ADMIN — CYANOCOBALAMIN TAB 1000 MCG 1000 MCG: 1000 TAB at 09:05

## 2023-01-01 RX ADMIN — BRIMONIDINE TARTRATE 1 DROP: 2 SOLUTION OPHTHALMIC at 21:59

## 2023-01-01 RX ADMIN — INSULIN ASPART 2 UNITS: 100 INJECTION, SOLUTION INTRAVENOUS; SUBCUTANEOUS at 14:36

## 2023-01-01 RX ADMIN — Medication 250 MG: at 21:52

## 2023-01-01 RX ADMIN — LATANOPROST 1 DROP: 50 SOLUTION OPHTHALMIC at 20:27

## 2023-01-01 RX ADMIN — GABAPENTIN 200 MG: 100 CAPSULE ORAL at 22:06

## 2023-01-01 RX ADMIN — POTASSIUM CHLORIDE 40 MEQ: 1500 TABLET, EXTENDED RELEASE ORAL at 09:05

## 2023-01-01 RX ADMIN — FUROSEMIDE 20 MG: 20 TABLET ORAL at 09:35

## 2023-01-01 RX ADMIN — NITROGLYCERIN 10 MCG/MIN: 20 INJECTION INTRAVENOUS at 07:21

## 2023-01-01 RX ADMIN — AZITHROMYCIN MONOHYDRATE 250 MG: 250 TABLET ORAL at 09:17

## 2023-01-01 RX ADMIN — APIXABAN 2.5 MG: 2.5 TABLET, FILM COATED ORAL at 21:17

## 2023-01-01 RX ADMIN — GUAIFENESIN 600 MG: 600 TABLET, EXTENDED RELEASE ORAL at 08:55

## 2023-01-01 RX ADMIN — BISACODYL 10 MG: 10 SUPPOSITORY RECTAL at 14:21

## 2023-01-01 RX ADMIN — CHOLESTYRAMINE 4 G: 4 POWDER, FOR SUSPENSION ORAL at 11:47

## 2023-01-01 RX ADMIN — IPRATROPIUM BROMIDE AND ALBUTEROL SULFATE 3 ML: 2.5; .5 SOLUTION RESPIRATORY (INHALATION) at 15:49

## 2023-01-01 RX ADMIN — SENNOSIDES AND DOCUSATE SODIUM 1 TABLET: 50; 8.6 TABLET ORAL at 09:31

## 2023-01-01 RX ADMIN — LISINOPRIL 10 MG: 10 TABLET ORAL at 08:33

## 2023-01-01 RX ADMIN — APIXABAN 2.5 MG: 2.5 TABLET, FILM COATED ORAL at 20:57

## 2023-01-01 RX ADMIN — GUAIFENESIN 600 MG: 600 TABLET, EXTENDED RELEASE ORAL at 09:31

## 2023-01-01 RX ADMIN — METFORMIN HYDROCHLORIDE 500 MG: 500 TABLET ORAL at 20:37

## 2023-01-01 RX ADMIN — LIDOCAINE 1 PATCH: 560 PATCH PERCUTANEOUS; TOPICAL; TRANSDERMAL at 20:28

## 2023-01-01 RX ADMIN — GUAIFENESIN 600 MG: 600 TABLET, EXTENDED RELEASE ORAL at 22:06

## 2023-01-01 RX ADMIN — METOPROLOL TARTRATE 37.5 MG: 25 TABLET, FILM COATED ORAL at 21:34

## 2023-01-01 RX ADMIN — ACETYLCYSTEINE 2 ML: 200 SOLUTION ORAL; RESPIRATORY (INHALATION) at 07:20

## 2023-01-01 RX ADMIN — BRIMONIDINE TARTRATE 1 DROP: 2 SOLUTION OPHTHALMIC at 20:43

## 2023-01-01 RX ADMIN — FUROSEMIDE 20 MG: 10 INJECTION, SOLUTION INTRAMUSCULAR; INTRAVENOUS at 20:49

## 2023-01-01 RX ADMIN — INSULIN GLARGINE 15 UNITS: 100 INJECTION, SOLUTION SUBCUTANEOUS at 08:21

## 2023-01-01 RX ADMIN — BRIMONIDINE TARTRATE 1 DROP: 2 SOLUTION OPHTHALMIC at 10:14

## 2023-01-01 RX ADMIN — IPRATROPIUM BROMIDE AND ALBUTEROL SULFATE 3 ML: 2.5; .5 SOLUTION RESPIRATORY (INHALATION) at 07:20

## 2023-01-01 RX ADMIN — FUROSEMIDE 40 MG: 10 INJECTION, SOLUTION INTRAMUSCULAR; INTRAVENOUS at 08:17

## 2023-01-01 RX ADMIN — BRIMONIDINE TARTRATE 1 DROP: 2 SOLUTION OPHTHALMIC at 09:31

## 2023-01-01 RX ADMIN — IPRATROPIUM BROMIDE AND ALBUTEROL SULFATE 3 ML: 2.5; .5 SOLUTION RESPIRATORY (INHALATION) at 19:35

## 2023-01-01 RX ADMIN — BRIMONIDINE TARTRATE 1 DROP: 2 SOLUTION OPHTHALMIC at 09:49

## 2023-01-01 RX ADMIN — INSULIN ASPART 2 UNITS: 100 INJECTION, SOLUTION INTRAVENOUS; SUBCUTANEOUS at 12:02

## 2023-01-01 RX ADMIN — INSULIN ASPART 8 UNITS: 100 INJECTION, SOLUTION INTRAVENOUS; SUBCUTANEOUS at 12:31

## 2023-01-01 RX ADMIN — DEXTROSE MONOHYDRATE 25 ML: 25 INJECTION, SOLUTION INTRAVENOUS at 09:33

## 2023-01-01 RX ADMIN — BRIMONIDINE TARTRATE 1 DROP: 2 SOLUTION OPHTHALMIC at 09:34

## 2023-01-01 RX ADMIN — FUROSEMIDE 20 MG: 20 TABLET ORAL at 06:54

## 2023-01-01 RX ADMIN — DIPHENHYDRAMINE HYDROCHLORIDE AND LIDOCAINE HYDROCHLORIDE AND ALUMINUM HYDROXIDE AND MAGNESIUM HYDRO 10 ML: KIT at 22:22

## 2023-01-01 RX ADMIN — PIPERACILLIN AND TAZOBACTAM 4.5 G: 4; .5 INJECTION, POWDER, FOR SOLUTION INTRAVENOUS at 14:17

## 2023-01-01 RX ADMIN — ACETAMINOPHEN 650 MG: 325 TABLET, FILM COATED ORAL at 09:45

## 2023-01-01 RX ADMIN — DORZOLAMIDE HYDROCHLORIDE 1 DROP: 20 SOLUTION/ DROPS OPHTHALMIC at 21:58

## 2023-01-01 RX ADMIN — CHOLESTYRAMINE 4 G: 4 POWDER, FOR SUSPENSION ORAL at 11:17

## 2023-01-01 RX ADMIN — INSULIN ASPART 8 UNITS: 100 INJECTION, SOLUTION INTRAVENOUS; SUBCUTANEOUS at 17:33

## 2023-01-01 RX ADMIN — GABAPENTIN 200 MG: 100 CAPSULE ORAL at 08:53

## 2023-01-01 RX ADMIN — GABAPENTIN 200 MG: 100 CAPSULE ORAL at 16:48

## 2023-01-01 RX ADMIN — SENNOSIDES AND DOCUSATE SODIUM 2 TABLET: 50; 8.6 TABLET ORAL at 22:15

## 2023-01-01 RX ADMIN — DORZOLAMIDE HYDROCHLORIDE 1 DROP: 20 SOLUTION/ DROPS OPHTHALMIC at 08:53

## 2023-01-01 RX ADMIN — PIPERACILLIN AND TAZOBACTAM 3.38 G: 3; .375 INJECTION, POWDER, FOR SOLUTION INTRAVENOUS at 01:04

## 2023-01-01 RX ADMIN — INSULIN ASPART 3 UNITS: 100 INJECTION, SOLUTION INTRAVENOUS; SUBCUTANEOUS at 09:30

## 2023-01-01 RX ADMIN — ENOXAPARIN SODIUM 40 MG: 40 INJECTION SUBCUTANEOUS at 15:39

## 2023-01-01 RX ADMIN — GABAPENTIN 200 MG: 100 CAPSULE ORAL at 09:05

## 2023-01-01 RX ADMIN — GABAPENTIN 200 MG: 100 CAPSULE ORAL at 09:09

## 2023-01-01 RX ADMIN — GABAPENTIN 200 MG: 100 CAPSULE ORAL at 08:20

## 2023-01-01 RX ADMIN — INSULIN ASPART 3 UNITS: 100 INJECTION, SOLUTION INTRAVENOUS; SUBCUTANEOUS at 08:30

## 2023-01-01 RX ADMIN — LIDOCAINE 1 PATCH: 560 PATCH PERCUTANEOUS; TOPICAL; TRANSDERMAL at 20:38

## 2023-01-01 RX ADMIN — SENNOSIDES AND DOCUSATE SODIUM 2 TABLET: 50; 8.6 TABLET ORAL at 21:54

## 2023-01-01 RX ADMIN — AMIODARONE HYDROCHLORIDE 200 MG: 200 TABLET ORAL at 08:32

## 2023-01-01 RX ADMIN — INSULIN ASPART 1 UNITS: 100 INJECTION, SOLUTION INTRAVENOUS; SUBCUTANEOUS at 09:51

## 2023-01-01 RX ADMIN — MICONAZOLE NITRATE: 2 POWDER TOPICAL at 20:11

## 2023-01-01 RX ADMIN — GABAPENTIN 200 MG: 100 CAPSULE ORAL at 21:00

## 2023-01-01 RX ADMIN — MORPHINE SULFATE 2 MG: 2 INJECTION, SOLUTION INTRAMUSCULAR; INTRAVENOUS at 15:42

## 2023-01-01 RX ADMIN — METOPROLOL TARTRATE 37.5 MG: 25 TABLET, FILM COATED ORAL at 21:17

## 2023-01-01 RX ADMIN — APIXABAN 2.5 MG: 2.5 TABLET, FILM COATED ORAL at 20:48

## 2023-01-01 RX ADMIN — Medication 250 MG: at 12:17

## 2023-01-01 RX ADMIN — CYANOCOBALAMIN TAB 1000 MCG 1000 MCG: 1000 TAB at 08:04

## 2023-01-01 RX ADMIN — INSULIN GLARGINE 30 UNITS: 100 INJECTION, SOLUTION SUBCUTANEOUS at 09:41

## 2023-01-01 RX ADMIN — INSULIN GLARGINE 5 UNITS: 100 INJECTION, SOLUTION SUBCUTANEOUS at 21:55

## 2023-01-01 RX ADMIN — ACETAMINOPHEN 650 MG: 325 TABLET, FILM COATED ORAL at 12:21

## 2023-01-01 RX ADMIN — MICONAZOLE NITRATE: 2 POWDER TOPICAL at 09:35

## 2023-01-01 RX ADMIN — METFORMIN HYDROCHLORIDE 500 MG: 500 TABLET ORAL at 08:33

## 2023-01-01 RX ADMIN — GABAPENTIN 200 MG: 100 CAPSULE ORAL at 20:57

## 2023-01-01 RX ADMIN — DORZOLAMIDE HYDROCHLORIDE 1 DROP: 20 SOLUTION/ DROPS OPHTHALMIC at 09:22

## 2023-01-01 RX ADMIN — LIDOCAINE 1 PATCH: 560 PATCH PERCUTANEOUS; TOPICAL; TRANSDERMAL at 21:44

## 2023-01-01 RX ADMIN — Medication 1 SPRAY: at 08:45

## 2023-01-01 RX ADMIN — Medication 250 MG: at 08:17

## 2023-01-01 RX ADMIN — BRIMONIDINE TARTRATE 1 DROP: 2 SOLUTION OPHTHALMIC at 09:22

## 2023-01-01 RX ADMIN — METOPROLOL TARTRATE 37.5 MG: 25 TABLET, FILM COATED ORAL at 22:11

## 2023-01-01 RX ADMIN — METOPROLOL TARTRATE 37.5 MG: 25 TABLET, FILM COATED ORAL at 08:33

## 2023-01-01 RX ADMIN — SENNOSIDES AND DOCUSATE SODIUM 2 TABLET: 50; 8.6 TABLET ORAL at 20:49

## 2023-01-01 RX ADMIN — PIPERACILLIN AND TAZOBACTAM 4.5 G: 4; .5 INJECTION, POWDER, FOR SOLUTION INTRAVENOUS at 08:02

## 2023-01-01 RX ADMIN — GABAPENTIN 200 MG: 100 CAPSULE ORAL at 09:27

## 2023-01-01 RX ADMIN — CYANOCOBALAMIN TAB 1000 MCG 1000 MCG: 1000 TAB at 08:53

## 2023-01-01 RX ADMIN — DORZOLAMIDE HYDROCHLORIDE 1 DROP: 20 SOLUTION/ DROPS OPHTHALMIC at 22:19

## 2023-01-01 RX ADMIN — GABAPENTIN 200 MG: 100 CAPSULE ORAL at 08:17

## 2023-01-01 RX ADMIN — GLIPIZIDE 5 MG: 5 TABLET ORAL at 09:16

## 2023-01-01 RX ADMIN — APIXABAN 2.5 MG: 2.5 TABLET, FILM COATED ORAL at 21:54

## 2023-01-01 RX ADMIN — GABAPENTIN 200 MG: 100 CAPSULE ORAL at 21:33

## 2023-01-01 RX ADMIN — LATANOPROST 1 DROP: 50 SOLUTION OPHTHALMIC at 21:56

## 2023-01-01 RX ADMIN — APIXABAN 2.5 MG: 2.5 TABLET, FILM COATED ORAL at 09:27

## 2023-01-01 RX ADMIN — GABAPENTIN 200 MG: 100 CAPSULE ORAL at 21:45

## 2023-01-01 RX ADMIN — APIXABAN 2.5 MG: 2.5 TABLET, FILM COATED ORAL at 09:57

## 2023-01-01 ASSESSMENT — ACTIVITIES OF DAILY LIVING (ADL)
ADLS_ACUITY_SCORE: 46
ADLS_ACUITY_SCORE: 47
ADLS_ACUITY_SCORE: 35
ADLS_ACUITY_SCORE: 44
ADLS_ACUITY_SCORE: 50
ADLS_ACUITY_SCORE: 35
ADLS_ACUITY_SCORE: 50
ADLS_ACUITY_SCORE: 50
ADLS_ACUITY_SCORE: 46
ADLS_ACUITY_SCORE: 46
ADLS_ACUITY_SCORE: 43
ADLS_ACUITY_SCORE: 42
ADLS_ACUITY_SCORE: 48
ADLS_ACUITY_SCORE: 44
DRESS: 1-->ASSISTANCE (EQUIPMENT/PERSON) NEEDED
ADLS_ACUITY_SCORE: 38
ADLS_ACUITY_SCORE: 54
ADLS_ACUITY_SCORE: 35
ADLS_ACUITY_SCORE: 52
ADLS_ACUITY_SCORE: 35
ADLS_ACUITY_SCORE: 54
ADLS_ACUITY_SCORE: 50
ADLS_ACUITY_SCORE: 50
DRESS: 2-->COMPLETELY DEPENDENT (NOT DEVELOPMENTALLY APPROPRIATE)
ADLS_ACUITY_SCORE: 48
ADLS_ACUITY_SCORE: 52
ADLS_ACUITY_SCORE: 42
ADLS_ACUITY_SCORE: 45
ADLS_ACUITY_SCORE: 40
ADLS_ACUITY_SCORE: 34
ADLS_ACUITY_SCORE: 52
ADLS_ACUITY_SCORE: 56
ADLS_ACUITY_SCORE: 43
ADLS_ACUITY_SCORE: 50
FALL_HISTORY_WITHIN_LAST_SIX_MONTHS: NO
ADLS_ACUITY_SCORE: 52
ADLS_ACUITY_SCORE: 48
ADLS_ACUITY_SCORE: 50
EQUIPMENT_CURRENTLY_USED_AT_HOME: WALKER, ROLLING
ADLS_ACUITY_SCORE: 52
ADLS_ACUITY_SCORE: 46
ADLS_ACUITY_SCORE: 52
ADLS_ACUITY_SCORE: 48
ADLS_ACUITY_SCORE: 45
VISION_MANAGEMENT: EYE GLASSESS
ADLS_ACUITY_SCORE: 49
ADLS_ACUITY_SCORE: 26
ADLS_ACUITY_SCORE: 49
ADLS_ACUITY_SCORE: 46
ADLS_ACUITY_SCORE: 43
ADLS_ACUITY_SCORE: 48
ADLS_ACUITY_SCORE: 40
TOILETING_ISSUES: NO
ADLS_ACUITY_SCORE: 51
ADLS_ACUITY_SCORE: 34
WEAR_GLASSES_OR_BLIND: YES
ADLS_ACUITY_SCORE: 35
ADLS_ACUITY_SCORE: 50
ADLS_ACUITY_SCORE: 39
ADLS_ACUITY_SCORE: 48
TRANSFERRING: 1-->ASSISTANCE (EQUIPMENT/PERSON) NEEDED
ADLS_ACUITY_SCORE: 46
ADLS_ACUITY_SCORE: 48
ADLS_ACUITY_SCORE: 26
ADLS_ACUITY_SCORE: 40
ADLS_ACUITY_SCORE: 50
WEAR_GLASSES_OR_BLIND: YES
ADLS_ACUITY_SCORE: 44
ADLS_ACUITY_SCORE: 48
ADLS_ACUITY_SCORE: 46
ADLS_ACUITY_SCORE: 52
ADLS_ACUITY_SCORE: 50
ADLS_ACUITY_SCORE: 54
ADLS_ACUITY_SCORE: 54
ADLS_ACUITY_SCORE: 42
ADLS_ACUITY_SCORE: 40
ADLS_ACUITY_SCORE: 48
ADLS_ACUITY_SCORE: 54
ADLS_ACUITY_SCORE: 48
ADLS_ACUITY_SCORE: 54
CONCENTRATING,_REMEMBERING_OR_MAKING_DECISIONS_DIFFICULTY: NO
ADLS_ACUITY_SCORE: 54
ADLS_ACUITY_SCORE: 42
DRESSING/BATHING_DIFFICULTY: YES
ADLS_ACUITY_SCORE: 54
ADLS_ACUITY_SCORE: 47
ADLS_ACUITY_SCORE: 51
ADLS_ACUITY_SCORE: 48
ADLS_ACUITY_SCORE: 50
ADLS_ACUITY_SCORE: 35
ADLS_ACUITY_SCORE: 48
ADLS_ACUITY_SCORE: 48
ADLS_ACUITY_SCORE: 38
ADLS_ACUITY_SCORE: 54
ADLS_ACUITY_SCORE: 50
ADLS_ACUITY_SCORE: 46
ADLS_ACUITY_SCORE: 52
ADLS_ACUITY_SCORE: 50
ADLS_ACUITY_SCORE: 50
ADLS_ACUITY_SCORE: 42
ADLS_ACUITY_SCORE: 47
ADLS_ACUITY_SCORE: 42
ADLS_ACUITY_SCORE: 38
ADLS_ACUITY_SCORE: 52
ADLS_ACUITY_SCORE: 48
DIFFICULTY_EATING/SWALLOWING: NO
ADLS_ACUITY_SCORE: 46
ADLS_ACUITY_SCORE: 40
ADLS_ACUITY_SCORE: 44
ADLS_ACUITY_SCORE: 50
ADLS_ACUITY_SCORE: 54
ADLS_ACUITY_SCORE: 50
ADLS_ACUITY_SCORE: 52
PREVIOUS_RESPONSIBILITIES: MEDICATION MANAGEMENT;FINANCES
ADLS_ACUITY_SCORE: 47
WALKING_OR_CLIMBING_STAIRS: AMBULATION DIFFICULTY, ASSISTANCE 1 PERSON
ADLS_ACUITY_SCORE: 40
ADLS_ACUITY_SCORE: 47
ADLS_ACUITY_SCORE: 50
ADLS_ACUITY_SCORE: 52
ADLS_ACUITY_SCORE: 50
ADLS_ACUITY_SCORE: 50
ADLS_ACUITY_SCORE: 46
ADLS_ACUITY_SCORE: 50
ADLS_ACUITY_SCORE: 46
ADLS_ACUITY_SCORE: 48
ADLS_ACUITY_SCORE: 52
ADLS_ACUITY_SCORE: 48
ADLS_ACUITY_SCORE: 52
ADLS_ACUITY_SCORE: 48
DOING_ERRANDS_INDEPENDENTLY_DIFFICULTY: NO
ADLS_ACUITY_SCORE: 50
ADLS_ACUITY_SCORE: 52
BATHING: 1-->ASSISTANCE NEEDED
ADLS_ACUITY_SCORE: 50
ADLS_ACUITY_SCORE: 48
ADLS_ACUITY_SCORE: 48
ADLS_ACUITY_SCORE: 46
ADLS_ACUITY_SCORE: 51
ADLS_ACUITY_SCORE: 50
CHANGE_IN_FUNCTIONAL_STATUS_SINCE_ONSET_OF_CURRENT_ILLNESS/INJURY: NO
ADLS_ACUITY_SCORE: 48
ADLS_ACUITY_SCORE: 39
ADLS_ACUITY_SCORE: 52
BATHING: 2-->COMPLETELY DEPENDENT (NOT DEVELOPMENTALLY APPROPRIATE)
ADLS_ACUITY_SCORE: 48
ADLS_ACUITY_SCORE: 52
ADLS_ACUITY_SCORE: 44
ADLS_ACUITY_SCORE: 48
ADLS_ACUITY_SCORE: 48
ADLS_ACUITY_SCORE: 39
ADLS_ACUITY_SCORE: 46
ADLS_ACUITY_SCORE: 50
ADLS_ACUITY_SCORE: 44
ADLS_ACUITY_SCORE: 44
ADLS_ACUITY_SCORE: 46
ADLS_ACUITY_SCORE: 52
ADLS_ACUITY_SCORE: 39
TRANSFERRING: 1-->ASSISTANCE (EQUIPMENT/PERSON) NEEDED (NOT DEVELOPMENTALLY APPROPRIATE)
ADLS_ACUITY_SCORE: 52
ADLS_ACUITY_SCORE: 50
TRANSFERRING: 2-->COMPLETELY DEPENDENT
ADLS_ACUITY_SCORE: 42
ADLS_ACUITY_SCORE: 46
ADLS_ACUITY_SCORE: 50
ADLS_ACUITY_SCORE: 44
ADLS_ACUITY_SCORE: 48
WALKING_OR_CLIMBING_STAIRS_DIFFICULTY: YES
DRESSING/BATHING: BATHING DIFFICULTY, ASSISTANCE 1 PERSON
ADLS_ACUITY_SCORE: 47
ADLS_ACUITY_SCORE: 46
ADLS_ACUITY_SCORE: 46
CHANGE_IN_FUNCTIONAL_STATUS_SINCE_ONSET_OF_CURRENT_ILLNESS/INJURY: NO
ADLS_ACUITY_SCORE: 54
ADLS_ACUITY_SCORE: 46
ADLS_ACUITY_SCORE: 52
ADLS_ACUITY_SCORE: 52
ADLS_ACUITY_SCORE: 47
ADLS_ACUITY_SCORE: 40
ADLS_ACUITY_SCORE: 46
ADLS_ACUITY_SCORE: 52
DRESSING/BATHING: BATHING DIFFICULTY, ASSISTANCE 1 PERSON
ADLS_ACUITY_SCORE: 52
ADLS_ACUITY_SCORE: 48
ADLS_ACUITY_SCORE: 52
ADLS_ACUITY_SCORE: 34
ADLS_ACUITY_SCORE: 42
ADLS_ACUITY_SCORE: 38
ADLS_ACUITY_SCORE: 56
WALKING_OR_CLIMBING_STAIRS: AMBULATION DIFFICULTY, REQUIRES EQUIPMENT
ADLS_ACUITY_SCORE: 50
FALL_HISTORY_WITHIN_LAST_SIX_MONTHS: NO
ADLS_ACUITY_SCORE: 50
ADLS_ACUITY_SCORE: 35
WALKING_OR_CLIMBING_STAIRS_DIFFICULTY: YES
ADLS_ACUITY_SCORE: 50
ADLS_ACUITY_SCORE: 50
ADLS_ACUITY_SCORE: 49
ADLS_ACUITY_SCORE: 48
ADLS_ACUITY_SCORE: 50
ADLS_ACUITY_SCORE: 48
ADLS_ACUITY_SCORE: 47
ADLS_ACUITY_SCORE: 48
DOING_ERRANDS_INDEPENDENTLY_DIFFICULTY: NO
ADLS_ACUITY_SCORE: 50
ADLS_ACUITY_SCORE: 52
ADLS_ACUITY_SCORE: 50
ADLS_ACUITY_SCORE: 43
ADLS_ACUITY_SCORE: 44
ADLS_ACUITY_SCORE: 38
ADLS_ACUITY_SCORE: 46
ADLS_ACUITY_SCORE: 52
DRESS: 0-->ASSISTANCE NEEDED (DEVELOPMENTALLY APPROPRIATE)
ADLS_ACUITY_SCORE: 47
ADLS_ACUITY_SCORE: 44
ADLS_ACUITY_SCORE: 42
ADLS_ACUITY_SCORE: 52
ADLS_ACUITY_SCORE: 50
ADLS_ACUITY_SCORE: 46
ADLS_ACUITY_SCORE: 47
ADLS_ACUITY_SCORE: 50
ADLS_ACUITY_SCORE: 50
DRESS: 2-->COMPLETELY DEPENDENT
ADLS_ACUITY_SCORE: 44
ADLS_ACUITY_SCORE: 51
ADLS_ACUITY_SCORE: 48
ADLS_ACUITY_SCORE: 54
ADLS_ACUITY_SCORE: 54
ADLS_ACUITY_SCORE: 50
ADLS_ACUITY_SCORE: 35
ADLS_ACUITY_SCORE: 52
ADLS_ACUITY_SCORE: 47
DIFFICULTY_EATING/SWALLOWING: NO
ADLS_ACUITY_SCORE: 52
ADLS_ACUITY_SCORE: 42
ADLS_ACUITY_SCORE: 42
DRESSING/BATHING_DIFFICULTY: YES
ADLS_ACUITY_SCORE: 46
ADLS_ACUITY_SCORE: 52
ADLS_ACUITY_SCORE: 48
ADLS_ACUITY_SCORE: 52
ADLS_ACUITY_SCORE: 48
ADLS_ACUITY_SCORE: 43
ADLS_ACUITY_SCORE: 52
ADLS_ACUITY_SCORE: 46
ADLS_ACUITY_SCORE: 43
ADLS_ACUITY_SCORE: 48
ADLS_ACUITY_SCORE: 42
TRANSFERRING: 2-->COMPLETELY DEPENDENT (NOT DEVELOPMENTALLY APPROPRIATE)
ADLS_ACUITY_SCORE: 46
ADLS_ACUITY_SCORE: 48
ADLS_ACUITY_SCORE: 52
ADLS_ACUITY_SCORE: 54
VISION_MANAGEMENT: EYE GLASSES
ADLS_ACUITY_SCORE: 52
ADLS_ACUITY_SCORE: 46
ADLS_ACUITY_SCORE: 47
TOILETING_ISSUES: NO
ADLS_ACUITY_SCORE: 46
ADLS_ACUITY_SCORE: 44
ADLS_ACUITY_SCORE: 46
ADLS_ACUITY_SCORE: 50
ADLS_ACUITY_SCORE: 48
CONCENTRATING,_REMEMBERING_OR_MAKING_DECISIONS_DIFFICULTY: NO
ADLS_ACUITY_SCORE: 48
ADLS_ACUITY_SCORE: 46
ADLS_ACUITY_SCORE: 34
ADLS_ACUITY_SCORE: 42
ADLS_ACUITY_SCORE: 48
ADLS_ACUITY_SCORE: 44
ADLS_ACUITY_SCORE: 50
ADLS_ACUITY_SCORE: 46
ADLS_ACUITY_SCORE: 39
ADLS_ACUITY_SCORE: 46
ADLS_ACUITY_SCORE: 48
ADLS_ACUITY_SCORE: 48
ADLS_ACUITY_SCORE: 47
ADLS_ACUITY_SCORE: 46

## 2023-01-01 ASSESSMENT — ENCOUNTER SYMPTOMS
SHORTNESS OF BREATH: 1
SHORTNESS OF BREATH: 1
FEVER: 0

## 2023-01-01 ASSESSMENT — PAIN SCALES - GENERAL: PAINLEVEL: NO PAIN (0)

## 2023-01-03 NOTE — PROGRESS NOTES
Cardiology Progress Note          Assessment and Plan:       1. Paroxysmal atrial fibrillation, 30% burden with slight suboptimal heart rate control.  Loop recorder in place.    Continue amiodarone for now.  If worsening diastolic congestive heart failure signs or symptoms, increase AV meño blockade.      2. Left lower extremity edema    Velcro compression and elevation.  Continue same amount of diuretics for now given his hyponatremia and lack of central volume elevation.    Consider left lower extremity ultrasound to rule out DVT if worsening edema.      3. Hyponatremia with diuresis    Continue current reduced dose of 10 mg daily furosemide    Follow-up in 3 to 4 months given his advanced age and edema.    30 minutes was spent with the patient, precharting and reviewing tests as well as post visit charting all done today..    This note was transcribed using electronic voice recognition software and there may be typographical errors present.                    Interval History:     The patient is a very pleasant 89 year old whom I have been following for atrial fibrillation, up to 30% on implanted monitor.  He has had mechanical falls with a right leg injury.  He has had left lower extremity worsening edema despite superficial venous insufficiency ablation.  His overall central venous pressures appear normal on physical exam.    He is accompanied by his daughter today.  He is tolerating the reduced Eliquis with less bruising.                     Review of Systems:     Review of Systems:  Skin:  not assessed     Eyes:  not assessed    ENT:  not assessed    Respiratory:  Positive for dyspnea on exertion  Cardiovascular:    Positive for;edema;fatigue;lower extremity symptoms  Gastroenterology: not assessed    Genitourinary:  not assessed    Musculoskeletal:  not assessed    Neurologic:  not assessed    Psychiatric:  not assessed    Heme/Lymph/Imm:  not assessed    Endocrine:  not assessed                Physical  "Exam:     Vitals: /66 (BP Location: Right arm, Patient Position: Sitting, Cuff Size: Adult Regular)   Pulse 105   Ht 1.778 m (5' 10\")   Wt 80.7 kg (178 lb)   SpO2 94%   BMI 25.54 kg/m    Constitutional:  cooperative, alert and oriented, well developed, well nourished, in no acute distress thin;frail uses a walker    Skin:  warm and dry to the touch, no apparent skin lesions or masses noted        Head:  normocephalic        Eyes:  pupils equal and round        Chest:  clear to auscultation        Cardiac: regular rhythm           early diastolic murmur good rate control    Extremities and Back:    stasis pigmentation  2+ left lower extremity pitting edema    Neurological:  no gross motor deficits                 Medications:     Current Outpatient Medications   Medication Sig Dispense Refill     Acetaminophen (TYLENOL PO) Take 1,000 mg by mouth 3 times daily       amiodarone (PACERONE) 200 MG tablet Take 1 tablet (200 mg) by mouth daily 90 tablet 3     apixaban ANTICOAGULANT (ELIQUIS) 2.5 MG tablet Take 1 tablet (2.5 mg) by mouth 2 times daily 180 tablet 3     brimonidine (ALPHAGAN) 0.2 % ophthalmic solution INSTILL 1 DROP INTO LEFT EYE TWICE A DAY       cholestyramine (QUESTRAN) 4 g packet Take 1 packet (4 g) by mouth 2 times daily (with meals) 180 packet 3     ferrous gluconate (FERGON) 324 (38 Fe) MG tablet TAKE 1 TABLET (324 MG) BY MOUTH DAILY (WITH BREAKFAST) 100 tablet 2     furosemide (LASIX) 20 MG tablet Take 0.5 tablets (10 mg) by mouth daily 90 tablet 3     gabapentin (NEURONTIN) 100 MG capsule PLEASE SEE ATTACHED FOR DETAILED DIRECTIONS 312 capsule 0     glipiZIDE (GLUCOTROL XL) 5 MG 24 hr tablet Take 1 tablet (5 mg) by mouth 2 times daily 180 tablet 3     latanoprost (XALATAN) 0.005 % ophthalmic solution Place 1 drop Into the left eye daily       lisinopril (ZESTRIL) 10 MG tablet Take 1 tablet (10 mg) by mouth daily 90 tablet 3     loperamide (IMODIUM A-D) 2 MG tablet daily as needed  30 " tablet 0     metFORMIN (GLUCOPHAGE) 500 MG tablet TAKE 1 TABLET BY MOUTH TWICE A DAY WITH MEALS 180 tablet 0     Metoprolol Tartrate 37.5 MG TABS Take 37.5 mg by mouth 2 times daily 180 tablet 1     multivitamin, therapeutic (THERA-VIT) TABS tablet Take 1 tablet by mouth daily       pioglitazone (ACTOS) 45 MG tablet TAKE 1 TABLET BY MOUTH EVERY DAY 90 tablet 0                Data:   All laboratory data reviewed  No results found for this or any previous visit (from the past 24 hour(s)).    All laboratory data reviewed  Lab Results   Component Value Date    CHOL 135 09/01/2022    CHOL 120 11/11/2019     Lab Results   Component Value Date    HDL 56 09/01/2022    HDL 55 11/11/2019     Lab Results   Component Value Date    LDL 60 09/01/2022    LDL 44 11/11/2019     Lab Results   Component Value Date    TRIG 93 09/01/2022    TRIG 104 11/11/2019     Lab Results   Component Value Date    CHOLHDLRATIO 2.0 03/20/2015     TSH   Date Value Ref Range Status   04/26/2022 7.08 (H) 0.40 - 4.00 mU/L Final   09/11/2020 6.13 (H) 0.40 - 4.00 mU/L Final     Last Basic Metabolic Panel:  Lab Results   Component Value Date     08/12/2022     03/08/2021      Lab Results   Component Value Date    POTASSIUM 4.7 08/12/2022    POTASSIUM 4.5 03/08/2021     Lab Results   Component Value Date    CHLORIDE 97 08/12/2022    CHLORIDE 103 03/08/2021     Lab Results   Component Value Date    MICHAEL 9.5 08/12/2022    MICHAEL 9.4 03/08/2021     Lab Results   Component Value Date    CO2 23 08/12/2022    CO2 28 03/08/2021     Lab Results   Component Value Date    BUN 22 08/12/2022    BUN 18 03/08/2021     Lab Results   Component Value Date    CR 0.79 08/12/2022    CR 1.00 03/08/2021     Lab Results   Component Value Date     08/12/2022     03/08/2021     Lab Results   Component Value Date    WBC 6.5 04/26/2022    WBC 8.1 03/08/2021     Lab Results   Component Value Date    RBC 4.25 04/26/2022    RBC 3.73 03/08/2021     Lab Results    Component Value Date    HGB 12.9 08/12/2022    HGB 12.3 03/08/2021     Lab Results   Component Value Date    HCT 38.9 04/26/2022    HCT 37.9 03/08/2021     Lab Results   Component Value Date    MCV 92 04/26/2022     03/08/2021     Lab Results   Component Value Date    MCH 30.1 04/26/2022    MCH 33.0 03/08/2021     Lab Results   Component Value Date    MCHC 32.9 04/26/2022    MCHC 32.5 03/08/2021     Lab Results   Component Value Date    RDW 14.4 04/26/2022    RDW 15.6 03/08/2021     Lab Results   Component Value Date     04/26/2022     03/08/2021

## 2023-01-03 NOTE — PATIENT INSTRUCTIONS
Urology is a good idea to help empty the bladder more fully at a time so you don't have to go little bits frequently.    I think stay with the current amount of LASIX (10mg) daily unless the leg swelling gets worse.    If the leg swelling gets worse, please let me know and we might try and slow your heart rate down further and increase the LASIX, but try the velcro compression on the left leg first.    Try and elevate the left leg a little more with a pillow or on the recliner when you can.

## 2023-01-03 NOTE — LETTER
1/3/2023    Joselito Armas MD  [address] [facility] [address]    RE: Louis Keller Jr.       Dear Colleague,     I had the pleasure of seeing Louis Keller Jr. in the [facility].  Cardiology Progress Note          Assessment and Plan:       1. Paroxysmal atrial fibrillation, 30% burden with slight suboptimal heart rate control.  Loop recorder in place.    Continue amiodarone for now.  If worsening diastolic congestive heart failure signs or symptoms, increase AV meño blockade.      2. Left lower extremity edema    Velcro compression and elevation.  Continue same amount of diuretics for now given his hyponatremia and lack of central volume elevation.    Consider left lower extremity ultrasound to rule out DVT if worsening edema.      3. Hyponatremia with diuresis    Continue current reduced dose of 10 mg daily furosemide    Follow-up in 3 to 4 months given his advanced age and edema.    30 minutes was spent with the patient, precharting and reviewing tests as well as post visit charting all done today..    This note was transcribed using electronic voice recognition software and there may be typographical errors present.                    Interval History:     The patient is a very pleasant 89 year old whom I have been following for atrial fibrillation, up to 30% on implanted monitor.  He has had mechanical falls with a right leg injury.  He has had left lower extremity worsening edema despite superficial venous insufficiency ablation.  His overall central venous pressures appear normal on physical exam.    He is accompanied by his daughter today.  He is tolerating the reduced Eliquis with less bruising.                     Review of Systems:     Review of Systems:  Skin:  not assessed     Eyes:  not assessed    ENT:  not assessed    Respiratory:  Positive for dyspnea on exertion  Cardiovascular:    Positive for;edema;fatigue;lower extremity symptoms  Gastroenterology: not  "assessed    Genitourinary:  not assessed    Musculoskeletal:  not assessed    Neurologic:  not assessed    Psychiatric:  not assessed    Heme/Lymph/Imm:  not assessed    Endocrine:  not assessed                Physical Exam:     Vitals: /66 (BP Location: Right arm, Patient Position: Sitting, Cuff Size: Adult Regular)   Pulse 105   Ht 1.778 m (5' 10\")   Wt 80.7 kg (178 lb)   SpO2 94%   BMI 25.54 kg/m    Constitutional:  cooperative, alert and oriented, well developed, well nourished, in no acute distress thin;frail uses a walker    Skin:  warm and dry to the touch, no apparent skin lesions or masses noted        Head:  normocephalic        Eyes:  pupils equal and round        Chest:  clear to auscultation        Cardiac: regular rhythm           early diastolic murmur good rate control    Extremities and Back:    stasis pigmentation  2+ left lower extremity pitting edema    Neurological:  no gross motor deficits                 Medications:     Current Outpatient Medications   Medication Sig Dispense Refill     Acetaminophen (TYLENOL PO) Take 1,000 mg by mouth 3 times daily       amiodarone (PACERONE) 200 MG tablet Take 1 tablet (200 mg) by mouth daily 90 tablet 3     apixaban ANTICOAGULANT (ELIQUIS) 2.5 MG tablet Take 1 tablet (2.5 mg) by mouth 2 times daily 180 tablet 3     brimonidine (ALPHAGAN) 0.2 % ophthalmic solution INSTILL 1 DROP INTO LEFT EYE TWICE A DAY       cholestyramine (QUESTRAN) 4 g packet Take 1 packet (4 g) by mouth 2 times daily (with meals) 180 packet 3     ferrous gluconate (FERGON) 324 (38 Fe) MG tablet TAKE 1 TABLET (324 MG) BY MOUTH DAILY (WITH BREAKFAST) 100 tablet 2     furosemide (LASIX) 20 MG tablet Take 0.5 tablets (10 mg) by mouth daily 90 tablet 3     gabapentin (NEURONTIN) 100 MG capsule PLEASE SEE ATTACHED FOR DETAILED DIRECTIONS 312 capsule 0     glipiZIDE (GLUCOTROL XL) 5 MG 24 hr tablet Take 1 tablet (5 mg) by mouth 2 times daily 180 tablet 3     latanoprost (XALATAN) " 0.005 % ophthalmic solution Place 1 drop Into the left eye daily       lisinopril (ZESTRIL) 10 MG tablet Take 1 tablet (10 mg) by mouth daily 90 tablet 3     loperamide (IMODIUM A-D) 2 MG tablet daily as needed  30 tablet 0     metFORMIN (GLUCOPHAGE) 500 MG tablet TAKE 1 TABLET BY MOUTH TWICE A DAY WITH MEALS 180 tablet 0     Metoprolol Tartrate 37.5 MG TABS Take 37.5 mg by mouth 2 times daily 180 tablet 1     multivitamin, therapeutic (THERA-VIT) TABS tablet Take 1 tablet by mouth daily       pioglitazone (ACTOS) 45 MG tablet TAKE 1 TABLET BY MOUTH EVERY DAY 90 tablet 0                Data:   All laboratory data reviewed  No results found for this or any previous visit (from the past 24 hour(s)).    All laboratory data reviewed  Lab Results   Component Value Date    CHOL 135 09/01/2022    CHOL 120 11/11/2019     Lab Results   Component Value Date    HDL 56 09/01/2022    HDL 55 11/11/2019     Lab Results   Component Value Date    LDL 60 09/01/2022    LDL 44 11/11/2019     Lab Results   Component Value Date    TRIG 93 09/01/2022    TRIG 104 11/11/2019     Lab Results   Component Value Date    CHOLHDLRATIO 2.0 03/20/2015     TSH   Date Value Ref Range Status   04/26/2022 7.08 (H) 0.40 - 4.00 mU/L Final   09/11/2020 6.13 (H) 0.40 - 4.00 mU/L Final     Last Basic Metabolic Panel:  Lab Results   Component Value Date     08/12/2022     03/08/2021      Lab Results   Component Value Date    POTASSIUM 4.7 08/12/2022    POTASSIUM 4.5 03/08/2021     Lab Results   Component Value Date    CHLORIDE 97 08/12/2022    CHLORIDE 103 03/08/2021     Lab Results   Component Value Date    MICHAEL 9.5 08/12/2022    MICHAEL 9.4 03/08/2021     Lab Results   Component Value Date    CO2 23 08/12/2022    CO2 28 03/08/2021     Lab Results   Component Value Date    BUN 22 08/12/2022    BUN 18 03/08/2021     Lab Results   Component Value Date    CR 0.79 08/12/2022    CR 1.00 03/08/2021     Lab Results   Component Value Date      08/12/2022     03/08/2021     Lab Results   Component Value Date    WBC 6.5 04/26/2022    WBC 8.1 03/08/2021     Lab Results   Component Value Date    RBC 4.25 04/26/2022    RBC 3.73 03/08/2021     Lab Results   Component Value Date    HGB 12.9 08/12/2022    HGB 12.3 03/08/2021     Lab Results   Component Value Date    HCT 38.9 04/26/2022    HCT 37.9 03/08/2021     Lab Results   Component Value Date    MCV 92 04/26/2022     03/08/2021     Lab Results   Component Value Date    MCH 30.1 04/26/2022    MCH 33.0 03/08/2021     Lab Results   Component Value Date    MCHC 32.9 04/26/2022    MCHC 32.5 03/08/2021     Lab Results   Component Value Date    RDW 14.4 04/26/2022    RDW 15.6 03/08/2021     Lab Results   Component Value Date     04/26/2022     03/08/2021         Thank you for allowing me to participate in the care of your patient.      Sincerely,     Herbert Gleason MD     [facility]  cc:   No referring provider defined for this encounter.

## 2023-01-11 NOTE — TELEPHONE ENCOUNTER
Patient calling  He is having a epidural on 1/16/2023 and needs to be off   apixaban ANTICOAGULANT (ELIQUIS) 2.5 MG tablet 3 days before. He needs primary's approval  Ok to call and  [phone]

## 2023-01-24 NOTE — LETTER
January 25, 2023      Louis Keller .  [address]   [facility] [address]        Dear ,    We are writing to inform you of your test results. Your labs show slightly decreased sodium levels, try to keep fluids restricted to 1200 ml daily. Recheck in 3 months.       Resulted Orders   Basic metabolic panel  (Ca, Cl, CO2, Creat, Gluc, K, Na, BUN)   Result Value Ref Range    Sodium 132 (L) 136 - 145 mmol/L    Potassium 4.6 3.4 - 5.3 mmol/L    Chloride 96 (L) 98 - 107 mmol/L    Carbon Dioxide (CO2) 23 22 - 29 mmol/L    Anion Gap 13 7 - 15 mmol/L    Urea Nitrogen 17.2 8.0 - 23.0 mg/dL    Creatinine 0.78 0.67 - 1.17 mg/dL    Calcium 9.4 8.2 - 9.6 mg/dL    Glucose 261 (H) 70 - 99 mg/dL    GFR Estimate 85 >60 mL/min/1.73m2      Comment:      eGFR calculated using 2021 CKD-EPI equation.       If you have any questions or concerns, please call the clinic at the number listed above.       Sincerely,      Joselito Armas MD      alan

## 2023-01-24 NOTE — PROGRESS NOTES
"  Assessment & Plan     Type 2 diabetes mellitus without complication, without long-term current use of insulin (H)  Controlled DM, continue diet, medications  Assess BMP. Has had hyponatremia   - Basic metabolic panel  (Ca, Cl, CO2, Creat, Gluc, K, Na, BUN)    Type 2 diabetes mellitus with stage 3 chronic kidney disease, without long-term current use of insulin, unspecified whether stage 3a or 3b CKD (H)  Continue actos   - pioglitazone (ACTOS) 45 MG tablet; Take 1 tablet (45 mg) by mouth daily    DDD (degenerative disc disease), lumbar  Has h/o OA, DDD, compression fractures  Order for walker replacement  Continue Gabapentin , increase dose to 300 mg tid, continue Tylenol  Add Lidocaine, consider repeated PT, follow up with ortho for steroid injections   - Walker Order for DME - ONLY FOR DME  - lidocaine (LIDODERM) 5 % patch; Place 1 patch onto the skin every 24 hours To prevent lidocaine toxicity, patient should be patch free for 12 hrs daily.    Right-sided low back pain with right-sided sciatica, unspecified chronicity  Increase Gabapentin dose   - gabapentin (NEURONTIN) 300 MG capsule; Take 1 capsule (300 mg) by mouth 3 times daily    Hip pain, right  Assess hip X rays for hip OA   - XR Hip Right 2-3 Views; Future    Leg edema, left  On Eliquis , but lower dose because of age, assess LE doppler for DVT, use compression stockings   - US Lower Extremity Venous Duplex Left; Future    Permanent atrial fibrillation (H)  On rhythm and rate control treatment and AC with Eliquis                BMI:   Estimated body mass index is 25.66 kg/m  as calculated from the following:    Height as of this encounter: 1.778 m (5' 10\").    Weight as of this encounter: 81.1 kg (178 lb 12.8 oz).       See Patient Instructions    Return in about 6 months (around 7/24/2023) for Physical Exam.    Joselito Armas MD  [facility]GL[facility] is a 89 year old accompanied by his daughter, presenting for " "the following health issues:  RECHECK, Musculoskeletal Problem (Pt still c/o right leg pain, had 3rd epidural; still having pain and discomfort), and Derm Problem (Had flare up in groin area, treated it at home)      HPI     Chief Complaint   Patient presents with     RECHECK     Musculoskeletal Problem     Pt still c/o right leg pain, had 3rd epidural; still having pain and discomfort     Derm Problem     Had flare up in groin area, treated it at home       Patient is seen for a follow up visit.    Concern for LBP, has DDD and compression fractures. Pain has moved to the right lateral hip and groin with radiation to the thigh and knee. Has h/o knee OA. Has had steroid injections in the knee and back. No good response to last injections. Has pain with ambulation, at night, uses a walker. No leg weakness, no numbness.   On treatment with Gabapentin and Tylenol.   Has H/O DM. On diet , exercise and oral treatment. Blood sugars are controlled. No parestesias. No hypoglycemias.  Has history of atrial fibrillation. On anticoagulation  and rate control medications. Asymptonatic - no chest pains , palpitations,  no side effects from medications.  Has increased edema of the LLE, calf, no pain, redness.   Reports problems with urine leaking after taking Lasix. No incontinence other times of the day.     Review of Systems   Constitutional, HEENT, cardiovascular, pulmonary, GI, , musculoskeletal, neuro, skin, endocrine and psych systems are negative, except as otherwise noted.      Objective    /62 (BP Location: Left arm, Cuff Size: Adult Regular)   Pulse 96   Temp (!) 96.5  F (35.8  C) (Tympanic)   Resp 20   Ht 1.778 m (5' 10\")   Wt 81.1 kg (178 lb 12.8 oz)   SpO2 96%   BMI 25.66 kg/m    Body mass index is 25.66 kg/m .  Physical Exam   GENERAL: elderly, frail appearing, alert and no distress  EYES: Eyes grossly normal to inspection, PERRL and conjunctivae and sclerae normal  HENT: ear canals and TM's normal, " nose and mouth without ulcers or lesions  NECK: no adenopathy, no asymmetry, masses, or scars and thyroid normal to palpation  RESP: lungs clear to auscultation - no rales, rhonchi , + base wheezes  CV: regular rate and rhythm, normal S1 S2, no S3 or S4, no murmur, click or rub, peripheral pulses strong  ABDOMEN: soft, nontender, no hepatosplenomegaly, no masses and bowel sounds normal  MS: no gross musculoskeletal defects noted, 2=+ LLE edema, right knee and inguinal pain with flexion , rotation   SKIN: no suspicious lesions or rashes  NEURO: generalized muscle weakness, mentation intact and speech normal    Ancillary Procedure on 12/22/2022   Component Date Value Ref Range Status     Date Time Interrogation Session 12/22/2022 20221222103420   Final     Implantable Pulse Generator Manufa* 12/22/2022 Med[employer]onic   Final     Implantable Pulse Generator Model 12/22/2022 [id number] Reveal LINQ   Final     Implantable Pulse Generator Serial* 12/22/2022 [id number]   Final     Type Interrogation Session 12/22/2022 Remote   Final     Clinic Name 12/22/2022 [place]   Final     Implantable Pulse Generator Type 12/22/2022 Implantable Diagnostic Monitor   Final     Implantable Pulse Generator Implan* 12/22/2022 [id number]   Final     Zone Setting Type Category 12/22/2022 VF   Final     Zone Setting Type Category 12/22/2022 VT   Final     Zone Setting Type Category 12/22/2022 VT   Final     Zone Setting Detection Interval 12/22/2022 420  ms Final     Zone Setting Type Category 12/22/2022 VT   Final     Zone Setting Type Category 12/22/2022 ATRIAL_FIBRILLATION   Final     Zone Setting Type Category 12/22/2022 AT/AF   Final     Zone Setting Type Category 12/22/2022 ASYSTOLE   Final     Zone Setting Detection Interval 12/22/2022 3,000  ms Final     Zone Setting Type Category 12/22/2022 BRADYCARDIA   Final     Zone Setting Detection Interval 12/22/2022 2,000  ms Final     Battery Status 12/22/2022 OK   Final     Atrial Tachy Statistic  Date Time S* 12/22/2022 20220922092023   Final     Atrial Tachy Statistic Date Time E* 12/22/2022 20221222103420   Final     Atrial Tachy Statistic AT/AF Burde* 12/22/2022 30.8  % Final     Episode Statistic Recent Count 12/22/2022 9   Final     Episode Statistic Type Category 12/22/2022 AT/AF   Final     Episode Statistic Recent Count 12/22/2022 0   Final     Episode Statistic Type Category 12/22/2022 ASYSTOLE   Final     Episode Statistic Recent Count 12/22/2022 0   Final     Episode Statistic Type Category 12/22/2022 AT   Final     Episode Statistic Recent Count 12/22/2022 0   Final     Episode Statistic Type Category 12/22/2022 MARCO   Final     Episode Statistic Recent Count 12/22/2022 0   Final     Episode Statistic Type Category 12/22/2022 Patient Activated   Final     Episode Statistic Recent Count 12/22/2022 0   Final     Episode Statistic Type Category 12/22/2022 VT   Final     Episode Statistic Recent Date Time* 12/22/2022 20220922092023   Final     Episode Statistic Recent Date Time* 12/22/2022 20221222103420   Final     Episode Statistic Recent Date Time* 12/22/2022 20220922092023   Final     Episode Statistic Recent Date Time* 12/22/2022 20221222103420   Final     Episode Statistic Recent Date Time* 12/22/2022 20220922092023   Final     Episode Statistic Recent Date Time* 12/22/2022 20221222103420   Final     Episode Statistic Recent Date Time* 12/22/2022 20220922092023   Final     Episode Statistic Recent Date Time* 12/22/2022 20221222103420   Final     Episode Statistic Recent Date Time* 12/22/2022 20220922092023   Final     Episode Statistic Recent Date Time* 12/22/2022 20221222103420   Final     Episode Statistic Recent Date Time* 12/22/2022 20220922092023   Final     Episode Statistic Recent Date Time* 12/22/2022 20221222103420   Final     Episode Statistic Total Count 12/22/2022 147   Final     Episode Statistic Type Category 12/22/2022 AT/AF   Final     Episode Statistic Total Count 12/22/2022 0    Final     Episode Statistic Type Category 12/22/2022 ASYSTOLE   Final     Episode Statistic Total Count 12/22/2022 0   Final     Episode Statistic Type Category 12/22/2022 AT   Final     Episode Statistic Total Count 12/22/2022 0   Final     Episode Statistic Type Category 12/22/2022 MARCO   Final     Episode Statistic Total Count 12/22/2022 1   Final     Episode Statistic Type Category 12/22/2022 Patient Activated   Final     Episode Statistic Total Count 12/22/2022 369   Final     Episode Statistic Type Category 12/22/2022 VT   Final     Episode Statistic Total Date Time * 12/22/2022 20190805153304   Final     Episode Statistic Total Date Time * 12/22/2022 20221222103420   Final     Episode Statistic Total Date Time * 12/22/2022 20190805153304   Final     Episode Statistic Total Date Time * 12/22/2022 20221222103420   Final     Episode Statistic Total Date Time * 12/22/2022 20190805153304   Final     Episode Statistic Total Date Time * 12/22/2022 20221222103420   Final     Episode Statistic Total Date Time * 12/22/2022 20190805153304   Final     Episode Statistic Total Date Time * 12/22/2022 20221222103420   Final     Episode Statistic Total Date Time * 12/22/2022 20190805153304   Final     Episode Statistic Total Date Time * 12/22/2022 20221222103420   Final     Episode Statistic Total Date Time * 12/22/2022 20190805153304   Final     Episode Statistic Total Date Time * 12/22/2022 20221222103420   Final     Episode Identifier 12/22/2022 517   Final     Episode Type Category 12/22/2022 AT/AF   Final     Episode Date Time 12/22/2022 20221010151704   Final     Episode Duration 12/22/2022 17,040  s Final

## 2023-01-24 NOTE — LETTER
January 25, 2023      Louis Keller .  [address]   [facility] [address]        Dear ,    We are writing to inform you of your test results.    Your xray showed mild arthritis of the hip. Recommend follow up with ortho services    Resulted Orders   XR Hip Right 2-3 Views    Narrative    HIP RIGHT TWO TO THREE VIEWS January 24, 2023 10:33 AM     HISTORY: Hip pain, right.    COMPARISON: None.      Impression    IMPRESSION: Diffuse bone demineralization and arterial calcifications.  No fracture or osteonecrosis. No signs of degenerative or inflammatory  arthropathy in the right hip. There is some chondrocalcinosis in the  right hip. Otherwise negative.    KAI RAPHAEL MD         SYSTEM ID:  [id number]       If you have any questions or concerns, please call the clinic at the number listed above.       Sincerely,      Joselito Armas MD    alan

## 2023-01-30 NOTE — LETTER
January 31, 2023      Louis Keller .  [address]   [facility] [address]        Dear ,    We are writing to inform you of your test results.    Your Ultrasound was normal, there is no blood clot.    Resulted Orders   US Lower Extremity Venous Duplex Left    Narrative    VENOUS ULTRASOUND LEFT LOWER EXTREMITY  1/30/2023 3:21 PM     HISTORY: Leg edema, left    COMPARISON: October 1, 2021    Technique: Color Doppler and spectral waveform analysis performed  throughout the deep veins of the left lower extremity. The left common  femoral, proximal greater saphenous, femoral, and popliteal veins  demonstrate normal blood flow, compression, and augmentation.  Posterior tibial and peroneal veins are compressible. Subcutaneous  edema noted in the calf. Contralateral right common femoral vein is  patent.      Impression    IMPRESSION: Negative for DVT in the left lower extremity.    BECCA VINCENT MD         SYSTEM ID:  [id number]   If you have any questions or concerns, please call the clinic at the number listed above.       Sincerely,      Joselito Armas MD      alan

## 2023-02-07 NOTE — TELEPHONE ENCOUNTER
Received call from patients wife stating that she has noticed over the last week patient has been getting increasingly more SOB. She mentioned that he has to take frequent breaks when walking to get breakfast or dinner where before he didn't used to need a break or minimal. Pt also is sounding more SOB when speaking. Pt ok at rest.   Denies chest pain or dizziness. Pts wife stated that his ankles and feet are swollen as well. PCP did an US on left LE 1/30/23 which was negative for DVT. Pts wife states patient uses a urinal overnight as he cant get out of bed that easy and thinks his  urine output is decreasing. Pts wife does not feel he needs to go to the ED at this time as he seems ok at rest. RN recommended patient be seen in clinic for evaluation but in the mean time if anything worsens or patient develops chest pain, they are aware patient will need to go to ED. Pt set up to see Sonal SCHMIDT 2/9/23.

## 2023-02-09 PROBLEM — I50.9 ACUTE ON CHRONIC CONGESTIVE HEART FAILURE, UNSPECIFIED HEART FAILURE TYPE (H): Status: ACTIVE | Noted: 2023-01-01

## 2023-02-09 PROBLEM — I50.9 ACUTE EXACERBATION OF CHF (CONGESTIVE HEART FAILURE) (H): Status: ACTIVE | Noted: 2023-01-01

## 2023-02-09 NOTE — PROGRESS NOTES
"Cardiology Clinic Visit  2023    Pt: Louis Keller Jr.  : 1932     Primary Cardiologist: Dr. Gleason    HPI:  Louis Keller Jr. Is a delightful 89 yo gentleman with PMH significant for PAF on anticoagulation with loop recorder in place, hyponatremia, lower extremity edema, orthopedic issues, HLD and DMII who presents for urgent evaluation in clinic.  The pt has noticed over the last 1 wk to 10 days progressive QUESADA, weakness, fatigue and chest pain.  This has particularly worsened since Saturday of this week.  While typically the pt can walk down to meals and back without resting he now can walk only 6 feet without severe shortness of breath, weakness and fatigue.  He notes as he has had intermittent chest pain this is not always with exertion and sometimes with rest he describes as across the center of his chest.  In addition, he has had progressive edema so much so now that his scrotum is swollen and it is difficult for him to separate his penis from his scrotum to urinate.  He also notes that his abdomen feels bloated.  He states he has been taking his medications.  He has not noticed any blood in his urine or blood in his stools.  His blood pressures have tended to be lower at home and he does not know exact numbers.  Overall he notes that all of this has worsened over the last 5 days.  He also has endorsed a couple episodes of severe right knee pain but these are separate from his chest pain and shortness of breath episodes.  He has recently undergone a couple cortisone shots both in his back and in his knees for pain.  He denies syncope but has had presyncope.  He also endorses \"sweating like a \" with activity and pain.    Social history: He lives in a facility with his wife.  He is here today with his daughter Kathy and son-in-law Aly.    Exam:  /71   Pulse 77   Wt 85.2 kg (187 lb 14.4 oz)   SpO2 100%   BMI 26.96 kg/m    This patient is a well-developed well-nourished gentleman " in no acute distress.  He is an excellent historian.  Normocephalic atraumatic.  Heart is irregularly irregular but not tachycardic he has a 2 out of 6 systolic murmur heard best at the lower left sternal border.  Lungs are diminished in the bases with some rales to the middle and upper lobes extremities with pitting edema to his thigh abdomen has pitting edema and JVP is to his jaw at 90 degrees he has puffy eyes with fluid bags under both eyes.  Skin is otherwise warm and dry.    Assessment and Plan:  This is a delightful 89-year-old gentleman with past medical history is significant for paroxysmal atrial fibrillation, chronic lymphedema, hyponatremia, orthopedic difficulties and hyperlipidemia who presents with about a 1 week history of severe progressive shortness of breath and now anasarca.  He is clearly in heart failure but the question is the underlying cause.  I am concerned about ischemia or possible acute coronary syndrome given his history of recent chest pain.  He is compliant with his medications so I do suspect there is some underlying pathology driving this.      At this point, it is clear that he is not an appropriate disposition for clinic because he needs a full work-up, I greatly appreciate my conversation with the ER MD and will have my RN bring him to the ER for further evaluation and potential admission.      Thank you for allowing me to participate in this patient's care  Sonal Ji PA-C  11:24 AM 2/9/2023   [facility] Card[facility]    45 minutes spent on the date of the encounter doing chart review, review of test results, interpretation of tests, patient visit, documentation and discussion with family       Orders this Visit:  No orders of the defined types were placed in this encounter.    No orders of the defined types were placed in this encounter.    There are no discontinued medications.      No diagnosis found.    CURRENT MEDICATIONS:  Current Outpatient Medications   Medication  Sig Dispense Refill     Acetaminophen (TYLENOL PO) Take 1,000 mg by mouth 3 times daily       amiodarone (PACERONE) 200 MG tablet Take 1 tablet (200 mg) by mouth daily 90 tablet 3     apixaban ANTICOAGULANT (ELIQUIS) 2.5 MG tablet Take 1 tablet (2.5 mg) by mouth 2 times daily 180 tablet 3     brimonidine (ALPHAGAN) 0.2 % ophthalmic solution INSTILL 1 DROP INTO LEFT EYE TWICE A DAY       cholestyramine (QUESTRAN) 4 g packet Take 1 packet (4 g) by mouth 2 times daily (with meals) (Patient taking differently: Take 1 packet by mouth daily) 180 packet 3     ferrous gluconate (FERGON) 324 (38 Fe) MG tablet TAKE 1 TABLET (324 MG) BY MOUTH DAILY (WITH BREAKFAST) 100 tablet 2     furosemide (LASIX) 20 MG tablet Take 0.5 tablets (10 mg) by mouth daily 90 tablet 3     gabapentin (NEURONTIN) 100 MG capsule Take 2 capsules (200 mg) by mouth 3 times daily 540 capsule 1     glipiZIDE (GLUCOTROL XL) 5 MG 24 hr tablet Take 1 tablet (5 mg) by mouth 2 times daily 180 tablet 3     latanoprost (XALATAN) 0.005 % ophthalmic solution Place 1 drop Into the left eye daily       lidocaine (LIDODERM) 5 % patch Place 1 patch onto the skin every 24 hours To prevent lidocaine toxicity, patient should be patch free for 12 hrs daily. 30 patch 3     lisinopril (ZESTRIL) 10 MG tablet Take 1 tablet (10 mg) by mouth daily 90 tablet 3     loperamide (IMODIUM A-D) 2 MG tablet daily as needed  30 tablet 0     metFORMIN (GLUCOPHAGE) 500 MG tablet TAKE 1 TABLET BY MOUTH TWICE A DAY WITH MEALS 180 tablet 0     Metoprolol Tartrate 37.5 MG TABS Take 37.5 mg by mouth 2 times daily 180 tablet 1     pioglitazone (ACTOS) 45 MG tablet Take 1 tablet (45 mg) by mouth daily 90 tablet 3     gabapentin (NEURONTIN) 300 MG capsule Take 1 capsule (300 mg) by mouth 3 times daily (Patient not taking: Reported on 2/9/2023) 270 capsule 3     multivitamin, therapeutic (THERA-VIT) TABS tablet Take 1 tablet by mouth daily (Patient not taking: Reported on 1/24/2023)          ALLERGIES     Allergies   Allergen Reactions     No Known Drug Allergies        PAST MEDICAL HISTORY:  Past Medical History:   Diagnosis Date     Antiplatelet or antithrombotic long-term use      Atrial fibrillation (H)      Diarrhea      Diastolic murmur      QUESADA (dyspnea on exertion)      Gout      Hemorrhage of gastrointestinal tract, unspecified 63,65,and 1971    hospitalized     History of blood transfusion      Hyperlipidemia LDL goal <100 2012     Long-term (current) use of anticoagulants [Z79.01] 3/31/2016     Mumps      Palpitations      Physical deconditioning 2013     Scarlet fever      Spider veins      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     Unspecified disease of pancreas     pancreatitis       PAST SURGICAL HISTORY:  Past Surgical History:   Procedure Laterality Date     ARTHROPLASTY KNEE  2013    Procedure: ARTHROPLASTY KNEE;  Left Total Knee Arthroplasty       CARDIOVERSION  11    failed     EP LOOP RECORDER IMPLANT N/A 2019    Procedure: EP Loop Recorder Implant;  Surgeon: Darling Pacheco MD;  Location:  [facility]ATH [facility]     [facility] NONSPECIFIC PROCEDURE  Child    T&A     ZZC NONSPECIFIC PROCEDURE  ; also     Colonoscopy     Z NONSPECIFIC PROCEDURE      Basal cell cancer of the nose     ZZC NONSPECIFIC PROCEDURE      Cholecystectomy       FAMILY HISTORY:  Family History   Problem Relation Age of Onset     Alzheimer Disease Maternal Grandmother      Arthritis Maternal Grandmother      Cancer Mother         breast/ 1983/colon     Eye Disorder Mother         glaucoma and cataracts     Heart Disease Mother         angina/CABG     Hypertension Mother      Cancer Father         colon     Diabetes Maternal Aunt         AoDM     No Known Problems Daughter        SOCIAL HISTORY:  Social History     Socioeconomic History     Marital status:      Spouse name: None     Number of children: None     Years of  education: None     Highest education level: None   Occupational History     Occupation:      Comment: Semi-retired   Tobacco Use     Smoking status: Never     Smokeless tobacco: Never   Substance and Sexual Activity     Alcohol use: Not Currently     Drug use: No     Sexual activity: Never     Partners: Female   Other Topics Concern     Caffeine Concern No     Comment: 14 oz per day     Sleep Concern No     Comment: sometimes - nocturia 4 times per night     Special Diet No     Exercise Yes     Comment: treadmill/walking 15-20 minutes 6 days per week       Review of Systems:  Skin:  Negative     Eyes:  Positive for glasses  ENT:  Negative    Respiratory:  Positive for shortness of breath;dyspnea on exertion;dyspnea at rest;cough  Cardiovascular:    Positive for;edema;heaviness;lower extremity symptoms;fatigue;exercise intolerance  Gastroenterology:      Genitourinary:  Negative    Musculoskeletal:  Positive for arthritis;joint pain  Neurologic:  Negative    Psychiatric:  not assessed    Heme/Lymph/Imm:  Positive for easy bruising  Endocrine:  Positive for night sweats;hot flashes    Physical Exam:  Vitals: /71   Pulse 77   Wt 85.2 kg (187 lb 14.4 oz)   SpO2 100%   BMI 26.96 kg/m     Please refer to dictation for physical exam    Recent Lab Results: all reviewed today  CBC RESULTS:  Lab Results   Component Value Date    WBC 6.5 04/26/2022    WBC 8.1 03/08/2021    RBC 4.25 (L) 04/26/2022    RBC 3.73 (L) 03/08/2021    HGB 12.9 (L) 08/12/2022    HGB 12.3 (L) 03/08/2021    HCT 38.9 (L) 04/26/2022    HCT 37.9 (L) 03/08/2021    MCV 92 04/26/2022     (H) 03/08/2021    MCH 30.1 04/26/2022    MCH 33.0 03/08/2021    MCHC 32.9 04/26/2022    MCHC 32.5 03/08/2021    RDW 14.4 04/26/2022    RDW 15.6 (H) 03/08/2021     04/26/2022     03/08/2021       BMP RESULTS:  Lab Results   Component Value Date     (L) 01/24/2023     03/08/2021    POTASSIUM 4.6 01/24/2023    POTASSIUM 4.7  08/12/2022    POTASSIUM 4.5 03/08/2021    CHLORIDE 96 (L) 01/24/2023    CHLORIDE 97 08/12/2022    CHLORIDE 103 03/08/2021    CO2 23 01/24/2023    CO2 23 08/12/2022    CO2 28 03/08/2021    ANIONGAP 13 01/24/2023    ANIONGAP 9 08/12/2022    ANIONGAP 4 03/08/2021     (H) 01/24/2023     (H) 08/12/2022     (H) 03/08/2021    BUN 17.2 01/24/2023    BUN 22 08/12/2022    BUN 18 03/08/2021    CR 0.78 01/24/2023    CR 1.00 03/08/2021    GFRESTIMATED 85 01/24/2023    GFRESTIMATED 67 03/08/2021    GFRESTBLACK 77 03/08/2021    MICHAEL 9.4 01/24/2023    MICHAEL 9.4 03/08/2021        INR RESULTS:  Lab Results   Component Value Date    INR 2.1 (H) 05/06/2022    INR 2.6 (H) 04/26/2022    INR 2.70 (H) 06/09/2021    INR 2.90 (H) 04/28/2021           CC  No referring provider defined for this encounter.

## 2023-02-09 NOTE — H&P
[facility]    History and Physical  Hospitalist       Date of Admission:  2/9/2023    Assessment & Plan   Louis Keller Jr. is a 89 year old male who presents with progressive dyspnea on exertion, weakness and fatigue, chest pain and lower extremity edema.    Acute exacerbation of chronic diastolic CHF with hypoxia  Chronic atrial fibrillation  Elevated troponin  -Patient presenting with worsening dyspnea  -Last echo on 2018 showed EF of 55-60%, patient likely has a history of diastolic heart failure although he does not consistently documented on his past records.  -Prior to admission appears to be on Lasix 10 mg daily.  -X-ray shows cardiomegaly and perihilar patchy airspace opacity suspicious for pulmonary vascular congestion  -Monitor daily weights  -Intake and output  -IV diuresis with Lasix 40 mg IV twice daily  -O2 sats in the mid 80s, continue supplemental oxygen, wean as tolerated  -Cardiology consult  -Continue prior to admission apixaban, metoprolol, lisinopril and amiodarone  -BMP in a.m.  -Serial troponin    Hyponatremia  -Patient has a history of mild hyponatremia up to 129 previously  -Sodium on presentation today is 121, suspected this is due to CHF exacerbation  -I expect this to improve with diuresis  -Check sodium every 6 hours until better     DM II  -Hold prior to admission glipizide and metformin and Actos.  -Medium intensity sliding scale for now    Normocytic anemia  -Baseline appears to be between 12-13  -Hemoglobin at presentation 9.3, last hemoglobin on epic is from August, 2022 and was 12.9  -Etiology for his recent drop in hemoglobin is unclear, patient is anticoagulated but denies bleeding from any source.  -Check iron studies, B12 and folic acid    Clinically Significant Risk Factors Present on Admission         # Hyponatremia: Lowest Na = 121 mmol/L in last 2 days, will monitor as appropriate       # Drug Induced Coagulation Defect: home medication list  "includes an anticoagulant medication    # Hypertension: home medication list includes antihypertensive(s)      # Overweight: Estimated body mass index is 26.83 kg/m  as calculated from the following:    Height as of this encounter: 1.778 m (5' 10\").    Weight as of this encounter: 84.8 kg (187 lb).             Medical Decision Making       75 MINUTES SPENT BY ME on the date of service doing chart review, history, exam, documentation & further activities per the note.  MANAGEMENT DISCUSSED with the following over the past 24 hours: ER physician         DVT Prophylaxis: DOAC  Code Status: Full Code    Disposition: Expected discharge in 2+ days    Tee Kauffman MD, MD    Primary Care Physician   Joselito Armas    Chief Complaint   Dyspnea on exertion    History is obtained from the patient    History of Present Illness   Louis Keller Jr. is a 89 year old male who presents to the emergency room from cardiology clinic with concerns for heart failure.  Patient initially presented to his outpatient cardiology visit with concerns for progressively worsening dyspnea on exertion, lower extremity edema and fatigue which has actually been going on for few weeks but got worse in the last week or so.  Patient endorses dry cough.  He also had couple of episodes of chest pain the last one was about a week ago.  It was retrosternal chest pain which he describes as a \"bruised pain\".  He is particularly concerned about his lower extremity edema which has worsened to the point he also has scrotal edema.  He also endorses progressive abdominal distention.  No fever or chills.  Denies dysuria urinary urgency or frequency.  No lightheadedness or dizziness.  Patient denies any hematochezia or melena.      Past Medical History    I have reviewed this patient's medical history and updated it with pertinent information if needed.   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  Past Surgical History:   Procedure Laterality Date     ARTHROPLASTY KNEE  8/5/2013    Procedure: ARTHROPLASTY KNEE;  Left Total Knee Arthroplasty       CARDIOVERSION  9/6/11    failed     EP LOOP RECORDER IMPLANT N/A 8/5/2019    Procedure: EP Loop Recorder Implant;  Surgeon: Darling Pacheco MD;  Location:  HEART CARDIAC CATH LAB     [facility] NONSPECIFIC PROCEDURE  Child    T&A     [facility] NONSPECIFIC PROCEDURE  ; also 11/03    Colonoscopy     [facility] NONSPECIFIC PROCEDURE      Basal cell cancer of the nose     [facility] NONSPECIFIC PROCEDURE  1990    Cholecystectomy       Prior to Admission Medications   Prior to Admission Medications   Prescriptions Last Dose Informant Patient Reported? Taking?   Acetaminophen (TYLENOL PO)   Yes No   Sig: Take 1,000 mg by mouth 3 times daily   Metoprolol Tartrate 37.5 MG TABS   No No   Sig: Take 37.5 mg by mouth 2 times daily   amiodarone (PACERONE) 200 MG tablet   No No   Sig: Take 1 tablet (200 mg) by mouth daily   apixaban ANTICOAGULANT (ELIQUIS) 2.5 MG tablet   No No   Sig: Take 1 tablet (2.5 mg) by mouth 2 times daily   brimonidine (ALPHAGAN) 0.2 % ophthalmic solution   Yes No   Sig: INSTILL 1 DROP INTO LEFT EYE TWICE A DAY   cholestyramine (QUESTRAN) 4 g packet   No No   Sig: Take 1 packet (4 g) by mouth 2  times daily (with meals)   Patient taking differently: Take 1 packet by mouth daily   ferrous gluconate (FERGON) 324 (38 Fe) MG tablet   No No   Sig: TAKE 1 TABLET (324 MG) BY MOUTH DAILY (WITH BREAKFAST)   furosemide (LASIX) 20 MG tablet   Yes No   Sig: Take 0.5 tablets (10 mg) by mouth daily   gabapentin (NEURONTIN) 100 MG capsule   No No   Sig: Take 2 capsules (200 mg) by mouth 3 times daily   gabapentin (NEURONTIN) 300 MG capsule   No No   Sig: Take 1 capsule (300 mg) by mouth 3 times daily   Patient not taking: Reported on 2/9/2023   glipiZIDE (GLUCOTROL XL) 5 MG 24 hr tablet   No No   Sig: Take 1 tablet (5 mg) by mouth 2 times daily   latanoprost (XALATAN) 0.005 % ophthalmic solution   Yes No   Sig: Place 1 drop Into the left eye daily   lidocaine (LIDODERM) 5 % patch   No No   Sig: Place 1 patch onto the skin every 24 hours To prevent lidocaine toxicity, patient should be patch free for 12 hrs daily.   lisinopril (ZESTRIL) 10 MG tablet   No No   Sig: Take 1 tablet (10 mg) by mouth daily   loperamide (IMODIUM A-D) 2 MG tablet   Yes No   Sig: daily as needed    metFORMIN (GLUCOPHAGE) 500 MG tablet   No No   Sig: TAKE 1 TABLET BY MOUTH TWICE A DAY WITH MEALS   multivitamin, therapeutic (THERA-VIT) TABS tablet  Self Yes No   Sig: Take 1 tablet by mouth daily   Patient not taking: Reported on 1/24/2023   pioglitazone (ACTOS) 45 MG tablet   No No   Sig: Take 1 tablet (45 mg) by mouth daily      Facility-Administered Medications: None     Allergies   Allergies   Allergen Reactions     No Known Drug Allergies        Social History   I have reviewed this patient's social history and updated it with pertinent information if needed. Louis Keller Jr.  reports that he has never smoked. He has never used smokeless tobacco. He reports that he does not currently use alcohol. He reports that he does not use drugs.    Family History   I have reviewed this patient's family history and updated it with pertinent information  if needed.   Family History   Problem Relation Age of Onset     Alzheimer Disease Maternal Grandmother      Arthritis Maternal Grandmother      Cancer Mother         breast/ 1983/colon     Eye Disorder Mother         glaucoma and cataracts     Heart Disease Mother         angina/CABG     Hypertension Mother      Cancer Father         colon     Diabetes Maternal Aunt         AoDM     No Known Problems Daughter        Review of Systems   The 10 point Review of Systems is negative other than noted in the HPI or here.     Physical Exam   Temp: 97.7  F (36.5  C) Temp src: Temporal BP: (!) 139/92 Pulse: 70   Resp: 23 SpO2: 98 %      Vital Signs with Ranges  Temp:  [97.7  F (36.5  C)] 97.7  F (36.5  C)  Pulse:  [60-77] 70  Resp:  [16-26] 23  BP: (109-139)/(53-92) 139/92  SpO2:  [92 %-100 %] 98 %  187 lbs 0 oz    Gen: AAOX3, NAD, comfortable  HEENT: Moist oral mucosa, no pallor or icterus  Neck: Supple neck, good ROM, no stridor  Resp: Diminished air entry bilaterally with crackles.  Normal respiratory breathing  CVS-irregular rhythm, rate controlled.  Systolic murmur in the left parasternal area.  Elevated JVD.  3-4+ bilateral pedal edema.  Abd/GI: Soft, distended with possible ascites. BS- normoactive  Skin- Warm, dry, no rashes  MSK: No joint effusion  Neuro- CN- intact. Moving X4     Data   Data reviewed today:  I personally reviewed the EKG tracing showing A-fib with controlled rate.        Recent Labs   Lab 23  1208   WBC 12.3*   HGB 9.3*   MCV 88      *   POTASSIUM 4.8   CHLORIDE 87*   CO2 25   BUN 22.0   CR 0.77   ANIONGAP 9   MICHAEL 9.1   *   ALBUMIN 3.6   PROTTOTAL 6.4   BILITOTAL 1.2   ALKPHOS 90   ALT 18   AST 29       Recent Results (from the past 24 hour(s))   Chest XR,  PA & LAT    Narrative    CHEST TWO VIEWS   2023 1:09 PM     HISTORY: Shortness of breath.    COMPARISON: Chest CTA on 2012.      Impression    IMPRESSION: AP and lateral views of the chest were obtained.  Mildly  enlarged cardiac silhouette. Right upper lobe/perihilar predominant  patchy airspace pulmonary opacities, worrisome for infection. No  significant pleural effusion or pneumothorax. Multilevel degenerative  changes of the spine.    GABINO BREWER MD         SYSTEM ID:  [id number]

## 2023-02-09 NOTE — ED PROVIDER NOTES
History     Chief Complaint:  Leg Swelling       The history is provided by the patient and a relative.      Louis Keller Jr. is a 89 year old male with a history of Diabetes Mellitus and atrial fibrillation who presents with shortness of breath and leg swelling. Today, the patient presented to his cardiology clinic due to increasing shortness of breath and chest pain for the past week. The patient was recommended to present to the ED. The patient reports that his legs have became swollen up to his lower abdomen. He notes that his left leg is usually more swollen than his right leg. Currently, he denies experiencing chest pain.  The patient denies swelling in his arms.       Independent Historian:    Family member    Review of External Notes: cardiology note from today     ROS:  Review of Systems   Respiratory: Positive for shortness of breath.    Cardiovascular: Positive for leg swelling. Negative for chest pain.   All other systems reviewed and are negative.      Allergies:  The patient denies any active allergies.     Medications:    Amiodarone   Eliquis  Lasix   Gabapentin   Glipizide  Lisinopril  Metformin   Metoprolol   Actos     Past Medical History:    Atrial fibrillation   Diastolic murmur   Hyperlipidemia   Mumps   Scarlet fever   Diabetes Mellitus type II     Past Surgical History:    Knee arthroplasty  Cardioversion   Loop recorder placement   T&A   Colonoscopy  Cholecystectomy   Removal of basal cell cancer     Family History:    Cancer   Eye disorder  Heart disease   Hypertension     Social History:  The patient presents with a family member.   The arrived via private vehicle.   PCP: Joselito Armas     Physical Exam     Patient Vitals for the past 24 hrs:   BP Temp Temp src Pulse Resp SpO2 Height Weight   02/09/23 1428 -- -- -- 69 18 98 % -- --   02/09/23 1427 -- -- -- 74 25 97 % -- --   02/09/23 1426 -- -- -- 68 18 98 % -- --   02/09/23 1425 -- -- -- 70 19 97 % -- --   02/09/23 1424 -- --  "-- 64 21 96 % -- --   02/09/23 1403 -- -- -- 72 26 97 % -- --   02/09/23 1402 -- -- -- 70 12 99 % -- --   02/09/23 1401 -- -- -- 70 23 98 % -- --   02/09/23 1400 (!) 142/91 -- -- 68 23 98 % -- --   02/09/23 1358 (!) 139/92 -- -- 68 -- -- -- --   02/09/23 1208 -- -- -- 67 18 100 % -- --   02/09/23 1207 -- -- -- 63 26 99 % -- --   02/09/23 1206 -- -- -- 64 17 99 % -- --   02/09/23 1205 -- -- -- 66 22 99 % -- --   02/09/23 1204 -- -- -- 67 19 100 % -- --   02/09/23 1202 -- -- -- 60 18 99 % -- --   02/09/23 1200 136/77 -- -- 68 19 99 % -- --   02/09/23 1121 109/53 97.7  F (36.5  C) Temporal 68 16 92 % 1.778 m (5' 10\") 84.8 kg (187 lb)        Physical Exam  General: alert, lying comfortably on gurney at about 40 degrees  HENT: mucous membranes moist  CV: regular rate, regular rhythm  Resp: normal effort, rales throughout.  GI: abdomen soft and nontender, no guarding  MSK: no bony tenderness  Skin: appropriately warm and dry  Extremities: extensive pitting edema from legs up to lower abdomen, scrotum.  Neuro: alert, clear speech, oriented  Psych: normal mood and affect      Emergency Department Course   ECG  ECG taken at 1123, ECG read at 1130  Atrial fibrillation with premature ventricular or aberrantly conducted complexes   Left axis deviation  Anterior infarct, age undetermined   Abnormal ECG  Rate 76 bpm. RI interval * ms. QRS duration 78 ms. QT/QTc 416/468 ms. P-R-T axes * -40 78.    Imaging:  Chest XR,  PA & LAT   Final Result   IMPRESSION: AP and lateral views of the chest were obtained. Mildly   enlarged cardiac silhouette. Right upper lobe/perihilar predominant   patchy airspace pulmonary opacities, worrisome for infection. No   significant pleural effusion or pneumothorax. Multilevel degenerative   changes of the spine.      GABINO BREWER MD            SYSTEM ID:  [id number]        Report per radiology    Laboratory:  Labs Ordered and Resulted from Time of ED Arrival to Time of ED Departure   COMPREHENSIVE " METABOLIC PANEL - Abnormal       Result Value    Sodium 121 (*)     Potassium 4.8      Chloride 87 (*)     Carbon Dioxide (CO2) 25      Anion Gap 9      Urea Nitrogen 22.0      Creatinine 0.77      Calcium 9.1      Glucose 334 (*)     Alkaline Phosphatase 90      AST 29      ALT 18      Protein Total 6.4      Albumin 3.6      Bilirubin Total 1.2      GFR Estimate 85     NT PROBNP INPATIENT - Abnormal    N terminal Pro BNP Inpatient 5,146 (*)    TROPONIN T, HIGH SENSITIVITY - Abnormal    Troponin T, High Sensitivity 50 (*)    CBC WITH PLATELETS AND DIFFERENTIAL - Abnormal    WBC Count 12.3 (*)     RBC Count 3.18 (*)     Hemoglobin 9.3 (*)     Hematocrit 28.0 (*)     MCV 88      MCH 29.2      MCHC 33.2      RDW 13.9      Platelet Count 323      % Neutrophils 83      % Lymphocytes 5      % Monocytes 11      % Eosinophils 0      % Basophils 0      % Immature Granulocytes 1      NRBCs per 100 WBC 0      Absolute Neutrophils 10.1 (*)     Absolute Lymphocytes 0.6 (*)     Absolute Monocytes 1.4 (*)     Absolute Eosinophils 0.0      Absolute Basophils 0.0      Absolute Immature Granulocytes 0.1      Absolute NRBCs 0.0     TROPONIN T, HIGH SENSITIVITY - Abnormal    Troponin T, High Sensitivity 46 (*)    COVID-19 VIRUS (CORONAVIRUS) BY PCR - Normal    SARS CoV2 PCR Negative        Emergency Department Course & Assessments:             Interventions:  Medications   furosemide (LASIX) injection 60 mg (60 mg Intravenous Given 2/9/23 1250)        Independent Interpretation (X-rays, CTs, rhythm strip):  I personally reviewed the patient's chest radiograph.  No infiltrate or pneumothorax on my interpretation.     Consultations/Discussion of Management or Tests:  0269 I spoke with Dr. Kauffman of the hospitalist service regarding the patient's history and presentation in the Emergency Department today. Dr. Kauffman accepted the patient for admission.       Social Determinants of Health affecting care:  none        Assessments:  1149 I obtained history and examined the patient as noted above.     Disposition:  The patient was admitted to the hospital under the care of Dr. Kauffman.     Impression & Plan    CMS Diagnoses: None    Medical Decision Making:  Louis Keller Jr. is a 89 year old male with history of atrial fibrillation on apixaban, type 2 DM, who presents with increased shortness of breath, hypoxia, and severe bilateral lower extremity edema.  On exam, he is alert but requiring 3LNC to maintain oxygen saturations of 93-95%.  Lung exam is consistent with pulmonary edema.  Less concern for pneumonia, given diffuse lower extremity swelling.  EKG and troponin are not suggestive of ACS.  Troponin is flat and minimally elevated.  BNP consistent with volume overload.  CXR demonstrates infiltrates consistent with pulmonary edema.  Electrolytes demonstrate hyponatremia, worse than baseline, as well as hyperglycemia.  Perhaps pseudohyponatremia but also related to volume overload.  Patient responded well to lasix, with 1L of urine out in the ED.  Will admit to [facility] for continued respiratory support, diuresis, and echocardiogram.  Family and patient are in agreement.    Diagnosis:    ICD-10-CM    1. Acute on chronic congestive heart failure, unspecified heart failure type (H)  I50.9       2. Hyponatremia  E87.1            Scribe Disclosure:  I, Janice Charles, am serving as a scribe at 12:11 PM on 2/9/2023 to document services personally performed by Raquel Colby MD based on my observations and the provider's statements to me.    2/9/2023   Raquel Colby MD Pepper, Tracy Lynn, MD  02/09/23 2134

## 2023-02-09 NOTE — ED TRIAGE NOTES
Pt reports swelling and fluids in bilateral legs and abdomen.  Nurse also at heart clinic where pt came from said he has fluid on his lungs as well     Pt reports this has been progressive in the past few months with decreased mobility

## 2023-02-09 NOTE — PHARMACY-ADMISSION MEDICATION HISTORY
Pharmacy Medication History  Admission medication history interview status for the 2/9/2023  admission is complete. See EPIC admission navigator for prior to admission medications     Location of Interview: Patient room  Medication history sources: Patient and Surescripts    Significant changes made to the medication list:  Added dorzolamide eye drops    In the past week, patient estimated taking medication this percent of the time: greater than 90%       Additional medication history information:   Of note, patient states that they still use 0.5 tabs of furosemide daily, but sometimes skip it if pt has a long day of appointments or events that would make urination difficult.  Last took 2/8/23.    Medication reconciliation completed by provider prior to medication history? No    Time spent in this activity: 20 minutes    Prior to Admission medications    Medication Sig Last Dose Taking? Auth Provider Long Term End Date   amiodarone (PACERONE) 200 MG tablet Take 1 tablet (200 mg) by mouth daily 2/9/2023 at am Yes Herbert Gleason MD Yes    apixaban ANTICOAGULANT (ELIQUIS) 2.5 MG tablet Take 1 tablet (2.5 mg) by mouth 2 times daily 2/9/2023 at am Yes Herbert Gleason MD     brimonidine (ALPHAGAN) 0.2 % ophthalmic solution INSTILL 1 DROP INTO LEFT EYE TWICE A DAY 2/9/2023 at am Yes Reported, Patient     cholestyramine (QUESTRAN) 4 g packet Take 1 packet (4 g) by mouth 2 times daily (with meals)  Patient taking differently: Take 1 packet by mouth daily 2/9/2023 at am Yes Joselito Armas MD Yes    dorzolamide (TRUSOPT) 2 % ophthalmic solution Place 1 drop Into the left eye 2 times daily 2/9/2023 at am Yes Unknown, Entered By History     ferrous gluconate (FERGON) 324 (38 Fe) MG tablet TAKE 1 TABLET (324 MG) BY MOUTH DAILY (WITH BREAKFAST) 2/9/2023 at am Yes Joselito Armas MD     furosemide (LASIX) 20 MG tablet Take 0.5 tablets (10 mg) by mouth daily 2/8/2023 Yes Herbert Gleason MD Yes    gabapentin  (NEURONTIN) 100 MG capsule Take 2 capsules (200 mg) by mouth 3 times daily 2/9/2023 at am Yes Joselito Armas MD Yes    glipiZIDE (GLUCOTROL XL) 5 MG 24 hr tablet Take 1 tablet (5 mg) by mouth 2 times daily 2/9/2023 at am Yes Herbert Gleason MD Yes    latanoprost (XALATAN) 0.005 % ophthalmic solution Place 1 drop Into the left eye daily 2/8/2023 at pm Yes Joselito Armas MD Yes    lisinopril (ZESTRIL) 10 MG tablet Take 1 tablet (10 mg) by mouth daily 2/9/2023 at am Yes Herbert Gleason MD Yes    loperamide (IMODIUM A-D) 2 MG tablet Take 2 mg by mouth daily as needed Unknown Yes Joselito Armas MD     metFORMIN (GLUCOPHAGE) 500 MG tablet TAKE 1 TABLET BY MOUTH TWICE A DAY WITH MEALS 2/9/2023 at am Yes Joselito Armas MD Yes    Metoprolol Tartrate 37.5 MG TABS Take 37.5 mg by mouth 2 times daily 2/9/2023 at am Yes Herbert Gleason MD Yes    pioglitazone (ACTOS) 45 MG tablet Take 1 tablet (45 mg) by mouth daily 2/9/2023 at am Yes Joselito Armas MD Yes    Acetaminophen (TYLENOL PO) Take 1,000 mg by mouth 3 times daily as needed   Reported, Patient No    gabapentin (NEURONTIN) 300 MG capsule Take 1 capsule (300 mg) by mouth 3 times daily  Patient not taking: Reported on 2/9/2023   Joselito Armas MD Yes    lidocaine (LIDODERM) 5 % patch Place 1 patch onto the skin every 24 hours To prevent lidocaine toxicity, patient should be patch free for 12 hrs daily.  Patient not taking: Reported on 2/9/2023 Not Taking  Joselito Armas MD     multivitamin, therapeutic (THERA-VIT) TABS tablet Take 1 tablet by mouth daily  Patient not taking: Reported on 1/24/2023   Unknown, Entered By History         The information provided in this note is only as accurate as the sources available at the time of update(s)     Thank you,  Isreal Pollard, AlexD

## 2023-02-09 NOTE — LETTER
"2023    Joselito Armas MD  [address] [facility] [address]    RE: Louis PACHECO Arianna Willis       Dear Colleague,     I had the pleasure of seeing Louis Keller Jr. in the [facility].  Cardiology Clinic Visit  2023    Pt: Louis Keller Jr.  : 1932     Primary Cardiologist: Dr. Gleason    HPI:  Louis Keller Jr. Is a delightful 89 yo gentleman with PMH significant for PAF on anticoagulation with loop recorder in place, hyponatremia, lower extremity edema, orthopedic issues, HLD and DMII who presents for urgent evaluation in clinic.  The pt has noticed over the last 1 wk to 10 days progressive QUESADA, weakness, fatigue and chest pain.  This has particularly worsened since Saturday of this week.  While typically the pt can walk down to meals and back without resting he now can walk only 6 feet without severe shortness of breath, weakness and fatigue.  He notes as he has had intermittent chest pain this is not always with exertion and sometimes with rest he describes as across the center of his chest.  In addition, he has had progressive edema so much so now that his scrotum is swollen and it is difficult for him to separate his penis from his scrotum to urinate.  He also notes that his abdomen feels bloated.  He states he has been taking his medications.  He has not noticed any blood in his urine or blood in his stools.  His blood pressures have tended to be lower at home and he does not know exact numbers.  Overall he notes that all of this has worsened over the last 5 days.  He also has endorsed a couple episodes of severe right knee pain but these are separate from his chest pain and shortness of breath episodes.  He has recently undergone a couple cortisone shots both in his back and in his knees for pain.  He denies syncope but has had presyncope.  He also endorses \"sweating like a \" with activity and pain.    Social history: He lives in a facility with his " wife.  He is here today with his daughter Kathy and son-in-law Aly.    Exam:  /71   Pulse 77   Wt 85.2 kg (187 lb 14.4 oz)   SpO2 100%   BMI 26.96 kg/m    This patient is a well-developed well-nourished gentleman in no acute distress.  He is an excellent historian.  Normocephalic atraumatic.  Heart is irregularly irregular but not tachycardic he has a 2 out of 6 systolic murmur heard best at the lower left sternal border.  Lungs are diminished in the bases with some rales to the middle and upper lobes extremities with pitting edema to his thigh abdomen has pitting edema and JVP is to his jaw at 90 degrees he has puffy eyes with fluid bags under both eyes.  Skin is otherwise warm and dry.    Assessment and Plan:  This is a delightful 89-year-old gentleman with past medical history is significant for paroxysmal atrial fibrillation, chronic lymphedema, hyponatremia, orthopedic difficulties and hyperlipidemia who presents with about a 1 week history of severe progressive shortness of breath and now anasarca.  He is clearly in heart failure but the question is the underlying cause.  I am concerned about ischemia or possible acute coronary syndrome given his history of recent chest pain.  He is compliant with his medications so I do suspect there is some underlying pathology driving this.      At this point, it is clear that he is not an appropriate disposition for clinic because he needs a full work-up, I greatly appreciate my conversation with the ER MD and will have my RN bring him to the ER for further evaluation and potential admission.      Thank you for allowing me to participate in this patient's care  Sonal Ji PA-C  11:24 AM 2/9/2023   [facility]    45 minutes spent on the date of the encounter doing chart review, review of test results, interpretation of tests, patient visit, documentation and discussion with family       Orders this Visit:  No orders of the defined types were  placed in this encounter.    No orders of the defined types were placed in this encounter.    There are no discontinued medications.      No diagnosis found.    CURRENT MEDICATIONS:  Current Outpatient Medications   Medication Sig Dispense Refill     Acetaminophen (TYLENOL PO) Take 1,000 mg by mouth 3 times daily       amiodarone (PACERONE) 200 MG tablet Take 1 tablet (200 mg) by mouth daily 90 tablet 3     apixaban ANTICOAGULANT (ELIQUIS) 2.5 MG tablet Take 1 tablet (2.5 mg) by mouth 2 times daily 180 tablet 3     brimonidine (ALPHAGAN) 0.2 % ophthalmic solution INSTILL 1 DROP INTO LEFT EYE TWICE A DAY       cholestyramine (QUESTRAN) 4 g packet Take 1 packet (4 g) by mouth 2 times daily (with meals) (Patient taking differently: Take 1 packet by mouth daily) 180 packet 3     ferrous gluconate (FERGON) 324 (38 Fe) MG tablet TAKE 1 TABLET (324 MG) BY MOUTH DAILY (WITH BREAKFAST) 100 tablet 2     furosemide (LASIX) 20 MG tablet Take 0.5 tablets (10 mg) by mouth daily 90 tablet 3     gabapentin (NEURONTIN) 100 MG capsule Take 2 capsules (200 mg) by mouth 3 times daily 540 capsule 1     glipiZIDE (GLUCOTROL XL) 5 MG 24 hr tablet Take 1 tablet (5 mg) by mouth 2 times daily 180 tablet 3     latanoprost (XALATAN) 0.005 % ophthalmic solution Place 1 drop Into the left eye daily       lidocaine (LIDODERM) 5 % patch Place 1 patch onto the skin every 24 hours To prevent lidocaine toxicity, patient should be patch free for 12 hrs daily. 30 patch 3     lisinopril (ZESTRIL) 10 MG tablet Take 1 tablet (10 mg) by mouth daily 90 tablet 3     loperamide (IMODIUM A-D) 2 MG tablet daily as needed  30 tablet 0     metFORMIN (GLUCOPHAGE) 500 MG tablet TAKE 1 TABLET BY MOUTH TWICE A DAY WITH MEALS 180 tablet 0     Metoprolol Tartrate 37.5 MG TABS Take 37.5 mg by mouth 2 times daily 180 tablet 1     pioglitazone (ACTOS) 45 MG tablet Take 1 tablet (45 mg) by mouth daily 90 tablet 3     gabapentin (NEURONTIN) 300 MG capsule Take 1 capsule  (300 mg) by mouth 3 times daily (Patient not taking: Reported on 2023) 270 capsule 3     multivitamin, therapeutic (THERA-VIT) TABS tablet Take 1 tablet by mouth daily (Patient not taking: Reported on 2023)         ALLERGIES     Allergies   Allergen Reactions     No Known Drug Allergies        PAST MEDICAL HISTORY:  Past Medical History:   Diagnosis Date     Antiplatelet or antithrombotic long-term use      Atrial fibrillation (H)      Diarrhea      Diastolic murmur      QUESADA (dyspnea on exertion)      Gout      Hemorrhage of gastrointestinal tract, unspecified 63,65,and 1971    hospitalized     History of blood transfusion      Hyperlipidemia LDL goal <100 2012     Long-term (current) use of anticoagulants [Z79.01] 3/31/2016     Mumps      Palpitations      Physical deconditioning 2013     Scarlet fever      Spider veins      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     Unspecified disease of pancreas     pancreatitis       PAST SURGICAL HISTORY:  Past Surgical History:   Procedure Laterality Date     ARTHROPLASTY KNEE  2013    Procedure: ARTHROPLASTY KNEE;  Left Total Knee Arthroplasty       CARDIOVERSION  11    failed     EP LOOP RECORDER IMPLANT N/A 2019    Procedure: EP Loop Recorder Implant;  Surgeon: Darling Pacheco MD;  Location:  HEART CARDIAC CATH LAB     [facility] NONSPECIFIC PROCEDURE  Child    T&A     [facility] NONSPECIFIC PROCEDURE  ; also     Colonoscopy     [facility] NONSPECIFIC PROCEDURE      Basal cell cancer of the nose     [facility] NONSPECIFIC PROCEDURE      Cholecystectomy       FAMILY HISTORY:  Family History   Problem Relation Age of Onset     Alzheimer Disease Maternal Grandmother      Arthritis Maternal Grandmother      Cancer Mother         breast/ 1983/colon     Eye Disorder Mother         glaucoma and cataracts     Heart Disease Mother         angina/CABG     Hypertension Mother      Cancer Father         colon     Diabetes  Maternal Aunt         AoDM     No Known Problems Daughter        SOCIAL HISTORY:  Social History     Socioeconomic History     Marital status:      Spouse name: None     Number of children: None     Years of education: None     Highest education level: None   Occupational History     Occupation:      Comment: Semi-retired   Tobacco Use     Smoking status: Never     Smokeless tobacco: Never   Substance and Sexual Activity     Alcohol use: Not Currently     Drug use: No     Sexual activity: Never     Partners: Female   Other Topics Concern     Caffeine Concern No     Comment: 14 oz per day     Sleep Concern No     Comment: sometimes - nocturia 4 times per night     Special Diet No     Exercise Yes     Comment: treadmill/walking 15-20 minutes 6 days per week       Review of Systems:  Skin:  Negative     Eyes:  Positive for glasses  ENT:  Negative    Respiratory:  Positive for shortness of breath;dyspnea on exertion;dyspnea at rest;cough  Cardiovascular:    Positive for;edema;heaviness;lower extremity symptoms;fatigue;exercise intolerance  Gastroenterology:      Genitourinary:  Negative    Musculoskeletal:  Positive for arthritis;joint pain  Neurologic:  Negative    Psychiatric:  not assessed    Heme/Lymph/Imm:  Positive for easy bruising  Endocrine:  Positive for night sweats;hot flashes    Physical Exam:  Vitals: /71   Pulse 77   Wt 85.2 kg (187 lb 14.4 oz)   SpO2 100%   BMI 26.96 kg/m     Please refer to dictation for physical exam    Recent Lab Results: all reviewed today  CBC RESULTS:  Lab Results   Component Value Date    WBC 6.5 04/26/2022    WBC 8.1 03/08/2021    RBC 4.25 (L) 04/26/2022    RBC 3.73 (L) 03/08/2021    HGB 12.9 (L) 08/12/2022    HGB 12.3 (L) 03/08/2021    HCT 38.9 (L) 04/26/2022    HCT 37.9 (L) 03/08/2021    MCV 92 04/26/2022     (H) 03/08/2021    MCH 30.1 04/26/2022    MCH 33.0 03/08/2021    MCHC 32.9 04/26/2022    MCHC 32.5 03/08/2021    RDW 14.4 04/26/2022    RDW  15.6 (H) 03/08/2021     04/26/2022     03/08/2021       BMP RESULTS:  Lab Results   Component Value Date     (L) 01/24/2023     03/08/2021    POTASSIUM 4.6 01/24/2023    POTASSIUM 4.7 08/12/2022    POTASSIUM 4.5 03/08/2021    CHLORIDE 96 (L) 01/24/2023    CHLORIDE 97 08/12/2022    CHLORIDE 103 03/08/2021    CO2 23 01/24/2023    CO2 23 08/12/2022    CO2 28 03/08/2021    ANIONGAP 13 01/24/2023    ANIONGAP 9 08/12/2022    ANIONGAP 4 03/08/2021     (H) 01/24/2023     (H) 08/12/2022     (H) 03/08/2021    BUN 17.2 01/24/2023    BUN 22 08/12/2022    BUN 18 03/08/2021    CR 0.78 01/24/2023    CR 1.00 03/08/2021    GFRESTIMATED 85 01/24/2023    GFRESTIMATED 67 03/08/2021    GFRESTBLACK 77 03/08/2021    MICHAEL 9.4 01/24/2023    MICHAEL 9.4 03/08/2021        INR RESULTS:  Lab Results   Component Value Date    INR 2.1 (H) 05/06/2022    INR 2.6 (H) 04/26/2022    INR 2.70 (H) 06/09/2021    INR 2.90 (H) 04/28/2021       CC  No referring provider defined for this encounter.    Thank you for allowing me to participate in the care of your patient.      Sincerely,     JILLIAN Guerrier [facility]  cc:   No referring provider defined for this encounter.

## 2023-02-09 NOTE — ED NOTES
[facility]  ED Nurse Handoff Report    ED Chief complaint: Leg Swelling      ED Diagnosis:   Final diagnoses:   None       Code Status: hospitalist to address    Allergies:   Allergies   Allergen Reactions    No Known Drug Allergies        Patient Story: pt sent ED from cardiac clinic. Patient hypoxic on room air, increased SOB and swelling to BLE and abdomen.pt alert and oriented. Daughter at bedside. Troponin and BNP elevated. Troponin series drawn- patient given IV lasix-good urine output. Patient SOB, worse with movement.   Focused Assessment:  see above    Treatments and/or interventions provided: see MAR, requiring oxygen  Patient's response to treatments and/or interventions:     To be done/followed up on inpatient unit:  remaining inpatient orders    Does this patient have any cognitive concerns?:  N/A    Activity level - Baseline/Home:  Walker  Activity Level - Current:   Stand with assist x2 and Walker    Patient's Preferred language: English   Needed?: No    Isolation: None  Infection: Not Applicable  Patient tested for COVID 19 prior to admission: NO  Bariatric?: No    Vital Signs:   Vitals:    02/09/23 1425 02/09/23 1426 02/09/23 1427 02/09/23 1428   BP:       Pulse: 70 68 74 69   Resp: 19 18 25 18   Temp:       TempSrc:       SpO2: 97% 98% 97% 98%   Weight:       Height:           Cardiac Rhythm:     Was the PSS-3 completed:   Yes  What interventions are required if any?               Family Comments: daughter at bedside  OBS brochure/video discussed/provided to patient/family: N/A              Name of person given brochure if not patient:               Relationship to patient:     For the majority of the shift this patient's behavior was Green.   Behavioral interventions performed were .    ED NURSE PHONE NUMBER:

## 2023-02-09 NOTE — PROGRESS NOTES
RECEIVING UNIT ED HANDOFF REVIEW    ED Nurse Handoff Report was reviewed by: Shelley Berrios RN on February 9, 2023 at 4:05 PM

## 2023-02-10 NOTE — PROGRESS NOTES
Cross cover    Called that sodium is 122.  Trend today has been 121->125->122.  Admitted today which CHF.  Hyponatremia thought due to volume overload from CHF and should improve with diuresis.  Received one dose of IV lasix this afternoon and one more this evening.  Urine sodium/osms not likely to be all that helpful given already on lasix.      -continue current plan with diuresis  -will check sodium q4 hrs for now, next at 0400    Addendum    0400 sodium improved to 126.  Continue current plan.

## 2023-02-10 NOTE — CONSULTS
[facility]    Cardiology Consultation     Date of Admission:  2/9/2023    Assessment & Plan   #1 Acute exacerbation of chronic diastolic CHF with hypoxia  #2 Chronic atrial fibrillation  #3 Hyponatremia  #4 DM II    It was as pleasure to be involved in the care of Mr. Keller.  I reviewed his history and symptoms in detail and testing as outlined. He is feeling much better now with diuresis overnight.  He still has significant crackles on lung exam so has more fluid to give and he is still requiring oxygen which supports that as well.   There is nothing to suggest an MI on echo and troponin was flat.  I suspect his pressure is from volume overload and will improve once he is back to his baseline weight.  The etiology for his exacerbation, which seems to be more gradual and then suddenly he tipped over, may be inadequately controlled heart rates with his afib at times, as Dr. Gleason previously suggested.  Once he is diuresed, will increase his metoprolol to 50 mg BID.      He should not be on actos at discharge- would start Jardiance.  Will continue to follow. Please call with questions     High complexity     Lo Soliz MD    Primary Care Physician   Joselito Armas    Reason for Consult   Reason for consult: I was asked by the hospitalist team for diastolic heart failure exacerbation    History of Present Illness   Louis Keller Jr. is a pleasant 89 year old male who has a past medical history significant for diastolic heart failure, chronic afib on anticoagulation and DM II (a1c 7.2).    He was seen in clinic yesterday by Sonal Ji with symptoms of worsening shortness of breath with exertion associated with weakness.    He also described some chest pressure. With these symptoms he was referred to the ED where he was admitted.   He was found to have new onset anemia with hemoglobin of 9 (was 12.9 six months ago). Troponin was flat at 50. BNP 5000.  Echo shows no RWMA.  There  is signs of PH that was also present on 2018 echo.  Last nuclear stress test in 2019 was negative for ischemia.  When he was seen by Dr. Gleason in clinic 1/2023 there was note that his HR with his afib was a little fast and if CHF would increase AV meño blocking agent. On amiodarone and eliquis.  At that time his weight was 178.  10/2022 he was 176. 6/2022 he was 172. He was admitted at 187. Weight is 181 today.  He reports that the SOB has been gradual and that he has not been eating more than usual.  He is on 10 mg lasix po daily at home. Does not weigh himself regularly.      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     Unspecified disease of pancreas 1990    pancreatitis       Past Surgical History   Past Surgical History:   Procedure Laterality Date     ARTHROPLASTY KNEE  8/5/2013    Procedure: ARTHROPLASTY KNEE;  Left Total Knee Arthroplasty       CARDIOVERSION  9/6/11    failed     EP LOOP RECORDER IMPLANT N/A 8/5/2019    Procedure: EP Loop Recorder Implant;  Surgeon: Darling Pacheco MD;  Location:  HEART CARDI[facility] CATH LAB     [facility] NONSPECIFIC PROCEDURE  Child    T&A     Z NONSPECIFIC PROCEDURE  ; also 11/03    Colonoscopy     [facility] NONSPECIFIC PROCEDURE      Basal cell cancer of the nose     Z NONSPECIFIC PROCEDURE  1990    Cholecystectomy         Prior to Admission Medications   Prior to Admission Medications   Prescriptions Last  Dose Informant Patient Reported? Taking?   Acetaminophen (TYLENOL PO)   Yes No   Sig: Take 1,000 mg by mouth 3 times daily as needed   Metoprolol Tartrate 37.5 MG TABS 2/9/2023 at am  No Yes   Sig: Take 37.5 mg by mouth 2 times daily   amiodarone (PACERONE) 200 MG tablet 2/9/2023 at am  No Yes   Sig: Take 1 tablet (200 mg) by mouth daily   apixaban ANTICOAGULANT (ELIQUIS) 2.5 MG tablet 2/9/2023 at am  No Yes   Sig: Take 1 tablet (2.5 mg) by mouth 2 times daily   brimonidine (ALPHAGAN) 0.2 % ophthalmic solution 2/9/2023 at am  Yes Yes   Sig: INSTILL 1 DROP INTO LEFT EYE TWICE A DAY   cholestyramine (QUESTRAN) 4 g packet 2/9/2023 at am  No Yes   Sig: Take 1 packet (4 g) by mouth 2 times daily (with meals)   Patient taking differently: Take 1 packet by mouth daily   dorzolamide (TRUSOPT) 2 % ophthalmic solution 2/9/2023 at am  Yes Yes   Sig: Place 1 drop Into the left eye 2 times daily   ferrous gluconate (FERGON) 324 (38 Fe) MG tablet 2/9/2023 at am  No Yes   Sig: TAKE 1 TABLET (324 MG) BY MOUTH DAILY (WITH BREAKFAST)   furosemide (LASIX) 20 MG tablet 2/8/2023  Yes Yes   Sig: Take 0.5 tablets (10 mg) by mouth daily   gabapentin (NEURONTIN) 100 MG capsule 2/9/2023 at am  No Yes   Sig: Take 2 capsules (200 mg) by mouth 3 times daily   glipiZIDE (GLUCOTROL XL) 5 MG 24 hr tablet 2/9/2023 at am  No Yes   Sig: Take 1 tablet (5 mg) by mouth 2 times daily   latanoprost (XALATAN) 0.005 % ophthalmic solution 2/8/2023 at pm  Yes Yes   Sig: Place 1 drop Into the left eye daily   lidocaine (LIDODERM) 5 % patch Not Taking  No No   Sig: Place 1 patch onto the skin every 24 hours To prevent lidocaine toxicity, patient should be patch free for 12 hrs daily.   Patient not taking: Reported on 2/9/2023   lisinopril (ZESTRIL) 10 MG tablet 2/9/2023 at am  No Yes   Sig: Take 1 tablet (10 mg) by mouth daily   loperamide (IMODIUM A-D) 2 MG tablet Unknown  Yes Yes   Sig: Take 2 mg by mouth daily as needed   metFORMIN (GLUCOPHAGE) 500 MG tablet  2023 at am  No Yes   Sig: TAKE 1 TABLET BY MOUTH TWICE A DAY WITH MEALS   multivitamin, therapeutic (THERA-VIT) TABS tablet  Self Yes No   Sig: Take 1 tablet by mouth daily   Patient not taking: Reported on 2023   pioglitazone (ACTOS) 45 MG tablet 2023 at am  No Yes   Sig: Take 1 tablet (45 mg) by mouth daily      Facility-Administered Medications: None     Current Facility-Administered Medications   Medication Dose Route Frequency     amiodarone  200 mg Oral Daily     apixaban ANTICOAGULANT  2.5 mg Oral BID     brimonidine  1 drop Left Eye BID     cholestyramine  1 packet Oral BID w/meals     dorzolamide  1 drop Left Eye BID     furosemide  40 mg Intravenous BID     gabapentin  200 mg Oral TID     insulin aspart  1-7 Units Subcutaneous TID AC     insulin aspart  1-5 Units Subcutaneous At Bedtime     latanoprost  1 drop Left Eye QPM     lisinopril  10 mg Oral Daily     metoprolol tartrate  37.5 mg Oral BID     sodium chloride (PF)  3 mL Intracatheter Q8H     Current Facility-Administered Medications   Medication Last Rate     Allergies   Allergies   Allergen Reactions     No Known Drug Allergies        Social History    reports that he has never smoked. He has never used smokeless tobacco. He reports that he does not currently use alcohol. He reports that he does not use drugs.      Family History   I have reviewed this patient's family history and updated it with pertinent information if needed.  Family History   Problem Relation Age of Onset     Alzheimer Disease Maternal Grandmother      Arthritis Maternal Grandmother      Cancer Mother         breast/ 1983/colon     Eye Disorder Mother         glaucoma and cataracts     Heart Disease Mother         angina/CABG     Hypertension Mother      Cancer Father         colon     Diabetes Maternal Aunt         AoDM     No Known Problems Daughter           Review of Systems   A comprehensive review of systems 10 point was performed and is negative  "other than that noted in the HPI or here.     Physical Exam   Vital Signs with Ranges  Temp:  [98.3  F (36.8  C)-99.7  F (37.6  C)] 98.3  F (36.8  C)  Pulse:  [64-95] 77  Resp:  [12-28] 18  BP: ()/(54-92) 114/54  SpO2:  [87 %-99 %] 93 %  Wt Readings from Last 4 Encounters:   02/10/23 82.1 kg (181 lb 1.6 oz)   02/09/23 85.2 kg (187 lb 14.4 oz)   01/24/23 81.1 kg (178 lb 12.8 oz)   01/03/23 80.7 kg (178 lb)     I/O last 3 completed shifts:  In: 360 [P.O.:360]  Out: 4100 [Urine:4100]      Vitals: /54 (BP Location: Right arm)   Pulse 77   Temp 98.3  F (36.8  C) (Oral)   Resp 18   Ht 1.778 m (5' 10\")   Wt 82.1 kg (181 lb 1.6 oz)   SpO2 93%   BMI 25.99 kg/m      Physical Exam:   General - Alert and oriented to time place and person in no acute distress  Eyes - No scleral icterus  HEENT - Neck supple, moist mucous membranes  Cardiovascular - irregular with normal rate. Soft holosystolic murmur  Extremities - There is mild edema  Respiratory - crackles to mid lung  Skin - No pallor or cyanosis  Gastrointestinal - Non tender and non distended without rebound or guarding  Psych - Appropriate affect   Neurological - No gross motor neurological focal deficits    No lab results found in last 7 days.    Invalid input(s): TROPONINIES    Recent Labs   Lab 02/10/23  1327 02/10/23  1219 02/10/23  0901 02/10/23  0856 02/10/23  0412 02/09/23  1716 02/09/23  1208   WBC  --   --   --   --  9.6  --  12.3*   HGB  --   --   --   --  8.7*  --  9.3*   MCV  --   --   --   --  89  --  88   PLT  --   --   --   --  322  --  323   NA  --  125* 127*  --  126*  126*   < > 121*   POTASSIUM  --   --   --   --  4.0  --  4.8   CHLORIDE  --   --   --   --  89*  --  87*   CO2  --   --   --   --  28  --  25   BUN  --   --   --   --  22.5  --  22.0   CR  --   --   --   --  0.85  --  0.77   GFRESTIMATED  --   --   --   --  83  --  85   ANIONGAP  --   --   --   --  9  --  9   MICHAEL  --   --   --   --  8.6  --  9.1   *  --   --  136* " 165*   < > 334*   ALBUMIN  --   --   --   --   --   --  3.6   PROTTOTAL  --   --   --   --   --   --  6.4   BILITOTAL  --   --   --   --   --   --  1.2   ALKPHOS  --   --   --   --   --   --  90   ALT  --   --   --   --   --   --  18   AST  --   --   --   --   --   --  29    < > = values in this interval not displayed.     Recent Labs   Lab Test 09/01/22  1445 09/30/21  1156 03/14/16  0842 03/20/15  0836   CHOL 135 122   < > 131   HDL 56 54   < > 66   LDL 60 55   < > 46   TRIG 93 65   < > 96   CHOLHDLRATIO  --   --   --  2.0    < > = values in this interval not displayed.     Recent Labs   Lab 02/10/23  0412 02/09/23  1755 02/09/23  1402 02/09/23  1208   WBC 9.6  --   --  12.3*   HGB 8.7*  --   --  9.3*   HCT 25.7*  --   --  28.0*   MCV 89  --   --  88     --   --  323   IRON  --   --  26*  --    IRONSAT  --   --  7*  --    FEB  --   --  349  --    SHELLY  --   --  293  --    B12  --   --  <150*  --    FOLIC  --  15.4  --   --      No results for input(s): PH, PHV, PO2, PO2V, SAT, PCO2, PCO2V, HCO3, HCO3V in the last 168 hours.  Recent Labs   Lab 02/09/23  1208   NTBNPI 5,146*     No results for input(s): DD in the last 168 hours.  No results for input(s): SED, CRP in the last 168 hours.  Recent Labs   Lab 02/10/23  0412 02/09/23  1208    323     No results for input(s): TSH in the last 168 hours.  No results for input(s): COLOR, APPEARANCE, URINEGLC, URINEBILI, URINEKETONE, SG, UBLD, URINEPH, PROTEIN, UROBILINOGEN, NITRITE, LEUKEST, RBCU, WBCU in the last 168 hours.    Imaging:  Recent Results (from the past 48 hour(s))   Chest XR,  PA & LAT    Narrative    CHEST TWO VIEWS   2/9/2023 1:09 PM     HISTORY: Shortness of breath.    COMPARISON: Chest CTA on 4/9/2012.      Impression    IMPRESSION: AP and lateral views of the chest were obtained. Mildly  enlarged cardiac silhouette. Right upper lobe/perihilar predominant  patchy airspace pulmonary opacities, worrisome for infection. No  significant pleural  "effusion or pneumothorax. Multilevel degenerative  changes of the spine.    GABINO BREWER MD         SYSTEM ID:  [id number]       Echo:  No results found for this or any previous visit (from the past 4320 hour(s)).       # Hyponatremia: Lowest Na = 121 mmol/L in last 2 days, will monitor as appropriate        # Drug Induced Coagulation Defect: home medication list includes an anticoagulant medication    # Overweight: Estimated body mass index is 25.99 kg/m  as calculated from the following:    Height as of this encounter: 1.778 m (5' 10\").    Weight as of this encounter: 82.1 kg (181 lb 1.6 oz).    Cardiovascular: Cardiac Arrhythmia: Atrial fibrillation: Paroxysmal    Fluid & Electrolyte Disorders: Fluid overload, unspecified    Pulmonary Heart Disease (Pulmonary hypertension or Cor pulmonale): Pulmonary Hypertension, unspecified    Limitations of activities/Fatigue (optional): Chronic Fatigue and Other Debilities: Age-related physical debility                  "

## 2023-02-10 NOTE — PROVIDER NOTIFICATION
0025 MD paged: Sodium is down to 122 from 125. Also, pt is c/o difficulty breathing out from nostrils. O2 sats OK, could I get some nasal spray? Thanks!

## 2023-02-10 NOTE — PROGRESS NOTES
MD Notification    Notified Person: MD    Notified Person Name: Harley PA    Notification Date/Time:2/9/2023 at 1845    Notification Interaction: awaiting order    Purpose of Notification: Pt is asking to get his home Gabapentin 200 mg for neuropathy.      Orders Received:    Comments:

## 2023-02-10 NOTE — PLAN OF CARE
Goal Outcome Evaluation: Pt is A&Ox4, Tylenol 650 mg given for generalized pain, VSS and on tele A-Fib CVR, on 1 L NC, Lasix 40 mg IV given, male liberty cath on,  at dinner and 294 at HS. Bilateral LE edema +2, coccyx blanchable red with yellow blister dime size - sacral foam dressing on. Plan to continue to diurese.       Plan of Care Reviewed With: patient, family    Overall Patient Progress: no changeOverall Patient Progress: no change

## 2023-02-10 NOTE — PLAN OF CARE
Goal Outcome Evaluation:      Plan of Care Reviewed With: patient    Overall Patient Progress: no change    A/O x 4. Tele A fib CVR. 3 L NC, LS coarse in bases. Assist of 2 w/ GB and walker. +3 BLE edema. 40 mg IV lasix BID. External catheter in place, patent. Mod carb diet, . Na low, trending q4h. 126 this AM. Plans to continue with diurese. Will continue with plan of care.

## 2023-02-10 NOTE — PLAN OF CARE
Goal Outcome Evaluation:      Plan of Care Reviewed With: patient, family      Patient is very hard to move, 2 strong assist and walker gait belt, no appetite, eating small amount. Daughter is at bedside, patient complaining pain in the right leg, sodium monitoring. VSS, need to 2 L NC to keep oxygen above 90's. Edema in legs.

## 2023-02-11 NOTE — PROGRESS NOTES
[facility] [place]    Medicine Progress Note - Hospitalist Service    Date of Admission:  2/9/2023    Assessment & Plan   Louis Keller Jr. is a 89 year old male who presents with progressive dyspnea on exertion, weakness and fatigue, chest pain and lower extremity edema.     Acute exacerbation of chronic diastolic CHF with hypoxia  Chronic atrial fibrillation  Elevated troponin  -Patient presenting with worsening dyspnea  -Last echo on 2018 showed EF of 55-60%, patient likely has a history of diastolic heart failure although he does not consistently documented on his past records.  -Prior to admission appears to be on Lasix 10 mg daily.  -X-ray shows cardiomegaly and perihilar patchy airspace opacity suspicious for pulmonary vascular congestion  -Monitor daily weights  -Intake and output  -IV diuresis with Lasix 40 mg IV twice daily  -O2 sats in the mid 80s, continue supplemental oxygen, wean as tolerated  -Cardiology consult appreciated  -Continue prior to admission apixaban, metoprolol, lisinopril and amiodarone  .-Hold [facility] due [facility] low [facility]ures [facility]      # Hypomagnesemia  Mag replacment        # CAP  Follow blood cultures  Continue azithromycin and Rocephin          # Chronic Atrial Fibrillation  Prior to admission amiodarone and eliquis     Hyponatremia  -Patient has a history of mild hyponatremia up to 129 previously  -Sodium on presentation today is 121, suspected this is due to CHF exacerbation  Sodium is 127  -I expect this to improve with diuresis  -Check sodium every  6 hours       DM II  -Hold prior to admission glipizide and metformin and Actos.  -Medium intensity sliding scale for now  Hypoglycemia protocol     Normocytic anemia  -Baseline appears to be between 12-13  -Hemoglobin at presentation 9.3, last hemoglobin on epic is from August, 2022 and was 12.9  -Etiology for his recent drop in hemoglobin is unclear, patient is anticoagulated but  denies bleeding from any source.  -Follow iron studies, B12 and folic acid  -Hemoglobin rechecked 9.2 and will resume Eliquis      # Chronic back pain   Lidocaine patch  PT and OT           Diet: Moderate Consistent Carb (60 g CHO per Meal) Diet    DVT Prophylaxis: DOAC  Mccarthy Catheter: Not present  Lines: None     Cardiac Monitoring: None  Code Status: Full Code      Clinically Significant Risk Factors         # Hyponatremia: Lowest Na = 122 mmol/L in last 2 days, will monitor as appropriate    # Hypomagnesemia: Lowest Mg = 1.5 mg/dL in last 2 days, will replace as needed                     Disposition Plan      Expected Discharge Date: 02/14/2023                  Saturnino Mcgee MD  Hospitalist Service  [facility]  Securely message with [employer] (more info)  Text page via [employer] Paging/Directory   ______________________________________________________________________    Interval History   Patient seen and examined at bedside  No chest pain  Improved shortness of breath  Improved lower back pain and leg pain        Physical Exam   Vital Signs: Temp: 100.4  F (38  C) Temp src: Oral BP: 121/62 Pulse: 105   Resp: 22 SpO2: 94 % O2 Device: Nasal cannula Oxygen Delivery: 2 LPM  Weight: 169 lbs 1.49 oz   Physical Exam  Cardiovascular:      Rate and Rhythm: Rhythm irregular.      Comments: JVD +  Pulmonary:      Comments: Mild respiratory distress  Musculoskeletal:      Right lower leg: Edema present.      Left lower leg: Edema present.       Medical Decision Making

## 2023-02-11 NOTE — PROGRESS NOTES
[facility]    Medicine Progress Note - Hospitalist Service    Date of Admission:  2/9/2023    Assessment & Plan   Louis Keller Jr. is a 89 year old male who presents with progressive dyspnea on exertion, weakness and fatigue, chest pain and lower extremity edema.     Acute exacerbation of chronic diastolic CHF with hypoxia  Chronic atrial fibrillation  Elevated troponin  -Patient presenting with worsening dyspnea  -Last echo on 2018 showed EF of 55-60%, patient likely has a history of diastolic heart failure although he does not consistently documented on his past records.  -Prior to admission appears to be on Lasix 10 mg daily.  -X-ray shows cardiomegaly and perihilar patchy airspace opacity suspicious for pulmonary vascular congestion  -Monitor daily weights  -Intake and output  -IV diuresis with Lasix 40 mg IV twice daily  -O2 sats in the mid 80s, continue supplemental oxygen, wean as tolerated  -Cardiology consult appreciated  -Continue prior to admission apixaban, metoprolol, lisinopril and amiodarone  -Serial troponin  Hold Actos     Intake/Output Summary (Last 24 hours) at 2/10/2023 1830  Last data filed at 2/10/2023 1800  Gross per 24 hour   Intake 223 ml   Output 3600 ml   Net -3377 ml         # Chronic Atrial Fibrillation  Prior to admission amiodarone and     Hyponatremia  -Patient has a history of mild hyponatremia up to 129 previously  -Sodium on presentation today is 121, suspected this is due to CHF exacerbation  Sodium is 125   -I expect this to improve with diuresis  -Check sodium every 4 until better     DM II  -Hold prior to admission glipizide and metformin and Actos.  -Medium intensity sliding scale for now  Hypoglycemia protocol     Normocytic anemia  -Baseline appears to be between 12-13  -Hemoglobin at presentation 9.3, last hemoglobin on epic is from August, 2022 and was 12.9  -Etiology for his recent drop in hemoglobin is unclear, patient is anticoagulated but  "denies bleeding from any source.  -Follow iron studies, B12 and folic acid  Hb 8.7      # Chronic back pain   Lidocaine patch  PT and OT           Diet: Moderate Consistent Carb (60 g CHO per Meal) Diet    DVT Prophylaxis: DOAC  Mccarthy Catheter: Not present  Lines: None     Cardiac Monitoring: None  Code Status: Full Code      Clinically Significant Risk Factors         # Hyponatremia: Lowest Na = 121 mmol/L in last 2 days, will monitor as appropriate                # Overweight: Estimated body mass index is 25.99 kg/m  as calculated from the following:    Height as of this encounter: 1.778 m (5' 10\").    Weight as of this encounter: 82.1 kg (181 lb 1.6 oz)., PRESENT ON ADMISSION         Disposition Plan     Expected Discharge Date: 02/11/2023                  Saturnino Mcgee MD  Hospitalist Service  [facility]  Securely message with [employer] (more info)  Text page via [employer] Paging/Directory   ______________________________________________________________________    Interval History   Patient seen and examined at bedside  No chest pain  Improved shortness of breath  Complaining of right lower leg pain        Physical Exam   Vital Signs: Temp: 99.1  F (37.3  C) Temp src: Oral BP: 128/72 Pulse: 90   Resp: 18 SpO2: 94 % O2 Device: Nasal cannula Oxygen Delivery: 2 LPM  Weight: 181 lbs 1.6 oz   Physical Exam  Cardiovascular:      Rate and Rhythm: Rhythm irregular.   Pulmonary:      Comments: Mild respiratory distress  Musculoskeletal:      Right lower leg: Edema present.      Left lower leg: Edema present.       Medical Decision Making       "

## 2023-02-11 NOTE — PROVIDER NOTIFICATION
MD Notification    Notified Person: MD    Notified Person Name: Dr Saldaña    Notification Date/Time: 02/11/2023 at 03:28 am    Notification Interaction: Text paged.     Purpose of Notification: Pt has temp of 101.1, given Tylenol. Also, Pt is congested with non-productive cough, wondering robitussin and Tessalon order?. Also, Na 125, per MD note, recheck every 4 hrs, no order in placed. Wondering an order?  Please advice.     Orders Received: BMP, CBS and STAT chest X-Ray and Blood culture order in placed. Also, Robitussin ordered.      Comments:

## 2023-02-11 NOTE — PROGRESS NOTES
Cross cover    Called for fever of 101F and more wet sounding, productive cough.  Note CXR on admission that called possible infiltrate, thought more edema given presentation.  Leukocytosis on admission, improved on recheck.  Now with fever on repeat CXR ordered tonight showing ongoing similar findings I wonder about pneumonia playing a role here as well.      -blood cultures obtained  -start ceftriaxone and azithromycin

## 2023-02-11 NOTE — PROGRESS NOTES
"   02/11/23 1300   Appointment Info   Signing Clinician's Name / Credentials (OT) Susanna Hinds, OTR/L   Living Environment   People in Home spouse   Current Living Arrangements independent living facility  ([facility] (Also has [facility]))   Home Accessibility no concerns   Transportation Anticipated family or friend will provide   Living Environment Comments Pt resides w/spouse in ILF. BR SET UPO: Walk in shower, grab bars, shower chair; RTS w/arms. 4WW use at baseline all mobility.   Self-Care   Usual Activity Tolerance fair   Regular Exercise Yes   Activity/Exercise Type walking  (Walks within facility)   Activity/Exercise/Self-Care Comment Son-inlaw A with showers, pt has troubles getting on/off toilet, ind dressing.   Instrumental Activities of Daily Living (IADL)   Previous Responsibilities medication management;finances   IADL Comments Meals per facility, cleaning/laundry per spouse,   General Information   Onset of Illness/Injury or Date of Surgery 02/09/23   Referring Physician Saturnino Mcgee MD   Patient/Family Therapy Goal Statement (OT) To get stronger   Additional Occupational Profile Info/Pertinent History of Current Problem Per chart: \"Louis Keller Jr. is a 89 year old male who presents with progressive dyspnea on exertion, weakness and fatigue, chest pain and lower extremity edema.\"   Existing Precautions/Restrictions fall   Cognitive Status Examination   Orientation Status orientation to person, place and time   Visual Perception   Visual Impairment/Limitations WFL;corrective lenses for reading   Sensory   Sensory Quick Adds sensation intact   Sensory Comments BUEs   Pain Assessment   Patient Currently in Pain   (Yes, occasional R quad muscle spasms)   Posture   Posture forward head position;protracted shoulders;kyphosis   Range of Motion Comprehensive   Comment, General Range of Motion BUEs WFL   Strength Comprehensive (MMT)   Comment, General Manual Muscle Testing (MMT) " Assessment 3+/5 shoulder to hand BUEs   Coordination   Upper Extremity Coordination No deficits were identified   Bed Mobility   Comment (Bed Mobility) Not tested   Transfers   Transfers sit-stand transfer   Sit-Stand Transfer   Sit/Stand Transfer Comments Dep A x 2 using Sonal Stedy   Balance   Balance Comments Very Unsteady on feet, global BLE weakness   Activities of Daily Living   BADL Assessment/Intervention lower body dressing;feeding   Lower Body Dressing Assessment/Training   Comment, (Lower Body Dressing) Dep A   Eating/Self Feeding   Comment, (Feeding) Set up A   Clinical Impression   Criteria for Skilled Therapeutic Interventions Met (OT) Yes, treatment indicated   OT Diagnosis Decreased ind with I/ADLs   OT Problem List-Impairments impacting ADL problems related to;activity tolerance impaired;balance;mobility;strength;pain   Assessment of Occupational Performance 3-5 Performance Deficits   Identified Performance Deficits TB Dressing, toileting/toilet transfers, taxing I/ADLs   Planned Therapy Interventions (OT) ADL retraining;IADL retraining;transfer training   Clinical Decision Making Complexity (OT) low complexity   Anticipated Equipment Needs Upon Discharge (OT)   (TBD)   Risk & Benefits of therapy have been explained patient   OT Total Evaluation Time   OT Eval, Low Complexity Minutes (52173) 12   OT Goals   Therapy Frequency (OT) 5 times/wk   OT Predicted Duration/Target Date for Goal Attainment 02/24/23   OT Goals Hygiene/Grooming;Upper Body Dressing;Lower Body Dressing;Toilet Transfer/Toileting;Transfers   OT: Hygiene/Grooming modified independent  (chair)   OT: Upper Body Dressing Supervision/stand-by assist  (chair vs EOB)   OT: Lower Body Dressing Minimal assist  (AE; chair vs EOB)   OT: Transfer Minimal assist  (FWW, sit<>stand using SS)   OT: Toilet Transfer/Toileting Minimal assist;toilet transfer;cleaning and garment management  (BSC)   Self-Care/Home Management   Self-Care/Home Mgmt/ADL,  Compensatory, Meal Prep Minutes (01359) 17   Symptoms Noted During/After Treatment (Meal Preparation/Planning Training) fatigue;increased pain   Treatment Detail/Skilled Intervention pt greeted in chair, agreeable to OT. pt sit<>stand with Dep A x 2 using SS with VCs/tactile cues/full physical assist, heavy Left lateral lean in standing. Leans to left sitting in chair. Pt Dep A for LB dressing to doff/don B socks. CHF education initiated. Pt up in chair with needs met, alarm set.   Cardiac Education   Education Provided Diagnosis;Diet;Heart anatomy;Signs and symptoms   Education Packet Given to Patient Yes   All Patient Education Handouts Reviewed with Patient and/or Family No  (Need to review OMNI scale, EC strategies, Stoplight tool, daily weights, etc.)   OT Discharge Planning   OT Plan CHF education, seated exercises/BUE HEP, g/h with seated dynamic reaching in chair   OT Discharge Recommendation (DC Rec) Transitional Care Facility   OT Rationale for DC Rec Pt functioning well below repored baseline level, demo impairments in activity tolerance, mobility, strength, ADLs, impacting overall indepeendence. Pt would benefit from skilled IP OT to address impairments noted. Rec TCU for increased strength and I/ADL independence prior to return home.   OT Brief overview of current status Dep A sit<>stand with SS; Dep A LB Dressing; CHF education   Total Session Time   Timed Code Treatment Minutes 17   Total Session Time (sum of timed and untimed services) 29

## 2023-02-11 NOTE — PROGRESS NOTES
A&O x 4,  VSS on 2LNC, spiked temp of 101.1, MD updated, chest X-ray done. Blood culture completed. Initiated IV abx. Lactic fire, waiting STAT lactic drawn. Last temp 99.1, Tele Afib CVR + PVC. Continue diuresing and abx. Given Robatassin for cough with same improvement.

## 2023-02-11 NOTE — PROGRESS NOTES
Inpatient Cardiology Consultation Progress Note:    Louis Keller Jr. MRN#: [id number]   YOB: 1932 Age: 89 year old     Date of Admission: 2/9/2023  Consult indication: CHF         Assessment and Plan:     # Acute decompensation of chronic HFpEF  # Mildly elevated troponin 2/2 above  # Fever, leukocytosis, possible PNA  # Hyponatremia, complicates diuresis  # DM2  # Chronic atrial fibrillation    -Hemoglobin has decreased from 9.3 on admission to 7.2 2/11/2023, no clear evidence of bleeding, though on recheck was back to 9.2  - Febrile overnight, reviewed cross cover note from Dr. Pantoja, concern for possible pneumonia, cultures obtained, started on antibiotics  -Continue amiodarone 200 mg daily  - Will hold lisinopril due to softer blood pressures this morning  - Continue metoprolol tartrate 37.5 mg twice daily  - Continue apixaban, erroneous Hgb level earlier  - Net negative approximately 6.2 L since admission, net -2.4 L yesterday.  BP softer today, decreased lasix, will resume tomorrow.     Thank you for allowing our team to participate in the care of Louis Keller Jr..  Please do not hesitate to page me with any questions or concerns.     Erick Beckham MD, [facility]  Cardiology  February 11, 2023    Voice recognition software utilized.     Moderate complexity          Interval Events:     - Hemoglobin level low this morning, erroneous value  - Febrile overnight and intermittently throughout the day  - Pt denies any chest pain, chest discomfort, dyspnea  - no dizziness/lightheadedness  - interval labs and studies reviewed   - interval progress notes reviewed  - Pt updated on clinical course, plan for the day         Medications reviewed:     Current medications:  Current Facility-Administered Medications Ordered in Epic   Medication Dose Route Frequency Last Rate Last Admin     acetaminophen (TYLENOL) tablet 650 mg  650 mg Oral Q6H PRN   650 mg at 02/11/23 0250    Or      acetaminophen (TYLENOL) Suppository 650 mg  650 mg Rectal Q6H PRN         amiodarone (PACERONE) tablet 200 mg  200 mg Oral Daily   200 mg at 02/10/23 0843     apixaban ANTICOAGULANT (ELIQUIS) tablet 2.5 mg  2.5 mg Oral BID   2.5 mg at 02/10/23 2211     [START ON 2/12/2023] azithromycin (ZITHROMAX) tablet 250 mg  250 mg Oral Daily         brimonidine (ALPHAGAN) 0.2 % ophthalmic solution 1 drop  1 drop Left Eye BID   1 drop at 02/10/23 2213     cefTRIAXone (ROCEPHIN) 2 g vial to attach to  ml bag for ADULTS or NS 50 ml bag for PEDS  2 g Intravenous Q24H   2 g at 02/11/23 0543     cholestyramine (QUESTRAN) Packet 4 g  1 packet Oral BID w/meals   4 g at 02/10/23 1830     glucose gel 15-30 g  15-30 g Oral Q15 Min PRN        Or     dextrose 50 % injection 25-50 mL  25-50 mL Intravenous Q15 Min PRN        Or     glucagon injection 1 mg  1 mg Subcutaneous Q15 Min PRN         dorzolamide (TRUSOPT) 2 % ophthalmic solution 1 drop  1 drop Left Eye BID   1 drop at 02/10/23 2219     furosemide (LASIX) injection 40 mg  40 mg Intravenous BID   40 mg at 02/10/23 1726     gabapentin (NEURONTIN) capsule 200 mg  200 mg Oral TID   200 mg at 02/10/23 2211     guaiFENesin-dextromethorphan (ROBITUSSIN DM) 100-10 MG/5ML syrup 10 mL  10 mL Oral Q4H PRN   10 mL at 02/11/23 0551     insulin aspart (NovoLOG) injection (RAPID ACTING)  1-7 Units Subcutaneous TID AC   3 Units at 02/10/23 1941     insulin aspart (NovoLOG) injection (RAPID ACTING)  1-5 Units Subcutaneous At Bedtime   1 Units at 02/10/23 2214     latanoprost (XALATAN) 0.005 % ophthalmic solution 1 drop  1 drop Left Eye QPM   1 drop at 02/10/23 2220     Lidocaine (LIDOCARE) 4 % Patch 1 patch  1 patch Transdermal Q24h   1 patch at 02/10/23 2211    And     lidocaine patch in PLACE   Transdermal Q8H MAURA         lidocaine (LMX4) cream   Topical Q1H PRN         lidocaine 1 % 0.1-1 mL  0.1-1 mL Other Q1H PRN         lisinopril (ZESTRIL) tablet 10 mg  10 mg Oral Daily   10 mg at  02/10/23 0843     melatonin tablet 1 mg  1 mg Oral At Bedtime PRN         metoprolol tartrate (LOPRESSOR) half-tab 37.5 mg  37.5 mg Oral BID   37.5 mg at 02/10/23 2211     ondansetron (ZOFRAN ODT) ODT tab 4 mg  4 mg Oral Q6H PRN        Or     ondansetron (ZOFRAN) injection 4 mg  4 mg Intravenous Q6H PRN         sodium chloride (OCEAN) 0.65 % nasal spray 1 spray  1 spray Both Nostrils Q1H PRN   1 spray at 23 0250     sodium chloride (PF) 0.9% PF flush 3 mL  3 mL Intracatheter Q8H   3 mL at 23 0251     sodium chloride (PF) 0.9% PF flush 3 mL  3 mL Intracatheter q1 min prn         No current [facility]-ordered outpatient medications on file.             Physical Exam:   Vital signs were reviewed:  Temperatures:  Current - Temp: 99  F (37.2  C); Max - Temp  Av.4  F (37.4  C)  Min: 98.3  F (36.8  C)  Max: 101.1  F (38.4  C)  Respiration range: Resp  Av  Min: 18  Max: 18  Pulse range: Pulse  Av.7  Min: 74  Max: 105  Blood pressure range: Systolic (24hrs), Av , Min:100 , Max:128   ; Diastolic (24hrs), Av, Min:54, Max:93    Pulse oximetry range: SpO2  Av.3 %  Min: 74 %  Max: 94 %    Intake/Output Summary (Last 24 hours) at 2023 0736  Last data filed at 2023 0557  Gross per 24 hour   Intake 223 ml   Output 2650 ml   Net -2427 ml     169 lbs 1.49 oz  Body mass index is 24.26 kg/m .   Body surface area is 1.95 meters squared.    General/Constitutional: appears stated age, in no apparent distress  Head: normocephalic, atraumatic  Eyes - No scleral icterus  Neck: supple, trachea midline  Respiratory: Mildly coarse bilaterally  Cardiovascular: Regular rate, regularly irregular rhythm, 1 out of 6 ART, no lower extremity edema  Gastrointestinal: no guarding, non-rigid   Neurologic: awake, alert, moves all extremities  Skin: no jaundice, warm on limited exam  Psychiatric: affect is normal, answers questions appropriately, oriented to self and place         Selected laboratory tests:    Laboratory test results personally reviewed:   [facility]  Recent Labs   Lab 02/11/23  0550 02/11/23  0159 02/10/23  2131 02/10/23  2039 02/10/23  1809 02/10/23  1327 02/10/23  1219 02/10/23  0901 02/10/23  0856 02/10/23  0412 02/09/23  1716 02/09/23  1208   *  --   --  125*  --   --  125* 127*  --  126*  126*   < > 121*   POTASSIUM 3.4  --   --   --   --   --   --   --   --  4.0  --  4.8   CHLORIDE 88*  --   --   --   --   --   --   --   --  89*  --  87*   CO2 28  --   --   --   --   --   --   --   --  28  --  25   ANIONGAP 11  --   --   --   --   --   --   --   --  9  --  9   * 193* 249*  --  259*   < >  --   --    < > 165*   < > 334*   BUN 22.0  --   --   --   --   --   --   --   --  22.5  --  22.0   CR 0.83  --   --   --   --   --   --   --   --  0.85  --  0.77   GFRESTIMATED 83  --   --   --   --   --   --   --   --  83  --  85   MICHAEL 8.5  --   --   --   --   --   --   --   --  8.6  --  9.1   PROTTOTAL  --   --   --   --   --   --   --   --   --   --   --  6.4   ALBUMIN  --   --   --   --   --   --   --   --   --   --   --  3.6   BILITOTAL  --   --   --   --   --   --   --   --   --   --   --  1.2   ALKPHOS  --   --   --   --   --   --   --   --   --   --   --  90   AST  --   --   --   --   --   --   --   --   --   --   --  29   ALT  --   --   --   --   --   --   --   --   --   --   --  18    < > = values in this interval not displayed.     CBC  Recent Labs   Lab 02/11/23  0550 02/10/23  0412 02/09/23  1208   WBC 12.4* 9.6 12.3*   RBC 2.47* 2.88* 3.18*   HGB 7.2* 8.7* 9.3*   HCT 22.1* 25.7* 28.0*   MCV 90 89 88   MCH 29.1 30.2 29.2   MCHC 32.6 33.9 33.2   RDW 13.8 13.6 13.9    322 323     INRNo lab results found in last 7 days.  No results found for: TROPI, TROPONIN, TROPR, TROPN  Recent Labs   Lab Test 09/01/22  1445 09/30/21  1156 03/14/16  0842 03/20/15  0836   CHOL 135 122   < > 131   HDL 56 54   < > 66   LDL 60 55   < > 46   TRIG 93 65   < > 96   CHOLHDLRATIO  --   --   --  2.0    < > =  values in this interval not displayed.     Lab Results   Component Value Date    A1C 7.2 10/25/2022    A1C 6.8 04/26/2022    A1C 6.8 09/08/2021    A1C 6.6 03/08/2021    A1C 6.4 09/02/2020    A1C 6.7 11/11/2019    A1C 6.9 06/17/2019    A1C 7.3 12/19/2018     TSH   Date Value Ref Range Status   04/26/2022 7.08 (H) 0.40 - 4.00 mU/L Final   09/11/2020 6.13 (H) 0.40 - 4.00 mU/L Final

## 2023-02-12 NOTE — CONSULTS
"[facility]    Neurosurgery Consultation     Date of Admission:  2/9/2023  Date of Consult (When I saw the patient): 02/12/23    Assessment & Plan   Louis Keller Jr. is a 89 year old male who was admitted on 2/9/2023. I was asked to see the patient for \"20-30% superior endplate compression fracture of L5 in July 2022 with increased back pain, would he benefit from a brace.\"    Active Problems:    Chronic bilateral low back pain with right-sided sciatica    Assessment: hx of L5 superior endplate compression fracture as well as other mild degenerative findings    Plan: Will obtain updated lumbar MRI at this time for evaluation of acute findings. Activity as tolerated, ok with PT. If MRI stable, would likely recommend outpatient follow up with NSG vs O with Dr. Herron. A brace is not recommended at this time due to no point tenderness on exam.     I have discussed the following assessment and plan with Dr. Villareal who is in agreement with initial plan and will follow up with further consultation recommendations.    Radha Brown, YO  [facility] Neurosurgery  [place] [address]    Tel [phone]    Code Status    Full Code    Reason for Consult   Reason for consult: I was asked by Dr. Mcgee to evaluate this patient for \"20-30% superior endplate compression fracture of L5 in July 2022 with increased back pain, would he benefit from a brace.\"    Primary Care Physician   Joselito Armas    Chief Complaint   Back and leg pain    History is obtained from the patient, family, and EMR.     History of Present Illness   Louis Keller Jr. is a 89 year old male who presents with a history of back and leg pain s/p fall last summer. At that time he was found to have a subacute L5 compression fracture. He has been subsequently following with Dr. Herron at [facility] and has undergone 3 RUI's for his symptoms over the past 6 " months. He states the first two did not provide him with any relief. He states the last injection was completed ~6 weeks ago and provided him with some relief. He presented to the ED with SOB, cough, and generalized weakness. He is being treated for a CHF exacerbation and pneumonia. He continues to have difficulty with ambulation since admission. Today he was seen sitting in the chair. He reports ongoing back pain with progressively worsening right anterior thigh pain to the knee, intermittently radiating below the knee. He has baseline paresthesias in his legs due to peripheral neuropathy. He states these symptoms are unchanged. He reports generalized weakness to his legs.     Past Medical History   I have reviewed this patient's medical history and updated it with pertinent information if needed.   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  Past Surgical History:   Procedure Laterality Date     ARTHROPLASTY KNEE  8/5/2013    Procedure: ARTHROPLASTY KNEE;  Left Total Knee Arthroplasty       CARDIOVERSION  9/6/11    failed     EP LOOP RECORDER IMPLANT N/A 8/5/2019    Procedure: EP Loop Recorder Implant;  Surgeon: Darling Pacheco MD;  Location:  HEART CARDIAC CATH LAB     [facility]  NONSPECIFIC PROCEDURE  Child    T&A     [facility] NONSPECIFIC PROCEDURE  ; also 11/03    Colonoscopy     [facility] NONSPECIFIC PROCEDURE      Basal cell cancer of the nose     [facility] NONSPECIFIC PROCEDURE  1990    Cholecystectomy       Prior to Admission Medications   Prior to Admission Medications   Prescriptions Last Dose Informant Patient Reported? Taking?   Acetaminophen (TYLENOL PO)   Yes No   Sig: Take 1,000 mg by mouth 3 times daily as needed   Metoprolol Tartrate 37.5 MG TABS 2/9/2023 at am  No Yes   Sig: Take 37.5 mg by mouth 2 times daily   amiodarone (PACERONE) 200 MG tablet 2/9/2023 at am  No Yes   Sig: Take 1 tablet (200 mg) by mouth daily   apixaban ANTICOAGULANT (ELIQUIS) 2.5 MG tablet 2/9/2023 at am  No Yes   Sig: Take 1 tablet (2.5 mg) by mouth 2 times daily   brimonidine (ALPHAGAN) 0.2 % ophthalmic solution 2/9/2023 at am  Yes Yes   Sig: INSTILL 1 DROP INTO LEFT EYE TWICE A DAY   cholestyramine (QUESTRAN) 4 g packet 2/9/2023 at am  No Yes   Sig: Take 1 packet (4 g) by mouth 2 times daily (with meals)   Patient taking differently: Take 1 packet by mouth daily   dorzolamide (TRUSOPT) 2 % ophthalmic solution 2/9/2023 at am  Yes Yes   Sig: Place 1 drop Into the left eye 2 times daily   ferrous gluconate (FERGON) 324 (38 Fe) MG tablet 2/9/2023 at am  No Yes   Sig: TAKE 1 TABLET (324 MG) BY MOUTH DAILY (WITH BREAKFAST)   furosemide (LASIX) 20 MG tablet 2/8/2023  Yes Yes   Sig: Take 0.5 tablets (10 mg) by mouth daily   gabapentin (NEURONTIN) 100 MG capsule 2/9/2023 at am  No Yes   Sig: Take 2 capsules (200 mg) by mouth 3 times daily   glipiZIDE (GLUCOTROL XL) 5 MG 24 hr tablet 2/9/2023 at am  No Yes   Sig: Take 1 tablet (5 mg) by mouth 2 times daily   latanoprost (XALATAN) 0.005 % ophthalmic solution 2/8/2023 at pm  Yes Yes   Sig: Place 1 drop Into the left eye daily   lidocaine (LIDODERM) 5 % patch Not Taking  No No   Sig: Place 1 patch onto the skin every 24 hours To prevent lidocaine toxicity, patient should be patch  free for 12 hrs daily.   Patient not taking: Reported on 2023   lisinopril (ZESTRIL) 10 MG tablet 2023 at am  No Yes   Sig: Take 1 tablet (10 mg) by mouth daily   loperamide (IMODIUM A-D) 2 MG tablet Unknown  Yes Yes   Sig: Take 2 mg by mouth daily as needed   metFORMIN (GLUCOPHAGE) 500 MG tablet 2023 at am  No Yes   Sig: TAKE 1 TABLET BY MOUTH TWICE A DAY WITH MEALS   multivitamin, therapeutic (THERA-VIT) TABS tablet  Self Yes No   Sig: Take 1 tablet by mouth daily   Patient not taking: Reported on 2023   pioglitazone (ACTOS) 45 MG tablet 2023 at am  No Yes   Sig: Take 1 tablet (45 mg) by mouth daily      Facility-Administered Medications: None     Allergies   Allergies   Allergen Reactions     No Known Drug Allergies        Social History   I have reviewed this patient's social history and updated it with pertinent information if needed. Louis Keller Jr.  reports that he has never smoked. He has never used smokeless tobacco. He reports that he does not currently use alcohol. He reports that he does not use drugs.    Family History   I have reviewed this patient's family history and updated it with pertinent information if needed.   Family History   Problem Relation Age of Onset     Alzheimer Disease Maternal Grandmother      Arthritis Maternal Grandmother      Cancer Mother         breast/ /colon     Eye Disorder Mother         glaucoma and cataracts     Heart Disease Mother         angina/CABG     Hypertension Mother      Cancer Father         colon     Diabetes Maternal Aunt         AoDM     No Known Problems Daughter        Review of Systems   10 point ROS negative except noted above.    Physical Exam   Temp: 98.7  F (37.1  C) Temp src: Oral BP: 107/64 Pulse: 79   Resp: 18 SpO2: 93 % O2 Device: Nasal cannula Oxygen Delivery: 2 LPM  Vital Signs with Ranges  Temp:  [98.7  F (37.1  C)-100.4  F (38  C)] 98.7  F (37.1  C)  Pulse:  [] 79  Resp:  [18-22] 18  BP:  "(106-130)/(61-67) 107/64  SpO2:  [93 %-96 %] 93 %  164 lbs 1.6 oz     , Blood pressure 107/64, pulse 79, temperature 98.7  F (37.1  C), temperature source Oral, resp. rate 18, height 1.778 m (5' 10\"), weight 74.4 kg (164 lb 1.6 oz), SpO2 93 %.  164 lbs 1.6 oz  HEENT:  Normocephalic, atraumatic.  PERRLA.    Heart:  L>R BLE pitting edema  Lungs:  Some SOB  Skin:  Warm and dry, good capillary refill.  Extremities:  Good radial and dorsalis pedis pulses bilaterally, no edema, cyanosis or clubbing.    NEUROLOGICAL EXAMINATION:   Mental status:  Alert and Oriented x 3, speech is fluent.  Motor:    Hip Flexor:                Right: 5/5  Left:  5/5  Hip Adductor:             Right:  5/5  Left:  5/5  Hip Abductor:             Right:  5/5  Left:  5/5  Gastroc Soleus:        Right:  5/5  Left:  5/5  Tib/Ant:                      Right:  5/5  Left:  5/5  EHL:                     Right:  5/5  Left:  5/5  Sensation:  Decreased BLE L>R  Reflexes:   Negative Babinski.  Negative Clonus.      Lumbar examination reveals no tenderness of the spine or paraspinous muscles.  Hip height is symmetrical. Negative SI joint, sciatic notch or greater trochanteric tenderness to palpation bilaterally.  Straight leg raise is negative bilaterally.       Data     CBC RESULTS:   Recent Labs   Lab Test 02/11/23  1047 02/11/23  0550   WBC  --  12.4*   RBC  --  2.47*   HGB 9.2* 7.2*   HCT  --  22.1*   MCV  --  90   MCH  --  29.1   MCHC  --  32.6   RDW  --  13.8   PLT  --  378     Basic Metabolic Panel:  Lab Results   Component Value Date     02/11/2023     03/08/2021      Lab Results   Component Value Date    POTASSIUM 4.1 02/12/2023    POTASSIUM 4.7 08/12/2022    POTASSIUM 4.5 03/08/2021     Lab Results   Component Value Date    CHLORIDE 88 02/11/2023    CHLORIDE 97 08/12/2022    CHLORIDE 103 03/08/2021     Lab Results   Component Value Date    MICHAEL 8.5 02/11/2023    MICHAEL 9.4 03/08/2021     Lab Results   Component Value Date    CO2 28 " 02/11/2023    CO2 23 08/12/2022    CO2 28 03/08/2021     Lab Results   Component Value Date    BUN 22.0 02/11/2023    BUN 22 08/12/2022    BUN 18 03/08/2021     Lab Results   Component Value Date    CR 0.83 02/11/2023    CR 1.00 03/08/2021     Lab Results   Component Value Date     02/12/2023     08/12/2022     03/08/2021     INR:  Lab Results   Component Value Date    INR 2.1 05/06/2022    INR 2.6 04/26/2022    INR 3.6 04/18/2022    INR 3.5 04/04/2022    INR 2.6 03/07/2022    INR 3.3 02/21/2022    INR 2.8 01/20/2022    INR 2.0 12/31/2021    INR 2.7 12/08/2021    INR 2.4 11/18/2021    INR 3.1 11/04/2021    INR 2.8 10/14/2021    INR 2.70 06/09/2021    INR 2.90 04/28/2021    INR 2.10 04/07/2021    INR 3.30 03/24/2021    INR 2.40 02/10/2021    INR 2.30 12/30/2020    INR 2.40 12/02/2020    INR 2.70 11/04/2020    INR 2.70 10/14/2020    INR 3.50 09/30/2020    INR 2.50 09/02/2020    INR 2.30 08/03/2020

## 2023-02-12 NOTE — PLAN OF CARE
0700 - 1900 RN Shift Summary    Patient weak, decompensated. Unable to bear weight; Up to chair with lift assist. Alert and oriented x 4. No acute BP or HR issues. On 2L O2 nasal cannula. Frequent use of incentive spirometer and flutter valve with nursing encouragement. Ate three meals, fair appetite. Blood glucose still very high. Denies pain throughout the day. One instance of fever, treated with Tylenol. Coccyx red with thickened skin, turned every 2 hours and coccyx / sacrum offloaded. Family at bedside during the day, updated.

## 2023-02-12 NOTE — CONSULTS
Care Management Initial Consult    General Information  Assessment completed with: Patient, Children, daughter Kathy, son-in-law Aly, and grandchildren  Type of CM/SW Visit: Initial Assessment    Primary Care Provider verified and updated as needed: Yes   Readmission within the last 30 days:        Reason for Consult: discharge planning  Advance Care Planning: Advance Care Planning Reviewed:  (no ACP documents)          Communication Assessment  Patient's communication style: spoken language (English or Bilingual)    Hearing Difficulty or Deaf: no   Wear Glasses or Blind: yes    Cognitive  Cognitive/Neuro/Behavioral: WDL  Level of Consciousness: alert  Arousal Level: opens eyes spontaneously  Orientation: oriented x 4  Mood/Behavior: calm, cooperative  Best Language: 0 - No aphasia  Speech: clear, spontaneous    Living Environment:   People in home: spouse  Laura  Current living Arrangements: independent living facility, apartment      Able to return to prior arrangements: no       Family/Social Support:  Care provided by: self, spouse/significant other  Provides care for: no one  Marital Status:   Wife, Children  Laura       Description of Support System: Supportive, Involved    Support Assessment: Adequate family and caregiver support, Adequate social supports    Current Resources:   Patient receiving home care services: No     Community Resources: None  Equipment currently used at home: walker, rolling, grab bar, tub/shower, raised toilet seat, shower chair  Supplies currently used at home: None    Employment/Financial:  Employment Status: retired        Financial Concerns: No concerns identified   Referral to Financial Worker: No       Lifestyle & Psychosocial Needs:  Social Determinants of Health     Tobacco Use: Low Risk      Smoking Tobacco Use: Never     Smokeless Tobacco Use: Never     Passive Exposure: Not on file   Alcohol Use: Not on file   Financial Resource Strain: Not on file   Food  Insecurity: Not on file   Transportation Needs: Not on file   Physical Activity: Not on file   Stress: Not on file   Social Connections: Not on file   Intimate Partner Violence: Not on file   Depression: Not at risk     PHQ-2 Score: 0   Housing Stability: Not on file       Functional Status:  Prior to admission patient needed assistance:              Mental Health Status:          Chemical Dependency Status:                Values/Beliefs:  Spiritual, Cultural Beliefs, Sabianist Practices, Values that affect care: no               Additional Information:  Per care management/social work consult for discharge planning and TCU placement on discharge.  Patient was admitted on 2-9-23 with progressive dyspnea on exertion, weakness and fatigue, chest pain, and lower extremity edema.  The tentative date of discharge is yet to be determined.  Reviewed chart and spoke with patient, daughter Kathy, her  Aly, and various grandchildren regarding discharge plans.  Per patient and family report, patient lives with his wife in an independent apartment at [facility].  Patient states that they also have assisted living in the building, which he is looking at transitioning to.  Patient uses a 4 wheeled walker at baseline.  Patient has grab bars, a shower chair, and a raised toilet seat with arms in the bathroom.  Patient is independent with medication management, finances, and dressing.  Patient's wife manages the cleaning and laundry.  Patient eats his meals in the dining room.  Patient's son-in-law assists with bathing. Reviewed the therapy discharge recommendations of TCU placement on discharge and patient and family are in agreement.  Patient states he has been to TCU in the past and he is asking for referrals to be sent to [facility] and [facility].  Referrals sent, via the discharge navigator, to check bed availability.    Will continue to follow.      BOBBY William, [facility]  Social Work  Supervisor  [phone]  [facility]

## 2023-02-12 NOTE — PROGRESS NOTES
[facility]    Medicine Progress Note - Hospitalist Service    Date of Admission:  2/9/2023    Assessment & Plan   Louis Keller Jr. is a 89 year old male who presents with progressive dyspnea on exertion, weakness and fatigue, chest pain and lower extremity edema.     Acute exacerbation of chronic diastolic CHF with hypoxia  Chronic atrial fibrillation  Elevated troponin  Interpretation Summary     Echo Left ventricular systolic function is normal. The visual ejection  fraction is 55-60%. Diastolic function not assessed due to atrial  fibrillation. No regional wall motion abnormalities noted. There is no  thrombus seen in the left ventricle.  The right ventricle is mildly dilated. The right ventricular systolic  function is normal.  color Doppler evidence of an atrial shunt.    -Patient presenting with worsening dyspnea  -Last echo on 2018 showed EF of 55-60%, patient likely has a history of diastolic heart failure although he does not consistently documented on his past records.  -Prior to admission appears to be on Lasix 20 mg daily.  -X-ray shows cardiomegaly and perihilar patchy airspace opacity suspicious for pulmonary vascular congestion  -Monitor daily weights  -Intake and output  -I  --Cardiology consult appreciated  -Continue prior to admission apixaban, metoprolol, lisinopril and amiodarone  .Blood pressures improved and diueretics continued at home dose      # Elevated Troponin  Demand ischemia due to CHF  No anginal symptoms    # Hypomagnesemia  Mag replacment        # CAP  Follow blood cultures  Continue azithromycin and Rocephin  Recheck wbc tomorrow          # Chronic Atrial Fibrillation  Prior to admission amiodarone and eliquis     Hyponatremia  -Patient has a history of mild hyponatremia up to 129 previously  -Sodium on presentation today is 121, suspected this is due to CHF exacerbation  Sodium is 127 on 2/11/23  - improved with diuresis  -Check sodium every  24  hours       DM II  -Hold prior to admission glipizide and metformin and Actos.  -Medium intensity sliding scale for now  Hypoglycemia protocol  Lantus 5 units started on 2/11/23     Normocytic anemia  -Baseline appears to be between 12-13  -Hemoglobin at presentation 9.3, last hemoglobin on epic is from August, 2022 and was 12.9  -Etiology for his recent drop in hemoglobin is unclear, patient is anticoagulated but denies bleeding from any source.  -Follow iron studies, B12 and folic acid  -Hemoglobin rechecked 9.2 and will resume Eliquis on 2/11/23  Will recheck hb tomorrow      # Chronic back pain    July 2022 20-30% superior endplate compression fracture of L5 with  associated mild marrow edema. This is likely due to a nonhealed  fracture or subacute fracture.  Neurosurgery consulted for recommendations of LAUROLO brace    Lidocaine patch  PT and OT           Diet: Moderate Consistent Carb (60 g CHO per Meal) Diet    DVT Prophylaxis: DOAC  Mccarthy Catheter: Not present  Lines: None     Cardiac Monitoring: None  Code Status: Full Code      Clinically Significant Risk Factors         # Hyponatremia: Lowest Na = 125 mmol/L in last 2 days, will monitor as appropriate    # Hypomagnesemia: Lowest Mg = 1.5 mg/dL in last 2 days, will replace as needed                     Disposition Plan  CHF exacerbation and back pain evaluation/Will need TCU placement     Expected Discharge Date: 02/14/2023                  Saturnino Mcgee MD  Hospitalist Service  [facility]  Securely message with [employer] (more info)  Text page via [employer] Paging/Directory   ______________________________________________________________________    Interval History   Patient seen and examined at bedside  No chest pain  Improved shortness of breath      Physical Exam   Vital Signs: Temp: 98.7  F (37.1  C) Temp src: Oral BP: 107/64 Pulse: 79   Resp: 18 SpO2: 93 % O2 Device: Nasal cannula Oxygen Delivery: 2 LPM  Weight: 164 lbs  1.6 oz   Physical Exam  Cardiovascular:      Rate and Rhythm: Rhythm irregular.      Comments: JVD +  Pulmonary:      Comments: Mild respiratory distress  Musculoskeletal:      Right lower leg: Edema present.      Left lower leg: Edema present.       Medical Decision Making

## 2023-02-12 NOTE — PLAN OF CARE
Goal Outcome Evaluation:    Pt denies pain, but endorses SOB, up with the lift into the chair, new wound to gluteal cleft and right heel, Pt appears constipated and reports no BM for 5 days, PO senna and bisacodyl suppository given. good UOP, Tele A-fib CVR

## 2023-02-12 NOTE — PROGRESS NOTES
Pt is A&O x 4, calm and cooperative for cares. VSS on 2LNC saturation low 90s. Lower back/nec pain managed with ice pack, PRN Tylenol and Lidocaine patch. Temp spike to 100.1, given Tylenol, last temp 98.7. External male cathter with good urine output. Coccyx area reddened with thickened skin, Turn and reposition every 2 hours. Continue receiving IV abx. Plan to continue diuresing and antibiotic.

## 2023-02-12 NOTE — PROGRESS NOTES
[facility] Cardiology Progress Note      Subjective   89M admitted for decompensated HFpEF and possible pneumonia    Interval events  No further fevers overnight, diuresed about negative 800, weight down to 164 lbs this AM, continues on 2L NC, feels improved      Objective     VITAL SIGNS  Temp:  [98.7  F (37.1  C)-100.4  F (38  C)] 98.7  F (37.1  C)  Pulse:  [] 79  Resp:  [18-22] 18  BP: (106-130)/(61-67) 107/64  SpO2:  [86 %-96 %] 93 %  Admission Weight: 84.8 kg (187 lb)  Current Weight: 74.4 kg (164 lb 1.6 oz)    PHYSICAL EXAMINATION  General:  Well-developed, well-nourished. No acute distress. Alert and oriented x 3.  Pleasant and cooperative.  HEENT:  Extraocular movements grossly intact. Sclera Anicteric.  Cardiovascular:  Regular rate and rhythm. Normal S1 & S2. No murmurs, JVP very mildly elevated at 45 deg  Lungs:  Normal work of breathing on 2L NC, crackles on the right  Abdomen:  Soft, nontender, nondistended  Extremities:  Warm, well perfused. 1-2+ edema b/l  Neuro: Moving all extremities, no gross deficits noted    DIAGNOSTICS    Most Recent 3 CBC's:  Recent Labs   Lab Test 02/11/23  1047 02/11/23  0550 02/10/23  0412 02/09/23  1208   WBC  --  12.4* 9.6 12.3*   HGB 9.2* 7.2* 8.7* 9.3*   MCV  --  90 89 88   PLT  --  378 322 323     Most Recent 3 BMP's:  Recent Labs   Lab Test 02/12/23  0732 02/12/23  0547 02/12/23  0155 02/11/23  2101 02/11/23  1632 02/11/23  1432 02/11/23  0745 02/11/23  0550 02/10/23  2131 02/10/23  2039 02/10/23  1327 02/10/23  1219 02/10/23  0856 02/10/23  0412 02/09/23  1716 02/09/23  1208   NA  --   --   --   --   --   --   --  127*  --  125*  --  125*   < > 126*  126*   < > 121*   POTASSIUM  --  4.1  --   --   --  4.4  --  3.4  --   --   --   --   --  4.0  --  4.8   CHLORIDE  --   --   --   --   --   --   --  88*  --   --   --   --   --  89*  --  87*   CO2  --   --   --   --   --   --   --  28  --   --   --   --   --  28  --   25   BUN  --   --   --   --   --   --   --  22.0  --   --   --   --   --  22.5  --  22.0   CR  --   --   --   --   --   --   --  0.83  --   --   --   --   --  0.85  --  0.77   ANIONGAP  --   --   --   --   --   --   --  11  --   --   --   --   --  9  --  9   MICHAEL  --   --   --   --   --   --   --  8.5  --   --   --   --   --  8.6  --  9.1   *  --  255* 244*   < >  --    < > 176*   < >  --    < >  --    < > 165*   < > 334*    < > = values in this interval not displayed.     Most Recent 3 Troponin's:No lab results found.  Most Recent 3 BNP's:  Recent Labs   Lab Test 02/09/23  1208 09/08/21  1439   NTBNPI 5,146*  --    NTBNP  --  967*     Most Recent Cholesterol Panel:  Recent Labs   Lab Test 09/01/22  1445   CHOL 135   LDL 60   HDL 56   TRIG 93         Assessment & Plan     # Acute decompensation of chronic HFpEF  # Mildly elevated troponin 2/2 above  # Community acquired PNA  # Hyponatremia, complicates diuresis  # DM2  # Chronic atrial fibrillation    Excellent diuresis, weaning O2 needs in the context of a concurrent pneumonia. Will switch to PO diuresis today and see how he does.     Plan:  -- Net negative 7L total, Weight down 10 lbs, Appears euvolemic, Convert Diuresis to PO lasix 20 this AM, Aim for net neg 500 ml, if he falls behind we can re-dose this afternoon  -Continue amiodarone 200 mg daily  - Holding Lisinopril given softer pressures  - Continue metoprolol tartrate 37.5 mg twice daily  - Continue apixaban, erroneous Hgb level earlier    Medical Decision Making       30 MINUTES SPENT BY ME on the date of service doing chart review, history, exam, documentation & further activities per the note.       Enrrique Ma MD  2/12/2023

## 2023-02-13 NOTE — PROGRESS NOTES
[facility]  Cardiology Progress Note  Date of Service: 02/13/2023  Primary Cardiologist: Dr. Gleason    Assessment & Plan    Louis Keller Jr. is a 89 year old male admitted on 2/9/2023 with decompensated HFpEF and community acquired pneumonia.     Assessment:  Acute on chronic HFpEF   -PTA on Lasix 10 mg daily   -admitted at weight 187#; suspected dry weight 163#  Mildly elevated troponin secondary to above   -50 on admission, trended down  Community acquired pneumonia    -stable on 3L O2 NC   -abx per IM  Chronic Hyponatremia, stabilizing    -management per IM  Type 2 Diabetes Mellitus   -Hgb A1c - 7.2   Paroxysmal atrial fibrillation   -on Toprol and amiodarone   -PTA on reduced dose Eliquis at 2.5 mg BID under the direction of his primary cardiologist Dr. Gleason   Venous insufficiency with chronic lower extremity edema    Plan:   1. Medications   -continue amiodarone 200 mg daily    -continue Eliquis 2.5 mg BID     -continue metoprolol tartrate 37.5 mg BID    -continue Lasix 20 mg once daily   -discontinue lisinopril    *patient will continue to monitor daily BP while at TCU; if needed can resume lisinopril on outpatient basis  2. Close cardiology follow up recommended in the next 2-3 weeks with Michael Cortes NP in [place]   3. Cardiology will sign off.     Jing Tinajero PA-C  [facility] - Heart Care  Pager: [phone]    Interval History   Patient was seen in cardiology follow up on 2/9/2023 by Sonal Ji PA-C. At that time, patient complained of severe and progressive dyspnea. He was advised to present to the ED and was subsequently admitted with acute decompensated diastolic heart failure and diagnosed with community acquired pneumonia. Echo 2/10 unchanged from prior (2018). Diuresed well. Started on antibiotics for pneumonia.     Transitioned from IV to PO diuretics 2/12/2023. Down to 163# today.     Feeling weak today. Tried to stand with PT again today and was unable.  Hoping to get stronger in the coming weeks. Planning for discharge to TCU ~Wednesday. Breathing improved compared to day of admission.      Physical Exam   Temp: 99  F (37.2  C) Temp src: Oral BP: 126/71 Pulse: 84   Resp: 18 SpO2: (!) 88 % O2 Device: Nasal cannula Oxygen Delivery: 5 LPM  Vitals:    02/11/23 0243 02/12/23 0600 02/13/23 0546   Weight: 76.7 kg (169 lb 1.5 oz) 74.4 kg (164 lb 1.6 oz) 74.3 kg (163 lb 13.2 oz)     GEN: well nourished, in no acute distress.  HEENT:  Pupils equal, round. Sclerae nonicteric.   NECK: Supple, no masses appreciated. No JVD.  C/V:  Regular rate and rhythm, no murmur  RESP: Respirations are unlabored. Crackles noted throughout bilaterally  GI: Abdomen soft, nontender.  EXTREM: 1+ bilateral LE edema.  NEURO: Alert and oriented, cooperative.  SKIN: Warm and dry.     Medications       amiodarone  200 mg Oral Daily     apixaban ANTICOAGULANT  2.5 mg Oral BID     azithromycin  250 mg Oral Daily     brimonidine  1 drop Left Eye BID     cefTRIAXone  2 g Intravenous Q24H     cholestyramine  1 packet Oral BID w/meals     dorzolamide  1 drop Left Eye BID     furosemide  20 mg Oral Daily     gabapentin  200 mg Oral TID     insulin aspart  1-7 Units Subcutaneous TID AC     insulin aspart  1-5 Units Subcutaneous At Bedtime     insulin glargine  8 Units Subcutaneous BID     latanoprost  1 drop Left Eye QPM     lidocaine  1 patch Transdermal Q24h    And     lidocaine   Transdermal Q8H [facility]A     [Held by provider] lisinopril  10 mg Oral Daily     metoprolol tartrate  37.5 mg Oral BID     senna-docusate  2 tablet Oral BID     sodium chloride (PF)  3 mL Intracatheter Q8H       Data   Last 24 hours labs reviewed   EKG 2/9/2023: (reviewed personally) AFIB with VR 76; LAD    Tele: reviewed; PAF with controlled rates    Echo 2/10/2023  1. The left ventricle is normal in size. There is normal left ventricular wall thickness. Left ventricular systolic function is normal. The visual ejection fraction is  55-60%. Diastolic function not assessed due to atrial fibrillation. No regional wall motion abnormalities noted. There is no thrombus seen in the left ventricle.  2. The right ventricle is mildly dilated. The right ventricular systolic  function is normal.  3. Normal left atrial size. The right atrium is mildly dilated. There is no  color Doppler evidence of an atrial shunt.  4. Mild to moderate tricuspid regurgitation.  5. Mild aortic root dilatation.  6. In comparison to the previous report dated 08/10/2018, the findings are similar.

## 2023-02-13 NOTE — CONSULTS
"[facility] Nurse Inpatient Assessment     Consulted for: Gluteal cleft and DTI to right heel    Patient History (according to provider note(s):      \"Louis Keller Jr. is a 89 year old male with a history of Diabetes Mellitus and atrial fibrillation who presents with shortness of breath and leg swelling.\"    Areas Assessed:      Areas visualized during today's visit: Heels  and Sacrum/coccyx    Pressure Injury Location: right heel            Last photo: 2/13/23  Wound type: Pressure Injury     Pressure Injury Stage: Deep Tissue Pressure Injury (DTPI), hospital acquired      This is a Medical Device Related Pressure Injury (MDRPI) due to unknown  Wound history/plan of care:   Documented in nursing note 2/12. Patient's mobility is compromised and he is requiring a lift for transfers, also assist with turn and repo. Pt reports that his heel does get sore occasionally when resting on a surface. Mepilex covering heel on initial assessment.  Wound base: UTV, epidermis over purple area is intact. Devitalized epidermis over what was maybe a fluid-filled blister. Fissures. Intact scabs.     Palpation of the wound bed: boggy      Drainage: scant     Description of drainage: serosanguinous     Measurements (length x width x depth, in cm) 2  x 3.4  x  0 cm      Tunneling N/A     Undermining N/A  Periwound skin: Erythema- blanchable      Color: red      Temperature: normal   Odor: none  Pain: patient reports pressure, none  Pain intervention prior to dressing change: patient tolerated well  Treatment goal: Infection control/prevention and Protection  STATUS: initial assessment  Supplies ordered: gathered, at bedside and discussed with RN    My PI Risk Assessment     Sensory Perception: 3 - Slightly Limited     Moisture: 3 - Occasionally moist      Activity: 2 - Chairfast     Mobility: 2 - Very limited     Nutrition: 2 - Probably inadequate      Friction/Shear: 1 - Problem     TOTAL: 13 "     Pressure Injury Location: coccyx            Last photo: 2/13/23  Wound type: Pressure Injury, Friction, Shear and Moisture Associated Skin Damage (MASD)     Pressure Injury Stage: Deep Tissue Pressure Injury (DTPI), present on admission at superior aspect of wound, Stage 2 at inferior aspect     This is a Medical Device Related Pressure Injury (MDRPI) due to NA  Wound history/plan of care: Documented in nursing note on admission. Per RN, wound has gotten bigger. Found with sacral Mepilex in place on initial assessment.  Wound base: non-blanchable and epidermis superior aspect of wound area; dermis in open area. Devitalized epidermis overlying part of wound bed.     Palpation of the wound bed: normal      Drainage: scant     Description of drainage: serosanguinous     Measurements (length x width x depth, in cm) 2  x 3  x  0.1 cm (total reddened area); 1 x 1.5 x 0.1 cm (open area)     Tunneling N/A     Undermining N/A  Periwound skin: Ecchymosis and Macerated      Color: pink and red      Temperature: normal   Odor: none  Pain: denies , none  Pain intervention prior to dressing change: patient tolerated well  Treatment goal: Heal , Infection control/prevention, Decrease moisture and Protection  STATUS: initial assessment  Supplies ordered: supplies stored on unit and discussed with RN- Triad paste to open area, can try to position a 4x4 Mepilex to cover the DTI    Treatment Plan:     Right heel wound(s): Daily  and PRN  1. Gently cleanse wound with MicroKlenz wound cleanser. Pat dry using gauze.   2. Use sterile gauze 4x4 to cover injured area. Goal is to keep the wound clean and dry.   3. Wrap foot and ankle with Kerlix or stretch gauze to keep 4x4 in place on heel.    4. Use tape to secure wrap. Initial and date.  5. Patient should have Rooke boots in place whenever in bed or legs elevated.    Coccyx wound: Daily and PRN  1. Cleanse wound with wound cleanser and pat dry.   2. Apply thin layer of Triad Paste  "(order #[id number]) to wound bed. Avoid thick layer of triad paste.  3. If Triad becomes soiled with stool remove only soiled paste and reapply as needed.  4. Avoid pre moisten wipes. They are not intended for rosalia care.   5. Avoid use of brief. Can try mesh underwear if you need to secure an external catheter.  6. Use single covidien pad and limit number of linens underneath patient.     Pressure Injury Prevention (PIP) Plan:  -If patient is declining pressure injury prevention interventions: Explore reason why and address patient's concerns, Educate on pressure injury risk and prevention intervention(s), If patient is still declining, document \"informed refusal\" , and Ensure Care team is aware ( provider, charge nurse, etc)  -Mattress: Follow bed algorithm, reassess daily and order specialty mattress, if indicated.  -HOB: Maintain at or below 30 degrees, unless contraindicated  -Repositioning in bed: Every 1-2 hours , Left/right positioning; avoid supine, and Raise foot of bed prior to raising head of bed, to reduce patient sliding down (shear)  -Heels: Keep elevated off mattress and Heel lift boots  -Protective Dressing: None  -Positioning Equipment: pillows  -Chair positioning: Chair cushion (#[id number]) , Assist patient to reposition hourly, and Do NOT use a donut for sitting (this increases pressure to smaller area and creates a higher potential for injury)    -Moisture Management: Perineal cleansing /protection: Follow Incontinence Protocol, Avoid brief in bed, Clean and dry skin folds with bathing , and Moisturize dry skin  -Under Devices: Inspect skin under all medical devices during skin inspection , Ensure tubes are stabilized without tension, and Ensure patient is not lying on medical devices or equipment when repositioned    Orders: Written    RECOMMEND PRIMARY TEAM ORDER: None, at this time  Education provided: importance of repositioning, Moisture management and Off-loading pressure  Discussed plan of care " "with: Patient, Family and Nurse  WO nurse follow-up plan: twice weekly, Mon/Thurs  Notify WOC if wound(s) deteriorate.  Nursing to notify the Provider(s) and re-consult the WO Nurse if new skin concern.    DATA:     Current support surface: Standard  Standard gel/foam mattress (IsoFlex, Atmos air, etc) Will order Pulsate and chair cushion.  Containment of urine/stool: Purewick external catheter   BMI: Body mass index is 23.51 kg/m .   Active diet order: Orders Placed This Encounter      Moderate Consistent Carb (60 g CHO per Meal) Diet     Output: I/O last 3 completed shifts:  In: 360 [P.O.:360]  Out: 700 [Urine:700]     Labs: Recent Labs   Lab 02/13/23  0630 02/10/23  0412 02/09/23  1208   ALBUMIN  --   --  3.6   HGB 8.7*   < > 9.3*   WBC 10.1   < > 12.3*   A1C 8.9*  --   --     < > = values in this interval not displayed.     Pressure injury risk assessment:   Sensory Perception: 3-->slightly limited  Moisture: 3-->occasionally moist  Activity: 2-->chairfast  Mobility: 2-->very limited  Nutrition: 3-->adequate  Friction and Shear: 3-->no apparent problem  Navin Score: 16    Carla Oakley RN, CWCN  Please contact via [facility] at name or group \"[facility] nurse\"  "

## 2023-02-13 NOTE — PROVIDER NOTIFICATION
MD Notification    Notified Person: MD    Notified Person Name:Dr. Ross    Notification Date/Time: 2/13/2023 at 1647    Notification Interaction: awaiting order    Purpose of Notification: pt is complaining on side tongue sore, asking for some mouth wash or medication    Orders Received:    Comments:

## 2023-02-13 NOTE — PLAN OF CARE
PT: Orders received. Chart reviewed and discussed with care team.  PT not indicated due to therapy needs being addressed by OT and discharge plans established.  Defer discharge recommendations to OT.  Will complete orders.

## 2023-02-13 NOTE — CONSULTS
"CLINICAL NUTRITION SERVICES  -  ASSESSMENT NOTE      Recommendations Ordered by Registered Dietitian (RD):   Glucerna (chocolate) once daily at 2 pm + PRN   Malnutrition:   % Weight Loss:  > 5% in 1 month (severe malnutrition) -- if previous reported dry weight of 178 lb is accurate   % Intake:  <75% for > 7 days (moderate malnutrition)  Subcutaneous Fat Loss:  Orbital region mild depletion and Upper arm region mild-moderate depletion  Muscle Loss:  Temporal region mild depletion, Clavicle bone region mild depletion, Acromion bone region mild depletion and Dorsal hand region mild-moderate depletion  Fluid Retention:  Moderate BLE    Malnutrition Diagnosis: Moderate malnutrition  In Context of:  Acute illness or injury with underlying chronic illness/disease        REASON FOR ASSESSMENT  Louis Keller Jr. is a 89 year old male seen by Registered Dietitian for Consult -- Nutrition Navin < 3      NUTRITION HISTORY  - Information obtained from patient at bedside this afternoon.   - Patient states he has had a decreased appetite for a couple weeks now, food just hasn't been appealing to him. Typically eats 3 meals/day at baseline and doesn't utilize nutritional supplements regularly. Reports his usual weight at home is 178 lb.   - No known food allergies noted.       CURRENT NUTRITION ORDERS  Diet Order:     Moderate Consistent Carbohydrate     Current Intake/Tolerance:  Patient has been eating % of meals over the past 4 days of admission. He has a pizza and jello at bedside for lunch - ate all of the jello but only 1/2 pizza due to it being \"too greasy and salty\". He says he has been having difficulty ordering due to being told \"no\" to many food items with his carb restriction.       NUTRITION FOCUSED PHYSICAL ASSESSMENT FOR DIAGNOSING MALNUTRITION)  Yes          Observed:    Muscle wasting (refer to documentation in Malnutrition section) and Subcutaneous fat loss (refer to documentation in Malnutrition " "section) --> some loss may be age-related     Obtained from Chart/Interdisciplinary Team:  Admitted due to CHF exacerbation  Suspected dry weight of 163 lb per cardiology   Nutrition Navin 3 (adequate)    ANTHROPOMETRICS  Height: 5' 10\"  Weight: 163 lbs 13.22 oz  Body mass index is 23.51 kg/m .  Weight Status:  Normal BMI  IBW: 75.5 kg (166 lb) +/- 10%  % IBW: ~100%  Weight History: Patient reports usual dry weight of 178 lb. Potentially down 15 lb (8% weight loss) if weight records are accurate and true dry weight is 178 lb. Admitted at 187 lb.   Wt Readings from Last 10 Encounters:   02/13/23 74.3 kg (163 lb 13.2 oz)   02/09/23 85.2 kg (187 lb 14.4 oz)   01/24/23 81.1 kg (178 lb 12.8 oz)   01/03/23 80.7 kg (178 lb)   10/25/22 80.2 kg (176 lb 12.8 oz)   06/21/22 78 kg (172 lb)   04/26/22 79.4 kg (175 lb)   12/08/21 78.5 kg (173 lb)   10/21/21 81.4 kg (179 lb 6.4 oz)   09/21/21 80.9 kg (178 lb 4.8 oz)       LABS  Labs reviewed    Recent Labs   Lab 02/13/23  1230 02/13/23  0816 02/13/23  0200 02/12/23  2129 02/12/23  1757 02/12/23  1606   * 293* 207* 268* 299* 309*       MEDICATIONS  Medications reviewed  Cholesytramine BID  Lasix 20 mg/day  Medium sliding scale insulin  Lantus 8 units BID      ASSESSED NUTRITION NEEDS PER APPROVED PRACTICE GUIDELINES:    Dosing Weight 74.3 kg (lowest this admit 2/13)  Estimated Energy Needs: 2596-4124 kcals (25-30 Kcal/Kg)  Justification: maintenance  Estimated Protein Needs: 75-90+ grams protein (1-1.2 g pro/Kg)  Justification: preservation of lean body mass  Estimated Fluid Needs: 2805-2683  mL (1 mL/Kcal)  Justification: maintenance or per provider pending fluid status       NUTRITION DIAGNOSIS:  Inadequate oral intake related to decreased appetite as evidenced by patient report of intake </= 75% of baseline over the past several weeks with potential 8% weight loss in the past month       NUTRITION INTERVENTIONS  Recommendations / Nutrition Prescription  Glucerna " (chocolate) once daily at 2 pm + PRN  Diet per provider      Implementation  Nutrition education: Provided education on RD and role of care. Discussed nutrition needs and recommendations for BG control and muscle preservation. Reviewed ONS and indications for use.   Medical Food Supplement - ordered as above       Nutrition Goals  Patient will consume >/= 50% meals TID + 1 supplement/day       MONITORING AND EVALUATION:  Progress towards goals will be monitored and evaluated per protocol and Practice Guidelines      Sarah Hadley RD, LD

## 2023-02-13 NOTE — PLAN OF CARE
Shift Summary  Assumed cares from approximately 5974-2251.    Neuro: A/O with intermittent forgetfulness. Makes needs known.   Cardiac: Afib rate controlled. Normotensive.   Pulmonary: LSC/Dim, on 3-5L nasal cannula.   GI: Pt had one large bowel movement this shift. Good appetite. Feeds self.   : External cath in place, adequate UOP.   Integumentary: See flow-sheet. Pt was assessed by [facility] nurse today for community-acquired wounds.   Restraints: Not needed  Activity: A2 with lift    Plan of care has been explained to patient: Yes    Barbara Barragan RN

## 2023-02-13 NOTE — PROGRESS NOTES
[facility]  Neurosurgery Daily Progress Note    Assessment & Plan   89M admitted with CHF exacerbation and pneumonia. NSG following for hx of L5 superior endplate compression fracture that has been managed by Dr. Herron at [facility]. Today, patient denies pain while lying in bed. He reports intermittent back pain that radiates to right anterior thigh that occurs with activity. Brace was not ordered due to no point tenderness on exam.     Plan:  - Continue supportive and symptomatic treatment  - Lumbar spine MRI ordered for further evaluation   - PT/OT, activity as tolerated   - Continue pain control measures as needed   - If MRI stable, will likely plan for outpatient follow-up with NSG vs O   - Appreciate assistance from specialties      Vero Layton CNP  [facility] Neurosurgery  [facility]  [address] [place] [id number]  Tel [phone]  Pager [phone]    Interval History   Stable     Physical Exam   Temp: 99  F (37.2  C) Temp src: Oral BP: 126/71 Pulse: 84   Resp: 18 SpO2: (!) 88 % O2 Device: Nasal cannula Oxygen Delivery: 5 LPM  Vitals:    02/11/23 0243 02/12/23 0600 02/13/23 0546   Weight: 76.7 kg (169 lb 1.5 oz) 74.4 kg (164 lb 1.6 oz) 74.3 kg (163 lb 13.2 oz)     Vital Signs with Ranges  Temp:  [98  F (36.7  C)-100  F (37.8  C)] 99  F (37.2  C)  Pulse:  [] 84  Resp:  [16-18] 18  BP: (100-126)/(63-71) 126/71  SpO2:  [74 %-97 %] 88 %  I/O last 3 completed shifts:  In: 360 [P.O.:360]  Out: 700 [Urine:700]    Mental status:  Alert and oriented x 3, speech is fluent.  Motor:  Generalized weakness but moves BLE equally.   Sensation:  BLE intact to light touch     Medications        amiodarone  200 mg Oral Daily     apixaban ANTICOAGULANT  2.5 mg Oral BID     azithromycin  250 mg Oral Daily     brimonidine  1 drop Left Eye BID     cefTRIAXone  2 g Intravenous Q24H     cholestyramine  1 packet Oral BID w/meals     dorzolamide  1 drop Left  Eye BID     furosemide  20 mg Oral Daily     gabapentin  200 mg Oral TID     insulin aspart  1-7 Units Subcutaneous TID AC     insulin aspart  1-5 Units Subcutaneous At Bedtime     insulin glargine  8 Units Subcutaneous BID     latanoprost  1 drop Left Eye QPM     lidocaine  1 patch Transdermal Q24h    And     lidocaine   Transdermal Q8H [facility]     [Held by provider] lisinopril  10 mg Oral Daily     metoprolol tartrate  37.5 mg Oral BID     senna-docusate  2 tablet Oral BID     sodium chloride (PF)  3 mL Intracatheter Q8H       Vero Layton [facility] Neurosurgery   [address] [address]  Tel [phone]  Pager [phone]

## 2023-02-13 NOTE — PROGRESS NOTES
Bridged patient care from [id number]. O2 stable on 3L NC.  Received report from overnight nurse, report given to support staff. Eating breakfast upon introduction to pt this AM, . Report given to oncoming bedside nurse.

## 2023-02-13 NOTE — PLAN OF CARE
A&OX3, forgetful, BP soft,t-max 99, 3 L NC with humidification, complains of back pain with activity.  Assist of 2 with cares, turned and repositioned , Tele- Afib CVR, LS crackles. Heels floated on pillows. External catheter in use. . Slept intermittent

## 2023-02-13 NOTE — PROGRESS NOTES
[facility]  Hospitalist Progress Note   02/13/2023          Assessment and Plan:       Louis Keller Jr. is a 89 year old male who presents with progressive dyspnea on exertion, weakness and fatigue, chest pain and lower extremity edema.     Acute hypoxic respiratory failure likely from heart failure exacerbation, pneumonia, deconditioning, anemia.  Acute exacerbation of chronic heart failure with preserved EF.  Chronic atrial fibrillation on anticoagulation.  Elevated troponin in the setting of demand.  Venous insufficiency with chronic lower extremity edema.  -Presented with worsening shortness of breath.  Prior to admission on Lasix 10 mg oral daily.  Troponin elevated at 50.  Echo Left ventricular systolic function is normal. The visual ejection  fraction is 55-60%. Diastolic function not assessed due to atrial fibrillation  X-ray shows cardiomegaly and perihilar patchy airspace opacity suspicious for pulmonary vascular congestion  --Received diuresis with intravenous Lasix, now switched to oral Lasix 20 mg daily.  Continue PTA apixaban, amiodarone.  Continue PTA metoprolol.  PTA lisinopril on hold.  Monitor blood pressures, optimize regimen.  Cardiology following, appreciate comanagement.  Strict input output monitoring, daily weights.    Community-acquired pneumonia.  CXR 2/11 Patchy bilateral airspace opacities are again seen   Continue IV azithromycin and IV Rocephin.  Will de-escalate to oral antibiotics at the time of discharge.  Follow final blood cultures.  Trend WBC count, fever curve.    Aggressive incentive spirometry.  Wean O2.    Acute on chronic hyponatremia.  -Patient has a history of mild hyponatremia up to 129 previously  -Sodium on presentation today is 121, likely due to CHF exacerbation, diuretic use.  Last sodium 124 yesterday, awaiting sodium levels this morning.  Fluid restriction to 1000 mL/day.  Liberalize salt in diet.    Follow TSH, LFTs.    Hyperglycemia likely  stress response, diabetes mellitus.  Diabetes mellitus with previous hemoglobin A1c of 7.2.  Now blood sugars running in 200s to 290s.  Will increase insulin Lantus to 8 units twice daily.  Continue medium intensity sliding scale insulin.  Hypoglycemia protocol in place.  Monitor blood sugars, optimize regimen.  Hold prior to admission glipizide and metformin and Actos.  Follow hemoglobin A1c.     Normocytic anemia  Iron deficiency, vitamin B12 deficiency.  Baseline appears to be between 12-13  Hemoglobin at presentation 9.3, last hemoglobin on epic is from August, 2022 and was 12.9  No active bleeding, Eliquis resumed 2/11.  Vitamin B12 less than 150, iron saturation index 7.  Folic acid within normal limits.  Will start on IV Venofer for 2 doses.  Started on oral B12 replacement.  Monitor hemoglobin in a.m., transfuse for hemoglobin less than 7 or symptomatic.    Acute on chronic back pain   History of compression fracture.  Imaging from July 2022 with 20-30% superior endplate compression fracture of L5 with  associated mild marrow edema.  Neurosurgery following.  MRI pending.   Lidocaine patch  PT and OT     Hypomagnesemia  Mag replacment     Physical deconditioning from medical illness, senile frailty.  PT and OT evaluation.  Case management assistance with transition, will likely need TCU, did discuss with patient and family.     Diet: Moderate Consistent Carb (60 g CHO per Meal) Diet    DVT Prophylaxis: DOAC  Mccarthy Catheter: Not present  Code Status: Full Code      Discussed with patient, his wife, daughter by the bedside, bedside RN.  Total time greater than 55 minutes spent by me on the date of service doing chart review, history, exam, documentation & further activities per the note.      Camila Ross MD        Interval History:        Patient sitting up in chair.  Some improvement in shortness of breath.  Continues to require 5 L nasal oxygen per nursing report shortness of breath with minimal  ambulation.  Drops to mid 80s.  Afebrile in the last 24 hours.  Denies any chest pain.  No palpitations.  No abdominal pain.  Tolerating oral diet.  Continues to complain of back pain, neurosurgery following and plan for MRI today.  Intermittent cough.  No blood loss noted.  Noted elevated blood sugars.         Physical Exam:        Physical Exam   Temp:  [98.8  F (37.1  C)-100  F (37.8  C)] 99.4  F (37.4  C)  Pulse:  [] 89  Resp:  [16-18] 18  BP: (100-126)/(63-71) 116/65  SpO2:  [88 %-97 %] 96 %    Intake/Output Summary (Last 24 hours) at 2/13/2023 1649  Last data filed at 2/13/2023 0547  Gross per 24 hour   Intake --   Output 700 ml   Net -700 ml       Admission Weight: 84.8 kg (187 lb)  Current Weight: 74.3 kg (163 lb 13.2 oz)    PHYSICAL EXAM  GENERAL: Patient is in no distress. Alert and oriented.  Frail-appearing.  HEENT: Oropharynx pink  HEART: Regular rate and rhythm. S1S2, systolic murmur heard.  LUNGS: Bilateral slightly decreased breath sounds in lower lobes.  Respirations unlabored on rest.  ABDOMEN: Soft, no abdominal tenderness, bowel sounds heard   NEURO: Moving all extremities.  EXTREMITIES: 1+ pedal edema.  SKIN: Warm, dry. No rash   PSYCHIATRY Cooperative       Medications:          amiodarone  200 mg Oral Daily     apixaban ANTICOAGULANT  2.5 mg Oral BID     azithromycin  250 mg Oral Daily     brimonidine  1 drop Left Eye BID     cefTRIAXone  2 g Intravenous Q24H     cholestyramine  1 packet Oral BID w/meals     dorzolamide  1 drop Left Eye BID     furosemide  20 mg Oral Daily     gabapentin  200 mg Oral TID     insulin aspart  1-7 Units Subcutaneous TID AC     insulin aspart  1-5 Units Subcutaneous At Bedtime     insulin glargine  8 Units Subcutaneous BID     latanoprost  1 drop Left Eye QPM     lidocaine  1 patch Transdermal Q24h    And     lidocaine   Transdermal Q8H MAURA     [Held by provider] lisinopril  10 mg Oral Daily     metoprolol tartrate  37.5 mg Oral BID     senna-docusate  2  tablet Oral BID     sodium chloride (PF)  3 mL Intracatheter Q8H     acetaminophen **OR** acetaminophen, bisacodyl, glucose **OR** dextrose **OR** glucagon, guaiFENesin-dextromethorphan, lidocaine 4%, lidocaine (buffered or not buffered), Magic Mouthwash, melatonin, ondansetron **OR** ondansetron, polyethylene glycol, sodium chloride, sodium chloride (PF)         Data:      All new lab and imaging data was reviewed.

## 2023-02-14 NOTE — PLAN OF CARE
VSS, no c/o pain, PT defferred until neuro surgery sees pt regarding MRI result, anticoagulation held, WOC cares completed, plan pending nuerosurgery workup, continue to monitor closely.

## 2023-02-14 NOTE — PROGRESS NOTES
Care Management Follow Up    Length of Stay (days): 4    Expected Discharge Date: 02/14/2023     Concerns to be Addressed:     Discharge planning  Patient plan of care discussed at interdisciplinary rounds: Yes    Anticipated Discharge Disposition:  [facility]     Anticipated Discharge Services:  therapy  Anticipated Discharge DME:  N/A    Patient/family educated on Medicare website which has current facility and service quality ratings:  no  Education Provided on the Discharge Plan:  yes  Patient/Family in Agreement with the Plan:  yes    Referrals Placed by CM/SW:  TCU referrals  Private pay costs discussed: Not applicable    Additional Information:  Received a call back from [facility].  They have a bed available for tomorrow.  Per Kiah, they do have COVID in the building, but they are isolated down a separate wing.  Call placed to [facility] to follow up on the referral.  Per Jovana, she would like to see how patient does in therapy as he is currently and assist of 2.  She will continue to reassess and if he can get down to as assist of one she will accept patient.  Call placed to update patient's daughter.  Explained that [facility] has COVID in the building, but they have accepted patient.  Patient's daughter is in agreement with patient going to [facility] on discharge.  Also explained what [facility] said.  Explained that we will update her when discharge is known.  Patient's daughter is in agreement.    Will continue to follow.      BOBBY William, [facility]    [phone]  [facility]

## 2023-02-14 NOTE — PROGRESS NOTES
Vascular surgery is consulted for retroperitoneal hemorrhage and edema.  On an MRI there are multiple vertebral body fractures.  The finding of the edema and hemorrhage are secondary to the vertebral body fractures which is a neurosurgical problem. Please consult neurosurgery for management.  Nothing for vascular surgery to offer.  We will sign off.  Patient will not be charged for this consultation.

## 2023-02-14 NOTE — PROVIDER NOTIFICATION
MD Notification    Notified Person: MD    Notified Person Name: Vandana    Notification Date/Time: 02/14/2023 3688    Notification Interaction: vocera    Purpose of Notification: can I put in a mod carb diet for pt? looks like they dont want any surgical intervention    Orders Received:    Comments:

## 2023-02-14 NOTE — PLAN OF CARE
Goal Outcome Evaluation: Pt is A&Ox4, Tylenol 650 mg and Lidocaine patch for lower back pian, VSS and on tele A-Fib CVR, Iron gtt given, turned and repositioned every 2 hrs with 2 assist and lift - has blanchable redness and wounds on L heel and coccyx - WOC follows,  at dinner and 259 at HS. MRI result pending. Daughter Kathy updated about room change, phone # to the new room provided.       Plan of Care Reviewed With: patient    Overall Patient Progress: no changeOverall Patient Progress: no change

## 2023-02-14 NOTE — PROGRESS NOTES
Care Management Follow Up    Length of Stay (days): 5    Expected Discharge Date: 02/14/2023     Concerns to be Addressed:       Patient plan of care discussed at interdisciplinary rounds: Yes    Anticipated Discharge Disposition:       Anticipated Discharge Services:    Anticipated Discharge DME:      Patient/family educated on Medicare website which has current facility and service quality ratings:    Education Provided on the Discharge Plan:    Patient/Family in Agreement with the Plan:      Referrals Placed by CM/SW:    Private pay costs discussed: Not applicable    Additional Information:  Call received from Kiah at [facility] confirming that they can accept patient today anytime after 1500 if medically ready. Writer paged MD asking if patient is medically ready. Writer waiting to hear.     Addendum 1100: Call received from Hospitalist provider stating patient is not medically ready for discharge. Writer called and left VM for Kiah in admissions updating her regarding this. Care management will continue to follow.  Writer completed PAS and placed on chart.    PAS-RR    D: Per DHS regulation, SW completed and submitted PAS-RR to [place] [employer] Direct Connect via the Senior LinkAge Line.  PAS-RR confirmation # is : [id number]  I: SW spoke with patient and they are aware a PAS-RR has been submitted.  SW reviewed with patient  that they may be contacted for a follow up appointment within 10 days of hospital discharge if their SNF stay is < 30 days.  Contact information for Senior LinkAge Line was also provided.    A: patient  verbalized understanding.    P: Further questions may be directed to [facility] LinkAge Line at #[phone], option #4 for PAS-RR staff.      Corrie Mcclain, BOBBY, LGSW   Social Work   [facility]

## 2023-02-14 NOTE — PROVIDER NOTIFICATION
MD Notification    Notified Person: Fahad from Neurosurgery     Notified Person Name:    Notification Date/Time: 2/14, 1151    Notification Interaction: direct conversation     Purpose of Notification:  MRI lumbar result, per hospitalist--this RN was to contact neurosurgery to tell them to follow up on MRI results.    Orders Received: No new orders, neurosurgery will follow up    Comments:  Continue to monitor.

## 2023-02-14 NOTE — SIGNIFICANT EVENT
Significant Event Note    Time of event: 11:20 PM February 13, 2023    Description of event:    MRI Spine lumbar -> L1 and L4 subacute / acute fractures.     Plan:    Treat symptomatically overnight  Spine surgery evaluation in AM    Discussed with: Dr. Mendenhall of [facility]    Mason Boyle MD

## 2023-02-14 NOTE — PROVIDER NOTIFICATION
Radiology tech called and asked to page Dr. Boyle and ask  to call  Dr. Mendenhall to discuss pt's MRI results. Tte writer sent page to Dr. Boyle with Dr. Mendenhall phone # for contact.

## 2023-02-14 NOTE — PLAN OF CARE
Pt here with decompensated HF and community acquired pneumonia. A&Ox4. Neuros including some foegetfulness. VSS. Tele Afib CVR. Mod carb diet. Up with A2/.lift. Denies pain. Pt scoring green on the Aggression Stop Light Tool. Plan for TCU at discharge possibly on 2/14.

## 2023-02-14 NOTE — PROGRESS NOTES
Neurosurgery was asked to comment on the finding on his lumbar MRI of presacral edema/hemorrhage.  Bilateral intracranial edema/hemorrhage.  We would not recommend any surgical intervention, this would not change our plan as outlined in our progress note from earlier in the day.  Recommend monitoring of his hemoglobin and coagulation parameters per the medicine team.    Discussed with Dr. Schroeder

## 2023-02-14 NOTE — PROGRESS NOTES
[facility]  Neurosurgery Daily Progress Note    Assessment & Plan   89M admitted with CHF exacerbation, pneumonia, and generalized weakness. NSG following for hx of L5 superior endplate compression fracture that has been managed by Dr. Herron at [facility]. Today, patient was seen sitting in the chair. He reports mild low back pain. Denies leg pain. He has baseline paresthesias in BLE due to peripheral neuropathy. He reports generalized weakness in legs. He notes history of 4 falls since August 2022, most recently in January 2023.     Lumbar spine MRI completed, reviewed with Dr. Schroeder. MRI shows acute/subacute L1 superior endplate compression deformity, acute/subacute L4 burst fracture, chronic L5 superior endplate compression deformity, and severe stenosis at L2-3 and L3-4. Patient states he is not interested in a brace or surgery.     Plan:  - No surgical intervention planned at this time  - Recommend TLSO brace for comfort   - Continue with PT   - Continue pain control measures as needed   - Follow up in clinic with [facility] since patient is established with their care team   - Appreciate assistance from specialties    - NSG will sign off, please call with questions or concerns     Discussed with Dr. Schroeder.     Vero Layton, CNP  [facility] Neurosurgery  [facility]  [address] [address]  Tel [phone]  Pager [phone]    Interval History   Stable     Physical Exam   Temp: 98.7  F (37.1  C) Temp src: Oral BP: 128/78 Pulse: 90   Resp: 17 SpO2: 95 % O2 Device: Nasal cannula Oxygen Delivery: 5 LPM  Vitals:    02/12/23 0600 02/13/23 0546 02/14/23 0600   Weight: 74.4 kg (164 lb 1.6 oz) 74.3 kg (163 lb 13.2 oz) 76.7 kg (169 lb 2.9 oz)     Vital Signs with Ranges  Temp:  [98.7  F (37.1  C)-99.7  F (37.6  C)] 98.7  F (37.1  C)  Pulse:  [] 90  Resp:  [17-18] 17  BP: (115-140)/(57-87) 128/78  SpO2:  [93 %-98 %] 95 %  I/O last 3 completed  shifts:  In: -   Out: 200 [Urine:200]    Mental status:  Alert and oriented x 3, speech is fluent.  Motor:  Generalized weakness but moves BLE equally.   Sensation:  BLE intact to light touch.    No tenderness to palpation of the lumbar spine.    Medications        amiodarone  200 mg Oral Daily     [Held by provider] apixaban ANTICOAGULANT  2.5 mg Oral BID     azithromycin  250 mg Oral Daily     brimonidine  1 drop Left Eye BID     cefTRIAXone  2 g Intravenous Q24H     cholestyramine  1 packet Oral BID w/meals     cyanocobalamin  1,000 mcg Oral Daily     dorzolamide  1 drop Left Eye BID     furosemide  20 mg Oral Daily     gabapentin  200 mg Oral TID     insulin aspart  1-7 Units Subcutaneous TID AC     insulin aspart  1-5 Units Subcutaneous At Bedtime     insulin glargine  8 Units Subcutaneous BID     latanoprost  1 drop Left Eye QPM     lidocaine  1 patch Transdermal Q24h    And     lidocaine   Transdermal Q8H MAURA     [Held by provider] lisinopril  10 mg Oral Daily     metoprolol tartrate  37.5 mg Oral BID     senna-docusate  2 tablet Oral BID     sodium chloride (PF)  3 mL Intracatheter Q8H     Vero Layton CNP  [facility] Neurosurgery   [address]  [place] [address]  Tel [phone]  Pager [phone]

## 2023-02-14 NOTE — PROGRESS NOTES
[facility]  Hospitalist Progress Note   02/14/2023          Assessment and Plan:       Louis Keller Jr. is a 89 year old male who presents with progressive dyspnea on exertion, weakness and fatigue, chest pain and lower extremity edema.     Acute hypoxic respiratory failure likely from heart failure exacerbation, pneumonia, deconditioning, anemia.  Acute exacerbation of chronic heart failure with preserved EF.  Chronic atrial fibrillation on anticoagulation.  Elevated troponin in the setting of demand.  Venous insufficiency with chronic lower extremity edema.  -Presented with worsening shortness of breath.  Prior to admission on Lasix 10 mg oral daily.  Troponin elevated at 50.  Echo Left ventricular systolic function is normal. The visual ejection  fraction is 55-60%. Diastolic function not assessed due to atrial fibrillation  X-ray shows cardiomegaly and perihilar patchy airspace opacity suspicious for pulmonary vascular congestion  --Received diuresis with intravenous Lasix, now switched to oral Lasix 20 mg daily [2/12 with hold parameters]  Holding anticoagulation as below, concern for retroperitoneal hemorrhage.  Continue PTA amiodarone  Continue PTA metoprolol.  PTA lisinopril on hold.  Monitor blood pressures, optimize regimen.  Cardiology signed off 2/13. Appreciate comanagement.  Strict input output monitoring, daily weights.    Acute on chronic back pain with multiple vertebral body fractures.  Bilateral retroperitoneal edema/hemorrhage likely in the setting of above  History of compression fracture.  MRI 2/13 with  L1 vertebral body superior endplate acute or subacute fracture with mild to moderate compression deformity.   L4 vertebral body acute or subacute burst fracture with mild to moderate compression deformity. This is potentially an unstable fracture complex.   L5 vertebral body superior endplate subacute or chronic mild to moderate compression deformity.  Multilevel  spondylosis described above. L2-L3 and L3-L4 severe thecal sac stenosis. Cauda equina syndrome will need be excluded clinically.   Presacral edema/hemorrhage. Bilateral retroperitoneal edema/hemorrhage.  --- Will await neurosurgery input.  Vascular surgery consult requested.  Hold PTA Eliquis today.  Monitor serial hemoglobins  Lidocaine patch    Acute on chronic back pain   History of compression fracture.  Imaging from July 2022 with 20-30% superior endplate compression fracture of L5 with  associated mild marrow edema.  Neurosurgery following.  MRI pending.   Lidocaine patch  PT and OT      Community-acquired pneumonia.  CXR 2/11 Patchy bilateral airspace opacities are again seen   Blood cultures no growth to date.  Continue IV azithromycin and IV Rocephin.  Will de-escalate to oral antibiotics at the time of discharge.  Trend WBC count, fever curve.    Aggressive incentive spirometry.  Wean O2.     Acute on chronic hyponatremia.  -Patient has a history of mild hyponatremia up to 129 previously  -Sodium on presentation today is 121, likely due to CHF exacerbation, diuretic use.  Sodium 129.  Fluid restriction to 2000 mL/day.  Liberalize salt in diet.       Hyperglycemia likely stress response, diabetes mellitus.  Uncontrolled diabetes mellitus with hemoglobin A1c of 8.9   Now blood sugars running in 200s  Will increase insulin Lantus to 10 units twice daily.  Continue medium intensity sliding scale insulin.  Hypoglycemia protocol in place.  Monitor blood sugars, optimize regimen.  Hold prior to admission glipizide and metformin and Actos.     Normocytic anemia  Iron deficiency, vitamin B12 deficiency.  Baseline appears to be between 12-13  Hemoglobin at presentation 9.3, last hemoglobin on epic is from August, 2022 and was 12.9    Vitamin B12 less than 150, iron saturation index 7.  Folic acid within normal limits.  On IV Venofer for 2 doses.  Started on oral B12 replacement.  Monitor hemoglobin in a.m.,  transfuse for hemoglobin less than 7 or symptomatic.  Eliquis resumed 2/11. Holding anticoagulation as above    Elevated TSH.  TSH is 6.66, T41.06.  Follow TSH levels in 10 to 12 weeks     Hypomagnesemia- Corrected      Physical deconditioning from medical illness, senile frailty.  Rehab team following.  Will likely transition to TCU.     Diet: Moderate Consistent Carb (60 g CHO per Meal) Diet    DVT Prophylaxis: DOAC  Mccarthy Catheter: Not present  Code Status: Full Code       Discussed with patient, bedside RN, neurosurgery KONSTANTIN  Daughter updated her over the telephone 2/14.  Total time greater than 55 minutes spent by me on the date of service doing chart review, history, exam, documentation & further activities per the note.           aCmila Ross MD        Interval History:        Patient sitting up in chair.  Some improvement in shortness of breath.  Continues to require 5 L nasal oxygen per nursing report shortness of breath with minimal ambulation.    Afebrile in the last 24 hours.  Denies any chest pain.  No palpitations.  No abdominal pain.  Tolerating oral diet.  Continues to complain of back pain -awaiting neurosurgery input  Intermittent cough.  No blood loss noted.  Noted elevated blood sugars.          Physical Exam:        Physical Exam   Temp:  [98.6  F (37  C)-99.7  F (37.6  C)] 98.6  F (37  C)  Pulse:  [] 91  Resp:  [16-18] 16  BP: (115-140)/(57-87) 132/75  SpO2:  [93 %-98 %] 96 %    Intake/Output Summary (Last 24 hours) at 2/14/2023 1642  Last data filed at 2/14/2023 1600  Gross per 24 hour   Intake 650 ml   Output 800 ml   Net -150 ml       Admission Weight: 84.8 kg (187 lb)  Current Weight: 76.7 kg (169 lb 2.9 oz)    PHYSICAL EXAM  GENERAL: Patient is in no distress. Alert and oriented.  Frail-appearing.  HEART: Regular rate and rhythm. S1S2, systolic murmur heard.  LUNGS: Bilateral slightly decreased breath sounds in lower lobes.  Respirations unlabored on rest.  ABDOMEN: Soft, no  abdominal tenderness, bowel sounds heard   NEURO: Moving all extremities.  EXTREMITIES: 1+ pedal edema.  SKIN: Warm, dry. No rash   PSYCHIATRY Cooperative       Medications:          amiodarone  200 mg Oral Daily     [Held by provider] apixaban ANTICOAGULANT  2.5 mg Oral BID     azithromycin  250 mg Oral Daily     brimonidine  1 drop Left Eye BID     cefTRIAXone  2 g Intravenous Q24H     cholestyramine  1 packet Oral BID w/meals     cyanocobalamin  1,000 mcg Oral Daily     dorzolamide  1 drop Left Eye BID     furosemide  20 mg Oral Daily     gabapentin  200 mg Oral TID     insulin aspart  1-7 Units Subcutaneous TID AC     insulin aspart  1-5 Units Subcutaneous At Bedtime     insulin glargine  8 Units Subcutaneous BID     latanoprost  1 drop Left Eye QPM     lidocaine  1 patch Transdermal Q24h    And     lidocaine   Transdermal Q8H MAURA     [Held by provider] lisinopril  10 mg Oral Daily     metoprolol tartrate  37.5 mg Oral BID     senna-docusate  2 tablet Oral BID     sodium chloride (PF)  3 mL Intracatheter Q8H     acetaminophen **OR** acetaminophen, bisacodyl, glucose **OR** dextrose **OR** glucagon, guaiFENesin-dextromethorphan, lidocaine 4%, lidocaine (buffered or not buffered), Magic Mouthwash, melatonin, naloxone **OR** naloxone **OR** naloxone **OR** naloxone, ondansetron **OR** ondansetron, oxyCODONE, polyethylene glycol, sodium chloride, sodium chloride (PF)         Data:      All new lab and imaging data was reviewed.

## 2023-02-15 NOTE — PROGRESS NOTES
2023 [id number]  Pt admitted on  for SOB/CP and BLE edema, pneumonia. Hx: DM2, a fib. VSS on 3L NC. Tachy at times. A&Ox4. Ax2 with lift. Denies pain but intermittent pain in lower back. Lidocaine patch in place. PIV SL with intermittent ABX. Tele: a fib CVR. B, 241 covered. Tolerating mod carb diet. 2000mL fluid restriction. Incontinent B&B at times. External cath in place. Wound on coccyx and heels. Wound cares done. rooke boots on. neurosurg consulted and do not recommend surgery. Discharge potentially tomorrow?

## 2023-02-15 NOTE — PLAN OF CARE
Goal Outcome Evaluation:  A&O x4, VSS ex T max 99.8, recheck T 99.2. Afib w/CVR. Weaned down 02 from 5L down to 3L with sat 91-93%. Desat to upper 80s on further weaning. QUESADA, encouraged pulmonary toilet. Incontinent. External cath in place with good UOP. BLE -edema elevated with Rooke boots on. Headache relieved with prn tylenol. Plans on transition to TCU at  discharge .

## 2023-02-15 NOTE — PROGRESS NOTES
[facility]  Hospitalist Progress Note   02/15/2023          Assessment and Plan:       Louis Keller Jr. is a 89 year old male who presents with progressive dyspnea on exertion, weakness and fatigue, chest pain and lower extremity edema.     Acute hypoxic respiratory failure likely from heart failure exacerbation, pneumonia, deconditioning, anemia.  Community-acquired pneumonia.  Presented with shortness of breath.  Received diuresis as below.  Worsening SOB, repeat CXR 2/11 Patchy bilateral airspace opacities are again seen   Blood cultures no growth to date.  Completed 5 days of azithromycin.  Continue IV Rocephin. Will de-escalate to oral antibiotics at the time of discharge.  Continues to have shortness of breath, requiring 5 L nasal oxygen with tachycardia.  CT chest stat to rule out PE.  Trend WBC count, fever curve.    Aggressive incentive spirometry.  Wean O2.    Addendum 3:30 PM.  CT with  No evidence of pulmonary embolus.  2.  Bilateral right greater left pulmonary infiltrates more likely  represent pneumonitis or developing adult respiratory distress  syndrome. Component of pulmonary edema difficult to exclude.  3.  No retroperitoneal hematoma.  Findings discussed with patient's daughter, pulmonology consult requested per family request.  Acapella, chest PT.  Follow CRP, respiratory panel.    Acute exacerbation of chronic heart failure with preserved EF.  Chronic atrial fibrillation on anticoagulation.  Elevated troponin in the setting of demand.  Venous insufficiency with chronic lower extremity edema.  -Presented with worsening shortness of breath.  Prior to admission on Lasix 10 mg oral daily.  Troponin elevated at 50.  Echo Left ventricular systolic function is normal. The visual ejection  fraction is 55-60%. Diastolic function not assessed due to atrial fibrillation  X-ray shows cardiomegaly and perihilar patchy airspace opacity suspicious for pulmonary vascular  congestion    --Received diuresis with intravenous Lasix, now switched to oral Lasix 20 mg daily  on 2/12.  Given CT findings as above will increase diuresis to 20 mg oral Lasix twice daily.  Monitor renal function in a.m., received contrast imaging and can accordingly optimize further diuresis.   Discussed with neurosurgery, CT imaging with no No retroperitoneal hematoma -hence will restart PTA Eliquis 2/15.  Continue PTA amiodarone  Continue PTA metoprolol.  PTA lisinopril on hold.  Monitor blood pressures, optimize regimen.  Cardiology signed off 2/13. Appreciate comanagement.  Strict input output monitoring, daily weights.     Acute on chronic back pain with multiple vertebral body fractures.  History of compression fracture.  MRI 2/13 with  L1 vertebral body superior endplate acute or subacute fracture with mild to moderate compression deformity.   L4 vertebral body acute or subacute burst fracture with mild to moderate compression deformity. This is potentially an unstable fracture complex.   L5 vertebral body superior endplate subacute or chronic mild to moderate compression deformity.  Multilevel spondylosis described above. L2-L3 and L3-L4 severe thecal sac stenosis. Cauda equina syndrome will need be excluded clinically.  ---Neurosurgery recommended no surgical intervention.  Orthotics for brace  Lidocaine patch, as needed Tylenol  Rehab team following, TCU    Bilateral retroperitoneal edema/hemorrhage likely in the setting of above on MRI 2/13, CT with no retroperitoneal hematoma on 2/15.  MRI Presacral edema/hemorrhage. Bilateral retroperitoneal edema/hemorrhage.  CT scan 2/15 with no retroperitoneal hematoma.  --Vascular surgery recommend management to neurosurgery.  Neurosurgery recommended no surgical intervention.   Restarted anticoagulation 2/15.  Closely monitor hemoglobin level     Normocytic anemia  Iron deficiency, vitamin B12 deficiency.  Baseline appears to be between 12-13  Hemoglobin at  presentation 9.3, last hemoglobin on epic is from August, 2022 and was 12.9  Vitamin B12 less than 150, iron saturation index 7.  Folic acid within normal limits.  On IV Venofer for 2 doses.  Started on oral B12 replacement.  Monitor hemoglobin in a.m., transfuse for hemoglobin less than 7 or symptomatic.  Eliquis resumed 2/11, held again on 2/14 as above.  Restarting 2/15.      Acute on chronic hyponatremia.  -Patient has a history of mild hyponatremia up to 129 previously  -Sodium on presentation today is 121, likely due to CHF exacerbation, diuretic use.  Sodium 128  Fluid restriction to 1800 mL/day.  Liberalize salt in diet.       Hyperglycemia likely stress response, diabetes mellitus.  Uncontrolled diabetes mellitus with hemoglobin A1c of 8.9   Now blood sugars running in 200s  Will increase insulin Lantus to 12 units twice daily.  Restart PTA glipizide 5 mg oral  Continue medium intensity sliding scale insulin.  Hypoglycemia protocol in place.  Monitor blood sugars, optimize regimen.  Hold prior to admission metformin and Actos.     Elevated TSH.  TSH is 6.66, T41.06.  Follow TSH levels in 10 to 12 weeks     Hypomagnesemia- Corrected     Delirium from hospitalization  Concern for cognitive impairment at baseline.  Noted some confusion today.  Minimize interruptions, frequent reorientation  Consider cognitive screening at TCU.     Physical deconditioning from medical illness, senile frailty.  Rehab team following. Transition to TCU.     Diet: Moderate Consistent Carb (60 g CHO per Meal) Diet    DVT Prophylaxis: DOAC  Mccarthy Catheter: Not present  Code Status: Full Code      Expected discharge in 1 to 2 days pending stable hemoglobin, improvement in respiratory status to TCU with supplemental oxygen.     Discussed with patient, bedside RN, SW, neurosurgery KONSTANTIN  Daughter updated her over the telephone 2/15.  Total time greater than 55 minutes spent by me on the date of service doing chart review, history, exam,  documentation & further activities per the note.       Camila Ross MD        Interval History:        Patient sitting up in chair.  Continues to complain of shortness of breath.  Some confusion noted.  Continues to require 5 L nasal oxygen per nursing report shortness of breath with minimal ambulation.    Afebrile in the last 24 hours.  Denies any chest pain.  No palpitations.  No abdominal pain.  Tolerating oral diet.  Intermittent cough.  No blood loss noted.  Noted elevated blood sugars.         Physical Exam:        Physical Exam   Temp:  [98.6  F (37  C)-99.8  F (37.7  C)] 98.8  F (37.1  C)  Pulse:  [] 127  Resp:  [14-20] 14  BP: (116-132)/(73-79) 116/73  SpO2:  [90 %-96 %] 90 %    Intake/Output Summary (Last 24 hours) at 2/15/2023 1508  Last data filed at 2/15/2023 1300  Gross per 24 hour   Intake 790 ml   Output 1225 ml   Net -435 ml     Admission Weight: 84.8 kg (187 lb)  Current Weight: 76.4 kg (168 lb 8 oz)    PHYSICAL EXAM  GENERAL: Patient is in no distress.  Frail-appearing   can answer simple questions, slightly confused today  HEENT: Oropharynx pink  HEART: Regular rate and rhythm. S1S2 systolic murmur right sternal border  LUNGS: Bilateral decreased breath sounds.  ABDOMEN: Soft, no abdominal tenderness, bowel sounds heard   NEURO: Moving all extremities.    EXTREMITIES: 1+ pedal edema.   SKIN: Warm, dry. No rash   PSYCHIATRY Cooperative       Medications:          amiodarone  200 mg Oral Daily     apixaban ANTICOAGULANT  2.5 mg Oral BID     brimonidine  1 drop Left Eye BID     cefTRIAXone  2 g Intravenous Q24H     cholestyramine  1 packet Oral BID w/meals     cyanocobalamin  1,000 mcg Oral Daily     dorzolamide  1 drop Left Eye BID     furosemide  20 mg Oral BID     gabapentin  200 mg Oral TID     insulin aspart  1-7 Units Subcutaneous TID AC     insulin aspart  1-5 Units Subcutaneous At Bedtime     insulin glargine  10 Units Subcutaneous BID     latanoprost  1 drop Left Eye QPM      lidocaine  1 patch Transdermal Q24h    And     lidocaine   Transdermal Q8H [facility]     [Held by provider] lisinopril  10 mg Oral Daily     metoprolol tartrate  37.5 mg Oral BID     senna-docusate  2 tablet Oral BID     sodium chloride (PF)  3 mL Intracatheter Q8H     acetaminophen **OR** acetaminophen, bisacodyl, glucose **OR** dextrose **OR** glucagon, guaiFENesin-dextromethorphan, lidocaine 4%, lidocaine (buffered or not buffered), Magic Mouthwash, melatonin, naloxone **OR** naloxone **OR** naloxone **OR** naloxone, ondansetron **OR** ondansetron, oxyCODONE, polyethylene glycol, sodium chloride, sodium chloride (PF)         Data:      All new lab and imaging data was reviewed.

## 2023-02-15 NOTE — PROGRESS NOTES
"SPIRITUAL HEALTH SERVICES Progress Note  [facility] [place]    Saw pt Louis Bassdevin Gasca. per length of stay.      Patient/Family Understanding of Illness and Goals of Care - had a number of falls at home, with weakness that was getting progressively worse prior to coming to the hospital. He anticipates being discharged to a TCU in order to receive therapies for strengthening.      Distress and Loss -     Louis shared his concern about his ongoing leg discomfort and weakness and his hope of engaging in therapies. Louis also wondered about the plan for discharging to a TCU.    Louis lives at a [facility] facility with his wife who also has \"her own health concerns\" with previous back surgeries.      Strengths, Coping, and Resources - Louis named his family (spouse, 2 daughters, a son and 5 grandchildren) his children are actively involved in supporting Louis and his wife's needs.       Meaning, Beliefs, and Spirituality - Louis is a member at [employer] in [place]. At [facility], they have a  who leads Taoism and makes pastoral care visits, which Louis is grateful to receive. Prayer is an important spiritual practice for Louis, thankful for the prayers of family and friends.    I offered spiritual and emotional support through reflective listening that affirmed emotions, experience, and meaning. Offered assurance through prayer which incorporated conversational themes, praying the Lord's Prayer together.      Plan of Care - [facility] remains available for support.    Micaela Hair MDiv  Associate   Pager [phone]  [facility] pager [phone]  [facility] phone [phone]    [facility] available 24/7 for emergent requests/referrals, either by having the on-call  paged or by entering an ASAP/STAT consult in Epic (this will also page the on-call ).      "

## 2023-02-15 NOTE — PLAN OF CARE
Goal Outcome Evaluation:    Orientations: AOx4  Vitals/Pain: VSS, 3-5L NC, denies pain  Tele: Afib CVR  Lines/Drains: PIV SL  Skin/Wounds: Coccyx wound, R heel wound  GI/: Incont. BB  Labs: Abnormal/Trends, Electrolyte Replacement- recheck in morning  Ambulation/Assist: Assist of 2 w/lift  Plan: Discharge to TCU tomorrow

## 2023-02-15 NOTE — PROGRESS NOTES
S:  We received an order for a TLSO vs LSO brace for lumbar spine fractures.  recommend to wear brace for comfort.    O:  I met with the patient in 254-01 and after speaking with him I feel a LSO would be the most practical for him to use.  The patient said he is very weak at the moment.  The patient said the pain he has is in his R side, and lower into his R hip.  I showed the patient a [place] 631 LSO, I donned the brace on myself and explained how it worked.  I explained he would only be using it when out of bed for comfort.  A:  The patient has declined a brace at this time.  The patient feels he is too weak to use a brace.    P:  If the patient changes his mind I feel the [place] 631 LSO would work well for him.  Leroy TRAORE

## 2023-02-15 NOTE — PROGRESS NOTES
Care Management Follow Up    Length of Stay (days): 6    Expected Discharge Date: 02/16/2023     Concerns to be Addressed: discharge planning     Patient plan of care discussed at interdisciplinary rounds: Yes    Anticipated Discharge Disposition: Transitional Care  Disposition Comments: Dischagre to TCU  Anticipated Discharge Services: Therapy  Anticipated Discharge DME: None    Patient/family educated on Medicare website which has current facility and service quality ratings: yes  Education Provided on the Discharge Plan:  yes  Patient/Family in Agreement with the Plan: yes    Referrals Placed by CM/SW:  TCU referrals  Private pay costs discussed: Not applicable    Additional Information:  Call placed to [facility] to update them as to patient's status.  Per Kiah, they can no longer hold the bed, but she is asking that we call I the morning to see if anything has changed with their bed availability.    Will continue to follow.      BOBBY William, [facility]    [phone]  [facility]

## 2023-02-15 NOTE — PROGRESS NOTES
02/15/23 1510   Appointment Info   Signing Clinician's Name / Credentials (PT) Lisa Bob, PT, DPT   Living Environment   People in Home spouse   Current Living Arrangements independent living facility   Home Accessibility no concerns   Transportation Anticipated agency   Self-Care   Usual Activity Tolerance fair   Current Activity Tolerance poor   Regular Exercise Yes   Activity/Exercise Type walking   Equipment Currently Used at Home walker, rolling   Activity/Exercise/Self-Care Comment At baseline, ambulates w/ 4WW.   General Information   Onset of Illness/Injury or Date of Surgery 02/09/23   Referring Physician Camila Ross MD   Patient/Family Therapy Goals Statement (PT) to get stronger   Pertinent History of Current Problem (include personal factors and/or comorbidities that impact the POC) Per chart: Louis Keller Jr. is a 89 year old male who presents with progressive dyspnea on exertion, weakness and fatigue, chest pain and lower extremity edema.   Existing Precautions/Restrictions fall   Weight-Bearing Status - LLE full weight-bearing   Weight-Bearing Status - RLE full weight-bearing   Cognition   Affect/Mental Status (Cognition) WFL   Pain Assessment   Patient Currently in Pain Yes, see Vital Sign flowsheet  (R LE pain)   Integumentary/Edema   Integumentary/Edema Comments age related changes, wound on R foot not formally assessed w/ bandaging.   Posture    Posture Kyphosis   Range of Motion (ROM)   ROM Comment limited spinal ROM w/ stiffness, functional LE   Strength (Manual Muscle Testing)   Strength Comments generally deconditioned, can SLR either LE briefly against gravity   Bed Mobility   Comment, (Bed Mobility) mod assist of 2 supine to seated EOB, mod assist into bed and total assist to supine scoot   Transfers   Comment, (Transfers) min assist sit<>stand to claudio steady   Gait/Stairs (Locomotion)   Comment, (Gait/Stairs) unable d/t weakness   Balance   Balance Comments needs mod  assist seated balance, min assist standing w/ claudio steady railing   Sensory Examination   Sensory Perception patient reports no sensory changes   Clinical Impression   Criteria for Skilled Therapeutic Intervention Yes, treatment indicated   PT Diagnosis (PT) impaired functional mobility   Influenced by the following impairments decreased activity tolerance, strength, balance   Functional limitations due to impairments bed mob, transfers, ambulation   Clinical Presentation (PT Evaluation Complexity) Evolving/Changing   Clinical Presentation Rationale clinical judgement   Clinical Decision Making (Complexity) moderate complexity   Planned Therapy Interventions (PT) balance training;bed mobility training;gait training;home exercise program;patient/family education;strengthening;transfer training   Anticipated Equipment Needs at Discharge (PT) walker, rolling   Risk & Benefits of therapy have been explained evaluation/treatment results reviewed;care plan/treatment goals reviewed;risks/benefits reviewed;current/potential barriers reviewed;participants voiced agreement with care plan;participants included;patient   PT Total Evaluation Time   PT Eval, Moderate Complexity Minutes (36676) 13   Physical Therapy Goals   PT Frequency 3x/week   PT Predicted Duration/Target Date for Goal Attainment 02/22/23   PT Goals Bed Mobility;Transfers;Gait   PT: Bed Mobility Supervision/stand-by assist;Supine to/from sit;Rolling   PT: Transfers Supervision/stand-by assist;Sit to/from stand;Bed to/from chair;Assistive device   PT: Gait Minimal assist;Rolling walker;10 feet   Interventions   Interventions Quick Adds Therapeutic Activity   Therapeutic Activity   Therapeutic Activities: dynamic activities to improve functional performance Minutes (82614) 26   Symptoms Noted During/After Treatment Fatigue   Treatment Detail/Skilled Intervention Supine to seated EOB w/ mod assist to roll and scoot to feet flat, then second assist remained  posteriorly for mod assist upright seated balance until claudio steady placed for pt to hold onto. With claudio steady railing, CGA balance. Worked on upright posture and deep breathing in seated while PT set up o2 probe (used portable probe), room one was not accurate. On 7L O2, did measure 88% spo2. Worked on sit<>stands to claudio steady x 3 reps from higher bed height w/ rest breaks in between w/ min assist to stand fully, cuing for posture. Able to stand for 20-60 seconds at a time. Mod assist of 2 to lie down and total to scoot.   PT Discharge Planning   PT Plan progress bed mobility, sit<>stands,  trial FWW   PT Discharge Recommendation (DC Rec) Transitional Care Facility   PT Rationale for DC Rec Patient w/ significantly decreased functional mobility w/ deficits in activity tolerance and strength where pt unable to ambulate. He will benefit from TCU stay to address this.   PT Brief overview of current status ax2 w/ claudio steady   Total Session Time   Timed Code Treatment Minutes 26   Total Session Time (sum of timed and untimed services) 39

## 2023-02-16 NOTE — CONSULTS
"PULMONOLOGY CONSULTATION  Date of service: 2/16/2023    [facility]  _____________________________________________________    Louis Keller Jr.  89 year old male  [id number]  [address]   [facility], [place] [address]    Primary Care Provider:  Joselito Armas  Admission Date: 2/9/2023  Hospital Attending Physician:  Camila Ross MD  ________________________________________    CHIEF COMPLAINT : I was asked to see this patient by Camila Ross MD for evaluation of PNA.     Informant: EHR and patient    HISTORY OF PRESENT ILLNESS     Per admission H&P:    \"Louis Keller Jr. is a 89 year old male who presents to the emergency room from cardiology clinic with concerns for heart failure.  Patient initially presented to his outpatient cardiology visit with concerns for progressively worsening dyspnea on exertion, lower extremity edema and fatigue which has actually been going on for few weeks but got worse in the last week or so.  Patient endorses dry cough.  He also had couple of episodes of chest pain the last one was about a week ago.  It was retrosternal chest pain which he describes as a \"bruised pain\".  He is particularly concerned about his lower extremity edema which has worsened to the point he also has scrotal edema.  He also endorses progressive abdominal distention.  No fever or chills.  Denies dysuria urinary urgency or frequency.  No lightheadedness or dizziness.  Patient denies any hematochezia or melena.\"      HOME MEDICATIONS     Medications Prior to Admission   Medication Sig Dispense Refill Last Dose     amiodarone (PACERONE) 200 MG tablet Take 1 tablet (200 mg) by mouth daily 90 tablet 3 2/9/2023 at am     apixaban ANTICOAGULANT (ELIQUIS) 2.5 MG tablet Take 1 tablet (2.5 mg) by mouth 2 times daily 180 tablet 3 2/9/2023 at am     brimonidine (ALPHAGAN) 0.2 % ophthalmic solution INSTILL 1 DROP INTO LEFT EYE TWICE A DAY   2/9/2023 at am     cholestyramine " (QUESTRAN) 4 g packet Take 1 packet (4 g) by mouth 2 times daily (with meals) (Patient taking differently: Take 1 packet by mouth daily) 180 packet 3 2/9/2023 at am     dorzolamide (TRUSOPT) 2 % ophthalmic solution Place 1 drop Into the left eye 2 times daily   2/9/2023 at am     ferrous gluconate (FERGON) 324 (38 Fe) MG tablet TAKE 1 TABLET (324 MG) BY MOUTH DAILY (WITH BREAKFAST) 100 tablet 2 2/9/2023 at am     furosemide (LASIX) 20 MG tablet Take 0.5 tablets (10 mg) by mouth daily 90 tablet 3 2/8/2023     gabapentin (NEURONTIN) 100 MG capsule Take 2 capsules (200 mg) by mouth 3 times daily 540 capsule 1 2/9/2023 at am     glipiZIDE (GLUCOTROL XL) 5 MG 24 hr tablet Take 1 tablet (5 mg) by mouth 2 times daily 180 tablet 3 2/9/2023 at am     latanoprost (XALATAN) 0.005 % ophthalmic solution Place 1 drop Into the left eye daily   2/8/2023 at pm     lisinopril (ZESTRIL) 10 MG tablet Take 1 tablet (10 mg) by mouth daily 90 tablet 3 2/9/2023 at am     loperamide (IMODIUM A-D) 2 MG tablet Take 2 mg by mouth daily as needed 30 tablet 0 Unknown     metFORMIN (GLUCOPHAGE) 500 MG tablet TAKE 1 TABLET BY MOUTH TWICE A DAY WITH MEALS 180 tablet 0 2/9/2023 at am     Metoprolol Tartrate 37.5 MG TABS Take 37.5 mg by mouth 2 times daily 180 tablet 1 2/9/2023 at am     pioglitazone (ACTOS) 45 MG tablet Take 1 tablet (45 mg) by mouth daily 90 tablet 3 2/9/2023 at am     Acetaminophen (TYLENOL PO) Take 1,000 mg by mouth 3 times daily as needed        lidocaine (LIDODERM) 5 % patch Place 1 patch onto the skin every 24 hours To prevent lidocaine toxicity, patient should be patch free for 12 hrs daily. (Patient not taking: Reported on 2/9/2023) 30 patch 3 Not Taking     multivitamin, therapeutic (THERA-VIT) TABS tablet Take 1 tablet by mouth daily (Patient not taking: Reported on 1/24/2023)          PAST MEDICAL HISTORY      Past Medical History:   Diagnosis Date     Antiplatelet or antithrombotic long-term use      Atrial fibrillation  (H)      Diarrhea      Diastolic murmur      QUESADA (dyspnea on exertion)      Gout      Hemorrhage of gastrointestinal tract, unspecified 63,65,and 1971    hospitalized     History of blood transfusion      Hyperlipidemia LDL goal <100 4/18/2012     Long-term (current) use of anticoagulants [Z79.01] 3/31/2016     Mumps      Palpitations      Physical deconditioning 8/9/2013     Scarlet fever      Spider veins      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     Unspecified disease of pancreas 1990    pancreatitis       PAST SURGICAL HISTORY      Past Surgical History:   Procedure Laterality Date     ARTHROPLASTY KNEE  8/5/2013    Procedure: ARTHROPLASTY KNEE;  Left Total Knee Arthroplasty       CARDIOVERSION  9/6/11    failed     EP LOOP RECORDER IMPLANT N/A 8/5/2019    Procedure: EP Loop Recorder Implant;  Surgeon: Darling Pacheco MD;  Location:  HEART CARDIAC CATH LAB     [facility] NONSPECIFIC PROCEDURE  Child    T&A     ZZ NONSPECIFIC PROCEDURE  ; also 11/03    Colonoscopy     ZZC NONSPECIFIC PROCEDURE      Basal cell cancer of the nose     Z NONSPECIFIC PROCEDURE  1990    Cholecystectomy       ALLERGIES     Allergies   Allergen Reactions     No Known Drug Allergies        SOCIAL / SUBSTANCE HISTORY     Social History     Socioeconomic History     Marital status:      Spouse name: Not on file     Number of children: Not on file     Years of education: Not on file     Highest education level: Not on file   Occupational History     Occupation:      Comment: Semi-retired   Tobacco Use     Smoking status: Never     Smokeless tobacco: Never   Substance and Sexual Activity     Alcohol use: Not Currently     Drug use: No     Sexual activity: Never     Partners: Female   Other Topics Concern      Service Not Asked     Blood Transfusions Not Asked     Caffeine Concern No     Comment: 14 oz per day     Occupational Exposure Not Asked     Hobby Hazards Not Asked      "Sleep Concern No     Comment: sometimes - nocturia 4 times per night     Stress Concern Not Asked     Weight Concern Not Asked     Special Diet No     Back Care Not Asked     Exercise Yes     Comment: treadmill/walking 15-20 minutes 6 days per week     Bike Helmet Not Asked     Seat Belt Not Asked     Self-Exams Not Asked     Parent/sibling w/ CABG, MI or angioplasty before 65F 55M? Not Asked   Social History Narrative     Not on file     Social Determinants of Health     Financial Resource Strain: Not on file   Food Insecurity: Not on file   Transportation Needs: Not on file   Physical Activity: Not on file   Stress: Not on file   Social Connections: Not on file   Intimate Partner Violence: Not on file   Housing Stability: Not on file       FAMILY HISTORY     Family History   Problem Relation Age of Onset     Alzheimer Disease Maternal Grandmother      Arthritis Maternal Grandmother      Cancer Mother         breast/ 1983/colon     Eye Disorder Mother         glaucoma and cataracts     Heart Disease Mother         angina/CABG     Hypertension Mother      Cancer Father         colon     Diabetes Maternal Aunt         AoDM     No Known Problems Daughter        REVIEW OF SYSTEMS   A comprehensive review of systems was negative except for items noted in HPI/Subjective.    PHYSICAL EXAMINATION   Vital signs  Temp (24hrs), Av.1  F (36.7  C), Min:98.1  F (36.7  C), Max:98.1  F (36.7  C)    Temp: 98.1  F (36.7  C) Temp src: Axillary BP: 117/77 Pulse: 112   Resp: 15 SpO2: 90 % O2 Device: Oxymask Oxygen Delivery: 11 LPM Height: 177.8 cm (5' 10\") Weight: 75.6 kg (166 lb 9.6 oz)  Estimated body mass index is 23.9 kg/m  as calculated from the following:    Height as of this encounter: 1.778 m (5' 10\").    Weight as of this encounter: 75.6 kg (166 lb 9.6 oz).  I/O last 3 completed shifts:  In: 960 [P.O.:960]  Out: 1450 [Urine:1450]    CONSTITUTIONAL/GENERAL: Alert male. No apparent distress.  EARS,NOSE,THROAT,MOUTH: " External ears and nose overall normal. Oximask  RESPIRATORY: Crackles  CARDIOVASCULAR: RRR, S1, S2. 2+ LE edema to the thighs.  PSYCHIATRIC: Appropriate mood and affect.     LABORATORY ASSESSMENT    Arterial Blood GasNo lab results found in last 7 days.  CBC  Recent Labs   Lab 02/16/23  0641 02/15/23  0620 02/14/23  2329 02/14/23  1537 02/14/23  0557 02/13/23  0630   WBC 13.6* 11.5*  --   --  9.5 10.1   RBC 3.15* 3.18*  --   --  3.11* 2.97*   HGB 9.2* 9.2* 9.4* 10.1* 9.2* 8.7*   HCT 28.5* 28.6*  --   --  28.2* 26.6*   MCV 91 90  --   --  91 90   MCH 29.2 28.9  --   --  29.6 29.3   MCHC 32.3 32.2  --   --  32.6 32.7   RDW 13.8 13.7  --   --  13.7 13.7    391  --   --  353 336     BMP  Recent Labs   Lab 02/16/23  0826 02/16/23  0641 02/16/23  0208 02/15/23  2119 02/15/23  1155 02/15/23  0620 02/14/23  0737 02/14/23  0557 02/13/23  0816 02/13/23  0630 02/12/23  1757 02/12/23  1606   NA  --  128*  --   --   --  128*  --  129*  --  129*  --  125*   POTASSIUM  --  4.2  --   --   --  4.4  --  4.4  --  4.2  --  4.7   CHLORIDE  --  91*  --   --   --  93*  --  94*  --   --   --  89*   MICHAEL  --  8.7  --   --   --  8.6  --  8.4  --   --   --  8.5   CO2  --  28  --   --   --  27  --  28  --   --   --  25   BUN  --  15.2  --   --   --  14.7  --  15.6  --   --   --  23.3*   CR  --  0.62*  --   --   --  0.62*  --  0.58*  --  0.70  --  0.72   * 215* 226* 318*   < > 164*   < > 183*   < >  --    < > 309*    < > = values in this interval not displayed.     INRNo lab results found in last 7 days.   BNPNo lab results found in last 7 days.  VENOUS BLOOD GASESNo lab results found in last 7 days.    IMAGING, ECHO, PFT, SLEEP STUDY, RHC, OTHER TESTING         ASSESSMENT / PLAN      Pulmonary diagnoses (alphabetical):  Abnl CT/CXR R91.8  CHF I50.9  Fluid overload E87.79  Hypoxemia R09.02  Pleural effusion  Pneumonia unspec J18.9  Resp fail acute J96.00  SOB R06.02      ASSESSMENT:  89-year-old male with history of mumps and  scarlet fever, A-fib, HFpEF, presenting from cardiology clinic in the setting of recent weight gain and LE edema.    Found to have BNP 5000 and hemoglobin 9 (baseline~12). TTE showed EF 55 to 60%, dilated RV, dilated RA, mild MR, moderate TR with RVSP 37. He was also found to have bilateral retroperitoneal edema/hemorrhage in the setting of multiple vertebral body fractures.  He was deemed not a surgical candidate and was restarted on anticoagulation.    CXR showed bilateral airspace opacities, RUL predominant.  He was diuresed successfully, however persistent dyspnea prompted additional imaging.     CT chest was negative for PE, showed bilateral GGO and reticulation, with dense consolidations in the RML and RUL, and bilateral pleural effusions.    Continues to require 5 L O2.  Receiving ceftriaxone and completed 5 days of azithromycin.     CT findings appear consistent with infectious PNA.  Volume overload in the setting of CHF contributes to chronic interstitial edema in the form of reticulation, and bilateral pleural effusions. He may also have underlying ILD.  Prior CT chest from 2012 showed very faint reticular prominence mainly in the right base, however no chest imaging since then.      PLAN:     No need for bronchoscopy.    Sputum culture if able to produce a sample (ordered).    Complete antibiotics for CAP.    Start DuoNeb every 6 hours, administered via MetaNeb (ordered).    Start Guaifenesin 600mg BID (ordered).    Manual chest PT    Aerobika    HOB >30     Incentive spirometry 10 times/hour while awake    OOB as able        Erick Jones MD    [facility]  Office: [phone]  Pager: [phone]

## 2023-02-16 NOTE — PROGRESS NOTES
[facility]    Hospitalist Progress Note    Interval History   - Ongoing weakness, shortness of breath, not significantly changed today  - Right heel ulcer managed by wound nurse, note pending, he is wearing boots here  - Called daughter Kathy and updated    Assessment & Plan   Summary: Louis Keller Jr. is a 89 year old male with PMH atrial fibrillation, DM2, HLD who was admitted on 2/9/2023 with acute hypoxic respiratory failure and CHF.    Acute hypoxic respiratory failure  Diastolic CHF exacerbation, improved  Community acquired pneumonia  Presented with shortness of breath.  Received diuresis as below.  Worsening SOB, repeat CXR 2/11 Patchy bilateral airspace opacities are again seen. Blood cultures no growth to date. Completed 5 days of azithromycin. Repeat CT on 2/15 notes ongoing bilateral opacities consistent with pneumonia, and possibly ARDS like pattern. CRP rising as of 2/15.  - Continue to wean O2 as able  - Appreciate Pulm consult   - Continue Ceftriaxone   - No bronchoscopy needed  - Start prednisone x5 days empirically  - Continue lasix 40mg IV BID for ongoing diuresis given ARDS type pattern seen on CT. Monitor renal function      Acute exacerbation of chronic heart failure with preserved EF.  Chronic atrial fibrillation on anticoagulation.  Elevated troponin in the setting of demand.  Venous insufficiency with chronic lower extremity edema.  Presented with worsening shortness of breath. Prior to admission on Lasix 10 mg oral daily.  Troponin elevated at 50. Echo Left ventricular systolic function is normal. The visual ejection fraction is 55-60%. Diastolic function not assessed due to atrial fibrillation. X-ray shows cardiomegaly and perihilar patchy airspace opacity suspicious for pulmonary vascular congestion.    Received diuresis with intravenous Lasix, now switched to oral Lasix 20 mg daily  on 2/12.  Given CT findings as above will increase diuresis to 20 mg oral  Lasix twice daily.  Monitor renal function in a.m., received contrast imaging and can accordingly optimize further diuresis.   Discussed with neurosurgery, CT imaging with no No retroperitoneal hematoma -hence will restart PTA Eliquis 2/15.  - Back to IV lasix on 2/16 due to CT findings 2/15  - Continue PTA amiodarone  - Continue PTA metoprolol.  - Continue PTA Eliquis  - PTA lisinopril on hold.  - Monitor blood pressures, optimize regimen.  - Cardiology signed off 2/13. Appreciate comanagement.  - Strict input output monitoring, daily weights.     Acute on chronic back pain with multiple vertebral body fractures  History of compression fracture  MRI 2/13 with  L1 vertebral body superior endplate acute or subacute fracture with mild to moderate compression deformity.  L4 vertebral body acute or subacute burst fracture with mild to moderate compression deformity. This is potentially an unstable fracture complex.  L5 vertebral body superior endplate subacute or chronic mild to moderate compression deformity.  Multilevel spondylosis described above. L2-L3 and L3-L4 severe thecal sac stenosis. Cauda equina syndrome will need be excluded clinically.  - Neurosurgery recommended no surgical intervention.  Orthotics for brace  - Lidocaine patch, as needed Tylenol  - Rehab team following, TCU     Bilateral retroperitoneal edema/hemorrhage likely in the setting of above on MRI 2/13, CT with no retroperitoneal hematoma on 2/15.  MRI Presacral edema/hemorrhage. Bilateral retroperitoneal edema/hemorrhage. CT scan 2/15 with no retroperitoneal hematoma. Vascular surgery recommend management to neurosurgery. Neurosurgery recommended no surgical intervention.   - Restarted anticoagulation 2/15.     Normocytic anemia  Iron deficiency, vitamin B12 deficiency.  Baseline appears to be between 12-13. Hemoglobin at presentation 9.3, last hemoglobin on epic is from August, 2022 and was 12.9. Vitamin B12 less than 150, iron saturation index  7.  Folic acid within normal limits.  On IV Venofer for 2 doses.  - Started on oral B12 replacement.  - Continue anticoagulation as above     Acute on chronic hyponatremia.  Patient has a history of mild hyponatremia up to 129 previously. Sodium on presentation today is 121, likely due to CHF exacerbation, diuretic use, and PNA. Sodium 128 on 2/16.  - Fluid restriction to 1800 mL/day.  Liberalize salt in diet.       Hyperglycemia likely stress response, diabetes mellitus.  Uncontrolled diabetes mellitus with hemoglobin A1c of 8.9   BG dropping under 200 today. Hold PTA Metformin and Actos  - Continue Lantus 12 units BID  - Continue Glipizide  - Continue MDSSI     Elevated TSH.  TSH is 6.66, T41.06.  Follow TSH levels in 10 to 12 weeks     Hypomagnesemia- Corrected      Delirium from hospitalization  Concern for cognitive impairment at baseline.  Noted some confusion today.  Minimize interruptions, frequent reorientation  Consider cognitive screening at TCU.     Physical deconditioning from medical illness, senile frailty.  Rehab team following. Transition to TCU.     DVT Prophylaxis: Pneumatic Compression Devices  Code Status: Full Code  PT/OT: ordered  Diet: Snacks/Supplements Adult: Glucerna; Between Meals  Moderate Consistent Carb (60 g CHO per Meal) Diet  Fluid restriction 1800 ML FLUID      Disposition: Expected discharge 3+ days    - Total time spent on encounter is 50 minutes, which includes counseling, coordination of care, time spent discussing with family, and/or time spent discussing with consultants.    Marco Tomlin MD, MD  Text Page  (7am to 6pm)  -Data reviewed today: I reviewed all new labs and imaging results over the last 24 hours.    Physical Exam   Temp: 98.1  F (36.7  C) Temp src: Axillary BP: 117/77 Pulse: 112   Resp: 15 SpO2: 95 % O2 Device: Oxymask Oxygen Delivery: 8 LPM  Vitals:    02/14/23 0600 02/15/23 0554 02/16/23 0555   Weight: 76.7 kg (169 lb 2.9 oz) 76.4 kg (168 lb 8 oz) 75.6  kg (166 lb 9.6 oz)     Vital Signs with Ranges  Temp:  [98.1  F (36.7  C)] 98.1  F (36.7  C)  Pulse:  [] 112  Resp:  [15-30] 15  BP: (108-142)/() 117/77  SpO2:  [89 %-95 %] 95 %  I/O last 3 completed shifts:  In: 960 [P.O.:960]  Out: 1450 [Urine:1450]  O2 requirements: yes    Constitutional: Elderly male appears frail, moderately ill appearing  HEENT: Eyes nonicteric  Cardiovascular: RRR, normal S1/2, no m/r/g  Respiratory: Bilateral crackles  Vascular: No LE pitting edema  GI: Normoactive bowel sounds, nontender  Skin/Integumen: No rashes  Neuro/Psych: Appropriate affect and mood. A&Ox3, moves all extremities    Medications       amiodarone  200 mg Oral Daily     apixaban ANTICOAGULANT  2.5 mg Oral BID     brimonidine  1 drop Left Eye BID     cefTRIAXone  2 g Intravenous Q24H     cholestyramine  1 packet Oral BID w/meals     cyanocobalamin  1,000 mcg Oral Daily     dorzolamide  1 drop Left Eye BID     furosemide  40 mg Intravenous BID     gabapentin  200 mg Oral TID     glipiZIDE  5 mg Oral QAM AC     guaiFENesin  600 mg Oral BID     insulin aspart  1-7 Units Subcutaneous TID AC     insulin aspart  1-5 Units Subcutaneous At Bedtime     insulin glargine  12 Units Subcutaneous BID     ipratropium - albuterol 0.5 mg/2.5 mg/3 mL  3 mL Nebulization 4x daily     latanoprost  1 drop Left Eye QPM     lidocaine  1 patch Transdermal Q24h    And     lidocaine   Transdermal Q8H MAURA     [Held by provider] lisinopril  10 mg Oral Daily     metoprolol tartrate  37.5 mg Oral BID     senna-docusate  2 tablet Oral BID     sodium chloride (PF)  3 mL Intracatheter Q8H       Data   Recent Labs   Lab 02/16/23  1243 02/16/23  0826 02/16/23  0641 02/15/23  1155 02/15/23  0620 02/15/23  0212 02/14/23  2329 02/14/23  0737 02/14/23  0557   WBC  --   --  13.6*  --  11.5*  --   --   --  9.5   HGB  --   --  9.2*  --  9.2*  --  9.4*   < > 9.2*   MCV  --   --  91  --  90  --   --   --  91   PLT  --   --  430  --  391  --   --   --  353    NA  --   --  128*  --  128*  --   --   --  129*   POTASSIUM  --   --  4.2  --  4.4  --   --   --  4.4   CHLORIDE  --   --  91*  --  93*  --   --   --  94*   CO2  --   --  28  --  27  --   --   --  28   BUN  --   --  15.2  --  14.7  --   --   --  15.6   CR  --   --  0.62*  --  0.62*  --   --   --  0.58*   ANIONGAP  --   --  9  --  8  --   --   --  7   MICHAEL  --   --  8.7  --  8.6  --   --   --  8.4   * 206* 215*   < > 164*   < >  --    < > 183*   ALBUMIN  --   --  2.8*  --   --   --   --   --  2.7*   PROTTOTAL  --   --  5.9*  --   --   --   --   --  5.7*   BILITOTAL  --   --  0.7  --   --   --   --   --  0.8   ALKPHOS  --   --  102  --   --   --   --   --  104   ALT  --   --  17  --   --   --   --   --  17   AST  --   --  23  --   --   --   --   --  29    < > = values in this interval not displayed.       Imaging:   Recent Results (from the past 24 hour(s))   CT Chest (PE) Abdomen Pelvis w Contrast    Narrative    CT CHEST PE, ABDOMEN & PELVIS WITH CONTRAST February 15, 2023 2:30  PM    CLINICAL HISTORY: Shortness of breath, hypoxia, tachycardia. Rule out  pulmonary embolus. Retroperitoneal hemorrhage/edema.    TECHNIQUE: CT angiogram chest and routine CT abdomen pelvis with IV  contrast. Arterial phase through the chest and venous phase through  the abdomen and pelvis. 2D and 3D MIP reconstructions were preformed  by the CT technologist. Dose reduction techniques were used.   CONTRAST:  84mL isovue 370    COMPARISON: None.    FINDINGS:  ANGIOGRAM CHEST: Pulmonary arteries are normal caliber and negative  for pulmonary emboli.     LUNGS AND PLEURA: There are extensive mixed ground-glass and airspace  infiltrates in both upper lobes, right greater than left and to a  lesser extent in the right middle and both lower lobes. Small  bilateral pleural effusions. No pneumothorax.    MEDIASTINUM/AXILLAE: Numerous mildly enlarged lymph nodes are  presumably reactive. Moderate coronary artery  calcification.    HEPATOBILIARY: Normal.    PANCREAS: Normal.    SPLEEN: Normal.    ADRENAL GLANDS: Normal.    KIDNEYS/BLADDER: Benign right renal cysts.    BOWEL: Sigmoid colonic diverticulosis without signs of diverticulitis.    LYMPH NODES: Normal.    PELVIC ORGANS: Mild prostate gland enlargement. Nonspecific asymmetric  enhancement of the right lateral prostate gland.    OTHER: There is no retroperitoneal hematoma. There is mild diffuse  subcutaneous edema.    MUSCULOSKELETAL: Normal.      Impression    IMPRESSION:  1.  No evidence of pulmonary embolus.  2.  Bilateral right greater left pulmonary infiltrates more likely  represent pneumonitis or developing adult respiratory distress  syndrome. Component of pulmonary edema difficult to exclude.  3.  No retroperitoneal hematoma.    CHEN SUN MD         SYSTEM ID:  [id number]

## 2023-02-16 NOTE — PROVIDER NOTIFICATION
MD Notification    Notified Person: MD    Notified Person Name:Dr Lipscomb    Notification Date/Time:  2/16/2023  0220  Notification Interaction:    Purpose of Notification:  The Pt's O2 needs have increased from 5L NC at 11pm to 10 L Oxymask at 0210.  His LS have crackles half way up.  Respiratory rate is 25-30, other VSS.  No c/o SOB.  Orders Received:20 MG of IV Lasix    Comments:  Will continue to monitor.

## 2023-02-16 NOTE — PROGRESS NOTES
Ordered Lasix 40mg IV BID and methylpred 125mg IV one time for increasing pulmonary infiltrates, increasing O2 needs. CRP is increasing. CT yesterday shows pneumonitis, or ARDS type pattern.  - Pulm to see later today.  - Also consider R thoracentesis for R pleural effusion  - Check BNP  - Will see later today    Marco Tomlin MD  Hospitalist

## 2023-02-16 NOTE — PLAN OF CARE
A&O x4, forgetful. Oxygen needs increasing throughout day, RT consulted. Now on high flow NC at 50LPM/50%. Lung sounds course, has weak cough. Encouraged IS. Ax2, turn/repo. Tele: afib. Denied pain. External catheter in place for incontinence. Had multiple loose BMs. Tolerating diet. Takes pills whole. Alarms on.

## 2023-02-16 NOTE — PLAN OF CARE
2/15/23 [id number]  Reason for Admission: L5 compression fracture    Cognitive/Mentation: A/Ox 4  VS: Stable on 3L NC. Tele: Afib CVR/RVR at times.  GI: BS +, + flatus, last BM today. Incontinent.  : External cath with adequate output  Pulmonary: LS diminished.  Pain: Denies.     IVs: PIV SL  Drains: None  Skin: Heel wound, covered with gauze  Activity: Assist x 2 with lift.  Diet: Mod CHO with thin liquids. Takes pills whole.     Aggression Stoplight Score: Green  Therapies recs: TCU  Discharge: Pending    End of shift summary: Seen by orthotics today, refused back brace. 1800 mL fluid restriction. Rooke boots in place.

## 2023-02-16 NOTE — PROGRESS NOTES
"[facility] Nurse Inpatient Assessment     Consulted for: Gluteal cleft and DTI to right heel    Patient History (according to provider note(s):      \"Louis Keller Jr. is a 89 year old male with a history of Diabetes Mellitus and atrial fibrillation who presents with shortness of breath and leg swelling.\"    Areas Assessed:      Areas visualized during today's visit: Heels  and Sacrum/coccyx    Pressure Injury Location: right heel    Right Heel  2/16/23 2/13/23 2/13/23      Last photo: 2/16/23  Wound type: Pressure Injury     Pressure Injury Stage: Deep Tissue Pressure Injury (DTPI), hospital acquired      This is a Medical Device Related Pressure Injury (MDRPI) due to unknown  Wound history/plan of care:   Documented in nursing note 2/12. Patient's mobility is compromised and he is requiring a lift for transfers, also assist with turn and repo. Pt reports that his heel does get sore occasionally when resting on a surface. Mepilex covering heel on initial assessment.  Wound base: UTV, 90 % epidermis over purple area is intact. Devitalized epidermis collapsed fluid-filled blister that is now tan/yellow scabbing. 10% pink moist dermis     Palpation of the wound bed: boggy      Drainage: scant     Description of drainage: serosanguinous     Measurements (length x width x depth, in cm) 1.6  x 3  x  0.1 cm      Tunneling N/A     Undermining N/A  Periwound skin: Erythema- blanchable      Color: pink      Temperature: normal   Odor: none  Pain: mild, tender  Pain intervention prior to dressing change: patient tolerated well  Treatment goal: Infection control/prevention and Protection  STATUS: initial assessment  Supplies ordered: gathered, at bedside and discussed with RN    My PI Risk Assessment     Sensory Perception: 3 - Slightly Limited     Moisture: 3 - Occasionally moist      Activity: 2 - Chairfast     Mobility: 2 - Very limited     Nutrition: 2 - Probably inadequate      " Friction/Shear: 1 - Problem     TOTAL: 13     Pressure Injury Location: coccyx    2/16/23 2/13 2/13      Last photo: 2/16/23  Wound type: Pressure Injury, Friction, Shear and Moisture Associated Skin Damage (MASD)     Pressure Injury Stage: Deep Tissue Pressure Injury (DTPI), present on admission at superior aspect of wound, Stage 2 at inferior aspect     This is a Medical Device Related Pressure Injury (MDRPI) due to NA  Wound history/plan of care: Documented in nursing note on admission. Wound continues to evolve but is shallow. Nursing report frequent loose stools so they took Mepilex off this am, however per WOC orders patient should be using Triad paste to Coccyx. Will continue Triad paste  Wound base: 90% dermis with 10% non blanchable deep purple epidermis     Palpation of the wound bed: normal      Drainage: scant     Description of drainage: serosanguinous     Measurements (length x width x depth, in cm) 3.2  x 3  x  0.1 cm     Tunneling N/A     Undermining N/A  Periwound skin: Ecchymosis and Macerated      Color: pink and red      Temperature: normal   Odor: none  Pain: denies , none  Pain intervention prior to dressing change: patient tolerated well  Treatment goal: Heal , Infection control/prevention, Decrease moisture and Protection  STATUS: evolving  Supplies ordered: at bedside    Treatment Plan:     Right heel wound(s): Daily  and PRN  1. Gently cleanse wound with MicroKlenz wound cleanser. Pat dry using gauze.   2. Use sterile gauze 4x4 to cover injured area. Goal is to keep the wound clean and dry.   3. Wrap foot and ankle with Kerlix or stretch gauze to keep 4x4 in place on heel.    4. Use tape to secure wrap. Initial and date.  5. Patient should have Rooke boots in place whenever in bed or legs elevated.    Coccyx wound: Daily and PRN  1. Cleanse wound with wound cleanser and pat dry.   2. Apply thin layer of Triad Paste (order #[id number]) to wound bed. Avoid thick layer of triad  "paste.  3. If Triad becomes soiled with stool remove only soiled paste and reapply as needed.  4. Avoid pre moisten wipes. They are not intended for rosalia care.   5. Avoid use of brief. Can try mesh underwear if you need to secure an external catheter.  6. Use single covidien pad and limit number of linens underneath patient.     Pressure Injury Prevention (PIP) Plan:  -If patient is declining pressure injury prevention interventions: Explore reason why and address patient's concerns, Educate on pressure injury risk and prevention intervention(s), If patient is still declining, document \"informed refusal\" , and Ensure Care team is aware ( provider, charge nurse, etc)  -Mattress: Follow bed algorithm, reassess daily and order specialty mattress, if indicated.  -HOB: Maintain at or below 30 degrees, unless contraindicated  -Repositioning in bed: Every 1-2 hours , Left/right positioning; avoid supine, and Raise foot of bed prior to raising head of bed, to reduce patient sliding down (shear)  -Heels: Keep elevated off mattress and Heel lift boots  -Protective Dressing: None  -Positioning Equipment: pillows  -Chair positioning: Chair cushion (#[id number]) , Assist patient to reposition hourly, and Do NOT use a donut for sitting (this increases pressure to smaller area and creates a higher potential for injury)    -Moisture Management: Perineal cleansing /protection: Follow Incontinence Protocol, Avoid brief in bed, Clean and dry skin folds with bathing , and Moisturize dry skin  -Under Devices: Inspect skin under all medical devices during skin inspection , Ensure tubes are stabilized without tension, and Ensure patient is not lying on medical devices or equipment when repositioned    Orders: Written    RECOMMEND PRIMARY TEAM ORDER: None, at this time  Education provided: importance of repositioning, Moisture management and Off-loading pressure  Discussed plan of care with: Patient, Family and Nurse  [facility] nurse follow-up plan: " twice weekly, Mon/Thurs  Notify WOC if wound(s) deteriorate.  Nursing to notify the Provider(s) and re-consult the WOC Nurse if new skin concern.    DATA:     Current support surface: Standard  Low air loss (YESIKA pump, Isolibrium, Pulsate, skin guard, etc)  Containment of urine/stool: Purewick external catheter   BMI: Body mass index is 23.9 kg/m .   Active diet order: Orders Placed This Encounter      Moderate Consistent Carb (60 g CHO per Meal) Diet     Output: I/O last 3 completed shifts:  In: 960 [P.O.:960]  Out: 1450 [Urine:1450]     Labs:   Recent Labs   Lab 02/16/23  0641 02/14/23  0557 02/13/23  0630   ALBUMIN 2.8*   < >  --    HGB 9.2*   < > 8.7*   WBC 13.6*   < > 10.1   A1C  --   --  8.9*    < > = values in this interval not displayed.     Pressure injury risk assessment:   Sensory Perception: 3-->slightly limited  Moisture: 3-->occasionally moist  Activity: 2-->chairfast  Mobility: 2-->very limited  Nutrition: 2-->probably inadequate  Friction and Shear: 1-->problem  Navin Score: 13    Enoc Montes RN CWOCN  -Securely message with [employer] (more info) - can reach individually by name or search 'WOC Nurse' (Apoorva) to reach all current WOCs on duty.  -WOC Office Phone: [phone]

## 2023-02-16 NOTE — PLAN OF CARE
Pt had a difficult night r/t increasing O2 needs that responded to IV Lasix which brought him back to his baseline of 5L by AM.  LS have crackles half way up on his back, the Pt doesn't think he is all that SOB.  Pt was encouraged to TCDB which did seem to make a difference.  Tele:Afib CVR/RVR with HR's 's, BP stable.  Pt is very weak and deconditioned and needs assist of 2 to turn in bed.  Pt was sleeping between nursing cares.

## 2023-02-17 NOTE — PLAN OF CARE
Neuro- A/Ox4, forgetful  Most Recent Vitals- Temp: 97.8  F (36.6  C) Temp src: Oral BP: 111/68 Pulse: 85   Resp: 18 SpO2: 92 % O2 Device: High Flow Nasal Cannula (HFNC)   Tele/Cardiac- Afib CVR  Resp- on 60%/60L HFNC, LS diminished/crackles in bases  Activity- 2A/lift  Pain- denies  Drips- n/a  Drains/Tubes- PIV  Skin- see flowsheets for coccyx and R heel pressure injuries  GI/- incontinent x2, external male cath in place  Aggression Color- Green  COVID status- Negative  Plan- continue diuresis and IV abx, plan for TCU at discharge      Tammie Moe RN

## 2023-02-17 NOTE — PROVIDER NOTIFICATION
MD Notification    Notified Person: MD    Notified Person Name: eDisi    Notification Date/Time: 02/17/23  2:45 AM    Notification Interaction: Amcom    Purpose of Notification:   0200 BG recheck was 428. Please advise.    Orders Received: 5 units of Novolog ordered    Comments:

## 2023-02-17 NOTE — PROGRESS NOTES
[facility]    Hospitalist Progress Note    Interval History   - Needing HFNC, satting 98%, remains very tired, CXR shows slightly worsening infiltrates  - Discussed with Pulm, will broaden antibiotics, continue steroids  - Called daughter Kathy, discussed code status and patient to remain FULL CODE    Assessment & Plan   Summary: Louis Keller Jr. is a 89 year old male with PMH atrial fibrillation, DM2, HLD who was admitted on 2/9/2023 with acute hypoxic respiratory failure and CHF.    Acute hypoxic respiratory failure  Diastolic CHF exacerbation, improved  Community acquired pneumonia  Presented with shortness of breath.  Received diuresis as below.  Worsening SOB, repeat CXR 2/11 Patchy bilateral airspace opacities are again seen. Blood cultures no growth to date. Completed 5 days of azithromycin. Repeat CT on 2/15 notes ongoing bilateral opacities consistent with pneumonia, and possibly ARDS like pattern. CRP rising as of 2/15. CXR 2/17 shows RUL worsening infiltrate, question possible mucous plug as patient does not have a strong cough.  - Continue HFNC, wean O2 as able  - Appreciate Pulm consult   - No bronchoscopy needed   - Prednisone switched to hydrocortisone   - Mucomyst  - Antibiotics broadened to Zosyn  - Continue lasix 40mg IV BID for ongoing diuresis given ARDS type pattern seen on CT. Monitor renal function  - Check ABG in AM      Acute exacerbation of chronic heart failure with preserved EF.  Chronic atrial fibrillation on anticoagulation.  Elevated troponin in the setting of demand.  Venous insufficiency with chronic lower extremity edema.  Presented with worsening shortness of breath. Prior to admission on Lasix 10 mg oral daily.  Troponin elevated at 50. Echo Left ventricular systolic function is normal. The visual ejection fraction is 55-60%. Diastolic function not assessed due to atrial fibrillation. X-ray shows cardiomegaly and perihilar patchy airspace opacity  suspicious for pulmonary vascular congestion.    Received diuresis with intravenous Lasix, now switched to oral Lasix 20 mg daily  on 2/12.  Given CT findings as above will increase diuresis to 20 mg oral Lasix twice daily.  Monitor renal function in a.m., received contrast imaging and can accordingly optimize further diuresis.   Discussed with neurosurgery, CT imaging with no No retroperitoneal hematoma -hence will restart PTA Eliquis 2/15.  - Back to IV lasix on 2/16 due to CT findings 2/15  - Continue PTA amiodarone  - Continue PTA metoprolol.  - Continue PTA Eliquis  - PTA lisinopril on hold.  - Monitor blood pressures, optimize regimen.  - Cardiology signed off 2/13  - Strict input output monitoring, daily weights.     Acute on chronic back pain with multiple vertebral body fractures  History of compression fracture  MRI 2/13 with  L1 vertebral body superior endplate acute or subacute fracture with mild to moderate compression deformity.  L4 vertebral body acute or subacute burst fracture with mild to moderate compression deformity. This is potentially an unstable fracture complex.  L5 vertebral body superior endplate subacute or chronic mild to moderate compression deformity.  Multilevel spondylosis described above. L2-L3 and L3-L4 severe thecal sac stenosis. Cauda equina syndrome will need be excluded clinically.  - Neurosurgery recommended no surgical intervention.  Orthotics for brace  - Lidocaine patch, as needed Tylenol  - Rehab team following, TCU     Bilateral retroperitoneal edema/hemorrhage likely in the setting of above on MRI 2/13, CT with no retroperitoneal hematoma on 2/15.  MRI Presacral edema/hemorrhage. Bilateral retroperitoneal edema/hemorrhage. CT scan 2/15 with no retroperitoneal hematoma. Vascular surgery recommend management to neurosurgery. Neurosurgery recommended no surgical intervention.   - Restarted anticoagulation 2/15.     Normocytic anemia  Iron deficiency, vitamin B12  deficiency.  Baseline appears to be between 12-13. Hemoglobin at presentation 9.3, last hemoglobin on epic is from August, 2022 and was 12.9. Vitamin B12 less than 150, iron saturation index 7.  Folic acid within normal limits.  On IV Venofer for 2 doses.  - Started on oral B12 replacement.  - Continue anticoagulation as above     Acute on chronic hyponatremia.  Patient has a history of mild hyponatremia up to 129 previously. Sodium on presentation today is 121, likely due to CHF exacerbation, diuretic use, and PNA. Sodium 128 on 2/16.  - Fluid restriction to 1800 mL/day.  Liberalize salt in diet.       Hyperglycemia likely steroid induced  Uncontrolled diabetes mellitus with hemoglobin A1c of 8.9   BG worsening in setting of steroids to > 400  - Increase Lantus to 20 units BID  - Continue Glipizide, resume metformin  - Continue MDSSI     Elevated TSH.  TSH is 6.66, T41.06.  Follow TSH levels in 10 to 12 weeks     Hypomagnesemia- Corrected      Delirium from hospitalization  Concern for cognitive impairment at baseline.  Noted some confusion today.  Minimize interruptions, frequent reorientation  Consider cognitive screening at TCU.     Physical deconditioning from medical illness, senile frailty.  Rehab team following. Transition to TCU.     DVT Prophylaxis: Pneumatic Compression Devices  Code Status: Full Code  PT/OT: ordered  Diet: Snacks/Supplements Adult: Glucerna; Between Meals  Moderate Consistent Carb (60 g CHO per Meal) Diet  Fluid restriction 1800 ML FLUID      Disposition: Expected discharge 3+ days    Marco Tomlin MD, MD  Text Page  (7am to 6pm)  -Data reviewed today: I reviewed all new labs and imaging results over the last 24 hours.    Physical Exam   Temp: 98  F (36.7  C) Temp src: Oral BP: 106/67 Pulse: 70   Resp: 18 SpO2: 94 % O2 Device: High Flow Nasal Cannula (HFNC) Oxygen Delivery: 50 LPM  Vitals:    02/15/23 0554 02/16/23 0555 02/17/23 0313   Weight: 76.4 kg (168 lb 8 oz) 75.6 kg (166 lb  9.6 oz) 74.4 kg (164 lb)     Vital Signs with Ranges  Temp:  [97.6  F (36.4  C)-98  F (36.7  C)] 98  F (36.7  C)  Pulse:  [] 70  Resp:  [18] 18  BP: (106-111)/(67-73) 106/67  FiO2 (%):  [50 %-65 %] 50 %  SpO2:  [87 %-97 %] 94 %  I/O last 3 completed shifts:  In: 400 [P.O.:400]  Out: 1350 [Urine:1350]  O2 requirements: yes    Constitutional: Elderly male appears frail, moderately ill appearing  HEENT: Eyes nonicteric  Cardiovascular: RRR, normal S1/2, no m/r/g  Respiratory: Bilateral crackles  Vascular: No LE pitting edema  GI: Normoactive bowel sounds, nontender  Skin/Integumen: No rashes  Neuro/Psych: Appropriate affect and mood. A&Ox3, moves all extremities    Medications       acetylcysteine  2 mL Nebulization BID     amiodarone  200 mg Oral Daily     apixaban ANTICOAGULANT  2.5 mg Oral BID     brimonidine  1 drop Left Eye BID     cholestyramine  1 packet Oral BID w/meals     cyanocobalamin  1,000 mcg Oral Daily     dorzolamide  1 drop Left Eye BID     furosemide  40 mg Intravenous BID     gabapentin  200 mg Oral TID     glipiZIDE  5 mg Oral QAM AC     guaiFENesin  600 mg Oral BID     insulin aspart  1-7 Units Subcutaneous TID AC     insulin aspart  1-5 Units Subcutaneous At Bedtime     insulin glargine  20 Units Subcutaneous BID     ipratropium - albuterol 0.5 mg/2.5 mg/3 mL  3 mL Nebulization 4x daily     latanoprost  1 drop Left Eye QPM     lidocaine  1 patch Transdermal Q24h    And     lidocaine   Transdermal Q8H MAURA     [Held by provider] lisinopril  10 mg Oral Daily     methylPREDNISolone  62.5 mg Intravenous Q12H     metoprolol tartrate  37.5 mg Oral BID     piperacillin-tazobactam  3.375 g Intravenous Q6H     [Held by provider] predniSONE  40 mg Oral Daily     senna-docusate  2 tablet Oral BID     sodium chloride (PF)  3 mL Intracatheter Q8H       Data   Recent Labs   Lab 02/17/23  0910 02/17/23  0627 02/17/23  0216 02/16/23  0826 02/16/23  0641 02/15/23  1155 02/15/23  0620 02/14/23  0737  02/14/23  0557   WBC  --  12.6*  --   --  13.6*  --  11.5*  --  9.5   HGB  --  9.6*  --   --  9.2*  --  9.2*   < > 9.2*   MCV  --  91  --   --  91  --  90  --  91   PLT  --  432  --   --  430  --  391  --  353   NA  --  129*  --   --  128*  --  128*  --  129*   POTASSIUM  --  3.8  3.8  --   --  4.2  --  4.4  --  4.4   CHLORIDE  --  90*  --   --  91*  --  93*  --  94*   CO2  --  29  --   --  28  --  27  --  28   BUN  --  21.2  --   --  15.2  --  14.7  --  15.6   CR  --  0.64*  --   --  0.62*  --  0.62*  --  0.58*   ANIONGAP  --  10  --   --  9  --  8  --  7   MICHAEL  --  8.7  --   --  8.7  --  8.6  --  8.4   * 342* 428*   < > 215*   < > 164*   < > 183*   ALBUMIN  --   --   --   --  2.8*  --   --   --  2.7*   PROTTOTAL  --   --   --   --  5.9*  --   --   --  5.7*   BILITOTAL  --   --   --   --  0.7  --   --   --  0.8   ALKPHOS  --   --   --   --  102  --   --   --  104   ALT  --   --   --   --  17  --   --   --  17   AST  --   --   --   --  23  --   --   --  29    < > = values in this interval not displayed.       Imaging:   Recent Results (from the past 24 hour(s))   XR Chest Port 1 View    Narrative    XR CHEST PORT 1 VIEW 2/17/2023 7:55 AM    HISTORY: hypoxia    COMPARISON: 2/15/2023, 2/11/23      Impression    IMPRESSION:Slight worsening of right upper lobe infiltrates, and  stable additional multifocal infiltrates. Small bilateral pleural  effusions, better seen on the recent CT. No pneumothorax. Normal heart  size. Implanted pacemaker.    DORY BAINS MD         SYSTEM ID:  [id number]

## 2023-02-17 NOTE — PROVIDER NOTIFICATION
MD Notification    Notified Person: MD    Notified Person Name: Deisi    Notification Date/Time: 02/16/23  11:45 PM    Notification Interaction: Amcom    Purpose of Notification: Patient's blood sugar was 424, 5 units of Novolog was given, and recheck was 446. Please advise.    Orders Received:    Comments:

## 2023-02-17 NOTE — PROGRESS NOTES
"  PULMONOLOGY PROGRESS NOTE    Date of Admission: 2/9/2023      SUBJECTIVE  / INTERVAL EVENTS     Increasing O2 requirements since yesterday. Reports difficulty clearing sputum.    PHYSICAL EXAMINATION   Vital signs  Temp: 98  F (36.7  C) Temp src: Oral BP: 106/67 Pulse: 70   Resp: 18 SpO2: 94 % O2 Device: High Flow Nasal Cannula (HFNC) Oxygen Delivery: 50 LPM Height: 177.8 cm (5' 10\") Weight: 74.4 kg (164 lb)  Estimated body mass index is 23.53 kg/m  as calculated from the following:    Height as of this encounter: 1.778 m (5' 10\").    Weight as of this encounter: 74.4 kg (164 lb).  I/O last 3 completed shifts:  In: 400 [P.O.:400]  Out: 1350 [Urine:1350]    CONSTITUTIONAL/GENERAL: Alert male. No apparent distress. Sitting unit(s) pin bed reading.   EARS,NOSE,THROAT,MOUTH: External ears and nose overall normal. HFNC in place.  RESPIRATORY: Crackles  CARDIOVASCULAR: RRR, S1, S2. 2+ LE edema to the thighs.  PSYCHIATRIC: Appropriate mood and affect.    LABORATORY ASSESSMENT    Arterial Blood GasNo lab results found in last 7 days.  CBC  Recent Labs   Lab 02/17/23  0627 02/16/23  0641 02/15/23  0620 02/14/23  2329 02/14/23  1537 02/14/23  0557   WBC 12.6* 13.6* 11.5*  --   --  9.5   RBC 3.31* 3.15* 3.18*  --   --  3.11*   HGB 9.6* 9.2* 9.2* 9.4*   < > 9.2*   HCT 30.0* 28.5* 28.6*  --   --  28.2*   MCV 91 91 90  --   --  91   MCH 29.0 29.2 28.9  --   --  29.6   MCHC 32.0 32.3 32.2  --   --  32.6   RDW 13.8 13.8 13.7  --   --  13.7    430 391  --   --  353    < > = values in this interval not displayed.     BMP  Recent Labs   Lab 02/17/23  0910 02/17/23  0627 02/17/23  0216 02/16/23  2339 02/16/23  0826 02/16/23  0641 02/15/23  1155 02/15/23  0620 02/14/23  0737 02/14/23  0557   NA  --  129*  --   --   --  128*  --  128*  --  129*   POTASSIUM  --  3.8  3.8  --   --   --  4.2  --  4.4  --  4.4   CHLORIDE  --  90*  --   --   --  91*  --  93*  --  94*   MICHAEL  --  8.7  --   --   --  8.7  --  8.6  --  8.4   CO2  --  29  " --   --   --  28  --  27  --  28   BUN  --  21.2  --   --   --  15.2  --  14.7  --  15.6   CR  --  0.64*  --   --   --  0.62*  --  0.62*  --  0.58*   * 342* 428* 446*   < > 215*   < > 164*   < > 183*    < > = values in this interval not displayed.     INRNo lab results found in last 7 days.   BNPNo lab results found in last 7 days.  VENOUS BLOOD GASESNo lab results found in last 7 days.    IMAGING, ECHO, PFT, SLEEP STUDY, RHC, OTHER TESTING         ASSESSMENT / PLAN      Pulmonary diagnoses (alphabetical):  Abnl CT/CXR R91.8  CHF I50.9  Fluid overload E87.79  Hypoxemia R09.02  Pleural effusion  Pneumonia unspec J18.9  Resp fail acute J96.00  SOB R06.02        ASSESSMENT:  89-year-old male with history of mumps and scarlet fever, A-fib, HFpEF, presenting from cardiology clinic in the setting of recent weight gain and LE edema.     Found to have BNP 5000 and hemoglobin 9 (baseline~12). TTE showed EF 55 to 60%, dilated RV, dilated RA, mild MR, moderate TR with RVSP 37. He was also found to have bilateral retroperitoneal edema/hemorrhage in the setting of multiple vertebral body fractures.  He was deemed not a surgical candidate and was restarted on anticoagulation.     CXR showed bilateral airspace opacities, RUL predominant.  He was diuresed successfully, however persistent dyspnea prompted additional imaging.      CT chest was negative for PE, showed bilateral GGO and reticulation, with dense consolidations in the RML and RUL, and bilateral pleural effusions.     CT findings appear consistent with infectious PNA. Volume overload in the setting of CHF contributes to chronic interstitial edema in the form of reticulation, and bilateral pleural effusions. He may also have underlying ILD.  Prior CT chest from 2012 showed very faint reticular prominence mainly in the right base, however no chest imaging since then. Had dramatic increase in oxygen requirements overnight, now on HFNC and appears to be progressing  toward ARDS.      PLAN:     Recommend broadening antibiotics given progressive hypoxia.    Start Solumedrol 62.5mg Q12h (ordered).    Continue Guaifenesin 600mg BID.    Continue DuoNeb Q6h via MetaNeb.    Start Mucomyst neb BID via MetaNeb. Administer after DuoNeb. (Ordered).    Diuresis per primary.    Manual chest PT    Aerobika    HOB >30     Incentive spirometry 10 times/hour while awake    OOB as able,        Erick Jones MD    [facility]  Office: [phone]  Pager: [phone]

## 2023-02-17 NOTE — PROGRESS NOTES
Care Management Follow Up    Length of Stay (days): 8    Expected Discharge Date:       Concerns to be Addressed: discharge planning     Patient plan of care discussed at interdisciplinary rounds: Yes    Anticipated Discharge Disposition: Transitional Care  Disposition Comments: Dischagre to TCU  Anticipated Discharge Services: None  Anticipated Discharge DME: None    Patient/family educated on Medicare website which has current facility and service quality ratings: yes  Education Provided on the Discharge Plan:    Patient/Family in Agreement with the Plan: yes    Referrals Placed by CM/SW:    Private pay costs discussed: Not applicable    Additional Information:  Writer received a voicemail from daughter Kathy asking for a referral to [facility]. Writer called Kathy and confirmed that we can send a referral and follow as patient gets more medically ready. Patient is currently on high-flow. Care management will send referrals when more medically appropriate.     BOBBY Lowry, SW   Social Work   [facility]

## 2023-02-18 NOTE — PROGRESS NOTES
"  PULMONOLOGY PROGRESS NOTE    Date of Admission: 2/9/2023      SUBJECTIVE  / INTERVAL EVENTS     Slight decrease in O2 requirements today. OOB to chair.     PHYSICAL EXAMINATION   Vital signs  Temp: 97.6  F (36.4  C) Temp src: Oral BP: 127/64 Pulse: 103   Resp: 18 SpO2: 96 % O2 Device: High Flow Nasal Cannula (HFNC) Oxygen Delivery: 35 LPM Height: 177.8 cm (5' 10\") Weight: 72.6 kg (160 lb)  Estimated body mass index is 22.96 kg/m  as calculated from the following:    Height as of this encounter: 1.778 m (5' 10\").    Weight as of this encounter: 72.6 kg (160 lb).  I/O last 3 completed shifts:  In: 600 [P.O.:600]  Out: 2600 [Urine:2600]    CONSTITUTIONAL/GENERAL: Alert male. No apparent distress. Sitting unit(s) pin bed reading.   EARS,NOSE,THROAT,MOUTH: External ears and nose overall normal. HFNC in place.  RESPIRATORY: Crackles  CARDIOVASCULAR: RRR, S1, S2. 2+ LE edema to the thighs.  PSYCHIATRIC: Appropriate mood and affect.    LABORATORY ASSESSMENT    Arterial Blood Gas  Recent Labs   Lab 02/18/23  1131   PH 7.49*   PCO2 40   PO2 66*   HCO3 31*   O2PER 40     CBC  Recent Labs   Lab 02/18/23  0627 02/17/23  0627 02/16/23  0641 02/15/23  0620   WBC 16.0* 12.6* 13.6* 11.5*   RBC 3.40* 3.31* 3.15* 3.18*   HGB 9.9* 9.6* 9.2* 9.2*   HCT 29.8* 30.0* 28.5* 28.6*   MCV 88 91 91 90   MCH 29.1 29.0 29.2 28.9   MCHC 33.2 32.0 32.3 32.2   RDW 14.4 13.8 13.8 13.7   * 432 430 391     BMP  Recent Labs   Lab 02/18/23  1205 02/18/23  0750 02/18/23  0627 02/18/23  0214 02/17/23  0910 02/17/23  0627 02/16/23  0826 02/16/23  0641 02/15/23  1155 02/15/23  0620   NA  --   --  131*  --   --  129*  --  128*  --  128*   POTASSIUM  --   --  3.8  --   --  3.8  3.8  --  4.2  --  4.4   CHLORIDE  --   --  92*  --   --  90*  --  91*  --  93*   MICHAEL  --   --  9.0  --   --  8.7  --  8.7  --  8.6   CO2  --   --  29  --   --  29  --  28  --  27   BUN  --   --  27.6*  --   --  21.2  --  15.2  --  14.7   CR  --   --  0.65*  --   --  0.64*  --  " 0.62*  --  0.62*   * 330* 330* 368*   < > 342*   < > 215*   < > 164*    < > = values in this interval not displayed.     INRNo lab results found in last 7 days.   BNPNo lab results found in last 7 days.  VENOUS BLOOD GASESNo lab results found in last 7 days.    IMAGING, ECHO, PFT, SLEEP STUDY, RHC, OTHER TESTING         ASSESSMENT / PLAN      Pulmonary diagnoses (alphabetical):  Abnl CT/CXR R91.8  CHF I50.9  Fluid overload E87.79  Hypoxemia R09.02  Pleural effusion  Pneumonia unspec J18.9  Resp fail acute J96.00  SOB R06.02        ASSESSMENT:  89-year-old male with history of mumps and scarlet fever, A-fib, HFpEF, presenting from cardiology clinic in the setting of recent weight gain and LE edema.     Found to have BNP 5000 and hemoglobin 9 (baseline~12). TTE showed EF 55 to 60%, dilated RV, dilated RA, mild MR, moderate TR with RVSP 37. He was also found to have bilateral retroperitoneal edema/hemorrhage in the setting of multiple vertebral body fractures.  He was deemed not a surgical candidate and was restarted on anticoagulation.     CXR showed bilateral airspace opacities, RUL predominant.  He was diuresed successfully, however persistent dyspnea prompted additional imaging.      CT chest was negative for PE, showed bilateral GGO and reticulation, with dense consolidations in the RML and RUL, and bilateral pleural effusions.     CT findings appear consistent with infectious PNA. Volume overload in the setting of CHF contributes to chronic interstitial edema in the form of reticulation, and bilateral pleural effusions. He may also have underlying ILD.  Prior CT chest from 2012 showed very faint reticular prominence mainly in the right base, however no chest imaging since then.     Now on HFNC O2 requirements slightly improved from yesterday.       PLAN:     Continue Solumedrol 62.5mg Q12h.     Continue Guaifenesin 600mg BID.    Continue DuoNeb Q6h via MetaNeb and Mucomyst neb BID via MetaNeb.    Diuresis  and antibiotics per primary.    Manual chest PT    Aerobika    HOB >30     Incentive spirometry 10 times/hour while awake    OOB as able.        Erick Jones MD    [facility] / [facility]  Office: [phone]  Pager: [phone]

## 2023-02-18 NOTE — PROGRESS NOTES
[facility]    Hospitalist Progress Note    Interval History   - Continues of HNC, patient appears slightly improved and feels slightly improved, Sodium improving, CRP decreasing. Continue to wean O2    Assessment & Plan   Summary: Louis Keller Jr. is a 89 year old male with PMH atrial fibrillation, DM2, HLD who was admitted on 2/9/2023 with acute hypoxic respiratory failure and CHF.    Acute hypoxic respiratory failure  Diastolic CHF exacerbation, improved  Community acquired pneumonia  Presented with shortness of breath.  Received diuresis as below.  Worsening SOB, repeat CXR 2/11 Patchy bilateral airspace opacities are again seen. Blood cultures no growth to date. Completed 5 days of azithromycin. Repeat CT on 2/15 notes ongoing bilateral opacities consistent with pneumonia, and possibly ARDS like pattern. CRP rising as of 2/15. CXR 2/17 shows RUL worsening infiltrate, question possible mucous plug as patient does not have a strong cough.  - Continue HFNC, wean O2 as able  - Appreciate Pulm consult   - No bronchoscopy needed   - Prednisone switched to hydrocortisone   - Mucomyst  - Antibiotics broadened to Zosyn, continue  - Continue lasix 40mg IV BID for ongoing diuresis given ARDS type pattern seen on CT. Monitor renal functio, continue as long as patient can tolerate  - Check ABG--not done still pending      Acute exacerbation of chronic heart failure with preserved EF  Chronic atrial fibrillation on anticoagulation  Elevated troponin in the setting of demand  Venous insufficiency with chronic lower extremity edema  Presented with worsening shortness of breath. Prior to admission on Lasix 10 mg oral daily.  Troponin elevated at 50. Echo Left ventricular systolic function is normal. The visual ejection fraction is 55-60%. Diastolic function not assessed due to atrial fibrillation. X-ray shows cardiomegaly and perihilar patchy airspace opacity suspicious for pulmonary vascular  congestion.    Received diuresis with intravenous Lasix, now switched to oral Lasix 20 mg daily  on 2/12.  Given CT findings as above will increase diuresis to 20 mg oral Lasix twice daily.  Monitor renal function in a.m., received contrast imaging and can accordingly optimize further diuresis.   Discussed with neurosurgery, CT imaging with no No retroperitoneal hematoma -hence will restart PTA Eliquis 2/15.  - Back to IV lasix on 2/16 due to CT findings 2/15  - Continue PTA amiodarone  - Continue PTA metoprolol.  - Continue PTA Eliquis  - PTA lisinopril on hold.  - Monitor blood pressures, optimize regimen.  - Cardiology signed off 2/13  - Strict input output monitoring, daily weights.     Acute on chronic back pain with multiple vertebral body fractures  History of compression fracture  MRI 2/13 with  L1 vertebral body superior endplate acute or subacute fracture with mild to moderate compression deformity.  L4 vertebral body acute or subacute burst fracture with mild to moderate compression deformity. This is potentially an unstable fracture complex.  L5 vertebral body superior endplate subacute or chronic mild to moderate compression deformity.  Multilevel spondylosis described above. L2-L3 and L3-L4 severe thecal sac stenosis. Cauda equina syndrome will need be excluded clinically.  - Neurosurgery recommended no surgical intervention.  Orthotics for brace  - Lidocaine patch, as needed Tylenol  - Rehab team following, TCU     Bilateral retroperitoneal edema/hemorrhage likely in the setting of above on MRI 2/13, CT with no retroperitoneal hematoma on 2/15.  MRI Presacral edema/hemorrhage. Bilateral retroperitoneal edema/hemorrhage. CT scan 2/15 with no retroperitoneal hematoma. Vascular surgery recommend management to neurosurgery. Neurosurgery recommended no surgical intervention.   - Restarted anticoagulation 2/15.     Normocytic anemia  Iron deficiency, vitamin B12 deficiency.  Baseline appears to be between  12-13. Hemoglobin at presentation 9.3, last hemoglobin on epic is from August, 2022 and was 12.9. Vitamin B12 less than 150, iron saturation index 7.  Folic acid within normal limits.  On IV Venofer for 2 doses.  - Started on oral B12 replacement.  - Continue anticoagulation as above     Acute on chronic hyponatremia.  Patient has a history of mild hyponatremia up to 129 previously. Sodium on presentation today is 121, likely due to CHF exacerbation, diuretic use, and PNA. Sodium 128 on 2/16.  - Fluid restriction to 1800 mL/day.  Liberalize salt in diet.       Hyperglycemia likely steroid induced  Uncontrolled diabetes mellitus with hemoglobin A1c of 8.9   BG worsening in setting of steroids to > 400, remain uncontrolled on 2/18  - Increase Lantus to 30 units BID  - Continue Glipizide, resume metformin  - Add carb counting insulin and increase to high sliding scale insulin     Elevated TSH.  TSH is 6.66, T41.06.  Follow TSH levels in 10 to 12 weeks     Hypomagnesemia- Corrected      Delirium from hospitalization  Concern for cognitive impairment at baseline.  Noted some confusion today.  Minimize interruptions, frequent reorientation  Consider cognitive screening at TCU.     Physical deconditioning from medical illness, senile frailty.  Rehab team following. Transition to TCU.     DVT Prophylaxis: Pneumatic Compression Devices  Code Status: Full Code  PT/OT: ordered  Diet: Snacks/Supplements Adult: Glucerna; Between Meals  Moderate Consistent Carb (60 g CHO per Meal) Diet  Fluid restriction 1800 ML FLUID      Disposition: Expected discharge 3+ days    Marco Tomlin MD, MD  Text Page  (7am to 6pm)  -Data reviewed today: I reviewed all new labs and imaging results over the last 24 hours.    Physical Exam   Temp: 97.6  F (36.4  C) Temp src: Oral BP: 127/64 Pulse: 103   Resp: 18 SpO2: 94 % O2 Device: High Flow Nasal Cannula (HFNC) Oxygen Delivery: 35 LPM  Vitals:    02/16/23 0555 02/17/23 0313 02/18/23 0544    Weight: 75.6 kg (166 lb 9.6 oz) 74.4 kg (164 lb) 72.6 kg (160 lb)     Vital Signs with Ranges  Temp:  [97.6  F (36.4  C)-97.9  F (36.6  C)] 97.6  F (36.4  C)  Pulse:  [] 103  Resp:  [18] 18  BP: (102-129)/(61-80) 127/64  FiO2 (%):  [40 %-50 %] 40 %  SpO2:  [89 %-97 %] 94 %  I/O last 3 completed shifts:  In: 600 [P.O.:600]  Out: 2600 [Urine:2600]  O2 requirements: yes    Constitutional: Elderly male appears frail, moderately ill appearing  HEENT: Eyes nonicteric  Cardiovascular: RRR, normal S1/2, no m/r/g  Respiratory: Bilateral crackles no significant change  Vascular: No LE pitting edema  GI: Normoactive bowel sounds, nontender  Skin/Integumen: No rashes  Neuro/Psych: Appropriate affect and mood. A&Ox3, moves all extremities    Medications       acetylcysteine  2 mL Nebulization BID     amiodarone  200 mg Oral Daily     apixaban ANTICOAGULANT  2.5 mg Oral BID     brimonidine  1 drop Left Eye BID     cholestyramine  1 packet Oral BID w/meals     cyanocobalamin  1,000 mcg Oral Daily     dorzolamide  1 drop Left Eye BID     furosemide  40 mg Intravenous BID     gabapentin  200 mg Oral TID     glipiZIDE  5 mg Oral QAM AC     guaiFENesin  600 mg Oral BID     insulin aspart  1-7 Units Subcutaneous TID AC     insulin aspart  1-5 Units Subcutaneous At Bedtime     insulin glargine  20 Units Subcutaneous BID     ipratropium - albuterol 0.5 mg/2.5 mg/3 mL  3 mL Nebulization 4x daily     latanoprost  1 drop Left Eye QPM     lidocaine  1 patch Transdermal Q24h    And     lidocaine   Transdermal Q8H MAURA     [Held by provider] lisinopril  10 mg Oral Daily     metFORMIN  500 mg Oral BID w/meals     methylPREDNISolone  62.5 mg Intravenous Q12H     metoprolol tartrate  37.5 mg Oral BID     piperacillin-tazobactam  3.375 g Intravenous Q6H     [Held by provider] predniSONE  40 mg Oral Daily     senna-docusate  2 tablet Oral BID     sodium chloride (PF)  3 mL Intracatheter Q8H       Data   Recent Labs   Lab 02/18/23  [id number]  02/18/23  0627 02/18/23  0214 02/17/23  0910 02/17/23  0627 02/16/23  0826 02/16/23  0641 02/14/23  0737 02/14/23  0557   WBC  --  16.0*  --   --  12.6*  --  13.6*   < > 9.5   HGB  --  9.9*  --   --  9.6*  --  9.2*   < > 9.2*   MCV  --  88  --   --  91  --  91   < > 91   PLT  --  460*  --   --  432  --  430   < > 353   NA  --  131*  --   --  129*  --  128*   < > 129*   POTASSIUM  --  3.8  --   --  3.8  3.8  --  4.2   < > 4.4   CHLORIDE  --  92*  --   --  90*  --  91*   < > 94*   CO2  --  29  --   --  29  --  28   < > 28   BUN  --  27.6*  --   --  21.2  --  15.2   < > 15.6   CR  --  0.65*  --   --  0.64*  --  0.62*   < > 0.58*   ANIONGAP  --  10  --   --  10  --  9   < > 7   MICHAEL  --  9.0  --   --  8.7  --  8.7   < > 8.4   * 330* 368*   < > 342*   < > 215*   < > 183*   ALBUMIN  --   --   --   --   --   --  2.8*  --  2.7*   PROTTOTAL  --   --   --   --   --   --  5.9*  --  5.7*   BILITOTAL  --   --   --   --   --   --  0.7  --  0.8   ALKPHOS  --   --   --   --   --   --  102  --  104   ALT  --   --   --   --   --   --  17  --  17   AST  --   --   --   --   --   --  23  --  29    < > = values in this interval not displayed.       Imaging:   No results found for this or any previous visit (from the past 24 hour(s)).

## 2023-02-18 NOTE — PROGRESS NOTES
Lizandro's test performed prior to radial ABG draw. Collateral circulation confirmed.    Site:Right radial  Device:HFNC 35L 40%      RT Aung on 2/18/2023 at 11:40 AM

## 2023-02-18 NOTE — PLAN OF CARE
Pleasant gentlemen. Pt is aox4 but forgetful, highflow NC 40%/40L, lift and full code. Tele is Afib CVR. Pt denies chest pain. External cath in place. IV lasix 40mg BID and diuresing well. K and Mag protocol. Continue Zoysn Q6. Rooke boots on. Repo every 2 hrs. WOC follows for pressure wounds on Rt heel and Coccyx. Lidocaine patch in place over night.   Plan: when ready discharge to TCU.

## 2023-02-18 NOTE — PLAN OF CARE
Goal Outcome Evaluation:      Plan of Care Reviewed With: patient, child     A&O x4, forgetful. Oxygen: high flow NC at 45 LPM/45%. Lung sounds course, diminished, has weak cough. Encouraged IS. Ax2, turn/repo. Rook boots on, heels elevated. R heel wound changed. Coccyx red, paste and foam. Tele: afib CVR.  Denied pain. External catheter in place for incontinence. Loose BM's yesterday, none today. Tolerating diet,but no appetite. Takes pills whole. Alarms on. Sat in chair this afternoon using black chair cushion, up with lift. BG elevated, Lantus increased, MD aware- started IV Solu-medrol today.

## 2023-02-19 NOTE — PLAN OF CARE
Goal Outcome Evaluation:    Shift Note  Neuro: A&O, forgetful.  CV: Tachycardic, otherwise VSS. Tele- AFib CVR/RVR, asymptomatic.   Respiratory: HFNC 40/35LPM. QUESADA. Crackles. Congested infrequent cough.   GI/: Incontinence. External catheter in place, good output.   Skin: Scattered bruising. R heel and coccyx pressure injuries-daily dressing changes completed.  Activity: A2 Lift. Up to chair.   Diet: Mod Carb. 1800 mL fluid restriction.   IV: PIV, SL.  Drips: n/a  BG: Carb counting and sliding scale. AC/HS - 330, 329, 320  Pain: Denies  Other:   Consults:  Plan: IV Lasix, BG management, solu-medrol, wean O2, zosyn, TCU @ discharge, expected discharge 3+ days

## 2023-02-19 NOTE — PLAN OF CARE
Pleasant gentlemen. Pt aox4 but forgetful, lift, High flow NC and full code. Tele Afib CVR. Pt denies chest pain and SOB. Male external cath in place and diuresing well. IV lasix 40mg BID. K and Mag protocol. Lantus increased to 30units BID and carb counting added. Continue Zoysn Q6. WOC follows for pressure wounds on Rt heel and Coccyx.   Plan: At discharge pt will be heading to TCU.

## 2023-02-19 NOTE — PROGRESS NOTES
[facility]    Hospitalist Progress Note    Interval History   - Continues of HFNC, no significant changes today. Encouraged patient to use ICS and cough. Family at bedside, questions answered and updated    Assessment & Plan   Summary: Louis Keller Jr. is a 89 year old male with PMH atrial fibrillation, DM2, HLD who was admitted on 2/9/2023 with acute hypoxic respiratory failure and CHF.    Acute respiratory distress syndrome  Diastolic CHF exacerbation, improved  Community acquired pneumonia, improved  Presented with shortness of breath.  Received diuresis as below.  Worsening SOB, repeat CXR 2/11 Patchy bilateral airspace opacities are again seen. Blood cultures no growth to date. Completed 5 days of azithromycin. Repeat CT on 2/15 notes ongoing bilateral opacities consistent with pneumonia, and possibly ARDS like pattern. CRP rising as of 2/15. CXR 2/17 shows RUL worsening infiltrate, question possible mucous plug as patient does not have a strong cough. ABG 2/18 consistent with ARDS with PO2/FIO2 ratio ~165  - Continue HFNC, wean O2 as able  - Appreciate Pulm consult   - No bronchoscopy needed   - Steroids   - Mucomyst  - Continue Zosyn, end date 2/24 (14 days)  - Continue lasix 40mg IV BID for ongoing diuresis given ARDS type pattern seen on CT. Monitor renal function, continue as long as patient can tolerate      Acute exacerbation of chronic heart failure with preserved EF  Chronic atrial fibrillation on anticoagulation  Elevated troponin in the setting of demand  Venous insufficiency with chronic lower extremity edema  Presented with worsening shortness of breath. Prior to admission on Lasix 10 mg oral daily.  Troponin elevated at 50. Echo Left ventricular systolic function is normal. The visual ejection fraction is 55-60%. Diastolic function not assessed due to atrial fibrillation. X-ray shows cardiomegaly and perihilar patchy airspace opacity suspicious for pulmonary vascular  congestion.    Received diuresis with intravenous Lasix, now switched to oral Lasix 20 mg daily  on 2/12.  Given CT findings as above will increase diuresis to 20 mg oral Lasix twice daily.  Monitor renal function in a.m., received contrast imaging and can accordingly optimize further diuresis.   Discussed with neurosurgery, CT imaging with no No retroperitoneal hematoma -hence will restart PTA Eliquis 2/15.  - Back to IV lasix on 2/16 due to CT findings 2/15  - Continue PTA amiodarone  - Continue PTA metoprolol.  - Continue PTA Eliquis  - PTA lisinopril on hold.  - Monitor blood pressures, optimize regimen.  - Cardiology signed off 2/13  - Strict input output monitoring, daily weights.     Acute on chronic back pain with multiple vertebral body fractures  History of compression fracture  MRI 2/13 with  L1 vertebral body superior endplate acute or subacute fracture with mild to moderate compression deformity.  L4 vertebral body acute or subacute burst fracture with mild to moderate compression deformity. This is potentially an unstable fracture complex.  L5 vertebral body superior endplate subacute or chronic mild to moderate compression deformity.  Multilevel spondylosis described above. L2-L3 and L3-L4 severe thecal sac stenosis. Cauda equina syndrome will need be excluded clinically.  - Neurosurgery recommended no surgical intervention.  Orthotics for brace  - Lidocaine patch, as needed Tylenol  - Rehab team following, TCU     Bilateral retroperitoneal edema/hemorrhage likely in the setting of above on MRI 2/13, CT with no retroperitoneal hematoma on 2/15.  MRI Presacral edema/hemorrhage. Bilateral retroperitoneal edema/hemorrhage. CT scan 2/15 with no retroperitoneal hematoma. Vascular surgery recommend management to neurosurgery. Neurosurgery recommended no surgical intervention.   - Restarted anticoagulation 2/15.     Normocytic anemia  Iron deficiency, vitamin B12 deficiency.  Baseline appears to be between  12-13. Hemoglobin at presentation 9.3, last hemoglobin on epic is from August, 2022 and was 12.9. Vitamin B12 less than 150, iron saturation index 7.  Folic acid within normal limits.  On IV Venofer for 2 doses.  - Started on oral B12 replacement.  - Continue anticoagulation as above     Acute on chronic hyponatremia.  Patient has a history of mild hyponatremia up to 129 previously. Sodium on presentation today is 121, likely due to CHF exacerbation, diuretic use, and PNA. Sodium 128 on 2/16.  - Fluid restriction to 1800 mL/day.  Liberalize salt in diet.       Hyperglycemia likely steroid induced  Uncontrolled diabetes mellitus with hemoglobin A1c of 8.9   BG worsening in setting of steroids to > 400, remain uncontrolled on 2/18  - Increase Lantus to 30 units BID  - Continue Glipizide, resume metformin  - Add carb counting insulin and increase to high sliding scale insulin     Elevated TSH.  TSH is 6.66, T41.06.  Follow TSH levels in 10 to 12 weeks     Hypomagnesemia- Corrected      Delirium from hospitalization  Concern for cognitive impairment at baseline.  Noted some confusion today.  Minimize interruptions, frequent reorientation  Consider cognitive screening at TCU.     Physical deconditioning from medical illness, senile frailty.  Rehab team following. Transition to TCU.     DVT Prophylaxis: Pneumatic Compression Devices  Code Status: Full Code  PT/OT: ordered  Diet: Snacks/Supplements Adult: Glucerna; Between Meals  Moderate Consistent Carb (60 g CHO per Meal) Diet  Fluid restriction 1800 ML FLUID      Disposition: Expected discharge 3+ days    Marco Tomlin MD, MD  Text Page  (7am to 6pm)  -Data reviewed today: I reviewed all new labs and imaging results over the last 24 hours.    Physical Exam   Temp: 98  F (36.7  C) Temp src: Oral BP: (!) 122/91 Pulse: 90   Resp: 17 SpO2: 97 % O2 Device: High Flow Nasal Cannula (HFNC) Oxygen Delivery: 35 LPM  Vitals:    02/17/23 0313 02/18/23 0544 02/19/23 0557    Weight: 74.4 kg (164 lb) 72.6 kg (160 lb) 77.1 kg (170 lb)     Vital Signs with Ranges  Temp:  [98  F (36.7  C)-98.4  F (36.9  C)] 98  F (36.7  C)  Pulse:  [68-90] 90  Resp:  [17-18] 17  BP: (109-127)/(62-91) 122/91  FiO2 (%):  [34 %-49 %] 49 %  SpO2:  [92 %-99 %] 97 %  I/O last 3 completed shifts:  In: 943 [P.O.:940; I.V.:3]  Out: 2400 [Urine:2400]  O2 requirements: yes    Constitutional: Elderly male appears frail, moderately ill appearing  HEENT: Eyes nonicteric  Cardiovascular: RRR, normal S1/2, no m/r/g  Respiratory: Bilateral crackles no significant change  Vascular: No LE pitting edema  GI: Normoactive bowel sounds, nontender  Skin/Integumen: No rashes  Neuro/Psych: Appropriate affect and mood. A&Ox3, moves all extremities    Medications       acetylcysteine  2 mL Nebulization BID     amiodarone  200 mg Oral Daily     apixaban ANTICOAGULANT  2.5 mg Oral BID     brimonidine  1 drop Left Eye BID     cholestyramine  1 packet Oral BID w/meals     cyanocobalamin  1,000 mcg Oral Daily     dorzolamide  1 drop Left Eye BID     furosemide  40 mg Intravenous BID     gabapentin  200 mg Oral TID     glipiZIDE  5 mg Oral QAM AC     guaiFENesin  600 mg Oral BID     insulin aspart   Subcutaneous TID w/meals     insulin aspart  1-10 Units Subcutaneous TID AC     insulin aspart  1-7 Units Subcutaneous At Bedtime     insulin glargine  30 Units Subcutaneous BID     ipratropium - albuterol 0.5 mg/2.5 mg/3 mL  3 mL Nebulization 4x daily     latanoprost  1 drop Left Eye QPM     lidocaine  1 patch Transdermal Q24h    And     lidocaine   Transdermal Q8H [facility]     [Held by provider] lisinopril  10 mg Oral Daily     metFORMIN  500 mg Oral BID w/meals     methylPREDNISolone  62.5 mg Intravenous Q12H     metoprolol tartrate  37.5 mg Oral BID     piperacillin-tazobactam  3.375 g Intravenous Q6H     [Held by provider] predniSONE  40 mg Oral Daily     senna-docusate  2 tablet Oral BID     sodium chloride (PF)  3 mL Intracatheter Q8H        Data   Recent Labs   Lab 02/19/23  0755 02/19/23  0717 02/19/23 0221 02/18/23 0750 02/18/23  0627 02/17/23  0910 02/17/23  0627 02/16/23  0826 02/16/23  0641 02/14/23  0737 02/14/23  0557   WBC  --  14.8*  --   --  16.0*  --  12.6*  --  13.6*   < > 9.5   HGB  --  9.6*  --   --  9.9*  --  9.6*  --  9.2*   < > 9.2*   MCV  --  88  --   --  88  --  91  --  91   < > 91   PLT  --  466*  --   --  460*  --  432  --  430   < > 353   NA  --  133*  --   --  131*  --  129*  --  128*   < > 129*   POTASSIUM  --  3.6  --   --  3.8  --  3.8  3.8  --  4.2   < > 4.4   CHLORIDE  --  93*  --   --  92*  --  90*  --  91*   < > 94*   CO2  --  31*  --   --  29  --  29  --  28   < > 28   BUN  --  28.8*  --   --  27.6*  --  21.2  --  15.2   < > 15.6   CR  --  0.63*  --   --  0.65*  --  0.64*  --  0.62*   < > 0.58*   ANIONGAP  --  9  --   --  10  --  10  --  9   < > 7   MICHAEL  --  8.9  --   --  9.0  --  8.7  --  8.7   < > 8.4   * 199* 240*   < > 330*   < > 342*   < > 215*   < > 183*   ALBUMIN  --   --   --   --   --   --   --   --  2.8*  --  2.7*   PROTTOTAL  --   --   --   --   --   --   --   --  5.9*  --  5.7*   BILITOTAL  --   --   --   --   --   --   --   --  0.7  --  0.8   ALKPHOS  --   --   --   --   --   --   --   --  102  --  104   ALT  --   --   --   --   --   --   --   --  17  --  17   AST  --   --   --   --   --   --   --   --  23  --  29    < > = values in this interval not displayed.       Imaging:   No results found for this or any previous visit (from the past 24 hour(s)).

## 2023-02-20 NOTE — PROGRESS NOTES
"PULMONOLOGY PROGRESS NOTE    Date of Admission: 2/9/2023    CC/Reason for Hospital visit: Hypoxemia  __________________________________________    ASSESSMENT / PLAN      Pulmonary Diagnoses:  Abnl CT/CXR R91.8  CHF I50.9  Fluid overload E87.79  Hypoxemia R09.02  Pleural effusion  Pneumonia unspec J18.9  Resp fail acute J96.00  SOB R06.02    ASSESSMENT:  89-year-old male with history of mumps and scarlet fever, A-fib, HFpEF, presenting from cardiology clinic in the setting of recent weight gain and LE edema.    Improvement with oxygenation with continue net negative I/O's; would encourage continued diuresis. Bilateral pleural effusions, consider thoracentesis via IR (would start with right). Start prednisone taper given continued improvement with diuresis.     PLAN:     Encourage continued diuresis given improvement in oxygenation with net negative daily    Consider right thoracentesis via IR    Prednisone taper: 40mg daily x3 days; taper by 10mg every 3 days    Duonebs while inpatient    Antibiotics per primary    Ronen Villafana MD  Interventional Pulmonology  [facility]      ____________________________________________    SUBJECTIVE      Oxygen much improved in interim. Tangential with questioning but notes his breathing is much improved. PT was difficult today.    ROS: A Problem Pertinent review of systems was negative except for items noted in HPI.  Past Medical, Family, and Social/Substance History has been reviewed: No interval changes.  OBJECTIVE   Vital signs:  Temp: 98.1  F (36.7  C) Temp src: Axillary BP: 106/66 Pulse: 76   Resp: 18 SpO2: 98 % O2 Device: Nasal cannula Oxygen Delivery: 4 LPM Height: 177.8 cm (5' 10\") Weight: 71.2 kg (156 lb 14.4 oz)  Estimated body mass index is 22.51 kg/m  as calculated from the following:    Height as of this encounter: 1.778 m (5' 10\").    Weight as of this encounter: 71.2 kg (156 lb 14.4 oz).        I/O last 3 completed shifts:  In: 820 [P.O.:820]  Out: 2800 " [Urine:2800]      CONSTITUTIONAL/GENERAL APPEARANCE: Alert. No Apparent Distress.  PSYCHIATRIC: Pleasant and appropriate mood and affect. Oriented x 3.  EARS, NOSE,THROAT,MOUTH: External ears and nose overall normal. Normal oral mucosa.   NECK: Neck appearance normal. No neck masses and the thyroid is not enlarged.   RESPIRATORY: Non-labored effort.   CARDIOVASCULAR: Extremities with 2+ edema on left.      LABORATORY ASSESSMENT    Arterial Blood Gas  Recent Labs   Lab 02/18/23  1131   PH 7.49*   PCO2 40   PO2 66*   HCO3 31*   O2PER 40     CBC  Recent Labs   Lab 02/20/23  0659 02/19/23  0717 02/18/23  0627 02/17/23  0627   WBC 15.7* 14.8* 16.0* 12.6*   RBC 3.68* 3.34* 3.40* 3.31*   HGB 10.6* 9.6* 9.9* 9.6*   HCT 33.6* 29.4* 29.8* 30.0*   MCV 91 88 88 91   MCH 28.8 28.7 29.1 29.0   MCHC 31.5 32.7 33.2 32.0   RDW 14.5 14.5 14.4 13.8   * 466* 460* 432     BMP  Recent Labs   Lab 02/20/23  0740 02/20/23  0659 02/20/23  0200 02/19/23  2121 02/19/23  0755 02/19/23  0717 02/18/23  0750 02/18/23  0627 02/17/23  0910 02/17/23  0627   NA  --  138  --   --   --  133*  --  131*  --  129*   POTASSIUM  --  3.5  --   --   --  3.6  --  3.8  --  3.8  3.8   CHLORIDE  --  96*  --   --   --  93*  --  92*  --  90*   MICHAEL  --  8.7  --   --   --  8.9  --  9.0  --  8.7   CO2  --  32*  --   --   --  31*  --  29  --  29   BUN  --  29.0*  --   --   --  28.8*  --  27.6*  --  21.2   CR  --  0.66*  --   --   --  0.63*  --  0.65*  --  0.64*   * 171* 238* 278*   < > 199*   < > 330*   < > 342*    < > = values in this interval not displayed.     INRNo lab results found in last 7 days.   BNPNo lab results found in last 7 days.  VENOUS BLOOD GASESNo lab results found in last 7 days.      Additional labs and/or comments:     IMAGING      Results for orders placed or performed during the hospital encounter of 02/09/23   Chest XR,  PA & LAT    Impression    IMPRESSION: AP and lateral views of the chest were obtained. Mildly  enlarged cardiac  silhouette. Right upper lobe/perihilar predominant  patchy airspace pulmonary opacities, worrisome for infection. No  significant pleural effusion or pneumothorax. Multilevel degenerative  changes of the spine.    GABINO BREWER MD         SYSTEM ID:  [id number]   XR Chest Port 1 View    Impression    IMPRESSION: Similar appearance of the cardiomediastinal silhouette. Patchy bilateral airspace opacities are again seen and do not appear significantly changed.   MR Lumbar Spine w/o Contrast    Impression     IMPRESSION:  1.  L1 vertebral body superior endplate acute or subacute fracture with mild to moderate compression deformity.    2.  L4 vertebral body acute or subacute burst fracture with mild to moderate compression deformity. This is potentially an unstable fracture complex.    3.  L5 vertebral body superior endplate subacute or chronic mild to moderate compression deformity.     4.  Multilevel spondylosis described above.    5.  L2-L3 and L3-L4 severe thecal sac stenosis. Cauda equina syndrome will need be excluded clinically.    6.  Additional degenerative changes described above.    7.  Presacral edema/hemorrhage. Bilateral retroperitoneal edema/hemorrhage.    Dr. Mason Mahmood  was notified by Dr Tristen Mendenhall at  11:20 PM 02/13/2023.     CT Chest (PE) Abdomen Pelvis w Contrast    Impression    IMPRESSION:  1.  No evidence of pulmonary embolus.  2.  Bilateral right greater left pulmonary infiltrates more likely  represent pneumonitis or developing adult respiratory distress  syndrome. Component of pulmonary edema difficult to exclude.  3.  No retroperitoneal hematoma.    CHEN SUN MD         SYSTEM ID:  [id number]   XR Chest Port 1 View    Impression    IMPRESSION:Slight worsening of right upper lobe infiltrates, and  stable additional multifocal infiltrates. Small bilateral pleural  effusions, better seen on the recent CT. No pneumothorax. Normal heart  size. Implanted pacemaker.    DORY BAINS MD          SYSTEM ID:  [id number]   Echocardiogram Complete   Result Value Ref Range    LVEF  55-60%        PFT & OTHER TESTING   No flowsheet data found.

## 2023-02-20 NOTE — PLAN OF CARE
Goal Outcome Evaluation:      Plan of Care Reviewed With: patient    Overall Patient Progress: improvingOverall Patient Progress: improving    Outcome Evaluation: continues to eat % of most meals, BID-TID. glucerna is sent 1x daily. encourage adeqate PO, especially protein given DTPI per WOCN documentation

## 2023-02-20 NOTE — PLAN OF CARE
A&Ox4, denies pain. QUESADA. Oxygen weaned from HFNC to oxymask at 8L. Tolerated 6L NC with dinner. YESIKA mattress ordered. Up in chair most of this shift. Tele remains AFIB CVR with occasional PVC's. K and Mag WNL. Plan to continue to wean O2, TCU when able.

## 2023-02-20 NOTE — PROGRESS NOTES
"[facility] Nurse Inpatient Assessment     Consulted for: Gluteal cleft and DTI to right heel    Patient History (according to provider note(s):      \"Louis Keller Jr. is a 89 year old male with a history of Diabetes Mellitus and atrial fibrillation who presents with shortness of breath and leg swelling.\"    Areas Assessed:      Areas visualized during today's visit: Perineal area, Heels  and Sacrum/coccyx    Pressure Injury Location: right heel        Last photo: 2/20/23  Wound type: Pressure Injury     Pressure Injury Stage: Deep Tissue Pressure Injury (DTPI), hospital acquired      This is a Medical Device Related Pressure Injury (MDRPI) due to unknown  Wound history/plan of care:   Documented in nursing note 2/12. Patient's mobility is compromised and he is requiring a lift for transfers, also assist with turn and repo. Pt reports that his heel does get sore occasionally when resting on a surface. Mepilex covering heel on initial assessment.  Wound base: UTV, epidermis over purple area is intact. Devitalized epidermis over what was maybe a fluid-filled blister. Fissures. Intact scabs.     Palpation of the wound bed: normal      Drainage: none     Description of drainage: none     Measurements (length x width x depth, in cm) 3 x 3.5  x  0 cm      Tunneling N/A     Undermining N/A  Periwound skin: Erythema- blanchable      Color: pink- redness reduced since assessment 2/13      Temperature: normal   Odor: none  Pain: patient reports pressure, none  Pain intervention prior to dressing change: patient tolerated well  Treatment goal: Infection control/prevention and Protection  STATUS: improving  Supplies ordered: gathered, at bedside and discussed with RN    My PI Risk Assessment     Sensory Perception: 3 - Slightly Limited     Moisture: 2 - Very moist      Activity: 2 - Chairfast     Mobility: 3 - Slightly limited      Nutrition: 2 - Probably inadequate      Friction/Shear: 1 - " Problem     TOTAL: 13     Pressure Injury Location: coccyx  2/20: Wound care done while RN present. Provided education about wound care including rationale for chosen interventions. Pt was found with Sacral Mepilex in place. He is incontinent of bowel. Stool noted in wound bed. Also lying supine. Instructed bedside RN that pt must be positioned side to side, and only in the chair with cushion. Also spoke to unit manager to update about pt's wound.        Last photo: 2/20/23  Wound type: Pressure Injury, Friction, Shear and Moisture Associated Skin Damage (MASD)     Pressure Injury Stage: Deep Tissue Pressure Injury (DTPI), present on admission at superior aspect of wound, Stage 2 pressure injury at inferior aspect     This is a Medical Device Related Pressure Injury (MDRPI) due to NA  Wound history/plan of care: Documented in nursing note on admission. Per RN, wound has gotten bigger. Found with sacral Mepilex in place on initial assessment.  Wound base: non-blanchable, purple and peeling, devitalized epidermis . White, filmy, adherent, non-viable tissue present in center of wound. Moist red-pink tissue noted to inferior wound margin.      Palpation of the wound bed: normal      Drainage: small     Description of drainage: serosanguinous     Measurements (length x width x depth, in cm) 4 x 2 x 0.2 cm (open area)     Tunneling N/A     Undermining N/A  Periwound skin: Macerated, Superficial erosion and non-blanchable red/maroon      Color: dusky, purple and red      Temperature: normal   Odor: none  Pain: denies , none  Pain intervention prior to dressing change: patient tolerated well  Treatment goal: Heal , Infection control/prevention, Decrease moisture and Protection  STATUS: deteriorating  Supplies ordered: at bedside, supplies stored on unit, discussed with RN and discussed with patient - Triad paste to open area    Treatment Plan:     Right heel wound(s): Daily  and PRN  1. Gently cleanse wound with MicroKlenz  "wound cleanser. Pat dry using gauze.   2. Use sterile gauze 4x4 to cover injured area. Goal is to keep the wound clean and dry.   3. Wrap foot and ankle with Kerlix or stretch gauze to keep 4x4 in place on heel.    4. Use tape to secure wrap. Initial and date.  5. Patient should have Rooke boots in place whenever in bed or legs elevated.    Coccyx wound: Daily and PRN   Assess wound every shift for signs of deterioration!   1. Cleanse wound with wound cleanser and pat dry.   2. Apply thin layer of Triad Paste (order #[id number]) to wound bed. Avoid thick layer of triad paste. Please do not use Sacral Mepilex to wound as patient is incontinent of stool and stool was shunted into wound bed.  3. If Triad becomes soiled with stool remove only soiled paste and reapply as needed.  4. Avoid pre moistened Bath wipes. They are not intended for rosalia care. Use Dane washcloths and Gabe spray, or the gray wipes with barrier cream.  5. Avoid use of brief. Can try mesh underwear if you need to secure an external catheter.  6. Use single covidien pad and limit number of linens underneath patient.   7. Pt needs to be lying on right or left side. Do not position supine. The most essential intervention for wound care is to eliminate the cause- in this case it is pressure.    Pressure Injury Prevention (PIP) Plan:  -If patient is declining pressure injury prevention interventions: Explore reason why and address patient's concerns, Educate on pressure injury risk and prevention intervention(s), If patient is still declining, document \"informed refusal\" , and Ensure Care team is aware ( provider, charge nurse, etc)  -Mattress: Follow bed algorithm, reassess daily and order specialty mattress, if indicated.  -HOB: Maintain at or below 30 degrees, unless contraindicated  -Repositioning in bed: Every 1-2 hours , Left/right positioning; avoid supine, and Raise foot of bed prior to raising head of bed, to reduce patient sliding down " (shear)  -Heels: Keep elevated off mattress and Heel lift boots  -Protective Dressing: None  -Positioning Equipment: pillows  -Chair positioning: Chair cushion (#[id number]) , Assist patient to reposition hourly, and Do NOT use a donut for sitting (this increases pressure to smaller area and creates a higher potential for injury)    -Moisture Management: Perineal cleansing /protection: Follow Incontinence Protocol, Avoid brief in bed, Clean and dry skin folds with bathing , and Moisturize dry skin  -Under Devices: Inspect skin under all medical devices during skin inspection , Ensure tubes are stabilized without tension, and Ensure patient is not lying on medical devices or equipment when repositioned    Orders: Reviewed and Updated    RECOMMEND PRIMARY TEAM ORDER: None, at this time  Education provided: importance of repositioning, Moisture management and Off-loading pressure  Discussed plan of care with: Patient, Family and Nurse  WOC nurse follow-up plan: twice weekly, Mon/Thurs  Notify WOC if wound(s) deteriorate.  Nursing to notify the Provider(s) and re-consult the WOC Nurse if new skin concern.    DATA:     Current support surface: Standard  Low air loss (YESIKA pump, Isolibrium, Pulsate, skin guard, etc)   Containment of urine/stool: Brief and Purewick external catheter   BMI: Body mass index is 22.51 kg/m .   Active diet order: Orders Placed This Encounter      Moderate Consistent Carb (60 g CHO per Meal) Diet     Output: I/O last 3 completed shifts:  In: 1280 [P.O.:1180; I.V.:100]  Out: 2700 [Urine:2700]     Labs:   Recent Labs   Lab 02/20/23  0659 02/17/23  0627 02/16/23  0641   ALBUMIN  --   --  2.8*   HGB 10.6*   < > 9.2*   WBC 15.7*   < > 13.6*    < > = values in this interval not displayed.     Pressure injury risk assessment:   Sensory Perception: 3-->slightly limited  Moisture: 3-->occasionally moist  Activity: 2-->chairfast  Mobility: 2-->very limited  Nutrition: 3-->adequate  Friction and Shear:  "1-->problem  Navin Score: 14    Carla Oakley, RN, CWCN  Please contact via [employer] at name or group \"WO nurse\"  "

## 2023-02-20 NOTE — PROGRESS NOTES
"CLINICAL NUTRITION SERVICES - REASSESSMENT NOTE    Malnutrition: (2/13)   % Weight Loss:  > 5% in 1 month (severe malnutrition) -- if previous reported dry weight of 178 lb is accurate   % Intake:  <75% for > 7 days (moderate malnutrition)  Subcutaneous Fat Loss:  Orbital region mild depletion and Upper arm region mild-moderate depletion  Muscle Loss:  Temporal region mild depletion, Clavicle bone region mild depletion, Acromion bone region mild depletion and Dorsal hand region mild-moderate depletion  Fluid Retention:  Moderate BLE     Malnutrition Diagnosis: Moderate malnutrition  In Context of:  Acute illness or injury with underlying chronic illness/disease      EVALUATION OF PROGRESS TOWARD GOALS   Diet: Moderate CHO (60 g), Glucerna @ 2 pm   FR 1800 mL  Intake/Tolerance:   - Tolerating 2 meals + 1 supplement daily the past ~5 days. He consumes % most of the time, sometimes just 50%.   - Appetite \"fair-good\"    - Labs:   Recent Labs   Lab 02/20/23  0740 02/20/23  0659 02/20/23  0200 02/19/23  2121 02/19/23  1708 02/19/23  1220   * 171* 238* 278* 215* 220*     - Stooling - + BM most days  BM x1 2/19  BM x1 2/17  BM x3 2/16  - Weight: measured lowest of admission 71.2 kg today. Unclear how accurate.       ASSESSED NUTRITION NEEDS:  Dosing Weight 74.3 kg (lowest this admit 2/13)  Estimated Energy Needs: 6325-7348 kcals (25-30 Kcal/Kg)  Justification: maintenance  Estimated Protein Needs: 75-90+ grams protein (1-1.2 g pro/Kg)  Justification: preservation of lean body mass  Estimated Fluid Needs: 8311-7617  mL (1 mL/Kcal)  Justification: maintenance or per provider pending fluid status     NEW FINDINGS:   2/16 - WOCN:   - right heel: DTPI, initial assessment.   - coccyx: DTPI, stage 2. Status: evolving   2/20 - WOCN:  - right heel: DTPI, improving  - coccys: DTPI, stage 2. Status: deteriorating     Previous Goals:   Patient will consume >/= 50% meals TID + 1 supplement/day   Evaluation: Met on average, " consuming % of meals BID.     Previous Nutrition Diagnosis:   Inadequate oral intake related to decreased appetite as evidenced by patient report of intake </= 75% of baseline over the past several weeks with potential 8% weight loss in the past month   Evaluation: Improving, continues below     CURRENT NUTRITION DIAGNOSIS  Inadequate oral intake related to decreased appetite as evidenced by patient report of intake </= 75% of baseline over the past several weeks with potential 8% weight loss in the past month     INTERVENTIONS  Recommendations / Nutrition Prescription  Continue diet as ordered  Glucerna 1x daily (fluid restriction)    Implementation  None new today    Goals  Intake of at least 75% meals BID-TID + supplement daily.       MONITORING AND EVALUATION:  Progress towards goals will be monitored and evaluated per protocol and Practice Guidelines    Marilu Cohen RD, LD  [facility], [facility], [facility]  Pager: [phone]  Weekend Pager: [phone]

## 2023-02-20 NOTE — PROGRESS NOTES
[facility]    Hospitalist Progress Note    Interval History   - Off HFNC, good improvement since yesterday. Cutting down support--weaning diuresis, stopped scheduled nebs, weaning steroids  - Called daughter Kathy not answered    Assessment & Plan   Summary: Louis Keller Jr. is a 89 year old male with PMH atrial fibrillation, DM2, HLD who was admitted on 2/9/2023 with acute hypoxic respiratory failure and CHF.    Acute respiratory distress syndrome  Diastolic CHF exacerbation, improved  Community acquired pneumonia, improved  Presented with shortness of breath.  Received diuresis as below.  Worsening SOB, repeat CXR 2/11 Patchy bilateral airspace opacities are again seen. Blood cultures no growth to date. Completed 5 days of azithromycin. Repeat CT on 2/15 notes ongoing bilateral opacities consistent with pneumonia, and possibly ARDS like pattern. CRP rising as of 2/15. CXR 2/17 shows RUL worsening infiltrate, question possible mucous plug as patient does not have a strong cough. ABG 2/18 consistent with ARDS with PO2/FIO2 ratio ~165  - Wean O2  - Appreciate Pulm consult   - No bronchoscopy needed  - Stop scheduled nebs, now PRN  - Wean steroids to prednisone 40mg daily tomorrow  - Stop Mucomyst  - Taper Zosyn to Augmentin, end date 2/24 (14 days)  - Decrease Lasix 40mg IV BID to daily      Acute exacerbation of chronic heart failure with preserved EF  Chronic atrial fibrillation on anticoagulation  Elevated troponin in the setting of demand  Venous insufficiency with chronic lower extremity edema  Presented with worsening shortness of breath. Prior to admission on Lasix 10 mg oral daily.  Troponin elevated at 50. Echo Left ventricular systolic function is normal. The visual ejection fraction is 55-60%. Diastolic function not assessed due to atrial fibrillation. X-ray shows cardiomegaly and perihilar patchy airspace opacity suspicious for pulmonary vascular congestion.    Received  diuresis with intravenous Lasix, now switched to oral Lasix 20 mg daily  on 2/12.  Given CT findings as above will increase diuresis to 20 mg oral Lasix twice daily.  Monitor renal function in a.m., received contrast imaging and can accordingly optimize further diuresis.   Discussed with neurosurgery, CT imaging with no No retroperitoneal hematoma -hence will restart PTA Eliquis 2/15.  - Manage IV lasix as above  - Continue PTA amiodarone  - Continue PTA metoprolol.  - Continue PTA Eliquis  - PTA lisinopril on hold.  - Monitor blood pressures, optimize regimen.  - Cardiology signed off 2/13  - Strict input output monitoring, daily weights.     Acute on chronic back pain with multiple vertebral body fractures  History of compression fracture  MRI 2/13 with  L1 vertebral body superior endplate acute or subacute fracture with mild to moderate compression deformity.  L4 vertebral body acute or subacute burst fracture with mild to moderate compression deformity. This is potentially an unstable fracture complex.  L5 vertebral body superior endplate subacute or chronic mild to moderate compression deformity.  Multilevel spondylosis described above. L2-L3 and L3-L4 severe thecal sac stenosis. Cauda equina syndrome will need be excluded clinically.  - Neurosurgery recommended no surgical intervention.  Orthotics for brace  - Lidocaine patch, as needed Tylenol  - Rehab team following, TCU     Bilateral retroperitoneal edema/hemorrhage likely in the setting of above on MRI 2/13, CT with no retroperitoneal hematoma on 2/15.  MRI Presacral edema/hemorrhage. Bilateral retroperitoneal edema/hemorrhage. CT scan 2/15 with no retroperitoneal hematoma. Vascular surgery recommend management to neurosurgery. Neurosurgery recommended no surgical intervention.   - Restarted anticoagulation 2/15.     Normocytic anemia  Iron deficiency, vitamin B12 deficiency.  Baseline appears to be between 12-13. Hemoglobin at presentation 9.3, last  hemoglobin on epic is from August, 2022 and was 12.9. Vitamin B12 less than 150, iron saturation index 7.  Folic acid within normal limits.  On IV Venofer for 2 doses.  - Started on oral B12 replacement.  - Continue anticoagulation as above     Acute on chronic hyponatremia.  Patient has a history of mild hyponatremia up to 129 previously. Sodium on presentation today is 121, likely due to CHF exacerbation, diuretic use, and PNA. Sodium 128 on 2/16.  - Fluid restriction to 1800 mL/day.  Liberalize salt in diet.       Hyperglycemia likely steroid induced  Uncontrolled diabetes mellitus with hemoglobin A1c of 8.9   BG worsening in setting of steroids to > 400, remain uncontrolled on 2/18  - Decrease to Lantus 30 units daily, continue to decrease as steroids are weaned off  - Continue Glipizide, resume metformin  - Continue high sliding scale and carb counting insulin     Elevated TSH.  TSH is 6.66, T41.06.  Follow TSH levels in 10 to 12 weeks     Hypomagnesemia- Corrected      Delirium from hospitalization  Concern for cognitive impairment at baseline.  Noted some confusion today.  Minimize interruptions, frequent reorientation  Consider cognitive screening at TCU.     Physical deconditioning from medical illness, senile frailty.  Rehab team following. Transition to TCU.     Hospital Acquired DTPI Rt heel, Stage 2 Pressure Injury inferior aspect Coccyx, & DTPI superior aspect Coccyx, POA      DVT Prophylaxis: Pneumatic Compression Devices  Code Status: Full Code  PT/OT: ordered  Diet: Snacks/Supplements Adult: Glucerna; Between Meals  Moderate Consistent Carb (60 g CHO per Meal) Diet  Fluid restriction 1800 ML FLUID      Disposition: Expected discharge 1-3 days    Marco Tomlin MD, MD  Text Page  (7am to 6pm)  -Data reviewed today: I reviewed all new labs and imaging results over the last 24 hours.    Physical Exam   Temp: 98.1  F (36.7  C) Temp src: Axillary BP: 106/66 Pulse: 76   Resp: 18 SpO2: 98 % O2  Device: Nasal cannula Oxygen Delivery: 4 LPM  Vitals:    02/18/23 0544 02/19/23 0557 02/20/23 0628   Weight: 72.6 kg (160 lb) 77.1 kg (170 lb) 71.2 kg (156 lb 14.4 oz)     Vital Signs with Ranges  Temp:  [97.7  F (36.5  C)-98.1  F (36.7  C)] 98.1  F (36.7  C)  Pulse:  [75-90] 76  Resp:  [18] 18  BP: (106-115)/(57-66) 106/66  SpO2:  [91 %-99 %] 98 %  I/O last 3 completed shifts:  In: 1280 [P.O.:1180; I.V.:100]  Out: 2700 [Urine:2700]  O2 requirements: yes    Constitutional: Elderly male appears frail, moderately ill appearing  HEENT: Eyes nonicteric  Cardiovascular: RRR, normal S1/2, no m/r/g  Respiratory: Bilateral crackles no significant change  Vascular: No LE pitting edema  GI: Normoactive bowel sounds, nontender  Skin/Integumen: No rashes  Neuro/Psych: Appropriate affect and mood. A&Ox3, moves all extremities    Medications       acetylcysteine  2 mL Nebulization BID     amiodarone  200 mg Oral Daily     apixaban ANTICOAGULANT  2.5 mg Oral BID     brimonidine  1 drop Left Eye BID     cholestyramine  1 packet Oral BID w/meals     cyanocobalamin  1,000 mcg Oral Daily     dorzolamide  1 drop Left Eye BID     [START ON 2/21/2023] furosemide  40 mg Intravenous Daily     gabapentin  200 mg Oral TID     glipiZIDE  5 mg Oral QAM AC     guaiFENesin  600 mg Oral BID     insulin aspart   Subcutaneous TID w/meals     insulin aspart  1-10 Units Subcutaneous TID AC     insulin aspart  1-7 Units Subcutaneous At Bedtime     [START ON 2/21/2023] insulin glargine  30 Units Subcutaneous QAM AC     latanoprost  1 drop Left Eye QPM     lidocaine  1 patch Transdermal Q24h    And     lidocaine   Transdermal Q8H [facility]     [Held by provider] lisinopril  10 mg Oral Daily     metFORMIN  500 mg Oral BID w/meals     metoprolol tartrate  37.5 mg Oral BID     piperacillin-tazobactam  3.375 g Intravenous Q6H     [START ON 2/21/2023] predniSONE  40 mg Oral Daily     senna-docusate  2 tablet Oral BID     sodium chloride (PF)  3 mL Intracatheter  Q8       Data   Recent Labs   Lab 02/20/23  0740 02/20/23  0659 02/20/23  0200 02/19/23  0755 02/19/23  0717 02/18/23  0750 02/18/23  0627 02/16/23  0826 02/16/23  0641 02/14/23  0737 02/14/23  0557   WBC  --  15.7*  --   --  14.8*  --  16.0*   < > 13.6*   < > 9.5   HGB  --  10.6*  --   --  9.6*  --  9.9*   < > 9.2*   < > 9.2*   MCV  --  91  --   --  88  --  88   < > 91   < > 91   PLT  --  482*  --   --  466*  --  460*   < > 430   < > 353   NA  --  138  --   --  133*  --  131*   < > 128*   < > 129*   POTASSIUM  --  3.5  --   --  3.6  --  3.8   < > 4.2   < > 4.4   CHLORIDE  --  96*  --   --  93*  --  92*   < > 91*   < > 94*   CO2  --  32*  --   --  31*  --  29   < > 28   < > 28   BUN  --  29.0*  --   --  28.8*  --  27.6*   < > 15.2   < > 15.6   CR  --  0.66*  --   --  0.63*  --  0.65*   < > 0.62*   < > 0.58*   ANIONGAP  --  10  --   --  9  --  10   < > 9   < > 7   MICHAEL  --  8.7  --   --  8.9  --  9.0   < > 8.7   < > 8.4   * 171* 238*   < > 199*   < > 330*   < > 215*   < > 183*   ALBUMIN  --   --   --   --   --   --   --   --  2.8*  --  2.7*   PROTTOTAL  --   --   --   --   --   --   --   --  5.9*  --  5.7*   BILITOTAL  --   --   --   --   --   --   --   --  0.7  --  0.8   ALKPHOS  --   --   --   --   --   --   --   --  102  --  104   ALT  --   --   --   --   --   --   --   --  17  --  17   AST  --   --   --   --   --   --   --   --  23  --  29    < > = values in this interval not displayed.       Imaging:   No results found for this or any previous visit (from the past 24 hour(s)).

## 2023-02-20 NOTE — PLAN OF CARE
Pleasant gentlemen. Pt is aox4, lift, 6LNC, and full code. Tele is Afib CVR. Pt denies chest pain and SOB. Male external cath in place. 40mg IV lasix BID and diuresing well. Continue Zosyn q6. Lantus increased to 30 units BID and added carb counting. K and Mag protocol. Pt followed by WOC for Rt heel and Coccyx pressure wounds. Repo every 2hrs. Fluid rx 1800.   Plan: TCU when ready to discharge.

## 2023-02-21 NOTE — PLAN OF CARE
A&O x4. Pt denied pain. VSS, on 5L NC. Up w/ assist of 2, lift. Q2hr repos. Rooke boots on overnight. Stage 2 pressure injury on sacrum, open to air per WOC RN. R heel wound dressing CDI. 1800 mL FR. Tele: A Fib CVR. Alarms on. Plan for continued diuresis.

## 2023-02-21 NOTE — PROGRESS NOTES
[facility]    Medicine Progress Note - Hospitalist Service    Date of Admission:  2/9/2023    Assessment & Plan   Louis Keller Jr. is a 89 year old male with PMH atrial fibrillation, DM2, HLD who was admitted on 2/9/2023 with acute hypoxic respiratory failure and CHF.    Acute respiratory distress syndrome  Diastolic CHF exacerbation, improved  Community acquired pneumonia, improved  Presented with shortness of breath.  Received diuresis as below.  Worsening SOB, repeat CXR 2/11 Patchy bilateral airspace opacities are again seen. Blood cultures no growth to date. Completed 5 days of azithromycin. Repeat CT on 2/15 notes ongoing bilateral opacities consistent with pneumonia, and possibly ARDS like pattern. CRP rising as of 2/15. CXR 2/17 shows RUL worsening infiltrate, question possible mucous plug as patient does not have a strong cough. ABG 2/18 consistent with ARDS with PO2/FIO2 ratio ~165  - Wean O2.  - Appreciate Pulm consult.   - No bronchoscopy needed.  - Stopped scheduled nebs, now PRN.  - Weaned steroids to prednisone 40mg daily starting 2/21/23, plan to taper by 10 mg every 3 days.  - Taper Zosyn to Augmentin, end date 2/24 (14 days).  - Decreased Lasix 40mg IV BID to daily starting 2/21/23, consider transition back to oral diuretic soon.  - Can repeat CXR and consider right thoracentesis if ongoing symptoms despite above noted treatments.      Acute exacerbation of chronic heart failure with preserved EF  Chronic atrial fibrillation on anticoagulation  Elevated troponin in the setting of demand  Venous insufficiency with chronic lower extremity edema  Presented with worsening shortness of breath. Prior to admission on Lasix 10 mg oral daily.  Troponin elevated at 50. Echo Left ventricular systolic function is normal. The visual ejection fraction is 55-60%. Diastolic function not assessed due to atrial fibrillation. X-ray shows cardiomegaly and perihilar patchy airspace opacity  suspicious for pulmonary vascular congestion.   Received diuresis with intravenous Lasix, now switched to oral Lasix 20 mg daily  on 2/12.  Given CT findings as above will increase diuresis to 20 mg oral Lasix twice daily.  Monitor renal function in a.m., received contrast imaging and can accordingly optimize further diuresis.   Discussed with neurosurgery, CT imaging with no No retroperitoneal hematoma -hence will restart PTA Eliquis 2/15.  - Manage IV lasix as above.  - Continue PTA amiodarone.  - Continue PTA metoprolol.  - Continue PTA Eliquis.  - PTA lisinopril on hold.  - Monitor blood pressures, optimize regimen.  - Cardiology signed off 2/13.  - Strict input output monitoring, daily weights.     Acute on chronic back pain with multiple vertebral body fractures  History of compression fracture  MRI 2/13 with  L1 vertebral body superior endplate acute or subacute fracture with mild to moderate compression deformity.  L4 vertebral body acute or subacute burst fracture with mild to moderate compression deformity. This is potentially an unstable fracture complex.  L5 vertebral body superior endplate subacute or chronic mild to moderate compression deformity.  Multilevel spondylosis described above. L2-L3 and L3-L4 severe thecal sac stenosis. Cauda equina syndrome will need be excluded clinically.  - Neurosurgery recommended no surgical intervention.  Orthotics for TLSO brace for comfort.  - Lidocaine patch, as needed Tylenol.  - Rehab team following, TCU at discharge.  - Follow up with [facility] after discharge from TCU.     Bilateral retroperitoneal edema/hemorrhage likely in the setting of above on MRI 2/13, CT with no retroperitoneal hematoma on 2/15.  MRI Presacral edema/hemorrhage. Bilateral retroperitoneal edema/hemorrhage. CT scan 2/15 with no retroperitoneal hematoma. Vascular surgery recommend management to neurosurgery. Neurosurgery recommended no surgical intervention.   - Restarted anticoagulation 2/15.  -  Hemoglobin has remained stable.     Normocytic anemia  Iron deficiency, vitamin B12 deficiency  Baseline appears to be between 12-13. Hemoglobin at presentation 9.3, last hemoglobin on Epic is from August, 2022 and was 12.9. Vitamin B12 less than 150, iron saturation index 7.  Folic acid within normal limits.  On IV Venofer for 2 doses.  - Started on oral B12 replacement.  - Continue anticoagulation as above.     Acute on chronic hyponatremia  Patient has a history of mild hyponatremia up to 129 previously. Sodium on presentation today is 121, likely due to CHF exacerbation, diuretic use, and PNA. Sodium 128 on 2/16.  - Sodium 136 on 2/21/23.  - Fluid restriction to 1800 mL/day.  Liberalize salt in diet.       Hyperglycemia likely steroid induced  Uncontrolled diabetes mellitus with hemoglobin A1c of 8.9   BG worsening in setting of steroids to > 400, remain uncontrolled on 2/18. Was not on insulin prior to admission.  - Decreased to Lantus 15 units daily tomorrow, continue to decrease as steroids are weaned off.  - Continue Glipizide and metformin.  - Continue high sliding scale and carb counting insulin.     Elevated TSH.  TSH: 6.66, T4: 1.06.  - Recommend repeating thyroid function testing in about 2 months.     Hypomagnesemia  - Resolved with replacement.     Delirium from hospitalization  Concern for cognitive impairment at baseline  Some confusion at times.  - Minimize interruptions, frequent reorientation  - Consider cognitive screening at TCU.     Physical deconditioning from medical illness, senile frailty  - Rehab team following. Transition to TCU at time of discharge..        Diet: Snacks/Supplements Adult: Glucerna; Between Meals  Moderate Consistent Carb (60 g CHO per Meal) Diet  Fluid restriction 1800 ML FLUID  Room Service    DVT Prophylaxis: Pneumatic Compression Devices  Mccarthy Catheter: Not present  Lines: None     Cardiac Monitoring: None  Code Status: Full Code      Clinically Significant Risk  Factors              # Hypoalbuminemia: Lowest albumin = 2.7 g/dL at 2/14/2023  5:57 AM, will monitor as appropriate          # DMII: A1C = 8.9 % (Ref range: <5.7 %) within past 6 months    # Moderate Malnutrition: based on nutrition assessment        Disposition Plan      Expected Discharge Date: 02/22/2023      Destination: nursing home            Roman Curran MD  Hospitalist Service  [facility]  Securely message with [employer] (more info)  Text page via [employer] Paging/Directory   ______________________________________________________________________    Interval History   Louis JUNIOR Keller Jr. was seen today. He feels OK. No new complaints/concerns for me. Denies fevers, chest pain, shortness of breath at rest, nausea, abdominal pain. Feels tired. Wants to get up with OT.    Physical Exam   Vital Signs: Temp: 97.5  F (36.4  C) Temp src: Axillary BP: 115/74 Pulse: 65   Resp: 18 SpO2: 98 % O2 Device: Nasal cannula Oxygen Delivery: 5 LPM  Weight: 152 lbs 0 oz    Constitutional: awake, alert, cooperative, no apparent distress, and appears stated age  Respiratory: no increased work of breathing, crackles at the bases  Cardiovascular: regular rate and rhythm, normal S1 and S2, no murmur noted  GI: normal bowel sounds, soft, non-distended, non-tender  Skin: warm, dry  Musculoskeletal: no lower extremity pitting edema present  Neurologic: awake, alert, answers a few questions appropriately,moves all extremities    Medical Decision Making       35 MINUTES SPENT BY ME on the date of service doing chart review, history, exam, documentation & further activities per the note.      Data     I have personally reviewed the following data over the past 24 hrs:    18.7 (H)  \   10.7 (L)   / 468 (H)     136 95 (L) 31.6 (H) /  121 (H)   3.8 32 (H) 0.68 \

## 2023-02-21 NOTE — PROGRESS NOTES
Care Management Follow Up    Length of Stay (days): 12    Expected Discharge Date: 02/22/2023     Concerns to be Addressed: discharge planning     Patient plan of care discussed at interdisciplinary rounds: Yes    Anticipated Discharge Disposition: Transitional Care  Disposition Comments: Dischagre to TCU  Anticipated Discharge Services: None  Anticipated Discharge DME: None    Patient/family educated on Medicare website which has current facility and service quality ratings: yes  Education Provided on the Discharge Plan:    Patient/Family in Agreement with the Plan: yes    Referrals Placed by CM/SW:    Private pay costs discussed: Not applicable    Additional Information:  Per MD note from 2/20 patient may be ready in 1-3 days. Writer sent referrals to [facility] to determine if they will have a bed for when patient is medically ready. Care management will continue to follow.     Addendum 1545: VM from ken at [facility] stating that they want to take patient and just need to confirm what beds they will have when ready and wondering if patient wants shared or private stating that they had him on a shared. Writer called back and confirmed.     Corrie Mcclain, BOBBY, LGSW   Social Work   [facility]

## 2023-02-21 NOTE — PROGRESS NOTES
"PULMONOLOGY PROGRESS NOTE    Date of Admission: 2/9/2023    CC/Reason for Hospital visit: Hypoxemia  __________________________________________    ASSESSMENT / PLAN      Pulmonary Diagnoses:  Abnl CT/CXR R91.8  CHF I50.9  Fluid overload E87.79  Hypoxemia R09.02  Pleural effusion  Pneumonia unspec J18.9  Resp fail acute J96.00  SOB R06.02    ASSESSMENT:  89-year-old male with history of mumps and scarlet fever, A-fib, HFpEF, presenting from cardiology clinic in the setting of recent weight gain and LE edema.    Improvement with oxygenation with continue net negative I/O's; would encourage continued diuresis. Bilateral pleural effusions, consider thoracentesis via IR (would start with right). Start prednisone taper given continued improvement with diuresis.     PLAN:     Encourage continued diuresis given improvement in oxygenation with net negative daily    Consider right thoracentesis via IR    Prednisone taper: 40mg daily x3 days; taper by 10mg every 3 days    Duonebs while inpatient    Antibiotics per primary    Ronen Villafana MD  Interventional Pulmonology  [facility]      ____________________________________________    SUBJECTIVE      On 4L this AM. Wants to work with PT today. Feeling OK.    ROS: A Problem Pertinent review of systems was negative except for items noted in HPI.  Past Medical, Family, and Social/Substance History has been reviewed: No interval changes.  OBJECTIVE   Vital signs:  Temp: 97.7  F (36.5  C) Temp src: Oral BP: 135/73 Pulse: 87   Resp: 18 SpO2: 95 % O2 Device: Nasal cannula Oxygen Delivery: 5 LPM Height: 177.8 cm (5' 10\") Weight: 68.9 kg (152 lb) (without airmattress machine)  Estimated body mass index is 21.81 kg/m  as calculated from the following:    Height as of this encounter: 1.778 m (5' 10\").    Weight as of this encounter: 68.9 kg (152 lb).        I/O last 3 completed shifts:  In: 480 [P.O.:480]  Out: 2500 [Urine:2500]      CONSTITUTIONAL/GENERAL APPEARANCE: Alert. " No Apparent Distress.  PSYCHIATRIC: Pleasant and appropriate mood and affect. Oriented x 3.  EARS, NOSE,THROAT,MOUTH: External ears and nose overall normal. Normal oral mucosa.   NECK: Neck appearance normal. No neck masses and the thyroid is not enlarged.   RESPIRATORY: Non-labored effort.   CARDIOVASCULAR: Extremities with 2+ edema on left.      LABORATORY ASSESSMENT    Arterial Blood Gas  Recent Labs   Lab 02/18/23  1131   PH 7.49*   PCO2 40   PO2 66*   HCO3 31*   O2PER 40     CBC  Recent Labs   Lab 02/21/23  0615 02/20/23  0659 02/19/23  0717 02/18/23  0627   WBC 18.7* 15.7* 14.8* 16.0*   RBC 3.70* 3.68* 3.34* 3.40*   HGB 10.7* 10.6* 9.6* 9.9*   HCT 32.8* 33.6* 29.4* 29.8*   MCV 89 91 88 88   MCH 28.9 28.8 28.7 29.1   MCHC 32.6 31.5 32.7 33.2   RDW 14.6 14.5 14.5 14.4   * 482* 466* 460*     BMP  Recent Labs   Lab 02/21/23  0615 02/21/23  0200 02/20/23  2104 02/20/23  1733 02/20/23  0740 02/20/23  0659 02/19/23  0755 02/19/23  0717 02/18/23  0750 02/18/23  0627     --   --   --   --  138  --  133*  --  131*   POTASSIUM 3.4  --   --   --   --  3.5  --  3.6  --  3.8   CHLORIDE 95*  --   --   --   --  96*  --  93*  --  92*   MICHAEL 9.0  --   --   --   --  8.7  --  8.9  --  9.0   CO2 32*  --   --   --   --  32*  --  31*  --  29   BUN 31.6*  --   --   --   --  29.0*  --  28.8*  --  27.6*   CR 0.68  --   --   --   --  0.66*  --  0.63*  --  0.65*   * 191* 175* 119*   < > 171*   < > 199*   < > 330*    < > = values in this interval not displayed.     INRNo lab results found in last 7 days.   BNPNo lab results found in last 7 days.  VENOUS BLOOD GASESNo lab results found in last 7 days.      Additional labs and/or comments:     IMAGING      Results for orders placed or performed during the hospital encounter of 02/09/23   Chest XR,  PA & LAT    Impression    IMPRESSION: AP and lateral views of the chest were obtained. Mildly  enlarged cardiac silhouette. Right upper lobe/perihilar predominant  patchy  airspace pulmonary opacities, worrisome for infection. No  significant pleural effusion or pneumothorax. Multilevel degenerative  changes of the spine.    GABINO BREWER MD         SYSTEM ID:  [id number]   XR Chest Port 1 View    Impression    IMPRESSION: Similar appearance of the cardiomediastinal silhouette. Patchy bilateral airspace opacities are again seen and do not appear significantly changed.   MR Lumbar Spine w/o Contrast    Impression     IMPRESSION:  1.  L1 vertebral body superior endplate acute or subacute fracture with mild to moderate compression deformity.    2.  L4 vertebral body acute or subacute burst fracture with mild to moderate compression deformity. This is potentially an unstable fracture complex.    3.  L5 vertebral body superior endplate subacute or chronic mild to moderate compression deformity.     4.  Multilevel spondylosis described above.    5.  L2-L3 and L3-L4 severe thecal sac stenosis. Cauda equina syndrome will need be excluded clinically.    6.  Additional degenerative changes described above.    7.  Presacral edema/hemorrhage. Bilateral retroperitoneal edema/hemorrhage.    Dr. Mason Mahmood  was notified by Dr Tristen Mendenhall at  11:20 PM 02/13/2023.     CT Chest (PE) Abdomen Pelvis w Contrast    Impression    IMPRESSION:  1.  No evidence of pulmonary embolus.  2.  Bilateral right greater left pulmonary infiltrates more likely  represent pneumonitis or developing adult respiratory distress  syndrome. Component of pulmonary edema difficult to exclude.  3.  No retroperitoneal hematoma.    CHEN SUN MD         SYSTEM ID:  [id number]   XR Chest Port 1 View    Impression    IMPRESSION:Slight worsening of right upper lobe infiltrates, and  stable additional multifocal infiltrates. Small bilateral pleural  effusions, better seen on the recent CT. No pneumothorax. Normal heart  size. Implanted pacemaker.    DORY BAINS MD         SYSTEM ID:  [id number]   Echocardiogram Complete   Result  Value Ref Range    LVEF  55-60%        PFT & OTHER TESTING   No flowsheet data found.

## 2023-02-21 NOTE — PLAN OF CARE
A&O x 4, forgetful. Tele: Afib CVR. Denies pain/CP. External catheter in place, good UOP. Diuresing with IV lasix. On S/S & carb count. K 3.5 & Mag 1.9, no replacement needed. WOC saw today, changed dressings. Turn/Repo q 2 hrs. On 1800mL fluid restriction. Up in chair with lift today. Plan to discharge to TCU. Will continue w/plan of care.

## 2023-02-22 NOTE — PLAN OF CARE
A&Ox 4, forgetful. VSS on 4L NC. Tele afib CVR. Denies pain, lidocaine patch to R lower back. Up with lift, T/R q2hr. Mod carb diet, 1800cc FR. BG stable. Voiding via ext cath, incontinent B/B. 2 soft/loose BM this shift. Perineum/groin/sacrum very red with wound to coccyx, see wound care orders. BLE Rook boots in use. LS clear, crackles to base, QUESADA. BS active, abd appears rounded. PIV SL. Possible R thoracentesis per pulmonology. Cardiology signed off. Discharge pending TCU placement.

## 2023-02-22 NOTE — PROGRESS NOTES
[facility]    Medicine Progress Note - Hospitalist Service    Date of Admission:  2/9/2023    Assessment & Plan   Louis Keller Jr. is a 89 year old male with PMH atrial fibrillation, DM2, HLD who was admitted on 2/9/2023 with acute hypoxic respiratory failure and CHF.    Acute respiratory distress syndrome  Diastolic CHF exacerbation, improved  Community acquired pneumonia, improved  Presented with shortness of breath.  Received diuresis as below.  Worsening SOB, repeat CXR 2/11 Patchy bilateral airspace opacities are again seen. Blood cultures no growth to date. Completed 5 days of azithromycin. Repeat CT on 2/15 notes ongoing bilateral opacities consistent with pneumonia, and possibly ARDS like pattern. CRP rising as of 2/15. CXR 2/17 shows RUL worsening infiltrate, question possible mucous plug as patient does not have a strong cough. ABG 2/18 consistent with ARDS with PO2/FIO2 ratio ~165.  - Wean O2 as able.  - Appreciate Pulm consult.   - No bronchoscopy needed.  - Stopped scheduled nebs, now PRN.  - Weaned steroids to prednisone 40mg daily starting 2/21/23, plan to taper by 10 mg every 3 days.  - Taper Zosyn to Augmentin, end date 2/24 (14 days).  - Decreased Lasix 40mg IV BID to daily starting 2/21/23, consider transition back to oral diuretic soon.  - Patient considering right thoracentesis.      Acute exacerbation of chronic heart failure with preserved EF  Chronic atrial fibrillation on anticoagulation  Elevated troponin in the setting of demand  Venous insufficiency with chronic lower extremity edema  Presented with worsening shortness of breath. Prior to admission on Lasix 10 mg oral daily.  Troponin elevated at 50. Echo Left ventricular systolic function is normal. The visual ejection fraction is 55-60%. Diastolic function not assessed due to atrial fibrillation. X-ray shows cardiomegaly and perihilar patchy airspace opacity suspicious for pulmonary vascular  congestion.   Received diuresis with intravenous Lasix, now switched to oral Lasix 20 mg daily  on 2/12.  Given CT findings as above will increase diuresis to 20 mg oral Lasix twice daily.  Monitor renal function in a.m., received contrast imaging and can accordingly optimize further diuresis.   Discussed with neurosurgery, CT imaging with no No retroperitoneal hematoma -hence will restart PTA Eliquis 2/15.  - Manage IV lasix as above.  - Continue PTA amiodarone.  - Continue PTA metoprolol.  - Continue PTA Eliquis.  - PTA lisinopril on hold.  - Monitor blood pressures, optimize regimen.  - Cardiology signed off 2/13.  - Strict input output monitoring, daily weights.     Acute on chronic back pain with multiple vertebral body fractures  History of compression fracture  MRI 2/13 with  L1 vertebral body superior endplate acute or subacute fracture with mild to moderate compression deformity.  L4 vertebral body acute or subacute burst fracture with mild to moderate compression deformity. This is potentially an unstable fracture complex.  L5 vertebral body superior endplate subacute or chronic mild to moderate compression deformity.  Multilevel spondylosis described above. L2-L3 and L3-L4 severe thecal sac stenosis. Cauda equina syndrome will need be excluded clinically.  - Neurosurgery recommended no surgical intervention.  Orthotics for TLSO brace for comfort.  - Lidocaine patch, as needed Tylenol.  - Rehab team following, TCU at discharge.  - Follow up with [facility] after discharge from TCU.     Bilateral retroperitoneal edema/hemorrhage likely in the setting of above on MRI 2/13, CT with no retroperitoneal hematoma on 2/15.  MRI Presacral edema/hemorrhage. Bilateral retroperitoneal edema/hemorrhage. CT scan 2/15 with no retroperitoneal hematoma. Vascular surgery recommend management to neurosurgery. Neurosurgery recommended no surgical intervention.  - Restarted anticoagulation 2/15.  - Hemoglobin has remained  stable.     Normocytic anemia  Iron deficiency, vitamin B12 deficiency  Baseline appears to be between 12-13. Hemoglobin at presentation 9.3, last hemoglobin on Epic is from August, 2022 and was 12.9. Vitamin B12 less than 150, iron saturation index 7.  Folic acid within normal limits.  On IV Venofer for 2 doses.  - Started on oral B12 replacement.  - Continue anticoagulation as above.     Acute on chronic hyponatremia  Patient has a history of mild hyponatremia up to 129 previously. Sodium on presentation today is 121, likely due to CHF exacerbation, diuretic use, and PNA. Sodium 128 on 2/16.  - Sodium 136 on 2/21/23.  - Fluid restriction to 1800 mL/day.  Liberalize salt in diet.       Hyperglycemia likely steroid induced  Uncontrolled diabetes mellitus with hemoglobin A1c of 8.9   BG worsening in setting of steroids to > 400, remain uncontrolled on 2/18. Was not on insulin prior to admission.  - Hypoglycemic in AM on 2/22/23, will discontinued Lantus.  - Continue to monitor blood sugars and use high dose sliding scale NovoLog as indicated.  - Continue prandial carb count insulin.  - Continue Glipizide and metformin.     Elevated TSH.  TSH: 6.66, T4: 1.06.  - Recommend repeating thyroid function testing in about 2 months.     Hypomagnesemia  - Resolved with replacement.     Delirium from hospitalization  Concern for cognitive impairment at baseline  Some confusion at times.  - Minimize interruptions, frequent reorientation  - Consider cognitive screening at TCU.     Physical deconditioning from medical illness, senile frailty  - Rehab team following. Transition to TCU at time of discharge.       Diet: Snacks/Supplements Adult: Glucerna; Between Meals  Moderate Consistent Carb (60 g CHO per Meal) Diet  Fluid restriction 1800 ML FLUID  Room Service    DVT Prophylaxis: Pneumatic Compression Devices  Mccarthy Catheter: Not present  Lines: None     Cardiac Monitoring: None  Code Status: Full Code      Clinically  Significant Risk Factors              # Hypoalbuminemia: Lowest albumin = 2.7 g/dL at 2/14/2023  5:57 AM, will monitor as appropriate          # DMII: A1C = 8.9 % (Ref range: <5.7 %) within past 6 months    # Moderate Malnutrition: based on nutrition assessment        Disposition Plan      Expected Discharge Date: 02/24/2023      Destination: nursing home            Roman Curran MD  Hospitalist Service  [facility]  Securely message with [email or web address] (more info)  Text page via [employer] Paging/Directory   ______________________________________________________________________    Interval History   Louis Keller Jr. was seen today. He was sleeping when I entered the room, woke up to my voice. Feels tired and weak. Wants to work with therapy to get stronger. No shortness of breath at rest in the bed. Still requiring supplemental oxygen. Discussed potential option for thoracentesis, he wants to consider it. Denies fevers, chest pain, nausea, abdominal pain. Tried to update his daughter by phone today, but just got voicemail.    Physical Exam   Vital Signs: Temp: 97.6  F (36.4  C) Temp src: Oral BP: 118/68 Pulse: 74   Resp: 18 SpO2: 92 % O2 Device: Nasal cannula with humidification Oxygen Delivery: 4 LPM  Weight: 160 lbs 14.4 oz    Constitutional: awake, alert, cooperative, no apparent distress, laying in the hospital bed with the head of the bed elevated  Respiratory: no increased work of breathing, clear to auscultation bilaterally, no crackles or wheezing  Cardiovascular: regular rate and rhythm, normal S1 and S2, no murmur noted  GI: normal bowel sounds, soft, non-distended, non-tender  Skin: warm, dry  Musculoskeletal: 1+ lower extremity pitting edema present  Neurologic: awake, alert, answers questions appropriately, moves all extremities    Medical Decision Making       35 MINUTES SPENT BY ME on the date of service doing chart review, history, exam, documentation & further activities per  the note.      Data     I have personally reviewed the following data over the past 24 hrs:    19.9 (H)  \   12.1 (L)   / 477 (H)     139 96 (L) 28.8 (H) /  151 (H)   3.8 33 (H) 0.69 \

## 2023-02-22 NOTE — PROGRESS NOTES
"PULMONOLOGY PROGRESS NOTE    Date of Admission: 2/9/2023    CC/Reason for Hospital visit: Hypoxemia  __________________________________________    ASSESSMENT / PLAN      Pulmonary Diagnoses:  Abnl CT/CXR R91.8  CHF I50.9  Fluid overload E87.79  Hypoxemia R09.02  Pleural effusion  Pneumonia unspec J18.9  Resp fail acute J96.00  SOB R06.02    ASSESSMENT:  89-year-old male with history of mumps and scarlet fever, A-fib, HFpEF, presenting from cardiology clinic in the setting of recent weight gain and LE edema.    Improvement with oxygenation with continue net negative I/O's; would encourage continued diuresis. Bilateral pleural effusions, consider thoracentesis via IR (would start with right). Start prednisone taper given continued improvement with diuresis.     PLAN:     Encourage continued diuresis given improvement in oxygenation with net negative daily    Consider right thoracentesis via IR    Prednisone taper: 40mg daily x3 days; taper by 10mg every 3 days    Duonebs while inpatient    Antibiotics per primary    Continue with PT/OT    Ronen Villafana MD  Interventional Pulmonology  [facility]      ____________________________________________    SUBJECTIVE      On 4L still this AM. Stood up for first time yesterday briefly. Sleepy this AM, just waking up.    ROS: A Problem Pertinent review of systems was negative except for items noted in HPI.  Past Medical, Family, and Social/Substance History has been reviewed: No interval changes.  OBJECTIVE   Vital signs:  Temp: 97.8  F (36.6  C) Temp src: Oral BP: 132/73 Pulse: 74   Resp: 20 SpO2: 91 % O2 Device: Nasal cannula with humidification Oxygen Delivery: 4 LPM Height: 177.8 cm (5' 10\") Weight: 73 kg (160 lb 14.4 oz)  Estimated body mass index is 23.09 kg/m  as calculated from the following:    Height as of this encounter: 1.778 m (5' 10\").    Weight as of this encounter: 73 kg (160 lb 14.4 oz).        I/O last 3 completed shifts:  In: 810 [P.O.:810]  Out: " 1300 [Urine:1300]      CONSTITUTIONAL/GENERAL APPEARANCE: Alert. No Apparent Distress.  PSYCHIATRIC: Pleasant and appropriate mood and affect. Oriented x 3.  EARS, NOSE,THROAT,MOUTH: External ears and nose overall normal. Normal oral mucosa.   NECK: Neck appearance normal. No neck masses and the thyroid is not enlarged.   RESPIRATORY: Non-labored effort.   CARDIOVASCULAR: Extremities with 2+ edema on left.      LABORATORY ASSESSMENT    Arterial Blood Gas  Recent Labs   Lab 02/18/23  1131   PH 7.49*   PCO2 40   PO2 66*   HCO3 31*   O2PER 40     CBC  Recent Labs   Lab 02/22/23  0641 02/21/23  0615 02/20/23  0659 02/19/23  0717   WBC 19.9* 18.7* 15.7* 14.8*   RBC 4.17* 3.70* 3.68* 3.34*   HGB 12.1* 10.7* 10.6* 9.6*   HCT 38.7* 32.8* 33.6* 29.4*   MCV 93 89 91 88   MCH 29.0 28.9 28.8 28.7   MCHC 31.3* 32.6 31.5 32.7   RDW 14.7 14.6 14.5 14.5   * 468* 482* 466*     BMP  Recent Labs   Lab 02/22/23  0641 02/22/23  0158 02/21/23  2154 02/21/23  1907 02/21/23  1338 02/21/23  0832 02/21/23  0615 02/20/23  0740 02/20/23  0659 02/19/23  0755 02/19/23  0717     --   --   --   --   --  136  --  138  --  133*   POTASSIUM 3.8  --   --   --  3.8  --  3.4  --  3.5  --  3.6   CHLORIDE 96*  --   --   --   --   --  95*  --  96*  --  93*   MICHAEL 9.1  --   --   --   --   --  9.0  --  8.7  --  8.9   CO2 33*  --   --   --   --   --  32*  --  32*  --  31*   BUN 28.8*  --   --   --   --   --  31.6*  --  29.0*  --  28.8*   CR 0.69  --   --   --   --   --  0.68  --  0.66*  --  0.63*   GLC 74 152* 177* 123*  --    < > 147*   < > 171*   < > 199*    < > = values in this interval not displayed.     INRNo lab results found in last 7 days.   BNPNo lab results found in last 7 days.  VENOUS BLOOD GASESNo lab results found in last 7 days.      Additional labs and/or comments:     IMAGING      Results for orders placed or performed during the hospital encounter of 02/09/23   Chest XR,  PA & LAT    Impression    IMPRESSION: AP and lateral views  of the chest were obtained. Mildly  enlarged cardiac silhouette. Right upper lobe/perihilar predominant  patchy airspace pulmonary opacities, worrisome for infection. No  significant pleural effusion or pneumothorax. Multilevel degenerative  changes of the spine.    GABINO BREWER MD         SYSTEM ID:  [id number]   XR Chest Port 1 View    Impression    IMPRESSION: Similar appearance of the cardiomediastinal silhouette. Patchy bilateral airspace opacities are again seen and do not appear significantly changed.   MR Lumbar Spine w/o Contrast    Impression     IMPRESSION:  1.  L1 vertebral body superior endplate acute or subacute fracture with mild to moderate compression deformity.    2.  L4 vertebral body acute or subacute burst fracture with mild to moderate compression deformity. This is potentially an unstable fracture complex.    3.  L5 vertebral body superior endplate subacute or chronic mild to moderate compression deformity.     4.  Multilevel spondylosis described above.    5.  L2-L3 and L3-L4 severe thecal sac stenosis. Cauda equina syndrome will need be excluded clinically.    6.  Additional degenerative changes described above.    7.  Presacral edema/hemorrhage. Bilateral retroperitoneal edema/hemorrhage.    Dr. Mason Mahmood  was notified by Dr Tristen Mendenhall at  11:20 PM 02/13/2023.     CT Chest (PE) Abdomen Pelvis w Contrast    Impression    IMPRESSION:  1.  No evidence of pulmonary embolus.  2.  Bilateral right greater left pulmonary infiltrates more likely  represent pneumonitis or developing adult respiratory distress  syndrome. Component of pulmonary edema difficult to exclude.  3.  No retroperitoneal hematoma.    CHEN SUN MD         SYSTEM ID:  [id number]   XR Chest Port 1 View    Impression    IMPRESSION:Slight worsening of right upper lobe infiltrates, and  stable additional multifocal infiltrates. Small bilateral pleural  effusions, better seen on the recent CT. No pneumothorax. Normal  heart  size. Implanted pacemaker.    DORY BAINS MD         SYSTEM ID:  [id number]   Echocardiogram Complete   Result Value Ref Range    LVEF  55-60%        PFT & OTHER TESTING   No flowsheet data found.

## 2023-02-22 NOTE — PLAN OF CARE
A&O x 4. VSS. On 5L O2 NC. Tele: Afib CVR. Assist x 2 w/lift, turns/repo. Wound care completed to coccyx & R) heal. On S/S & carb counts for blood glucose management. Tylenol x 1 for pain. K: 3:4 & Ma.9, no replacement today, recheck in AM. [place] boots on. Diuresing well with IV lasix. External catheter. Will continue w/plan of care.

## 2023-02-23 NOTE — PROGRESS NOTES
[facility]    Medicine Progress Note - Hospitalist Service    Date of Admission:  2/9/2023    Assessment & Plan   Louis Keller Jr. is a 89 year old male with PMH atrial fibrillation, DM2, HLD who was admitted on 2/9/2023 with acute hypoxic respiratory failure and CHF.    Acute respiratory distress syndrome  Diastolic CHF exacerbation, improved  Community acquired pneumonia, improved  Presented with shortness of breath.  Received diuresis as below.  Worsening SOB, repeat CXR 2/11 Patchy bilateral airspace opacities are again seen. Blood cultures no growth to date. Completed 5 days of azithromycin. Repeat CT on 2/15 notes ongoing bilateral opacities consistent with pneumonia, and possibly ARDS like pattern. CRP rising as of 2/15. CXR 2/17 shows RUL worsening infiltrate, question possible mucous plug as patient does not have a strong cough. ABG 2/18 consistent with ARDS with PO2/FIO2 ratio ~165.  - Wean O2 as able, still on 3 lpm today.  - Appreciate Pulm consult.   - No bronchoscopy needed.  - Stopped scheduled nebs, now PRN.  - Weaned steroids to prednisone 40mg daily starting 2/21/23, plan to taper by 10 mg every 3 days, taper ordered in Epic.  - Tapered Zosyn to Augmentin. Will stop today due to loose stools, 13 day course has been sufficient for treatment of his pneumonia.  - Decreased Lasix 40mg IV BID to daily starting 2/21/23, consider transition back to oral diuretic soon.  - Plan for right thoracentesis today.  - Plan for TCU at discharge, possibly in the next 24-48 hours.      Acute exacerbation of chronic heart failure with preserved EF  Chronic atrial fibrillation on anticoagulation  Elevated troponin in the setting of demand  Venous insufficiency with chronic lower extremity edema  Presented with worsening shortness of breath. Prior to admission on Lasix 10 mg oral daily.  Troponin elevated at 50. Echo Left ventricular systolic function is normal. The visual ejection fraction  is 55-60%. Diastolic function not assessed due to atrial fibrillation. X-ray shows cardiomegaly and perihilar patchy airspace opacity suspicious for pulmonary vascular congestion.   Received diuresis with intravenous Lasix, now switched to oral Lasix 20 mg daily  on 2/12.  Given CT findings as above will increase diuresis to 20 mg oral Lasix twice daily.  Monitor renal function in a.m., received contrast imaging and can accordingly optimize further diuresis.   Discussed with neurosurgery, CT imaging with no No retroperitoneal hematoma -hence will restart PTA Eliquis 2/15.  - Manage IV lasix as above.  - Continue PTA amiodarone.  - Continue PTA metoprolol.  - Continue PTA Eliquis.  - PTA lisinopril on hold.  - Monitor blood pressures, optimize regimen.  - Cardiology signed off 2/13.  - Strict input output monitoring, daily weights.     Acute on chronic back pain with multiple vertebral body fractures  History of compression fracture  MRI 2/13 with  L1 vertebral body superior endplate acute or subacute fracture with mild to moderate compression deformity.  L4 vertebral body acute or subacute burst fracture with mild to moderate compression deformity. This is potentially an unstable fracture complex.  L5 vertebral body superior endplate subacute or chronic mild to moderate compression deformity.  Multilevel spondylosis described above. L2-L3 and L3-L4 severe thecal sac stenosis. Cauda equina syndrome will need be excluded clinically.  - Neurosurgery recommended no surgical intervention.  Orthotics for TLSO brace for comfort.  - Lidocaine patch, as needed Tylenol.  - Rehab team following, TCU at discharge.  - Follow up with [facility] after discharge from TCU.     Bilateral retroperitoneal edema/hemorrhage likely in the setting of above on MRI 2/13, CT with no retroperitoneal hematoma on 2/15.  MRI Presacral edema/hemorrhage. Bilateral retroperitoneal edema/hemorrhage. CT scan 2/15 with no retroperitoneal hematoma. Vascular  surgery recommend management to neurosurgery. Neurosurgery recommended no surgical intervention.  - Restarted anticoagulation 2/15.  - Hemoglobin has remained stable.     Normocytic anemia  Iron deficiency, vitamin B12 deficiency  Baseline appears to be between 12-13. Hemoglobin at presentation 9.3, last hemoglobin on Epic is from August, 2022 and was 12.9. Vitamin B12 less than 150, iron saturation index 7.  Folic acid within normal limits.  On IV Venofer for 2 doses.  - Started on oral B12 replacement.  - Continue anticoagulation as above.     Acute on chronic hyponatremia  Patient has a history of mild hyponatremia up to 129 previously. Sodium on presentation today is 121, likely due to CHF exacerbation, diuretic use, and PNA. Sodium 128 on 2/16.  - Sodium 136 on 2/21/23.  - Fluid restriction to 1800 mL/day.  Liberalize salt in diet.       Hyperglycemia likely steroid induced  Uncontrolled diabetes mellitus with hemoglobin A1c of 8.9   BG worsening in setting of steroids to > 400, remain uncontrolled on 2/18. Was not on insulin prior to admission.  - Hypoglycemic in AM on 2/22/23, held lantus in the morning, then became hyperglycemic in the evening.  - Restart Lantus at 15 units in AM (previously was on 30 units in AM).  - Continue to monitor blood sugars and use high dose sliding scale NovoLog as indicated.  - Continue prandial carb count insulin.  - Continue PTA Glipizide and metformin.     Elevated TSH.  TSH: 6.66, T4: 1.06.  - Recommend repeating thyroid function testing in about 2 months.     Hypomagnesemia  - Resolved with replacement.     Delirium from hospitalization  Concern for cognitive impairment at baseline  Some confusion at times.  - Minimize interruptions, frequent reorientation  - Consider cognitive screening at TCU.     Physical deconditioning from medical illness, senile frailty  - Rehab team following. Transition to TCU at time of discharge.       Diet: Snacks/Supplements Adult: Glucerna;  Between Meals  Moderate Consistent Carb (60 g CHO per Meal) Diet  Fluid restriction 1800 ML FLUID  Room Service    DVT Prophylaxis: DOAC  Mccarthy Catheter: Not present  Lines: None     Cardiac Monitoring: None  Code Status: Full Code      Clinically Significant Risk Factors              # Hypoalbuminemia: Lowest albumin = 2.7 g/dL at 2/14/2023  5:57 AM, will monitor as appropriate          # DMII: A1C = 8.9 % (Ref range: <5.7 %) within past 6 months    # Moderate Malnutrition: based on nutrition assessment        Disposition Plan     Expected Discharge Date: 02/24/2023      Destination: nursing home            Roman Curran MD  Hospitalist Service  [facility]  Securely message with [employer] (more info)  Text page via [employer] Paging/Directory   ______________________________________________________________________    Interval History   Louis Keller Jr. was seen this morning. He feels about the same. Tired. Weak. Still requiring supplemental oxygen. Would like to proceed with thoracentesis today. Blood sugars low yesteray morning, elevated yesterday evening. Updated his daughter by phone yesterday evening and again this morning.    Physical Exam   Vital Signs: Temp: 97.7  F (36.5  C) Temp src: Oral BP: 132/85 Pulse: 71   Resp: 18 SpO2: 92 % O2 Device: Nasal cannula with humidification Oxygen Delivery: 3 LPM  Weight: 148 lbs 0 oz    Constitutional: awake, alert, cooperative, no apparent distress, laying in the hospital bed with the head of the bed elevated  Respiratory: no increased work of breathing, clear to auscultation bilaterally, no crackles or wheezing  Cardiovascular: regular rate and rhythm, normal S1 and S2, no murmur noted  GI: normal bowel sounds, soft, non-distended, non-tender  Skin: warm, dry  Musculoskeletal: 1+ lower extremity pitting edema present  Neurologic: awake, alert, answers questions appropriately, moves all extremities    Medical Decision Making       40 MINUTES  SPENT BY ME on the date of service doing chart review, history, exam, documentation & further activities per the note.      Data     I have personally reviewed the following data over the past 24 hrs:    17.0 (H)  \   12.1 (L)   / 429     136 96 (L) 29.2 (H) /  204 (H)   4.3 36 (H) 0.65 (L) \       ALT: N/A AST: N/A AP: N/A TBILI: N/A   ALB: N/A TOT PROTEIN: 5.4 (L) LIPASE: N/A

## 2023-02-23 NOTE — PLAN OF CARE
A&Ox4. VSS. Tele is AFib CVR. Denies pain. BG was 50 this AM. Dextrose IV given. Wound dressing changed on R heel. Turn/repo.

## 2023-02-23 NOTE — PROGRESS NOTES
Care Management Follow Up    Length of Stay (days): 14    Expected Discharge Date: 02/25/2023     Concerns to be Addressed: discharge planning     Patient plan of care discussed at interdisciplinary rounds: Yes    Anticipated Discharge Disposition: Transitional Care  Disposition Comments: Dischagre to TCU  Anticipated Discharge Services: therapy  Anticipated Discharge DME: None    Patient/family educated on Medicare website which has current facility and service quality ratings: yes  Education Provided on the Discharge Plan:  yes  Patient/Family in Agreement with the Plan: yes    Referrals Placed by CM/SW:  TCU referrals  Private pay costs discussed: Not applicable    Additional Information:  Received a message from patient's daughter stating that patient's first choice for TCU is [facility] and second choice is [facility].  Reviewed chart.  [facility] has no available beds.  Call placed to [facility] to follow up on the referral.  Per Kiah, currently Sunday is the earliest day they have an available bed, but she is asking that we follow up tomorrow to see if anything has changed with their bed availability.  Call placed to update patient's daughter.  She is asking for us to arrange for transport when discharge is known.    Will continue to follow.      BOBBY William, [facility]    [phone]  [facility]

## 2023-02-23 NOTE — PLAN OF CARE
"Goal Outcome Evaluation:    Neuro- x4, forgetful   Most Recent Vitals- /74 (BP Location: Right arm)   Pulse 67   Temp 97.6  F (36.4  C) (Oral)   Resp 18   Ht 1.778 m (5' 10\")   Wt 67.1 kg (148 lb)   SpO2 92%   BMI 21.24 kg/m    Tele/Cardiac- Afib CVR   Resp- 2-4L NC overnight   Activity- lift oob, turn/reposition   Pain- denies   Skin- see flow sheet/wound LDA  GI/- external catheter in place    Blood sugar 426,393 and 282, received insulin per orders. Additional 3U given overnight.   Diet: Snacks/Supplements Adult: Glucerna; Between Meals  Moderate Consistent Carb (60 g CHO per Meal) Diet  Fluid restriction 1800 ML FLUID  Room Service    Plan- Continue IV diuresis, monitor blood glucose and possible thoracentesis in AM      "

## 2023-02-23 NOTE — PROVIDER NOTIFICATION
Brief update:    Paged regarding multiple loose stools over the past several days, last stool softener 2/20, elevated white count, no abdominal pain.    Nursing questioning if C. difficile should be sent    Leukocytosis correlates with initiation of steroids    He remains on antibiotics, currently Augmentin, which might be contributing to his loose stools.    He did have a fever early during his hospitalization, though this has since resolved    As he appears to be improving from an infectious standpoint other than finding of increasing white cell count on corticosteroids, will hold on C. difficile testing.  If there remains clinical concern, can be ordered by rounding team following assessment    David Ma MD  3:39 AM

## 2023-02-23 NOTE — PROVIDER NOTIFICATION
Brief update:    Paged regarding elevated blood glucose in the 390 range after 9 units of NovoLog.  Note that long-acting insulin was held given hypoglycemic this morning to 50.  Still on metformin and glipizide    Will likely need reinitiation of his morning long-acting insulin dosing at a reduced dose given hyperglycemia over the course of the day.  He is on prednisone 40 mg daily    Discussed with nursing staff.  Given hypoglycemia this morning, hesitant to give another short acting dose only 1 hour after last as this will result in dose stacking.  Please page with blood glucose at 2 AM, anticipate he will require additional short acting insulin at that time.    David Ma MD  11:10 PM

## 2023-02-23 NOTE — PROGRESS NOTES
"[facility] Nurse Inpatient Assessment     Consulted for: Gluteal cleft and DTI to right heel    Patient History (according to provider note(s):      \"Louis Keller Jr. is a 89 year old male with a history of Diabetes Mellitus and atrial fibrillation who presents with shortness of breath and leg swelling.\"    Areas Assessed:      Areas visualized during today's visit: Perineal area, Heels  and Sacrum/coccyx    Pressure Injury Location: right heel  Last photo: 2/23/23 2/20/23    Wound type: Pressure Injury     Pressure Injury Stage: Deep Tissue Pressure Injury (DTPI), hospital acquired      This is a Medical Device Related Pressure Injury (MDRPI) due to unknown  Wound history/plan of care:   Documented in nursing note 2/12. Patient's mobility is compromised and he is requiring a lift for transfers, also assist with turn and repo. Pt reports that his heel does get sore occasionally when resting on a surface. Mepilex covering heel on initial assessment.  Wound base: Epidermis over purple/maroon non blanchable erythema area with peeling of epidermis and scabbing     Palpation of the wound bed: normal      Drainage: none     Description of drainage: none     Measurements (length x width x depth, in cm) 2.5 x 3  x  0 cm      Tunneling N/A     Undermining N/A  Periwound skin: Erythema- blanchable      Color: pink      Temperature: normal   Odor: none  Pain: patient reports pressure, none  Pain intervention prior to dressing change: patient tolerated well  Treatment goal: Infection control/prevention and Protection  STATUS: improving  Supplies ordered: gathered, at bedside and discussed with RN    My PI Risk Assessment     Sensory Perception: 3 - Slightly Limited     Moisture: 2 - Very moist      Activity: 2 - Chairfast     Mobility: 3 - Slightly limited      Nutrition: 2 - Probably inadequate      Friction/Shear: 1 - Problem     TOTAL: 13     Pressure Injury Location: " coccyx    Last Photo 2/23/23 2/20        Wound type: Pressure Injury, Friction, Shear and Moisture Associated Skin Damage (MASD)     Pressure Injury Stage: Deep Tissue Pressure Injury (DTPI), present on admission at superior aspect of wound, Stage 2 pressure injury at inferior aspect     This is a Medical Device Related Pressure Injury (MDRPI) due to NA  Wound history/plan of care: Documented in nursing note on admission. Per RN, wound has gotten bigger. Sleeping in bed in high fowlers with pillows tucked under sacrum and sitting on brief, 2 covidien pads and a lift sheet. WOC requested nursing come to bedside to discuss bed positioning, offloading and moisture management.   Wound base: 60 % pale pink/white dermis with 40% deep purple non blanchable discoloration in upper 2/3rds of wound base.      Palpation of the wound bed: normal      Drainage: small     Description of drainage: serosanguinous     Measurements (length x width x depth, in cm) 4 x 3 x 0.2 cm (open area)     Tunneling N/A     Undermining N/A  Periwound skin: pink/purple blanchable erythema      Color: dusky, purple and red      Temperature: normal   Odor: none  Pain: Hurts really bad when sitting up in the chair  Pain intervention prior to dressing change: patient tolerated well  Treatment goal: Heal , Infection control/prevention, Decrease moisture and Protection  STATUS: deteriorating  Supplies ordered: at bedside, supplies stored on unit, discussed with RN and discussed with patient - Triad paste to open area        Skin Injury Location: Groin    Last photo: None taken  Skin injury due to: Fungal rash   Skin history and plan of care:   Pt incontinent of bowel and bladder using external incontinence products:primo fit and brief  Affected area:      Skin assessment: Intact, Denudement and Lesions-satellite        Color: red     Temperature  normal      Drainage: none .      Color: none      Odor: mild  Pain: none, none  Pain interventions  "prior to dressing change: N/A  Treatment goal: Heal , Decrease moisture and Maintain (prevention of deterioration)  STATUS: initial assessment  Supplies ordered: ordered Miconazole powder        Treatment Plan:     Right heel wound(s): Every 3 days and PRN  1. Clean wound with saline or MicroKlenz Spray, pat dry  2. Wipe / \"clean\" the surrounding periwound tissue with skin prep (Cavilon No Sting Skin Prep #174672) and allow to dry. This will help protect periwound and help dressing adherence  3. Press a Mepilex  Border Dressing (#603746) to the area, making sure to conform nicely to skin curvatures.   4. Time and date dressing change  NOTE  -Reposition pt side to side scott when in bed, every 2 hours-get the pt way over on side to completely offload pressure. This will benefit skin and respiratory function   -Keep heels elevated and floating on pillows at all times. Try using at least 2 pillows under each calf.  -When up to the chair pt needs to fully offload every 2 hours and use a chair cushion if needed         Coccyx wound: Daily and PRN   Assess wound every shift for signs of deterioration!   1. Cleanse wound with wound cleanser and pat dry.   2. Apply thin layer of Triad Paste (order #[id number]) to wound bed. Avoid thick layer of triad paste. Please do not use Sacral Mepilex to wound as patient is incontinent of stool and stool was shunted into wound bed.  3. If Triad becomes soiled with stool remove only soiled paste and reapply as needed.  4. Avoid pre moistened Bath wipes. They are not intended for rosalia care. Use Dane washcloths and Gabe spray, or the gray wipes with barrier cream.  5. Avoid use of brief. Can try mesh underwear if you need to secure an external catheter.  6. Use single covidien pad and limit number of linens underneath patient.   7. Pt needs to be lying on right or left side. Do not position supine. The most essential intervention for wound care is to eliminate the cause- in this case it is " "pressure.    Pressure Injury Prevention (PIP) Plan:  -If patient is declining pressure injury prevention interventions: Explore reason why and address patient's concerns, Educate on pressure injury risk and prevention intervention(s), If patient is still declining, document \"informed refusal\" , and Ensure Care team is aware ( provider, charge nurse, etc)  -Mattress: Follow bed algorithm, reassess daily and order specialty mattress, if indicated.  -HOB: Maintain at or below 30 degrees, unless contraindicated  -Repositioning in bed: Every 1-2 hours , Left/right positioning; avoid supine, and Raise foot of bed prior to raising head of bed, to reduce patient sliding down (shear)  -Heels: Keep elevated off mattress and Heel lift boots  -Protective Dressing: None  -Positioning Equipment: pillows  -Chair positioning: Chair cushion (#[id number]) , Assist patient to reposition hourly, and Do NOT use a donut for sitting (this increases pressure to smaller area and creates a higher potential for injury)    -Moisture Management: Perineal cleansing /protection: Follow Incontinence Protocol, Avoid brief in bed, Clean and dry skin folds with bathing , and Moisturize dry skin  -Under Devices: Inspect skin under all medical devices during skin inspection , Ensure tubes are stabilized without tension, and Ensure patient is not lying on medical devices or equipment when repositioned    Groin: BID and PRN  After any incontinent episode cleanse with mei cleanse and protect and jovany dry wipes/washcloths.   Ensure area is completely dry by blotting and using circular motions, do not wipe as this can cause trauma to the skin   Lightly dust with antifungal powder and gently rub in. Remove only soiled paste, then reapply thin layer. If complete removal is needed use baby/mineral oil (located in pharmacy).   *Once  fungal rash resolves ok to use Interdry AG for moisture management.   *Avoid pre moisten wipes.   *Avoid use of brief   *Use " single covidien pad and limit number of linens underneath patient. Covidien pad can be used as incontinence pad and lift pad      Orders: Reviewed and Updated    RECOMMEND PRIMARY TEAM ORDER: Antifungal POWDER to groin BID x 5 days then stop  Education provided: importance of repositioning, Moisture management and Off-loading pressure  Discussed plan of care with: Patient and Nurse  WOC nurse follow-up plan: twice weekly as available  Notify WOC if wound(s) deteriorate.  Nursing to notify the Provider(s) and re-consult the WOC Nurse if new skin concern.    DATA:     Current support surface: Standard  Low air loss (YESIKA pump, Isolibrium, Pulsate, skin guard, etc)   Containment of urine/stool: Brief and Purewick external catheter   BMI: Body mass index is 21.24 kg/m .   Active diet order: Orders Placed This Encounter      Moderate Consistent Carb (60 g CHO per Meal) Diet     Output: I/O last 3 completed shifts:  In: 1280 [P.O.:1280]  Out: 1300 [Urine:1300]     Labs:   Recent Labs   Lab 02/23/23  0716   HGB 12.1*   WBC 17.0*     Pressure injury risk assessment:   Sensory Perception: 3-->slightly limited  Moisture: 3-->occasionally moist  Activity: 2-->chairfast  Mobility: 2-->very limited  Nutrition: 3-->adequate  Friction and Shear: 1-->problem  Navin Score: 14    Enoc Montes RN CWOCN  -Securely message with [employer] (more info) - can reach individually by name or search 'WOC Nurse' (Apoorva) to reach all current WOCs on duty.  WOC Office Phone: [phone]

## 2023-02-23 NOTE — PROVIDER NOTIFICATION
Notification     Notified Person: Ma MD    Notification Time: 2303     Notification Interaction: AmCom     Purpose of Notification:   rm [address]      after 9U novolog. Also receiving metformin and glipizide.   please advise   mary beth OBRIEN *[id number]    Orders Received: Monitor, notify if 2AM dose continues to be elevated     0204 FYI 0200 blood glucose 282

## 2023-02-23 NOTE — PROGRESS NOTES
"PULMONOLOGY PROGRESS NOTE    Date of Admission: 2/9/2023    CC/Reason for Hospital visit: Hypoxemia  __________________________________________    ASSESSMENT / PLAN      Pulmonary Diagnoses:  Abnl CT/CXR R91.8  CHF I50.9  Fluid overload E87.79  Hypoxemia R09.02  Pleural effusion  Pneumonia unspec J18.9  Resp fail acute J96.00  SOB R06.02    ASSESSMENT:  89-year-old male with history of mumps and scarlet fever, A-fib, HFpEF, presenting from cardiology clinic in the setting of recent weight gain and LE edema.    Improvement with oxygenation with continue net negative I/O's; would encourage continued diuresis. Bilateral pleural effusions, pending right thoracentesis via IR. Otherwise unchanged today.    PLAN:     Encourage continued diuresis given improvement in oxygenation with net negative daily    Pending right thoracentesis via IR    Prednisone taper: 40mg daily x3 days; taper by 10mg every 3 days    Duonebs while inpatient    Antibiotics per primary    Continue with PT/OT    Ronen Villafana MD  Interventional Pulmonology  [facility]      ____________________________________________    SUBJECTIVE      On 4L still this AM. Eating breakfast this AM without concern.    ROS: A Problem Pertinent review of systems was negative except for items noted in HPI.  Past Medical, Family, and Social/Substance History has been reviewed: No interval changes.  OBJECTIVE   Vital signs:  Temp: 98.4  F (36.9  C) Temp src: Oral BP: 117/56 Pulse: 73   Resp: 18 SpO2: 98 % O2 Device: Nasal cannula Oxygen Delivery: 3 LPM Height: 177.8 cm (5' 10\") Weight: 67.1 kg (148 lb)  Estimated body mass index is 21.24 kg/m  as calculated from the following:    Height as of this encounter: 1.778 m (5' 10\").    Weight as of this encounter: 67.1 kg (148 lb).        I/O last 3 completed shifts:  In: 1760 [P.O.:1760]  Out: 2400 [Urine:2400]      CONSTITUTIONAL/GENERAL APPEARANCE: Alert. No Apparent Distress.  PSYCHIATRIC: Pleasant and appropriate " mood and affect. Oriented x 3.  EARS, NOSE,THROAT,MOUTH: External ears and nose overall normal. Normal oral mucosa.   NECK: Neck appearance normal. No neck masses and the thyroid is not enlarged.   RESPIRATORY: Non-labored effort.   CARDIOVASCULAR: Extremities with 2+ edema on left.      LABORATORY ASSESSMENT    Arterial Blood Gas  Recent Labs   Lab 02/18/23  1131   PH 7.49*   PCO2 40   PO2 66*   HCO3 31*   O2PER 40     CBC  Recent Labs   Lab 02/23/23  0716 02/22/23  0641 02/21/23  0615 02/20/23  0659   WBC 17.0* 19.9* 18.7* 15.7*   RBC 4.16* 4.17* 3.70* 3.68*   HGB 12.1* 12.1* 10.7* 10.6*   HCT 38.8* 38.7* 32.8* 33.6*   MCV 93 93 89 91   MCH 29.1 29.0 28.9 28.8   MCHC 31.2* 31.3* 32.6 31.5   RDW 14.8 14.7 14.6 14.5    477* 468* 482*     BMP  Recent Labs   Lab 02/23/23  1155 02/23/23  0726 02/23/23  0716 02/23/23  0151 02/22/23  0851 02/22/23  0641 02/21/23  1907 02/21/23  1338 02/21/23  0832 02/21/23  0615 02/20/23  0740 02/20/23  0659   NA  --   --  136  --   --  139  --   --   --  136  --  138   POTASSIUM  --   --  4.3  --   --  3.8  --  3.8  --  3.4  --  3.5   CHLORIDE  --   --  96*  --   --  96*  --   --   --  95*  --  96*   MICHAEL  --   --  8.8  --   --  9.1  --   --   --  9.0  --  8.7   CO2  --   --  36*  --   --  33*  --   --   --  32*  --  32*   BUN  --   --  29.2*  --   --  28.8*  --   --   --  31.6*  --  29.0*   CR  --   --  0.65*  --   --  0.69  --   --   --  0.68  --  0.66*   * 204* 210* 282*   < > 74   < >  --    < > 147*   < > 171*    < > = values in this interval not displayed.     INRNo lab results found in last 7 days.   BNPNo lab results found in last 7 days.  VENOUS BLOOD GASESNo lab results found in last 7 days.      Additional labs and/or comments:     IMAGING      Results for orders placed or performed during the hospital encounter of 02/09/23   Chest XR,  PA & LAT    Impression    IMPRESSION: AP and lateral views of the chest were obtained. Mildly  enlarged cardiac silhouette.  Right upper lobe/perihilar predominant  patchy airspace pulmonary opacities, worrisome for infection. No  significant pleural effusion or pneumothorax. Multilevel degenerative  changes of the spine.    GABINO BREWER MD         SYSTEM ID:  [id number]   XR Chest Port 1 View    Impression    IMPRESSION: Similar appearance of the cardiomediastinal silhouette. Patchy bilateral airspace opacities are again seen and do not appear significantly changed.   MR Lumbar Spine w/o Contrast    Impression     IMPRESSION:  1.  L1 vertebral body superior endplate acute or subacute fracture with mild to moderate compression deformity.    2.  L4 vertebral body acute or subacute burst fracture with mild to moderate compression deformity. This is potentially an unstable fracture complex.    3.  L5 vertebral body superior endplate subacute or chronic mild to moderate compression deformity.     4.  Multilevel spondylosis described above.    5.  L2-L3 and L3-L4 severe thecal sac stenosis. Cauda equina syndrome will need be excluded clinically.    6.  Additional degenerative changes described above.    7.  Presacral edema/hemorrhage. Bilateral retroperitoneal edema/hemorrhage.    Dr. Mason Mahmood  was notified by Dr Tristen Mendenhall at  11:20 PM 02/13/2023.     CT Chest (PE) Abdomen Pelvis w Contrast    Impression    IMPRESSION:  1.  No evidence of pulmonary embolus.  2.  Bilateral right greater left pulmonary infiltrates more likely  represent pneumonitis or developing adult respiratory distress  syndrome. Component of pulmonary edema difficult to exclude.  3.  No retroperitoneal hematoma.    CHEN SUN MD         SYSTEM ID:  [id number]   XR Chest Port 1 View    Impression    IMPRESSION:Slight worsening of right upper lobe infiltrates, and  stable additional multifocal infiltrates. Small bilateral pleural  effusions, better seen on the recent CT. No pneumothorax. Normal heart  size. Implanted pacemaker.    DORY BAINS MD         SYSTEM  ID:  [id number]   Echocardiogram Complete   Result Value Ref Range    LVEF  55-60%        PFT & OTHER TESTING   No flowsheet data found.

## 2023-02-24 NOTE — PLAN OF CARE
Goal Outcome Evaluation:           Overall Patient Progress: no changeOverall Patient Progress: no change    Outcome Evaluation: Eating % of meals BID-TID.

## 2023-02-24 NOTE — PROGRESS NOTES
CLINICAL NUTRITION SERVICES - REASSESSMENT NOTE    Malnutrition: (2/13)   % Weight Loss:  > 5% in 1 month (severe malnutrition) -- if previous reported dry weight of 178 lb is accurate   % Intake:  <75% for > 7 days (moderate malnutrition)  Subcutaneous Fat Loss:  Orbital region mild depletion and Upper arm region mild-moderate depletion  Muscle Loss:  Temporal region mild depletion, Clavicle bone region mild depletion, Acromion bone region mild depletion and Dorsal hand region mild-moderate depletion  Fluid Retention:  Moderate BLE     Malnutrition Diagnosis: Moderate malnutrition  In Context of:  Acute illness or injury with underlying chronic illness/disease      EVALUATION OF PROGRESS TOWARD GOALS   Diet: Moderate CHO  Glucerna @ 2pm   1800 mL FR    Intake/Tolerance:   - Eating % of meals (often 100%) of meals BID-TID.  2/23: received 1800 kcal, 94 g protein - these were standard trays, not a good indicator of what pt may typically order.   2/22: received 1415 kcal, 89 g protein  2/21: received 1120 kcal, 55 g protein (only 2 meals + 1 supplement ordered)     - attempted to see pt in room. Soundly sleeping, did not rouse to voice. Per RN, little appetite.     - Labs - reviewed  Recent Labs   Lab 02/24/23  0751 02/24/23  0619 02/24/23  0216 02/23/23  2154 02/23/23  1703 02/23/23  1155   * 184* 204* 222* 227* 231*   - High sliding scale insulin + 15 units lantus q AM + metformin     - Stooling: Bm x1 2/23  Bm x5 2/22 - bowel meds held per RN  - Weight:   69.3 kg recorded today. - diuresing     ASSESSED NUTRITION NEEDS: (updated 2/24)  Dosing Weight 74.3 kg (lowest this admit 2/13)  Estimated Energy Needs: 7093-6003 kcals (25-30 Kcal/Kg)  Justification: maintenance  Estimated Protein Needs:  grams protein (1.2-1.5 g pro/Kg)  Justification: preservation of lean body mass, wound healing    NEW FINDINGS:   2/23 WOCN   - Right heel: DTPI. Status: improving  - coccyx: Stage 2 DTPI. Status:  "deteriorating \"wound has gotten bigger\"    Previous Goals:   Intake of at least 75% meals BID-TID + supplement daily.   Evaluation: Met    Previous Nutrition Diagnosis:   Inadequate oral intake related to decreased appetite as evidenced by patient report of intake </= 75% of baseline over the past several weeks with potential 8% weight loss in the past month   Evaluation: No change    CURRENT NUTRITION DIAGNOSIS  Increased nutrient needs (protein related to wound healing as evidenced by stage 2 dtpi to coccyx, protein needs 90+ grams/day.     INTERVENTIONS  Recommendations / Nutrition Prescription  Moderate CHO  glucerna @ 2 pm    Implementation  No new interventions    Goals  Intake of >/=75% meals TID + 1 glucerna.       MONITORING AND EVALUATION:  Progress towards goals will be monitored and evaluated per protocol and Practice Guidelines    Marilu Cohen RD, LD  [facility], Ortho, Ortho Spine  Pager: [phone]  Weekend Pager: [phone]    "

## 2023-02-24 NOTE — PROGRESS NOTES
Pt A&O x4. Forgetful at times. Follows commands. Generalized weakness. PERRL. HR 60-90 in A-fib. LS diminished, encouraged to take deep breaths and cough. On NC 3L. Coccyx wound assessed and applied paste per orders. Rook boots on overnight. Repositioning q2h. No BM overnight. External cath in place for incontinence.     Courtney Schoen, RN on 2/24/2023 at 5:46 AM

## 2023-02-24 NOTE — PROGRESS NOTES
"SPIRITUAL HEALTH SERVICES Progress Note  [facility]    Saw pt Louis JUNIOR Keller Jr. per follow up visit.    Patient/Family Understanding of Illness and Goals of Care - Louis is anticipating discharge to TCU in a couple of days \"where I can gain strength and be ready to return home.\"    Distress and Loss - none named    Strengths, Coping, and Resources - Louis expressed gratitude for the support of his daughter (Kathy) and son-in-law who have been visiting and for Kathy's \"willingness and ability\" to support medical decisions.    Meaning, Beliefs, and Spirituality - Louis reflected on the importance of prayer and how \"helpful it is to know that family and friends are praying for me.\" He welcomed prayer, which I offered.    Plan of Care - [facility] remains available for support.    Micaela Hair MDiv  Associate   Pager [phone]  [facility] pager [phone]  [facility] phone [phone]    [facility] available 24/7 for emergent requests/referrals, either by having the on-call  paged or by entering an ASAP/STAT consult in Epic (this will also page the on-call ).        "

## 2023-02-24 NOTE — PROGRESS NOTES
[facility]    Medicine Progress Note - Hospitalist Service    Date of Admission:  2/9/2023    Assessment & Plan   Louis Keller Jr. is a 89 year old male with PMH atrial fibrillation, DM2, HLD who was admitted on 2/9/2023 with acute hypoxic respiratory failure and CHF.    Acute respiratory distress syndrome  Diastolic CHF exacerbation, improved  Community acquired pneumonia, improved  Presented with shortness of breath.  Received diuresis as below.  Worsening SOB, repeat CXR 2/11 Patchy bilateral airspace opacities are again seen. Blood cultures no growth to date. Completed 5 days of azithromycin. Repeat CT on 2/15 notes ongoing bilateral opacities consistent with pneumonia, and possibly ARDS like pattern. CRP rising as of 2/15. CXR 2/17 shows RUL worsening infiltrate, question possible mucous plug as patient does not have a strong cough. ABG 2/18 consistent with ARDS with PO2/FIO2 ratio ~165.  - Wean O2 as able, still on 3 lpm today.  - Appreciate Pulm consult.   - No bronchoscopy needed.  - Stopped scheduled nebs, now PRN.  - Weaned steroids to prednisone 40mg daily starting 2/21/23, plan to taper by 10 mg every 3 days, taper ordered in Epic.  - Tapered Zosyn to Augmentin. Will stop today due to loose stools, 13 day course has been sufficient for treatment of his pneumonia.  - Decreased Lasix 40mg IV BID to daily starting 2/21/23, consider transition back to oral diuretic lasix 40mg PO daily starting 2/25/23   - Plan for right thoracentesis today.  - Plan for TCU at discharge, possibly in the next 24-48 hours.      Acute exacerbation of chronic heart failure with preserved EF  Chronic atrial fibrillation on anticoagulation  Elevated troponin in the setting of demand  Venous insufficiency with chronic lower extremity edema  Presented with worsening shortness of breath. Prior to admission on Lasix 10 mg oral daily.  Troponin elevated at 50. Echo Left ventricular systolic function is  normal. The visual ejection fraction is 55-60%. Diastolic function not assessed due to atrial fibrillation. X-ray shows cardiomegaly and perihilar patchy airspace opacity suspicious for pulmonary vascular congestion.   Received diuresis with intravenous Lasix, now switched to oral Lasix 20 mg daily  on 2/12.  Given CT findings as above will increase diuresis to 20 mg oral Lasix twice daily.  Monitor renal function in a.m., received contrast imaging and can accordingly optimize further diuresis.   Discussed with neurosurgery, CT imaging with no No retroperitoneal hematoma -hence will restart PTA Eliquis 2/15.  - Manage IV lasix as above.  - Continue PTA amiodarone.  - Continue PTA metoprolol.  - Continue PTA Eliquis.  - PTA lisinopril  restarted at a lower dose 5mg PO daily on 2/24   - Monitor blood pressures, optimize regimen.  - Cardiology signed off 2/13.  - Strict input output monitoring, daily weights.     Acute on chronic back pain with multiple vertebral body fractures  History of compression fracture  MRI 2/13 with  L1 vertebral body superior endplate acute or subacute fracture with mild to moderate compression deformity.  L4 vertebral body acute or subacute burst fracture with mild to moderate compression deformity. This is potentially an unstable fracture complex.  L5 vertebral body superior endplate subacute or chronic mild to moderate compression deformity.  Multilevel spondylosis described above. L2-L3 and L3-L4 severe thecal sac stenosis. Cauda equina syndrome will need be excluded clinically.  - Neurosurgery recommended no surgical intervention.  Orthotics for TLSO brace for comfort.  - Lidocaine patch, as needed Tylenol.  - Rehab team following, TCU at discharge.  - Follow up with [facility] after discharge from TCU.     Bilateral retroperitoneal edema/hemorrhage likely in the setting of above on MRI 2/13, CT with no retroperitoneal hematoma on 2/15.  MRI Presacral edema/hemorrhage. Bilateral retroperitoneal  edema/hemorrhage. CT scan 2/15 with no retroperitoneal hematoma. Vascular surgery recommend management to neurosurgery. Neurosurgery recommended no surgical intervention.  - Restarted anticoagulation 2/15.  - Hemoglobin has remained stable.     Normocytic anemia  Iron deficiency, vitamin B12 deficiency  Baseline appears to be between 12-13. Hemoglobin at presentation 9.3, last hemoglobin on Epic is from August, 2022 and was 12.9. Vitamin B12 less than 150, iron saturation index 7.  Folic acid within normal limits.  On IV Venofer for 2 doses.  - Started on oral B12 replacement.  - Continue anticoagulation as above.     Acute on chronic hyponatremia  Patient has a history of mild hyponatremia up to 129 previously. Sodium on presentation today is 121, likely due to CHF exacerbation, diuretic use, and PNA. Sodium 128 on 2/16.  - Sodium 136 on 2/21/23.  - Fluid restriction to 1800 mL/day.  Liberalize salt in diet.       Hyperglycemia likely steroid induced  Uncontrolled diabetes mellitus with hemoglobin A1c of 8.9   BG worsening in setting of steroids to > 400, remain uncontrolled on 2/18. Was not on insulin prior to admission.  - Hypoglycemic in AM on 2/22/23, held lantus in the morning, then became hyperglycemic in the evening.  - Restart Lantus at 15 units in AM (previously was on 30 units in AM).  - Continue to monitor blood sugars and use high dose sliding scale NovoLog as indicated.  - Continue prandial carb count insulin.  - Continue PTA Glipizide and metformin.     Elevated TSH.  TSH: 6.66, T4: 1.06.  - Recommend repeating thyroid function testing in about 2 months.     Hypomagnesemia  - Resolved with replacement.     Delirium from hospitalization  Concern for cognitive impairment at baseline  Some confusion at times.  - Minimize interruptions, frequent reorientation  - Consider cognitive screening at TCU.    Moderate malnutrition:  Nutrition services following      Physical deconditioning from medical illness,  senile frailty  - Rehab team following. Transition to TCU at time of discharge.       Diet: Snacks/Supplements Adult: Glucerna; Between Meals  Moderate Consistent Carb (60 g CHO per Meal) Diet  Fluid restriction 1800 ML FLUID  Room Service    DVT Prophylaxis: DOAC  Mccarthy Catheter: Not present  Lines: None     Cardiac Monitoring: None  Code Status: Full Code      Clinically Significant Risk Factors              # Hypoalbuminemia: Lowest albumin = 2.7 g/dL at 2/14/2023  5:57 AM, will monitor as appropriate          # DMII: A1C = 8.9 % (Ref range: <5.7 %) within past 6 months    # Moderate Malnutrition: based on nutrition assessment        Disposition Plan      Expected Discharge Date: 02/26/2023      Destination: nursing home            Ally Burnette MD  Hospitalist Service  [facility]  Securely message with [employer] (more info)  Text page via [employer] Paging/Directory   ______________________________________________________________________    Interval History   Louis Keller Jr. was seen this morning. He feels about the same. Tired. Weak. Having loose stools senna held by RN. No other acute issues in the last 24 hours     Physical Exam   Vital Signs: Temp: 97.8  F (36.6  C) Temp src: Oral BP: 111/67 Pulse: 64   Resp: 18 SpO2: 96 % O2 Device: Nasal cannula Oxygen Delivery: 3 LPM  Weight: 152 lbs 11.2 oz    Constitutional: awake, alert, cooperative, no apparent distress, laying in the hospital bed with the head of the bed elevated  Respiratory: no increased work of breathing, clear to auscultation bilaterally, no crackles or wheezing  Cardiovascular: regular rate and rhythm, normal S1 and S2, no murmur noted  GI: normal bowel sounds, soft, non-distended, non-tender  Skin: warm, dry  Musculoskeletal: 1+ lower extremity pitting edema present  Neurologic: awake, alert, answers questions appropriately, moves all extremities    Medical Decision Making       40 MINUTES SPENT BY ME on the date of  service doing chart review, history, exam, documentation & further activities per the note.      Data     I have personally reviewed the following data over the past 24 hrs:    14.1 (H)  \   12.2 (L)   / 404     135 (L) 94 (L) 26.2 (H) /  171 (H)   4.5 34 (H) 0.63 (L) \

## 2023-02-25 NOTE — PROGRESS NOTES
[facility]    Medicine Progress Note - Hospitalist Service    Date of Admission:  2/9/2023    Assessment & Plan   Louis Keller Jr. is a 89 year old male with PMH atrial fibrillation, DM2, HLD who was admitted on 2/9/2023 with acute hypoxic respiratory failure and CHF.    Acute respiratory distress syndrome  Diastolic CHF exacerbation, improved  Community acquired pneumonia, improved  Presented with shortness of breath.  Received diuresis as below.  Worsening SOB, repeat CXR 2/11 Patchy bilateral airspace opacities are again seen. Blood cultures no growth to date. Completed 5 days of azithromycin. Repeat CT on 2/15 notes ongoing bilateral opacities consistent with pneumonia, and possibly ARDS like pattern. CRP rising as of 2/15. CXR 2/17 shows RUL worsening infiltrate, question possible mucous plug as patient does not have a strong cough. ABG 2/18 consistent with ARDS with PO2/FIO2 ratio ~165.  - Wean O2 as able, still on 3 lpm today.  - Appreciate Pulm consult.   - No bronchoscopy needed.  - Stopped scheduled nebs, now PRN.  - Weaned steroids to prednisone 40mg daily starting 2/21/23, plan to taper by 10 mg every 3 days, taper ordered in Epic.  Completed 13day course of antibiotics   - Decreased Lasix 40mg IV BID to daily starting 2/21/23, transitioned  back to oral diuretic lasix 40mg PO daily starting 2/25/23     - Plan for TCU at discharge, possibly in the next 24-48 hours.      Acute exacerbation of chronic heart failure with preserved EF  Chronic atrial fibrillation on anticoagulation  Elevated troponin in the setting of demand  Venous insufficiency with chronic lower extremity edema  Presented with worsening shortness of breath. Prior to admission on Lasix 10 mg oral daily.  Troponin elevated at 50. Echo Left ventricular systolic function is normal. The visual ejection fraction is 55-60%. Diastolic function not assessed due to atrial fibrillation. X-ray shows cardiomegaly and  perihilar patchy airspace opacity suspicious for pulmonary vascular congestion.   Received diuresis with intravenous Lasix, now switched to oral Lasix 20 mg daily  on 2/12.  Given CT findings as above will increase diuresis to 20 mg oral Lasix twice daily.  Monitor renal function in a.m., received contrast imaging and can accordingly optimize further diuresis.   Discussed with neurosurgery, CT imaging with no No retroperitoneal hematoma -hence will restart PTA Eliquis 2/15.  - Manage IV lasix as above.  - Continue PTA amiodarone.  - Continue PTA metoprolol.  - Continue PTA Eliquis.  - PTA lisinopril  restarted at a lower dose 5mg PO daily on 2/24   - Monitor blood pressures, optimize regimen.  - Cardiology signed off 2/13.  - Strict input output monitoring, daily weights.     Acute on chronic back pain with multiple vertebral body fractures  History of compression fracture  MRI 2/13 with  L1 vertebral body superior endplate acute or subacute fracture with mild to moderate compression deformity.  L4 vertebral body acute or subacute burst fracture with mild to moderate compression deformity. This is potentially an unstable fracture complex.  L5 vertebral body superior endplate subacute or chronic mild to moderate compression deformity.  Multilevel spondylosis described above. L2-L3 and L3-L4 severe thecal sac stenosis. Cauda equina syndrome will need be excluded clinically.  - Neurosurgery recommended no surgical intervention.  Orthotics for TLSO brace for comfort.  - Lidocaine patch, as needed Tylenol.  - Rehab team following, TCU at discharge.  - Follow up with [facility] after discharge from TCU.     Bilateral retroperitoneal edema/hemorrhage likely in the setting of above on MRI 2/13, CT with no retroperitoneal hematoma on 2/15.  MRI Presacral edema/hemorrhage. Bilateral retroperitoneal edema/hemorrhage. CT scan 2/15 with no retroperitoneal hematoma. Vascular surgery recommend management to neurosurgery. Neurosurgery  recommended no surgical intervention.  - Restarted anticoagulation 2/15.  - Hemoglobin has remained stable.     Normocytic anemia  Iron deficiency, vitamin B12 deficiency  Baseline appears to be between 12-13. Hemoglobin at presentation 9.3, last hemoglobin on Epic is from August, 2022 and was 12.9. Vitamin B12 less than 150, iron saturation index 7.  Folic acid within normal limits.  On IV Venofer for 2 doses.  - Started on oral B12 replacement.  - Continue anticoagulation as above.     Acute on chronic hyponatremia  Patient has a history of mild hyponatremia up to 129 previously. Sodium on presentation today is 121, likely due to CHF exacerbation, diuretic use, and PNA. Sodium 128 on 2/16.  - Sodium 136 on 2/21/23.  - Fluid restriction to 1800 mL/day.  Liberalize salt in diet.       Hyperglycemia likely steroid induced  Uncontrolled diabetes mellitus with hemoglobin A1c of 8.9   BG worsening in setting of steroids to > 400, remain uncontrolled on 2/18. Was not on insulin prior to admission.  - Hypoglycemic in AM on 2/22/23, held lantus in the morning, then became hyperglycemic in the evening.  - Restart Lantus at 15 units in AM (previously was on 30 units in AM).  - Continue to monitor blood sugars and use high dose sliding scale NovoLog as indicated.  - Continue prandial carb count insulin.  - Continue PTA Glipizide and metformin, pioglitazone .     Elevated TSH.  TSH: 6.66, T4: 1.06.  - Recommend repeating thyroid function testing in about 2 months.     Hypomagnesemia  - Resolved with replacement.     Delirium from hospitalization  Concern for cognitive impairment at baseline  Some confusion at times.  - Minimize interruptions, frequent reorientation  - Consider cognitive screening at TCU.    Moderate malnutrition:  Nutrition services following      Physical deconditioning from medical illness, senile frailty  - Rehab team following. Transition to TCU at time of discharge.       Diet: Snacks/Supplements Adult:  Glucerna; Between Meals  Moderate Consistent Carb (60 g CHO per Meal) Diet  Fluid restriction 1800 ML FLUID  Room Service    DVT Prophylaxis: DOAC  Mccarthy Catheter: Not present  Lines: None     Cardiac Monitoring: None  Code Status: Full Code      Clinically Significant Risk Factors              # Hypoalbuminemia: Lowest albumin = 2.7 g/dL at 2/14/2023  5:57 AM, will monitor as appropriate          # DMII: A1C = 8.9 % (Ref range: <5.7 %) within past 6 months    # Moderate Malnutrition: based on nutrition assessment        Disposition Plan      Expected Discharge Date: 02/26/2023      Destination: nursing home            Ally Burnette MD  Hospitalist Service  [facility]  Securely message with [employer] (more info)  Text page via [employer] Paging/Directory   ______________________________________________________________________    Interval History   Louis Keller Jr. was seen this morning. He feels about the same. Tired. Weak. Denies SOB or chest pain. No N/V . No other acute issues in the last 24 hours     Physical Exam   Vital Signs: Temp: 97.7  F (36.5  C) Temp src: Oral BP: 130/74 Pulse: 69   Resp: 18 SpO2: 98 % O2 Device: Nasal cannula Oxygen Delivery: 3 LPM  Weight: 152 lbs 11.2 oz    Constitutional: awake, alert, cooperative, no apparent distress, laying in the hospital bed with the head of the bed elevated  Respiratory: no increased work of breathing, clear to auscultation bilaterally, no crackles or wheezing  Cardiovascular: regular rate and rhythm, normal S1 and S2, no murmur noted  GI: normal bowel sounds, soft, non-distended, non-tender  Skin: warm, dry  Musculoskeletal: 1+ lower extremity pitting edema present  Neurologic: awake, alert, answers questions appropriately, moves all extremities    Medical Decision Making       40 MINUTES SPENT BY ME on the date of service doing chart review, history, exam, documentation & further activities per the note.      Data     I have personally  reviewed the following data over the past 24 hrs:    N/A  \   N/A   / N/A     N/A N/A N/A /  186 (H)   4.8 N/A N/A \

## 2023-02-25 NOTE — PROGRESS NOTES
Up A-2 with LIFT, though not OOB today. Pt refused up to chair. A&O x4, forgetful. Tolerating mod carb diet, good appetite. C/o pain to coccyx wound, declines intervention. Turn and repo Q2. Incontinent, external cath in place with adequate output. Discharge pending. Tele: Afib CVR.

## 2023-02-25 NOTE — PROGRESS NOTES
"PULMONOLOGY PROGRESS NOTE    Date of Admission: 2/9/2023    CC/Reason for Hospital visit: Hypoxemia  __________________________________________    ASSESSMENT / PLAN      Pulmonary Diagnoses:  Abnl CT/CXR R91.8  CHF I50.9  Fluid overload E87.79  Hypoxemia R09.02  Pleural effusion  Pneumonia unspec J18.9  Resp fail acute J96.00  SOB R06.02    ASSESSMENT:  89-year-old male with history of mumps and scarlet fever, A-fib, HFpEF, presenting from cardiology clinic in the setting of recent weight gain and LE edema.    Improvement with oxygenation with continue net negative I/O's; would encourage continued diuresis. Otherwise unchanged today.    PLAN:     Encourage continued diuresis given improvement in oxygenation with net negative daily    Prednisone taper: 40mg daily x3 days; taper by 10mg every 3 days    Duonebs while inpatient    Antibiotics per primary    Continue with PT/OT    Ronen Villafana MD  Interventional Pulmonology  [facility]      ____________________________________________    SUBJECTIVE      On 3L today. No other concerns today.    ROS: A Problem Pertinent review of systems was negative except for items noted in HPI.  Past Medical, Family, and Social/Substance History has been reviewed: No interval changes.  OBJECTIVE   Vital signs:  Temp: 98.1  F (36.7  C) Temp src: Oral BP: 125/76 Pulse: 76   Resp: 16 SpO2: 92 % O2 Device: Nasal cannula Oxygen Delivery: 3 LPM Height: 177.8 cm (5' 10\") Weight: 69.3 kg (152 lb 11.2 oz)  Estimated body mass index is 21.91 kg/m  as calculated from the following:    Height as of this encounter: 1.778 m (5' 10\").    Weight as of this encounter: 69.3 kg (152 lb 11.2 oz).        I/O last 3 completed shifts:  In: 500 [P.O.:500]  Out: 3600 [Urine:3600]      CONSTITUTIONAL/GENERAL APPEARANCE: Alert. No Apparent Distress.  PSYCHIATRIC: Pleasant and appropriate mood and affect. Oriented x 3.  EARS, NOSE,THROAT,MOUTH: External ears and nose overall normal. Normal oral " mucosa.   NECK: Neck appearance normal. No neck masses and the thyroid is not enlarged.   RESPIRATORY: Non-labored effort.   CARDIOVASCULAR: Extremities with 2+ edema on left.      LABORATORY ASSESSMENT    Arterial Blood Gas  Recent Labs   Lab 02/18/23  1131   PH 7.49*   PCO2 40   PO2 66*   HCO3 31*   O2PER 40     CBC  Recent Labs   Lab 02/24/23  0619 02/23/23  0716 02/22/23  0641 02/21/23  0615   WBC 14.1* 17.0* 19.9* 18.7*   RBC 4.18* 4.16* 4.17* 3.70*   HGB 12.2* 12.1* 12.1* 10.7*   HCT 38.4* 38.8* 38.7* 32.8*   MCV 92 93 93 89   MCH 29.2 29.1 29.0 28.9   MCHC 31.8 31.2* 31.3* 32.6   RDW 14.7 14.8 14.7 14.6    429 477* 468*     BMP  Recent Labs   Lab 02/24/23  1726 02/24/23  1304 02/24/23  0751 02/24/23  0619 02/23/23  0726 02/23/23  0716 02/22/23  0851 02/22/23  0641 02/21/23  1907 02/21/23  1338 02/21/23  0832 02/21/23  0615   NA  --   --   --  135*  --  136  --  139  --   --   --  136   POTASSIUM  --   --   --  4.5  --  4.3  --  3.8  --  3.8  --  3.4   CHLORIDE  --   --   --  94*  --  96*  --  96*  --   --   --  95*   MICHAEL  --   --   --  8.8  --  8.8  --  9.1  --   --   --  9.0   CO2  --   --   --  34*  --  36*  --  33*  --   --   --  32*   BUN  --   --   --  26.2*  --  29.2*  --  28.8*  --   --   --  31.6*   CR  --   --   --  0.63*  --  0.65*  --  0.69  --   --   --  0.68   * 177* 171* 184*   < > 210*   < > 74   < >  --    < > 147*    < > = values in this interval not displayed.     INRNo lab results found in last 7 days.   BNPNo lab results found in last 7 days.  VENOUS BLOOD GASESNo lab results found in last 7 days.      Additional labs and/or comments:     IMAGING      Results for orders placed or performed during the hospital encounter of 02/09/23   Chest XR,  PA & LAT    Impression    IMPRESSION: AP and lateral views of the chest were obtained. Mildly  enlarged cardiac silhouette. Right upper lobe/perihilar predominant  patchy airspace pulmonary opacities, worrisome for infection.  No  significant pleural effusion or pneumothorax. Multilevel degenerative  changes of the spine.    GABINO BREWER MD         SYSTEM ID:  [id number]   XR Chest Port 1 View    Impression    IMPRESSION: Similar appearance of the cardiomediastinal silhouette. Patchy bilateral airspace opacities are again seen and do not appear significantly changed.   MR Lumbar Spine w/o Contrast    Impression     IMPRESSION:  1.  L1 vertebral body superior endplate acute or subacute fracture with mild to moderate compression deformity.    2.  L4 vertebral body acute or subacute burst fracture with mild to moderate compression deformity. This is potentially an unstable fracture complex.    3.  L5 vertebral body superior endplate subacute or chronic mild to moderate compression deformity.     4.  Multilevel spondylosis described above.    5.  L2-L3 and L3-L4 severe thecal sac stenosis. Cauda equina syndrome will need be excluded clinically.    6.  Additional degenerative changes described above.    7.  Presacral edema/hemorrhage. Bilateral retroperitoneal edema/hemorrhage.    Dr. Mason Mahmood  was notified by Dr Tristen Mendenhall at  11:20 PM 02/13/2023.     CT Chest (PE) Abdomen Pelvis w Contrast    Impression    IMPRESSION:  1.  No evidence of pulmonary embolus.  2.  Bilateral right greater left pulmonary infiltrates more likely  represent pneumonitis or developing adult respiratory distress  syndrome. Component of pulmonary edema difficult to exclude.  3.  No retroperitoneal hematoma.    HCEN SUN MD         SYSTEM ID:  [id number]   XR Chest Port 1 View    Impression    IMPRESSION:Slight worsening of right upper lobe infiltrates, and  stable additional multifocal infiltrates. Small bilateral pleural  effusions, better seen on the recent CT. No pneumothorax. Normal heart  size. Implanted pacemaker.    DORY BAINS MD         SYSTEM ID:  [id number]   Echocardiogram Complete   Result Value Ref Range    LVEF  55-60%        PFT & OTHER  TESTING   No flowsheet data found.

## 2023-02-25 NOTE — PLAN OF CARE
Goal Outcome Evaluation:    Pt here with decompensated HF and community acquired pneumonia. A&Ox4. Neuros including some forgetfulness. Denies SOB, Denies CP. VS - On 5L02 for oxygen sats > 90%. Tele Afib CVR. Mod carb diet. Strict I/O FR 1800ml. Up with A2/.lift. T/R q2h, : Ext Cath. GI: No BM this shift. Taking antidiarrheal, given this shift. Skin: Saccral pressure wound care complete, Triad barrier applied. Denies pain. Pt scoring green on the Aggression Stop Light Tool. Plan for TCU at discharge.

## 2023-02-25 NOTE — PLAN OF CARE
Goal Outcome Evaluation:      Plan of Care Reviewed With: patient, family    Overall Patient Progress: no changeOverall Patient Progress: no change  Neuro:intact  CV/Rhythm:AF CVR  Resp/02:3 L weaned from 5 L NC  GI/Diet:mod carb  :ext cath changed  Skin/Incisions/Sites:coccynx wound triad paste applied, turned q2hr, up to chair x 30 min for lunch, R inner heel wound mepilex dressing change done 2/23 - order states q3day, intact, rorke boots in place, legs elevated  Pulses/CMS:weak BLE, cool extremities  Edema:none  Activity/Falls Risk:fall risk, turn q2hr, up to chair with lift  Lines/Drains/IVs:PIV  Labs/BGM:K and Mag in AM  Test/Procedures:-  VS/Pain:stable, c/o buttocks pain tyelnol given no change, repo  DC Plan:TCU [facility] tomorrow?  Other:daughter at bedside.

## 2023-02-25 NOTE — PROGRESS NOTES
Care Management Follow Up    Length of Stay (days): 16    Expected Discharge Date: 02/26/2023     Concerns to be Addressed: discharge planning     Patient plan of care discussed at interdisciplinary rounds: Yes    Anticipated Discharge Disposition: Transitional Care  Disposition Comments: Dischagre to TCU  Anticipated Discharge Services: therapy  Anticipated Discharge DME: None    Patient/family educated on Medicare website which has current facility and service quality ratings: yes  Education Provided on the Discharge Plan:  yes  Patient/Family in Agreement with the Plan: yes    Referrals Placed by CM/SW:  TCU referrals, transport  Private pay costs discussed: transportation costs    Additional Information:  Call placed to [employer] to arrange for stretcher transport at 13:00 tomorrow as patient is on oxygen that he can not manage himself.  Patient is also a heavy transfer and needs closer monitoring in the back of the rig.  Faxed the facesheet and PCS to [employer].  Call placed to update patient's daughter Kathy.    Will continue to follow.      BOBBY William, [facility]    [phone]  [facility]

## 2023-02-25 NOTE — PROGRESS NOTES
Brief pulmonary note:    89-year-old male with history of mumps and scarlet fever, A-fib, HFpEF, presenting from cardiology clinic in the setting of recent weight gain and LE edema.     Improvement with oxygenation with continue net negative I/O's; would encourage continued diuresis. Otherwise unchanged today.     PLAN:     Encourage continued diuresis given improvement in oxygenation with net negative daily    Continue prednisone; taper by 10mg every 3 days    Duonebs while inpatient    Antibiotics per primary    Continue with PT/OT     Pulmonary will follow peripherally, please page with questions.    Ronen Villafana MD  [facility]

## 2023-02-26 NOTE — PROGRESS NOTES
Pt left with stretcher transport and oxygen 2 L  to [facility] with EMS. C/O intermittent buttocks pain, repo off buttocks with pillows. Pt and social work alerted family to transfer plans. Social work faxed orders to [facility]. PIV removed. Tele and ext cath removed for transport. Toiletrys and clothing, shoes and chair cushions with pt, Pt had glasses and razor in personal bag on discharge.

## 2023-02-26 NOTE — PROGRESS NOTES
Care Management Discharge Note    Discharge Date: 03/06/2018       Discharge Disposition: Transitional Care    Discharge Services: Other (see comment) (Therapy)    Discharge DME: Oxygen    Discharge Transportation:   [employer] stretcher transport at 13:00    Private pay costs discussed: Not applicable    PAS Confirmation Code: [id number]  Patient/family educated on Medicare website which has current facility and service quality ratings: no    Education Provided on the Discharge Plan:  yes  Persons Notified of Discharge Plans: patient and daughter  Patient/Family in Agreement with the Plan: yes    Handoff Referral Completed: Yes    Additional Information:  Received discharge orders for patient.  Bed available at [facility] for today.   [employer] stretcher ride set up for 13:00 today.  Patient and daughter informed of the plan and in agreement to the plan.  Call placed to update [facility] and faxed/email the orders.        BOBBY William, [facility]    [phone]  [facility]

## 2023-02-26 NOTE — PROGRESS NOTES
Care Management Discharge Note    Discharge Date: 02/26/2023       Discharge Disposition: Transitional Care    Discharge Services: therapy    Discharge DME: None    Discharge Transportation:  [facility] stretcher transport at 13:00    Private pay costs discussed: Not applicable    PAS Confirmation Code: [id number]  Patient/family educated on Medicare website which has current facility and service quality ratings: yes    Education Provided on the Discharge Plan:  yes  Persons Notified of Discharge Plans: patient and duaghter  Patient/Family in Agreement with the Plan: yes    Handoff Referral Completed: Yes    Additional Information:  Received discharge orders for patient.  Bed available at [place] for today.   [facility] stretcher transport arranged for 13:00 today.  Patient and daughter informed of the plan and in agreement to the plan.  Call placed to update [facility] and faxed/emailed the orders.      BOBBY William, [facility]    [phone]  [facility]

## 2023-02-26 NOTE — DISCHARGE SUMMARY
[facility]  Discharge Summary        Louis Keller Jr. MRN# [id number]   YOB: 1932 Age: 89 year old     Date of Admission:  2/9/2023  Date of Discharge:  2/26/2023  Admitting Physician:  No admitting provider for patient encounter.  Discharge Physician: Ally Burnette MD  Discharging Service: Hospitalist     Primary Provider: Joselito Armas  Primary Care Physician Phone Number: [phone]         Discharge Diagnoses/Problem Oriented Hospital Course (Providers):    Louis Keller Jr. was admitted on 2/9/2023 by No admitting provider for patient encounter. and I would refer you to their history and physical.  The following problems were addressed during his hospitalization:      Louis Keller Jr. is a 89 year old male with PMH atrial fibrillation, DM2, HLD who was admitted on 2/9/2023 with acute hypoxic respiratory failure and CHF.     Acute respiratory distress syndrome  Diastolic CHF exacerbation, improved  Community acquired pneumonia, improved  Presented with shortness of breath.  Received diuresis as below.  Worsening SOB, repeat CXR 2/11 Patchy bilateral airspace opacities are again seen. Blood cultures no growth to date. Completed 5 days of azithromycin. Repeat CT on 2/15 notes ongoing bilateral opacities consistent with pneumonia, and possibly ARDS like pattern. CRP rising as of 2/15. CXR 2/17 shows RUL worsening infiltrate, question possible mucous plug as patient does not have a strong cough. ABG 2/18 consistent with ARDS with PO2/FIO2 ratio ~165.  - Wean O2 as able, still on 3 lpm today.  - Appreciate Pulm consult.                - No bronchoscopy needed.  - Stopped scheduled nebs, now PRN.  - Weaned steroids to prednisone 40mg daily starting 2/21/23, plan to taper by 10 mg every 3 days, taper ordered in Epic.  Completed 13day course of antibiotics   - Decreased Lasix 40mg IV BID to daily starting 2/21/23, transitioned  back to oral diuretic lasix 40mg PO daily  starting 2/25/23      - Plan for TCU at discharge      Acute exacerbation of chronic heart failure with preserved EF  Chronic atrial fibrillation on anticoagulation  Elevated troponin in the setting of demand  Venous insufficiency with chronic lower extremity edema  Presented with worsening shortness of breath. Prior to admission on Lasix 10 mg oral daily.  Troponin elevated at 50. Echo Left ventricular systolic function is normal. The visual ejection fraction is 55-60%. Diastolic function not assessed due to atrial fibrillation. X-ray shows cardiomegaly and perihilar patchy airspace opacity suspicious for pulmonary vascular congestion.   Received diuresis with intravenous Lasix, now switched to oral Lasix 20 mg daily  on 2/12.  Given CT findings as above will increase diuresis to 20 mg oral Lasix twice daily.  Monitor renal function in a.m., received contrast imaging and can accordingly optimize further diuresis.   Discussed with neurosurgery, CT imaging with no No retroperitoneal hematoma -hence will restart PTA Eliquis 2/15.  - Manage IV lasix as above.  - Continue PTA amiodarone.  - Continue PTA metoprolol.  - Continue PTA Eliquis.  - PTA lisinopril  restarted   - Monitor blood pressures, optimize regimen.  - Cardiology signed off 2/13.  - Strict input output monitoring, daily weights.     Acute on chronic back pain with multiple vertebral body fractures  History of compression fracture  MRI 2/13 with  L1 vertebral body superior endplate acute or subacute fracture with mild to moderate compression deformity.  L4 vertebral body acute or subacute burst fracture with mild to moderate compression deformity. This is potentially an unstable fracture complex.  L5 vertebral body superior endplate subacute or chronic mild to moderate compression deformity.  Multilevel spondylosis described above. L2-L3 and L3-L4 severe thecal sac stenosis. Cauda equina syndrome will need be excluded clinically.  - Neurosurgery  recommended no surgical intervention.  Orthotics for TLSO brace for comfort.  - Lidocaine patch, as needed Tylenol.  - Rehab team following, TCU at discharge.  - Follow up with [facility] after discharge from TCU.     Bilateral retroperitoneal edema/hemorrhage likely in the setting of above on MRI 2/13, CT with no retroperitoneal hematoma on 2/15.  MRI Presacral edema/hemorrhage. Bilateral retroperitoneal edema/hemorrhage. CT scan 2/15 with no retroperitoneal hematoma. Vascular surgery recommend management to neurosurgery. Neurosurgery recommended no surgical intervention.  - Restarted anticoagulation 2/15.  - Hemoglobin has remained stable.     Normocytic anemia  Iron deficiency, vitamin B12 deficiency  Baseline appears to be between 12-13. Hemoglobin at presentation 9.3, last hemoglobin on Epic is from August, 2022 and was 12.9. Vitamin B12 less than 150, iron saturation index 7.  Folic acid within normal limits.  On IV Venofer for 2 doses.  - Started on oral B12 replacement.  - Continue anticoagulation as above.     Acute on chronic hyponatremia  Patient has a history of mild hyponatremia up to 129 previously. Sodium on presentation today is 121, likely due to CHF exacerbation, diuretic use, and PNA. Sodium 128 on 2/16.  - Sodium 136 on 2/21/23.  - Fluid restriction to 1800 mL/day.  Liberalize salt in diet.    Sodium low at 127 on 2/26. Pt asymptomatic. Recheck BMP in 1week      Hyperglycemia likely steroid induced  Uncontrolled diabetes mellitus with hemoglobin A1c of 8.9   BG worsening in setting of steroids to > 400, remain uncontrolled on 2/18. Was not on insulin prior to admission.  - Hypoglycemic in AM on 2/22/23, held lantus in the morning, then became hyperglycemic in the evening.  - Restart Lantus at 15 units in AM (previously was on 30 units in AM).  - Continue to monitor blood sugars and use high dose sliding scale NovoLog as indicated.  - Continue prandial carb count insulin.  - Continue PTA Glipizide and  metformin, pioglitazone .     Elevated TSH.  TSH: 6.66, T4: 1.06.  - Recommend repeating thyroid function testing in about 2 months.     Hypomagnesemia  - Resolved with replacement.     Delirium from hospitalization  Concern for cognitive impairment at baseline  Some confusion at times.  - Minimize interruptions, frequent reorientation  - Consider cognitive screening at TCU.     Moderate malnutrition:  Nutrition services following      Physical deconditioning from medical illness, senile frailty  - Rehab team following. Transition to TCU at time of discharge.           Diet: Snacks/Supplements Adult: Glucerna; Between Meals  Moderate Consistent Carb (60 g CHO per Meal) Diet  Fluid restriction 1800 ML FLUID  Room Service    DVT Prophylaxis: DOAC  Mccarthy Catheter: Not present  Lines: None     Cardiac Monitoring: None  Code Status: Full Code          Clinically Significant Risk Factors                 # Hypoalbuminemia: Lowest albumin = 2.7 g/dL at 2/14/2023  5:57 AM, will monitor as appropriate          # DMII: A1C = 8.9 % (Ref range: <5.7 %) within past 6 months    # Moderate Malnutrition: based on nutrition assessment               Disposition Plan        Expected Discharge Date: 02/26/2023   to TCU in stable condition     Ally Burnette MD   Page [phone](7AM-6PM)            Code Status:      Full Code         Important Results:      Please see below          Pending Results:        Unresulted Labs Ordered in the Past 30 Days of this Admission     No orders found from 1/10/2023 to 2/10/2023.               Discharge Instructions and Follow-Up:      Follow-up Appointments     Follow Up and recommended labs and tests      F/u with NH physician in 1week. REcheck BMP in 1week                  Discharge Disposition:      Discharged to short-term care facility         Discharge Medications:        Current Discharge Medication List      START taking these medications    Details   cyanocobalamin (CYANOCOBALAMIN) 1000 MCG  tablet Take 1 tablet (1,000 mcg) by mouth daily  Qty: 30 tablet, Refills: 0    Associated Diagnoses: Acute on chronic diastolic congestive heart failure (H)      insulin glargine (LANTUS PEN) 100 UNIT/ML pen Inject 15 Units Subcutaneous every morning (before breakfast)  Qty: 15 mL, Refills: 0    Comments: If Lantus is not covered by insurance, may substitute Basaglar or Semglee or other insulin glargine product per insurance preference at same dose and frequency.    Associated Diagnoses: Type 2 diabetes mellitus with stage 3 chronic kidney disease, without long-term current use of insulin, unspecified whether stage 3a or 3b CKD (H)      Lidocaine (LIDOCARE) 4 % Patch Place 1 patch onto the skin every 24 hours To prevent lidocaine toxicity, patient should be patch free for 12 hrs daily.  Qty: 30 patch, Refills: 0    Associated Diagnoses: Acute on chronic diastolic congestive heart failure (H)      miconazole (MICATIN) 2 % external powder Apply topically 2 times daily  Qty: 43 g, Refills: 0    Associated Diagnoses: COPD exacerbation (H)      predniSONE (DELTASONE) 20 MG tablet Take 1 tablet (20 mg) by mouth daily For 3days then reduce to 10mg PO daily for 3days then stop  Qty: 10 tablet, Refills: 0    Associated Diagnoses: COPD exacerbation (H)      saccharomyces boulardii (FLORASTOR) 250 MG capsule Take 1 capsule (250 mg) by mouth 2 times daily  Qty: 20 capsule, Refills: 0    Associated Diagnoses: COPD exacerbation (H)      senna-docusate (SENOKOT-S/PERICOLACE) 8.6-50 MG tablet Take 2 tablets by mouth nightly as needed for constipation  Qty: 20 tablet, Refills: 0    Associated Diagnoses: COPD exacerbation (H)         CONTINUE these medications which have CHANGED    Details   furosemide (LASIX) 40 MG tablet Take 1 tablet (40 mg) by mouth daily  Qty: 30 tablet, Refills: 0    Associated Diagnoses: Acute on chronic diastolic congestive heart failure (H)         CONTINUE these medications which have NOT CHANGED    Details    amiodarone (PACERONE) 200 MG tablet Take 1 tablet (200 mg) by mouth daily  Qty: 90 tablet, Refills: 3    Associated Diagnoses: Chronic a-fib (H); Near syncope; Venous (peripheral) insufficiency      apixaban ANTICOAGULANT (ELIQUIS) 2.5 MG tablet Take 1 tablet (2.5 mg) by mouth 2 times daily  Qty: 180 tablet, Refills: 3    Associated Diagnoses: Chronic atrial fibrillation (H)      brimonidine (ALPHAGAN) 0.2 % ophthalmic solution INSTILL 1 DROP INTO LEFT EYE TWICE A DAY      cholestyramine (QUESTRAN) 4 g packet Take 1 packet (4 g) by mouth 2 times daily (with meals)  Qty: 180 packet, Refills: 3    Associated Diagnoses: Postcholecystectomy diarrhea; Hyperlipidemia LDL goal <100      dorzolamide (TRUSOPT) 2 % ophthalmic solution Place 1 drop Into the left eye 2 times daily      ferrous gluconate (FERGON) 324 (38 Fe) MG tablet TAKE 1 TABLET (324 MG) BY MOUTH DAILY (WITH BREAKFAST)  Qty: 100 tablet, Refills: 2    Associated Diagnoses: Anemia due to blood loss, acute      gabapentin (NEURONTIN) 100 MG capsule Take 2 capsules (200 mg) by mouth 3 times daily  Qty: 540 capsule, Refills: 1    Associated Diagnoses: Right-sided low back pain with right-sided sciatica, unspecified chronicity      glipiZIDE (GLUCOTROL XL) 5 MG 24 hr tablet Take 1 tablet (5 mg) by mouth 2 times daily  Qty: 180 tablet, Refills: 3    Comments: Sorry, this is the correct prescription. I misunderstood what the patient was taking.  Associated Diagnoses: Chronic atrial fibrillation (H); Actinic keratosis      latanoprost (XALATAN) 0.005 % ophthalmic solution Place 1 drop Into the left eye daily    Comments: Per eye doctor      lisinopril (ZESTRIL) 10 MG tablet Take 1 tablet (10 mg) by mouth daily  Qty: 90 tablet, Refills: 3    Associated Diagnoses: Essential hypertension      loperamide (IMODIUM A-D) 2 MG tablet Take 2 mg by mouth daily as needed  Qty: 30 tablet, Refills: 0    Comments: bid      metFORMIN (GLUCOPHAGE) 500 MG tablet TAKE 1 TABLET BY  "MOUTH TWICE A DAY WITH MEALS  Qty: 180 tablet, Refills: 0    Associated Diagnoses: Type 2 diabetes mellitus with stage 3 chronic kidney disease, without long-term current use of insulin (H)      Metoprolol Tartrate 37.5 MG TABS Take 37.5 mg by mouth 2 times daily  Qty: 180 tablet, Refills: 1    Associated Diagnoses: Chronic a-fib (H)      pioglitazone (ACTOS) 45 MG tablet Take 1 tablet (45 mg) by mouth daily  Qty: 90 tablet, Refills: 3    Associated Diagnoses: Type 2 diabetes mellitus with stage 3 chronic kidney disease, without long-term current use of insulin, unspecified whether stage 3a or 3b CKD (H)      Acetaminophen (TYLENOL PO) Take 1,000 mg by mouth 3 times daily as needed      multivitamin, therapeutic (THERA-VIT) TABS tablet Take 1 tablet by mouth daily         STOP taking these medications       lidocaine (LIDODERM) 5 % patch Comments:   Reason for Stopping:                    Allergies:         Allergies   Allergen Reactions     No Known Drug Allergies             Consultations This Hospital Stay:      Consultation during this admission received from cardiology, pulmonary medicine and Neurosurgery          Condition and Physical Exam on Discharge:      Discharge condition: Stable   Discharge vitals: Blood pressure (!) 145/86, pulse 69, temperature 98.5  F (36.9  C), temperature source Oral, resp. rate 18, height 1.778 m (5' 10\"), weight 69.4 kg (153 lb 1.6 oz), SpO2 95 %.     Constitutional: Alert, awake, NAD    Lungs: CTA b/l    Cardiovascular: RRR   Abdomen: Soft, NT, ND, BS+   Skin: Warm and dry. Rooke boots on    Other:            Discharge Orders for Skilled Facility (from Discharge Orders):        After Care Instructions     Activity - Up with nursing assistance      Diet      Follow this diet upon discharge: Orders Placed This Encounter      Snacks/Supplements Adult: Glucerna; Between Meals      Fluid restriction 1800 ML FLUID      Room Service      Moderate Consistent Carb (60 g CHO per Meal) " "Diet         Fall precautions      General info for SNF      Length of Stay Estimate: Short Term Care: Estimated # of Days 31-90  Condition at Discharge: Stable  Level of care:skilled   Rehabilitation Potential: Excellent  Admission H&P remains valid and up-to-date: Yes  Recent Chemotherapy: N/A  Use Nursing Home Standing Orders: Yes         Glucose monitor nursing POCT      Before meals and at bedtime         Mantoux instructions      Give two-step Mantoux (PPD) Per Facility Policy Yes         Oxygen (SNF/TCU) Discharge      Wound care      Right heel wound(s): Every 3 days and PRN  1. Clean wound with saline or MicroKlenz Spray, pat dry  2. Wipe / \"clean\" the surrounding periwound tissue with skin prep (Cavilon No Sting Skin Prep #307605) and allow to dry. This will help protect periwound and help dressing adherence  3. Press a Mepilex  Border Dressing (#111677) to the area, making sure to conform nicely to skin curvatures.   4. Time and date dressing change  NOTE  -Reposition pt side to side scott when in bed, every 2 hours-get the pt way over on side to completely offload pressure. This will benefit skin and respiratory function   -Keep heels elevated and floating on pillows at all times. Try using at least 2 pillows under each calf.  -When up to the chair pt needs to fully offload every 2 hours and use a chair cushion if needed            Coccyx wound: Daily and PRN   Assess wound every shift for signs of deterioration!   1. Cleanse wound with wound cleanser and pat dry.   2. Apply thin layer of Triad Paste (order #[id number]) to wound bed. Avoid thick layer of triad paste. Please do not use Sacral Mepilex to wound as patient is incontinent of stool and stool was shunted into wound bed.  3. If Triad becomes soiled with stool remove only soiled paste and reapply as needed.  4. Avoid pre moistened Bath wipes. They are not intended for rosalia care. Use Dane washcloths and Gabe spray, or the gray wipes with barrier " "cream.  5. Avoid use of brief. Can try mesh underwear if you need to secure an external catheter.  6. Use single covidien pad and limit number of linens underneath patient.   7. Pt needs to be lying on right or left side. Do not position supine. The most essential intervention for wound care is to eliminate the cause- in this case it is pressure.     Pressure Injury Prevention (PIP) Plan:  -If patient is declining pressure injury prevention interventions: Explore reason why and address patient's concerns, Educate on pressure injury risk and prevention intervention(s), If patient is still declining, document \"informed refusal\" , and Ensure Care team is aware ( provider, charge nurse, etc)  -Mattress: Follow bed algorithm, reassess daily and order specialty mattress, if indicated.  -HOB: Maintain at or below 30 degrees, unless contraindicated  -Repositioning in bed: Every 1-2 hours , Left/right positioning; avoid supine, and Raise foot of bed prior to raising head of bed, to reduce patient sliding down (shear)  -Heels: Keep elevated off mattress and Heel lift boots  -Protective Dressing: None  -Positioning Equipment: pillows  -Chair positioning: Chair cushion (#[id number]) , Assist patient to reposition hourly, and Do NOT use a donut for sitting (this increases pressure to smaller area and creates a higher potential for injury)    -Moisture Management: Perineal cleansing /protection: Follow Incontinence Protocol, Avoid brief in bed, Clean and dry skin folds with bathing , and Moisturize dry skin  -Under Devices: Inspect skin under all medical devices during skin inspection , Ensure tubes are stabilized without tension, and Ensure patient is not lying on medical devices or equipment when repositioned     Groin: BID and PRN  After any incontinent episode cleanse with mei cleanse and protect and jovany dry wipes/washcloths.   Ensure area is completely dry by blotting and using circular motions, do not wipe as this can " cause trauma to the skin   Lightly dust with antifungal powder and gently rub in. Remove only soiled paste, then reapply thin layer. If complete removal is needed use baby/mineral oil (located in pharmacy).   *Once  fungal rash resolves ok to use Interdry AG for moisture management.   *Avoid pre moisten wipes.   *Avoid use of brief   *Use single covidien pad and limit number of linens underneath patient. Covidien pad can be used as incontinence pad and lift pad        Orders: Reviewed and Updated     RECOMMEND PRIMARY TEAM ORDER: Antifungal POWDER to groin BID x 5 days then stop  Education provided: importance of repositioning, Moisture management and Off-loading pressure  Discussed plan of care with: Patient and Nurse  WOC nurse follow-up plan: twice weekly as available  Notify WOC if wound(s) deteriorate.  Nursing to notify the Provider(s) and re-consult the WOC Nurse if new skin concern.             Future tests that were ordered for you     Basic metabolic panel               Rehab orders for Skilled Facility (from Discharge Orders):      Referrals     Future Labs/Procedures    Follow-Up with Cardiology [facility]     Comments:     LIA [facility] will call you to coordinate your care as prescribed by   your provider. If you have concerns about scheduling, please call   [phone].      Occupational Therapy Adult Consult     Comments:    Evaluate and treat as clinically indicated.    Reason:  Pneumonia and CHF and COPD exacerbation    Physical Therapy Adult Consult     Comments:    Evaluate and treat as clinically indicated.    Reason: Pneumonia and CHF and COPD exacerbation             Discharge Time:      Greater than 30 minutes.        Image Results From This Hospital Stay (For Non-EPIC Providers):        Results for orders placed or performed during the hospital encounter of 02/09/23   Chest XR,  PA & LAT    Narrative    CHEST TWO VIEWS   2/9/2023 1:09 PM     HISTORY: Shortness of breath.    COMPARISON: Chest  CTA on 4/9/2012.      Impression    IMPRESSION: AP and lateral views of the chest were obtained. Mildly  enlarged cardiac silhouette. Right upper lobe/perihilar predominant  patchy airspace pulmonary opacities, worrisome for infection. No  significant pleural effusion or pneumothorax. Multilevel degenerative  changes of the spine.    GABINO BREWER MD         SYSTEM ID:  [id number]   XR Chest Port 1 View    Narrative    EXAM: XR CHEST PORT 1 VIEW  LOCATION: [facility] [place]  DATE/TIME: 2/11/2023 4:18 AM    INDICATION: fever, cough  COMPARISON: 02/09/2023      Impression    IMPRESSION: Similar appearance of the cardiomediastinal silhouette. Patchy bilateral airspace opacities are again seen and do not appear significantly changed.   MR Lumbar Spine w/o Contrast    Narrative    EXAM: MR LUMBAR SPINE W/O CONTRAST  LOCATION: [facility] [place]  DATE/TIME: 2/13/2023 7:18 PM    INDICATION: Back pain  COMPARISON: MRI lumbar spine 07/06/2022  TECHNIQUE: Routine Lumbar Spine MRI without IV contrast.    FINDINGS:   Nomenclature is based on 5 lumbar type vertebral bodies.     L1 vertebral body superior endplate acute or subacute fracture with mild to moderate compression deformity. No significant retropulsion.    L4 vertebral body acute or subacute burst fracture with mild to moderate compression deformity. No significant retropulsion.    L5 vertebral body superior endplate subacute or chronic mild to moderate compression deformity with retropulsion.    Remaining vertebral body heights within normal limits.  No concerning bone marrow lesions.  Normal distal spinal cord and cauda equina with conus medullaris at L1-L2.  Unremarkable visualized bony pelvis.      Moderate lumbar levoscoliosis. Multilevel degenerative disc disease. Several levels demonstrate degenerative type I Modic changes. Bilateral facet arthropathy. Bilateral SI degenerative joint disease.    L1-L2 mild grade 1  retrolisthesis measuring 2 to 3 mm.  L2-L3 mild grade 1 retrolisthesis measuring 2 mm.  L4-L5 mild grade 1 anterolisthesis measuring 3 to 4 mm.    T12-L1: Slight bulge. Mild thecal sac stenosis. No significant neural foraminal stenosis.      L1-L2: Mild bulge. Small superimposed posterior disc extrusion extending mildly above and below the disc margin. Mild thecal sac stenosis. Moderate bilateral foraminal stenosis.    L2-L3: Bulge. Small superimposed posterior disc extrusion extending mildly above and below the disc margin. Facet arthropathy. Severe thecal sac stenosis measuring 6 mm AP dimension. Moderate left foraminal stenosis. Moderate to severe right foraminal   stenosis.    L3-L4: Bulge. Facet arthropathy. Severe thecal sac stenosis measuring 4 mm AP dimension. Mild to moderate left foraminal stenosis. Severe right foraminal stenosis.    L4-L5: Bulge. Facet arthropathy. Mild to moderate thecal sac stenosis. No significant left foraminal stenosis. Moderate to severe right foraminal stenosis.    L5-S1: Bulge. Left disc bulge demonstrates increased T2 signal suggesting annular fissure. Facet arthropathy. No significant thecal sac stenosis. Mild to moderate left foraminal stenosis. Disc osteophyte spur contacts the exiting left L5 nerve root. No   significant right foraminal stenosis.    EXTRASPINAL FINDINGS:  Presacral edema/hemorrhage. Bilateral retroperitoneal edema/hemorrhage.    Partially visualized renal cysts. No specific follow-up recommended.      Impression     IMPRESSION:  1.  L1 vertebral body superior endplate acute or subacute fracture with mild to moderate compression deformity.    2.  L4 vertebral body acute or subacute burst fracture with mild to moderate compression deformity. This is potentially an unstable fracture complex.    3.  L5 vertebral body superior endplate subacute or chronic mild to moderate compression deformity.     4.  Multilevel spondylosis described above.    5.  L2-L3 and  L3-L4 severe thecal sac stenosis. Cauda equina syndrome will need be excluded clinically.    6.  Additional degenerative changes described above.    7.  Presacral edema/hemorrhage. Bilateral retroperitoneal edema/hemorrhage.    Dr. Mason Mahmood  was notified by Dr Tristen Mendenhall at  11:20 PM 02/13/2023.     CT Chest (PE) Abdomen Pelvis w Contrast    Narrative    CT CHEST PE, ABDOMEN & PELVIS WITH CONTRAST February 15, 2023 2:30  PM    CLINICAL HISTORY: Shortness of breath, hypoxia, tachycardia. Rule out  pulmonary embolus. Retroperitoneal hemorrhage/edema.    TECHNIQUE: CT angiogram chest and routine CT abdomen pelvis with IV  contrast. Arterial phase through the chest and venous phase through  the abdomen and pelvis. 2D and 3D MIP reconstructions were preformed  by the CT technologist. Dose reduction techniques were used.   CONTRAST:  84mL isovue 370    COMPARISON: None.    FINDINGS:  ANGIOGRAM CHEST: Pulmonary arteries are normal caliber and negative  for pulmonary emboli.     LUNGS AND PLEURA: There are extensive mixed ground-glass and airspace  infiltrates in both upper lobes, right greater than left and to a  lesser extent in the right middle and both lower lobes. Small  bilateral pleural effusions. No pneumothorax.    MEDIASTINUM/AXILLAE: Numerous mildly enlarged lymph nodes are  presumably reactive. Moderate coronary artery calcification.    HEPATOBILIARY: Normal.    PANCREAS: Normal.    SPLEEN: Normal.    ADRENAL GLANDS: Normal.    KIDNEYS/BLADDER: Benign right renal cysts.    BOWEL: Sigmoid colonic diverticulosis without signs of diverticulitis.    LYMPH NODES: Normal.    PELVIC ORGANS: Mild prostate gland enlargement. Nonspecific asymmetric  enhancement of the right lateral prostate gland.    OTHER: There is no retroperitoneal hematoma. There is mild diffuse  subcutaneous edema.    MUSCULOSKELETAL: Normal.      Impression    IMPRESSION:  1.  No evidence of pulmonary embolus.  2.  Bilateral right greater left  pulmonary infiltrates more likely  represent pneumonitis or developing adult respiratory distress  syndrome. Component of pulmonary edema difficult to exclude.  3.  No retroperitoneal hematoma.    CHEN SUN MD         SYSTEM ID:  [id number]   XR Chest Port 1 View    Narrative    XR CHEST PORT 1 VIEW 2023 7:55 AM    HISTORY: hypoxia    COMPARISON: 2/15/2023, 23      Impression    IMPRESSION:Slight worsening of right upper lobe infiltrates, and  stable additional multifocal infiltrates. Small bilateral pleural  effusions, better seen on the recent CT. No pneumothorax. Normal heart  size. Implanted pacemaker.    DORY BAINS MD         SYSTEM ID:  [id number]   US Chest Pleural Effusion Imaging    Narrative    Exam: US CHEST/PLEURAL EFFUSION IMAGING, 2023 11:29 AM    Indication: right thoracentesis    Comparison: 2023      Impression    Impression: Small right pleural effusion. No safe window for  thoracentesis. Discussed with Dr. Curran.    DORY BAINS MD         SYSTEM ID:  [id number]   Echocardiogram Complete     Value    LVEF  55-60%    Narrative    [id number]  [facility]  [id number]  [id number]^[email or web address]^BARBARA     [facility]  Echocardiography Laboratory  [address] [place] [address]     Name: JR. KATHY LOPEZ JR.  MRN: [id number]  : 1932  Study Date: 02/10/2023 11:20 AM  Age: 89 yrs  Gender: Male  Patient Location: [facility]  Reason For Study: CHF  Ordering Physician: BARBARA BRANDT  Referring Physician: BARBARA BRANDT  Performed By: Caryl Benton     BSA: 2.0 m2  Height: 70 in  Weight: 187 lb  HR: 75  BP: 114/54 mmHg  ______________________________________________________________________________  Procedure  Complete Echo Adult. Optison (NDC #[id number]) given intravenously.  ______________________________________________________________________________  Interpretation Summary     1. The left ventricle is normal in size. There is normal left ventricular  wall  thickness. Left ventricular systolic function is normal. The visual ejection  fraction is 55-60%. Diastolic function not assessed due to atrial  fibrillation. No regional wall motion abnormalities noted. There is no  thrombus seen in the left ventricle.  2. The right ventricle is mildly dilated. The right ventricular systolic  function is normal.  3. Normal left atrial size. The right atrium is mildly dilated. There is no  color Doppler evidence of an atrial shunt.  4. Mild to moderate tricuspid regurgitation.  5. Mild aortic root dilatation.  6. In comparison to the previous report dated 08/10/2018, the findings are  similar.  ______________________________________________________________________________  Left Ventricle  The left ventricle is normal in size. There is normal left ventricular wall  thickness. Left ventricular systolic function is normal. The visual ejection  fraction is 55-60%. Diastolic function not assessed due to atrial  fibrillation. No regional wall motion abnormalities noted. There is no  thrombus seen in the left ventricle.     Right Ventricle  The right ventricle is mildly dilated. The right ventricular systolic function  is normal.     Atria  Normal left atrial size. The right atrium is mildly dilated. There is no color  Doppler evidence of an atrial shunt.     Mitral Valve  The mitral valve leaflets are mildly thickened. There is mild (1+) mitral  regurgitation.     Tricuspid Valve  There is mild to moderate (1-2+) tricuspid regurgitation. The right  ventricular systolic pressure is approximated at 37.9 mmHg plus the right  atrial pressure.     Aortic Valve  There is mild trileaflet aortic sclerosis. There is mild (1+) aortic  regurgitation. No aortic stenosis is present.     Pulmonic Valve  There is trace pulmonic valvular regurgitation. There is no pulmonic valvular  stenosis.     Vessels  Mild aortic root dilatation. Normal size ascending aorta. The inferior vena  cava is normal.      Pericardium  There is no pericardial effusion.     Rhythm  The rhythm was atrial fibrillation.  ______________________________________________________________________________  MMode/2D Measurements & Calculations  IVSd: 1.2 cm     LVIDd: 3.6 cm  LVIDs: 2.3 cm  LVPWd: 1.0 cm  FS: 34.9 %  LV mass(C)d: 124.1 grams  LV mass(C)dI: 61.2 grams/m2  Ao root diam: 3.9 cm  LA dimension: 4.3 cm  asc Aorta Diam: 3.4 cm  LA/Ao: 1.1  LVOT diam: 2.1 cm  LVOT area: 3.6 cm2  LA Volume (BP): 51.5 ml  LA Volume Index (BP): 25.4 ml/m2  RWT: 0.56     Doppler Measurements & Calculations  MV E max chantal: 102.0 cm/sec  MV A max chantal: 49.3 cm/sec  MV E/A: 2.1  MV dec time: 0.21 sec  LV V1 max P.7 mmHg  LV V1 max: 82.6 cm/sec  LV V1 VTI: 14.3 cm  SV(LVOT): 51.0 ml  SI(LVOT): 25.1 ml/m2  PA acc time: 0.09 sec  TR max chantal: 297.6 cm/sec  TR max P.9 mmHg  E/E' avg: 15.9  Lateral E/e': 14.4  Medial E/e': 17.4     ______________________________________________________________________________  Report approved by: Claudia Noriega 02/10/2023 04:34 PM           *Note: Due to a large number of results and/or encounters for the requested time period, some results have not been displayed. A complete set of results can be found in Results Review.             Most Recent Lab Results In EPIC (For Non-EPIC Providers):    Most Recent 3 CBC's:  Recent Labs   Lab Test 23  0619 23  0716 23  0641   WBC 14.1* 17.0* 19.9*   HGB 12.2* 12.1* 12.1*   MCV 92 93 93    429 477*      Most Recent 3 BMP's:  Recent Labs   Lab Test 23  0758 23  0539 23  0238 23  0800 23  0627 23  0751 23  0619 23  0726 23  0716   NA  --  127*  --   --   --   --  135*  --  136   POTASSIUM  --  4.9  4.8  --   --  4.8  --  4.5  --  4.3   CHLORIDE  --  89*  --   --   --   --  94*  --  96*   CO2  --  28  --   --   --   --  34*  --  36*   BUN  --  25.6*  --   --   --   --  26.2*  --  29.2*   CR  --  0.57*  --    --   --   --  0.63*  --  0.65*   ANIONGAP  --  10  --   --   --   --  7  --  4*   MICHAEL  --  9.0  --   --   --   --  8.8  --  8.8   * 217* 245*   < >  --    < > 184*   < > 210*    < > = values in this interval not displayed.     Most Recent 3 Troponin's:No lab results found.    Invalid input(s): TROP, TROPONINIES  Most Recent 3 INR's:  Recent Labs   Lab Test 05/06/22  1452 04/26/22  1705 04/18/22  1120   INR 2.1* 2.6* 3.6*     Most Recent 2 LFT's:  Recent Labs   Lab Test 02/16/23  0641 02/14/23  0557   AST 23 29   ALT 17 17   ALKPHOS 102 104   BILITOTAL 0.7 0.8     Most Recent Cholesterol Panel:  Recent Labs   Lab Test 09/01/22  1445   CHOL 135   LDL 60   HDL 56   TRIG 93     Most Recent 6 Bacteria Isolates From Any Culture (See EPIC Reports for Culture Details):  Recent Labs   Lab Test 03/05/18  1325 03/03/18  2246 03/03/18  2243   CULT No anaerobes isolated  No growth No growth No growth     Most Recent TSH, T4 and HgbA1c:  Recent Labs   Lab Test 02/14/23  0557 02/13/23  0630   TSH 6.66*  --    T4 1.06  --    A1C  --  8.9*

## 2023-02-26 NOTE — PLAN OF CARE
Goal Outcome Evaluation:  Pt here with decompensated HF and community acquired pneumonia. A&Ox4. Neuros including some forgetfulness. Denies SOB, Denies CP. VS - On 302 via NC for oxygen sats > 90%. Tele Afib CVR. Mod carb diet. Strict I/O FR 1800ml. 120ml on this shift. Up with A2/.lift. T/R q2h, : Ext Cath. Output adequate. GI: No BM this shift. Taking antidiarrheal, given this shift. Skin: Saccral pressure wound care complete, Triad barrier applied. Denies pain. Pt scoring green on the Aggression Stop Light Tool. Plan for TCU at discharge today at 1PM.

## 2023-02-27 NOTE — PROGRESS NOTES
Clinic Care Coordination Contact  Care Coordination Transition Communication         Clinical Data: Patient was hospitalized at [facility] from 2/9/23 to 2/26/23 with diagnosis of Acute on chronic congestive heart failure, unspecified heart failure type.     Transition to Facility:              Facility Name: [facility]              Contact name and phone number/fax: [phone]    Plan: RN/SW Care Coordinator will await notification from facility staff informing RN/SW Care Coordinator of patient's discharge plans/needs. RN/SW Care Coordinator will review chart and outreach to facility staff every 4 weeks and as needed.     Linsey Barr RN Care Coordinator  [facility]  Email: [email or web address]  Phone: [phone]

## 2023-02-27 NOTE — PROGRESS NOTES
[facility] GERIATRICS    PRIMARY CARE PROVIDER AND CLINIC:  Joselito Armas MD, [address] [facility] [address]  Chief Complaint   Patient presents with     Hospital F/U      [place] Medical Record Number:  [id number]  [facility]    Louis Keller Jr.  is a 89 year old  (6/26/1932), admitted to the above facility from  [facility]. Hospital stay 2/9/23 through 2/26/23..   HPI:    PMH of atrial fib, HTN,  HLD, DMII who presented with acute SOB  CAP  Acute respiratory distress  Acute dCHF exacerbation  Initially diuresed 40mg lasix IV BID then transitioned to oral lasix 40mg QD 2/25  Hypoxia worsened despite diuresis, CXR shows RUL worsening infiltrate ,possible mucous plug  Pulmonology consulted, patient completed 13 day coarse of Abx, Steroids, nebs  Atrial fib: resumed PTA amiodarone, metoprolol, eliquis and lisinopril, cardiology signed off 2/13  Acute on chronic low back pain  Hx of compression Fracture  MRI 2/13 with L1 and L4 vertebral body superior endplate acute or subacute fracture with mild to moderate compression deformity  Multilevel spondylosis  Neurosurgery consulted, no surgical intervention, orthotics for comfort  Bilateral retroperitoneal edema/hemorrhage likely from vertebral fractures.   No retroperitoneal bleed, neurosurgery resumed eliquis 2/15, hgb stable  Anemia: baseline hgb 12-13, low vitamin B12, did receive IV venofer X 2  Hyponatremia: Na down to 121, fluid restriction 1800cc/day, at discharge Na 127  Hyperglycemia steroid induced  DMii Hgb A1c of 8.9, continue lantus 15u (PTA 30u QAM)  Sliding scale insulin, oral glipizide, metformin and pioglitazone  TSH elevated 6.66, T4 1.06  F/u as OP  Delirium: likely underlying cognitive impairment, f/u in TCU  On exam today: patient is resting in bed, alert, [place], denies SOB, cough, congestion, fever, chills, CP, palpitations, states he  has some pain in his Bottom, no c/o back pain or pain radiating down legs  OT just finished cognitive scoring, BIMS 13/15 and SBT 10/28.     CODE STATUS/ADVANCE DIRECTIVES DISCUSSION:  Full Code  CPR/Full code   ALLERGIES:   Allergies   Allergen Reactions     No Known Drug Allergies       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  FAMILY HISTORY: family history includes Alzheimer Disease in his maternal grandmother; Arthritis in his maternal grandmother; Cancer in his father and mother; Diabetes in his maternal aunt; Eye Disorder in his mother; Heart Disease in his mother; Hypertension in his mother; No Known Problems in his daughter.  SOCIAL HISTORY:   reports that he has never smoked. He has never used smokeless tobacco. He reports that he does not currently use alcohol. He reports that he does not use drugs.  Patient's living condition: lives with spouse    Post Discharge Medication Reconciliation Status:   MED REC REQUIRED  Post Medication Reconciliation Status: discharge medications reconciled and  changed, per note/orders       Current Outpatient Medications   Medication Sig     Acetaminophen (TYLENOL PO) Take 1,000 mg by mouth 3 times daily as needed     amiodarone (PACERONE) 200 MG tablet Take 1 tablet (200 mg) by mouth daily     apixaban ANTICOAGULANT (ELIQUIS) 2.5 MG tablet Take 1 tablet (2.5 mg) by mouth 2 times daily     brimonidine (ALPHAGAN) 0.2 % ophthalmic solution INSTILL 1 DROP INTO LEFT EYE TWICE A DAY     cholestyramine (QUESTRAN) 4 g packet Take 1 packet (4 g) by mouth 2 times daily (with meals)     cyanocobalamin (CYANOCOBALAMIN) 1000 MCG tablet Take 1 tablet (1,000 mcg) by mouth daily     dorzolamide (TRUSOPT) 2 % ophthalmic solution Place 1 drop Into the left eye 2 times daily     ferrous gluconate (FERGON) 324 (38 Fe) MG tablet TAKE 1 TABLET (324 MG) BY MOUTH DAILY (WITH BREAKFAST)     furosemide (LASIX) 40 MG tablet Take 1 tablet (40 mg) by mouth daily     gabapentin (NEURONTIN) 100 MG capsule Take 2 capsules (200 mg) by mouth 3 times daily     glipiZIDE (GLUCOTROL XL) 5 MG 24 hr tablet Take 1 tablet (5 mg) by mouth 2 times daily     insulin glargine (LANTUS PEN) 100 UNIT/ML pen Inject 15 Units Subcutaneous every morning (before breakfast)     latanoprost (XALATAN) 0.005 % ophthalmic solution Place 1 drop Into the left eye daily     Lidocaine (LIDOCARE) 4 % Patch Place 1 patch onto the skin every 24 hours To prevent lidocaine toxicity, patient should be patch free for 12 hrs daily.     lisinopril (ZESTRIL) 10 MG tablet Take 1 tablet (10 mg) by mouth daily     loperamide (IMODIUM A-D) 2 MG tablet Take 2 mg by mouth daily as needed     metFORMIN (GLUCOPHAGE) 500 MG tablet TAKE 1 TABLET BY MOUTH TWICE A DAY WITH MEALS     Metoprolol Tartrate 37.5 MG TABS Take 37.5 mg by mouth 2 times daily     miconazole (MICATIN) 2 % external powder Apply topically 2 times daily     multivitamin, therapeutic (THERA-VIT) TABS tablet Take 1 tablet by mouth daily     pioglitazone (ACTOS) 45 MG tablet Take 1  "tablet (45 mg) by mouth daily     predniSONE (DELTASONE) 20 MG tablet Take 1 tablet (20 mg) by mouth daily For 3days then reduce to 10mg PO daily for 3days then stop     saccharomyces boulardii (FLORASTOR) 250 MG capsule Take 1 capsule (250 mg) by mouth 2 times daily     senna-docusate (SENOKOT-S/PERICOLACE) 8.6-50 MG tablet Take 2 tablets by mouth nightly as needed for constipation     No current facility-administered medications for this visit.       ROS:  10 point ROS of systems including Constitutional, Eyes, Respiratory, Cardiovascular, Gastroenterology, Genitourinary, Integumentary, Musculoskeletal, Psychiatric were all negative except for pertinent positives noted in my HPI.    Vitals:  /70   Pulse 73   Temp 97.5  F (36.4  C)   Resp 16   Ht 1.778 m (5' 10\")   Wt 69.4 kg (153 lb)   SpO2 90%   BMI 21.95 kg/m    Exam:  GENERAL APPEARANCE:  Alert, in no distress  ENT:  Mouth and posterior oropharynx normal, moist mucous membranes, [place]  EYES:  EOM, conjunctivae, lids, pupils and irises normal, PERRL  RESP:  respiratory effort and palpation of chest normal, lungs clear to auscultation , no respiratory distress, diminished breath sounds bases bilaterally  CV:  Palpation and auscultation of heart done , regular rate and rhythm, no murmur, rub, or gallop, no edema  ABDOMEN:  normal bowel sounds, soft, nontender, no hepatosplenomegaly or other masses  M/S:   speech wnl  SKIN:  Inspection of skin and subcutaneous tissue baseline  NEURO:   speech wnl  PSYCH:  affect and mood normal    Lab/Diagnostic data:  [facility] reviewed by me today.  and   Most Recent 3 CBC's:Recent Labs   Lab Test 02/24/23  0619 02/23/23  0716 02/22/23  0641   WBC 14.1* 17.0* 19.9*   HGB 12.2* 12.1* 12.1*   MCV 92 93 93    429 477*     Most Recent 3 BMP's:Recent Labs   Lab Test 02/26/23  1144 02/26/23  0758 02/26/23  0539 02/25/23  0800 02/25/23  0627 02/24/23  0751 02/24/23  0619 02/23/23  0726 02/23/23  0716   NA  -- "   --  127*  --   --   --  135*  --  136   POTASSIUM  --   --  4.9  4.8  --  4.8  --  4.5  --  4.3   CHLORIDE  --   --  89*  --   --   --  94*  --  96*   CO2  --   --  28  --   --   --  34*  --  36*   BUN  --   --  25.6*  --   --   --  26.2*  --  29.2*   CR  --   --  0.57*  --   --   --  0.63*  --  0.65*   ANIONGAP  --   --  10  --   --   --  7  --  4*   MICHAEL  --   --  9.0  --   --   --  8.8  --  8.8   * 201* 217*   < >  --    < > 184*   < > 210*    < > = values in this interval not displayed.       ASSESSMENT/PLAN:    (J18.9) Community acquired pneumonia of right upper lobe of lung  (primary encounter diagnosis)  (R53.81) Physical deconditioning  Comment: acute/ongoing RUL pneumonia, finished 13 day coarse of abx in hospital  Plan: monitor SaO2 at rest and with activity  Continues on prednisone taper    (I50.33) Acute on chronic heart failure with preserved ejection fraction (HFpEF) (H)  Comment: acute/ongoing  Plan: BMP follow, daily weights, continue lasix 40mg QD    (I48.20) Chronic atrial fibrillation (H)  (Z79.01) Long term current use of anticoagulants with INR goal of 2.0-3.0  Comment: acute/ongoing  Plan: follow BMP, continue PTA amiodarone 200mg QD, metoprolol 37.5mg BID, lisinopril 10mg QD, apixaban 2.5mg BID    (D62) Anemia due to blood loss, acute  Comment: acute/ongoing, did receive IV iron X 2  Plan: Hgb follow,  vitamin B12 1000mcg QD, ferrous gluconate 324mg QD    (S32.010D) Compression fracture of L1 vertebra with routine healing, subsequent encounter  Comment: acute on chronic  Plan: PT and OT, neurontin 200mg TID, change tylenol to 1000mg TID scheduled and qhs prn    (E87.1) Hyponatremia  Comment: acute/ongoing  Plan: continue fluid restriction 1800cc/day    (G93.40) Encephalopathy  Comment: acute/ongoing  BIMS 13/15 and SBT 10/28  Plan: OT for evaluation    (E11.59) DMII  Acute/ongoing  Plan: blood sugar monitoring QID and prn, continue lantus 15u QAM (PTA 30u qam)  Metformin 500mg BID,  glipizide xl 5mg BID, pioglitazone 45mg QD    Orders:  CBC and BMP on Thursday  Tylenol 1000mg TID scheduled and qhs prn      Total time spent with patient visit at the skilled nursing facility was 45 min including patient visit and review of past records. Greater than 50% of total time spent with counseling and coordinating care due to reviewed cardiology, neurosurgery and internal medicine notes in Epic, discussed hospitalization and medications, will monitor labs.     Electronically signed by:  Tonya Lynn Haase, APRN CNP

## 2023-02-27 NOTE — LETTER
[facility]   To:             Please give to facility    From:   Linsey Barr RN  Care Coordinator   [facility]   P: [phone] [email or web address]   Patient Name:  Louis Keller Jr.   YOB: 1932     Admit date: 2/26/23      *Information Needed:  Please contact me when the patient will discharge (or if they will move to long term care)- include the discharge date, disposition, and main diagnosis   - If the patient is discharged with home care services, please provide the name of the agency    Also- Please inform me if a care conference is being held.   Phone, Fax or Email with information                   Thank you

## 2023-02-27 NOTE — LETTER
2/27/2023        RE: Louis Keller Jr.  [address]  [facility] [address]        [facility] GERIATRICS    PRIMARY CARE PROVIDER AND CLINIC:  Joselito Armas MD, [facility] [address] / [facility] [address]  Chief Complaint   Patient presents with     Hospital F/U      [facility] Record Number:  [id number]  [facility] of [facility] where encounter took [facility] [25]    Louis Keller Jr.  is a 89 year old  (6/26/1932), admitted to the above facility from  [facility]. Hospital stay 2/9/23 through 2/26/23..   HPI:    PMH of atrial fib, HTN,  HLD, DMII who presented with acute SOB  CAP  Acute respiratory distress  Acute dCHF exacerbation  Initially diuresed 40mg lasix IV BID then transitioned to oral lasix 40mg QD 2/25  Hypoxia worsened despite diuresis, CXR shows RUL worsening infiltrate ,possible mucous plug  Pulmonology consulted, patient completed 13 day coarse of Abx, Steroids, nebs  Atrial fib: resumed PTA amiodarone, metoprolol, eliquis and lisinopril, cardiology signed off 2/13  Acute on chronic low back pain  Hx of compression Fracture  MRI 2/13 with L1 and L4 vertebral body superior endplate acute or subacute fracture with mild to moderate compression deformity  Multilevel spondylosis  Neurosurgery consulted, no surgical intervention, orthotics for comfort  Bilateral retroperitoneal edema/hemorrhage likely from vertebral fractures.   No retroperitoneal bleed, neurosurgery resumed eliquis 2/15, hgb stable  Anemia: baseline hgb 12-13, low vitamin B12, did receive IV venofer X 2  Hyponatremia: Na down to 121, fluid restriction 1800cc/day, at discharge Na 127  Hyperglycemia steroid induced  DMii Hgb A1c of 8.9, continue lantus 15u (PTA 30u QAM)  Sliding scale insulin, oral glipizide, metformin and pioglitazone  TSH elevated 6.66, T4 1.06  F/u as OP  Delirium: likely underlying cognitive impairment, f/u in TCU  On exam today: patient  is resting in bed, alert, [place] [employer] [place], denies SOB, cough, congestion, fever, chills, CP, palpitations, states he has some pain in his Bottom, no c/o back pain or pain radiating down legs  OT just finished cognitive scoring, BIMS 13/15 and SBT 10/28.     CODE STATUS/ADVANCE DIRECTIVES DISCUSSION:  Full Code  CPR/Full code   ALLERGIES:   Allergies   Allergen Reactions     No Known Drug Allergies       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  FAMILY HISTORY: family history includes Alzheimer Disease in his maternal grandmother; Arthritis in his maternal grandmother; Cancer in his father and mother; Diabetes in his maternal aunt; Eye Disorder in his mother; Heart Disease in his mother; Hypertension in his mother; No Known Problems in his daughter.  SOCIAL HISTORY:   reports that he has never smoked. He has never used smokeless tobacco. He reports that he does not currently use alcohol. He reports that he does not use drugs.  Patient's living condition: lives with spouse    Post Discharge Medication Reconciliation  Status:   MED REC REQUIRED  Post Medication Reconciliation Status: discharge medications reconciled and changed, per note/orders       Current Outpatient Medications   Medication Sig     Acetaminophen (TYLENOL PO) Take 1,000 mg by mouth 3 times daily as needed     amiodarone (PACERONE) 200 MG tablet Take 1 tablet (200 mg) by mouth daily     apixaban ANTICOAGULANT (ELIQUIS) 2.5 MG tablet Take 1 tablet (2.5 mg) by mouth 2 times daily     brimonidine (ALPHAGAN) 0.2 % ophthalmic solution INSTILL 1 DROP INTO LEFT EYE TWICE A DAY     cholestyramine (QUESTRAN) 4 g packet Take 1 packet (4 g) by mouth 2 times daily (with meals)     cyanocobalamin (CYANOCOBALAMIN) 1000 MCG tablet Take 1 tablet (1,000 mcg) by mouth daily     dorzolamide (TRUSOPT) 2 % ophthalmic solution Place 1 drop Into the left eye 2 times daily     ferrous gluconate (FERGON) 324 (38 Fe) MG tablet TAKE 1 TABLET (324 MG) BY MOUTH DAILY (WITH BREAKFAST)     furosemide (LASIX) 40 MG tablet Take 1 tablet (40 mg) by mouth daily     gabapentin (NEURONTIN) 100 MG capsule Take 2 capsules (200 mg) by mouth 3 times daily     glipiZIDE (GLUCOTROL XL) 5 MG 24 hr tablet Take 1 tablet (5 mg) by mouth 2 times daily     insulin glargine (LANTUS PEN) 100 UNIT/ML pen Inject 15 Units Subcutaneous every morning (before breakfast)     latanoprost (XALATAN) 0.005 % ophthalmic solution Place 1 drop Into the left eye daily     Lidocaine (LIDOCARE) 4 % Patch Place 1 patch onto the skin every 24 hours To prevent lidocaine toxicity, patient should be patch free for 12 hrs daily.     lisinopril (ZESTRIL) 10 MG tablet Take 1 tablet (10 mg) by mouth daily     loperamide (IMODIUM A-D) 2 MG tablet Take 2 mg by mouth daily as needed     metFORMIN (GLUCOPHAGE) 500 MG tablet TAKE 1 TABLET BY MOUTH TWICE A DAY WITH MEALS     Metoprolol Tartrate 37.5 MG TABS Take 37.5 mg by mouth 2 times daily     miconazole (MICATIN) 2 % external powder Apply topically 2 times daily     multivitamin,  "therapeutic (THERA-VIT) TABS tablet Take 1 tablet by mouth daily     pioglitazone (ACTOS) 45 MG tablet Take 1 tablet (45 mg) by mouth daily     predniSONE (DELTASONE) 20 MG tablet Take 1 tablet (20 mg) by mouth daily For 3days then reduce to 10mg PO daily for 3days then stop     saccharomyces boulardii (FLORASTOR) 250 MG capsule Take 1 capsule (250 mg) by mouth 2 times daily     senna-docusate (SENOKOT-S/PERICOLACE) 8.6-50 MG tablet Take 2 tablets by mouth nightly as needed for constipation     No current facility-administered medications for this visit.       ROS:  10 point ROS of systems including Constitutional, Eyes, Respiratory, Cardiovascular, Gastroenterology, Genitourinary, Integumentary, Musculoskeletal, Psychiatric were all negative except for pertinent positives noted in my HPI.    Vitals:  /70   Pulse 73   Temp 97.5  F (36.4  C)   Resp 16   Ht 1.778 m (5' 10\")   Wt 69.4 kg (153 lb)   SpO2 90%   BMI 21.95 kg/m    Exam:  GENERAL APPEARANCE:  Alert, in no distress  ENT:  Mouth and posterior oropharynx normal, moist mucous membranes, [place]  EYES:  EOM, conjunctivae, lids, pupils and irises normal, PERRL  RESP:  respiratory effort and palpation of chest normal, lungs clear to auscultation , no respiratory distress, diminished breath sounds bases bilaterally  CV:  Palpation and auscultation of heart done , regular rate and rhythm, no murmur, rub, or gallop, no edema  ABDOMEN:  normal bowel sounds, soft, nontender, no hepatosplenomegaly or other masses  M/S:   speech wnl  SKIN:  Inspection of skin and subcutaneous tissue baseline  NEURO:   speech wnl  PSYCH:  affect and mood normal    Lab/Diagnostic data:  Recent labs in [facility] reviewed by me today.  and   Most Recent 3 CBC's:Recent Labs   Lab Test 02/24/23  0619 02/23/23  0716 02/22/23  0641   WBC 14.1* 17.0* 19.9*   HGB 12.2* 12.1* 12.1*   MCV 92 93 93    429 477*     Most Recent 3 BMP's:Recent Labs   Lab Test 02/26/23  1144 02/26/23  0758 " 02/26/23  0539 02/25/23  0800 02/25/23  0627 02/24/23  0751 02/24/23  0619 02/23/23  0726 02/23/23  0716   NA  --   --  127*  --   --   --  135*  --  136   POTASSIUM  --   --  4.9  4.8  --  4.8  --  4.5  --  4.3   CHLORIDE  --   --  89*  --   --   --  94*  --  96*   CO2  --   --  28  --   --   --  34*  --  36*   BUN  --   --  25.6*  --   --   --  26.2*  --  29.2*   CR  --   --  0.57*  --   --   --  0.63*  --  0.65*   ANIONGAP  --   --  10  --   --   --  7  --  4*   MICHAEL  --   --  9.0  --   --   --  8.8  --  8.8   * 201* 217*   < >  --    < > 184*   < > 210*    < > = values in this interval not displayed.       ASSESSMENT/PLAN:    (J18.9) Community acquired pneumonia of right upper lobe of lung  (primary encounter diagnosis)  (R53.81) Physical deconditioning  Comment: acute/ongoing RUL pneumonia, finished 13 day coarse of abx in hospital  Plan: monitor SaO2 at rest and with activity  Continues on prednisone taper    (I50.33) Acute on chronic heart failure with preserved ejection fraction (HFpEF) (H)  Comment: acute/ongoing  Plan: BMP follow, daily weights, continue lasix 40mg QD    (I48.20) Chronic atrial fibrillation (H)  (Z79.01) Long term current use of anticoagulants with INR goal of 2.0-3.0  Comment: acute/ongoing  Plan: follow BMP, continue PTA amiodarone 200mg QD, metoprolol 37.5mg BID, lisinopril 10mg QD, apixaban 2.5mg BID    (D62) Anemia due to blood loss, acute  Comment: acute/ongoing, did receive IV iron X 2  Plan: Hgb follow,  vitamin B12 1000mcg QD, ferrous gluconate 324mg QD    (S32.010D) Compression fracture of L1 vertebra with routine healing, subsequent encounter  Comment: acute on chronic  Plan: PT and OT, neurontin 200mg TID, change tylenol to 1000mg TID scheduled and qhs prn    (E87.1) Hyponatremia  Comment: acute/ongoing  Plan: continue fluid restriction 1800cc/day    (G93.40) Encephalopathy  Comment: acute/ongoing  BIMS 13/15 and SBT 10/28  Plan: OT for evaluation    (E11.59)  DMII  Acute/ongoing  Plan: blood sugar monitoring QID and prn, continue lantus 15u QAM (PTA 30u qam)  Metformin 500mg BID, glipizide xl 5mg BID, pioglitazone 45mg QD    Orders:  CBC and BMP on Thursday  Tylenol 1000mg TID scheduled and qhs prn      Total time spent with patient visit at the [facility] nursing facility was 45 min including patient visit and review of past records. Greater than 50% of total time spent with counseling and coordinating care due to reviewed cardiology, neurosurgery and internal medicine notes in Epic, discussed hospitalization and medications, will monitor labs.     Electronically signed by:  Tonya Lynn Haase, APRN CNP                       Sincerely,        Tonya Lynn Haase, APRN CNP

## 2023-02-27 NOTE — PLAN OF CARE
"Occupational Therapy Discharge Summary    Reason for therapy discharge:    Discharged to transitional care facility.    Progress towards therapy goal(s). See goals on Care Plan in [facility] electronic health record for goal details.  Goals partially met.  Barriers to achieving goals:   discharge from facility.    Therapy recommendation(s):    Continued therapy is recommended.  Rationale/Recommendations:  \"Pt functioning well below repored baseline level, demo impairments in activity tolerance, mobility, strength, ADLs, impacting overall indepeendence. Pt would benefit from skilled IP OT to address impairments noted. Rec TCU for increased strength and I/ADL independence prior to return home.\".   Writing therapist did not treat pt, note written based on previous treating therapist notes and recommendations. Please see chart and flow sheet for additional details.        "

## 2023-02-27 NOTE — PLAN OF CARE
Physical Therapy Discharge Summary    Reason for therapy discharge:    Discharged to transitional care facility.    Progress towards therapy goal(s). See goals on Care Plan in [facility] electronic health record for goal details.  Goals partially met.  Barriers to achieving goals:   discharge from facility.    Therapy recommendation(s):    Continued therapy is recommended.  Rationale/Recommendations:  To further increase independence with mobility.

## 2023-02-28 NOTE — TELEPHONE ENCOUNTER
Patient was admitted to [facility] on 2/9/2023 with decompensated HFpEF and community acquired pneumonia.     PMH: atrial fibrillation-Amiodarone and Eliquis, DM2, HLD.    Echo showed EF of 55-60%. Diastolic function not assessed due to atrial fibrillation.    Treated with nebs, steroids, IV ABX's and IV Lasix.    Pt was started on Prednisone and PTA Lasix dosage was increased at time of discharge.    Pt is scheduled for an OV on 4/7/23 at 1525 with KONSTANTIN Michael Cotres at our [place] Office.    Pt was discharged to [facility].    Writer called [facility] and follow up OV as above verified with [facility]. Reminded to send daily wt and VS recordings with to this OV. Verbalized understanding with no further questions. JESSI Paniagua RN.

## 2023-03-02 NOTE — PROGRESS NOTES
"[facility] GERIATRICS    Chief Complaint   Patient presents with     RECHECK     HPI:  Louis Keller Jr. is a 89 year old  (6/26/1932), who is being seen today for an episodic care visit at: [facility]) [25]. Today's concern is:   Pneumonia/hypoxia: patient continues on O2, attempting to wean, has occasional cough and QUESADA, SaO2 is 90-92% on   In therapy patient is requiring total assist with cares, using a EZ lift for transfers, very weak and deconditioned  CHF/HTN/atrial fib: /57, 104/52, 123/70 with HR 70's, weight on admission 168lbs stable   Anemia: see labs  Hyponatremia: see labs  DMII: blood sugar range 126-411 range  Encephalopathy/cognitive impairment: BIMS 13/15, SBT 10/28    Allergies, and PMH/PSH reviewed in [facility] today.  REVIEW OF SYSTEMS:  10 point ROS of systems including Constitutional, Eyes, Respiratory, Cardiovascular, Gastroenterology, Genitourinary, Integumentary, Musculoskeletal, Psychiatric were all negative except for pertinent positives noted in my HPI.    Objective:   /52   Pulse 75   Temp 97.3  F (36.3  C)   Resp 18   Ht 1.778 m (5' 10\")   Wt 76.6 kg (168 lb 12.8 oz)   SpO2 92%   BMI 24.22 kg/m    GENERAL APPEARANCE:  Alert, in no distress  ENT:  Mouth and posterior oropharynx normal, moist mucous membranes, [place]  EYES:  EOM, conjunctivae, lids, pupils and irises normal, PERRL  RESP:  respiratory effort and palpation of chest normal, lungs clear to auscultation , no respiratory distress, diminished breath sounds bases bilaterally  CV:  Palpation and auscultation of heart done , regular rate and rhythm, no murmur, rub, or gallop, no edema  ABDOMEN:  normal bowel sounds, soft, nontender, no hepatosplenomegaly or other masses  M/S:   patient resting in bed  SKIN:  Inspection of skin and subcutaneous tissue baseline, wound on coccyx and right heel, not visualized  NEURO:   speech wnl  PSYCH:  affect and mood normal    Recent labs in [facility] reviewed by me " today.  and   Most Recent 3 CBC's:Recent Labs   Lab Test 02/24/23  0619 02/23/23  0716 02/22/23  0641   WBC 14.1* 17.0* 19.9*   HGB 12.2* 12.1* 12.1*   MCV 92 93 93    429 477*     Most Recent 3 BMP's:Recent Labs   Lab Test 02/26/23  1144 02/26/23  0758 02/26/23  0539 02/25/23  0800 02/25/23  0627 02/24/23  0751 02/24/23  0619 02/23/23  0726 02/23/23  0716   NA  --   --  127*  --   --   --  135*  --  136   POTASSIUM  --   --  4.9  4.8  --  4.8  --  4.5  --  4.3   CHLORIDE  --   --  89*  --   --   --  94*  --  96*   CO2  --   --  28  --   --   --  34*  --  36*   BUN  --   --  25.6*  --   --   --  26.2*  --  29.2*   CR  --   --  0.57*  --   --   --  0.63*  --  0.65*   ANIONGAP  --   --  10  --   --   --  7  --  4*   MICHAEL  --   --  9.0  --   --   --  8.8  --  8.8   * 201* 217*   < >  --    < > 184*   < > 210*    < > = values in this interval not displayed.       Assessment/Plan:  (J18.9) Community acquired pneumonia of right upper lobe of lung  (primary encounter diagnosis)  (R53.81) Physical deconditioning  Comment: acute/ongoing RUL pneumonia, finished 13 day coarse of abx in hospital  No change today  Plan: PT and OT,  monitor SaO2 at rest and with activity, continues on O2 at 1-2 L /nc wean as able  Continues on prednisone taper     (I50.33) Acute on chronic heart failure with preserved ejection fraction (HFpEF) (H)  Comment: acute/ongoing, no change  Plan: BMP follow, daily weights, continue lasix 40mg QD     (I48.20) Chronic atrial fibrillation (H)  (Z79.01) Long term current use of anticoagulants with INR goal of 2.0-3.0  Comment: acute/ongoing, no change  Plan: follow BMP, continue PTA amiodarone 200mg QD, metoprolol 37.5mg BID, lisinopril 10mg QD, apixaban 2.5mg BID     (D62) Anemia due to blood loss, acute  Comment: acute/ongoing, did receive IV iron X 2  No change  Plan: Hgb follow,  vitamin B12 1000mcg QD, ferrous gluconate 324mg QD     (S32.010D) Compression fracture of L1 vertebra with  routine healing, subsequent encounter  Comment: acute on chronic, no change  Plan: PT and OT, neurontin 200mg TID, change tylenol to 1000mg TID scheduled and qhs prn     (E87.1) Hyponatremia  Comment: acute/ongoing, no change  Plan: continue fluid restriction 1800cc/day     (G93.40) Encephalopathy  Comment: acute/ongoing, no change  BIMS 13/15 and SBT 10/28  Plan: OT ongoing     (E11.59) DMII  Acute/ongoing  Plan: blood sugar monitoring QID and prn, continue lantus 15u QAM (PTA 30u qam)  Increase Metformin 1000mg BID, glipizide xl 5mg BID  DC pioglitazone not recommended in patient with CHF       MED REC REQUIRED  Post Medication Reconciliation Status: medication reconcilation previously completed during another office visit      Orders:  DC pioglitazone  Increase metformin to 1000mg BID  BMP on Monday      Electronically signed by: Tonya Lynn Haase, APRN CNP

## 2023-03-02 NOTE — LETTER
"    3/2/2023        RE: Louis Keller Jr.  [address]  [facility] [address]        [facility]    Chief Complaint   Patient presents with     RECHECK     HPI:  Louis Keller Jr. is a 89 year old  (6/26/1932), who is being seen today for an episodic care visit at: [facility]) [25]. Today's concern is:   Pneumonia/hypoxia: patient continues on O2, attempting to wean, has occasional cough and QUESADA, SaO2 is 90-92% on   In therapy patient is requiring total assist with cares, using a EZ lift for transfers, very weak and deconditioned  CHF/HTN/atrial fib: /57, 104/52, 123/70 with HR 70's, weight on admission 168lbs stable   Anemia: see labs  Hyponatremia: see labs  DMII: blood sugar range 126-411 range  Encephalopathy/cognitive impairment: BIMS 13/15, SBT 10/28    Allergies, and PMH/PSH reviewed in [facility] today.  REVIEW OF SYSTEMS:  10 point ROS of systems including Constitutional, Eyes, Respiratory, Cardiovascular, Gastroenterology, Genitourinary, Integumentary, Musculoskeletal, Psychiatric were all negative except for pertinent positives noted in my HPI.    Objective:   /52   Pulse 75   Temp 97.3  F (36.3  C)   Resp 18   Ht 1.778 m (5' 10\")   Wt 76.6 kg (168 lb 12.8 oz)   SpO2 92%   BMI 24.22 kg/m    GENERAL APPEARANCE:  Alert, in no distress  ENT:  Mouth and posterior oropharynx normal, moist mucous membranes, [place]  EYES:  EOM, conjunctivae, lids, pupils and irises normal, PERRL  RESP:  respiratory effort and palpation of chest normal, lungs clear to auscultation , no respiratory distress, diminished breath sounds bases bilaterally  CV:  Palpation and auscultation of heart done , regular rate and rhythm, no murmur, rub, or gallop, no edema  ABDOMEN:  normal bowel sounds, soft, nontender, no hepatosplenomegaly or other masses  M/S:   patient resting in bed  SKIN:  Inspection of skin and subcutaneous tissue baseline, wound on coccyx and right heel, not " visualized  NEURO:   speech wnl  PSYCH:  affect and mood normal    Recent labs in [facility] reviewed by me today.  and   Most Recent 3 CBC's:Recent Labs   Lab Test 02/24/23  0619 02/23/23  0716 02/22/23  0641   WBC 14.1* 17.0* 19.9*   HGB 12.2* 12.1* 12.1*   MCV 92 93 93    429 477*     Most Recent 3 BMP's:Recent Labs   Lab Test 02/26/23  1144 02/26/23  0758 02/26/23  0539 02/25/23  0800 02/25/23  0627 02/24/23  0751 02/24/23  0619 02/23/23  0726 02/23/23  0716   NA  --   --  127*  --   --   --  135*  --  136   POTASSIUM  --   --  4.9  4.8  --  4.8  --  4.5  --  4.3   CHLORIDE  --   --  89*  --   --   --  94*  --  96*   CO2  --   --  28  --   --   --  34*  --  36*   BUN  --   --  25.6*  --   --   --  26.2*  --  29.2*   CR  --   --  0.57*  --   --   --  0.63*  --  0.65*   ANIONGAP  --   --  10  --   --   --  7  --  4*   MICHAEL  --   --  9.0  --   --   --  8.8  --  8.8   * 201* 217*   < >  --    < > 184*   < > 210*    < > = values in this interval not displayed.       Assessment/Plan:  (J18.9) Community acquired pneumonia of right upper lobe of lung  (primary encounter diagnosis)  (R53.81) Physical deconditioning  Comment: acute/ongoing RUL pneumonia, finished 13 day coarse of abx in hospital  No change today  Plan: PT and OT,  monitor SaO2 at rest and with activity, continues on O2 at 1-2 L /nc wean as able  Continues on prednisone taper     (I50.33) Acute on chronic heart failure with preserved ejection fraction (HFpEF) (H)  Comment: acute/ongoing, no change  Plan: BMP follow, daily weights, continue lasix 40mg QD     (I48.20) Chronic atrial fibrillation (H)  (Z79.01) Long term current use of anticoagulants with INR goal of 2.0-3.0  Comment: acute/ongoing, no change  Plan: follow BMP, continue PTA amiodarone 200mg QD, metoprolol 37.5mg BID, lisinopril 10mg QD, apixaban 2.5mg BID     (D62) Anemia due to blood loss, acute  Comment: acute/ongoing, did receive IV iron X 2  No change  Plan: Hgb follow,  vitamin  B12 1000mcg QD, ferrous gluconate 324mg QD     (S32.010D) Compression fracture of L1 vertebra with routine healing, subsequent encounter  Comment: acute on chronic, no change  Plan: PT and OT, neurontin 200mg TID, change tylenol to 1000mg TID scheduled and qhs prn     (E87.1) Hyponatremia  Comment: acute/ongoing, no change  Plan: continue fluid restriction 1800cc/day     (G93.40) Encephalopathy  Comment: acute/ongoing, no change  BIMS 13/15 and SBT 10/28  Plan: OT ongoing     (E11.59) DMII  Acute/ongoing  Plan: blood sugar monitoring QID and prn, continue lantus 15u QAM (PTA 30u qam)  Increase Metformin 1000mg BID, glipizide xl 5mg BID  DC pioglitazone not recommended in patient with CHF       MED REC REQUIRED  Post Medication Reconciliation Status: medication reconcilation previously completed during another office visit      Orders:  DC pioglitazone  Increase metformin to 1000mg BID  BMP on Monday      Electronically signed by: Tonya Lynn Haase, APRN CNP             Sincerely,        Tonya Lynn Haase, APRN CNP

## 2023-03-06 NOTE — PROGRESS NOTES
[facility] GERIATRICS    PRIMARY CARE PROVIDER AND CLINIC:  Joselito Armas MD, [address] / [facility] [address]  Chief Complaint   Patient presents with     Hospital F/U      [facility] Medical Record Number:  [id number]  Place of Service where encounter took place:  [facility] ([facility])     Louis Keller Jr.  is a 89 year old  (6/26/1932), admitted to the above facility from  [facility]. Hospital stay 2/9/23 through 2/26/23..     Hospital course was reviewed by me, is as per the hospital discharge summary and nurse practitioner note      Patient has past medical history of atrial fibrillation, hypertension, diabetes mellitus type 2 who was hospitalized for the evaluation of acute hypoxic respiratory failure secondary to diastolic heart failure exacerbation.  Patient was treated with intravenous furosemide which was transitioned to oral furosemide.  He however exhibited worsened hypoxia in spite of diuresis.  Chest x-ray revealed a right upper lobe infiltrate with possible mucous plug.  Patient was seen by pulmonary, was treated with a 13-day course of antibiotics as well as steroids and nebs.  Atrial fibrillation was managed with prior to admission amiodarone, metoprolol and apixaban.  Echocardiogram on 2/9/2023 revealed normal LV size, mildly dilated right ventricle with normal right ventricular systolic function, mild to moderate tricuspid regurgitation, unchanged from echo in August 2018      Patient did complain of acute on chronic low back pain, was found to have an acute versus subacute fracture of the L1 and L4 vertebral body endplates with mild to moderate compression deformity.  No surgical intervention was recommended.  He was also found to have bilateral retroperitoneal edema versus hemorrhage, likely secondary to vertebral fracture.    Hemoglobin on admission was 9.3, down from baseline of almost 13.  She was given intravenous iron and started on oral  B12 replacement as B12 level was less than 150.    Course was also complicated by hyponatremia with sodium down to 121.  This improved with fluid restriction.  Sodium on discharge 127.  He also developed delirium felt secondary to acute illness and likely underlying cognitive impairment.    Hemoglobin A1c was found to be 8.9.  Patient was maintained on Lantus at a reduced dose, glipizide, metformin and pioglitazone.        Patient states he is feeling a little stronger.  He notes shortness of breath with minimal activity.  He is able to stand briefly in therapy only.  He notes an occasional cough which is nonproductive.  He denies dizziness, back pain, nausea, vomiting.  Appetite has been fair.    Vital signs have been stable  Blood glucoses have been 100s to 200s.  Hemoglobin on 3/6/2023 was 11.5, BMP stable with sodium 132      CODE STATUS/ADVANCE DIRECTIVES DISCUSSION:  Full Code  CPR/Full code   ALLERGIES:   Allergies   Allergen Reactions     No Known Drug Allergies       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  FAMILY HISTORY: family history includes Alzheimer Disease in his maternal grandmother; Arthritis in his maternal grandmother; Cancer in his father and mother; Diabetes in his maternal aunt; Eye Disorder in his mother; Heart Disease in his mother; Hypertension in his mother; No Known Problems in his daughter.  SOCIAL HISTORY:   reports that he has never smoked. He has never used smokeless tobacco. He reports that he does not currently use alcohol. He reports that he does not use drugs.  Patient's living condition: lives with spouse    Current medications were reviewed by me today:    Current Outpatient Medications   Medication Sig     Acetaminophen (TYLENOL PO) Take 1,000 mg by mouth 3 times daily     amiodarone (PACERONE) 200 MG tablet Take 1 tablet (200 mg) by mouth daily     apixaban ANTICOAGULANT (ELIQUIS) 2.5 MG tablet Take 1 tablet (2.5 mg) by mouth 2 times daily     brimonidine (ALPHAGAN) 0.2 % ophthalmic solution INSTILL 1 DROP INTO LEFT EYE TWICE A DAY     cholestyramine (QUESTRAN) 4 g packet Take 1 packet (4 g) by mouth 2 times daily (with meals)     cyanocobalamin (CYANOCOBALAMIN) 1000 MCG tablet Take 1 tablet (1,000 mcg) by mouth daily     dorzolamide (TRUSOPT) 2 % ophthalmic solution Place 1 drop Into the left eye 2 times daily     ferrous gluconate (FERGON) 324 (38 Fe) MG tablet TAKE 1 TABLET (324 MG) BY MOUTH DAILY (WITH BREAKFAST)     furosemide (LASIX) 40 MG tablet Take 1 tablet (40 mg) by mouth daily     gabapentin (NEURONTIN) 100 MG capsule Take 2 capsules (200 mg) by mouth 3 times daily     glipiZIDE (GLUCOTROL XL) 5 MG 24 hr tablet Take 1 tablet (5 mg) by mouth 2 times daily     insulin glargine (LANTUS PEN) 100 UNIT/ML pen Inject 15 Units Subcutaneous every morning (before breakfast)     latanoprost (XALATAN) 0.005 % ophthalmic solution Place 1 drop Into the left eye daily     Lidocaine (LIDOCARE) 4 % Patch Place 1 patch onto the skin every 24 hours To prevent lidocaine toxicity,  "patient should be patch free for 12 hrs daily.     lisinopril (ZESTRIL) 10 MG tablet Take 1 tablet (10 mg) by mouth daily     loperamide (IMODIUM A-D) 2 MG tablet Take 2 mg by mouth daily as needed     metFORMIN (GLUCOPHAGE) 500 MG tablet Take 2 tablets (1,000 mg) by mouth 2 times daily (with meals)     Metoprolol Tartrate 37.5 MG TABS Take 37.5 mg by mouth 2 times daily     miconazole (MICATIN) 2 % external powder Apply topically 2 times daily     multivitamin, therapeutic (THERA-VIT) TABS tablet Take 1 tablet by mouth daily     predniSONE (DELTASONE) 20 MG tablet Take 1 tablet (20 mg) by mouth daily For 3days then reduce to 10mg PO daily for 3days then stop     saccharomyces boulardii (FLORASTOR) 250 MG capsule Take 1 capsule (250 mg) by mouth 2 times daily     senna-docusate (SENOKOT-S/PERICOLACE) 8.6-50 MG tablet Take 2 tablets by mouth nightly as needed for constipation     No current facility-administered medications for this visit.       ROS:  10 point ROS of systems including Constitutional, Eyes, Respiratory, Cardiovascular, Gastroenterology, Genitourinary, Integumentary, Musculoskeletal, Psychiatric were all negative except for pertinent positives noted in my HPI.    Vitals:  /62   Pulse 77   Temp 98.2  F (36.8  C)   Resp 16   Ht 1.778 m (5' 10\")   Wt 72.8 kg (160 lb 8 oz)   SpO2 91%   BMI 23.03 kg/m    Exam:  Pleasant, thin appearing male sitting in his room preparing to work with therapy.  He is quite pleasant, in no distress.  Wearing oxygen per nasal cannula  HEENT: Oral mucosa moist  Respiratory rate 14, slightly dyspneic with conversation.  Bibasilar crackles.  CV irregular, heart rate 80s  Abdomen soft, nontender, nondistended  No lower extremity edema  Neuro: Alert, pleasant, oriented x2.  Cranial nerves intact.  No focal weakness.    Lab/Diagnostic data:    Most Recent 3 CBC's:Recent Labs   Lab Test 02/24/23  0619 02/23/23  0716 02/22/23  0641   WBC 14.1* 17.0* 19.9*   HGB 12.2* 12.1* " 12.1*   MCV 92 93 93    429 477*     Most Recent 3 BMP's:Recent Labs   Lab Test 02/26/23  1144 02/26/23  0758 02/26/23  0539 02/25/23  0800 02/25/23  0627 02/24/23  0751 02/24/23  0619 02/23/23  0726 02/23/23  0716   NA  --   --  127*  --   --   --  135*  --  136   POTASSIUM  --   --  4.9  4.8  --  4.8  --  4.5  --  4.3   CHLORIDE  --   --  89*  --   --   --  94*  --  96*   CO2  --   --  28  --   --   --  34*  --  36*   BUN  --   --  25.6*  --   --   --  26.2*  --  29.2*   CR  --   --  0.57*  --   --   --  0.63*  --  0.65*   ANIONGAP  --   --  10  --   --   --  7  --  4*   MICHAEL  --   --  9.0  --   --   --  8.8  --  8.8   * 201* 217*   < >  --    < > 184*   < > 210*    < > = values in this interval not displayed.     Most Recent 2 LFT's:Recent Labs   Lab Test 02/16/23  0641 02/14/23  0557   AST 23 29   ALT 17 17   ALKPHOS 102 104   BILITOTAL 0.7 0.8     Most Recent TSH and T4:Recent Labs   Lab Test 02/14/23  0557   TSH 6.66*   T4 1.06     Most Recent Hemoglobin A1c:Recent Labs   Lab Test 02/13/23  0630   A1C 8.9*     Most Recent Anemia Panel:Recent Labs   Lab Test 02/24/23  0619 02/10/23  0412 02/09/23  1755 02/09/23  1402   WBC 14.1*   < >  --   --    HGB 12.2*   < >  --   --    HCT 38.4*   < >  --   --    MCV 92   < >  --   --       < >  --   --    IRON  --   --   --  26*   IRONSAT  --   --   --  7*   FEB  --   --   --  349   SHELLY  --   --   --  293   B12  --   --   --  <150*   FOLIC  --   --  15.4  --     < > = values in this interval not displayed.       ASSESSMENT/PLAN:    (J18.9) Community acquired pneumonia of right upper lobe of lung  (primary encounter diagnosis)  (R53.81) Physical deconditioning  Comment: acute/ongoing RUL pneumonia, finished 13 day coarse of abx in hospital  Respiratory failure gradually improving  Plan: PT and OT,  monitor SaO2 at rest and with activity, continues on O2 at 1-2 L /nc wean as able  Complete  prednisone taper     (I50.33) Acute on chronic heart failure  with preserved ejection fraction (HFpEF) (H)  Comment: appears stable  Plan: monitor BMP daily weights, continue lasix 40mg QD     (I48.20) Chronic atrial fibrillation (H)  (Z79.01) Long term current use of anticoagulants with INR goal of 2.0-3.0  Comment: HR controlled  Plan:  continue PTA amiodarone 200mg QD, metoprolol 37.5mg BID, lisinopril 10mg QD, apixaban 2.5mg BID  Monitor BMP     Anemia secondary to blood loss, and possible underlying Fe deficiency and B 12 deficiency  Comment: acute/ongoing, did receive IV iron X 2  Hgb stable  Plan: monitor Hgb, continue vitamin B12 1000mcg QD, ferrous gluconate 324mg QD     (S32.010D) Compression fracture of L1 vertebra with routine healing, subsequent encounter  Comment: acute on chronic, no change  Plan: PT and OT, neurontin 200mg TID, change tylenol to 1000mg TID scheduled and qhs prn    (G93.40) Encephalopathy  Comment: likely secondary to acute illness, possible underling baseline cognitive impairment  BIMS 13/15 and SBT 10/28  Plan: OT ongoing     (E87.1) Hyponatremia  Comment: likely secondary to SIADH vs CHF  Improved  Plan: continue fluid restriction 1800cc/day, monitor BMP     (E11.59) DMII  Acute/ongoing  Plan: blood sugar monitoring QID and prn, continue lantus 15u QAM (PTA 30u qam), metformin.  Actos has been discontinued, consider discontinuation of glipizide and use of metformin and Lantus insulin alone.      Willian Lechuga MD

## 2023-03-06 NOTE — LETTER
3/6/2023        RE: Louis Keller Jr.  [address]  [facility] [address]        [facility] GERIATRICS    PRIMARY CARE PROVIDER AND CLINIC:  Joselito Armas MD, [facility] [address] / [facility] [address]  Chief Complaint   Patient presents with     Hospital F/U      [place] Medical Record Number:  [id number]  Place of Service where encounter took place:  [facility] ([facility])     Louis Keller Jr.  is a 89 year old  (6/26/1932), admitted to the above facility from  [facility]. Hospital stay 2/9/23 through 2/26/23..     Hospital course was reviewed by me, is as per the hospital discharge summary and nurse practitioner note      Patient has past medical history of atrial fibrillation, hypertension, diabetes mellitus type 2 who was hospitalized for the evaluation of acute hypoxic respiratory failure secondary to diastolic heart failure exacerbation.  Patient was treated with intravenous furosemide which was transitioned to oral furosemide.  He however exhibited worsened hypoxia in spite of diuresis.  Chest x-ray revealed a right upper lobe infiltrate with possible mucous plug.  Patient was seen by pulmonary, was treated with a 13-day course of antibiotics as well as steroids and nebs.  Atrial fibrillation was managed with prior to admission amiodarone, metoprolol and apixaban.  Echocardiogram on 2/9/2023 revealed normal LV size, mildly dilated right ventricle with normal right ventricular systolic function, mild to moderate tricuspid regurgitation, unchanged from echo in August 2018      Patient did complain of acute on chronic low back pain, was found to have an acute versus subacute fracture of the L1 and L4 vertebral body endplates with mild to moderate compression deformity.  No surgical intervention was recommended.  He was also found to have bilateral retroperitoneal edema versus hemorrhage, likely secondary to vertebral fracture.    Hemoglobin on  admission was 9.3, down from baseline of almost 13.  She was given intravenous iron and started on oral B12 replacement as B12 level was less than 150.    Course was also complicated by hyponatremia with sodium down to 121.  This improved with fluid restriction.  Sodium on discharge 127.  He also developed delirium felt secondary to acute illness and likely underlying cognitive impairment.    Hemoglobin A1c was found to be 8.9.  Patient was maintained on Lantus at a reduced dose, glipizide, metformin and pioglitazone.        Patient states he is feeling a little stronger.  He notes shortness of breath with minimal activity.  He is able to stand briefly in therapy only.  He notes an occasional cough which is nonproductive.  He denies dizziness, back pain, nausea, vomiting.  Appetite has been fair.    Vital signs have been stable  Blood glucoses have been 100s to 200s.  Hemoglobin on 3/6/2023 was 11.5, BMP stable with sodium 132      CODE STATUS/ADVANCE DIRECTIVES DISCUSSION:  Full Code  CPR/Full code   ALLERGIES:   Allergies   Allergen Reactions     No Known Drug Allergies       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  FAMILY HISTORY: family history includes Alzheimer Disease in his maternal grandmother; Arthritis in his maternal grandmother; Cancer in his father and mother; Diabetes in his maternal aunt; Eye Disorder in his mother; Heart Disease in his mother; Hypertension in his mother; No Known Problems in his daughter.  SOCIAL HISTORY:   reports that he has never smoked. He has never used smokeless tobacco. He reports that he does not currently use alcohol. He reports that he does not use drugs.  Patient's living condition: lives with spouse    Current medications were reviewed by me today:    Current Outpatient Medications   Medication Sig     Acetaminophen (TYLENOL PO) Take 1,000 mg by mouth 3 times daily     amiodarone (PACERONE) 200 MG tablet Take 1 tablet (200 mg) by mouth daily     apixaban ANTICOAGULANT (ELIQUIS) 2.5 MG tablet Take 1 tablet (2.5 mg) by mouth 2 times daily     brimonidine (ALPHAGAN) 0.2 % ophthalmic solution INSTILL 1 DROP INTO LEFT EYE TWICE A DAY     cholestyramine (QUESTRAN) 4 g packet Take 1 packet (4 g) by mouth 2 times daily (with meals)     cyanocobalamin (CYANOCOBALAMIN) 1000 MCG tablet Take 1 tablet (1,000 mcg) by mouth daily     dorzolamide (TRUSOPT) 2 % ophthalmic solution Place 1 drop Into the left eye 2 times daily     ferrous gluconate (FERGON) 324 (38 Fe) MG tablet TAKE 1 TABLET (324 MG) BY MOUTH DAILY (WITH BREAKFAST)     furosemide (LASIX) 40 MG tablet Take 1 tablet (40 mg) by mouth daily     gabapentin (NEURONTIN) 100 MG capsule Take 2 capsules (200 mg) by mouth 3 times daily     glipiZIDE (GLUCOTROL XL) 5 MG 24 hr tablet Take 1 tablet (5 mg) by mouth 2 times daily     insulin glargine (LANTUS PEN) 100 UNIT/ML pen Inject 15 Units Subcutaneous every morning (before breakfast)     latanoprost (XALATAN) 0.005 % ophthalmic solution Place 1 drop Into the left eye daily      "Lidocaine (LIDOCARE) 4 % Patch Place 1 patch onto the skin every 24 hours To prevent lidocaine toxicity, patient should be patch free for 12 hrs daily.     lisinopril (ZESTRIL) 10 MG tablet Take 1 tablet (10 mg) by mouth daily     loperamide (IMODIUM A-D) 2 MG tablet Take 2 mg by mouth daily as needed     metFORMIN (GLUCOPHAGE) 500 MG tablet Take 2 tablets (1,000 mg) by mouth 2 times daily (with meals)     Metoprolol Tartrate 37.5 MG TABS Take 37.5 mg by mouth 2 times daily     miconazole (MICATIN) 2 % external powder Apply topically 2 times daily     multivitamin, therapeutic (THERA-VIT) TABS tablet Take 1 tablet by mouth daily     predniSONE (DELTASONE) 20 MG tablet Take 1 tablet (20 mg) by mouth daily For 3days then reduce to 10mg PO daily for 3days then stop     saccharomyces boulardii (FLORASTOR) 250 MG capsule Take 1 capsule (250 mg) by mouth 2 times daily     senna-docusate (SENOKOT-S/PERICOLACE) 8.6-50 MG tablet Take 2 tablets by mouth nightly as needed for constipation     No current facility-administered medications for this visit.       ROS:  10 point ROS of systems including Constitutional, Eyes, Respiratory, Cardiovascular, Gastroenterology, Genitourinary, Integumentary, Musculoskeletal, Psychiatric were all negative except for pertinent positives noted in my HPI.    Vitals:  /62   Pulse 77   Temp 98.2  F (36.8  C)   Resp 16   Ht 1.778 m (5' 10\")   Wt 72.8 kg (160 lb 8 oz)   SpO2 91%   BMI 23.03 kg/m    Exam:  Pleasant, thin appearing male sitting in his room preparing to work with therapy.  He is quite pleasant, in no distress.  Wearing oxygen per nasal cannula  HEENT: Oral mucosa moist  Respiratory rate 14, slightly dyspneic with conversation.  Bibasilar crackles.  CV irregular, heart rate 80s  Abdomen soft, nontender, nondistended  No lower extremity edema  Neuro: Alert, pleasant, oriented x2.  Cranial nerves intact.  No focal weakness.    Lab/Diagnostic data:    Most Recent 3 " CBC's:Recent Labs   Lab Test 02/24/23  0619 02/23/23  0716 02/22/23  0641   WBC 14.1* 17.0* 19.9*   HGB 12.2* 12.1* 12.1*   MCV 92 93 93    429 477*     Most Recent 3 BMP's:Recent Labs   Lab Test 02/26/23  1144 02/26/23  0758 02/26/23  0539 02/25/23  0800 02/25/23  0627 02/24/23  0751 02/24/23  0619 02/23/23  0726 02/23/23  0716   NA  --   --  127*  --   --   --  135*  --  136   POTASSIUM  --   --  4.9  4.8  --  4.8  --  4.5  --  4.3   CHLORIDE  --   --  89*  --   --   --  94*  --  96*   CO2  --   --  28  --   --   --  34*  --  36*   BUN  --   --  25.6*  --   --   --  26.2*  --  29.2*   CR  --   --  0.57*  --   --   --  0.63*  --  0.65*   ANIONGAP  --   --  10  --   --   --  7  --  4*   MICHAEL  --   --  9.0  --   --   --  8.8  --  8.8   * 201* 217*   < >  --    < > 184*   < > 210*    < > = values in this interval not displayed.     Most Recent 2 LFT's:Recent Labs   Lab Test 02/16/23  0641 02/14/23  0557   AST 23 29   ALT 17 17   ALKPHOS 102 104   BILITOTAL 0.7 0.8     Most Recent TSH and T4:Recent Labs   Lab Test 02/14/23  0557   TSH 6.66*   T4 1.06     Most Recent Hemoglobin A1c:Recent Labs   Lab Test 02/13/23  0630   A1C 8.9*     Most Recent Anemia Panel:Recent Labs   Lab Test 02/24/23  0619 02/10/23  0412 02/09/23  1755 02/09/23  1402   WBC 14.1*   < >  --   --    HGB 12.2*   < >  --   --    HCT 38.4*   < >  --   --    MCV 92   < >  --   --       < >  --   --    IRON  --   --   --  26*   IRONSAT  --   --   --  7*   FEB  --   --   --  349   SHELLY  --   --   --  293   B12  --   --   --  <150*   FOLIC  --   --  15.4  --     < > = values in this interval not displayed.       ASSESSMENT/PLAN:    (J18.9) Community acquired pneumonia of right upper lobe of lung  (primary encounter diagnosis)  (R53.81) Physical deconditioning  Comment: acute/ongoing RUL pneumonia, finished 13 day coarse of abx in hospital  Respiratory failure gradually improving  Plan: PT and OT,  monitor SaO2 at rest and with activity,  continues on O2 at 1-2 L /nc wean as able  Complete  prednisone taper     (I50.33) Acute on chronic heart failure with preserved ejection fraction (HFpEF) (H)  Comment: appears stable  Plan: monitor BMP daily weights, continue lasix 40mg QD     (I48.20) Chronic atrial fibrillation (H)  (Z79.01) Long term current use of anticoagulants with INR goal of 2.0-3.0  Comment: HR controlled  Plan:  continue PTA amiodarone 200mg QD, metoprolol 37.5mg BID, lisinopril 10mg QD, apixaban 2.5mg BID  Monitor BMP     Anemia secondary to blood loss, and possible underlying Fe deficiency and B 12 deficiency  Comment: acute/ongoing, did receive IV iron X 2  Hgb stable  Plan: monitor Hgb, continue vitamin B12 1000mcg QD, ferrous gluconate 324mg QD     (S32.010D) Compression fracture of L1 vertebra with routine healing, subsequent encounter  Comment: acute on chronic, no change  Plan: PT and OT, neurontin 200mg TID, change tylenol to 1000mg TID scheduled and qhs prn    (G93.40) Encephalopathy  Comment: likely secondary to acute illness, possible underling baseline cognitive impairment  BIMS 13/15 and SBT 10/28  Plan: OT ongoing     (E87.1) Hyponatremia  Comment: likely secondary to SIADH vs CHF  Improved  Plan: continue fluid restriction 1800cc/day, monitor BMP     (E11.59) DMII  Acute/ongoing  Plan: blood sugar monitoring QID and prn, continue lantus 15u QAM (PTA 30u qam), metformin.  Actos has been discontinued, consider discontinuation of glipizide and use of metformin and Lantus insulin alone.      Willian Lechuga MD                  Sincerely,        Willian Lechuga MD

## 2023-03-06 NOTE — TELEPHONE ENCOUNTER
[facility] Geriatrics Lab Note     Provider: Tonya Haase, APRN CNP  Facility: [facility] Facility Type:  TCU    Allergies   Allergen Reactions     No Known Drug Allergies        Labs Reviewed by provider: BMP, Hgb     Verbal Order/Direction given by Provider: No new orders.      Provider giving Order:  Tonya Haase, APRN CNP    Verbal Order given to: Shayna([phone])    Jesus Vallecillo RN

## 2023-03-08 NOTE — PROGRESS NOTES
"[facility] GERIATRICS    Chief Complaint   Patient presents with     RECHECK     HPI:  Louis Keller Jr. is a 89 year old  (6/26/1932), who is being seen today for an episodic care visit at: [facility]) [25]. Today's concern is:   Pneumonia/hypoxia: patient continues on O2 at 2 liters/nc  attempting to wean, has occasional cough and QUESADA, SaO2 is 92-93%   In therapy patient is requiring total assist with cares, using a EZ lift for transfers, very weak and deconditioned, he was sitting up in w/c at time of exam, states he is wiped out from morning activities, had a BM and got cleaned up a bit in the bathroom  CHF/HTN/atrial fib: BP 98/59, 113/63, 104/63 with HR 70-80 range, weight on admission 168lbs current weight 159.6lbs   Anemia: Hgb 11.5 on 3/6/23  Hyponatremia: Na 132 on 3/6/23  DMII: blood sugar range 119-313 range  Encephalopathy/cognitive impairment: BIMS 13/15, SBT 10/28    Allergies, and PMH/PSH reviewed in [facility] today.  REVIEW OF SYSTEMS:  10 point ROS of systems including Constitutional, Eyes, Respiratory, Cardiovascular, Gastroenterology, Genitourinary, Integumentary, Musculoskeletal, Psychiatric were all negative except for pertinent positives noted in my HPI.    Objective:   /63   Pulse 82   Temp 98.3  F (36.8  C)   Resp 18   Ht 1.778 m (5' 10\")   Wt 73.2 kg (161 lb 4.8 oz)   SpO2 93%   BMI 23.14 kg/m    GENERAL APPEARANCE:  Alert, in no distress  ENT:  Mouth and posterior oropharynx normal, moist mucous membranes, [place]  EYES:  EOM, conjunctivae, lids, pupils and irises normal, PERRL  RESP:  respiratory effort and palpation of chest normal, lungs clear to auscultation , no respiratory distress, diminished breath sounds bases bilaterally  CV:  Palpation and auscultation of heart done , regular rate and rhythm, no murmur, rub, or gallop, peripheral edema trace+ in LE bilaterally  ABDOMEN:  normal bowel sounds, soft, nontender, no hepatosplenomegaly or other masses  M/S:   " patient sitting up in w/c  SKIN:  Inspection of skin and subcutaneous tissue baseline  NEURO:   speech wnl  PSYCH:  affect and mood normal    Recent labs in [facility] reviewed by me today.  and   Most Recent 3 CBC's:Recent Labs   Lab Test 02/24/23  0619 02/23/23  0716 02/22/23  0641   WBC 14.1* 17.0* 19.9*   HGB 12.2* 12.1* 12.1*   MCV 92 93 93    429 477*     Most Recent 3 BMP's:Recent Labs   Lab Test 02/26/23  1144 02/26/23  0758 02/26/23  0539 02/25/23  0800 02/25/23  0627 02/24/23  0751 02/24/23  0619 02/23/23  0726 02/23/23  0716   NA  --   --  127*  --   --   --  135*  --  136   POTASSIUM  --   --  4.9  4.8  --  4.8  --  4.5  --  4.3   CHLORIDE  --   --  89*  --   --   --  94*  --  96*   CO2  --   --  28  --   --   --  34*  --  36*   BUN  --   --  25.6*  --   --   --  26.2*  --  29.2*   CR  --   --  0.57*  --   --   --  0.63*  --  0.65*   ANIONGAP  --   --  10  --   --   --  7  --  4*   MICHAEL  --   --  9.0  --   --   --  8.8  --  8.8   * 201* 217*   < >  --    < > 184*   < > 210*    < > = values in this interval not displayed.       Assessment/Plan:  (J18.9) Community acquired pneumonia of right upper lobe of lung  (primary encounter diagnosis)  (R53.81) Physical deconditioning  Comment: acute/ongoing RUL pneumonia, finished 13 day coarse of abx in hospital  No change  Plan: PT and OT,  monitor SaO2 at rest and with activity, continues on O2 at 1-2 L /nc wean as able  Finished with prednisone taper     (I50.33) Acute on chronic heart failure with preserved ejection fraction (HFpEF) (H)  Comment: acute/ongoing, no change  Plan: BMP follow, daily weights, continue lasix 40mg QD  Creat on 3/6/23 0.59     (I48.20) Chronic atrial fibrillation (H)  (Z79.01) Long term current use of anticoagulants with INR goal of 2.0-3.0  Comment: acute/ongoing, no change  Plan: follow BMP, continue PTA amiodarone 200mg QD, metoprolol 37.5mg BID, lisinopril 10mg QD, apixaban 2.5mg BID     (D62) Anemia due to blood loss,  acute  Comment: acute/ongoing, did receive IV iron X 2  No change Hgb on 3/6/23 11.5  Plan: Hgb follow,  vitamin B12 1000mcg QD, ferrous gluconate 324mg QD     (S32.010D) Compression fracture of L1 vertebra with routine healing, subsequent encounter  Comment: acute on chronic, no change  Plan: PT and OT, neurontin 200mg TID, change tylenol to 1000mg TID scheduled and qhs prn     (E87.1) Hyponatremia  Comment: acute/ongoing, no change  Na on 3/6/23 132  Plan: continue fluid restriction 1800cc/day     (G93.40) Encephalopathy  (R41.89) cognitive impairment  Comment: acute/ongoing, no change  BIMS 13/15 and SBT 10/28  Plan: OT ongoing     (E11.59) DMII  Acute/ongoing, no change  Plan: blood sugar monitoring QID and prn, continue lantus 15u QAM (PTA 30u qam)  continue Metformin 1000mg BID (increased due to DC pioglitazone) , continue glipizide xl 5mg BID  DC pioglitazone not recommended in patient with CHF    MED REC REQUIRED  Post Medication Reconciliation Status: medication reconcilation previously completed during another office visit      Orders:  No new orders    Electronically signed by: Tonya Lynn Haase, APRN CNP

## 2023-03-08 NOTE — LETTER
"    3/8/2023        RE: Louis Keller Jr.  [address]  [facility] [address]        [facility] GERIATRICS    Chief Complaint   Patient presents with     RECHECK     HPI:  Louis Keller Jr. is a 89 year old  (6/26/1932), who is being seen today for an episodic care visit at: [facility]) [25]. Today's concern is:   Pneumonia/hypoxia: patient continues on O2 at 2 liters/nc  attempting to wean, has occasional cough and QUESADA, SaO2 is 92-93%   In therapy patient is requiring total assist with cares, using a EZ lift for transfers, very weak and deconditioned, he was sitting up in w/c at time of exam, states he is wiped out from morning activities, had a BM and got cleaned up a bit in the bathroom  CHF/HTN/atrial fib: BP 98/59, 113/63, 104/63 with HR 70-80 range, weight on admission 168lbs current weight 159.6lbs   Anemia: Hgb 11.5 on 3/6/23  Hyponatremia: Na 132 on 3/6/23  DMII: blood sugar range 119-313 range  Encephalopathy/cognitive impairment: BIMS 13/15, SBT 10/28    Allergies, and PMH/PSH reviewed in [facility] today.  REVIEW OF SYSTEMS:  10 point ROS of systems including Constitutional, Eyes, Respiratory, Cardiovascular, Gastroenterology, Genitourinary, Integumentary, Musculoskeletal, Psychiatric were all negative except for pertinent positives noted in my HPI.    Objective:   /63   Pulse 82   Temp 98.3  F (36.8  C)   Resp 18   Ht 1.778 m (5' 10\")   Wt 73.2 kg (161 lb 4.8 oz)   SpO2 93%   BMI 23.14 kg/m    GENERAL APPEARANCE:  Alert, in no distress  ENT:  Mouth and posterior oropharynx normal, moist mucous membranes, [place]  EYES:  EOM, conjunctivae, lids, pupils and irises normal, PERRL  RESP:  respiratory effort and palpation of chest normal, lungs clear to auscultation , no respiratory distress, diminished breath sounds bases bilaterally  CV:  Palpation and auscultation of heart done , regular rate and rhythm, no murmur, rub, or gallop, peripheral edema trace+ in LE " bilaterally  ABDOMEN:  normal bowel sounds, soft, nontender, no hepatosplenomegaly or other masses  M/S:   patient sitting up in w/c  SKIN:  Inspection of skin and subcutaneous tissue baseline  NEURO:   speech wnl  PSYCH:  affect and mood normal    [facility] reviewed by me today.  and   Most Recent 3 CBC's:Recent Labs   Lab Test 02/24/23  0619 02/23/23  0716 02/22/23  0641   WBC 14.1* 17.0* 19.9*   HGB 12.2* 12.1* 12.1*   MCV 92 93 93    429 477*     Most Recent 3 BMP's:Recent Labs   Lab Test 02/26/23  1144 02/26/23  0758 02/26/23  0539 02/25/23  0800 02/25/23  0627 02/24/23  0751 02/24/23  0619 02/23/23  0726 02/23/23  0716   NA  --   --  127*  --   --   --  135*  --  136   POTASSIUM  --   --  4.9  4.8  --  4.8  --  4.5  --  4.3   CHLORIDE  --   --  89*  --   --   --  94*  --  96*   CO2  --   --  28  --   --   --  34*  --  36*   BUN  --   --  25.6*  --   --   --  26.2*  --  29.2*   CR  --   --  0.57*  --   --   --  0.63*  --  0.65*   ANIONGAP  --   --  10  --   --   --  7  --  4*   MICHAEL  --   --  9.0  --   --   --  8.8  --  8.8   * 201* 217*   < >  --    < > 184*   < > 210*    < > = values in this interval not displayed.       Assessment/Plan:  (J18.9) Community acquired pneumonia of right upper lobe of lung  (primary encounter diagnosis)  (R53.81) Physical deconditioning  Comment: acute/ongoing RUL pneumonia, finished 13 day coarse of abx in hospital  No change  Plan: PT and OT,  monitor SaO2 at rest and with activity, continues on O2 at 1-2 L /nc wean as able  Finished with prednisone taper     (I50.33) Acute on chronic heart failure with preserved ejection fraction (HFpEF) (H)  Comment: acute/ongoing, no change  Plan: BMP follow, daily weights, continue lasix 40mg QD  Creat on 3/6/23 0.59     (I48.20) Chronic atrial fibrillation (H)  (Z79.01) Long term current use of anticoagulants with INR goal of 2.0-3.0  Comment: acute/ongoing, no change  Plan: follow BMP, continue PTA amiodarone 200mg  QD, metoprolol 37.5mg BID, lisinopril 10mg QD, apixaban 2.5mg BID     (D62) Anemia due to blood loss, acute  Comment: acute/ongoing, did receive IV iron X 2  No change Hgb on 3/6/23 11.5  Plan: Hgb follow,  vitamin B12 1000mcg QD, ferrous gluconate 324mg QD     (S32.010D) Compression fracture of L1 vertebra with routine healing, subsequent encounter  Comment: acute on chronic, no change  Plan: PT and OT, neurontin 200mg TID, change tylenol to 1000mg TID scheduled and qhs prn     (E87.1) Hyponatremia  Comment: acute/ongoing, no change  Na on 3/6/23 132  Plan: continue fluid restriction 1800cc/day     (G93.40) Encephalopathy  (R41.89) cognitive impairment  Comment: acute/ongoing, no change  BIMS 13/15 and SBT 10/28  Plan: OT ongoing     (E11.59) DMII  Acute/ongoing, no change  Plan: blood sugar monitoring QID and prn, continue lantus 15u QAM (PTA 30u qam)  continue Metformin 1000mg BID (increased due to DC pioglitazone) , continue glipizide xl 5mg BID  DC pioglitazone not recommended in patient with CHF    MED REC REQUIRED  Post Medication Reconciliation Status: medication reconcilation previously completed during another office visit      Orders:  No new orders    Electronically signed by: Tonya Lynn Haase, APRN CNP            Sincerely,        Tonya Lynn Haase, APRN CNP

## 2023-03-10 PROBLEM — J96.01 ACUTE RESPIRATORY FAILURE WITH HYPOXIA (H): Status: ACTIVE | Noted: 2023-01-01

## 2023-03-10 PROBLEM — I50.33: Status: ACTIVE | Noted: 2023-01-01

## 2023-03-10 NOTE — PHARMACY-ADMISSION MEDICATION HISTORY
Pharmacy Medication History  Admission medication history interview status for the 3/10/2023  admission is complete. See EPIC admission navigator for prior to admission medications     Location of Interview: Outside patient room but on unit  Medication history sources: Surescripts and MAR ([facility])    Significant changes made to the medication list:  Adjusted tablet strength of acetaminophen   Adjusted cholestyramine BID --> daily (adjusted per MAR 3/2/23)   Adjusted B12 from IR --> CR tablet (per MAR)   Removed miconazole 2% powder BID (nystatin powder on MAR course completed)  Removed prednisone 20 mg daily X3d then 10 mg daily X3d then stop (completed)   Added Luis and Glucerna supplements    In the past week, patient estimated taking medication this percent of the time: greater than 90%    Medication Affordability:  Not including over the counter (OTC) medications, was there a time in the past 12 months when you did not take your medications as prescribed because of cost?: No (medications managed by facility)    Additional medication history information:   Pioglitazone has recent fill history - per MAR, discontinued 3/2/23.   Lidocaine 5% patches filled 1/24/23 #30ds, MAR lists 4% strength most recently - did not adjust on PTA med list.   In addition to dietary supplements, MAR states family brings a Light & Fit Cherry Greek yogurt daily for patient to eat at noon - MAR indicated this was for wound healing and general nutrition.     Medication reconciliation completed by provider prior to medication history? No    Time spent in this activity: 20 minutes    Medication Sig Last Dose Taking? Auth Provider   acetaminophen (TYLENOL) 500 MG tablet Take 1,000 mg by mouth 3 times daily as needed for mild pain 3/8/2023 at 0800 Yes Unknown, Entered By History   amiodarone (PACERONE) 200 MG tablet Take 1 tablet (200 mg) by mouth daily 3/9/2023 at AM Yes Herbert Gleason MD   apixaban ANTICOAGULANT  (ELIQUIS) 2.5 MG tablet Take 1 tablet (2.5 mg) by mouth 2 times daily 3/9/2023 at HS Yes Herbert Gleason MD   brimonidine (ALPHAGAN) 0.2 % ophthalmic solution Place 1 drop Into the left eye 2 times daily 3/9/2023 at HS Yes Reported, Patient   cholestyramine (QUESTRAN) 4 g packet Take 1 packet (4 g) by mouth 2 times daily (with meals)  Patient taking differently: Take 1 packet by mouth daily 3/9/2023 at PM Yes Joselito Armas MD   Cyanocobalamin (VITAMIN B-12 CR) 1000 MCG TBCR Take 1,000 mcg by mouth daily 3/9/2023 at AM Yes Unknown, Entered By History   dorzolamide (TRUSOPT) 2 % ophthalmic solution Place 1 drop Into the left eye 2 times daily 3/9/2023 at HS Yes Unknown, Entered By History   ferrous gluconate (FERGON) 324 (38 Fe) MG tablet TAKE 1 TABLET (324 MG) BY MOUTH DAILY (WITH BREAKFAST) 3/9/2023 at AM Yes Joselito Armas MD   furosemide (LASIX) 40 MG tablet Take 1 tablet (40 mg) by mouth daily 3/9/2023 at AM Yes Ally Burnette MD   gabapentin (NEURONTIN) 100 MG capsule Take 2 capsules (200 mg) by mouth 3 times daily 3/9/2023 at 2000 Yes Joselito Armas MD   glipiZIDE (GLUCOTROL XL) 5 MG 24 hr tablet Take 1 tablet (5 mg) by mouth 2 times daily 3/9/2023 at PM Yes Herbert Gleason MD   insulin glargine (LANTUS PEN) 100 UNIT/ML pen Inject 15 Units Subcutaneous every morning (before breakfast) 3/9/2023 at AM Yes Ally Burnette MD   latanoprost (XALATAN) 0.005 % ophthalmic solution Place 1 drop Into the left eye daily 3/9/2023 at HS Yes Joselito Armas MD   Lidocaine (LIDOCARE) 4 % Patch Place 1 patch onto the skin every 24 hours To prevent lidocaine toxicity, patient should be patch free for 12 hrs daily. 3/9/2023 at applied 2000 (removes at 0800) Yes Ally Burnette MD   lisinopril (ZESTRIL) 10 MG tablet Take 1 tablet (10 mg) by mouth daily 3/9/2023 at AM Yes Herbert Gleason MD   loperamide (IMODIUM A-D) 2 MG tablet Take 2 mg by mouth daily as needed 3/8/2023 at 1400 Yes Chanda  Joselito PATRICIA MD   metFORMIN (GLUCOPHAGE) 500 MG tablet Take 2 tablets (1,000 mg) by mouth 2 times daily (with meals) 3/9/2023 at PM Yes Haase, Tonya Lynn, APRN CNP   Metoprolol Tartrate 37.5 MG TABS Take 37.5 mg by mouth 2 times daily 3/9/2023 at HS Yes Herbert Gleason MD   multivitamin, therapeutic (THERA-VIT) TABS tablet Take 1 tablet by mouth daily 3/9/2023 at AM Yes Unknown, Entered By History   Nutritional Supplements (GLUCERNA PO) Take 1 each by mouth 2 times daily Drinks chocolate flavor 3/9/2023 at PM Yes Unknown, Entered By History   Nutritional Supplements (KAIDEN) PACK Take 1 packet by mouth daily Orange Kaiden - mix with 8 ounces of water/other beverage, take at noon 3/9/2023 at noon Yes Unknown, Entered By History   saccharomyces boulardii (FLORASTOR) 250 MG capsule Take 1 capsule (250 mg) by mouth 2 times daily 3/9/2023 at HS Yes Ally Burnette MD   senna-docusate (SENOKOT-S/PERICOLACE) 8.6-50 MG tablet Take 2 tablets by mouth nightly as needed for constipation Unknown at PRN Yes Ally Burnette MD       The information provided in this note is only as accurate as the sources available at the time of update(s)   Rica Garcia, [place]

## 2023-03-10 NOTE — PROGRESS NOTES
Patient placed on BiPAP per MD. Initially required 100% oxygen, now down to 60%. Clear BS. Inline neb given. Patient RR 30-34, MD aware. Chest x-ray shows pulmonary edema, PEEP 8.     Settings:  16/8 12r 8PEEP 60%  Readings:  34RR  740Vt  25Ve  13 leak      SOB improving.  RT will continue to monitor.    DARSHAN LIVINGSTON, RRT

## 2023-03-10 NOTE — PROVIDER NOTIFICATION
"Notified provider of soft pressures following start of precedex, stopped dex, currently on versed at 1.  MAP goal of 60 established as \"adequate\" for perfusion of this patient.   "

## 2023-03-10 NOTE — ED PROVIDER NOTES
History     Chief Complaint:  Shortness of Breath       The history is provided by the EMS personnel and the patient.      Louis Keller Jr. [facility] 89 [facility] a history of chronic atrial fibrillation, CHF, COPD, and type 2 diabetes who presents with shortness of breath. The patient arrives via EMS who report that the patient began experiencing worsening shortness of breath during the night and into this morning. The patient reports that he has experienced difficulty breathing since November of last year and that his breathing is much worse today. The patient denies experiencing fever or chest discomfort. EMS additionally notes a sore on the patient's backside.    Independent Historian: History also provided by EMS as noted in HPI.   Family at bedside notes that patient appeared well yesterday and the shortness of breath started abruptly overnight.  They denied any fevers or concern for infection/pneumonia.      Review of External Notes:    Reviewed TCU note from 2 days ago.  On 2 L of oxygen chronically and heart rate was noted to be between 70 and 80.    ROS:  Review of Systems   Constitutional: Negative for fever.   Respiratory: Positive for shortness of breath.    Cardiovascular: Negative for chest pain.   All other systems reviewed and are negative.    Medications:    Cyanocobalamin  Deltasone  Eliquis  Florastor  Glucophage  Glucotrol-XL  Imodium A-D  Lantus Pen  Lasix  Lopressor  Neurontin  Pacerone  Questran  Senokot-S  Zestril    Past Medical History:    Acute exacerbation of CHF  Anemia due to acute blood loss  Chronic atrial fibrillation  Diastolic murmur  Gout  H/O blood transfusion  Hearing loss  Hemorrhage of gastrointestinal tract  Hyperlipidemia  Irritable bowel syndrome with diarrhea  Knee effusion, right  Mumps  Nonsustained ventricular tachycardia  Pancreatitis  Scarlet fever  Type 2 diabetes mellitus    Past Surgical History:    Knee arthroplasty  Cardioversion   Loop  recorder placement   T&A   Colonoscopy  Cholecystectomy   Removal of basal cell cancer     Family History:    Father: Cancer  Mother: Cancer, eye disorder, heart disease, hypertension    Social History:  The patient presents to the ED alone.  He arrived via EMS.  PCP: Joselito Armas     Physical Exam     Patient Vitals for the past 24 hrs:   BP Temp Temp src Pulse Resp SpO2 Weight   03/10/23 1200 -- 98.6  F (37  C) Axillary -- -- -- --   03/10/23 1120 -- 98.7  F (37.1  C) Oral -- -- -- 70.9 kg (156 lb 4.8 oz)   03/10/23 1010 110/55 -- -- 115 17 96 % --   03/10/23 1005 100/67 -- -- 114 19 92 % --   03/10/23 0955 102/66 -- -- 117 22 98 % --   03/10/23 0947 (!) 85/50 -- -- (!) 122 16 97 % --   03/10/23 0945 (!) 85/50 -- -- 112 17 97 % --   03/10/23 0942 91/51 -- -- (!) 125 16 97 % --   03/10/23 0937 (!) 80/47 -- -- 115 17 95 % --   03/10/23 0932 (!) 66/48 -- -- (!) 125 17 93 % --   03/10/23 0927 (!) 64/44 -- -- (!) 123 19 93 % --   03/10/23 0922 (!) 52/42 -- -- (!) 131 23 92 % --   03/10/23 0917 (!) 83/42 -- -- (!) 124 21 100 % --   03/10/23 0907 (!) 168/102 -- -- (!) 128 16 96 % --   03/10/23 0857 (!) 146/99 -- -- (!) 122 12 100 % --   03/10/23 0847 116/56 -- -- 107 28 97 % --   03/10/23 0744 139/71 -- -- 113 (!) 37 93 % --   03/10/23 0729 128/84 -- -- 115 28 94 % --   03/10/23 0714 (!) 142/74 -- -- 102 (!) 36 94 % --   03/10/23 0700 124/73 -- -- 114 (!) 43 97 % --   03/10/23 0657 110/80 98.6  F (37  C) Axillary (!) 121 29 99 % --   03/10/23 0654 -- -- -- (!) 122 29 98 % --   03/10/23 0646 (!) 144/69 -- -- (!) 124 28 (!) 53 % --        Physical Exam  Nursing note and vitals reviewed.  HENT:   Mouth/Throat: Moist mucous membranes.   Eyes: EOMI, nonicteric sclera  Cardiovascular: tachycardic, irregularly irregular rhythm, no murmurs, rubs, or gallops  Pulmonary/Chest: Increased respiratory effort even on BiPAP with tachypnea and accessory muscle use.  Bibasilar crackles.  No wheezes.  Abdominal: Soft. Nontender,  nondistended, no guarding or rigidity.   Musculoskeletal: Normal range of motion.   Neurological: Alert. Moves all extremities spontaneously.   Skin: Skin is warm and dry. No rash noted.   Psychiatric: Normal mood and affect.     Emergency Department Course     Imaging:  XR Chest Port 1 View   Final Result   IMPRESSION: Single AP view of the chest was obtained. Endotracheal   tube tip projects over mid thoracic trachea approximately 4.2 cm from   the denton. Mildly enlarged cardiac silhouette. Diffuse patchy   airspace pulmonary opacities, worrisome for infection versus pulmonary   edema. No significant pleural effusion or pneumothorax.      GABINO BREWER MD            SYSTEM ID:  [id number]      Echocardiogram Limited   Final Result      XR Chest Port 1 View   Final Result   IMPRESSION: Extensive predominantly airspace opacities in both lungs,   most prominent in the perihilar and lower lung regions, have increased   since the previous exam. Distribution of these findings suggests   pulmonary edema, however an infectious process could also have this   appearance. Previously described right upper lobe infiltrate and   consolidation has improved. Shallow inspiration accentuates heart size   and pulmonary vascularity. Aortic calcification. No pneumothorax.      CHIQUITA PATE MD            SYSTEM ID:  [id number]         Report per radiology    Imaging independently reviewed.  Initial portable x-ray with fluffy bilateral infiltrates strongly suggestive of pulmonary edema.  Postintubation x-ray with similar appearance and ET tube in correct position.    Laboratory:  Labs Ordered and Resulted from Time of ED Arrival to Time of ED Departure   COMPREHENSIVE METABOLIC PANEL - Abnormal       Result Value    Sodium 130 (*)     Potassium 4.7      Chloride 90 (*)     Carbon Dioxide (CO2) 27      Anion Gap 13      Urea Nitrogen 22.3      Creatinine 0.60 (*)     Calcium 9.0      Glucose 186 (*)     Alkaline Phosphatase 107       AST 45      ALT 31      Protein Total 6.2 (*)     Albumin 2.9 (*)     Bilirubin Total 1.3 (*)     GFR Estimate >90     CBC WITH PLATELETS AND DIFFERENTIAL - Abnormal    WBC Count 17.9 (*)     RBC Count 3.73 (*)     Hemoglobin 10.8 (*)     Hematocrit 33.6 (*)     MCV 90      MCH 29.0      MCHC 32.1      RDW 15.9 (*)     Platelet Count 156      % Neutrophils 88      % Lymphocytes 4      % Monocytes 7      % Eosinophils 0      % Basophils 0      % Immature Granulocytes 1      NRBCs per 100 WBC 0      Absolute Neutrophils 15.9 (*)     Absolute Lymphocytes 0.6 (*)     Absolute Monocytes 1.2      Absolute Eosinophils 0.0      Absolute Basophils 0.1      Absolute Immature Granulocytes 0.1      Absolute NRBCs 0.0     ISTAT GASES LACTATE VENOUS POCT - Abnormal    Lactic Acid POCT 2.1 (*)     Bicarbonate Venous POCT 32 (*)     O2 Sat, Venous POCT 21 (*)     pCO2V Venous POCT 49      pH Venous POCT 7.42      pO2 Venous POCT 16 (*)    NT PROBNP INPATIENT - Abnormal    N terminal Pro BNP Inpatient 3,632 (*)    LACTIC ACID WHOLE BLOOD - Abnormal    Lactic Acid 2.4 (*)    PROCALCITONIN - Abnormal    Procalcitonin 0.34 (*)    TROPONIN T, HIGH SENSITIVITY - Abnormal    Troponin T, High Sensitivity 44 (*)    TROPONIN T, HIGH SENSITIVITY - Abnormal    Troponin T, High Sensitivity 50 (*)    ISTAT GASES LACTATE VENOUS POCT - Abnormal    Lactic Acid POCT 2.1 (*)     Bicarbonate Venous POCT 30 (*)     O2 Sat, Venous POCT 55 (*)     pCO2V Venous POCT 60 (*)     pH Venous POCT 7.30 (*)     pO2 Venous POCT 33     BLOOD CULTURE   BLOOD CULTURE      Procedures       Rapid Sequence Intubation      Procedure: Rapid Sequence Intubation    Consent: Verbal from Family     Risks Discussed: Not discussed due to emergent procedure.     Universal Protocol: Universal protocol was followed and time out conducted just prior to starting procedure, confirming patient identity, site/side, procedure, patient position, and availability of correct equipment  and implants.     Indication: Respiratory failure    Preparation: Preoxygenation with CPAP/BiPAP. Premedication with None.    Sedation: Etomidate     Paralytic: Succinylcholine    Procedure Detail:   The patient was intubated with a 7.5 endotracheal tube using Video Laryngoscopy.  Following intubation, the patient's breath sounds were Equal.  ET Tube placement was confirmed with Auscultation, Chest X-ray and ETCO2.    Monitoring: Monitoring consisted of heart rate, cardiac monitor, continuous pulse oximetry, blood pressure checks, level of consciousness, IV access, constant attendance by RN, and MD attendance until patient stable or transfer of care.      Patient Status: The patient tolerated the procedure well: Yes. There were no complications.         Arthrocentesis     Procedure: Orogastric Tube Placement.     Indication: Stomach decompression s/p Endotracheal Intubation. RN attempt @ NG/OG failed x3.     Consent: Implied    Universal Protocol: Universal protocol was followed and time out conducted just prior to starting procedure, confirming patient identity, site/side, procedure, patient position, and availability of correct equipment and implants.     Location: oropharynx    Preparation: None    Anesthesia/Sedation: None needed, but pt sedated from intubation with etomidate and propofol.     Procedure Detail: Using the glidescope blade, the 16 Malagasy OG tube was passed into the esophagus and into the stomach. Taped to ETT after measuring appropriate depth. Confirmation confirmed with auscultation of air from a syringe and aspiration of gastric contents.     Patient Status: The patient tolerated the procedure well: Yes. There were no complications.        Emergency Department Course & Assessments:       Interventions:  Medications   midazolam (VERSED) drip - ADULT 100 mg/100 mL in NS (pre-mix) (1 mg/hr Intravenous Rate/Dose Change 3/10/23 1204)   ipratropium - albuterol 0.5 mg/2.5 mg/3 mL (DUONEB) 0.5-2.5 (3)  MG/3ML neb solution (3 mLs  $Given 3/10/23 0657)   nitroGLYcerin (NITROSTAT) 0.4 MG sublingual tablet (  $Given 3/10/23 0657)   furosemide (LASIX) injection 60 mg (60 mg Intravenous $Given 3/10/23 0704)   piperacillin-tazobactam (ZOSYN) 4.5 g vial to attach to  mL bag (0 g Intravenous Stopped 3/10/23 0934)   LORazepam (ATIVAN) injection 0.5 mg (0.5 mg Intravenous $Given 3/10/23 0759)   fentaNYL (PF) (SUBLIMAZE) 100 MCG/2ML injection (100 mcg  $Given 3/10/23 0915)   0.9% sodium chloride BOLUS (0 mLs Intravenous Stopped 3/10/23 1031)        Assessments:  0643 I arrived in stabilization room 2, received history from EMS and spoke with the patient.  0649 I performed a bedside ultrasound. No PTX bilaterally. Prominent B-linse noted.   0740 Updated family at bedside.   0830 Updated family again - discussed need for intubation.  0840 I performed a rapid sequence intubation.  0904 I reviewed the patient's post-intubation x-ray and rechecked the patient.  0925 I rechecked the patient.  0954 I rechecked the patient and inserted an OG tube.    Independent Interpretation (X-rays, CTs, rhythm strip):  See above.     Consultations/Discussion of Management or Tests:  0901 I spoke on the phone with Dr. Grayson Silva, intensivist.       Social Determinants of Health affecting care:   None    Disposition:  The patient was admitted to the hospital under the care of Dr. Silva.     Impression & Plan      Medical Decision Making:  Patient presents with chief complaint dyspnea.  Patient arrives significantly hypoxic with oxygen saturations in the 50s with increased work of breathing.  Broad differential considered including flash pulmonary edema, COPD exacerbation, pneumothorax, pneumonia, ACS, among many other etiologies.  Vital signs were notable for tachycardia.  Patient has history of atrial fibrillation.  Exam notable for crackles and bedside ultrasound strongly suggested pulmonary edema as the cause of patient's symptoms.   "Patient was started on BiPAP immediately upon being transferred off of EMS stretcher.  It took some time for oxygen saturations to improve, but they did return to normal.  Chest x-ray essentially confirmed pulmonary edema as cause of symptoms.  Patient did not present very hypertensive, but we did trial nitro as well as a nitro drip to lower afterload for treatment of acute pulmonary edema.  Patient also immediately given 60 mg of IV Lasix to mobilize fluid.  In this time, opted to treat patient with antibiotics given possibility of superimposed bacterial pneumonia, but given family's report of no recent cough or URI symptoms, this seems less likely.  Unfortunately, patient continued to decompensate despite maximum medical therapy.  We trialed a dose of Ativan to help with air hunger, but this was also not successful.  He became less responsive and more tachycardic suggesting impending respiratory collapse.  While patient was still awake and alert, I discussed intubation with him and he confirmed his CODE STATUS to be full code.  When I specifically discussed a breathing tube he stated \"whatever is necessary.\"  When I discussed this with patient's family, they also confirmed his desire to be full code.  Given his pending respiratory collapse, he was intubated as above without decompensation.  He was initially started on propofol to take over 4 nitro's afterload reduction, but about 30 minutes after he was intubated, he suddenly dropped his pressures.  He had put out quite a bit of urine at this time.  A blood gas and lactic was repeated without worsening.  He responded very well to an IVF bolus and did not require pressors and was no longer hypotensive at time of transfer to the intensive care unit.  I considered that this could be septic shock, but I think it is more likely patient did not respond well to fluid shifts.  I spoke with Dr. Silva, intensivist, twice regarding patient, differential diagnosis, and need " for admission.  Family was also updated multiple times.  Patient has been stabilized at time of admission but prognosis is guarded which I discussed with family.  My overall impression is respiratory failure due to exacerbation of chronic heart failure with possible superimposed pneumonia. A stat echo was obtained during pt's time in ED. No formal read was available, but I did note pulmonary hypertension which is consistent with increased right heart pressures associated with pulmonary edema.        Critical Care time:  was 50 minutes for this patient excluding procedures.    Diagnosis:    ICD-10-CM    1. Acute respiratory failure with hypoxia (H)  J96.01       2. Acute on chronic congestive heart failure with left ventricular diastolic dysfunction (H)  I50.33       3. Atrial fibrillation with RVR (H)  I48.91            Scribe Disclosure:  I, Emanuel Collado, am serving as a scribe at 7:01 AM on 3/10/2023 to document services personally performed by Fahad Valenzuela MD based on my observations and the provider's statements to me.        Fahad Valenzuela MD  03/10/23 8091

## 2023-03-10 NOTE — PROGRESS NOTES
Notified provider about indwelling lewis catheter discussed removal or continued need.    Did provider choose to remove indwelling lewis catheter? No    Provider's lewis indication for keeping indwelling lewis catheter:  Close I&O monitoring as well as community acquired coccyx pressure injury present.     Is there an order for indwelling lewis catheter? Yes    *If there is a plan to keep lewis catheter in place at discharge daily notification with provider is not necessary, but please add a notation in the treatment team sticky note that the patient will be discharging with the catheter.

## 2023-03-10 NOTE — TELEPHONE ENCOUNTER
Received a page from facility this morning and when got through to the station, another staff answered the phone and said the NP was in the process of calling 911 due to Louis's O2 sats in the 60's, on 6L/min of O2 and full code.    Asked the staff to let the nurse know that this NP will write up the call    MARTHA Valdez CNP

## 2023-03-10 NOTE — PROGRESS NOTES
Patient intubated 7.5 tube placed 23 @ teeth. Tube placement confirmed by chest x-ray, bilateral breath sounds, color metric, adequate VT.  Upper lip bleeding, MD aware. Tube placed to left to relieve pressure of wound.    Settings:  500VT  16 RR  5 PEEP  60%    Readings:  515Vt  16Rr  11.7Ve  31PIP (MD aware)    DARSHAN LIVINGSTON, RRT

## 2023-03-10 NOTE — H&P
SURGICAL ICU H&P  March 10, 2023      CO-MORBIDITIES:   Acute respiratory failure with hypoxia (H)  Acute on chronic congestive heart failure with left ventricular diastolic dysfunction (H)  Atrial fibrillation with RVR (H)    ASSESSMENT: Louis Keller Jr. is a 89 year old male with a significant past medical history of chronic atrial fibrillation, CHF with diastolic dysfunction, COPD, and DM II who initially presented on 3/10 with SOB now s/p intubation for acute hypoxic respiratory failure. Working diagnosis at this point most consistent with CHF exacerbation in the context of elevated BNP and new findings on echocardiogram of RV dysfunction. Also concern for potential respiratory infection in the setting of recent bacterial pneumonia, elevated WBC, and airspace opacities on CXR.     PLAN:  Neuro/ pain/ sedation:  # Sedation and pain control for intubation  - Midazolam and Precedex for sedation  - Monitor neurological status. Notify the MD for any acute changes in exam.    Pulmonary care:   # Acute respiratory failure with hypoxia   - Intubated to maintain respiratory status   - Current vent settings: 530VT, 16RR, 5 PEEP, 50% FiO2  - Continue to adjust vent settings and wean as able, trend VBG for ongoing monitoring  - likely SBT tomorrow to wean to extubate    Cardiovascular:    # Acute on chronic CHF with left ventricular diastolic dysfunction  - Received bolus of IV Lasix in the ED, continue to monitor and plan to initiate diuresis with IV Lasix as tolerated  - Daily BNP  - continue diuresis therapy as tolerated  # Atrial fibrillation with RVR  - Rhythm currently in A-fib per tele, HR WNL 80-90's  - Re-start PTA amiodarone   - Repeat EKG today  - Will hold PTA Eliquis for now  # Hemodynamics - mild hypotension  - Not currently requiring pressors, continue to monitor hemodynamic status    ID/ Antibiotics:  # Concern for community-acquired pneumonia  - Recent hospitalization c/b CAP, on presentation  today WBC elevated to 17.9 and airspace opacities on CXR with potential infectious process  - Blood cx's, sputum cx  - Zosyn 4.5g q6hrs    Renal/Fluids/ Electrolytes:  # Lactic acidosis (resolved)  - Lactate of 2.4 this AM, resolved to 1.4 this PM.  # Hyponatremia (acute on chronic)  - Na 130 on admission, appears to be chronic in the context of CHF and diuresis.    - monitor Na with diuresis   - Electrolyte replacement protocol as needed  - Mccarthy for strict I/O's    GI care:   # Protein calorie malnutrition  - NPO for now  - If remains intubated tomorrow, would consider initiation of TF    Endocrine:    # Hyperglycemia   # Uncontrolled Type II DM (HgbA1c 8.9% on 2/13/23)  - Medium sliding scale insulin, goal < 180    Heme:     #Drug Induced Coagulopathy due to Eliquis  - Holding Eliquis    # Anemia (acute on chronic)  Hemoglobin 10.8 on admission, baseline 11-12.   - Continue to monitor with daily CBC  - hold PTA Eliquis  - okay for lovenox     MSK:  # Multiple Lumbar vertebral body fractures   - Neurosurgery consulted during prior hospitalization, determined to not be a candidate for surgical intervention.  - Recommend TLSO brace when awake and extubated    Prophylaxis:    - DVT ppx: Lovenox and SCDs   - Chlorohexidine swabs  - Elevated HOB 30 degrees  - restraints BUE wrists   - Pepcid for GI PPx    Lines/ tubes/ drains:  - PIVs x 2  - Mccarthy catheter   - ETT  - OGT     Disposition:  ICU    Patient seen, findings and plan discussed with ICU staff Dr. Adri Turpin and MD Deja Momin, MS4  [employer]    - - - - - - - - - - - - - - - - - - - - - - - - - - - - - - - - - - - - - - - - - - - - - - - - - - - - - - - - -     HISTORY PRESENTING ILLNESS: Louis PACHECO Arianna Willis is a 89 year old male with a significant past medical history of chronic atrial fibrillation, CHF, COPD, and DM II who initially presented on 3/10 with SOB. Per chart review,  the patient reportedly began experiencing worsening shortness of breath during the night and into this morning. EMS reports his O2 sats on the scene were in the 70's, BiPAP initiated with improvement. Just prior to arrival, O2 sats started to drop and breathing became more labored. On arrival O2 sats in the upper 80's, after transferring to ED cart and changing BiPAP units his O2 sats dropped to 53%. Patient subsequently intubated for acute hypoxic respiratory failure.     Of note, patient recently hospitalized from 2/09/23-2/26/23 for acute hypoxic respiratory failure and CHF exacerbation, c/b community-acquired pneumonia. Was discharged to TCU at [facility].    Family at bedside today (wife Laura and daughter Kathy). Noted that patient appeared well yesterday and that the shortness of breath developed abruptly overnight. They denied any fevers or concern for infection/pneumonia.     REVIEW OF SYSTEMS: Unable to assess secondary to intubation and sedation.    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     Unspecified disease of pancreas 1990    pancreatitis       SURGICAL HISTORY:   Past Surgical History:   Procedure Laterality Date     ARTHROPLASTY KNEE  8/5/2013    Procedure: ARTHROPLASTY KNEE;  Left Total Knee Arthroplasty       CARDIOVERSION  9/6/11    failed     EP LOOP RECORDER IMPLANT N/A 8/5/2019    Procedure: EP Loop Recorder Implant;   Surgeon: Darling Pacheco MD;  Location:  HEART CARDIAC CATH LAB     [facility] NONSPECIFIC PROCEDURE  Child    T&A     [facility] NONSPECIFIC PROCEDURE  ; also     Colonoscopy     [facility] NONSPECIFIC PROCEDURE      Basal cell cancer of the nose     [facility] NONSPECIFIC PROCEDURE      Cholecystectomy       FAMILY HISTORY:  Family History   Problem Relation Age of Onset     Alzheimer Disease Maternal Grandmother       Arthritis Maternal Grandmother       Cancer Mother           breast/ 1983/colon     Eye Disorder Mother           glaucoma and cataracts     Heart Disease Mother           angina/CABG     Hypertension Mother       Cancer Father           colon     Diabetes Maternal Aunt           AoDM     No Known Problems Daughter        ALLERGIES:      Allergies   Allergen Reactions     No Known Drug Allergies        MEDICATIONS:  No current facility-administered medications on file prior to encounter.  acetaminophen (TYLENOL) 500 MG tablet, Take 1,000 mg by mouth 3 times daily as needed for mild pain  amiodarone (PACERONE) 200 MG tablet, Take 1 tablet (200 mg) by mouth daily  apixaban ANTICOAGULANT (ELIQUIS) 2.5 MG tablet, Take 1 tablet (2.5 mg) by mouth 2 times daily  brimonidine (ALPHAGAN) 0.2 % ophthalmic solution, Place 1 drop Into the left eye 2 times daily  cholestyramine (QUESTRAN) 4 g packet, Take 1 packet (4 g) by mouth 2 times daily (with meals) (Patient taking differently: Take 1 packet by mouth daily)  Cyanocobalamin (VITAMIN B-12 CR) 1000 MCG TBCR, Take 1,000 mcg by mouth daily  dorzolamide (TRUSOPT) 2 % ophthalmic solution, Place 1 drop Into the left eye 2 times daily  ferrous gluconate (FERGON) 324 (38 Fe) MG tablet, TAKE 1 TABLET (324 MG) BY MOUTH DAILY (WITH BREAKFAST)  furosemide (LASIX) 40 MG tablet, Take 1 tablet (40 mg) by mouth daily  gabapentin (NEURONTIN) 100 MG capsule, Take 2 capsules (200 mg) by mouth 3 times daily  glipiZIDE (GLUCOTROL XL) 5 MG 24 hr tablet, Take 1 tablet (5 mg) by  mouth 2 times daily  insulin glargine (LANTUS PEN) 100 UNIT/ML pen, Inject 15 Units Subcutaneous every morning (before breakfast)  latanoprost (XALATAN) 0.005 % ophthalmic solution, Place 1 drop Into the left eye daily  Lidocaine (LIDOCARE) 4 % Patch, Place 1 patch onto the skin every 24 hours To prevent lidocaine toxicity, patient should be patch free for 12 hrs daily.  lisinopril (ZESTRIL) 10 MG tablet, Take 1 tablet (10 mg) by mouth daily  loperamide (IMODIUM A-D) 2 MG tablet, Take 2 mg by mouth daily as needed  metFORMIN (GLUCOPHAGE) 500 MG tablet, Take 2 tablets (1,000 mg) by mouth 2 times daily (with meals)  Metoprolol Tartrate 37.5 MG TABS, Take 37.5 mg by mouth 2 times daily  multivitamin, therapeutic (THERA-VIT) TABS tablet, Take 1 tablet by mouth daily  Nutritional Supplements (GLUCERNA PO), Take 1 each by mouth 2 times daily Drinks chocolate flavor  Nutritional Supplements (LUIS) PACK, Take 1 packet by mouth daily Orange Luis - mix with 8 ounces of water/other beverage, take at noon  saccharomyces boulardii (FLORASTOR) 250 MG capsule, Take 1 capsule (250 mg) by mouth 2 times daily  senna-docusate (SENOKOT-S/PERICOLACE) 8.6-50 MG tablet, Take 2 tablets by mouth nightly as needed for constipation      PHYSICAL EXAMINATION:  Temp:  [98.6  F (37  C)-98.7  F (37.1  C)] 98.7  F (37.1  C)  Pulse:  [102-131] 115  Resp:  [12-43] 17  BP: ()/() 110/55  FiO2 (%):  [50 %-100 %] 50 %  SpO2:  [53 %-100 %] 96 %    General: supine, intubated and sedated  Neuro: No focal neuro deficits noted.   Respiratory: Non-labored breathing, synchronous with mechanical ventilation, lung sounds diminished bilaterally at the bases  Cardiovascular: Regular rate, irregularly irregular rhythm, no murmurs, rubs, or gallops  Gastrointestinal: Abdomen soft, non-distended  Extremities: 1+ LE edema bilaterally  Skin: pressure wound on the posterior right heel    LABS: Reviewed.   Arterial Blood Gases   Recent Labs   Lab  03/10/23  1004 03/10/23  0650   PH 7.30* 7.42     Complete Blood Count   Recent Labs   Lab 03/10/23  0653   WBC 17.9*   HGB 10.8*        Basic Metabolic Panel  Recent Labs   Lab 03/10/23  1049 03/10/23  0650   NA  --  130*   POTASSIUM  --  4.7   CHLORIDE  --  90*   CO2  --  27   BUN  --  22.3   CR  --  0.60*   * 186*     Liver Function Tests  Recent Labs   Lab 03/10/23  0650   AST 45   ALT 31   ALKPHOS 107   BILITOTAL 1.3*   ALBUMIN 2.9*     Pancreatic Enzymes  No lab results found in last 7 days.  Coagulation Profile  No lab results found in last 7 days.  Lactate  Invalid input(s): LACTATE    IMAGING:  Results for orders placed or performed during the hospital encounter of 03/10/23   XR Chest Port 1 View    Narrative    CHEST ONE VIEW PORTABLE March 10, 2023 7:01 AM     HISTORY: Shortness of breath.    COMPARISON: 2/17/2023.      Impression    IMPRESSION: Extensive predominantly airspace opacities in both lungs,  most prominent in the perihilar and lower lung regions, have increased  since the previous exam. Distribution of these findings suggests  pulmonary edema, however an infectious process could also have this  appearance. Previously described right upper lobe infiltrate and  consolidation has improved. Shallow inspiration accentuates heart size  and pulmonary vascularity. Aortic calcification. No pneumothorax.     *Note: Due to a large number of results and/or encounters for the requested time period, some results have not been displayed. A complete set of results can be found in Results Review.

## 2023-03-10 NOTE — PROGRESS NOTES
[facility] ICU RESPIRATORY NOTE        Date of Admission: 3/10/2023    Date of Intubation (most recent): 3/10/23    Reason for Mechanical Ventilation: Respiratory failure    Number of Days on Mechanical Ventilation: 1    Met Criteria for Spontaneous Breathing Trial: No    Reason for No Spontaneous Breathing Trial: Intubated today    Significant Events Today: None     ABG Results:   Recent Labs   Lab 03/10/23  1514 03/10/23  1233 03/10/23  1004 03/10/23  0650   PH  --   --  7.30* 7.42   O2PER 45 50  --   --        Current Vent Settings: Vent Mode: CMV/AC  (Continuous Mandatory Ventilation/ Assist Control)  FiO2 (%): 45 %  Resp Rate (Set): 16 breaths/min  Tidal Volume (Set, mL): 530 mL  PEEP (cm H2O): 5 cmH2O  Resp: 17      Skin Assessment: Skin intact     Plan: Continue full vent support and wean as tolerated     Radha Guzmán RT on 3/10/2023 at 5:18 PM

## 2023-03-10 NOTE — ED TRIAGE NOTES
Patient BIBA from nursing home with acute onset of shortness of breath this morning.  EMS reports his O2 sats on scene were in the 70's.  EMS initiated BiPAP  Work of breathing and O2 sats improved.  Just prior to arrival, O2 sats started to drop and breathing became more labored.  On arrival, O2 sats are in upper 80s and his breathing is labored.  After transferring to ED cart and changing BiPAP units, his O2 sats dropped to 53%.   He was recently diagnosed with pneumonia. He has hx. of COPD and CHF.     Triage Assessment     Row Name 03/10/23 0716       Triage Assessment (Adult)    Airway WDL WDL       Respiratory WDL    Respiratory WDL X;rhythm/pattern     Rhythm/Pattern, Respiratory labored;shortness of breath        Skin Circulation/Temperature WDL    Skin Circulation/Temperature WDL WDL       Cardiac WDL    Cardiac WDL X;rhythm     Pulse Rate & Regularity tachycardic     Cardiac Rhythm Atrial fibrillation        Cognitive/Neuro/Behavioral WDL    Cognitive/Neuro/Behavioral WDL WDL       [place] Coma Scale    Best Eye Response 4-->(E4) spontaneous    Best Motor Response 6-->(M6) obeys commands    Best Verbal Response 5-->(V5) oriented    [place] Coma Scale Score 15

## 2023-03-11 NOTE — PROGRESS NOTES
ICU Progress Note  March 11, 2023      CO-MORBIDITIES:   Acute respiratory failure with hypoxia (H)  Acute on chronic congestive heart failure with left ventricular diastolic dysfunction (H)  Atrial fibrillation with RVR (H)    ASSESSMENT: Louis Keller Jr. is a 89 year old male with a significant past medical history of chronic atrial fibrillation, CHF with diastolic dysfunction, COPD, and DM II who initially presented on 3/10 with SOB now s/p intubation for acute hypoxic respiratory failure. Working diagnosis at this point most consistent with CHF exacerbation in the context of elevated BNP and new findings on echocardiogram of RV dysfunction. Also concern for potential respiratory infection in the setting of recent bacterial pneumonia, elevated WBC, and airspace opacities on CXR.     PLAN:  Neuro/ pain/ sedation:  # Sedation and pain control for intubation  - Midazolam and Precedex for sedation. I will plan to wean off Midazolam and start scheduled low dose Ativan  - Overall plan is to wean off sedation slowly    Pulmonary care:   # Acute respiratory failure with hypoxia/Pulmonary edema  - Intubated to maintain respiratory status   - Current vent settings: 530VT, 16RR, 5 PEEP, 40% FiO2  - Appears to have concomitant RV failure with concern for fluid overload  - Plan to aggressively diurese  - Continue to adjust vent settings and wean as able, trend VBG for ongoing monitoring  - likely SBT tomorrow to wean to extubate    Cardiovascular:    # Acute on chronic CHF with left ventricular diastolic dysfunction/RV dysfunction  - Start aggressive diuresis  - Daily BMP  # Atrial fibrillation with RVR  - Rhythm currently in A-fib per tele, HR WNL 80-90's  - Re-start PTA amiodarone   - Repeat EKG today  - Will hold PTA Eliquis for now and plan to restart tomorrow  # Hemodynamics - mild hypotension  - Not currently requiring pressors, continue to monitor hemodynamic status    ID/ Antibiotics:  # Concern for  community-acquired pneumonia  - Recent hospitalization c/b CAP, on presentation WBC elevated to 17.9 and airspace opacities on CXR with potential infectious process  - Blood cx's, sputum cx- NGT  - Zosyn 4.5g q6hrs    Renal/Fluids/ Electrolytes:  # Lactic acidosis (resolved)  - Lactate of 2.4 this AM, resolved to 1.4 this PM.  # Hyponatremia (acute on chronic)  - Na 130 on admission, appears to be chronic in the context of CHF and diuresis and improving  - monitor Na with diuresis   - Electrolyte replacement protocol as needed  - Mccarthy for strict I/O's    GI care:   # Protein calorie malnutrition  - NPO for now  - If remains intubated tomorrow, would consider initiation of TF    Endocrine:    # Hyperglycemia   # Uncontrolled Type II DM (HgbA1c 8.9% on 2/13/23)  - Medium sliding scale insulin, goal < 180    Heme:     # Anemia (acute on chronic)  Hemoglobin 10.8 on admission, baseline 11-12.   - Continue to monitor with daily CBC  - hold PTA Eliquis and may restart tomorrow  - okay for lovenox     MSK:  # Multiple Lumbar vertebral body fractures   - Neurosurgery consulted during prior hospitalization, determined to not be a candidate for surgical intervention.  - Recommend TLSO brace when awake and extubated    Prophylaxis:    - DVT ppx: Lovenox and SCDs   - Chlorohexidine swabs  - Elevated HOB 30 degrees  - restraints BUE wrists   - Pepcid for GI PPx    Lines/ tubes/ drains:  - PIVs x 2  - Mccarthy catheter   - ETT  - OGT     Disposition:  ICU    Time Spent on this Encounter   Louis was seen and evaluated by me on 03/11/23.  He was in critical condition as the result of Pulmonary edema/respiratory failure.    His condition is now Critical.     Total Critical Care time spent by me, excluding procedures, was 40 minutes.    MD Julia Hughes MD  Pulmonary, Critical Care and Sleep Medicine  [facility]  Pager: [phone]      - - - - - - - - - - - - - - - - - - - - - - - - - - - - - - -  - - - - - - - - - - - - - - - - - - - - - - - - - -     HISTORY PRESENTING ILLNESS: Louis Keller Jr. is a 89 year old male with a significant past medical history of chronic atrial fibrillation, CHF, COPD, and DM II who initially presented on 3/10 with SOB. Per chart review, the patient reportedly began experiencing worsening shortness of breath during the night and into this morning. EMS reports his O2 sats on the scene were in the 70's, BiPAP initiated with improvement. Just prior to arrival, O2 sats started to drop and breathing became more labored. On arrival O2 sats in the upper 80's, after transferring to ED cart and changing BiPAP units his O2 sats dropped to 53%. Patient subsequently intubated for acute hypoxic respiratory failure.     Of note, patient recently hospitalized from 2/09/23-2/26/23 for acute hypoxic respiratory failure and CHF exacerbation, c/b community-acquired pneumonia. Was discharged to TCU at [facility].    Family at bedside today (wife Laura and daughter Kathy). Noted that patient appeared well yesterday and that the shortness of breath developed abruptly overnight. They denied any fevers or concern for infection/pneumonia.     REVIEW OF SYSTEMS: Unable to assess secondary to intubation and sedation.    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     Unspecified disease of pancreas 1990    pancreatitis       SURGICAL  HISTORY:   Past Surgical History:   Procedure Laterality Date     ARTHROPLASTY KNEE  2013    Procedure: ARTHROPLASTY KNEE;  Left Total Knee Arthroplasty       CARDIOVERSION  11    failed     EP LOOP RECORDER IMPLANT N/A 2019    Procedure: EP Loop Recorder Implant;  Surgeon: Darling Pacheco MD;  Location:  HEART CARDIAC CATH LAB     [facility] NONSPECIFIC PROCEDURE  Child    T&A     [facility] NONSPECIFIC PROCEDURE  ; also     Colonoscopy     [facility] NONSPECIFIC PROCEDURE      Basal cell cancer of the nose     [facility] NONSPECIFIC PROCEDURE      Cholecystectomy       FAMILY HISTORY:  Family History   Problem Relation Age of Onset     Alzheimer Disease Maternal Grandmother       Arthritis Maternal Grandmother       Cancer Mother           breast/ 1983/colon     Eye Disorder Mother           glaucoma and cataracts     Heart Disease Mother           angina/CABG     Hypertension Mother       Cancer Father           colon     Diabetes Maternal Aunt           AoDM     No Known Problems Daughter        ALLERGIES:      Allergies   Allergen Reactions     No Known Drug Allergies        MEDICATIONS:  No current facility-administered medications on file prior to encounter.  acetaminophen (TYLENOL) 500 MG tablet, Take 1,000 mg by mouth 3 times daily as needed for mild pain  amiodarone (PACERONE) 200 MG tablet, Take 1 tablet (200 mg) by mouth daily  apixaban ANTICOAGULANT (ELIQUIS) 2.5 MG tablet, Take 1 tablet (2.5 mg) by mouth 2 times daily  brimonidine (ALPHAGAN) 0.2 % ophthalmic solution, Place 1 drop Into the left eye 2 times daily  cholestyramine (QUESTRAN) 4 g packet, Take 1 packet (4 g) by mouth 2 times daily (with meals) (Patient taking differently: Take 1 packet by mouth daily)  Cyanocobalamin (VITAMIN B-12 CR) 1000 MCG TBCR, Take 1,000 mcg by mouth daily  dorzolamide (TRUSOPT) 2 % ophthalmic solution, Place 1 drop Into the left eye 2 times daily  ferrous gluconate (FERGON) 324 (38 Fe) MG tablet, TAKE  1 TABLET (324 MG) BY MOUTH DAILY (WITH BREAKFAST)  furosemide (LASIX) 40 MG tablet, Take 1 tablet (40 mg) by mouth daily  gabapentin (NEURONTIN) 100 MG capsule, Take 2 capsules (200 mg) by mouth 3 times daily  glipiZIDE (GLUCOTROL XL) 5 MG 24 hr tablet, Take 1 tablet (5 mg) by mouth 2 times daily  insulin glargine (LANTUS PEN) 100 UNIT/ML pen, Inject 15 Units Subcutaneous every morning (before breakfast)  latanoprost (XALATAN) 0.005 % ophthalmic solution, Place 1 drop Into the left eye daily  Lidocaine (LIDOCARE) 4 % Patch, Place 1 patch onto the skin every 24 hours To prevent lidocaine toxicity, patient should be patch free for 12 hrs daily.  lisinopril (ZESTRIL) 10 MG tablet, Take 1 tablet (10 mg) by mouth daily  loperamide (IMODIUM A-D) 2 MG tablet, Take 2 mg by mouth daily as needed  metFORMIN (GLUCOPHAGE) 500 MG tablet, Take 2 tablets (1,000 mg) by mouth 2 times daily (with meals)  Metoprolol Tartrate 37.5 MG TABS, Take 37.5 mg by mouth 2 times daily  multivitamin, therapeutic (THERA-VIT) TABS tablet, Take 1 tablet by mouth daily  Nutritional Supplements (GLUCERNA PO), Take 1 each by mouth 2 times daily Drinks chocolate flavor  Nutritional Supplements (LUIS) PACK, Take 1 packet by mouth daily Orange Luis - mix with 8 ounces of water/other beverage, take at noon  saccharomyces boulardii (FLORASTOR) 250 MG capsule, Take 1 capsule (250 mg) by mouth 2 times daily  senna-docusate (SENOKOT-S/PERICOLACE) 8.6-50 MG tablet, Take 2 tablets by mouth nightly as needed for constipation      PHYSICAL EXAMINATION:  Temp:  [97.5  F (36.4  C)-99.8  F (37.7  C)] 98.2  F (36.8  C)  Pulse:  [] 117  Resp:  [15-25] 22  BP: ()/(42-76) 131/76  FiO2 (%):  [40 %-50 %] 40 %  SpO2:  [90 %-99 %] 97 %    General: supine, intubated and sedated  Neuro: No focal neuro deficits noted.   Respiratory: Non-labored breathing, synchronous with mechanical ventilation, lung sounds diminished bilaterally at the bases  Cardiovascular:  Regular rate, irregularly irregular rhythm, no murmurs, rubs, or gallops  Gastrointestinal: Abdomen soft, non-distended  Extremities: 1+ LE edema bilaterally  Skin: pressure wound on the posterior right heel    LABS: Reviewed.   Arterial Blood Gases   Recent Labs   Lab 03/10/23  1004 03/10/23  0650   PH 7.30* 7.42     Complete Blood Count   Recent Labs   Lab 03/11/23  0454 03/10/23  0653   WBC 9.9 17.9*   HGB 9.1* 10.8*    156     Basic Metabolic Panel  Recent Labs   Lab 03/11/23  0758 03/11/23  0600 03/11/23  0454 03/11/23  0207 03/10/23  1524 03/10/23  1514 03/10/23  1049 03/10/23  0650   NA  --   --  134*  --   --  132*  --  130*   POTASSIUM  --   --  3.6  --   --  4.0  --  4.7   CHLORIDE  --   --  92*  --   --  93*  --  90*   CO2  --   --  31*  --   --  30*  --  27   BUN  --   --  16.6  --   --  19.8  --  22.3   CR  --   --  0.63*  --   --  0.63*  --  0.60*   * 126* 135* 123*   < > 217*   < > 186*    < > = values in this interval not displayed.     Liver Function Tests  Recent Labs   Lab 03/10/23  0650   AST 45   ALT 31   ALKPHOS 107   BILITOTAL 1.3*   ALBUMIN 2.9*     Pancreatic Enzymes  No lab results found in last 7 days.  Coagulation Profile  No lab results found in last 7 days.  Lactate  Invalid input(s): LACTATE    IMAGING:  Results for orders placed or performed during the hospital encounter of 03/10/23   XR Chest Port 1 View    Narrative    CHEST ONE VIEW PORTABLE March 10, 2023 7:01 AM     HISTORY: Shortness of breath.    COMPARISON: 2/17/2023.      Impression    IMPRESSION: Extensive predominantly airspace opacities in both lungs,  most prominent in the perihilar and lower lung regions, have increased  since the previous exam. Distribution of these findings suggests  pulmonary edema, however an infectious process could also have this  appearance. Previously described right upper lobe infiltrate and  consolidation has improved. Shallow inspiration accentuates heart size  and pulmonary  vascularity. Aortic calcification. No pneumothorax.     *Note: Due to a large number of results and/or encounters for the requested time period, some results have not been displayed. A complete set of results can be found in Results Review.

## 2023-03-11 NOTE — PROGRESS NOTES
[facility] ICU RESPIRATORY NOTE        Date of Admission: 3/10/2023    Date of Intubation: 3/10/23    Reason for Mechanical Ventilation: Respiratory Failure    Number of Days on Mechanical Ventilation: 2    Met Criteria for Spontaneous Breathing Trial: Yes    Significant Events Today: Patient tolerated PS 5/5 for 43 min today    ABG Results:   Recent Labs   Lab 03/10/23  1514 03/10/23  1233 03/10/23  1004 03/10/23  0650   PH  --   --  7.30* 7.42   O2PER 45 50  --   --        Current Vent Settings: Vent Mode: CMV/AC  (Continuous Mandatory Ventilation/ Assist Control)  FiO2 (%): 40 %  Resp Rate (Set): 16 breaths/min  Tidal Volume (Set, mL): 530 mL  PEEP (cm H2O): 5 cmH2O  Pressure Support (cm H2O): 5 cmH2O  Resp: 17      Plan: Continue to wean as tolerated with the objective of extubation.     Enoc Walker, RT on 3/11/2023 at 5:27 PM

## 2023-03-11 NOTE — PROGRESS NOTES
Patient intermittently opened eyes to pain and stimulation. Does nod head and following minimal commands. Weak grasp and wiggled toes. Does move BUE spontaneously. Very [place]. PERRL.     HR  in A-fib. MAP goal >60 per MD.     LS dim. Frequent cough with minimal ETT secretions. O2 mid 90's with FiO2 @ 40%.     UOP trending down overnight after Lasix was given yesterday evening. No BM. OG to LIS.     Coccyx and heel wound assessed. WOCN consult in place.     Courtney Schoen, RN on 3/11/2023 at 6:51 AM

## 2023-03-11 NOTE — PROVIDER NOTIFICATION
Respiratory Care Note        Patient is on full vent support.   No changes significant change of note this shift.  Vitals are stable on the settings below.   No weaning attempt overnight.      03/11/23 0230   Ventilator Settings    Vent Mode CMV/AC  (Continuous Mandatory Ventilation/ Assist Control)   Resp Rate (Set) 16 breaths/min   Tidal Volume (Set, mL) 530 mL   FiO2 45 %   PEEP (cm H2O) 5 cmH2O       Darinel BOYCE Deisi, RT.

## 2023-03-12 NOTE — PROGRESS NOTES
Patient hypotensive  Urine output trending downward  May be over diuresis  Will try small fluid bolus.    Patient volume responsive to first bolus, able to wean norepi down, will give another bolus and attempt to wean norepi off.    Not in synchrony with vent on AC, changed to SIMV with a rate of 12, better synchrony    Enrrique Suresh MD  Critical Care Medicine

## 2023-03-12 NOTE — PROGRESS NOTES
SPIRITUAL HEALTH SERVICES Progress Note  [facility]  Unit: ICU    I visited pt. Louis per STAT consult and admission request from spouse and daughter.  Two daughters, a son in law and a granddaughter, and his wife Laura were bedside.      I offered spiritual and emotional support through reflective listening, affirming their emotions and normalizing their feelings of sadness and ultimate hope. I facilitated story sharing and offered prayer, incorporating conversational themes.       Plan of care:  I orientated the family to [facility] and how to contact.  Please consult should further needs arise.      DEBRA Gomez   Intern    [facility] routine referrals [facility] *[phone]  [facility] available 24/7 for emergent requests/referrals, either by paging the on-call  or by entering an ASAP/STAT consult in Epic (this will also page the on-call ).

## 2023-03-12 NOTE — PROGRESS NOTES
[facility] ICU RESPIRATORY NOTE        Date of Admission: 3/10/2023    Date of Intubation (most recent): 3/10/2023    Reason for Mechanical Ventilation: Respiratory failure.    Number of Days on Mechanical Ventilation: 3    Met Criteria for Spontaneous Breathing Trial: Yes -    Significant Events Today: Patient was switched to SIMV mode due to double triggering.     ABG Results:   Recent Labs   Lab 03/10/23  1514 03/10/23  1233 03/10/23  1004 03/10/23  0650   PH  --   --  7.30* 7.42   O2PER 45 50  --   --        Current Vent Settings: Vent Mode: SIMV  (Synchronized Intermittent Mandatory Ventilation)  FiO2 (%): 50 %  Resp Rate (Set): 12 breaths/min  Tidal Volume (Set, mL): 470 mL  PEEP (cm H2O): 5 cmH2O  Pressure Support (cm H2O): 10 cmH2O  Resp: 19      Skin Assessment: Intact.    Plan: Continue to monitor and wean as tolerated.     Glenn Mckeon, RT on 3/12/2023 at 4:23 AM

## 2023-03-12 NOTE — PROGRESS NOTES
ICU Progress Note  March 12, 2023      CO-MORBIDITIES:   Acute respiratory failure with hypoxia (H)  Acute on chronic congestive heart failure with left ventricular diastolic dysfunction (H)  Atrial fibrillation with RVR (H)    ASSESSMENT: Louis Keller Jr. is a 89 year old male with a significant past medical history of chronic atrial fibrillation, CHF with diastolic dysfunction, COPD, and DM II who initially presented on 3/10 with SOB now s/p intubation for acute hypoxic respiratory failure. Working diagnosis at this point most consistent with CHF exacerbation in the context of elevated BNP and new findings on echocardiogram of RV dysfunction. Also concern for potential respiratory infection in the setting of recent bacterial pneumonia, elevated WBC, and airspace opacities on CXR.     PLAN:  Neuro/ pain/ sedation:  # Sedation and pain control for intubation  -  Precedex for sedation. Midazolam was weaned off and there is PRN ativan in place  - Overall plan is to wean off sedation slowly and attempt SBT today    Pulmonary care:   # Acute respiratory failure with hypoxia/Pulmonary edema/VAP  - Intubated to maintain respiratory status   Vent Mode: SIMV/PS  (Synchronized Intermittent Mandatory Ventilation with Pressure Support)  FiO2 (%): 50 %  Resp Rate (Set): 12 breaths/min  Tidal Volume (Set, mL): 470 mL  PEEP (cm H2O): 5 cmH2O  Pressure Support (cm H2O): 10 cmH2O  Resp: 15  - Appears to be having secretions and will intensify pulmonary hygiene. Start antibiotics, sputum culture  - Appears to have concomitant RV failure with concern for fluid overload, but appears to be euvolemic with downtrending BNP and hypotension while diuresing  - He is likely diuresis dependent and will start oral lasix 20 mg daily  - Continue to adjust vent settings and wean as able, trend VBG for ongoing monitoring  - likely SBT today    Cardiovascular:    # Acute on chronic CHF with left ventricular diastolic dysfunction/RV  dysfunction  - Very gentle diuresis as he is likely diuresis dependent  - Daily BMP  # Atrial fibrillation with RVR  - Rhythm currently in A-fib per tele, HR WNL 80-90's  - PTA amiodarone   - Restart Elliquis  # Hemodynamics - mild hypotension  - Not currently requiring pressors, required briefly overnight, continue to monitor hemodynamic status    ID/ Antibiotics:  # Concern for community-acquired pneumonia  - Recent hospitalization c/b CAP, on presentation WBC elevated to 17.9 and airspace opacities on CXR with potential infectious process  - Blood cx's, sputum cx- NGT  - Zosyn 4.5g q6hrs    Renal/Fluids/ Electrolytes:  # Lactic acidosis (resolved)  # Hyponatremia (acute on chronic)  - Na 130 on admission, appears to be chronic in the context of CHF and diuresis and improving  - monitor Na with diuresis   - Electrolyte replacement protocol as needed  - Mccarthy for strict I/O's    GI care:   # Protein calorie malnutrition  - NPO for now  - Nutrition consult for TF    Endocrine:    # Hyperglycemia   # Uncontrolled Type II DM (HgbA1c 8.9% on 2/13/23)  - Medium sliding scale insulin, goal < 180    Heme:     # Anemia (acute on chronic)  Hemoglobin 10.8 on admission, baseline 11-12.   - Continue to monitor with daily CBC  - Restart Elliquis    MSK:  # Multiple Lumbar vertebral body fractures   - Neurosurgery consulted during prior hospitalization, determined to not be a candidate for surgical intervention.  - Recommend TLSO brace when awake and extubated    Prophylaxis:    - DVT ppx: restart Elliquis  - Chlorohexidine swabs  - Elevated HOB 30 degrees  - restraints BUE wrists   - Pepcid for GI PPx    Lines/ tubes/ drains:  - PIVs x 2  - Mccarthy catheter   - ETT  - OGT     Disposition:  ICU    Time Spent on this Encounter   Louis was seen and evaluated by me on 03/12/23.  He was in critical condition as the result of Pulmonary edema/respiratory failure.    His condition is now Critical.     Total Critical Care time spent by  me, excluding procedures, was 40 minutes.    MD Julia Hughes MD  Pulmonary, Critical Care and Sleep Medicine  [facility]  Pager: [phone]      - - - - - - - - - - - - - - - - - - - - - - - - - - - - - - - - - - - - - - - - - - - - - - - - - - - - - - - - -     HISTORY PRESENTING ILLNESS: Louis Keller Jr. is a 89 year old male with a significant past medical history of chronic atrial fibrillation, CHF, COPD, and DM II who initially presented on 3/10 with SOB. Per chart review, the patient reportedly began experiencing worsening shortness of breath during the night and into this morning. EMS reports his O2 sats on the scene were in the 70's, BiPAP initiated with improvement. Just prior to arrival, O2 sats started to drop and breathing became more labored. On arrival O2 sats in the upper 80's, after transferring to ED cart and changing BiPAP units his O2 sats dropped to 53%. Patient subsequently intubated for acute hypoxic respiratory failure.     Of note, patient recently hospitalized from 2/09/23-2/26/23 for acute hypoxic respiratory failure and CHF exacerbation, c/b community-acquired pneumonia. Was discharged to TCU at [facility].    Family at bedside today (wife Laura and daughter Kathy). Noted that patient appeared well yesterday and that the shortness of breath developed abruptly overnight. They denied any fevers or concern for infection/pneumonia.     REVIEW OF SYSTEMS: Unable to assess secondary to intubation and sedation.    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     Unspecified disease of pancreas     pancreatitis       SURGICAL HISTORY:   Past Surgical History:   Procedure Laterality Date     ARTHROPLASTY KNEE  2013    Procedure: ARTHROPLASTY KNEE;  Left Total Knee Arthroplasty       CARDIOVERSION  11    failed     EP LOOP RECORDER IMPLANT N/A 2019    Procedure: EP Loop Recorder Implant;  Surgeon: Darling Pacheco MD;  Location:  HEART CARDIAC CATH LAB     [facility] NONSPECIFIC PROCEDURE  Child    T&A     [facility] NONSPECIFIC PROCEDURE  ; also     Colonoscopy     [facility] NONSPECIFIC PROCEDURE      Basal cell cancer of the nose     [facility] NONSPECIFIC PROCEDURE      Cholecystectomy       FAMILY HISTORY:  Family History   Problem Relation Age of Onset     Alzheimer Disease Maternal Grandmother       Arthritis Maternal Grandmother       Cancer Mother           breast/ 1983/colon     Eye Disorder Mother           glaucoma and cataracts     Heart Disease Mother           angina/CABG     Hypertension Mother       Cancer Father           colon     Diabetes Maternal Aunt           AoDM     No Known Problems Daughter        ALLERGIES:      Allergies   Allergen Reactions     No Known Drug Allergies        MEDICATIONS:  No current facility-administered medications on file prior to encounter.  acetaminophen (TYLENOL) 500 MG tablet, Take 1,000 mg by mouth 3 times daily as needed for mild pain  amiodarone (PACERONE) 200 MG tablet, Take 1 tablet (200 mg) by mouth daily  apixaban ANTICOAGULANT (ELIQUIS) 2.5 MG tablet, Take 1 tablet (2.5 mg) by mouth 2 times daily  brimonidine (ALPHAGAN) 0.2 % ophthalmic solution, Place 1 drop Into the left eye 2 times daily  cholestyramine (QUESTRAN) 4 g packet, Take 1 packet (4 g) by mouth 2 times daily (with meals) (Patient taking differently: Take 1 packet  by mouth daily)  Cyanocobalamin (VITAMIN B-12 CR) 1000 MCG TBCR, Take 1,000 mcg by mouth daily  dorzolamide (TRUSOPT) 2 % ophthalmic solution, Place 1 drop Into the left eye 2 times daily  ferrous gluconate (FERGON) 324 (38 Fe) MG tablet, TAKE 1 TABLET (324 MG) BY MOUTH DAILY (WITH BREAKFAST)  furosemide (LASIX) 40 MG tablet, Take 1 tablet (40 mg) by mouth daily  gabapentin (NEURONTIN) 100 MG capsule, Take 2 capsules (200 mg) by mouth 3 times daily  glipiZIDE (GLUCOTROL XL) 5 MG 24 hr tablet, Take 1 tablet (5 mg) by mouth 2 times daily  insulin glargine (LANTUS PEN) 100 UNIT/ML pen, Inject 15 Units Subcutaneous every morning (before breakfast)  latanoprost (XALATAN) 0.005 % ophthalmic solution, Place 1 drop Into the left eye daily  Lidocaine (LIDOCARE) 4 % Patch, Place 1 patch onto the skin every 24 hours To prevent lidocaine toxicity, patient should be patch free for 12 hrs daily.  lisinopril (ZESTRIL) 10 MG tablet, Take 1 tablet (10 mg) by mouth daily  loperamide (IMODIUM A-D) 2 MG tablet, Take 2 mg by mouth daily as needed  metFORMIN (GLUCOPHAGE) 500 MG tablet, Take 2 tablets (1,000 mg) by mouth 2 times daily (with meals)  Metoprolol Tartrate 37.5 MG TABS, Take 37.5 mg by mouth 2 times daily  multivitamin, therapeutic (THERA-VIT) TABS tablet, Take 1 tablet by mouth daily  Nutritional Supplements (GLUCERNA PO), Take 1 each by mouth 2 times daily Drinks chocolate flavor  Nutritional Supplements (LUIS) PACK, Take 1 packet by mouth daily Orange Luis - mix with 8 ounces of water/other beverage, take at noon  saccharomyces boulardii (FLORASTOR) 250 MG capsule, Take 1 capsule (250 mg) by mouth 2 times daily  senna-docusate (SENOKOT-S/PERICOLACE) 8.6-50 MG tablet, Take 2 tablets by mouth nightly as needed for constipation      PHYSICAL EXAMINATION:  Temp:  [95.7  F (35.4  C)-99.8  F (37.7  C)] 98.6  F (37  C)  Pulse:  [] 67  Resp:  [14-23] 15  BP: ()/(46-76) 100/59  FiO2 (%):  [40 %-50 %] 50 %  SpO2:  [91  %-98 %] 95 %    General: supine, intubated and sedated  Neuro: No focal neuro deficits noted.   Respiratory: Non-labored breathing, synchronous with mechanical ventilation, lung sounds diminished bilaterally at the bases  Cardiovascular: Regular rate, irregularly irregular rhythm, no murmurs, rubs, or gallops  Gastrointestinal: Abdomen soft, non-distended  Extremities: 1+ LE edema bilaterally  Skin: pressure wound on the posterior right heel    LABS: Reviewed.   Arterial Blood Gases   Recent Labs   Lab 03/10/23  1004 03/10/23  0650   PH 7.30* 7.42     Complete Blood Count   Recent Labs   Lab 03/12/23  0711 03/11/23  0454 03/10/23  0653   WBC 7.4 9.9 17.9*   HGB 8.4* 9.1* 10.8*    150 156     Basic Metabolic Panel  Recent Labs   Lab 03/12/23  0711 03/12/23  0540 03/12/23  0331 03/11/23  2152 03/11/23  0600 03/11/23  0454 03/10/23  1524 03/10/23  1514 03/10/23  1049 03/10/23  0650   *  --   --   --   --  134*  --  132*  --  130*   POTASSIUM 3.2*  --   --   --   --  3.6  --  4.0  --  4.7   CHLORIDE 95*  --   --   --   --  92*  --  93*  --  90*   CO2 31*  --   --   --   --  31*  --  30*  --  27   BUN 16.5  --   --   --   --  16.6  --  19.8  --  22.3   CR 0.70  --   --   --   --  0.63*  --  0.63*  --  0.60*   * 114* 125* 122*   < > 135*   < > 217*   < > 186*    < > = values in this interval not displayed.     Liver Function Tests  Recent Labs   Lab 03/10/23  0650   AST 45   ALT 31   ALKPHOS 107   BILITOTAL 1.3*   ALBUMIN 2.9*     Pancreatic Enzymes  No lab results found in last 7 days.  Coagulation Profile  No lab results found in last 7 days.  Lactate  Invalid input(s): LACTATE    IMAGING:  Results for orders placed or performed during the hospital encounter of 03/10/23   XR Chest Port 1 View    Narrative    CHEST ONE VIEW PORTABLE March 10, 2023 7:01 AM     HISTORY: Shortness of breath.    COMPARISON: 2/17/2023.      Impression    IMPRESSION: Extensive predominantly airspace opacities in both  lungs,  most prominent in the perihilar and lower lung regions, have increased  since the previous exam. Distribution of these findings suggests  pulmonary edema, however an infectious process could also have this  appearance. Previously described right upper lobe infiltrate and  consolidation has improved. Shallow inspiration accentuates heart size  and pulmonary vascularity. Aortic calcification. No pneumothorax.     *Note: Due to a large number of results and/or encounters for the requested time period, some results have not been displayed. A complete set of results can be found in Results Review.

## 2023-03-12 NOTE — PROGRESS NOTES
Patient intermittently opening eyes to voice/touch.  Does follow minimal commands. Weak grasp, shrugs shoulders and wiggled toes. Does move BUE spontaneously. Very [place]. PERRL.      HR 60-70 in A-fib. MAP goal >60. Started to become more hypotensive yesterday evening. MD notified and bolus given. Norepi ordered and started at low rate. BP's slowly improved. Albumin given and norepi turned off. AM lasix held per Dr. Suresh.      LS course. Frequent cough with very thick secreations. O2 mid 90's with FiO2 @ 50%.      UOP trending down overnight after Lasix was given yesterday evening. MD aware. (given fluid). No BM. OG clammped.      Coccyx and heel wound assessed. WOCN consult in place. Repositioning q1-2h left to right.      Courtney Schoen, RN on 3/12/2023 at 6:15 AM

## 2023-03-13 NOTE — PROVIDER NOTIFICATION
Notified provider about indwelling lewis catheter discussed removal or continued need.    Did provider choose to remove indwelling lewis catheter? yes    Provider's lewis indication for keeping indwelling lewis catheter:none    Is there an order for indwelling lewis catheter?no      Plan for lewis catheter removal today per Dr. Garvey.        *If there is a plan to keep lewis catheter in place at discharge daily notification with provider is not necessary, but please add a notation in the treatment team sticky note that the patient will be discharging with the catheter.

## 2023-03-13 NOTE — CONSULTS
"SPIRITUAL HEALTH SERVICES Progress Note  [facility] ICU    Referral Source: SH Consult - Emotional Support    Supported Pt's spouse, Laura, and family (children and grandchildren) at bedside. Laura named desire for Louis to \"not suffer anymore\" and reflected on her life and memories with Louis. Laura requested prayer for \"comfort and peace.\" Family gathered around the bedside, I provided a prayer, and family shared words of love and gratitude with Louis.    Plan: Family are aware of SH availability. SHS is available 24/7 for emergent requests/referrals, either by having the on-call  paged or by entering an ASAP/STAT consult in Epic (this will also page the on-call ). SHS remains available.    Karen Bazan MDiv  Chaplain Resident  Pager: [phone]     SHS available 24/7 for emergent requests/referrals, either by having the on-call  paged or by entering an ASAP/STAT consult in Epic (this will also page the on-call ).  "

## 2023-03-13 NOTE — PROGRESS NOTES
ICU Progress Note  March 12, 2023      CO-MORBIDITIES:   Acute respiratory failure with hypoxia (H)  Acute on chronic congestive heart failure with left ventricular diastolic dysfunction (H)  Atrial fibrillation with RVR (H)    ASSESSMENT: Louis Keller Jr. is a 89 year old male with a significant past medical history of chronic atrial fibrillation, CHF with diastolic dysfunction, COPD, and DM II who initially presented on 3/10 with SOB now s/p intubation for acute hypoxic respiratory failure. Working diagnosis at this point most consistent with CHF exacerbation in the context of elevated BNP and new findings on echocardiogram of RV dysfunction. Also concern for potential respiratory infection in the setting of recent bacterial pneumonia, elevated WBC, and airspace opacities on CXR. Extubation on 3/13 attempted, required transition to BIPAP right away. Prognosis guarded.     PLAN:  Neuro/ pain/ sedation:  # encephalopathy; secondary to sedatives and respiratory distress   - continue BIPAP for supportive care   - discontinue all sedation medications     Pulmonary care:   # Acute respiratory failure with hypoxia/Pulmonary edema/VAP  - Intubated to maintain respiratory status   Vent Mode: PS  (Pressure Support)  FiO2 (%): 50 %  Resp Rate (Set): 12 breaths/min  Tidal Volume (Set, mL): 470 mL  PEEP (cm H2O): 5 cmH2O  Pressure Support (cm H2O): 5 cmH2O  Resp: 25  - Continue abx for now   - additional lasix given today  - Continue BIPAP as tolerated for now  - likely transition to comfort cares later today    Cardiovascular:    # Acute on chronic CHF with left ventricular diastolic dysfunction/RV dysfunction  - diuresis with 40 lasix IV x1   - monitor UOP   # Atrial fibrillation with RVR  - Rhythm currently in A-fib per tele, HR WNL 80-90's  - PTA amiodarone   - PTA Elliquis     ID/ Antibiotics:  # Concern for community-acquired pneumonia  - Recent hospitalization c/b CAP, on presentation WBC elevated to 17.9 and  airspace opacities on CXR with potential infectious process  - Blood cx's, sputum cx- NGT  - Zosyn 4.5g q6hrs    Renal/Fluids/ Electrolytes:  # Lactic acidosis (resolved)  # Hyponatremia (acute on chronic) - improved  - Lasix 40 mg IV x1 given    - Electrolyte replacement protocol as needed  - Mccarthy for strict I/O's    GI care:   # Protein calorie malnutrition  - NPO for now; no tube feeds while on BIPAP     Endocrine:    # Hyperglycemia   # Uncontrolled Type II DM (HgbA1c 8.9% on 2/13/23)  - Medium sliding scale insulin, goal < 180    Heme:     # Anemia (acute on chronic)  Hemoglobin 10.8 on admission, baseline 11-12.   - Continue to monitor with daily CBC  - PTA Elliquis    MSK:  # Multiple Lumbar vertebral body fractures   - Neurosurgery consulted during prior hospitalization, determined to not be a candidate for surgical intervention.  - no acute interventions; monitor for now     Prophylaxis:    - DVT ppx: restart Elliquis  - Elevated HOB 30 degrees  - Pepcid for GI PPx    Lines/ tubes/ drains:  - PIVs x 2  - Mccarthy catheter     Disposition:  ICU; prognosis guarded; likely transition to comfort care today    Time Spent on this Encounter   Louis was seen and evaluated by me on 03/12/23.  He was in critical condition as the result of Pulmonary edema/respiratory failure.    His condition is now Critical.     Total Critical Care time spent by me, excluding procedures, was 40 minutes.    MD Adri Teixeira MD   3/13/2023   Critical Care Fellow       - - - - - - - - - - - - - - - - - - - - - - - - - - - - - - - - - - - - - - - - - - - - - - - - - - - - - - - - -     HISTORY PRESENTING ILLNESS: Louis Bassdevin Willis is a 89 year old male with a significant past medical history of chronic atrial fibrillation, CHF, COPD, and DM II who initially presented on 3/10 with SOB. Per chart review, the patient reportedly began experiencing worsening shortness of breath during the night and into this morning. EMS  reports his O2 sats on the scene were in the 70's, BiPAP initiated with improvement. Just prior to arrival, O2 sats started to drop and breathing became more labored. On arrival O2 sats in the upper 80's, after transferring to ED cart and changing BiPAP units his O2 sats dropped to 53%. Patient subsequently intubated for acute hypoxic respiratory failure.     Of note, patient recently hospitalized from 2/09/23-2/26/23 for acute hypoxic respiratory failure and CHF exacerbation, c/b community-acquired pneumonia. Was discharged to TCU at [facility].    Patient extubated this am after tolerating SBT and following commands. Immediately showed increased work of breathing, was placed on BIPAP. Family at bedside, condition reviewed and discussed possibility of reintubation with expected need for tracheostomy. Appears that family would not want this, so goals of care were re-addressed; likely transition to comfort care later today.        REVIEW OF SYSTEMS: Unable to assess secondary to intubation and sedation.    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     Unspecified disease of pancreas 1990    pancreatitis       SURGICAL HISTORY:   Past Surgical History:   Procedure Laterality Date     ARTHROPLASTY KNEE  8/5/2013    Procedure: ARTHROPLASTY KNEE;  Left Total Knee Arthroplasty       CARDIOVERSION  9/6/11    failed     EP LOOP RECORDER IMPLANT N/A 8/5/2019     Procedure: EP Loop Recorder Implant;  Surgeon: Darling Pacheco MD;  Location:  HEART CARDIAC CATH LAB     [facility], [place] NONSPECIFIC PROCEDURE  Child    T&A     [facility] [place] NONSPECIFIC PROCEDURE  ; also     Colonoscopy     [facility] [place] NONSPECIFIC PROCEDURE      Basal cell cancer of the nose     [facility] [place] NONSPECIFIC PROCEDURE      Cholecystectomy       FAMILY HISTORY:  Family History   Problem Relation Age of Onset     Alzheimer Disease Maternal Grandmother       Arthritis Maternal Grandmother       Cancer Mother           breast/ 1983/colon     Eye Disorder Mother           glaucoma and cataracts     Heart Disease Mother           angina/CABG     Hypertension Mother       Cancer Father           colon     Diabetes Maternal Aunt           AoDM     No Known Problems Daughter        ALLERGIES:      Allergies   Allergen Reactions     No Known Drug Allergies        MEDICATIONS:  No current facility-administered medications on file prior to encounter.  acetaminophen (TYLENOL) 500 MG tablet, Take 1,000 mg by mouth 3 times daily as needed for mild pain  amiodarone (PACERONE) 200 MG tablet, Take 1 tablet (200 mg) by mouth daily  apixaban ANTICOAGULANT (ELIQUIS) 2.5 MG tablet, Take 1 tablet (2.5 mg) by mouth 2 times daily  brimonidine (ALPHAGAN) 0.2 % ophthalmic solution, Place 1 drop Into the left eye 2 times daily  cholestyramine (QUESTRAN) 4 g packet, Take 1 packet (4 g) by mouth 2 times daily (with meals) (Patient taking differently: Take 1 packet by mouth daily)  Cyanocobalamin (VITAMIN B-12 CR) 1000 MCG TBCR, Take 1,000 mcg by mouth daily  dorzolamide (TRUSOPT) 2 % ophthalmic solution, Place 1 drop Into the left eye 2 times daily  ferrous gluconate (FERGON) 324 (38 Fe) MG tablet, TAKE 1 TABLET (324 MG) BY MOUTH DAILY (WITH BREAKFAST)  furosemide (LASIX) 40 MG tablet, Take 1 tablet (40 mg) by mouth daily  gabapentin (NEURONTIN) 100 MG capsule, Take 2 capsules (200 mg) by mouth 3 times daily  glipiZIDE (GLUCOTROL XL) 5 MG  24 hr tablet, Take 1 tablet (5 mg) by mouth 2 times daily  insulin glargine (LANTUS PEN) 100 UNIT/ML pen, Inject 15 Units Subcutaneous every morning (before breakfast)  latanoprost (XALATAN) 0.005 % ophthalmic solution, Place 1 drop Into the left eye daily  Lidocaine (LIDOCARE) 4 % Patch, Place 1 patch onto the skin every 24 hours To prevent lidocaine toxicity, patient should be patch free for 12 hrs daily.  lisinopril (ZESTRIL) 10 MG tablet, Take 1 tablet (10 mg) by mouth daily  loperamide (IMODIUM A-D) 2 MG tablet, Take 2 mg by mouth daily as needed  metFORMIN (GLUCOPHAGE) 500 MG tablet, Take 2 tablets (1,000 mg) by mouth 2 times daily (with meals)  Metoprolol Tartrate 37.5 MG TABS, Take 37.5 mg by mouth 2 times daily  multivitamin, therapeutic (THERA-VIT) TABS tablet, Take 1 tablet by mouth daily  Nutritional Supplements (GLUCERNA PO), Take 1 each by mouth 2 times daily Drinks chocolate flavor  Nutritional Supplements (LUIS) PACK, Take 1 packet by mouth daily Orange Luis - mix with 8 ounces of water/other beverage, take at noon  saccharomyces boulardii (FLORASTOR) 250 MG capsule, Take 1 capsule (250 mg) by mouth 2 times daily  senna-docusate (SENOKOT-S/PERICOLACE) 8.6-50 MG tablet, Take 2 tablets by mouth nightly as needed for constipation      PHYSICAL EXAMINATION:  Temp:  [97.9  F (36.6  C)-100.2  F (37.9  C)] 98.2  F (36.8  C)  Pulse:  [] 110  Resp:  [12-30] 25  BP: ()/(41-71) 130/65  FiO2 (%):  [40 %-50 %] 50 %  SpO2:  [89 %-98 %] 96 %    General: supine, on BIPAP; obtunded GCS 8 (1/1/6)  Neuro: No focal neuro deficits noted; diffusely weak, intermittently follows commands   Respiratory: Increased work of breathing, on BIPAP, lung sounds diminished bilaterally at the bases  Cardiovascular: Regular rate, irregularly irregular rhythm, no murmurs, rubs, or gallops  Gastrointestinal: Abdomen soft, non-distended  Extremities: 1+ LE edema bilaterally  Skin: pressure wound on the posterior right  heel    LABS: Reviewed.   Arterial Blood Gases   Recent Labs   Lab 03/10/23  1004 03/10/23  0650   PH 7.30* 7.42     Complete Blood Count   Recent Labs   Lab 03/13/23  0556 03/12/23  0711 03/11/23  0454 03/10/23  0653   WBC 7.7 7.4 9.9 17.9*   HGB 9.1* 8.4* 9.1* 10.8*    164 150 156     Basic Metabolic Panel  Recent Labs   Lab 03/13/23  0653 03/13/23  0556 03/13/23  0209 03/12/23  2206 03/12/23  1722 03/12/23  0945 03/12/23  0711 03/11/23  0600 03/11/23  0454 03/10/23  1524 03/10/23  1514   NA  --  138  --   --   --   --  135*  --  134*  --  132*   POTASSIUM  --  3.5  --   --  3.9  --  3.2*  --  3.6  --  4.0   CHLORIDE  --  96*  --   --   --   --  95*  --  92*  --  93*   CO2  --  26  --   --   --   --  31*  --  31*  --  30*   BUN  --  19.5  --   --   --   --  16.5  --  16.6  --  19.8   CR  --  0.70  --   --   --   --  0.70  --  0.63*  --  0.63*   * 141* 143* 134*  --    < > 116*   < > 135*   < > 217*    < > = values in this interval not displayed.     Liver Function Tests  Recent Labs   Lab 03/10/23  0650   AST 45   ALT 31   ALKPHOS 107   BILITOTAL 1.3*   ALBUMIN 2.9*     Pancreatic Enzymes  No lab results found in last 7 days.  Coagulation Profile  No lab results found in last 7 days.  Lactate  Invalid input(s): LACTATE    IMAGING:  Results for orders placed or performed during the hospital encounter of 03/10/23   XR Chest Port 1 View    Narrative    CHEST ONE VIEW PORTABLE March 10, 2023 7:01 AM     HISTORY: Shortness of breath.    COMPARISON: 2/17/2023.      Impression    IMPRESSION: Extensive predominantly airspace opacities in both lungs,  most prominent in the perihilar and lower lung regions, have increased  since the previous exam. Distribution of these findings suggests  pulmonary edema, however an infectious process could also have this  appearance. Previously described right upper lobe infiltrate and  consolidation has improved. Shallow inspiration accentuates heart size  and pulmonary  vascularity. Aortic calcification. No pneumothorax.     *Note: Due to a large number of results and/or encounters for the requested time period, some results have not been displayed. A complete set of results can be found in Results Review.

## 2023-03-13 NOTE — PLAN OF CARE
Goal Outcome Evaluation:       Pt remains intubated, sedated on precedex gtt.  RASS -1 to 1.  Prn ativan and versed given for comfort & ventilator synchrony.  Pt wakes, follows & responds appropriately w/head nods.      Pt performed PS trial 5/5 40% for 1.5h.  2nd attempt at PS trial this afternoon 30min, triggering vent for high peak pressures, grimacing and coughing frequently.  Family was present & pt appeared anxious r/t inability to speak. Minimal but thick plugging secretions via ETT.     Family has verbalized desire discuss code status and to be present when pt is able to extubate with hopes that he can speak his wishes related to goals of care.

## 2023-03-13 NOTE — PROGRESS NOTES
Pt extubated to NC @ 1200. Placed oxymask for increased WOB, then BiPap. Pt becoming more obtunded. MD discussed next steps for pt with family who have now decided that they would like to move forward with comfort cares. Pt now DNR/DNI.  in pt's room. Will start comfort medications this afternoon with removal of Bipap.

## 2023-03-13 NOTE — PROGRESS NOTES
Right wrist and Left wrist restraints continued 3/13/2023    Clinical Justification: Pulling lines, pulling tubes, and pulling equipment  Less Restrictive Alternative: Repositioning, Pain management, Reorientation, De-escalation, Re-evaluate equipment   ,     New orders placed current orders valid will request new order if needed.   Length of Order: 1 Day      Beverly Pride RN    Neuro: Rass labile, intermittently fights ventilator, follows commands  CV: a fib, rate controlled 70-90. Intermittent levophed needed to maintain MAP goal  Resp: no secretions. Crackles in bases of lungs: On SIMV for vent compliance  : lewis removed. Contacted Dr. raheel boyd for low urine output. No void yet, external catheter placed. Bladder scan q 4 hours. Administered 10 mg IV lasix. No urine yet, however bladder scan showed 226 ml urine.   GI: hypoactive bowels. Remains NPO. Needs nutrition consult. NG in place and clamped.   Pain: tylenol 1000mg po administered once

## 2023-03-13 NOTE — PROGRESS NOTES
Extubation Note    Successful completion of SBT (Yes or No):Yes  Extubation time:1200    Patient assessment:  Lung sounds:Coarse and diminished.  Stridor Present (Yes or No):No  Patient tolerance:Pt was initially extubated to a 4L oxymask which was then increased to 6L with SpO2 in the low 90's. At 1208 pt was placed on BiPAP per MD order due to pt having an increased WOB.    Oxygen device:  BiPAP 16/8 50%  SpO2:95%    Plan:Will cont BiPAP for now.   3/13/2023  Radha Weaver, RT

## 2023-03-14 ENCOUNTER — PATIENT OUTREACH (OUTPATIENT)
Dept: CARE COORDINATION | Facility: CLINIC | Age: 88
End: 2023-03-14
Payer: COMMERCIAL

## 2023-03-14 NOTE — PROGRESS NOTES
Provided most-mortem cares from [phone]. Finished death record paperwork, pulled PIVx2 and lewis catheter out. Pt sent to [facility] with security, no belongings present.

## 2023-03-14 NOTE — PROGRESS NOTES
Clinic Care Coordination Contact  Care Coordination Clinician Chart Review    Situation: Patient chart reviewed by care coordinator.    Background: Clinic Care Coordination Referral received from inpatient care team for transition handoff communication following hospital admission.    Assessment: Upon chart review, patient is not a candidate for Primary Care Clinic Care Coordination enrollment due to reason stated below:  Patient has passed away.    Plan/Recommendations: Clinic Care Coordination Referral/order cancelled. RN/SW CC will perform no further monitoring/outreaches at this time and will remain available as needed. If new needs arise, a new Care Coordination Referral may be placed.    Linsey Barr RN Care Coordinator  [facility]  Email: [email or web address]  Phone: [phone]

## 2023-03-14 NOTE — DISCHARGE SUMMARY
[facility]    Death Summary - ICU Service    Date of Admission:  3/10/2023  Date of Death:         3/13/2023  Attending Provider: [facility]   Louis Keller Jr. was admitted on 3/10/2023 for Acute Hypoxic Respiratory Failure.  The following problems were addressed during his hospitalization:    Acute Hypoxic Respiratory Failure: bipap, discussing GOC with family, extubated 3/13/2023  Cardiogenic Shock on admission     CAP: Zosyn     Cause of death: Acute Hypoxic Respiratory Failure from CAP and CHF      Abdirashid Johnson MD  [facility]  Pager: [phone]  ______________________________________________________________________    Significant Results and Procedures   Most Recent 3 CBC's:Recent Labs   Lab Test 03/13/23  0556 03/12/23  0711 03/11/23  0454   WBC 7.7 7.4 9.9   HGB 9.1* 8.4* 9.1*   MCV 94 90 89    164 150     Most Recent 3 BMP's:Recent Labs   Lab Test 03/13/23  0653 03/13/23  0556 03/13/23  0209 03/12/23  2206 03/12/23  1722 03/12/23  0945 03/12/23  0711 03/11/23  0600 03/11/23  0454   NA  --  138  --   --   --   --  135*  --  134*   POTASSIUM  --  3.5  --   --  3.9  --  3.2*  --  3.6   CHLORIDE  --  96*  --   --   --   --  95*  --  92*   CO2  --  26  --   --   --   --  31*  --  31*   BUN  --  19.5  --   --   --   --  16.5  --  16.6   CR  --  0.70  --   --   --   --  0.70  --  0.63*   ANIONGAP  --  16*  --   --   --   --  9  --  11   MICHAEL  --  8.6  --   --   --   --  8.1*  --  8.2   * 141* 143*   < >  --    < > 116*   < > 135*    < > = values in this interval not displayed.       Consultations This Hospital Stay   WOUND OSTOMY CONTINENCE NURSE  IP CONSULT  [facility]UAL [facility] IP CONSULT  SP[facility]UAL [facility] SERVICES IP CONSULT    Primary Care Physician   Joselito Armas

## 2023-03-15 LAB
BACTERIA BLD CULT: NO GROWTH
BACTERIA BLD CULT: NO GROWTH

## 2023-03-16 LAB
ATRIAL RATE - MUSE: 87 BPM
DIASTOLIC BLOOD PRESSURE - MUSE: NORMAL MMHG
INTERPRETATION ECG - MUSE: NORMAL
P AXIS - MUSE: NORMAL DEGREES
PR INTERVAL - MUSE: NORMAL MS
QRS DURATION - MUSE: 80 MS
QT - MUSE: 364 MS
QTC - MUSE: 445 MS
R AXIS - MUSE: 58 DEGREES
SYSTOLIC BLOOD PRESSURE - MUSE: NORMAL MMHG
T AXIS - MUSE: 86 DEGREES
VENTRICULAR RATE- MUSE: 90 BPM

## 2023-07-12 NOTE — IP AVS SNAPSHOT
MRN:[id number]                      After Visit Summary   3/3/2018    Louis Keller Jr.    MRN: [id number]           Thank you!     Thank you for choosing [facility] for your care. Our goal is always to provide you with excellent care. Hearing back from our patients is one way we can continue to improve our services. Please take a few minutes to complete the written survey that you may receive in the mail after you visit. If you would like to speak to someone directly about your visit please contact Patient Relations at [phone]. Thank you!          Patient Information     Date Of Birth          6/26/1932        About your hospital stay     You were admitted on:  March 3, 2018 You last received care in the:  [facility] Observation Department    You were discharged on:  March 6, 2018        Reason for your hospital stay       Knee effusion, pseudogout                  Who to Call     For medical emergencies, please call 911.  For non-urgent questions about your medical care, please call your primary care provider or clinic, [phone]          Attending Provider     Provider Specialty    Jaime Decker,  Emergency Medicine    Mike Boykin MD Internal Medicine    Candie Vivas DO Internal Medicine       Primary Care Provider Office Phone # Fax #    Joselito Armas [phone]      After Care Instructions     Activity - Up with nursing assistance           Advance Diet as Tolerated       Follow this diet upon discharge: Moderate consistent carbohydrate (7813-4808 shanta / 4-6 CHO units per meal)            Fall precautions           General info for SNF       Length of Stay Estimate: Short Term Care: Estimated # of Days <30  Condition at Discharge: Stable  Level of care:skilled   Rehabilitation Potential: Good  Admission H&P remains valid and up-to-date: Yes  Recent Chemotherapy: N/A  Use Nursing Home Standing Orders: Yes             Glucose monitor nursing POCT       Before meals and at bedtime            Mantoux instructions       Give two-step Mantoux (PPD) Per Facility Policy Yes                  Follow-up Appointments     Follow-up and recommended labs and tests        Follow up with primary care provider, Joselito Armas, within 7 days for hospital follow- up.  The following labs/tests are recommended: INR in 1 week.                  Your next 10 appointments already scheduled     Mar 29, 2018 11:15 AM CDT   [facility]tico[facility] with [facility]   [facility]    [address]  [facility] [id number] [phone]            Mar 29, 2018 11:45 AM CDT   LAB with [facility]   [facility]    [address]  [facility] [id number] [phone]           Please do not eat 10-12 hours before your appointment if you are coming in fasting for labs on lipids, cholesterol, or glucose (sugar). This does not apply to pregnant women. Water, hot tea and black coffee (with nothing added) are okay. Do not drink other fluids, diet soda or chew gum.              Additional Services     Occupational Therapy Adult Consult       Evaluate and treat as clinically indicated.    Reason:  R knee psseudogout            Physical Therapy Adult Consult       Evaluate and treat as clinically indicated.    Reason:  Right knee pseudogout                   Follow-up Information     Follow up with Joselito Armas MD. Call in 2 days.    Specialty:  Internal Medicine    Why:  As needed    Contact information:    [address] [facility] [address]  [phone]          Follow up with [facility]. Call in 2 days.    Why:  To schedule follow up    Contact information:    [address], [facility] [address]  [facility] [address]  [phone]          Follow up with [facility] Emergency Department.     Specialty:  EMERGENCY MEDICINE    Why:  If symptoms worsen    Contact information:    [address]  [facility] [id number] [phone]                Further instructions from your care team         Water on the Knee    Water on the knee is also known as knee effusion. The knee joint normally has less than 1 ounce of fluid. Injury or inflammation of the knee joint causes extra fluid to collect there. When this happens, the knee joint looks swollen and is usually painful. It may be hard to fully bend the knee.  The most common cause of water on the knee is osteoarthritis due to wear and tear on the joint cartilage. Other causes include injury to the cartilage, inflammatory arthritis such as gout or rheumatoid arthritis, and infection of the joint.  You may need a needle aspiration, if the cause of your water on the knee is not certain. This procedure removes a sample of joint fluid from the knee for testing. This is done with a local anesthetic. Removing excess fluid may also relieve swelling and pain.  Home care    Limit your activities. Stay off the injured leg as much as possible until pain improves.    Keep your leg elevated to reduce pain and swelling. When sleeping, place a pillow under the injured leg. When sitting, support the injured leg so it is level with your waist. This is very important during the first 48 hours.    Apply an ice pack over the injured area for 15 to 20 minutes every 3 to 6 hours. You should do this for the first 24 to 48 hours. You can make an ice pack by filling a plastic bag that seals at the top with ice cubes and then wrapping it with a thin towel. Continue to use ice packs for relief of pain and swelling as needed. As the ice melts, be careful to avoid getting your wrap, splint, or cast wet. After 48 hours, apply heat(warm shower or warm bath) for 15 to 20 minutes several times a day, or alternate ice and heat. If you have to wear a hook-and-loop knee brace, you  can open it to apply the ice pack, or heat, directly to the knee. Never put ice directly on the skin. Always wrap the ice in a towel or other type of cloth.    You may use over-the-counter pain medicine to control pain, unless another pain medicine was prescribed. If you have chronic liver or kidney disease or have ever had a stomach ulcer or GI bleeding, talk with your healthcare provider before using these medicines.    If crutches or a walker have been recommended, do not put weight on the injured leg until you can do so without pain. Check with your healthcare provider before returning to sports or full work duties.    If you have a hook-and-loop knee brace, you can remove it to bathe and sleep, unless told otherwise.  Follow-up care  Follow up with your healthcare provider as advised.  If you are overweight, talk to your healthcare provider about a weight loss program. The excess weight puts extra strain on your knees.  When to seek medical advice  Call your healthcare provider right away if any of these occur:    Increasing pain, redness, or swelling of the knee    Fever of 100.4 F (38 C) or above lasting for 24 to 48 hours  Date Last Reviewed: 11/23/2015 2000-2017 [employer]. [address] [place] [address]. All rights reserved. This information is not intended as a substitute for professional medical care. Always follow your healthcare professional's instructions.          Pending Results     Date and Time Order Name Status Description    3/5/2018 1349 Anaerobic bacterial culture Preliminary     3/5/2018 1345 Fluid Culture Aerobic Bacterial Preliminary     3/3/2018 2239 Blood culture Preliminary     3/3/2018 2239 Blood culture Preliminary             Statement of Approval     Ordered          03/06/18 1027  I have reviewed and agree with all the recommendations and orders detailed in this document.  EFFECTIVE NOW     Approved and electronically signed by:  Mike Boykin MD      "        Admission Information     Date & Time Provider Department Dept. Phone    3/3/2018 Candie Vivas DO [facility] Observation Department [phone]      Your Vitals Were     Blood Pressure Pulse Temperature Respirations Weight Pulse Oximetry    124/73 (BP Location: Right arm) 105 98.3  F (36.8  C) (Oral) 20 86.2 kg (190 lb) 94%    BMI (Body Mass Index)                   26.5 kg/m2           [email or web address] Information     [employer] lets you send messages to your doctor, view your test results, renew your prescriptions, schedule appointments and more. To sign up, go to [email or web address] . Click on \"Log in\" on the left side of the screen, which will take you to the Welcome page. Then click on \"Sign up Now\" on the right side of the page.     You will be asked to enter the access code listed below, as well as some personal information. Please follow the directions to create your username and password.     Your access code is: [email or web address]  Expires: 3/29/2018  5:39 PM     Your access code will  in 90 days. If you need help or a new code, please call your [place] clinic or [phone].        Care EveryWhere ID     This is your Care EveryWhere ID. This could be used by other organizations to access your [place] medical records  [id number]        Equal Access to Services     SABRA LEAL AH: Hadnate Foster, waaxda lutamanna, qaybta kaalmaemanuel prescott. So [facility] [phone].    ATENCIÓN: Si habla español, tiene a cao disposición servicios gratuitos de asistencia lingüística. Jose L anaya [phone].    We comply with applicable federal civil rights laws and [place] laws. We do not discriminate on the basis of race, color, national origin, age, disability, sex, sexual orientation, or gender identity.               Review of your medicines      START taking        Dose / Directions    colchicine 0.6 MG tablet   Used for:  Pseudogout    "     Dose:  0.6 mg   Take 1 tablet (0.6 mg) by mouth 2 times daily for 7 days Then, re-assess with physician   Quantity:  3 tablet   Refills:  0       naproxen 250 MG tablet   Commonly known as:  NAPROSYN        Dose:  250 mg   Take 1 tablet (250 mg) by mouth 2 times daily (with meals) for 7 days After 7 days, re-assess with a physician before continuing   Refills:  0       oxyCODONE IR 5 MG tablet   Commonly known as:  ROXICODONE        Dose:  2.5-5 mg   Take 0.5-1 tablets (2.5-5 mg) by mouth every 6 hours as needed for moderate to severe pain   Quantity:  18 tablet   Refills:  0       polyethylene glycol Packet   Commonly known as:  MIRALAX/GLYCOLAX   Used for:  Constipation, unspecified constipation type        Dose:  17 g   Take 17 g by mouth daily as needed for constipation   Quantity:  7 packet   Refills:  0       senna-docusate 8.6-50 MG per tablet   Commonly known as:  SENOKOT-S;PERICOLACE   Used for:  Constipation, unspecified constipation type        Dose:  1 tablet   Take 1 tablet by mouth 2 times daily as needed for constipation   Quantity:  100 tablet   Refills:  0         CONTINUE these medicines which have NOT CHANGED        Dose / Directions    ACE NOT PRESCRIBED (INTENTIONAL)   Used for:  Type 2 diabetes, HbA1C goal < 8% (H)        Dose:  1 each   1 each daily ACE Inhibitor not prescribed due to Risk for drug interaction   Quantity:  0 each   Refills:  0       acetaminophen 500 MG tablet   Commonly known as:  TYLENOL        Dose:  500 mg   Take 500 mg by mouth every 6 hours as needed   Refills:  0       ASPIRIN NOT PRESCRIBED   Commonly known as:  INTENTIONAL        by Other route continuous prn Reported on 3/7/2017   Refills:  0       cholestyramine 4 G Packet   Commonly known as:  QUESTRAN        Dose:  1 packet   Take 1 packet by mouth every evening   Refills:  0       Chromium 1000 MCG Tabs        Dose:  500 mcg   Take 500 mcg by mouth daily   Refills:  0       diltiazem 120 MG 24 hr capsule    Commonly known as:  CARDIZEM CD/CARTIA XT   Used for:  Chronic atrial fibrillation (H)        Dose:  120 mg   Take 1 capsule (120 mg) by mouth every evening   Quantity:  90 capsule   Refills:  3       ferrous gluconate 324 (38 FE) MG tablet   Commonly known as:  FERGON   Used for:  Anemia due to blood loss, acute        TAKE 1 TABLET (324 MG) BY MOUTH DAILY (WITH BREAKFAST)   Quantity:  100 tablet   Refills:  3       fish oil-omega-3 fatty acids 1000 MG capsule        Dose:  1 g   Take 1 g by mouth 2 times daily   Refills:  0       glipiZIDE 10 MG 24 hr tablet   Commonly known as:  glipiZIDE XL   Used for:  Chronic atrial fibrillation (H), Actinic keratosis        Dose:  10 mg   Take 1 tablet (10 mg) by mouth 2 times daily   Quantity:  180 tablet   Refills:  3       loperamide 2 MG tablet   Commonly known as:  IMODIUM A-D        1 tablet BID   Quantity:  30 tablet   Refills:  0       metFORMIN 500 MG tablet   Commonly known as:  GLUCOPHAGE   Used for:  Type 2 diabetes mellitus with stage 3 chronic kidney disease, without long-term current use of insulin (H)        Dose:  500 mg   Take 1 tablet (500 mg) by mouth 2 times daily (with meals)   Quantity:  180 tablet   Refills:  1       multivitamin, therapeutic Tabs tablet        Dose:  0.5 tablet   Take 0.5 tablets by mouth 2 times daily   Refills:  0       pioglitazone 45 MG tablet   Commonly known as:  ACTOS   Used for:  Type 2 diabetes mellitus with stage 3 chronic kidney disease, without long-term current use of insulin (H)        TAKE 1 TABLET (45 MG) BY MOUTH DAILY   Quantity:  90 tablet   Refills:  1       STATIN NOT PRESCRIBED (INTENTIONAL)   Used for:  Type 2 diabetes, HbA1C goal < 8% (H)        Dose:  1 each   1 each At Bedtime Statin not prescribed intentionally due to Risk for drug interaction   Quantity:  0 each   Refills:  0       warfarin 5 MG tablet   Commonly known as:  COUMADIN   Used for:  Heart palpitations, New onset atrial fibrillation (H),  Chronic anticoagulation        TAKE 1 TABLET BY MOUTH TUESDAY,THURS AND SUN. TAKE 1/2 TABLET ON MON,WED,FRI AND SAT. OR AS DIRECTED   Quantity:  65 tablet   Refills:  1         STOP taking     blood glucose monitoring lancets           ONETOUCH ULTRA test strip   Generic drug:  blood glucose monitoring                Where to get your medicines      Some of these will need a paper prescription and others can be bought over the counter. Ask your nurse if you have questions.     Bring a paper prescription for each of these medications     oxyCODONE IR 5 MG tablet       You don't need a prescription for these medications     colchicine 0.6 MG tablet    naproxen 250 MG tablet    polyethylene glycol Packet    senna-docusate 8.6-50 MG per tablet                Protect others around you: Learn how to safely use, store and throw away your medicines at www.disposemymeds.org.        Information about OPIOIDS     PRESCRIPTION OPIOIDS: WHAT YOU NEED TO KNOW    Prescription opioids can be used to help relieve moderate to severe pain and are often prescribed following a surgery or injury, or for certain health conditions. These medications can be an important part of treatment but also come with serious risks. It is important to work with your health care provider to make sure you are getting the safest, most effective care.    WHAT ARE THE RISKS AND SIDE EFFECTS OF OPIOID USE?  Prescription opioids carry serious risks of addiction and overdose, especially with prolonged use. An opioid overdose, often marked by slowed breathing can cause sudden death. The use of prescription opioids can have a number of side effects as well, even when taken as directed:      Tolerance - meaning you might need to take more of a medication for the same pain relief    Physical dependence - meaning you have symptoms of withdrawal when a medication is stopped    Increased sensitivity to pain    Constipation    Nausea, vomiting, and dry  mouth    Sleepiness and dizziness    Confusion    Depression    Low levels of testosterone that can result in lower sex drive, energy, and strength    Itching and sweating    RISKS ARE GREATER WITH:    History of drug misuse, substance use disorder, or overdose    Mental health conditions (such as depression or anxiety)    Sleep apnea    Older age (65 years or older)    Pregnancy    Avoid alcohol while taking prescription opioids.   Also, unless specifically advised by your health care provider, medications to avoid include:    Benzodiazepines (such as Xanax or Valium)    Muscle relaxants (such as Soma or Flexeril)    Hypnotics (such as Ambien or Lunesta)    Other prescription opioids    KNOW YOUR OPTIONS:  Talk to your health care provider about ways to manage your pain that do not involve prescription opioids. Some of these options may actually work better and have fewer risks and side effects:    Pain relievers such as acetaminophen, ibuprofen, and naproxen    Some medications that are also used for depression or seizures    Physical therapy and exercise    Cognitive behavioral therapy, a psychological, goal-directed approach, in which patients learn how to modify physical, behavioral, and emotional triggers of pain and stress    IF YOU ARE PRESCRIBED OPIOIDS FOR PAIN:    Never take opioids in greater amounts or more often than prescribed    Follow up with your primary health care provider and work together to create a plan on how to manage your pain.    Talk about ways to help manage your pain that do not involve prescription opioids    Talk about all concerns and side effects    Help prevent misuse and abuse    Never sell or share prescription opioids    Never use another person's prescription opioids    Store prescription opioids in a secure place and out of reach of others (this may include visitors, children, friends, and family)    Visit www.cdc.gov/drugoverdose to learn about risks of opioid abuse and  overdose    If you believe you may be struggling with addiction, tell your health care provider and ask for guidance or call [facility]'s National Helpline at 3-755-734-HELP    LEARN MORE / www.cdc.gov/drugoverdose/prescribing/guideline.html    Safely dispose of unused prescription opioids: Find your local drug take-back programs and more information about the importance of safe disposal at www.doseofreality.mn.gov             Medication List: This is a list of all your medications and when to take them. Check marks below indicate your daily home schedule. Keep this list as a reference.      Medications           Morning Afternoon Evening Bedtime As Needed    ACE NOT PRESCRIBED (INTENTIONAL)   1 each daily ACE Inhibitor not prescribed due to Risk for drug interaction                                acetaminophen 500 MG tablet   Commonly known as:  TYLENOL   Take 500 mg by mouth every 6 hours as needed   Last time this was given:  650 mg on 3/6/2018 10:08 AM                                ASPIRIN NOT PRESCRIBED   Commonly known as:  INTENTIONAL   by Other route continuous prn Reported on 3/7/2017                                cholestyramine 4 G Packet   Commonly known as:  QUESTRAN   Take 1 packet by mouth every evening   Last time this was given:  4 g on 3/5/2018  8:06 PM                                Chromium 1000 MCG Tabs   Take 500 mcg by mouth daily                                colchicine 0.6 MG tablet   Take 1 tablet (0.6 mg) by mouth 2 times daily for 7 days Then, re-assess with physician   Last time this was given:  1.2 mg on 3/6/2018 11:13 AM                                diltiazem 120 MG 24 hr capsule   Commonly known as:  CARDIZEM CD/CARTIA XT   Take 1 capsule (120 mg) by mouth every evening   Last time this was given:  120 mg on 3/5/2018  8:06 PM                                ferrous gluconate 324 (38 FE) MG tablet   Commonly known as:  FERGON   TAKE 1 TABLET (324 MG) BY MOUTH DAILY (WITH BREAKFAST)    Last time this was given:  324 mg on 3/6/2018  8:43 AM                                fish oil-omega-3 fatty acids 1000 MG capsule   Take 1 g by mouth 2 times daily                                glipiZIDE 10 MG 24 hr tablet   Commonly known as:  glipiZIDE XL   Take 1 tablet (10 mg) by mouth 2 times daily   Last time this was given:  10 mg on 3/6/2018  8:43 AM                                loperamide 2 MG tablet   Commonly known as:  IMODIUM A-D   1 tablet BID                                metFORMIN 500 MG tablet   Commonly known as:  GLUCOPHAGE   Take 1 tablet (500 mg) by mouth 2 times daily (with meals)   Last time this was given:  500 mg on 3/6/2018  8:43 AM                                multivitamin, therapeutic Tabs tablet   Take 0.5 tablets by mouth 2 times daily                                naproxen 250 MG tablet   Commonly known as:  NAPROSYN   Take 1 tablet (250 mg) by mouth 2 times daily (with meals) for 7 days After 7 days, re-assess with a physician before continuing   Last time this was given:  250 mg on 3/6/2018 11:13 AM                                oxyCODONE IR 5 MG tablet   Commonly known as:  ROXICODONE   Take 0.5-1 tablets (2.5-5 mg) by mouth every 6 hours as needed for moderate to severe pain   Last time this was given:  5 mg on 3/6/2018  8:43 AM                                pioglitazone 45 MG tablet   Commonly known as:  ACTOS   TAKE 1 TABLET (45 MG) BY MOUTH DAILY   Last time this was given:  45 mg on 3/6/2018  8:43 AM                                polyethylene glycol Packet   Commonly known as:  MIRALAX/GLYCOLAX   Take 17 g by mouth daily as needed for constipation                                senna-docusate 8.6-50 MG per tablet   Commonly known as:  SENOKOT-S;PERICOLACE   Take 1 tablet by mouth 2 times daily as needed for constipation                                STATIN NOT PRESCRIBED (INTENTIONAL)   1 each At Bedtime Statin not prescribed intentionally due to Risk for drug  interaction                                warfarin 5 MG tablet   Commonly known as:  COUMADIN   TAKE 1 TABLET BY MOUTH TUESDAY,THURS AND SUN. TAKE 1/2 TABLET ON MON,WED,FRI AND SAT. OR AS DIRECTED   Last time this was given:  1 mg on 3/5/2018  6:12 PM                                   15

## 2023-07-17 NOTE — PLAN OF CARE
Goal Outcome Evaluation:      Plan of Care Reviewed With: patient             A&O x4, forgetful. Oxygen: h2-3 L NC. Lung sounds , diminished, has weak cough. Encouraged IS. Ax2, turn/repo. Rook boots on, heels elevated. R heel wound changed. Coccyx red, paste on. NO brief. Gabby area red/ rash ordered intra dry for groin, and left for MD to antifungal power. NO BM today, small smear. Tele: afib CVR.  Denied pain. External catheter in place for incontinence. Tolerating diet,but no appetite. Takes pills whole. Alarms on. Refused to sit in chair. Turning q 2 hrs. BG monitored.      Render In Strict Bullet Format?: No Detail Level: Zone Plan: Discussed increasing tretinoin but pt is happy with current treatment and would like to keep it the same. Initiate Treatment: OTC acne wash for the body Continue Regimen: Clindamycin 1% topical swabs AAA BID\\nTretinoin 0.05% cream AAA QHS

## 2024-04-07 NOTE — TELEPHONE ENCOUNTER
MARILU-PROCEDURAL ANTICOAGULATION  MANAGEMENT    SUBJECTIVE/OBJECTIVE     Louis Keller Jr., a 89 year old male on Apixaban (Eliquis), with planned RUI on 12/13/22.    Indication for Anticoagulation: Atrial Fibrillation ([id number] = 3 (Age >= 75 and Diabetes)    Wt Readings from Last 2 Encounters:   10/25/22 80.2 kg (176 lb 12.8 oz)   06/21/22 78 kg (172 lb)      Lab Results   Component Value Date    CR 0.79 08/12/2022    CR 0.84 05/06/2022     CrCl = 66 ml/min    ASSESSMENT/RECOMMENDATION     DOAC hold duration is bleeding risk of the procedure, renal function of the patient.     2022 Chest Perioperative Management of Antithrombotic Therapy guidelines suggest against perioperative bridging in DOAC patients; considering their rapid offset and rapid onset of action.      Pre-Procedure:  o Hold apixaban (Eliquis) 72 hours prior to procedure for required for interventional spine/pain procedure OR spinal anesthesia or regional block. Take last dose on Friday, December 9 in PM.  o No Bridge      Post-Procedure:    Resume therapeutic Apixaban 2.5 mg BID (Eliquis reduced to 2.5 mg twice daily secondary to bruising and age) if okay with provider ~24 hours post interventional pain procedure    Plan routed to referring provider for approval  ?   [facility]   .

## 2024-11-08 NOTE — MR AVS SNAPSHOT
Louis Keller Jr.   2/16/2017 1:45 PM   Anticoagulation Therapy Visit    Description:  84 year old male   Provider:  [facility]   Department:  [facility]           INR as of 2/16/2017     Today's INR 2.0      Anticoagulation Summary as of 2/16/2017     INR goal 2.0-3.0   Today's INR 2.0   Full instructions 5 mg on Sun, Tue, Thu; 2.5 mg all other days   Next INR check 3/16/2017    Indications   Long-term (current) use of anticoagulants [Z79.01] [Z79.01]  Atrial fibrillation (H) [I48.91]         Your next Anticoagulation Clinic appointment(s)     Mar 16, 2017 11:00 AM CDT   [facility]    [address] [facility] [address]  [facility] [address] [phone]              Contact Numbers     [facility] Phone Numbers:  Anticoagulation Clinic Appointments : [phone]  Anticoagulation Nurse: [phone]         February 2017 Details    Sun Mon Tue Wed Thu Fri Sat        1               2               3               4                 5               6               7               8               9               10               11                 12               13               14               15               16      5 mg   See details      17      2.5 mg         18      2.5 mg           19      5 mg         20      2.5 mg         21      5 mg         22      2.5 mg         23      5 mg         24      2.5 mg         25      2.5 mg           26      5 mg         27      2.5 mg         28      5 mg              Date Details   02/16 This INR check               How to take your warfarin dose     To take:  2.5 mg Take 0.5 of a 5 mg tablet.    To take:  5 mg Take 1 of the 5 mg tablets.           March 2017 Details    Sun Mon Tue Wed Thu Fri Sat        1      2.5 mg         2      5 mg         3      2.5 mg         4      2.5 mg           5      5 mg         6      2.5 mg         7       5 mg         8      2.5 mg         9      5 mg         10      2.5 mg         11      2.5 mg           12      5 mg         13      2.5 mg         14      5 mg         15      2.5 mg         16            17               18                 19               20               21               22               23               24               25                 26               27               28               29               30               31                 Date Details   No additional details    Date of next INR:  3/16/2017         How to take your warfarin dose     To take:  2.5 mg Take 0.5 of a 5 mg tablet.    To take:  5 mg Take 1 of the 5 mg tablets.            Imaging Studies/Medications

## 2025-04-23 NOTE — MR AVS SNAPSHOT
After Visit Summary   11/15/2018    Louis Keller Jr.    MRN: [id number]           Patient Information     Date Of Birth          6/26/1932        Visit Information        Provider Department      11/15/2018 1:10 PM Susanna Haque PA-C [facility]        Today's Diagnoses     Venous (peripheral) insufficiency           Follow-ups after your visit        Additional Services     Follow-Up with Cardiac Advanced Practice Provider                 Your next 10 appointments already scheduled     Nov 20, 2018 11:15 AM CST   Anticoagulation Visit with [facility]   [facility]    [address] [facility] [address]   [phone]            Dec 10, 2018  2:00 PM CST   US ENDOVENOUS ABLATION THERAPY INCOMPETENT VEIN RT, 2OR>VEINS with SUVUS1   [facility] [place] ([facility])    [address] [place] [address]   [phone]            Dec 12, 2018 11:30 AM CST   US LOWER EXTREMITY VENOUS DUPLEX RIGHT with SUVUS1   [facility] ([facility])    [address] [place] [address] [address]  [facility] [address]   [phone]           How do I prepare for my exam? (Food and drink instructions) No Food and Drink Restrictions.  How do I prepare for my exam? (Other instructions) You do not need to do anything special to prepare for your exam.  What should I wear: Wear comfortable clothes.  How long does the exam take: Most ultrasounds take 30 to 60 minutes.  What should I bring: Bring a list of your medicines, including vitamins, minerals and over-the-counter drugs. It is safest to leave personal items at home.  Do I need a :  No  is needed.  What do I need to tell my doctor: Tell your doctor about any allergies you may have.  What should I do after the exam: No restrictions, You  Received the following message from PT - requested follow up. Called and spoke to PT, scheduled 05/02/25 at 11:10am with Dr. Cruz at [facility]. PT voiced understanding. Also informed PT that he has two prior no shows to our office, and that if he needed to move appt. To anther day to please call office to reschedule. He again voiced understanding.    "may resume normal activities.  What is this test: An ultrasound uses sound waves to make pictures of the body. Sound waves do not cause pain. The only discomfort may be the pressure of the wand against your skin or full bladder.  Who should I call with questions: If you have any questions, please call the Imaging Department where you will have your exam. Directions, parking instructions, and other information is available on our website, [email or web address].            Dec 19, 2018  7:40 AM CST   SHORT with Joselito Armas MD   [facility] ([facility]    [address]  [facility] [address]   [phone]              Future tests that were ordered for you today     Open Future Orders        Priority Expected Expires Ordered    Follow-Up with Cardiac Advanced Practice Provider Routine 5/14/2019 11/15/2019 11/15/2018            Who to contact     If you have questions or need follow up information about today's clinic visit or your schedule please contact [facility] directly at [phone].  Normal or non-critical lab and imaging results will be communicated to you by MyChart, letter or phone within 4 business days after the clinic has received the results. If you do not hear from us within 7 days, please contact the clinic through MyChart or phone. If you have a critical or abnormal lab result, we will notify you by phone as soon as possible.  Submit refill requests through [employer] or call your pharmacy and they will forward the refill request to us. Please allow 3 business days for your refill to be completed.          Additional Information About Your Visit        Care EveryWhere ID     This is your Care EveryWhere ID. This could be used by other organizations to access your [facility] medical records  [id number]        Your Vitals Were     Pulse Height BMI (Body Mass Index)             83 1.803 m (5' 11\") 26.57 kg/m2    "       Blood Pressure from Last 3 Encounters:   11/15/18 112/60   10/03/18 136/74   09/21/18 118/68    Weight from Last 3 Encounters:   11/15/18 86.4 kg (190 lb 8 oz)   10/03/18 85.2 kg (187 lb 12.8 oz)   09/21/18 83.6 kg (184 lb 3.2 oz)              We Performed the Following     Follow-Up with Cardiac Advanced Practice Provider        Primary Care Provider Office Phone # Fax #    Joselito Armas [phone]       [address] [facility] [address]        Equal Access to Services     [facility]: Hadii aad ku hadasho Soomaali, waaxda luqadaha, qaybta kaalmada adeegyada, waxsujey weathers hayjimn adeallyson kelsey . So [facility] [place] [phone].    ATENCIÓN: Si habla español, tiene a cao disposición servicios gratuitos de asistencia lingüística. Llame al [phone].    We comply with applicable federal civil rights laws and [place] laws. We do not discriminate on the basis of race, color, national origin, age, disability, sex, sexual orientation, or gender identity.            Thank you!     Thank you for choosing [facility]  for your care. Our goal is always to provide you with excellent care. Hearing back from our patients is one way we can continue to improve our services. Please take a few minutes to complete the written survey that you may receive in the mail after your visit with us. Thank you!             Your Updated Medication List - Protect others around you: Learn how to safely use, store and throw away your medicines at www.disposemymeds.org.          This list is accurate as of 11/15/18  1:37 PM.  Always use your most recent med list.                   Brand Name Dispense Instructions for use Diagnosis    ACE NOT PRESCRIBED (INTENTIONAL)     0 each    1 each daily ACE Inhibitor not prescribed due to Risk for drug interaction    Type 2 diabetes, HbA1C goal < 8% (H)       ASPIRIN NOT PRESCRIBED    INTENTIONAL     by Other route continuous prn  Reported on 3/7/2017        * cholestyramine 4 g Packet    QUESTRAN     Take 1 packet by mouth every evening        * cholestyramine 4 g Packet    QUESTRAN    180 packet    TAKE 1 PACKET (4 G) BY MOUTH 2 TIMES DAILY (WITH MEALS)    Postcholecystectomy diarrhea, Hyperlipidemia LDL goal <100       Chromium 1000 MCG Tabs      Take 500 mcg by mouth daily        diltiazem 120 MG 24 hr capsule    CARDIZEM CD/CARTIA XT    90 capsule    Take 1 capsule (120 mg) by mouth every evening    Chronic atrial fibrillation (H)       ferrous gluconate 324 (38 Fe) MG tablet    FERGON    100 tablet    TAKE 1 TABLET (324 MG) BY MOUTH DAILY (WITH BREAKFAST)    Anemia due to blood loss, acute       fish oil-omega-3 fatty acids 1000 MG capsule      Take 1 g by mouth 2 times daily        furosemide 20 MG tablet    LASIX    30 tablet    Take 1 tablet (20 mg) by mouth daily    QUESADA (dyspnea on exertion), Diastolic dysfunction, Bilateral leg edema       glipiZIDE 10 MG 24 hr tablet    GLUCOTROL XL    180 tablet    TAKE 1 TABLET (10 MG) BY MOUTH 2 TIMES DAILY    Chronic atrial fibrillation (H), Actinic keratosis       loperamide 2 MG tablet    IMODIUM A-D    30 tablet    daily        * metFORMIN 500 MG tablet    GLUCOPHAGE    180 tablet    Take 1 tablet (500 mg) by mouth 2 times daily (with meals)    Type 2 diabetes mellitus with stage 3 chronic kidney disease, without long-term current use of insulin (H)       * metFORMIN 500 MG tablet    GLUCOPHAGE    180 tablet    TAKE 1 TABLET (500 MG) BY MOUTH 2 TIMES DAILY (WITH MEALS)    DM type 2 (diabetes mellitus, type 2) (H)       multivitamin, therapeutic Tabs tablet      Take 0.5 tablets by mouth 2 times daily        ONETOUCH ULTRA test strip   Generic drug:  blood glucose monitoring     100 strip    USE TO TEST DAILY    Type 2 diabetes mellitus with stage 3 chronic kidney disease (H)       pioglitazone 45 MG tablet    ACTOS    90 tablet    TAKE 1 TABLET (45 MG) BY MOUTH DAILY    Type 2 diabetes  mellitus with stage 3 chronic kidney disease, without long-term current use of insulin (H)       senna-docusate 8.6-50 MG per tablet    SENOKOT-S;PERICOLACE    100 tablet    Take 1 tablet by mouth 2 times daily as needed for constipation    Constipation, unspecified constipation type       STATIN NOT PRESCRIBED (INTENTIONAL)     0 each    1 each At Bedtime Statin not prescribed intentionally due to Risk for drug interaction    Type 2 diabetes, HbA1C goal < 8% (H)       tamsulosin 0.4 MG capsule    FLOMAX    30 capsule    Take 1 capsule (0.4 mg) by mouth daily    Urinary frequency       TYLENOL PO      Take 1,000 mg by mouth 3 times daily        warfarin 5 MG tablet    COUMADIN    60 tablet    TAKE 1 TABLET BY MOUTH TUESDAY,THURS AND SUN. TAKE 1/2 TABLET ON MON,WED,FRI AND SAT. OR AS DIRECTED    Heart palpitations, New onset atrial fibrillation (H), Chronic anticoagulation       * Notice:  This list has 4 medication(s) that are the same as other medications prescribed for you. Read the directions carefully, and ask your doctor or other care provider to review them with you.

## (undated) DEVICE — DRAPE SLEEVE MEDT STERILE 10FT 6177

## (undated) DEVICE — PACK PCMKR PERM SRG PROC LF SAN32PC573

## (undated) RX ORDER — LIDOCAINE HYDROCHLORIDE 10 MG/ML
INJECTION, SOLUTION EPIDURAL; INFILTRATION; INTRACAUDAL; PERINEURAL
Status: DISPENSED
Start: 2019-08-05

## (undated) RX ORDER — CEFAZOLIN SODIUM 2 G/100ML
INJECTION, SOLUTION INTRAVENOUS
Status: DISPENSED
Start: 2019-08-05